# Patient Record
Sex: FEMALE | Race: WHITE | NOT HISPANIC OR LATINO | Employment: UNEMPLOYED | URBAN - METROPOLITAN AREA
[De-identification: names, ages, dates, MRNs, and addresses within clinical notes are randomized per-mention and may not be internally consistent; named-entity substitution may affect disease eponyms.]

---

## 2017-11-10 ENCOUNTER — APPOINTMENT (OUTPATIENT)
Dept: RADIOLOGY | Facility: CLINIC | Age: 62
End: 2017-11-10
Payer: COMMERCIAL

## 2017-11-10 ENCOUNTER — OFFICE VISIT (OUTPATIENT)
Dept: URGENT CARE | Facility: CLINIC | Age: 62
End: 2017-11-10
Payer: COMMERCIAL

## 2017-11-10 DIAGNOSIS — M25.562 PAIN IN LEFT KNEE: ICD-10-CM

## 2017-11-10 PROCEDURE — 99213 OFFICE O/P EST LOW 20 MIN: CPT

## 2017-11-10 PROCEDURE — S9088 SERVICES PROVIDED IN URGENT: HCPCS

## 2017-11-10 PROCEDURE — 73562 X-RAY EXAM OF KNEE 3: CPT

## 2017-11-13 NOTE — PROGRESS NOTES
Assessment    1  Left knee pain (339 46) (M25 562)    Plan  Left knee pain    · * XR KNEE 3 VW LEFT NON INJURY; Status:Active - Retrospective Authorization; Requested for:10Nov2017;     Discussion/Summary  Discussion Summary:   Follow up with PCP if no improvement  Take Tylenol or Motrin as needed for pain  Understands and agrees with treatment plan: The treatment plan was reviewed with the patient/guardian  The patient/guardian understands and agrees with the treatment plan   Counseling Documentation With Imm: The patient was counseled regarding instructions for management  Chief Complaint    1  Knee Pain  Chief Complaint Free Text Note Form: C/O pain in her posterior left knee with no known injury x 1 week  Pt states that the pain is worse with rising from a sitting position and walking  History of Present Illness  HPI: Having left posterior knee pain for past week  Worse with ambulation  No swelling of knee or lower extremity  Hospital Based Practices Required Assessment:  Pain Assessment  the patient states they have pain  The pain is located in the left posterior knee  The patient describes the pain as aching  (on a scale of 0 to 10, the patient rates the pain at 5 )  Abuse And Domestic Violence Screen   Yes, the patient is safe at home  -- The patient states no one is hurting them  Depression And Suicide Screen  No, the patient has not had thoughts of hurting themself  No, the patient has not felt depressed in the past 7 days  Knee Pain: Santana Wren presents with complaints of knee pain  Associated symptoms include no swelling,-- no warmth,-- no redness,-- no ecchymosis,-- no stiffness,-- no decreased range of motion,-- no locking,-- no instability,-- no difficulty bearing weight,-- no difficulty ambulating,-- no fever,-- no chills,-- no localized rash,-- no generalized rash-- and-- no pain in other joints        Review of Systems  Focused-Female:  Constitutional: no fever-- and-- no chills  ENT: no nasal discharge-- and-- no hoarseness  Cardiovascular: no chest pain  Respiratory: no shortness of breath,-- no cough,-- no wheezing-- and-- no shortness of breath during exertion  Gastrointestinal: no abdominal pain,-- no nausea-- and-- no vomiting  Musculoskeletal: no myalgias  Integumentary: no rashes  Neurological: no headache-- and-- no dizziness  ROS Reviewed:   ROS reviewed  Active Problems  1  Bipolar disorder (296 80) (F31 9)   2  Chest pain (786 50) (R07 9)   3  Constipation (564 00) (K59 00)   4  Dermatitis of foot (692 9) (L30 9)   5  Hypercholesterolemia (272 0) (E78 00)   6  Hypothyroidism (244 9) (E03 9)   7  Pain, dental (525 9) (K08 89)   8  Subconjunctival hemorrhage (372 72) (H11 30)    Past Medical History  1  History of Blood in underwear (459 0) (R58)   2  History of chest pain (V13 89) (Z87 898)   3  No pertinent past medical history   4  History of Perineal laceration of vulva (618 7) (S31 41XA)  Active Problems And Past Medical History Reviewed: The active problems and past medical history were reviewed and updated today  Family History  Family History    1  No pertinent family history    Social History   · Denied: History of Alcohol   · Never A Smoker  Social History Reviewed: The social history was reviewed and updated today  Surgical History    1  History of Ankle Incision   2  History of Dilation And Curettage    Current Meds   1  Doxepin HCl CAPS; Therapy: (Recorded:09Jan2014) to Recorded   2  Lithium Carbonate TABS; Therapy: (Recorded:09Jan2014) to Recorded   3  Pravachol TABS; Therapy: ([id number]) to Recorded   4  Synthroid TABS; Therapy: ([id number]) to Recorded   5  Vitamin C CAPS; Therapy: ([id number]) to Recorded  Medication List Reviewed: The medication list was reviewed and updated today  Allergies    1   No Known Drug Allergies    Vitals  Signs   Recorded: [id number] 02:22PM   Temperature: 95 9 F  Heart Rate: 96  Respiration: 20  Systolic: 070  Diastolic: 88  Height: 5 ft   Weight: 240 lb   BMI Calculated: 46 87  BSA Calculated: 2 01  O2 Saturation: 97  Pain Scale: 5    Physical Exam   Constitutional  General appearance: No acute distress, well appearing and well nourished  Eyes  Conjunctiva and lids: No swelling, erythema or discharge  Ears, Nose, Mouth, and Throat  External inspection of ears and nose: Normal    Pulmonary  Respiratory effort: No increased work of breathing or signs of respiratory distress  Musculoskeletal  Gait and station: Normal    Skin  Skin and subcutaneous tissue: Normal without rashes or lesions  Psychiatric  Mood and affect: Normal        Results/Data  Diagnostic Studies Reviewed: I personally reviewed the films/images/results in the office today  My interpretation follows  X-ray Review left knee - degenerative changes        Signatures   Electronically signed by : JONEL Ramírez; Nov 10 2017  2:59PM EST                       (Author)    Electronically signed by : REKHA Drake ; Nov 12 2017 11:37AM EST                       (Co-author)

## 2018-01-12 ENCOUNTER — OFFICE VISIT (OUTPATIENT)
Dept: URGENT CARE | Facility: CLINIC | Age: 63
End: 2018-01-12
Payer: COMMERCIAL

## 2018-01-12 PROCEDURE — S9088 SERVICES PROVIDED IN URGENT: HCPCS

## 2018-01-12 PROCEDURE — 99213 OFFICE O/P EST LOW 20 MIN: CPT

## 2018-01-16 NOTE — PROGRESS NOTES
Assessment   1  Ecchymosis (190 66) (R58)    Discussion/Summary   Discussion Summary:    Patient informed that bruise was not likely due to [address]  Continue medications as prescribed  Is more likely due to use of baby aspirin  Patient informed to continue using baby aspirin  She has no other bruising anywhere else on her body  No petechiae  If you develop more bruises, follow up with PCP  Medication Side Effects Reviewed: Possible side effects of new medications were reviewed with the patient/guardian today  Understands and agrees with treatment plan: The treatment plan was reviewed with the patient/guardian  The patient/guardian understands and agrees with the treatment plan      Chief Complaint   1  Skin Wound  Chief Complaint Free Text Note Form: C/O ecchymotic area on mid low abdomen without known injury x 2 days  Pt was started on Januvia for Borderline Diabetes and was concerned that it was causing bruising  Pt does take Aspirin 81 mg daily      History of Present Illness   HPI: 80-year-old female here with bruise on her abdomen  She does not recall any injury  She noticed a bruise about 2 days ago  Recently started on Januvia for borderline diabetes and was concerned if the medication was causing bruising  She does take a baby aspirin daily  Denies any other bruises  Hospital Based Practices Required Assessment:      Pain Assessment      the patient states they do not have pain  (on a scale of 0 to 10, the patient rates the pain at 0 )      Abuse And Domestic Violence Screen       Yes, the patient is safe at home  -- The patient states no one is hurting them  Depression And Suicide Screen  No, the patient has not had thoughts of hurting themself  No, the patient has not felt depressed in the past 7 days  Review of Systems   Focused-Female:      Constitutional: No fever, no chills, feels well, no tiredness, no recent weight gain or loss        Cardiovascular: no complaints of slow or fast heart rate, no chest pain, no palpitations, no leg claudication or lower extremity edema  Respiratory: no complaints of shortness of breath, no wheezing, no dyspnea on exertion, no orthopnea or PND  Integumentary: as noted in HPI  ROS Reviewed:    ROS reviewed  Active Problems   1  Bipolar disorder (296 80) (F31 9)   2  Chest pain (786 50) (R07 9)   3  Constipation (564 00) (K59 00)   4  Dermatitis of foot (692 9) (L30 9)   5  Hypercholesterolemia (272 0) (E78 00)   6  Hypothyroidism (244 9) (E03 9)   7  Left knee pain (719 46) (M25 562)   8  Pain, dental (525 9) (K08 89)   9  Subconjunctival hemorrhage (372 72) (H11 30)    Past Medical History   1  History of Blood in underwear (459 0) (R58)   2  History of chest pain (V13 89) (Z87 898)   3  No pertinent past medical history   4  History of Perineal laceration of vulva (618 7) (S31 41XA)  Active Problems And Past Medical History Reviewed: The active problems and past medical history were reviewed and updated today  Family History   Family History    1  No pertinent family history  Family History Reviewed: The family history was reviewed and updated today  Social History    · Denied: History of Alcohol   · Never A Smoker  Social History Reviewed: The social history was reviewed and updated today  The social history was reviewed and is unchanged  Surgical History   1  History of Ankle Incision   2  History of Dilation And Curettage  Surgical History Reviewed: The surgical history was reviewed and updated today  Current Meds    1  Doxepin HCl CAPS; Therapy: (Recorded:2014) to Recorded   2  Lithium Carbonate TABS; Therapy: (Recorded:2014) to Recorded   3  Pravachol TABS; Therapy: ([id number]) to Recorded   4  Synthroid TABS; Therapy: ([id number]) to Recorded   5  Vitamin C CAPS; Therapy: ((15) [id number]) to Recorded  Medication List Reviewed:     The medication list was reviewed and updated today  Allergies   1  No Known Drug Allergies    Vitals   Signs   Recorded: 12Jan2018 02:27PM   Temperature: 99 8 F  Heart Rate: 94  Respiration: 18  Systolic: 347  Diastolic: 90  O2 Saturation: 97    Physical Exam        Constitutional      General appearance: No acute distress, well appearing and well nourished  Pulmonary      Respiratory effort: No increased work of breathing or signs of respiratory distress  Auscultation of lungs: Clear to auscultation  Cardiovascular      Auscultation of heart: Normal rate and rhythm, normal S1 and S2, without murmurs  Abdomen      Abdomen: Abnormal  -- 2 inch diameter area of ecchymoses going through healing stages with some yellowing        Signatures    Electronically signed by : Brad Ugalde PAC; Jan 12 2018  2:56PM EST                       (Author)     Electronically signed by : REKHA Camacho ; Rio 15 2018 11:34AM EST                       (Co-author)

## 2018-01-23 VITALS
DIASTOLIC BLOOD PRESSURE: 90 MMHG | HEART RATE: 94 BPM | TEMPERATURE: 99.8 F | OXYGEN SATURATION: 97 % | RESPIRATION RATE: 18 BRPM | SYSTOLIC BLOOD PRESSURE: 152 MMHG

## 2018-04-20 ENCOUNTER — OFFICE VISIT (OUTPATIENT)
Dept: URGENT CARE | Facility: CLINIC | Age: 63
End: 2018-04-20
Payer: COMMERCIAL

## 2018-04-20 ENCOUNTER — APPOINTMENT (OUTPATIENT)
Dept: RADIOLOGY | Facility: CLINIC | Age: 63
End: 2018-04-20
Payer: COMMERCIAL

## 2018-04-20 VITALS
HEIGHT: 60 IN | WEIGHT: 235 LBS | HEART RATE: 101 BPM | BODY MASS INDEX: 46.13 KG/M2 | TEMPERATURE: 98 F | SYSTOLIC BLOOD PRESSURE: 128 MMHG | DIASTOLIC BLOOD PRESSURE: 82 MMHG

## 2018-04-20 DIAGNOSIS — M25.561 ACUTE PAIN OF RIGHT KNEE: Primary | ICD-10-CM

## 2018-04-20 DIAGNOSIS — M25.561 ACUTE PAIN OF RIGHT KNEE: ICD-10-CM

## 2018-04-20 PROCEDURE — S9088 SERVICES PROVIDED IN URGENT: HCPCS | Performed by: PHYSICIAN ASSISTANT

## 2018-04-20 PROCEDURE — 99213 OFFICE O/P EST LOW 20 MIN: CPT | Performed by: PHYSICIAN ASSISTANT

## 2018-04-20 PROCEDURE — 73562 X-RAY EXAM OF KNEE 3: CPT

## 2018-04-20 RX ORDER — AMOXICILLIN 400 MG/5ML
800 POWDER, FOR SUSPENSION ORAL 2 TIMES DAILY
Qty: 280 ML | Refills: 0 | Status: SHIPPED | OUTPATIENT
Start: 2018-04-20 | End: 2018-04-20 | Stop reason: CLARIF

## 2018-04-20 NOTE — PROGRESS NOTES
[facility]        NAME: Emma Hinds is a 65 y o  female  : 1955    MRN: [id number]  DATE: 2018  TIME: 3:36 PM    Assessment and Plan   Acute pain of right knee [M25 561]  1  Acute pain of right knee  XR knee 3 vw right non injury         Patient Instructions     Follow up with PCP in 3-5 days  Proceed to  ER if symptoms worsen  Chief Complaint     Chief Complaint   Patient presents with    Leg Pain     right leg pain started 1 week ago         History of Present Illness       Patient presents with right knee pain which started 1 week ago  She states the pain occasionally radiates to her right groin  She denies any swelling to the knee or lower extremity no redness or warmth to her thigh  She states that when she is walking the pain is worse  No fever chills paresthesias headaches palpitations  Review of Systems   Review of Systems   Constitutional: Negative for chills and fever  HENT: Negative for rhinorrhea and sore throat  Eyes: Negative for redness  Respiratory: Negative for cough, shortness of breath and wheezing  Cardiovascular: Negative for chest pain and palpitations  Gastrointestinal: Negative for abdominal pain, diarrhea, nausea and vomiting  Genitourinary: Negative for difficulty urinating  Musculoskeletal: Negative for joint swelling  Skin: Negative for rash  Neurological: Negative for dizziness and headaches  Hematological: Negative for adenopathy  Current Medications     No current outpatient prescriptions on file      Current Allergies     Allergies as of 2018    (No Known Allergies)            The following portions of the patient's history were reviewed and updated as appropriate: allergies, current medications, past family history, past medical history, past social history, past surgical history and problem list      Past Medical History:   Diagnosis Date    Allergic     Depression     Disease of thyroid gland  GERD (gastroesophageal reflux disease)        History reviewed  No pertinent surgical history  No family history on file  Medications have been verified  Objective   /82 (BP Location: Right arm, Patient Position: Sitting, Cuff Size: Standard)   Pulse 101   Temp 98 °F (36 7 °C) (Tympanic)   Ht 5' (1 524 m)   Wt 107 kg (235 lb)   BMI 45 90 kg/m²        Physical Exam     Physical Exam   Constitutional: She is oriented to person, place, and time  She appears well-developed and well-nourished  HENT:   Head: Normocephalic and atraumatic  Eyes: Conjunctivae are normal    Neck: Normal range of motion  Cardiovascular: Normal rate and regular rhythm  Pulmonary/Chest: Effort normal    Musculoskeletal:   Right knee without any erythema bruising swelling or tenderness to palpation  No edema of the lower extremity  No palpable cords negative Homans  Neurological: She is alert and oriented to person, place, and time  Skin: Skin is warm and dry  No rash noted  Psychiatric: She has a normal mood and affect   Her behavior is normal  Judgment and thought content normal      X-ray viewed by me and independently reviewed by me contemporaneously as patellar degenerative changes

## 2018-06-11 ENCOUNTER — TRANSCRIBE ORDERS (OUTPATIENT)
Dept: PHYSICAL THERAPY | Facility: CLINIC | Age: 63
End: 2018-06-11

## 2018-06-11 ENCOUNTER — EVALUATION (OUTPATIENT)
Dept: PHYSICAL THERAPY | Facility: CLINIC | Age: 63
End: 2018-06-11
Payer: COMMERCIAL

## 2018-06-11 DIAGNOSIS — M25.561 CHRONIC PAIN OF RIGHT KNEE: Primary | ICD-10-CM

## 2018-06-11 DIAGNOSIS — G89.29 CHRONIC PAIN OF RIGHT KNEE: Primary | ICD-10-CM

## 2018-06-11 PROCEDURE — G8990 OTHER PT/OT CURRENT STATUS: HCPCS | Performed by: PHYSICAL THERAPIST

## 2018-06-11 PROCEDURE — G8991 OTHER PT/OT GOAL STATUS: HCPCS | Performed by: PHYSICAL THERAPIST

## 2018-06-11 PROCEDURE — 97535 SELF CARE MNGMENT TRAINING: CPT | Performed by: PHYSICAL THERAPIST

## 2018-06-11 PROCEDURE — 97162 PT EVAL MOD COMPLEX 30 MIN: CPT | Performed by: PHYSICAL THERAPIST

## 2018-06-11 NOTE — LETTER
2018    Tamika Garcia DO  [address]  [address] [address]    Patient: Cristhian Sherwood   YOB: 1955   Date of Visit: 2018     Encounter Diagnosis     ICD-10-CM    1  Chronic pain of right knee M25 561     G89 29        Dear Dr Marlena Villa:    Please review the attached Plan of Care from [address] recent visit  Please verify that you agree therapy should continue by signing the attached document and sending it back to our office  If you have any questions or concerns, please don't hesitate to call  Sincerely,    Edwin Manuel, PT      Referring Provider:      I certify that I have read the below Plan of Care and certify the need for these services furnished under this plan of treatment while under my care  Tamika Garcia DO  [address]  [address] [facility] [address] [place] [address]: [phone]          PT Evaluation     Today's date: 2018  Patient name: Cristhian Sherwood  : 1955  MRN: [id number]  Referring provider: Patience Melchor DO  Dx:   Encounter Diagnosis     ICD-10-CM    1  Chronic pain of right knee M25 561     G89 29                   Assessment  Impairments: activity intolerance, impaired balance, impaired physical strength and pain with function    Assessment details: Cristhian Sherwood is a 65 y o  female who presents to outpatient PT with a  Chronic pain of right knee  (primary encounter diagnosis)  No further referral appears necessary at this time based upon examination results  Pt presents with decreased strength, ROM, balance, functional activity tolerance, and pain with movement in her right knee,  which is  limiting her ability to perform the aforementioned functional activities  Etiologic factors include repetitive poor body mechanics  Prognosis is good given HEP compliance and PT 3/wk  Please contact me if you have any questions or recommendations  Thank you for the opportunity to share in  Kathia jorgensen  Understanding of Dx/Px/POC: good   Prognosis: good    Goals  1  In 4-6 weeks, patient will demonstrate a decrease in pain to 0/10 during functional activities  2  In 4-6 weeks, patient will demonstrate an increase in range of motion by 5 degrees  3  In 4-6 weeks, patient will demonstrate an increase in strength by 1/2 grade on MMT    1  In 6-8 weeks, patient will be independent with their home exercise program  2  In 6-8 weeks, patient will have zero limitations with strength  3  In 6-8 weeks, patient will have zero limitations with ROM  4  In 6-8 weeks, patient will have zero limitations with ambulation  5  In 6-8 weeks, patient will have zero limitations with stair negotiation    Plan  Patient would benefit from: skilled physical therapy  Planned therapy interventions: manual therapy, therapeutic exercise and home exercise program  Frequency: 3x week  Duration in weeks: 6  Treatment plan discussed with: patient  Plan details: Patient was provided a home exercise program and demonstrated an understanding of exercises  Patient was advised to stop performing home exercise program if symptoms increase or new complaints developed  Verbal understanding demonstrated regarding home exercise program instructions  Subjective Evaluation    History of Present Illness  Date of onset: 2018  Mechanism of injury: Pt reports that her right knee began to bother her about a month ago  She reports that she went to the urgent care, and then to see Dr Luisito Arnold  She had an MRI done, and that's when they found out that she has a tear in her meniscus    Pain  Current pain ratin  At best pain ratin  At worst pain ratin  Relieving factors: change in position  Aggravating factors: sitting  Progression: no change    Social Support  Steps to enter house: yes  Stairs in house: yes   Lives in: multiple-level home  Lives with: Brother     Employment status: not working (Retired )  Treatments  Current treatment: physical therapy  Patient Goals  Patient goals for therapy: decreased pain          Objective     Active Range of Motion   Left Hip   Flexion: Left hip active flexion: Limited  Abduction: Active abduction with hip flexed: Limited    Adduction: Left hip active adduction: Limited      Right Hip   Flexion: Right hip active flexion: Limited    Abduction: Right hip active abduction: Limited    Adduction: Right hip active adduction: Limited    Left Knee   Flexion: 115 degrees   Extension: -4 degrees     Right Knee   Flexion: 110 degrees   Extension: -4 degrees     Additional Active Range of Motion Details  Pain and tenderness to Right medial joint line  Braulio Positive Medially  Varus/Valgus - Negative     Anterior Drawer  _ Negative     Posterior drawer - Negative     Heel Raise/ Toe Raise, Normal     SLS 0 seconds, right,   0 seconds left           Strength/Myotome Testing     Left Hip   Planes of Motion   Flexion: 4-  Abduction: 4-  Adduction: 4-    Right Hip   Planes of Motion   Flexion: 4-  Abduction: 4-  Adduction: 4-    Left Knee   Flexion: 4  Extension: 4    Right Knee   Flexion: 4  Extension: 4          Precautions:     Daily Treatment Diary     Manual                                                                                   Exercise Diary              R HS Self   R Gastroc Self   R Hip Flexor self       Nu Step             Quad Set              Hip[ Add with Ball             Clamshell             Bridges              SLR 4 way              Step Up              SLS             HR/TR              Lunges                                                                                                                                                    Modalities

## 2018-06-11 NOTE — PROGRESS NOTES
PT Evaluation     Today's date: 2018  Patient name: Malcolm Bone  : 1955  MRN: [id number]  Referring provider: Stevo Paul DO  Dx:   Encounter Diagnosis     ICD-10-CM    1  Chronic pain of right knee M25 561     G89 29                   Assessment  Impairments: activity intolerance, impaired balance, impaired physical strength and pain with function    Assessment details: Malcolm Bone is a 65 y o  female who presents to outpatient PT with a  Chronic pain of right knee  (primary encounter diagnosis)  No further referral appears necessary at this time based upon examination results  Pt presents with decreased strength, ROM, balance, functional activity tolerance, and pain with movement in her right knee,  which is  limiting her ability to perform the aforementioned functional activities  Etiologic factors include repetitive poor body mechanics  Prognosis is good given HEP compliance and PT 3/wk  Please contact me if you have any questions or recommendations  Thank you for the opportunity to share in  [facility]  Understanding of Dx/Px/POC: good   Prognosis: good    Goals  1  In 4-6 weeks, patient will demonstrate a decrease in pain to 0/10 during functional activities  2  In 4-6 weeks, patient will demonstrate an increase in range of motion by 5 degrees  3  In 4-6 weeks, patient will demonstrate an increase in strength by 1/2 grade on MMT    1  In 6-8 weeks, patient will be independent with their home exercise program  2  In 6-8 weeks, patient will have zero limitations with strength  3  In 6-8 weeks, patient will have zero limitations with ROM  4  In 6-8 weeks, patient will have zero limitations with ambulation  5    In 6-8 weeks, patient will have zero limitations with stair negotiation    Plan  Patient would benefit from: skilled physical therapy  Planned therapy interventions: manual therapy, therapeutic exercise and home exercise program  Frequency: 3x week  Duration in weeks: 6  Treatment plan discussed with: patient  Plan details: Patient was provided a home exercise program and demonstrated an understanding of exercises  Patient was advised to stop performing home exercise program if symptoms increase or new complaints developed  Verbal understanding demonstrated regarding home exercise program instructions  Subjective Evaluation    History of Present Illness  Date of onset: 2018  Mechanism of injury: Pt reports that her right knee began to bother her about a month ago  She reports that she went to the urgent care, and then to see Dr David Holley  She had an MRI done, and that's when they found out that she has a tear in her meniscus  Pain  Current pain ratin  At best pain ratin  At worst pain ratin  Relieving factors: change in position  Aggravating factors: sitting  Progression: no change    Social Support  Steps to enter house: yes  Stairs in house: yes   Lives in: multiple-level home  Lives with: Brother     Employment status: not working (Retired )  Treatments  Current treatment: physical therapy  Patient Goals  Patient goals for therapy: decreased pain          Objective     Active Range of Motion   Left Hip   Flexion: Left hip active flexion: Limited  Abduction: Active abduction with hip flexed: Limited    Adduction: Left hip active adduction: Limited      Right Hip   Flexion: Right hip active flexion: Limited    Abduction: Right hip active abduction: Limited    Adduction: Right hip active adduction: Limited    Left Knee   Flexion: 115 degrees   Extension: -4 degrees     Right Knee   Flexion: 110 degrees   Extension: -4 degrees     Additional Active Range of Motion Details  Pain and tenderness to Right medial joint line  Braulio Positive Medially  Varus/Valgus - Negative     Anterior Drawer  _ Negative     Posterior drawer - Negative     Heel Raise/ Toe Raise, Normal     SLS 0 seconds, right,   0 seconds left           Strength/Myotome Testing Left Hip   Planes of Motion   Flexion: 4-  Abduction: 4-  Adduction: 4-    Right Hip   Planes of Motion   Flexion: 4-  Abduction: 4-  Adduction: 4-    Left Knee   Flexion: 4  Extension: 4    Right Knee   Flexion: 4  Extension: 4          Precautions:     Daily Treatment Diary     Manual                                                                                   Exercise Diary              R HS Self   R Gastroc Self   R Hip Flexor self       Nu Step             Quad Set              Hip[ Add with Ball             Clamshell             Bridges              SLR 4 way              Step Up              SLS             HR/TR              Lunges                                                                                                                                                    Modalities

## 2018-06-12 ENCOUNTER — APPOINTMENT (OUTPATIENT)
Dept: LAB | Facility: CLINIC | Age: 63
End: 2018-06-12
Payer: COMMERCIAL

## 2018-06-12 ENCOUNTER — TRANSCRIBE ORDERS (OUTPATIENT)
Dept: LAB | Facility: CLINIC | Age: 63
End: 2018-06-12

## 2018-06-12 DIAGNOSIS — E55.9 VITAMIN D DEFICIENCY: ICD-10-CM

## 2018-06-12 DIAGNOSIS — R53.83 FATIGUE, UNSPECIFIED TYPE: Primary | ICD-10-CM

## 2018-06-12 DIAGNOSIS — E78.2 MIXED HYPERLIPIDEMIA: ICD-10-CM

## 2018-06-12 LAB
25(OH)D3 SERPL-MCNC: 50.6 NG/ML (ref 30–100)
ALBUMIN SERPL BCP-MCNC: 3.5 G/DL (ref 3.5–5)
ALP SERPL-CCNC: 123 U/L (ref 46–116)
ALT SERPL W P-5'-P-CCNC: 16 U/L (ref 12–78)
ANION GAP SERPL CALCULATED.3IONS-SCNC: 8 MMOL/L (ref 4–13)
AST SERPL W P-5'-P-CCNC: 12 U/L (ref 5–45)
BASOPHILS # BLD AUTO: 0.05 THOUSANDS/ΜL (ref 0–0.1)
BASOPHILS NFR BLD AUTO: 0 % (ref 0–1)
BILIRUB DIRECT SERPL-MCNC: 0.23 MG/DL (ref 0–0.2)
BILIRUB SERPL-MCNC: 0.99 MG/DL (ref 0.2–1)
BUN SERPL-MCNC: 7 MG/DL (ref 5–25)
CALCIUM SERPL-MCNC: 9.5 MG/DL (ref 8.3–10.1)
CHLORIDE SERPL-SCNC: 105 MMOL/L (ref 100–108)
CHOLEST SERPL-MCNC: 173 MG/DL (ref 50–200)
CO2 SERPL-SCNC: 25 MMOL/L (ref 21–32)
CREAT SERPL-MCNC: 0.85 MG/DL (ref 0.6–1.3)
EOSINOPHIL # BLD AUTO: 0 THOUSAND/ΜL (ref 0–0.61)
EOSINOPHIL NFR BLD AUTO: 0 % (ref 0–6)
ERYTHROCYTE [DISTWIDTH] IN BLOOD BY AUTOMATED COUNT: 13.2 % (ref 11.6–15.1)
FERRITIN SERPL-MCNC: 85 NG/ML (ref 8–388)
GFR SERPL CREATININE-BSD FRML MDRD: 74 ML/MIN/1.73SQ M
GLUCOSE P FAST SERPL-MCNC: 178 MG/DL (ref 65–99)
HCT VFR BLD AUTO: 42.4 % (ref 34.8–46.1)
HDLC SERPL-MCNC: 65 MG/DL (ref 40–60)
HGB BLD-MCNC: 13.6 G/DL (ref 11.5–15.4)
IMM GRANULOCYTES # BLD AUTO: 0.07 THOUSAND/UL (ref 0–0.2)
IMM GRANULOCYTES NFR BLD AUTO: 1 % (ref 0–2)
IRON SATN MFR SERPL: 25 %
IRON SERPL-MCNC: 86 UG/DL (ref 50–170)
LDLC SERPL CALC-MCNC: 82 MG/DL (ref 0–100)
LITHIUM SERPL-SCNC: 0.6 MMOL/L (ref 0.5–1)
LYMPHOCYTES # BLD AUTO: 2.04 THOUSANDS/ΜL (ref 0.6–4.47)
LYMPHOCYTES NFR BLD AUTO: 17 % (ref 14–44)
MCH RBC QN AUTO: 29.4 PG (ref 26.8–34.3)
MCHC RBC AUTO-ENTMCNC: 32.1 G/DL (ref 31.4–37.4)
MCV RBC AUTO: 92 FL (ref 82–98)
MONOCYTES # BLD AUTO: 0.61 THOUSAND/ΜL (ref 0.17–1.22)
MONOCYTES NFR BLD AUTO: 5 % (ref 4–12)
NEUTROPHILS # BLD AUTO: 9.41 THOUSANDS/ΜL (ref 1.85–7.62)
NEUTS SEG NFR BLD AUTO: 77 % (ref 43–75)
NONHDLC SERPL-MCNC: 108 MG/DL
NRBC BLD AUTO-RTO: 0 /100 WBCS
PLATELET # BLD AUTO: 334 THOUSANDS/UL (ref 149–390)
PMV BLD AUTO: 10.4 FL (ref 8.9–12.7)
POTASSIUM SERPL-SCNC: 4.1 MMOL/L (ref 3.5–5.3)
PROT SERPL-MCNC: 7.5 G/DL (ref 6.4–8.2)
RBC # BLD AUTO: 4.63 MILLION/UL (ref 3.81–5.12)
SODIUM SERPL-SCNC: 138 MMOL/L (ref 136–145)
T3FREE SERPL-MCNC: 2.37 PG/ML (ref 2.3–4.2)
TIBC SERPL-MCNC: 348 UG/DL (ref 250–450)
TRIGL SERPL-MCNC: 130 MG/DL
TSH SERPL DL<=0.05 MIU/L-ACNC: 0.43 UIU/ML (ref 0.36–3.74)
WBC # BLD AUTO: 12.18 THOUSAND/UL (ref 4.31–10.16)

## 2018-06-12 PROCEDURE — 80048 BASIC METABOLIC PNL TOTAL CA: CPT

## 2018-06-12 PROCEDURE — 80076 HEPATIC FUNCTION PANEL: CPT

## 2018-06-12 PROCEDURE — 84481 FREE ASSAY (FT-3): CPT

## 2018-06-12 PROCEDURE — 84443 ASSAY THYROID STIM HORMONE: CPT

## 2018-06-12 PROCEDURE — 82728 ASSAY OF FERRITIN: CPT

## 2018-06-12 PROCEDURE — 80178 ASSAY OF LITHIUM: CPT

## 2018-06-12 PROCEDURE — 82306 VITAMIN D 25 HYDROXY: CPT

## 2018-06-12 PROCEDURE — 83550 IRON BINDING TEST: CPT

## 2018-06-12 PROCEDURE — 36415 COLL VENOUS BLD VENIPUNCTURE: CPT

## 2018-06-12 PROCEDURE — 85025 COMPLETE CBC W/AUTO DIFF WBC: CPT

## 2018-06-12 PROCEDURE — 80061 LIPID PANEL: CPT

## 2018-06-12 PROCEDURE — 83540 ASSAY OF IRON: CPT

## 2018-06-13 ENCOUNTER — OFFICE VISIT (OUTPATIENT)
Dept: PHYSICAL THERAPY | Facility: CLINIC | Age: 63
End: 2018-06-13
Payer: COMMERCIAL

## 2018-06-13 DIAGNOSIS — M25.561 CHRONIC PAIN OF RIGHT KNEE: Primary | ICD-10-CM

## 2018-06-13 DIAGNOSIS — G89.29 CHRONIC PAIN OF RIGHT KNEE: Primary | ICD-10-CM

## 2018-06-13 PROCEDURE — 97110 THERAPEUTIC EXERCISES: CPT | Performed by: PHYSICAL THERAPIST

## 2018-06-13 NOTE — PROGRESS NOTES
Daily Note     Today's date: 2018  Patient name: Willow Dawn  : 1955  MRN: [id number]  Referring provider: Regina Kay DO  Dx:   Encounter Diagnosis     ICD-10-CM    1  Chronic pain of right knee M25 561     G89 29                   Subjective: "I feel fine today "       Objective: See treatment diary below    Manual                                                                                   Exercise Diary              R HS Self   R Gastroc Self   R Hip Flexor self       Nu Step 30''3x  30''3x  30''3x        10 min L3            Quad Set              Hip[ Add with Ball 10''10x            Clamshell 3x10 Blue             Bridges  5''20x             SLR 4 way  3x10 flex only             Step Up  B 3x10 right only             SLS             HR/TR  30x each             Lunges                                                                                                                                                    Modalities                                                           Assessment: Tolerated treatment fair  Patient exhibited good technique with therapeutic exercises  Pt began sweating profusely during exercises  Pt is diabetic, and checks her blood sugar regularly  Denies dizziness and lightheaded ness during PT  Gave two pieces of chocolate, and some water  Pt reports feeling Ok and was able to continue treatment  No sx throughout the remainder of treatment  Plan: Continue per plan of care

## 2018-06-14 ENCOUNTER — APPOINTMENT (OUTPATIENT)
Dept: PHYSICAL THERAPY | Facility: CLINIC | Age: 63
End: 2018-06-14
Payer: COMMERCIAL

## 2018-06-14 ENCOUNTER — OFFICE VISIT (OUTPATIENT)
Dept: PHYSICAL THERAPY | Facility: CLINIC | Age: 63
End: 2018-06-14
Payer: COMMERCIAL

## 2018-06-14 DIAGNOSIS — G89.29 CHRONIC PAIN OF RIGHT KNEE: Primary | ICD-10-CM

## 2018-06-14 DIAGNOSIS — M25.561 CHRONIC PAIN OF RIGHT KNEE: Primary | ICD-10-CM

## 2018-06-14 PROCEDURE — 97110 THERAPEUTIC EXERCISES: CPT

## 2018-06-14 NOTE — PROGRESS NOTES
Daily Note     Today's date: 2018  Patient name: Carlitos Oden  : 1955  MRN: [id number]  Referring provider: Komal Singh DO  Dx:   Encounter Diagnosis     ICD-10-CM    1  Chronic pain of right knee M25 561     G89 29                   Subjective: Pt reports that she is sore from her PT session yesterday  Objective: See treatment diary below      Manual                                                     Exercise Diary        R HS Self   R Gastroc Self   R Hip Flexor self       Nu Step 30''3x  30''3x  30''3x        10 min L3 30''3x  30''3x  30''3x        10 min L3      Quad Set   5" x20      Hip Add with Ball 10''10x 5''20x      Clamshell 3x10 Blue  3x10 Blue       Bridges  5''20x  5''20x       SLR 4 way  3x10 flex only  3x10 flex only       Step Up  B 3x10 right only  B 3x10 right only      SLS        HR/TR  30x each  30x each       Lunges                                                                                             Modalities                                   Assessment: Tolerated treatment fair  Pt requires some verbal cueing to perform exercises correctly  Pt fatigues very easily and requires frequent rest breaks for fatigue to reside  Patient would benefit from continued PT      Plan: Continue per plan of care  Progress treatment as tolerated

## 2018-06-18 ENCOUNTER — OFFICE VISIT (OUTPATIENT)
Dept: PHYSICAL THERAPY | Facility: CLINIC | Age: 63
End: 2018-06-18
Payer: COMMERCIAL

## 2018-06-18 DIAGNOSIS — M25.561 CHRONIC PAIN OF RIGHT KNEE: Primary | ICD-10-CM

## 2018-06-18 DIAGNOSIS — G89.29 CHRONIC PAIN OF RIGHT KNEE: Primary | ICD-10-CM

## 2018-06-18 PROCEDURE — 97110 THERAPEUTIC EXERCISES: CPT | Performed by: PHYSICAL THERAPIST

## 2018-06-18 NOTE — PROGRESS NOTES
Daily Note     Today's date: 2018  Patient name: Carlitos Oden  : 1955  MRN: [id number]  Referring provider: Komal Singh DO  Dx: No diagnosis found  Subjective: " I feel pretty good"       Objective: See treatment diary below  Manual                                                     Exercise Diary        R HS Self   R Gastroc Self   R Hip Flexor self       Nu Step 30''3x  30''3x  30''3x        10 min L3 30''3x  30''3x  30''3x        10 min L3 30''3x  30''3x  30''3x        10 min L3      Quad Set   5" x20 5''20x     Hip Add with Ball 10''10x 5''20x 5''20x     Clamshell 3x10 Blue  3x10 Blue  3x10 Blue      Bridges  5''20x  5''20x  5''20x      SLR 4 way  3x10 flex only  3x10 flex only  3x10 flex only      Step Up  B 3x10 right only  B 3x10 right only B 3x10 RLE      SLS        HR/TR  30x each  30x each  30x each       Lunges                                                                                             Modalities                                       Assessment: Tolerated treatment well  Patient exhibited good technique with therapeutic exercises  Consider gait training next session with appropriate use of SPC  Plan: Continue per plan of care

## 2018-06-20 ENCOUNTER — OFFICE VISIT (OUTPATIENT)
Dept: PHYSICAL THERAPY | Facility: CLINIC | Age: 63
End: 2018-06-20
Payer: COMMERCIAL

## 2018-06-20 DIAGNOSIS — M25.561 CHRONIC PAIN OF RIGHT KNEE: Primary | ICD-10-CM

## 2018-06-20 DIAGNOSIS — G89.29 CHRONIC PAIN OF RIGHT KNEE: Primary | ICD-10-CM

## 2018-06-20 PROCEDURE — 97110 THERAPEUTIC EXERCISES: CPT

## 2018-06-20 NOTE — PROGRESS NOTES
Daily Note     Today's date: 2018  Patient name: Tiffany Alexander  : 1955  MRN: [id number]  Referring provider: Dave Leon DO  Dx:   Encounter Diagnosis     ICD-10-CM    1  Chronic pain of right knee M25 561     G89 29                   Subjective: Pt offers no new comments and denies all pain in the R knee    Objective: See treatment diary below  Manual                                                     Exercise Diary      R HS Self   R Gastroc Self   R Hip Flexor self       Nu Step 30''3x  30''3x  30''3x        10 min L3 30''3x  30''3x  30''3x        10 min L3 30''3x  30''3x  30''3x        10 min L3  30''3x  30''3x  30''3x        10 min L3    Quad Set   5" x20 5''20x 5" x30    Hip Add with Ball 10''10x 5''20x 5''20x 5" x20    Clamshell 3x10 Blue  3x10 Blue  3x10 Blue  3x10 blue    Bridges  5''20x  5''20x  5''20x  5''20x     SLR 4 way  3x10 flex only  3x10 flex only  3x10 flex only  3x10 flex only    Step Up  B 3x10 right only  B 3x10 right only B 3x10 RLE  B 3x10 RLE    SLS    30" x3 RLE    HR/TR  30x each  30x each  30x each   30x ea    Lunges     3x10 3" RLE                                                                                        Modalities                                   Assessment: Tolerated treatment well  Initiated a SLS and lunges on the RLE this date  Pt has some LOB during SLS but is able to self correct  Patient demonstrated fatigue post treatment and would benefit from continued PT      Plan: Continue per plan of care  Progress treatment as tolerated

## 2018-06-21 ENCOUNTER — OFFICE VISIT (OUTPATIENT)
Dept: PHYSICAL THERAPY | Facility: CLINIC | Age: 63
End: 2018-06-21
Payer: COMMERCIAL

## 2018-06-21 DIAGNOSIS — M25.561 CHRONIC PAIN OF RIGHT KNEE: Primary | ICD-10-CM

## 2018-06-21 DIAGNOSIS — G89.29 CHRONIC PAIN OF RIGHT KNEE: Primary | ICD-10-CM

## 2018-06-21 PROCEDURE — 97110 THERAPEUTIC EXERCISES: CPT

## 2018-06-21 NOTE — PROGRESS NOTES
Daily Note     Today's date: 2018  Patient name: Edison Arora  : 1955  MRN: [id number]  Referring provider: Corbin Echevarria DO  Dx:   Encounter Diagnosis     ICD-10-CM    1  Chronic pain of right knee M25 561     G89 29                   Subjective: Pt reports that she is sore this date  Objective: See treatment diary below    Exercise Diary     R HS Self   R Gastroc Self   R Hip Flexor self       Nu Step 30''3x  30''3x  30''3x        10 min L3 30''3x  30''3x  30''3x        10 min L3 30''3x  30''3x  30''3x        10 min L3  30''3x  30''3x  30''3x        10 min L3 30''3x  30''3x  30''3x        10 min L3   Quad Set   5" x20 5''20x 5" x30 5" x30   Hip Add with Ball 10''10x 5''20x 5''20x 5" x20 5" x30   Clamshell 3x10 Blue  3x10 Blue  3x10 Blue  3x10 blue 3x10 blue TB   Bridges  5''20x  5''20x  5''20x  5''20x  5" x30   SLR 4 way  3x10 flex only  3x10 flex only  3x10 flex only  3x10 flex only 3x10  Flex only    Step Up  B 3x10 right only  B 3x10 right only B 3x10 RLE  B 3x10 RLE B 3x10 RLE   SLS    30" x3 RLE 30" x3 RLE   HR/TR  30x each  30x each  30x each   30x ea 30x ea   Lunges     3x10 3" RLE 3x10 3" ea                                                                                       Modalities                                     Assessment: Tolerated treatment well  Pt has a better understanding of self stretches this date  Patient demonstrated fatigue post treatment and would benefit from continued PT      Plan: Continue per plan of care  Progress treatment as tolerated

## 2018-06-25 ENCOUNTER — OFFICE VISIT (OUTPATIENT)
Dept: PHYSICAL THERAPY | Facility: CLINIC | Age: 63
End: 2018-06-25
Payer: COMMERCIAL

## 2018-06-25 DIAGNOSIS — G89.29 CHRONIC PAIN OF RIGHT KNEE: Primary | ICD-10-CM

## 2018-06-25 DIAGNOSIS — M25.561 CHRONIC PAIN OF RIGHT KNEE: Primary | ICD-10-CM

## 2018-06-25 PROCEDURE — 97110 THERAPEUTIC EXERCISES: CPT

## 2018-06-25 NOTE — PROGRESS NOTES
Daily Note     Today's date: 2018  Patient name: Devin Keane  : 1955  MRN: [id number]  Referring provider: Dianna Rey DO  Dx:   Encounter Diagnosis     ICD-10-CM    1  Chronic pain of right knee M25 561     G89 29                   Subjective: Pt offers no new comments and denies all pain  Objective: See treatment diary below    Exercise Diary     R HS Self   R Gastroc Self   R Hip Flexor self       Nu Step 30''3x  30''3x  30''3x        10 min L4 30''3x  30''3x  30''3x        10 min L3 30''3x  30''3x  30''3x        10 min L3  30''3x  30''3x  30''3x        10 min L3 30''3x  30''3x  30''3x        10 min L3   Quad Set  5" x20 5" x20 5''20x 5" x30 5" x30   Hip Add with Ball 5" x30 5''20x 5''20x 5" x20 5" x30   Clamshell 3x10 Blue  3x10 Blue  3x10 Blue  3x10 blue 3x10 blue TB   Bridges  5''20x  5''20x  5''20x  5''20x  5" x30   SLR 4 way  3x10 flex only  3x10 flex only  3x10 flex only  3x10 flex only 3x10  Flex only    Step Up  B 3x10 right only  B 3x10 right only B 3x10 RLE  B 3x10 RLE B 3x10 RLE   SLS 30" x3 RLE   30" x3 RLE 30" x3 RLE   HR/TR  30x each  30x each  30x each   30x ea 30x ea   Lunges  3x10 3" RLE   3x10 3" RLE 3x10 3" ea                                                                                       Modalities                                     Assessment: Tolerated treatment well  Pt has some LOB and dizziness when transfering from a supine to sit position  Pt requires multiple therapeutic rest breaks this date  Patient demonstrated fatigue post treatment and would benefit from continued PT      Plan: Continue per plan of care  Progress treatment as tolerated

## 2018-06-27 ENCOUNTER — OFFICE VISIT (OUTPATIENT)
Dept: PHYSICAL THERAPY | Facility: CLINIC | Age: 63
End: 2018-06-27
Payer: COMMERCIAL

## 2018-06-27 DIAGNOSIS — G89.29 CHRONIC PAIN OF RIGHT KNEE: Primary | ICD-10-CM

## 2018-06-27 DIAGNOSIS — M25.561 CHRONIC PAIN OF RIGHT KNEE: Primary | ICD-10-CM

## 2018-06-27 PROCEDURE — 97110 THERAPEUTIC EXERCISES: CPT | Performed by: PHYSICAL THERAPIST

## 2018-06-27 NOTE — PROGRESS NOTES
Daily Note     Today's date: 2018  Patient name: Loree Tafoya  : 1955  MRN: [id number]  Referring provider: Domenic Osler, DO  Dx:   Encounter Diagnosis     ICD-10-CM    1  Chronic pain of right knee M25 561     G89 29                   Subjective: "My knee feels alright"      Objective: See treatment diary below  Exercise Diary     R HS Self   R Gastroc Self   R Hip Flexor self       Nu Step 30''3x  30''3x  30''3x        10 min L4 30''3x  30''3x  30''3x        10 min L3 30''3x  30''3x  30''3x        10 min L3  30''3x  30''3x  30''3x        10 min L3 30''3x  30''3x  30''3x        10 min L3   Quad Set  5" x20 5" x20 5''20x 5" x30 5" x30   Hip Add with Ball 5" x30 5''20x 5''20x 5" x20 5" x30   Clamshell 3x10 Blue  3x10 Blue  3x10 Blue  3x10 blue 3x10 blue TB   Bridges  5''20x  5''20x  5''20x  5''20x  5" x30   SLR 4 way  3x10 flex only  3x10 flex only  3x10 flex only  3x10 flex only 3x10  Flex only    Step Up  B 3x10 right only  B 3x10 right only B 3x10 RLE  B 3x10 RLE B 3x10 RLE   SLS 30" x3 RLE   30" x3 RLE 30" x3 RLE   HR/TR  30x each  30x each  30x each   30x ea 30x ea   Lunges  3x10 3" RLE   3x10 3" RLE 3x10 3" ea   Mini Squat   3x10                                                                                   Modalities                                     Assessment: Tolerated treatment well  Patient exhibited good technique with therapeutic exercises  Added mini squats this session to help with lower extremity and right knee strengthening  Plan: Continue per plan of care

## 2018-06-28 ENCOUNTER — OFFICE VISIT (OUTPATIENT)
Dept: PHYSICAL THERAPY | Facility: CLINIC | Age: 63
End: 2018-06-28
Payer: COMMERCIAL

## 2018-06-28 DIAGNOSIS — G89.29 CHRONIC PAIN OF RIGHT KNEE: Primary | ICD-10-CM

## 2018-06-28 DIAGNOSIS — M25.561 CHRONIC PAIN OF RIGHT KNEE: Primary | ICD-10-CM

## 2018-06-28 PROCEDURE — 97110 THERAPEUTIC EXERCISES: CPT

## 2018-06-28 NOTE — PROGRESS NOTES
Daily Note     Today's date: 2018  Patient name: Casper Ross  : 1955  MRN: [id number]  Referring provider: Lani Scruggs DO  Dx:   Encounter Diagnosis     ICD-10-CM    1  Chronic pain of right knee M25 561     G89 29                   Subjective: Pt reports that she feels that she is ambulating much better  She denies all R knee pain  Objective: See treatment diary below  Exercise Diary     R HS Self   R Gastroc Self   R Hip Flexor self       Nu Step 30''3x  30''3x  30''3x        10 min L4 30''3x  30''3x  30''3x        10 min L3 30''3x  30''3x  30''3x        10 min L5 30''3x  30''3x  30''3x        10 min L3 30''3x  30''3x  30''3x        10 min L3   Quad Set  5" x20 5" x20 5''20x 5" x30 5" x30   Hip Add with Ball 5" x30 5''20x 5''20x 5" x20 5" x30   Clamshell 3x10 Blue  3x10 Blue  3x10 Blue  3x10 blue 3x10 blue TB   Bridges  5''20x  5''20x  5''20x  5''20x  5" x30   SLR 4 way  3x10 flex only  3x10 flex only  3x10 flex only  3x10 flex only 3x10  Flex only    Step Up  B 3x10 right only  B 3x10 right only B 3x10 RLE  B 3x10 RLE B 3x10 RLE   SLS 30" x3 RLE  30" x3 RLE 30" x3 RLE 30" x3 RLE   HR/TR  30x each  30x each  30x each   30x ea 30x ea   Lunges  3x10 3" RLE  3x10 3" RLE 3x10 3" RLE 3x10 3" ea   Mini Squat   3x10  3x10                                                                                 Modalities                                     Assessment: Tolerated treatment well  Pt very fatigued after all standing exercises  Pt required multiple therapeutic rest breaks to resolve fatigue  Pt has high levels of perspiration this date and is advised to check her sugars when she gets home  Patient demonstrated fatigue post treatment and would benefit from continued PT      Plan: Continue per plan of care  Progress treatment as tolerated

## 2018-07-02 ENCOUNTER — OFFICE VISIT (OUTPATIENT)
Dept: PHYSICAL THERAPY | Facility: CLINIC | Age: 63
End: 2018-07-02
Payer: COMMERCIAL

## 2018-07-02 DIAGNOSIS — M25.561 CHRONIC PAIN OF RIGHT KNEE: Primary | ICD-10-CM

## 2018-07-02 DIAGNOSIS — G89.29 CHRONIC PAIN OF RIGHT KNEE: Primary | ICD-10-CM

## 2018-07-02 PROCEDURE — 97110 THERAPEUTIC EXERCISES: CPT

## 2018-07-02 NOTE — PROGRESS NOTES
Daily Note     Today's date: 2018  Patient name: Edison Arora  : 1955  MRN: [id number]  Referring provider: Corbin Echevarria DO  Dx:   Encounter Diagnosis     ICD-10-CM    1  Chronic pain of right knee M25 561     G89 29                   Subjective: Pt  Reports her knee is feeling good  Objective: See treatment diary below    Objective: See treatment diary below  Exercise Diary     R HS Self   R Gastroc Self   R Hip Flexor self       Nu Step 30''3x  30''3x  30''3x        10 min L4 30''3x  30''3x  30''3x        10 min L3 30''3x  30''3x  30''3x        10 min L5 30''3x  30''3x  30''3x        10 min L3 30''3x  30''3x  30''3x        10 min L3   Quad Set  5" x20 5" x20 5''20x 5" x30 5" x30   Hip Add with Ball 5" x30 5''20x 5''20x 5" x30 5" x30   Clamshell 3x10 Blue  3x10 Blue  3x10 Blue  3x10 blue 3x10 blue TB   Bridges  5''20x  5''20x  5''20x  5''20x  5" x30   SLR 4 way  3x10 flex only  3x10 flex only  3x10 flex only  3x10 flex only 3x10  Flex only    Step Up  B 3x10 right only  B 3x10 right only B 3x10 RLE  B 3x10 RLE B 3x10 RLE   SLS 30" x3 RLE  30" x3 RLE 30" x3 RLE 30" x3 RLE   HR/TR  30x each  30x each  30x each   30x ea 30x ea   Lunges  3x10 3" RLE  3x10 3" RLE 3x10 3" RLE 3x10 3" ea   Mini Squat   3x10  3x10 x20                                                                                Modalities                                       Assessment: Tolerated treatment well  Patient demonstrated fatigue post treatment and would benefit from continued PT  Several rest periods required t/o therapy session  Plan: Continue per plan of care

## 2018-07-05 ENCOUNTER — OFFICE VISIT (OUTPATIENT)
Dept: PHYSICAL THERAPY | Facility: CLINIC | Age: 63
End: 2018-07-05
Payer: COMMERCIAL

## 2018-07-05 DIAGNOSIS — M25.561 CHRONIC PAIN OF RIGHT KNEE: Primary | ICD-10-CM

## 2018-07-05 DIAGNOSIS — G89.29 CHRONIC PAIN OF RIGHT KNEE: Primary | ICD-10-CM

## 2018-07-05 PROCEDURE — 97110 THERAPEUTIC EXERCISES: CPT

## 2018-07-09 ENCOUNTER — OFFICE VISIT (OUTPATIENT)
Dept: PHYSICAL THERAPY | Facility: CLINIC | Age: 63
End: 2018-07-09
Payer: COMMERCIAL

## 2018-07-09 DIAGNOSIS — M25.561 CHRONIC PAIN OF RIGHT KNEE: Primary | ICD-10-CM

## 2018-07-09 DIAGNOSIS — G89.29 CHRONIC PAIN OF RIGHT KNEE: Primary | ICD-10-CM

## 2018-07-09 PROCEDURE — 97110 THERAPEUTIC EXERCISES: CPT | Performed by: PHYSICAL THERAPIST

## 2018-07-09 NOTE — PROGRESS NOTES
Daily Note     Today's date: 2018  Patient name: Zachery Denise  : 1955  MRN: [id number]  Referring provider: Maida Ocampo DO  Dx:   Encounter Diagnosis     ICD-10-CM    1  Chronic pain of right knee M25 561     G89 29                   Subjective: Patient offers no complaints today  Objective: See treatment diary below  Exercise Diary     R HS Self   R Gastroc Self   R Hip Flexor self       Nu Step 30''3x  30''3x  30''3x        10 min L5 30''3x  30''3x  30''3x        10 min L3 30''3x  30''3x  30''3x        10 min L5 30''3x  30''3x  30''3x        10 min L3 30''3x  30''3x  30''3x        10 min L5   Quad Set  5" x20 5" x20 5''20x 5" x30 5" x30   Hip Add with Ball 5" x30 5''20x 5''20x 5" x30 5" x30   Clamshell 3x10 Blue  3x10 Blue  3x10 Blue  3x10 blue 3x10 blue TB   Bridges  5''20x  5''20x  5''20x  5''20x  5" x20   SLR 4 way  3x10 flex only  3x10 flex only  3x10 flex only  3x10 flex only 3x10  Flex only    Step Up  B 3x10 RLE B 3x10 right only B 3x10 RLE  B 3x10 RLE B 3x10 RLE   SLS 30" x3 RLE  30" x3 RLE 30" x3 RLE 30" x3 RLE   HR/TR  30x each  30x each  30x each   30x ea 30x ea   Lunges  3x10 3" RLE  3x10 3" RLE 3x10 3" RLE 3x10 3" ea   Mini Squat  3x10 3x10  3x10 x20 x20                                                                                 Assessment: Tolerated treatment well  Some cueing required for mini squats for proper LE alignment  Patient demonstrated fatigue post treatment, exhibited good technique with therapeutic exercises and would benefit from continued PT      Plan: Continue per plan of care  Progress treatment as tolerated

## 2018-07-11 ENCOUNTER — APPOINTMENT (OUTPATIENT)
Dept: PHYSICAL THERAPY | Facility: CLINIC | Age: 63
End: 2018-07-11
Payer: COMMERCIAL

## 2018-07-12 ENCOUNTER — OFFICE VISIT (OUTPATIENT)
Dept: PHYSICAL THERAPY | Facility: CLINIC | Age: 63
End: 2018-07-12
Payer: COMMERCIAL

## 2018-07-12 DIAGNOSIS — M25.561 CHRONIC PAIN OF RIGHT KNEE: Primary | ICD-10-CM

## 2018-07-12 DIAGNOSIS — G89.29 CHRONIC PAIN OF RIGHT KNEE: Primary | ICD-10-CM

## 2018-07-12 PROCEDURE — 97110 THERAPEUTIC EXERCISES: CPT

## 2018-07-12 NOTE — PROGRESS NOTES
Daily Note     Today's date: 2018  Patient name: Edison Arora  : 1955  MRN: [id number]  Referring provider: Corbin Echevarria DO  Dx:   Encounter Diagnosis     ICD-10-CM    1  Chronic pain of right knee M25 561     G89 29                   Subjective: Pt offers no new comments and denies pain      Objective: See treatment diary below  Exercise Diary     R HS Self   R Gastroc Self   R Hip Flexor self       Nu Step 30''3x  30''3x  30''3x        10 min L5 30''3x  30''3x  30''3x        10 min L5 30''3x  30''3x  30''3x        10 min L5 30''3x  30''3x  30''3x        10 min L3 30''3x  30''3x  30''3x        10 min L5   Quad Set  5" x20 5" x20 5''20x 5" x30 5" x30   Hip Add with Ball 5" x30 5''20x 5''20x 5" x30 5" x30   Clamshell 3x10 Blue  3x10 Blue  3x10 Blue  3x10 blue 3x10 blue TB   Bridges  5''20x  5''20x  5''20x  5''20x  5" x20   SLR 4 way  3x10 flex only  3x10 flex only  3x10 flex only  3x10 flex only 3x10  Flex only    Step Up  B 3x10 RLE B 3x10 RLE B 3x10 RLE  B 3x10 RLE B 3x10 RLE   SLS 30" x3 RLE 30" x3 RLE 30" x3 RLE 30" x3 RLE 30" x3 RLE   HR/TR  30x each  30x each  30x each   30x ea 30x ea   Lunges  3x10 3" RLE 3x10 3" RLE 3x10 3" RLE 3x10 3" RLE 3x10 3" ea   Mini Squat  3x10 3x10  3x10 x20 x20                                                                               Assessment: Tolerated treatment well  Pt demonstrates a good understanding of all self stretches  Patient demonstrated fatigue post treatment and would benefit from continued PT      Plan: Continue per plan of care  Progress treatment as tolerated

## 2018-07-16 ENCOUNTER — OFFICE VISIT (OUTPATIENT)
Dept: PHYSICAL THERAPY | Facility: CLINIC | Age: 63
End: 2018-07-16
Payer: COMMERCIAL

## 2018-07-16 DIAGNOSIS — G89.29 CHRONIC PAIN OF RIGHT KNEE: Primary | ICD-10-CM

## 2018-07-16 DIAGNOSIS — M25.561 CHRONIC PAIN OF RIGHT KNEE: Primary | ICD-10-CM

## 2018-07-16 PROCEDURE — 97110 THERAPEUTIC EXERCISES: CPT | Performed by: PHYSICAL THERAPIST

## 2018-07-16 NOTE — PROGRESS NOTES
Daily Note     Today's date: 2018  Patient name: Angie Coronado  : 1955  MRN: [id number]  Referring provider: Joe Floyd DO  Dx:   Encounter Diagnosis     ICD-10-CM    1  Chronic pain of right knee M25 561     G89 29                   Subjective: Patient offers no new complaints      Objective: See treatment diary below      Exercise Diary     R HS Self   R Gastroc Self   R Hip Flexor self       Nu Step 30''3x  30''3x  30''3x        10 min L5 30''3x  30''3x  30''3x        10 min L5 30''3x  30''3x  30''3x        10 min L5 30''3x  30''3x  30''3x        10 min L3 30''3x  30''3x  30''3x        10 min L5   Quad Set  5" x20 5" x20  5" x30 5" x30   Hip Add with Ball 5" x30 5''20x 5''20x 5" x30 5" x30   Clamshell 3x10 Blue  3x10 Blue  3x10 Blue  3x10 blue 3x10 blue TB   Bridges  5''20x  5''20x  5''20x  5''20x  5" x20   SLR 4 way  3x10 flex only  3x10 flex only  3x10 flex only, 1#  3x10 flex only 3x10  Flex only    Step Up  B 3x10 RLE B 3x10 RLE B 3x10 RLE  B 3x10 RLE B 3x10 RLE   SLS 30" x3 RLE 30" x3 RLE 30" x3 RLE  On foam 30" x3 RLE 30" x3 RLE   HR/TR  30x each  30x each  30x each   30x ea 30x ea   Lunges  3x10 3" RLE 3x10 3" RLE 3x10 3" RLE 3x10 3" RLE 3x10 3" ea   Mini Squat  3x10 3x10  3x10 x20 x20                                                                             Assessment: Tolerated treatment well  Added light weight to SLRs and added foam to SLS balance  Patient demonstrated fatigue post treatment, exhibited good technique with therapeutic exercises and would benefit from continued PT      Plan: Continue per plan of care  Progress treatment as tolerated

## 2018-07-18 ENCOUNTER — OFFICE VISIT (OUTPATIENT)
Dept: PHYSICAL THERAPY | Facility: CLINIC | Age: 63
End: 2018-07-18
Payer: COMMERCIAL

## 2018-07-18 DIAGNOSIS — G89.29 CHRONIC PAIN OF RIGHT KNEE: Primary | ICD-10-CM

## 2018-07-18 DIAGNOSIS — M25.561 CHRONIC PAIN OF RIGHT KNEE: Primary | ICD-10-CM

## 2018-07-18 PROCEDURE — 97110 THERAPEUTIC EXERCISES: CPT

## 2018-07-18 NOTE — PROGRESS NOTES
Daily Note     Today's date: 2018  Patient name: Casper Ross  : 1955  MRN: [id number]  Referring provider: Lani Scruggs DO  Dx:   Encounter Diagnosis     ICD-10-CM    1  Chronic pain of right knee M25 561     G89 29                   Subjective: Pt report that she would like to be d/c from PT next week  Pt denies all pain  Objective: See treatment diary below  Exercise Diary     R HS Self   R Gastroc Self   R Hip Flexor self       Nu Step 30''3x  30''3x  30''3x        10 min L5 30''3x  30''3x  30''3x        10 min L5 30''3x  30''3x  30''3x        10 min L5 30''3x  30''3x  30''3x        10 min L5 30''3x  30''3x  30''3x        10 min L5   Quad Set  5" x20 5" x20  5" x30 5" x30   Hip Add with Ball 5" x30 5''20x 5''20x 5" x30 5" x30   Clamshell 3x10 Blue  3x10 Blue  3x10 Blue  3x10 blue 3x10 blue TB   Bridges  5''20x  5''20x  5''20x  5''20x  5" x20   SLR 4 way  3x10 flex only  3x10 flex only  3x10 flex only, 1#  3x10 flex only 3x10  Flex only    Step Up  B 3x10 RLE B 3x10 RLE B 3x10 RLE  B 3x10 RLE B 3x10 RLE   SLS 30" x3 RLE 30" x3 RLE 30" x3 RLE  On foam 30" x3 RLE 30" x3 RLE   HR/TR  30x each  30x each  30x each   30x ea 30x ea   Lunges  3x10 3" RLE 3x10 3" RLE 3x10 3" RLE 3x10 3" RLE 3x10 3" ea   Mini Squat  3x10 3x10  3x10 x20 x20                                                                               Assessment: Tolerated treatment well  Pt continues to demonstrate a good understanding of all self stretches  Pt requires cueing to take rest breaks to resolve fatigue  Patient demonstrated fatigue post treatment and would benefit from continued PT      Plan: Continue per plan of care  Progress treatment as tolerated

## 2018-07-19 ENCOUNTER — OFFICE VISIT (OUTPATIENT)
Dept: PHYSICAL THERAPY | Facility: CLINIC | Age: 63
End: 2018-07-19
Payer: COMMERCIAL

## 2018-07-19 DIAGNOSIS — M25.561 CHRONIC PAIN OF RIGHT KNEE: Primary | ICD-10-CM

## 2018-07-19 DIAGNOSIS — G89.29 CHRONIC PAIN OF RIGHT KNEE: Primary | ICD-10-CM

## 2018-07-19 PROCEDURE — 97110 THERAPEUTIC EXERCISES: CPT | Performed by: PHYSICAL THERAPIST

## 2018-07-19 NOTE — PROGRESS NOTES
Daily Note     Today's date: 2018  Patient name: Mamie Calderon  : 1955  MRN: [id number]  Referring provider: Lauren Mcallister DO  Dx:   Encounter Diagnosis     ICD-10-CM    1  Chronic pain of right knee M25 561     G89 29        Start Time: 1445  Stop Time: 1545  Total time in clinic (min): 60 minutes    Subjective: Pt reports she is a little sore today after having PT yesterday, however, no pain reported at the start of treatment  Notes she typically has this soreness when exercising for consecutive days  Objective: See treatment diary below  Exercise Diary     R HS Self   R Gastroc Self   R Hip Flexor self           Nu Step 30''3x  30''3x  30''3x           10 min L5 30''3x  30''3x  30''3x           10 min L5 30''3x  30''3x  30''3x           10 min L5 30''3x  30''3x  30''3x           10 min L5 3x30"  3x30"  3x30"        10 min L5   Quad Set  5" x20 5" x20   5" x30 30x5" hold   Hip Add with Ball 5" x30 5''20x 5''20x 5" x30 30x5"    Clamshell 3x10 Blue  3x10 Blue  3x10 Blue  3x10 blue 3x10 Blue   Bridges  5''20x  5''20x  5''20x  5''20x  20x5" hold   SLR 4 way  3x10 flex only  3x10 flex only  3x10 flex only, 1#  3x10 flex only 3x10 Flex only   Step Up  B 3x10 RLE B 3x10 RLE B 3x10 RLE  B 3x10 RLE    SLS 30" x3 RLE 30" x3 RLE 30" x3 RLE  On foam 30" x3 RLE 3x30" RLE   HR/TR  30x each  30x each  30x each   30x ea 30x ea   Lunges  3x10 3" RLE 3x10 3" RLE 3x10 3" RLE 3x10 3" RLE 3x10 3" RLE   Mini Squat  3x10 3x10  3x10 x20 20x                                                                                                                                      Assessment: Tolerated treatment well  Patient exhibited good technique with therapeutic exercises and would benefit from continued PT  Pt with good tolerance to exercises this visit with no increased pain noted  Pt notes she continues to feel pretty good following PT treatment and would like to get back to walking   Progress note to be performed next visit with possible d/c pending assessment  Plan: Continue per plan of care  Progress note during next visit

## 2018-07-23 ENCOUNTER — EVALUATION (OUTPATIENT)
Dept: PHYSICAL THERAPY | Facility: CLINIC | Age: 63
End: 2018-07-23
Payer: COMMERCIAL

## 2018-07-23 DIAGNOSIS — M25.561 CHRONIC PAIN OF RIGHT KNEE: Primary | ICD-10-CM

## 2018-07-23 DIAGNOSIS — G89.29 CHRONIC PAIN OF RIGHT KNEE: Primary | ICD-10-CM

## 2018-07-23 PROCEDURE — G8991 OTHER PT/OT GOAL STATUS: HCPCS | Performed by: PHYSICAL THERAPIST

## 2018-07-23 PROCEDURE — 97164 PT RE-EVAL EST PLAN CARE: CPT | Performed by: PHYSICAL THERAPIST

## 2018-07-23 PROCEDURE — 97110 THERAPEUTIC EXERCISES: CPT | Performed by: PHYSICAL THERAPIST

## 2018-07-23 PROCEDURE — G8990 OTHER PT/OT CURRENT STATUS: HCPCS | Performed by: PHYSICAL THERAPIST

## 2018-07-23 NOTE — PROGRESS NOTES
PT Re-Evaluation          Assessment  Impairments: activity intolerance, impaired balance, impaired physical strength and pain with function    Assessment details: Lanny Patino has demonstrated decreased pain, increased strength, increased range of motion, and increased activity tolerance since starting physical therapy services  They report an overall improvement of 90% thus far  At this time, patient has achieved their maximum functional benefit from skilled physical therapy services and will be discharged to their [facility]  Patient is in agreement with the plan of care  As a result, patient is discharged from physical therapy  Understanding of Dx/Px/POC: good   Prognosis: good    Goals  1  In 4-6 weeks, patient will demonstrate a decrease in pain to 0/10 during functional activities  Met  2  In 4-6 weeks, patient will demonstrate an increase in range of motion by 5 degrees  Met    3  In 4-6 weeks, patient will demonstrate an increase in strength by 1/2 grade on MMT  Met    1  In 6-8 weeks, patient will be independent with their home exercise program  Met  2  In 6-8 weeks, patient will have zero limitations with strength  Partially Met  3  In 6-8 weeks, patient will have zero limitations with ROM  Partially Met   4  In 6-8 weeks, patient will have zero limitations with ambulation  Partially Met   5  In 6-8 weeks, patient will have zero limitations with stair negotiation  Partially Met     Plan  Patient would benefit from: skilled physical therapy  Planned therapy interventions: manual therapy, therapeutic exercise and home exercise program  Frequency: DC PT   Duration in weeks: 6  Treatment plan discussed with: patient  Plan details: Patient was provided a home exercise program and demonstrated an understanding of exercises  Patient was advised to stop performing home exercise program if symptoms increase or new complaints developed    Verbal understanding demonstrated regarding home exercise program instructions  Subjective Evaluation    History of Present Illness  Date of onset: 2018  Mechanism of injury: The patient reports that her right knee feels about 90 percent better since beginning therapy  She reports that she is able to walk, and negotiate stairs without any pain since beginning therapy  She does not report any other issues with her right knee at this time  Pain  Current pain ratin  At best pain ratin  At worst pain ratin  Relieving factors: change in position  Aggravating factors: sitting  Progression: no change    Social Support  Steps to enter house: yes  Stairs in house: yes   Lives in: multiple-level home  Lives with: Brother     Employment status: not working (Retired )  Treatments  Current treatment: physical therapy  Patient Goals  Patient goals for therapy: decreased pain          Objective     Active Range of Motion   Left Hip   Flexion: Left hip active flexion: Limited  Abduction: Active abduction with hip flexed: Limited    Adduction: Left hip active adduction: Limited      Right Hip   Flexion: Right hip active flexion: Limited    Abduction: Right hip active abduction: Limited    Adduction: Right hip active adduction: Limited    Left Knee                                   Flexion: 115 degrees            Extension: -4 degrees               Right Knee                             Flexion: 110 degrees             118 degrees  Extension: -4 degrees             0 degrees     Additional Active Range of Motion Details  Pain and tenderness to Right medial joint line  Braulio Positive Medially  Varus/Valgus - Negative     Anterior Drawer  _ Negative     Posterior drawer - Negative     Heel Raise/ Toe Raise, Normal     SLS 0 seconds, right,   0 seconds left           Strength/Myotome Testing     Left Hip                        Planes of Motion   Flexion: 4-                   4  Abduction: 4-               4 Adduction: 4-               4    Right Hip                 7/23   Planes of Motion   Flexion: 4-               4  Abduction: 4-           4   Adduction: 4-           4    Left Knee             7/23   Flexion: 4               4+  Extension: 4           4+    Right Knee         7/23  Flexion: 4              4+  Extension: 4         4+    Exercise Diary  7/23 7/12 7/16 7/18 7/19   R HS Self   R Gastroc Self   R Hip Flexor self           Nu Step 30''3x  30''3x  30''3x           10 min L5 30''3x  30''3x  30''3x           10 min L5 30''3x  30''3x  30''3x           10 min L5 30''3x  30''3x  30''3x           10 min L5 3x30"  3x30"  3x30"        10 min L5   Quad Set  5" x20 5" x20   5" x30 30x5" hold   Hip Add with Ball 5" x30 5''20x 5''20x 5" x30 30x5"    Clamshell 3x10 Blue  3x10 Blue  3x10 Blue  3x10 blue 3x10 Blue   Bridges  5''20x  5''20x  5''20x  5''20x  20x5" hold   SLR 4 way  3x10 flex only  3x10 flex only  3x10 flex only, 1#  3x10 flex only 3x10 Flex only   Step Up  B 3x10 RLE B 3x10 RLE B 3x10 RLE  B 3x10 RLE    SLS 30" x3 RLE 30" x3 RLE 30" x3 RLE  On foam 30" x3 RLE 3x30" RLE   HR/TR  30x each  30x each  30x each   30x ea 30x ea   Lunges  3x10 3" RLE 3x10 3" RLE 3x10 3" RLE 3x10 3" RLE 3x10 3" RLE   Mini Squat  3x10 3x10  3x10 x20 20x

## 2018-07-23 NOTE — LETTER
July 24, 2018    Shai Corrales DO  [address]  [address] [place] [address]    Patient: Adamaris Jackson   YOB: 1955   Date of Visit: 7/23/2018     Encounter Diagnosis     ICD-10-CM    1  Chronic pain of right knee M25 561 PT plan of care cert/re-cert    A40 05        Dear Dr Jorge Posada:    Please review the attached Plan of Care from [address] recent visit  Please verify that you agree therapy should continue by signing the attached document and sending it back to our office  If you have any questions or concerns, please don't hesitate to call  Sincerely,    Erica Robledo, PT      Referring Provider:      I certify that I have read the below Plan of Care and certify the need for these services furnished under this plan of treatment while under my care  Shai Corrales DO  [phone]: [phone]                                                   PT Re-Evaluation          Assessment  Impairments: activity intolerance, impaired balance, impaired physical strength and pain with function    Assessment details: Adamaris Jackson has demonstrated decreased pain, increased strength, increased range of motion, and increased activity tolerance since starting physical therapy services  They report an overall improvement of 90% thus far  At this time, patient has achieved their maximum functional benefit from skilled physical therapy services and will be discharged to their [facility]  Patient is in agreement with the plan of care  As a result, patient is discharged from physical therapy  Understanding of Dx/Px/POC: good   Prognosis: good    Goals  1  In 4-6 weeks, patient will demonstrate a decrease in pain to 0/10 during functional activities  Met  2  In 4-6 weeks, patient will demonstrate an increase in range of motion by 5 degrees  Met    3   In 4-6 weeks, patient will demonstrate an increase in strength by 1/2 grade on MMT  Met    1  In 6-8 weeks, patient will be independent with their home exercise program  Met  2  In 6-8 weeks, patient will have zero limitations with strength  Partially Met  3  In 6-8 weeks, patient will have zero limitations with ROM  Partially Met   4  In 6-8 weeks, patient will have zero limitations with ambulation  Partially Met   5  In 6-8 weeks, patient will have zero limitations with stair negotiation  Partially Met     Plan  Patient would benefit from: skilled physical therapy  Planned therapy interventions: manual therapy, therapeutic exercise and home exercise program  Frequency: DC PT   Duration in weeks: 6  Treatment plan discussed with: patient  Plan details: Patient was provided a home exercise program and demonstrated an understanding of exercises  Patient was advised to stop performing home exercise program if symptoms increase or new complaints developed  Verbal understanding demonstrated regarding home exercise program instructions  Subjective Evaluation    History of Present Illness  Date of onset: 2018  Mechanism of injury: The patient reports that her right knee feels about 90 percent better since beginning therapy  She reports that she is able to walk, and negotiate stairs without any pain since beginning therapy  She does not report any other issues with her right knee at this time  Pain  Current pain ratin  At best pain ratin  At worst pain ratin  Relieving factors: change in position  Aggravating factors: sitting  Progression: no change    Social Support  Steps to enter house: yes  Stairs in house: yes   Lives in: multiple-level home  Lives with: Brother     Employment status: not working (Retired )  Treatments  Current treatment: physical therapy  Patient Goals  Patient goals for therapy: decreased pain          Objective     Active Range of Motion   Left Hip   Flexion: Left hip active flexion: Limited     Abduction: Active abduction with hip flexed: Limited    Adduction: Left hip active adduction: Limited      Right Hip   Flexion: Right hip active flexion: Limited    Abduction: Right hip active abduction: Limited    Adduction: Right hip active adduction: Limited    Left Knee                                   Flexion: 115 degrees            Extension: -4 degrees               Right Knee                           7/23  Flexion: 110 degrees             118 degrees  Extension: -4 degrees             0 degrees     Additional Active Range of Motion Details  Pain and tenderness to Right medial joint line  Braulio Positive Medially  Varus/Valgus - Negative     Anterior Drawer  _ Negative     Posterior drawer - Negative     Heel Raise/ Toe Raise, Normal     SLS 0 seconds, right,   0 seconds left           Strength/Myotome Testing     Left Hip                     7/23   Planes of Motion   Flexion: 4-                   4  Abduction: 4-               4   Adduction: 4-               4    Right Hip                 7/23   Planes of Motion   Flexion: 4-               4  Abduction: 4-           4   Adduction: 4-           4    Left Knee             7/23   Flexion: 4               4+  Extension: 4           4+    Right Knee         7/23  Flexion: 4              4+  Extension: 4         4+    Exercise Diary  7/23 7/12 7/16 7/18 7/19   R HS Self   R Gastroc Self   R Hip Flexor self           Nu Step 30''3x  30''3x  30''3x           10 min L5 30''3x  30''3x  30''3x           10 min L5 30''3x  30''3x  30''3x           10 min L5 30''3x  30''3x  30''3x           10 min L5 3x30"  3x30"  3x30"        10 min L5   Quad Set  5" x20 5" x20   5" x30 30x5" hold   Hip Add with Ball 5" x30 5''20x 5''20x 5" x30 30x5"    Clamshell 3x10 Blue  3x10 Blue  3x10 Blue  3x10 blue 3x10 Blue   Bridges  5''20x  5''20x  5''20x  5''20x  20x5" hold   SLR 4 way  3x10 flex only  3x10 flex only  3x10 flex only, 1#  3x10 flex only 3x10 Flex only   Step Up  B 3x10 RLE B 3x10 RLE B 3x10 RLE  B 3x10 RLE SLS 30" x3 RLE 30" x3 RLE 30" x3 RLE  On foam 30" x3 RLE 3x30" RLE   HR/TR  30x each  30x each  30x each   30x ea 30x ea   Lunges  3x10 3" RLE 3x10 3" RLE 3x10 3" RLE 3x10 3" RLE 3x10 3" RLE   Mini Squat  3x10 3x10  3x10 x20 20x

## 2018-07-24 ENCOUNTER — TRANSCRIBE ORDERS (OUTPATIENT)
Dept: PHYSICAL THERAPY | Facility: CLINIC | Age: 63
End: 2018-07-24

## 2018-07-24 DIAGNOSIS — M25.561 ACUTE PAIN OF RIGHT KNEE: Primary | ICD-10-CM

## 2018-09-28 ENCOUNTER — TRANSCRIBE ORDERS (OUTPATIENT)
Dept: ADMINISTRATIVE | Facility: HOSPITAL | Age: 63
End: 2018-09-28

## 2018-09-28 DIAGNOSIS — E03.9 HYPOTHYROIDISM, UNSPECIFIED TYPE: ICD-10-CM

## 2018-09-28 DIAGNOSIS — Z12.39 SCREENING BREAST EXAMINATION: Primary | ICD-10-CM

## 2018-10-05 ENCOUNTER — HOSPITAL ENCOUNTER (OUTPATIENT)
Dept: MAMMOGRAPHY | Facility: HOSPITAL | Age: 63
Discharge: HOME | End: 2018-10-05
Payer: COMMERCIAL

## 2018-10-05 ENCOUNTER — HOSPITAL ENCOUNTER (OUTPATIENT)
Dept: ULTRASOUND IMAGING | Facility: HOSPITAL | Age: 63
Discharge: HOME | End: 2018-10-05
Payer: COMMERCIAL

## 2018-10-05 DIAGNOSIS — Z12.39 SCREENING BREAST EXAMINATION: ICD-10-CM

## 2018-10-05 DIAGNOSIS — E03.9 HYPOTHYROIDISM, UNSPECIFIED TYPE: ICD-10-CM

## 2018-10-05 PROCEDURE — 76536 US EXAM OF HEAD AND NECK: CPT

## 2018-10-05 PROCEDURE — 77063 BREAST TOMOSYNTHESIS BI: CPT

## 2018-10-05 PROCEDURE — 77067 SCR MAMMO BI INCL CAD: CPT

## 2018-10-17 ENCOUNTER — APPOINTMENT (OUTPATIENT)
Dept: LAB | Facility: CLINIC | Age: 63
End: 2018-10-17
Payer: COMMERCIAL

## 2018-10-17 ENCOUNTER — TRANSCRIBE ORDERS (OUTPATIENT)
Dept: LAB | Facility: CLINIC | Age: 63
End: 2018-10-17

## 2018-10-17 DIAGNOSIS — F32.0 MILD MAJOR DEPRESSION (HCC): Primary | ICD-10-CM

## 2018-10-17 LAB
ANION GAP SERPL CALCULATED.3IONS-SCNC: 3 MMOL/L (ref 4–13)
BUN SERPL-MCNC: 8 MG/DL (ref 5–25)
CALCIUM SERPL-MCNC: 9.7 MG/DL (ref 8.3–10.1)
CHLORIDE SERPL-SCNC: 105 MMOL/L (ref 100–108)
CHOLEST SERPL-MCNC: 152 MG/DL (ref 50–200)
CO2 SERPL-SCNC: 30 MMOL/L (ref 21–32)
CREAT SERPL-MCNC: 0.96 MG/DL (ref 0.6–1.3)
GFR SERPL CREATININE-BSD FRML MDRD: 64 ML/MIN/1.73SQ M
GLUCOSE P FAST SERPL-MCNC: 138 MG/DL (ref 65–99)
HDLC SERPL-MCNC: 54 MG/DL (ref 40–60)
LDLC SERPL CALC-MCNC: 71 MG/DL (ref 0–100)
LITHIUM SERPL-SCNC: 0.7 MMOL/L (ref 0.5–1)
NONHDLC SERPL-MCNC: 98 MG/DL
POTASSIUM SERPL-SCNC: 4.5 MMOL/L (ref 3.5–5.3)
SODIUM SERPL-SCNC: 138 MMOL/L (ref 136–145)
T4 FREE SERPL-MCNC: 1.38 NG/DL (ref 0.76–1.46)
TRIGL SERPL-MCNC: 135 MG/DL
TSH SERPL DL<=0.05 MIU/L-ACNC: 0.25 UIU/ML (ref 0.36–3.74)

## 2018-10-17 PROCEDURE — 36415 COLL VENOUS BLD VENIPUNCTURE: CPT

## 2018-10-17 PROCEDURE — 80048 BASIC METABOLIC PNL TOTAL CA: CPT

## 2018-10-17 PROCEDURE — 80061 LIPID PANEL: CPT

## 2018-10-17 PROCEDURE — 80178 ASSAY OF LITHIUM: CPT

## 2018-10-17 PROCEDURE — 84443 ASSAY THYROID STIM HORMONE: CPT

## 2018-10-17 PROCEDURE — 84439 ASSAY OF FREE THYROXINE: CPT

## 2019-01-04 ENCOUNTER — TRANSCRIBE ORDERS (OUTPATIENT)
Dept: URGENT CARE | Facility: CLINIC | Age: 64
End: 2019-01-04

## 2019-01-04 ENCOUNTER — APPOINTMENT (OUTPATIENT)
Dept: LAB | Facility: CLINIC | Age: 64
End: 2019-01-04
Payer: COMMERCIAL

## 2019-01-04 DIAGNOSIS — R53.83 FATIGUE, UNSPECIFIED TYPE: Primary | ICD-10-CM

## 2019-01-04 DIAGNOSIS — E78.2 MIXED HYPERLIPIDEMIA: ICD-10-CM

## 2019-01-04 DIAGNOSIS — E55.9 VITAMIN D DEFICIENCY: ICD-10-CM

## 2019-01-04 LAB
25(OH)D3 SERPL-MCNC: 49.6 NG/ML (ref 30–100)
ALBUMIN SERPL BCP-MCNC: 3.7 G/DL (ref 3.5–5)
ALP SERPL-CCNC: 115 U/L (ref 46–116)
ALT SERPL W P-5'-P-CCNC: 17 U/L (ref 12–78)
ANION GAP SERPL CALCULATED.3IONS-SCNC: 7 MMOL/L (ref 4–13)
AST SERPL W P-5'-P-CCNC: 12 U/L (ref 5–45)
BASOPHILS # BLD AUTO: 0.04 THOUSANDS/ΜL (ref 0–0.1)
BASOPHILS NFR BLD AUTO: 0 % (ref 0–1)
BILIRUB DIRECT SERPL-MCNC: 0.24 MG/DL (ref 0–0.2)
BILIRUB SERPL-MCNC: 1.02 MG/DL (ref 0.2–1)
BUN SERPL-MCNC: 7 MG/DL (ref 5–25)
CALCIUM SERPL-MCNC: 10 MG/DL (ref 8.3–10.1)
CHLORIDE SERPL-SCNC: 107 MMOL/L (ref 100–108)
CHOLEST SERPL-MCNC: 168 MG/DL (ref 50–200)
CO2 SERPL-SCNC: 28 MMOL/L (ref 21–32)
CREAT SERPL-MCNC: 0.91 MG/DL (ref 0.6–1.3)
EOSINOPHIL # BLD AUTO: 0.01 THOUSAND/ΜL (ref 0–0.61)
EOSINOPHIL NFR BLD AUTO: 0 % (ref 0–6)
ERYTHROCYTE [DISTWIDTH] IN BLOOD BY AUTOMATED COUNT: 13.7 % (ref 11.6–15.1)
FERRITIN SERPL-MCNC: 68 NG/ML (ref 8–388)
GFR SERPL CREATININE-BSD FRML MDRD: 68 ML/MIN/1.73SQ M
GLUCOSE P FAST SERPL-MCNC: 155 MG/DL (ref 65–99)
HCT VFR BLD AUTO: 42.4 % (ref 34.8–46.1)
HDLC SERPL-MCNC: 67 MG/DL (ref 40–60)
HGB BLD-MCNC: 13.6 G/DL (ref 11.5–15.4)
IMM GRANULOCYTES # BLD AUTO: 0.05 THOUSAND/UL (ref 0–0.2)
IMM GRANULOCYTES NFR BLD AUTO: 0 % (ref 0–2)
IRON SATN MFR SERPL: 24 %
IRON SERPL-MCNC: 86 UG/DL (ref 50–170)
LDLC SERPL CALC-MCNC: 72 MG/DL (ref 0–100)
LITHIUM SERPL-SCNC: 0.7 MMOL/L (ref 0.5–1)
LYMPHOCYTES # BLD AUTO: 1.69 THOUSANDS/ΜL (ref 0.6–4.47)
LYMPHOCYTES NFR BLD AUTO: 15 % (ref 14–44)
MCH RBC QN AUTO: 30.2 PG (ref 26.8–34.3)
MCHC RBC AUTO-ENTMCNC: 32.1 G/DL (ref 31.4–37.4)
MCV RBC AUTO: 94 FL (ref 82–98)
MONOCYTES # BLD AUTO: 0.52 THOUSAND/ΜL (ref 0.17–1.22)
MONOCYTES NFR BLD AUTO: 5 % (ref 4–12)
NEUTROPHILS # BLD AUTO: 9.01 THOUSANDS/ΜL (ref 1.85–7.62)
NEUTS SEG NFR BLD AUTO: 80 % (ref 43–75)
NONHDLC SERPL-MCNC: 101 MG/DL
NRBC BLD AUTO-RTO: 0 /100 WBCS
PLATELET # BLD AUTO: 337 THOUSANDS/UL (ref 149–390)
PMV BLD AUTO: 10 FL (ref 8.9–12.7)
POTASSIUM SERPL-SCNC: 4.6 MMOL/L (ref 3.5–5.3)
PROT SERPL-MCNC: 7.5 G/DL (ref 6.4–8.2)
RBC # BLD AUTO: 4.51 MILLION/UL (ref 3.81–5.12)
SODIUM SERPL-SCNC: 142 MMOL/L (ref 136–145)
T3FREE SERPL-MCNC: 2.54 PG/ML (ref 2.3–4.2)
TIBC SERPL-MCNC: 354 UG/DL (ref 250–450)
TRIGL SERPL-MCNC: 145 MG/DL
TSH SERPL DL<=0.05 MIU/L-ACNC: 0.96 UIU/ML (ref 0.36–3.74)
WBC # BLD AUTO: 11.32 THOUSAND/UL (ref 4.31–10.16)

## 2019-01-04 PROCEDURE — 84443 ASSAY THYROID STIM HORMONE: CPT

## 2019-01-04 PROCEDURE — 85025 COMPLETE CBC W/AUTO DIFF WBC: CPT

## 2019-01-04 PROCEDURE — 84481 FREE ASSAY (FT-3): CPT

## 2019-01-04 PROCEDURE — 83540 ASSAY OF IRON: CPT

## 2019-01-04 PROCEDURE — 80061 LIPID PANEL: CPT

## 2019-01-04 PROCEDURE — 36415 COLL VENOUS BLD VENIPUNCTURE: CPT

## 2019-01-04 PROCEDURE — 80076 HEPATIC FUNCTION PANEL: CPT

## 2019-01-04 PROCEDURE — 82306 VITAMIN D 25 HYDROXY: CPT

## 2019-01-04 PROCEDURE — 82728 ASSAY OF FERRITIN: CPT

## 2019-01-04 PROCEDURE — 83550 IRON BINDING TEST: CPT

## 2019-01-04 PROCEDURE — 80048 BASIC METABOLIC PNL TOTAL CA: CPT

## 2019-01-04 PROCEDURE — 80178 ASSAY OF LITHIUM: CPT

## 2019-06-05 ENCOUNTER — HOSPITAL ENCOUNTER (EMERGENCY)
Facility: HOSPITAL | Age: 64
Discharge: HOME | End: 2019-06-06
Attending: EMERGENCY MEDICINE | Admitting: EMERGENCY MEDICINE
Payer: COMMERCIAL

## 2019-06-05 DIAGNOSIS — K62.5 RECTAL BLEEDING: Primary | ICD-10-CM

## 2019-06-05 PROCEDURE — 99285 EMERGENCY DEPT VISIT HI MDM: CPT

## 2019-06-05 PROCEDURE — 83690 ASSAY OF LIPASE: CPT

## 2019-06-05 PROCEDURE — 80053 COMPREHEN METABOLIC PANEL: CPT

## 2019-06-05 PROCEDURE — 36415 COLL VENOUS BLD VENIPUNCTURE: CPT | Performed by: EMERGENCY MEDICINE

## 2019-06-05 PROCEDURE — 86850 RBC ANTIBODY SCREEN: CPT

## 2019-06-05 PROCEDURE — 99283 EMERGENCY DEPT VISIT LOW MDM: CPT | Performed by: EMERGENCY MEDICINE

## 2019-06-05 PROCEDURE — 86901 BLOOD TYPING SEROLOGIC RH(D): CPT

## 2019-06-05 PROCEDURE — 84484 ASSAY OF TROPONIN QUANT: CPT | Performed by: EMERGENCY MEDICINE

## 2019-06-05 PROCEDURE — 85025 COMPLETE CBC W/AUTO DIFF WBC: CPT

## 2019-06-05 PROCEDURE — 86900 BLOOD TYPING SEROLOGIC ABO: CPT

## 2019-06-06 VITALS
HEART RATE: 90 BPM | RESPIRATION RATE: 20 BRPM | OXYGEN SATURATION: 96 % | BODY MASS INDEX: 46.13 KG/M2 | SYSTOLIC BLOOD PRESSURE: 141 MMHG | DIASTOLIC BLOOD PRESSURE: 63 MMHG | WEIGHT: 235 LBS | HEIGHT: 60 IN

## 2019-06-06 LAB
ABO GROUP BLD: NORMAL
ALBUMIN SERPL BCP-MCNC: 3.3 G/DL (ref 3.5–5)
ALP SERPL-CCNC: 107 U/L (ref 46–116)
ALT SERPL W P-5'-P-CCNC: 19 U/L (ref 12–78)
ANION GAP SERPL CALCULATED.3IONS-SCNC: 8 MMOL/L (ref 4–13)
APTT PPP: 29 SECONDS (ref 26–38)
AST SERPL W P-5'-P-CCNC: 15 U/L (ref 5–45)
BASOPHILS # BLD AUTO: 0.04 THOUSANDS/ΜL (ref 0–0.1)
BASOPHILS NFR BLD AUTO: 0 % (ref 0–1)
BILIRUB SERPL-MCNC: 0.7 MG/DL (ref 0.2–1)
BLD GP AB SCN SERPL QL: NEGATIVE
BUN SERPL-MCNC: 6 MG/DL (ref 5–25)
CALCIUM SERPL-MCNC: 9.2 MG/DL (ref 8.3–10.1)
CHLORIDE SERPL-SCNC: 104 MMOL/L (ref 100–108)
CO2 SERPL-SCNC: 29 MMOL/L (ref 21–32)
CREAT SERPL-MCNC: 1.05 MG/DL (ref 0.6–1.3)
EOSINOPHIL # BLD AUTO: 0.01 THOUSAND/ΜL (ref 0–0.61)
EOSINOPHIL NFR BLD AUTO: 0 % (ref 0–6)
ERYTHROCYTE [DISTWIDTH] IN BLOOD BY AUTOMATED COUNT: 13.2 % (ref 11.6–15.1)
GFR SERPL CREATININE-BSD FRML MDRD: 57 ML/MIN/1.73SQ M
GLUCOSE SERPL-MCNC: 208 MG/DL (ref 65–140)
HCT VFR BLD AUTO: 37.8 % (ref 34.8–46.1)
HGB BLD-MCNC: 12.2 G/DL (ref 11.5–15.4)
HOLD SPECIMEN: NORMAL
IMM GRANULOCYTES # BLD AUTO: 0.05 THOUSAND/UL (ref 0–0.2)
IMM GRANULOCYTES NFR BLD AUTO: 0 % (ref 0–2)
INR PPP: 1.02 (ref 0.86–1.17)
LIPASE SERPL-CCNC: 85 U/L (ref 73–393)
LYMPHOCYTES # BLD AUTO: 1.87 THOUSANDS/ΜL (ref 0.6–4.47)
LYMPHOCYTES NFR BLD AUTO: 17 % (ref 14–44)
MCH RBC QN AUTO: 31 PG (ref 26.8–34.3)
MCHC RBC AUTO-ENTMCNC: 32.3 G/DL (ref 31.4–37.4)
MCV RBC AUTO: 96 FL (ref 82–98)
MONOCYTES # BLD AUTO: 0.64 THOUSAND/ΜL (ref 0.17–1.22)
MONOCYTES NFR BLD AUTO: 6 % (ref 4–12)
NEUTROPHILS # BLD AUTO: 8.67 THOUSANDS/ΜL (ref 1.85–7.62)
NEUTS SEG NFR BLD AUTO: 77 % (ref 43–75)
NRBC BLD AUTO-RTO: 0 /100 WBCS
PLATELET # BLD AUTO: 313 THOUSANDS/UL (ref 149–390)
PMV BLD AUTO: 10 FL (ref 8.9–12.7)
POTASSIUM SERPL-SCNC: 3.9 MMOL/L (ref 3.5–5.3)
PROT SERPL-MCNC: 6.7 G/DL (ref 6.4–8.2)
PROTHROMBIN TIME: 12.9 SECONDS (ref 11.8–14.2)
RBC # BLD AUTO: 3.94 MILLION/UL (ref 3.81–5.12)
RH BLD: POSITIVE
SODIUM SERPL-SCNC: 141 MMOL/L (ref 136–145)
SPECIMEN EXPIRATION DATE: NORMAL
TROPONIN I SERPL-MCNC: <0.02 NG/ML
WBC # BLD AUTO: 11.28 THOUSAND/UL (ref 4.31–10.16)

## 2019-06-06 PROCEDURE — 85610 PROTHROMBIN TIME: CPT | Performed by: EMERGENCY MEDICINE

## 2019-06-06 PROCEDURE — 96360 HYDRATION IV INFUSION INIT: CPT

## 2019-06-06 PROCEDURE — 36415 COLL VENOUS BLD VENIPUNCTURE: CPT | Performed by: EMERGENCY MEDICINE

## 2019-06-06 PROCEDURE — 93005 ELECTROCARDIOGRAM TRACING: CPT

## 2019-06-06 PROCEDURE — 85730 THROMBOPLASTIN TIME PARTIAL: CPT | Performed by: EMERGENCY MEDICINE

## 2019-06-06 RX ORDER — ASPIRIN 81 MG/1
81 TABLET ORAL DAILY
COMMUNITY

## 2019-06-06 RX ORDER — LITHIUM CARBONATE 300 MG
300 TABLET ORAL 2 TIMES DAILY
COMMUNITY
End: 2022-03-03 | Stop reason: DRUGHIGH

## 2019-06-06 RX ORDER — PRAVASTATIN SODIUM 40 MG
40 TABLET ORAL DAILY
COMMUNITY
End: 2020-04-10 | Stop reason: SDUPTHER

## 2019-06-06 RX ORDER — DOXEPIN HYDROCHLORIDE 75 MG/1
100 CAPSULE ORAL
COMMUNITY

## 2019-06-06 RX ORDER — LEVOTHYROXINE SODIUM 0.12 MG/1
125 TABLET ORAL DAILY
COMMUNITY
End: 2019-10-22 | Stop reason: DRUGHIGH

## 2019-06-06 RX ADMIN — SODIUM CHLORIDE 250 ML: 0.9 INJECTION, SOLUTION INTRAVENOUS at 01:33

## 2019-06-07 LAB
ATRIAL RATE: 94 BPM
P AXIS: 49 DEGREES
PR INTERVAL: 164 MS
QRS AXIS: 24 DEGREES
QRSD INTERVAL: 82 MS
QT INTERVAL: 320 MS
QTC INTERVAL: 400 MS
T WAVE AXIS: -51 DEGREES
VENTRICULAR RATE: 94 BPM

## 2019-06-07 PROCEDURE — 93010 ELECTROCARDIOGRAM REPORT: CPT | Performed by: INTERNAL MEDICINE

## 2019-06-13 ENCOUNTER — PATIENT OUTREACH (OUTPATIENT)
Dept: CASE MANAGEMENT | Facility: OTHER | Age: 64
End: 2019-06-13

## 2019-08-14 ENCOUNTER — APPOINTMENT (OUTPATIENT)
Dept: LAB | Facility: CLINIC | Age: 64
End: 2019-08-14
Payer: COMMERCIAL

## 2019-08-14 ENCOUNTER — TRANSCRIBE ORDERS (OUTPATIENT)
Dept: URGENT CARE | Facility: CLINIC | Age: 64
End: 2019-08-14

## 2019-08-14 DIAGNOSIS — E10.9 TYPE 1 DIABETES MELLITUS WITHOUT COMPLICATION (HCC): Primary | ICD-10-CM

## 2019-08-14 DIAGNOSIS — E03.9 HYPOTHYROIDISM, ADULT: ICD-10-CM

## 2019-08-14 DIAGNOSIS — E55.9 VITAMIN D DEFICIENCY: ICD-10-CM

## 2019-08-14 DIAGNOSIS — E78.2 MIXED HYPERLIPIDEMIA: ICD-10-CM

## 2019-08-14 LAB
25(OH)D3 SERPL-MCNC: 64.8 NG/ML (ref 30–100)
ALBUMIN SERPL BCP-MCNC: 3.9 G/DL (ref 3.5–5)
ALP SERPL-CCNC: 104 U/L (ref 46–116)
ALT SERPL W P-5'-P-CCNC: 16 U/L (ref 12–78)
ANION GAP SERPL CALCULATED.3IONS-SCNC: 6 MMOL/L (ref 4–13)
AST SERPL W P-5'-P-CCNC: 13 U/L (ref 5–45)
BASOPHILS # BLD AUTO: 0.03 THOUSANDS/ΜL (ref 0–0.1)
BASOPHILS NFR BLD AUTO: 0 % (ref 0–1)
BILIRUB SERPL-MCNC: 1.1 MG/DL (ref 0.2–1)
BUN SERPL-MCNC: 6 MG/DL (ref 5–25)
CALCIUM SERPL-MCNC: 9.9 MG/DL (ref 8.3–10.1)
CHLORIDE SERPL-SCNC: 108 MMOL/L (ref 100–108)
CHOLEST SERPL-MCNC: 146 MG/DL (ref 50–200)
CO2 SERPL-SCNC: 28 MMOL/L (ref 21–32)
CREAT SERPL-MCNC: 0.95 MG/DL (ref 0.6–1.3)
EOSINOPHIL # BLD AUTO: 0.01 THOUSAND/ΜL (ref 0–0.61)
EOSINOPHIL NFR BLD AUTO: 0 % (ref 0–6)
ERYTHROCYTE [DISTWIDTH] IN BLOOD BY AUTOMATED COUNT: 13.1 % (ref 11.6–15.1)
GFR SERPL CREATININE-BSD FRML MDRD: 64 ML/MIN/1.73SQ M
GLUCOSE P FAST SERPL-MCNC: 145 MG/DL (ref 65–99)
HCT VFR BLD AUTO: 42.4 % (ref 34.8–46.1)
HDLC SERPL-MCNC: 57 MG/DL (ref 40–60)
HGB BLD-MCNC: 13.6 G/DL (ref 11.5–15.4)
IMM GRANULOCYTES # BLD AUTO: 0.04 THOUSAND/UL (ref 0–0.2)
IMM GRANULOCYTES NFR BLD AUTO: 0 % (ref 0–2)
LDLC SERPL CALC-MCNC: 63 MG/DL (ref 0–100)
LITHIUM SERPL-SCNC: 0.7 MMOL/L (ref 0.5–1)
LYMPHOCYTES # BLD AUTO: 1.87 THOUSANDS/ΜL (ref 0.6–4.47)
LYMPHOCYTES NFR BLD AUTO: 20 % (ref 14–44)
MCH RBC QN AUTO: 30 PG (ref 26.8–34.3)
MCHC RBC AUTO-ENTMCNC: 32.1 G/DL (ref 31.4–37.4)
MCV RBC AUTO: 93 FL (ref 82–98)
MONOCYTES # BLD AUTO: 0.56 THOUSAND/ΜL (ref 0.17–1.22)
MONOCYTES NFR BLD AUTO: 6 % (ref 4–12)
NEUTROPHILS # BLD AUTO: 7.05 THOUSANDS/ΜL (ref 1.85–7.62)
NEUTS SEG NFR BLD AUTO: 74 % (ref 43–75)
NONHDLC SERPL-MCNC: 89 MG/DL
NRBC BLD AUTO-RTO: 0 /100 WBCS
PLATELET # BLD AUTO: 319 THOUSANDS/UL (ref 149–390)
PMV BLD AUTO: 10.7 FL (ref 8.9–12.7)
POTASSIUM SERPL-SCNC: 4 MMOL/L (ref 3.5–5.3)
PROT SERPL-MCNC: 7.4 G/DL (ref 6.4–8.2)
RBC # BLD AUTO: 4.54 MILLION/UL (ref 3.81–5.12)
SODIUM SERPL-SCNC: 142 MMOL/L (ref 136–145)
TRIGL SERPL-MCNC: 130 MG/DL
TSH SERPL DL<=0.05 MIU/L-ACNC: 0.23 UIU/ML (ref 0.36–3.74)
WBC # BLD AUTO: 9.56 THOUSAND/UL (ref 4.31–10.16)

## 2019-08-14 PROCEDURE — 85025 COMPLETE CBC W/AUTO DIFF WBC: CPT

## 2019-08-14 PROCEDURE — 84443 ASSAY THYROID STIM HORMONE: CPT

## 2019-08-14 PROCEDURE — 80053 COMPREHEN METABOLIC PANEL: CPT

## 2019-08-14 PROCEDURE — 82306 VITAMIN D 25 HYDROXY: CPT

## 2019-08-14 PROCEDURE — 36415 COLL VENOUS BLD VENIPUNCTURE: CPT

## 2019-08-14 PROCEDURE — 80061 LIPID PANEL: CPT

## 2019-08-14 PROCEDURE — 80178 ASSAY OF LITHIUM: CPT

## 2019-08-27 LAB
LEFT EYE DIABETIC RETINOPATHY: NORMAL
RIGHT EYE DIABETIC RETINOPATHY: NORMAL

## 2019-09-26 DIAGNOSIS — E13.9 DIABETES 1.5, MANAGED AS TYPE 2 (HCC): Primary | ICD-10-CM

## 2019-10-18 ENCOUNTER — OFFICE VISIT (OUTPATIENT)
Dept: INTERNAL MEDICINE CLINIC | Facility: CLINIC | Age: 64
End: 2019-10-18
Payer: COMMERCIAL

## 2019-10-18 VITALS
HEART RATE: 86 BPM | DIASTOLIC BLOOD PRESSURE: 70 MMHG | TEMPERATURE: 97.8 F | OXYGEN SATURATION: 97 % | HEIGHT: 60 IN | BODY MASS INDEX: 44.52 KG/M2 | RESPIRATION RATE: 18 BRPM | SYSTOLIC BLOOD PRESSURE: 126 MMHG | WEIGHT: 226.8 LBS

## 2019-10-18 DIAGNOSIS — Z78.0 POSTMENOPAUSAL: ICD-10-CM

## 2019-10-18 DIAGNOSIS — E66.01 MORBID OBESITY WITH BMI OF 40.0-44.9, ADULT (HCC): ICD-10-CM

## 2019-10-18 DIAGNOSIS — E78.00 HYPERCHOLESTEROLEMIA: ICD-10-CM

## 2019-10-18 DIAGNOSIS — F31.60 BIPOLAR AFFECTIVE DISORDER, CURRENT EPISODE MIXED, CURRENT EPISODE SEVERITY UNSPECIFIED (HCC): ICD-10-CM

## 2019-10-18 DIAGNOSIS — Z12.39 SCREENING FOR BREAST CANCER: ICD-10-CM

## 2019-10-18 DIAGNOSIS — E03.9 HYPOTHYROIDISM, UNSPECIFIED TYPE: ICD-10-CM

## 2019-10-18 DIAGNOSIS — Z23 NEED FOR INFLUENZA VACCINATION: ICD-10-CM

## 2019-10-18 DIAGNOSIS — E11.9 TYPE 2 DIABETES MELLITUS WITHOUT COMPLICATION, WITHOUT LONG-TERM CURRENT USE OF INSULIN (HCC): Primary | ICD-10-CM

## 2019-10-18 DIAGNOSIS — Z11.59 NEED FOR HEPATITIS C SCREENING TEST: ICD-10-CM

## 2019-10-18 DIAGNOSIS — Z12.11 SCREENING FOR COLON CANCER: ICD-10-CM

## 2019-10-18 DIAGNOSIS — E13.9 DIABETES 1.5, MANAGED AS TYPE 2 (HCC): ICD-10-CM

## 2019-10-18 LAB
CREAT UR-MCNC: 117 MG/DL
MICROALBUMIN UR-MCNC: 7 MG/L (ref 0–20)
MICROALBUMIN/CREAT 24H UR: 6 MG/G CREATININE (ref 0–30)
SL AMB POCT HEMOGLOBIN AIC: 6.1 (ref ?–6.5)

## 2019-10-18 PROCEDURE — 83036 HEMOGLOBIN GLYCOSYLATED A1C: CPT | Performed by: NURSE PRACTITIONER

## 2019-10-18 PROCEDURE — 90471 IMMUNIZATION ADMIN: CPT | Performed by: NURSE PRACTITIONER

## 2019-10-18 PROCEDURE — 82043 UR ALBUMIN QUANTITATIVE: CPT | Performed by: NURSE PRACTITIONER

## 2019-10-18 PROCEDURE — 90682 RIV4 VACC RECOMBINANT DNA IM: CPT | Performed by: NURSE PRACTITIONER

## 2019-10-18 PROCEDURE — 99204 OFFICE O/P NEW MOD 45 MIN: CPT | Performed by: NURSE PRACTITIONER

## 2019-10-18 PROCEDURE — 82570 ASSAY OF URINE CREATININE: CPT | Performed by: NURSE PRACTITIONER

## 2019-10-18 PROCEDURE — 3008F BODY MASS INDEX DOCD: CPT | Performed by: NURSE PRACTITIONER

## 2019-10-18 NOTE — PROGRESS NOTES
Assessment/Plan: A1C well controlled at 6 1  She is checking her sugars at home  Foot exam in office was normal  Will obtain eye exam  Did obtain microablumin  Will give script for mammogram and bone density  Will obtain records from [facility]  Will recheck TSH  Depression screening normal  Bp stable 126/70  Will bring back in 3 months for Medicare Wellness  Patient will be due for pap smear with Dr Eladia Noble  Will follow up in 3 months or sooner if need be  No problem-specific Assessment & Plan notes found for this encounter  Problem List Items Addressed This Visit        Endocrine    Hypothyroidism    Relevant Orders    TSH, 3rd generation with Free T4 reflex    Type 2 diabetes mellitus without complication, without long-term current use of insulin (HCC) - Primary    Relevant Medications    linaGLIPtin (TRADJENTA) 5 MG TABS    Other Relevant Orders    Microalbumin / creatinine urine ratio    POCT hemoglobin A1c (Completed)       Other    Bipolar disorder ([facility] )    Hypercholesterolemia    Morbid obesity with BMI of 40 0-44 9, adult ([facility] )    Need for hepatitis C screening test    Relevant Orders    Hepatitis C antibody    Screening for breast cancer    Relevant Orders    Mammo screening bilateral w 3d & cad    Postmenopausal    Relevant Orders    DXA bone density spine hip and pelvis    Screening for colon cancer    Relevant Orders    Cologuard      Other Visit Diagnoses     Diabetes 1 5, managed as type 2 ([facility] )        Relevant Medications    linaGLIPtin (TRADJENTA) 5 MG TABS    Need for influenza vaccination        Relevant Orders    influenza vaccine, [id number], quadrivalent, recombinant, PF, 0 5 mL, for patients 18 yr+ (FLUBLOK) (Completed)            Subjective:      Patient ID: Wale Parrish is a 62 y o  female  Nithin Barnett is here today to establish care as a new patient  She was a previous patient of [address] [facility]  She is a type 2 diabetic controlled and is taking Tradjenta   She mathews have high cholesterol well controlled and is Bipolar seeing [facility] in [facility]  She did hae lab work done in August but was never informed of the results  She does take Synthroid daily  She denies any chest pain, SOB, or palpitations  She states her depression and anxiety is well controlled  She denies any issues with constipation or diarrhea  She denies any alcohol, drug, or tobacco use  She does see Leif Ríos for eye exams and does see a Podiatrist in [employer]  She did have a FIT test done around one year ago  She will do a Cologuard  She would like the Flu vaccine  The following portions of the patient's history were reviewed and updated as appropriate:   She  has a past medical history of Allergic, Depression, Disease of thyroid gland, GERD (gastroesophageal reflux disease), and Hyperchloremia  She   Patient Active Problem List    Diagnosis Date Noted    Type 2 diabetes mellitus without complication, without long-term current use of insulin ([place] [address] ) 10/18/2019    Morbid obesity with BMI of 40 0-44 9, adult ([place] [address] ) 10/18/2019    Need for hepatitis C screening test 10/18/2019    Screening for breast cancer 10/18/2019    Postmenopausal 10/18/2019    Screening for colon cancer 10/18/2019    Morbid obesity ([facility] ) 08/21/2017    Bipolar disorder ([place] [address] ) 01/09/2014    Constipation 01/09/2014    Hypercholesterolemia 01/09/2014    Hypothyroidism 01/09/2014     She  has a past surgical history that includes Ankle surgery and Dilation and curettage of uterus  Her family history includes No Known Problems in her family  She  reports that she has never smoked  She has never used smokeless tobacco  She reports that she does not drink alcohol or use drugs    Current Outpatient Medications   Medication Sig Dispense Refill    aspirin (ECOTRIN LOW STRENGTH) 81 mg EC tablet Take 81 mg by mouth daily      doxepin (SINEquan) 100 mg capsule Take 200 mg by mouth daily at bedtime      glucose blood test strip 1 each by Other route as needed Use as instructed      levothyroxine 125 mcg tablet Take 125 mcg by mouth daily      linaGLIPtin (TRADJENTA) 5 MG TABS Take 5 mg by mouth daily 30 tablet 3    lithium 300 MG tablet Take 300 mg by mouth 2 (two) times a day      pravastatin (PRAVACHOL) 40 mg tablet Take 40 mg by mouth daily       No current facility-administered medications for this visit  Current Outpatient Medications on File Prior to Visit   Medication Sig    aspirin (ECOTRIN LOW STRENGTH) 81 mg EC tablet Take 81 mg by mouth daily    doxepin (SINEquan) 100 mg capsule Take 200 mg by mouth daily at bedtime    glucose blood test strip 1 each by Other route as needed Use as instructed    levothyroxine 125 mcg tablet Take 125 mcg by mouth daily    lithium 300 MG tablet Take 300 mg by mouth 2 (two) times a day    pravastatin (PRAVACHOL) 40 mg tablet Take 40 mg by mouth daily    [DISCONTINUED] linaGLIPtin (TRADJENTA) 5 MG TABS Take 5 mg by mouth daily    [DISCONTINUED] UNKNOWN TO PATIENT      No current facility-administered medications on file prior to visit  She has No Known Allergies       Review of Systems   All other systems reviewed and are negative  Patient's shoes and socks removed  Right Foot/Ankle   Right Foot Inspection  Skin Exam: skin normal and skin intact no dry skin, no warmth, no callus, no erythema, no maceration, no abnormal color, no pre-ulcer, no ulcer and no callus                          Toe Exam: ROM and strength within normal limits  Sensory   Vibration: intact  Proprioception: intact   Monofilament testing: intact  Vascular  Capillary refills: < 3 seconds  The right DP pulse is 2+  The right PT pulse is 2+       Left Foot/Ankle  Left Foot Inspection  Skin Exam: skin normal and skin intactno dry skin, no warmth, no erythema, no maceration, normal color, no pre-ulcer, no ulcer and no callus                         Toe Exam: ROM and strength within normal limits                   Sensory Vibration: intact  Proprioception: intact  Monofilament: intact  Vascular  Capillary refills: < 3 seconds  The left DP pulse is 2+  The left PT pulse is 2+  Assign Risk Category:  No deformity present; No loss of protective sensation; No weak pulses       Risk: 0       Below is the patient's most recent value for Albumin, ALT, AST, BUN, Calcium, Chloride, Cholesterol, CO2, Creatinine, GFR, Glucose, HDL, Hematocrit, Hemoglobin, Hemoglobin A1C, LDL, Magnesium, Phosphorus, Platelets, Potassium, PSA, Sodium, Triglycerides, and WBC  Lab Results   Component Value Date    ALT 16 08/14/2019    AST 13 08/14/2019    BUN 6 08/14/2019    CALCIUM 9 9 08/14/2019     08/14/2019    CO2 28 08/14/2019    CREATININE 0 95 08/14/2019    HDL 57 08/14/2019    HCT 42 4 08/14/2019    HGB 13 6 08/14/2019    HGBA1C 6 1 10/18/2019     08/14/2019    K 4 0 08/14/2019     02/06/2015    TRIG 130 08/14/2019    WBC 9 56 08/14/2019     Note: for a comprehensive list of the patient's lab results, access the Results Review activity  Objective:      /70 (BP Location: Right arm, Patient Position: Sitting, Cuff Size: Large)   Pulse 86   Temp 97 8 °F (36 6 °C) (Temporal)   Resp 18   Ht 5' (1 524 m)   Wt 103 kg (226 lb 12 8 oz)   SpO2 97%   BMI 44 29 kg/m²          Physical Exam   Constitutional: She is oriented to person, place, and time  She appears well-developed and well-nourished  HENT:   Head: Normocephalic and atraumatic  Right Ear: External ear normal    Left Ear: External ear normal    Nose: Nose normal    Mouth/Throat: Oropharynx is clear and moist    Eyes: Pupils are equal, round, and reactive to light  Conjunctivae and EOM are normal    Neck: Normal range of motion  Neck supple  Cardiovascular: Normal rate, regular rhythm, normal heart sounds and intact distal pulses  Pulses are no weak pulses  Pulses:       Dorsalis pedis pulses are 2+ on the right side, and 2+ on the left side          Posterior tibial pulses are 2+ on the right side, and 2+ on the left side  Pulmonary/Chest: Effort normal and breath sounds normal    Abdominal: Soft  Bowel sounds are normal    Musculoskeletal: Normal range of motion  Feet:    Feet:   Right Foot:   Skin Integrity: Negative for ulcer, skin breakdown, erythema, warmth, callus or dry skin  Left Foot:   Skin Integrity: Negative for ulcer, skin breakdown, erythema, warmth, callus or dry skin  Neurological: She is alert and oriented to person, place, and time  Skin: Skin is warm and dry  Capillary refill takes less than 2 seconds  Psychiatric: She has a normal mood and affect  Her behavior is normal  Judgment and thought content normal    Vitals reviewed  BMI Counseling: Body mass index is 44 29 kg/m²  The BMI is above normal  Nutrition recommendations include reducing portion sizes, decreasing overall calorie intake, 3-5 servings of fruits/vegetables daily, reducing fast food intake, consuming healthier snacks, decreasing soda and/or juice intake, moderation in carbohydrate intake, increasing intake of lean protein, reducing intake of saturated fat and trans fat and reducing intake of cholesterol

## 2019-10-18 NOTE — PATIENT INSTRUCTIONS

## 2019-10-21 ENCOUNTER — APPOINTMENT (OUTPATIENT)
Dept: LAB | Facility: CLINIC | Age: 64
End: 2019-10-21
Payer: COMMERCIAL

## 2019-10-21 DIAGNOSIS — E03.9 HYPOTHYROIDISM, UNSPECIFIED TYPE: ICD-10-CM

## 2019-10-21 DIAGNOSIS — Z11.59 NEED FOR HEPATITIS C SCREENING TEST: ICD-10-CM

## 2019-10-21 LAB
HCV AB SER QL: NORMAL
T4 FREE SERPL-MCNC: 1.24 NG/DL (ref 0.76–1.46)
TSH SERPL DL<=0.05 MIU/L-ACNC: 0.24 UIU/ML (ref 0.36–3.74)

## 2019-10-21 PROCEDURE — 84443 ASSAY THYROID STIM HORMONE: CPT

## 2019-10-21 PROCEDURE — 36415 COLL VENOUS BLD VENIPUNCTURE: CPT

## 2019-10-21 PROCEDURE — 84439 ASSAY OF FREE THYROXINE: CPT

## 2019-10-21 PROCEDURE — 86803 HEPATITIS C AB TEST: CPT

## 2019-10-22 DIAGNOSIS — E78.00 HYPERCHOLESTEROLEMIA: Primary | ICD-10-CM

## 2019-10-22 DIAGNOSIS — E03.9 HYPOTHYROIDISM, UNSPECIFIED TYPE: ICD-10-CM

## 2019-10-22 RX ORDER — LEVOTHYROXINE SODIUM 112 UG/1
112 TABLET ORAL
Qty: 30 TABLET | Refills: 5 | Status: SHIPPED | OUTPATIENT
Start: 2019-10-22 | End: 2020-04-10

## 2019-11-26 ENCOUNTER — HOSPITAL ENCOUNTER (OUTPATIENT)
Dept: BONE DENSITY | Facility: HOSPITAL | Age: 64
Discharge: HOME | End: 2019-11-26
Payer: COMMERCIAL

## 2019-11-26 ENCOUNTER — HOSPITAL ENCOUNTER (OUTPATIENT)
Dept: MAMMOGRAPHY | Facility: HOSPITAL | Age: 64
Discharge: HOME | End: 2019-11-26
Payer: COMMERCIAL

## 2019-11-26 VITALS — WEIGHT: 235 LBS | BODY MASS INDEX: 46.13 KG/M2 | HEIGHT: 60 IN

## 2019-11-26 DIAGNOSIS — Z12.39 SCREENING FOR BREAST CANCER: ICD-10-CM

## 2019-11-26 DIAGNOSIS — Z78.0 POSTMENOPAUSAL: ICD-10-CM

## 2019-11-26 PROCEDURE — 77067 SCR MAMMO BI INCL CAD: CPT

## 2019-11-26 PROCEDURE — 77063 BREAST TOMOSYNTHESIS BI: CPT

## 2019-11-26 PROCEDURE — 77080 DXA BONE DENSITY AXIAL: CPT

## 2020-01-14 DIAGNOSIS — E13.9 DIABETES 1.5, MANAGED AS TYPE 2 (HCC): ICD-10-CM

## 2020-01-20 ENCOUNTER — OFFICE VISIT (OUTPATIENT)
Dept: INTERNAL MEDICINE CLINIC | Facility: CLINIC | Age: 65
End: 2020-01-20
Payer: COMMERCIAL

## 2020-01-20 VITALS
OXYGEN SATURATION: 99 % | WEIGHT: 229.1 LBS | TEMPERATURE: 98.2 F | RESPIRATION RATE: 16 BRPM | BODY MASS INDEX: 44.98 KG/M2 | HEART RATE: 92 BPM | HEIGHT: 60 IN | SYSTOLIC BLOOD PRESSURE: 122 MMHG | DIASTOLIC BLOOD PRESSURE: 76 MMHG

## 2020-01-20 DIAGNOSIS — F31.60 BIPOLAR AFFECTIVE DISORDER, CURRENT EPISODE MIXED, CURRENT EPISODE SEVERITY UNSPECIFIED (HCC): ICD-10-CM

## 2020-01-20 DIAGNOSIS — Z00.00 MEDICARE ANNUAL WELLNESS VISIT, SUBSEQUENT: Primary | ICD-10-CM

## 2020-01-20 DIAGNOSIS — E03.9 HYPOTHYROIDISM, UNSPECIFIED TYPE: ICD-10-CM

## 2020-01-20 DIAGNOSIS — E66.01 MORBID OBESITY WITH BMI OF 40.0-44.9, ADULT (HCC): ICD-10-CM

## 2020-01-20 DIAGNOSIS — E78.00 HYPERCHOLESTEROLEMIA: ICD-10-CM

## 2020-01-20 DIAGNOSIS — E11.9 TYPE 2 DIABETES MELLITUS WITHOUT COMPLICATION, WITHOUT LONG-TERM CURRENT USE OF INSULIN (HCC): ICD-10-CM

## 2020-01-20 PROBLEM — Z12.39 SCREENING FOR BREAST CANCER: Status: RESOLVED | Noted: 2019-10-18 | Resolved: 2020-01-20

## 2020-01-20 PROBLEM — Z11.59 NEED FOR HEPATITIS C SCREENING TEST: Status: RESOLVED | Noted: 2019-10-18 | Resolved: 2020-01-20

## 2020-01-20 PROBLEM — Z12.11 SCREENING FOR COLON CANCER: Status: RESOLVED | Noted: 2019-10-18 | Resolved: 2020-01-20

## 2020-01-20 LAB — SL AMB POCT HEMOGLOBIN AIC: 6 (ref ?–6.5)

## 2020-01-20 PROCEDURE — 83036 HEMOGLOBIN GLYCOSYLATED A1C: CPT | Performed by: NURSE PRACTITIONER

## 2020-01-20 PROCEDURE — 3008F BODY MASS INDEX DOCD: CPT | Performed by: NURSE PRACTITIONER

## 2020-01-20 PROCEDURE — 3044F HG A1C LEVEL LT 7.0%: CPT | Performed by: NURSE PRACTITIONER

## 2020-01-20 PROCEDURE — 3725F SCREEN DEPRESSION PERFORMED: CPT | Performed by: NURSE PRACTITIONER

## 2020-01-20 PROCEDURE — 1036F TOBACCO NON-USER: CPT | Performed by: NURSE PRACTITIONER

## 2020-01-20 PROCEDURE — G0439 PPPS, SUBSEQ VISIT: HCPCS | Performed by: NURSE PRACTITIONER

## 2020-01-20 PROCEDURE — 99213 OFFICE O/P EST LOW 20 MIN: CPT | Performed by: NURSE PRACTITIONER

## 2020-01-20 NOTE — PATIENT INSTRUCTIONS
Medicare Preventive Visit Patient Instructions  Thank you for completing your Welcome to Medicare Visit or Medicare Annual Wellness Visit today  Your next wellness visit will be due in one year (1/20/2021)  The screening/preventive services that you may require over the next 5-10 years are detailed below  Some tests may not apply to you based off risk factors and/or age  Screening tests ordered at today's visit but not completed yet may show as past due  Also, please note that scanned in results may not display below  Preventive Screenings:  Service Recommendations Previous Testing/Comments   Colorectal Cancer Screening  * Colonoscopy    * Fecal Occult Blood Test (FOBT)/Fecal Immunochemical Test (FIT)  * Fecal DNA/Cologuard Test  * Flexible Sigmoidoscopy Age: 54-65 years old   Colonoscopy: every 10 years (may be performed more frequently if at higher risk)  OR  FOBT/FIT: every 1 year  OR  Cologuard: every 3 years  OR  Sigmoidoscopy: every 5 years  Screening may be recommended earlier than age 48 if at higher risk for colorectal cancer  Also, an individualized decision between you and your healthcare provider will decide whether screening between the ages of 74-80 would be appropriate  Colonoscopy: Not on file  FOBT/FIT: Not on file  Cologuard: Not on file  Sigmoidoscopy: Not on file         Breast Cancer Screening Age: 36 years old  Frequency: every 1-2 years  Not required if history of left and right mastectomy Mammogram: 11/26/2019    Screening Current   Cervical Cancer Screening Between the ages of 21-29, pap smear recommended once every 3 years  Between the ages of 33-67, can perform pap smear with HPV co-testing every 5 years     Recommendations may differ for women with a history of total hysterectomy, cervical cancer, or abnormal pap smears in past  Pap Smear: Not on file       Hepatitis C Screening Once for adults born between 1945 and 1965  More frequently in patients at high risk for Hepatitis C Hep C Antibody: 10/21/2019    Screening Current   Diabetes Screening 1-2 times per year if you're at risk for diabetes or have pre-diabetes Fasting glucose: 145 mg/dL   A1C: 6 1    Screening Not Indicated  History Diabetes   Cholesterol Screening Once every 5 years if you don't have a lipid disorder  May order more often based on risk factors  Lipid panel: 08/14/2019    Screening Not Indicated  History Lipid Disorder     Other Preventive Screenings Covered by Medicare:  1  Abdominal Aortic Aneurysm (AAA) Screening: covered once if your at risk  You're considered to be at risk if you have a family history of AAA  2  Lung Cancer Screening: covers low dose CT scan once per year if you meet all of the following conditions: (1) Age 50-69; (2) No signs or symptoms of lung cancer; (3) Current smoker or have quit smoking within the last 15 years; (4) You have a tobacco smoking history of at least 30 pack years (packs per day multiplied by number of years you smoked); (5) You get a written order from a healthcare provider  3  Glaucoma Screening: covered annually if you're considered high risk: (1) You have diabetes OR (2) Family history of glaucoma OR (3)  aged 48 and older OR (3)  American aged 72 and older  3  Osteoporosis Screening: covered every 2 years if you meet one of the following conditions: (1) You're estrogen deficient and at risk for osteoporosis based off medical history and other findings; (2) Have a vertebral abnormality; (3) On glucocorticoid therapy for more than 3 months; (4) Have primary hyperparathyroidism; (5) On osteoporosis medications and need to assess response to drug therapy  · Last bone density test (DXA Scan): 11/26/2019   5  HIV Screening: covered annually if you're between the age of 15-65  Also covered annually if you are younger than 13 and older than 72 with risk factors for HIV infection   For pregnant patients, it is covered up to 3 times per pregnancy  Immunizations:  Immunization Recommendations   Influenza Vaccine Annual influenza vaccination during flu season is recommended for all persons aged >= 6 months who do not have contraindications   Pneumococcal Vaccine (Prevnar and Pneumovax)  * Prevnar = PCV13  * Pneumovax = PPSV23   Adults 25-60 years old: 1-3 doses may be recommended based on certain risk factors  Adults 72 years old: Prevnar (PCV13) vaccine recommended followed by Pneumovax (PPSV23) vaccine  If already received PPSV23 since turning 65, then PCV13 recommended at least one year after PPSV23 dose  Hepatitis B Vaccine 3 dose series if at intermediate or high risk (ex: diabetes, end stage renal disease, liver disease)   Tetanus (Td) Vaccine - COST NOT COVERED BY MEDICARE PART B Following completion of primary series, a booster dose should be given every 10 years to maintain immunity against tetanus  Td may also be given as tetanus wound prophylaxis  Tdap Vaccine - COST NOT COVERED BY MEDICARE PART B Recommended at least once for all adults  For pregnant patients, recommended with each pregnancy  Shingles Vaccine (Shingrix) - COST NOT COVERED BY MEDICARE PART B  2 shot series recommended in those aged 48 and above     Health Maintenance Due:      Topic Date Due    CRC Screening: Colonoscopy  1955    Cervical Cancer Screening  07/14/1977    MAMMOGRAM  11/26/2021    DXA SCAN  11/26/2021    Hepatitis C Screening  Completed     Immunizations Due:      Topic Date Due    DTaP,Tdap,and Td Vaccines (1 - Tdap) 07/14/1967     Advance Directives   What are advance directives? Advance directives are legal documents that state your wishes and plans for medical care  These plans are made ahead of time in case you lose your ability to make decisions for yourself  Advance directives can apply to any medical decision, such as the treatments you want, and if you want to donate organs  What are the types of advance directives?   There are many types of advance directives, and each state has rules about how to use them  You may choose a combination of any of the following:  · Living will: This is a written record of the treatment you want  You can also choose which treatments you do not want, which to limit, and which to stop at a certain time  This includes surgery, medicine, IV fluid, and tube feedings  · Durable power of  for healthcare [facility]): This is a written record that states who you want to make healthcare choices for you when you are unable to make them for yourself  This person, called a proxy, is usually a family member or a friend  You may choose more than 1 proxy  · Do not resuscitate (DNR) order:  A DNR order is used in case your heart stops beating or you stop breathing  It is a request not to have certain forms of treatment, such as CPR  A DNR order may be included in other types of advance directives  · Medical directive: This covers the care that you want if you are in a coma, near death, or unable to make decisions for yourself  You can list the treatments you want for each condition  Treatment may include pain medicine, surgery, blood transfusions, dialysis, IV or tube feedings, and a ventilator (breathing machine)  · Values history: This document has questions about your views, beliefs, and how you feel and think about life  This information can help others choose the care that you would choose  Why are advance directives important? An advance directive helps you control your care  Although spoken wishes may be used, it is better to have your wishes written down  Spoken wishes can be misunderstood, or not followed  Treatments may be given even if you do not want them  An advance directive may make it easier for your family to make difficult choices about your care     Weight Management   Why it is important to manage your weight:  Being overweight increases your risk of health conditions such as heart disease, high blood pressure, type 2 diabetes, and certain types of cancer  It can also increase your risk for osteoarthritis, sleep apnea, and other respiratory problems  Aim for a slow, steady weight loss  Even a small amount of weight loss can lower your risk of health problems  How to lose weight safely:  A safe and healthy way to lose weight is to eat fewer calories and get regular exercise  You can lose up about 1 pound a week by decreasing the number of calories you eat by 500 calories each day  Healthy meal plan for weight management:  A healthy meal plan includes a variety of foods, contains fewer calories, and helps you stay healthy  A healthy meal plan includes the following:  · Eat whole-grain foods more often  A healthy meal plan should contain fiber  Fiber is the part of grains, fruits, and vegetables that is not broken down by your body  Whole-grain foods are healthy and provide extra fiber in your diet  Some examples of whole-grain foods are whole-wheat breads and pastas, oatmeal, brown rice, and bulgur  · Eat a variety of vegetables every day  Include dark, leafy greens such as spinach, kale, juaquin greens, and mustard greens  Eat yellow and orange vegetables such as carrots, sweet potatoes, and winter squash  · Eat a variety of fruits every day  Choose fresh or canned fruit (canned in its own juice or light syrup) instead of juice  Fruit juice has very little or no fiber  · Eat low-fat dairy foods  Drink fat-free (skim) milk or 1% milk  Eat fat-free yogurt and low-fat cottage cheese  Try low-fat cheeses such as mozzarella and other reduced-fat cheeses  · Choose meat and other protein foods that are low in fat  Choose beans or other legumes such as split peas or lentils  Choose fish, skinless poultry (chicken or turkey), or lean cuts of red meat (beef or pork)  Before you cook meat or poultry, cut off any visible fat  · Use less fat and oil  Try baking foods instead of frying them  Add less fat, such as margarine, sour cream, regular salad dressing and mayonnaise to foods  Eat fewer high-fat foods  Some examples of high-fat foods include french fries, doughnuts, ice cream, and cakes  · Eat fewer sweets  Limit foods and drinks that are high in sugar  This includes candy, cookies, regular soda, and sweetened drinks  Exercise:  Exercise at least 30 minutes per day on most days of the week  Some examples of exercise include walking, biking, dancing, and swimming  You can also fit in more physical activity by taking the stairs instead of the elevator or parking farther away from stores  Ask your healthcare provider about the best exercise plan for you  © Copyright [employer] 2018 Information is for End User's use only and may not be sold, redistributed or otherwise used for commercial purposes  All illustrations and images included in CareNotes® are the copyrighted property of [employer]  or [facility] Preventive Visit Patient Instructions  Thank you for completing your Welcome to Medicare Visit or Medicare Annual Wellness Visit today  Your next wellness visit will be due in one year (1/20/2021)  The screening/preventive services that you may require over the next 5-10 years are detailed below  Some tests may not apply to you based off risk factors and/or age  Screening tests ordered at today's visit but not completed yet may show as past due  Also, please note that scanned in results may not display below    Preventive Screenings:  Service Recommendations Previous Testing/Comments   Colorectal Cancer Screening  * Colonoscopy    * Fecal Occult Blood Test (FOBT)/Fecal Immunochemical Test (FIT)  * Fecal DNA/Cologuard Test  * Flexible Sigmoidoscopy Age: 54-65 years old   Colonoscopy: every 10 years (may be performed more frequently if at higher risk)  OR  FOBT/FIT: every 1 year  OR  Cologuard: every 3 years  OR  Sigmoidoscopy: every 5 years  Screening may be recommended earlier than age 48 if at higher risk for colorectal cancer  Also, an individualized decision between you and your healthcare provider will decide whether screening between the ages of 74-80 would be appropriate  Colonoscopy: Not on file  FOBT/FIT: Not on file  Cologuard: Not on file  Sigmoidoscopy: Not on file         Breast Cancer Screening Age: 36 years old  Frequency: every 1-2 years  Not required if history of left and right mastectomy Mammogram: 11/26/2019    Screening Current   Cervical Cancer Screening Between the ages of 21-29, pap smear recommended once every 3 years  Between the ages of 33-67, can perform pap smear with HPV co-testing every 5 years  Recommendations may differ for women with a history of total hysterectomy, cervical cancer, or abnormal pap smears in past  Pap Smear: Not on file       Hepatitis C Screening Once for adults born between 1945 and 1965  More frequently in patients at high risk for Hepatitis C Hep C Antibody: 10/21/2019    Screening Current   Diabetes Screening 1-2 times per year if you're at risk for diabetes or have pre-diabetes Fasting glucose: 145 mg/dL   A1C: 6 1    Screening Not Indicated  History Diabetes   Cholesterol Screening Once every 5 years if you don't have a lipid disorder  May order more often based on risk factors  Lipid panel: 08/14/2019    Screening Not Indicated  History Lipid Disorder     Other Preventive Screenings Covered by Medicare:  6  Abdominal Aortic Aneurysm (AAA) Screening: covered once if your at risk  You're considered to be at risk if you have a family history of AAA    7  Lung Cancer Screening: covers low dose CT scan once per year if you meet all of the following conditions: (1) Age 50-69; (2) No signs or symptoms of lung cancer; (3) Current smoker or have quit smoking within the last 15 years; (4) You have a tobacco smoking history of at least 30 pack years (packs per day multiplied by number of years you smoked); (5) You get a written order from a healthcare provider  8  Glaucoma Screening: covered annually if you're considered high risk: (1) You have diabetes OR (2) Family history of glaucoma OR (3)  aged 48 and older OR (3)  American aged 72 and older  5  Osteoporosis Screening: covered every 2 years if you meet one of the following conditions: (1) You're estrogen deficient and at risk for osteoporosis based off medical history and other findings; (2) Have a vertebral abnormality; (3) On glucocorticoid therapy for more than 3 months; (4) Have primary hyperparathyroidism; (5) On osteoporosis medications and need to assess response to drug therapy  · Last bone density test (DXA Scan): 11/26/2019   10  HIV Screening: covered annually if you're between the age of 15-65  Also covered annually if you are younger than 13 and older than 72 with risk factors for HIV infection  For pregnant patients, it is covered up to 3 times per pregnancy  Immunizations:  Immunization Recommendations   Influenza Vaccine Annual influenza vaccination during flu season is recommended for all persons aged >= 6 months who do not have contraindications   Pneumococcal Vaccine (Prevnar and Pneumovax)  * Prevnar = PCV13  * Pneumovax = PPSV23   Adults 25-60 years old: 1-3 doses may be recommended based on certain risk factors  Adults 72 years old: Prevnar (PCV13) vaccine recommended followed by Pneumovax (PPSV23) vaccine  If already received PPSV23 since turning 65, then PCV13 recommended at least one year after PPSV23 dose  Hepatitis B Vaccine 3 dose series if at intermediate or high risk (ex: diabetes, end stage renal disease, liver disease)   Tetanus (Td) Vaccine - COST NOT COVERED BY MEDICARE PART B Following completion of primary series, a booster dose should be given every 10 years to maintain immunity against tetanus  Td may also be given as tetanus wound prophylaxis     Tdap Vaccine - COST NOT COVERED BY MEDICARE PART B Recommended at least once for all adults  For pregnant patients, recommended with each pregnancy  Shingles Vaccine (Shingrix) - COST NOT COVERED BY MEDICARE PART B  2 shot series recommended in those aged 48 and above     Health Maintenance Due:      Topic Date Due    CRC Screening: Colonoscopy  1955    Cervical Cancer Screening  07/14/1977    MAMMOGRAM  11/26/2021    DXA SCAN  11/26/2021    Hepatitis C Screening  Completed     Immunizations Due:      Topic Date Due    DTaP,Tdap,and Td Vaccines (1 - Tdap) 07/14/1967     Advance Directives   What are advance directives? Advance directives are legal documents that state your wishes and plans for medical care  These plans are made ahead of time in case you lose your ability to make decisions for yourself  Advance directives can apply to any medical decision, such as the treatments you want, and if you want to donate organs  What are the types of advance directives? There are many types of advance directives, and each state has rules about how to use them  You may choose a combination of any of the following:  · Living will: This is a written record of the treatment you want  You can also choose which treatments you do not want, which to limit, and which to stop at a certain time  This includes surgery, medicine, IV fluid, and tube feedings  · Durable power of  for healthcare [place]): This is a written record that states who you want to make healthcare choices for you when you are unable to make them for yourself  This person, called a proxy, is usually a family member or a friend  You may choose more than 1 proxy  · Do not resuscitate (DNR) order:  A DNR order is used in case your heart stops beating or you stop breathing  It is a request not to have certain forms of treatment, such as CPR  A DNR order may be included in other types of advance directives  · Medical directive:   This covers the care that you want if you are in a coma, near death, or unable to make decisions for yourself  You can list the treatments you want for each condition  Treatment may include pain medicine, surgery, blood transfusions, dialysis, IV or tube feedings, and a ventilator (breathing machine)  · Values history: This document has questions about your views, beliefs, and how you feel and think about life  This information can help others choose the care that you would choose  Why are advance directives important? An advance directive helps you control your care  Although spoken wishes may be used, it is better to have your wishes written down  Spoken wishes can be misunderstood, or not followed  Treatments may be given even if you do not want them  An advance directive may make it easier for your family to make difficult choices about your care  Weight Management   Why it is important to manage your weight:  Being overweight increases your risk of health conditions such as heart disease, high blood pressure, type 2 diabetes, and certain types of cancer  It can also increase your risk for osteoarthritis, sleep apnea, and other respiratory problems  Aim for a slow, steady weight loss  Even a small amount of weight loss can lower your risk of health problems  How to lose weight safely:  A safe and healthy way to lose weight is to eat fewer calories and get regular exercise  You can lose up about 1 pound a week by decreasing the number of calories you eat by 500 calories each day  Healthy meal plan for weight management:  A healthy meal plan includes a variety of foods, contains fewer calories, and helps you stay healthy  A healthy meal plan includes the following:  · Eat whole-grain foods more often  A healthy meal plan should contain fiber  Fiber is the part of grains, fruits, and vegetables that is not broken down by your body  Whole-grain foods are healthy and provide extra fiber in your diet   Some examples of whole-grain foods are whole-wheat breads and pastas, oatmeal, brown rice, and bulgur  · Eat a variety of vegetables every day  Include dark, leafy greens such as spinach, kale, juaquin greens, and mustard greens  Eat yellow and orange vegetables such as carrots, sweet potatoes, and winter squash  · Eat a variety of fruits every day  Choose fresh or canned fruit (canned in its own juice or light syrup) instead of juice  Fruit juice has very little or no fiber  · Eat low-fat dairy foods  Drink fat-free (skim) milk or 1% milk  Eat fat-free yogurt and low-fat cottage cheese  Try low-fat cheeses such as mozzarella and other reduced-fat cheeses  · Choose meat and other protein foods that are low in fat  Choose beans or other legumes such as split peas or lentils  Choose fish, skinless poultry (chicken or turkey), or lean cuts of red meat (beef or pork)  Before you cook meat or poultry, cut off any visible fat  · Use less fat and oil  Try baking foods instead of frying them  Add less fat, such as margarine, sour cream, regular salad dressing and mayonnaise to foods  Eat fewer high-fat foods  Some examples of high-fat foods include french fries, doughnuts, ice cream, and cakes  · Eat fewer sweets  Limit foods and drinks that are high in sugar  This includes candy, cookies, regular soda, and sweetened drinks  Exercise:  Exercise at least 30 minutes per day on most days of the week  Some examples of exercise include walking, biking, dancing, and swimming  You can also fit in more physical activity by taking the stairs instead of the elevator or parking farther away from stores  Ask your healthcare provider about the best exercise plan for you  © Copyright [employer] 2018 Information is for End User's use only and may not be sold, redistributed or otherwise used for commercial purposes   All illustrations and images included in CareNotes® are the copyrighted property of [employer]  or [address] [place] [address]

## 2020-01-20 NOTE — PROGRESS NOTES
Assessment and Plan:     Problem List Items Addressed This Visit        Endocrine    Hypothyroidism    Relevant Orders    Comprehensive metabolic panel    CBC and differential    TSH, 3rd generation with Free T4 reflex    Type 2 diabetes mellitus without complication, without long-term current use of insulin (HCC)    Relevant Orders    Comprehensive metabolic panel    CBC and differential    POCT hemoglobin A1c       Other    Bipolar disorder (HCC)    Relevant Orders    Comprehensive metabolic panel    CBC and differential    Hypercholesterolemia    Relevant Orders    Comprehensive metabolic panel    CBC and differential    Lipid panel    Morbid obesity with BMI of 40 0-44 9, adult ([place] [id number] )    Medicare annual wellness visit, subsequent - Primary    Relevant Orders    Comprehensive metabolic panel    CBC and differential           Preventive health issues were discussed with patient, and age appropriate screening tests were ordered as noted in patient's After Visit Summary  Personalized health advice and appropriate referrals for health education or preventive services given if needed, as noted in patient's After Visit Summary       History of Present Illness:     Patient presents for Medicare Annual Wellness visit    Patient Care Team:  Tiffanie Gruber as PCP - General (Family Medicine)  Al Weiner MD as PCP - [address] [place] [address] (RTE)     Problem List:     Patient Active Problem List   Diagnosis    Bipolar disorder ([facility] )    Constipation    Hypercholesterolemia    Hypothyroidism    Morbid obesity ([facility] )    Type 2 diabetes mellitus without complication, without long-term current use of insulin ([facility] )    Morbid obesity with BMI of 40 0-44 9, adult ([facility] )    Postmenopausal    Medicare annual wellness visit, subsequent      Past Medical and Surgical History:     Past Medical History:   Diagnosis Date    Allergic     Depression     Disease of thyroid gland     GERD (gastroesophageal reflux disease)     Hyperchloremia      Past Surgical History:   Procedure Laterality Date    ANKLE SURGERY      Incision    DILATION AND CURETTAGE OF UTERUS        Family History:     Family History   Problem Relation Age of Onset    No Known Problems Family     Diabetes Mother     Heart disease Mother    Elysia Singh Stroke Mother     Diabetes Father     Stroke Father     No Known Problems Maternal Grandmother     No Known Problems Maternal Grandfather     No Known Problems Paternal Grandmother     No Known Problems Paternal Grandfather     Diabetes Brother     No Known Problems Paternal Aunt       Social History:     Social History     Socioeconomic History    Marital status: Single     Spouse name: None    Number of children: None    Years of education: None    Highest education level: None   Occupational History    None   Social Needs    Financial resource strain: None    Food insecurity:     Worry: None     Inability: None    Transportation needs:     Medical: None     Non-medical: None   Tobacco Use    Smoking status: Never Smoker    Smokeless tobacco: Never Used   Substance and Sexual Activity    Alcohol use: No    Drug use: No    Sexual activity: None   Lifestyle    Physical activity:     Days per week: None     Minutes per session: None    Stress: None   Relationships    Social connections:     Talks on phone: None     Gets together: None     Attends Tenriism service: None     Active member of club or organization: None     Attends meetings of clubs or organizations: None     Relationship status: None    Intimate partner violence:     Fear of current or ex partner: None     Emotionally abused: None     Physically abused: None     Forced sexual activity: None   Other Topics Concern    None   Social History Narrative    None       Medications and Allergies:     Current Outpatient Medications   Medication Sig Dispense Refill    aspirin (ECOTRIN LOW STRENGTH) 81 mg EC tablet Take 81 mg by mouth daily      doxepin (SINEquan) 100 mg capsule Take 200 mg by mouth daily at bedtime      glucose blood test strip 1 each by Other route as needed Use as instructed      levothyroxine 112 mcg tablet Take 1 tablet (112 mcg total) by mouth daily in the early morning 30 tablet 5    linaGLIPtin (TRADJENTA) 5 MG TABS Take 5 mg by mouth daily 90 tablet 3    lithium 300 MG tablet Take 300 mg by mouth 2 (two) times a day      pravastatin (PRAVACHOL) 40 mg tablet Take 40 mg by mouth daily       No current facility-administered medications for this visit  No Known Allergies   Immunizations:     Immunization History   Administered Date(s) Administered    INFLUENZA 09/29/2015, 10/25/2016, 09/22/2017, 10/04/2018    Influenza, recombinant, quadrivalent,injectable, preservative free 10/18/2019      Health Maintenance:         Topic Date Due    CRC Screening: Colonoscopy  1955    Cervical Cancer Screening  07/14/1977    MAMMOGRAM  11/26/2021    DXA SCAN  11/26/2021    Hepatitis C Screening  Completed         Topic Date Due    DTaP,Tdap,and Td Vaccines (1 - Tdap) 07/14/1967      Medicare Health Risk Assessment:     /76 (BP Location: Right arm, Patient Position: Sitting, Cuff Size: Large)   Pulse 92   Temp 98 2 °F (36 8 °C) (Temporal)   Resp 16   Ht 5' (1 524 m)   Wt 104 kg (229 lb 1 6 oz)   SpO2 99%   BMI 44 74 kg/m²      [employer] is here for her Subsequent Wellness visit  Health Risk Assessment:   Patient rates overall health as good  Patient feels that their physical health rating is same  Eyesight was rated as same  Hearing was rated as same  Patient feels that their emotional and mental health rating is same  Pain experienced in the last 7 days has been none  Patient states that she has experienced no weight loss or gain in last 6 months  Depression Screening:   PHQ-2 Score: 0      Fall Risk Screening:    In the past year, patient has experienced: no history of falling in past year Urinary Incontinence Screening:   Patient has not leaked urine accidently in the last six months  Home Safety:  Patient does not have trouble with stairs inside or outside of their home  Patient has working smoke alarms and has working carbon monoxide detector  Home safety hazards include: none  Nutrition:   Current diet is Frequent junk food and Regular  Medications:   Patient is currently taking over-the-counter supplements  OTC medications include: see medication list  Patient is able to manage medications  Activities of Daily Living (ADLs)/Instrumental Activities of Daily Living (IADLs):   Walk and transfer into and out of bed and chair?: Yes  Dress and groom yourself?: Yes    Bathe or shower yourself?: Yes    Feed yourself? Yes  Do your laundry/housekeeping?: Yes  Manage your money, pay your bills and track your expenses?: Yes  Make your own meals?: Yes    Do your own shopping?: Yes    Previous Hospitalizations:   Any hospitalizations or ED visits within the last 12 months?: Yes    How many hospitalizations have you had in the last year?: 1-2    Advance Care Planning:   Living will: No    Durable POA for healthcare:  Yes    Advanced directive: No    Advanced directive counseling given: Yes    Five wishes given: Yes    Patient declined ACP directive: No    End of Life Decisions reviewed with patient: Yes    Provider agrees with end of life decisions: Yes      Cognitive Screening:   Provider or family/friend/caregiver concerned regarding cognition?: No    PREVENTIVE SCREENINGS      Cardiovascular Screening:    General: History Lipid Disorder and Risks and Benefits Discussed    Due for: Lipid Panel, Cholesterol and Triglycerides      Diabetes Screening:     General: History Diabetes and Risks and Benefits Discussed    Due for: Blood Glucose      Colorectal Cancer Screening:     General: Risks and Benefits Discussed and Screening Current      Breast Cancer Screening:     General: Screening Current and Risks and Benefits Discussed      Cervical Cancer Screening:    General: Risks and Benefits Discussed and Screening Current      Osteoporosis Screening:    General: Screening Current and Risks and Benefits Discussed      Abdominal Aortic Aneurysm (AAA) Screening:        General: Screening Not Indicated      Lung Cancer Screening:     General: Screening Not Indicated      Hepatitis C Screening:    General: Screening Current      Karley [id number], CRNP

## 2020-01-20 NOTE — PROGRESS NOTES
Assessment/Plan: Will repeat fasting labs  Will obtain Cologuard results  She is following up with [facility] no changes to any medications  She is up to date on her other screenings  A1C today in the office was 6 0  She was advised to continue to check her sugars at home  If any low readings she is to call the office  Will follow up with her in 6 months or sooner if need be  No problem-specific Assessment & Plan notes found for this encounter  Problem List Items Addressed This Visit        Endocrine    Hypothyroidism    Relevant Orders    Comprehensive metabolic panel    CBC and differential    TSH, 3rd generation with Free T4 reflex    Type 2 diabetes mellitus without complication, without long-term current use of insulin (HCC)    Relevant Orders    Comprehensive metabolic panel    CBC and differential    POCT hemoglobin A1c (Completed)       Other    Bipolar disorder (HCC)    Relevant Orders    Comprehensive metabolic panel    CBC and differential    Hypercholesterolemia    Relevant Orders    Comprehensive metabolic panel    CBC and differential    Lipid panel    Morbid obesity with BMI of 40 0-44 9, adult ([facility] )    Medicare annual wellness visit, subsequent - Primary    Relevant Orders    Comprehensive metabolic panel    CBC and differential            Subjective:      Patient ID: Mohit Pinon is a 62 y o  female  [facility] is here today for a follow up visit and Medicare wellness  She is doing well today and offers no issues  She does see [facility] every 3 months and no changes have been made to her medications  She is checking her sugar and states some days they are running low  She is up to date on her screenings and did complete the Cologuard with her last PCP  She denies any chest pain, SOB, or palpitations  She is not needing any refills  She did have her last eye exam done with [facility]  She offers no other issues        The following portions of the patient's history were reviewed and updated as appropriate:   She  has a past medical history of Allergic, Depression, Disease of thyroid gland, GERD (gastroesophageal reflux disease), and Hyperchloremia  She   Patient Active Problem List    Diagnosis Date Noted    Medicare annual wellness visit, subsequent 01/20/2020    Type 2 diabetes mellitus without complication, without long-term current use of insulin ([place] [id number] ) 10/18/2019    Morbid obesity with BMI of 40 0-44 9, adult ([place] [address] ) 10/18/2019    Postmenopausal 10/18/2019    Morbid obesity ([place] [address] ) 08/21/2017    Bipolar disorder ([place] [address] ) 01/09/2014    Constipation 01/09/2014    Hypercholesterolemia 01/09/2014    Hypothyroidism 01/09/2014     She  has a past surgical history that includes Ankle surgery and Dilation and curettage of uterus  Her family history includes Diabetes in her brother, father, and mother; Heart disease in her mother; No Known Problems in her family, maternal grandfather, maternal grandmother, paternal aunt, paternal grandfather, and paternal grandmother; Stroke in her father and mother  She  reports that she has never smoked  She has never used smokeless tobacco  She reports that she does not drink alcohol or use drugs  Current Outpatient Medications   Medication Sig Dispense Refill    aspirin (ECOTRIN LOW STRENGTH) 81 mg EC tablet Take 81 mg by mouth daily      doxepin (SINEquan) 100 mg capsule Take 200 mg by mouth daily at bedtime      glucose blood test strip 1 each by Other route as needed Use as instructed      levothyroxine 112 mcg tablet Take 1 tablet (112 mcg total) by mouth daily in the early morning 30 tablet 5    linaGLIPtin (TRADJENTA) 5 MG TABS Take 5 mg by mouth daily 90 tablet 3    lithium 300 MG tablet Take 300 mg by mouth 2 (two) times a day      pravastatin (PRAVACHOL) 40 mg tablet Take 40 mg by mouth daily       No current facility-administered medications for this visit        Current Outpatient Medications on File Prior to Visit   Medication Sig    aspirin (ECOTRIN LOW STRENGTH) 81 mg EC tablet Take 81 mg by mouth daily    doxepin (SINEquan) 100 mg capsule Take 200 mg by mouth daily at bedtime    glucose blood test strip 1 each by Other route as needed Use as instructed    levothyroxine 112 mcg tablet Take 1 tablet (112 mcg total) by mouth daily in the early morning    linaGLIPtin (TRADJENTA) 5 MG TABS Take 5 mg by mouth daily    lithium 300 MG tablet Take 300 mg by mouth 2 (two) times a day    pravastatin (PRAVACHOL) 40 mg tablet Take 40 mg by mouth daily     No current facility-administered medications on file prior to visit  She has No Known Allergies       Review of Systems   All other systems reviewed and are negative  Below is the patient's most recent value for Albumin, ALT, AST, BUN, Calcium, Chloride, Cholesterol, CO2, Creatinine, GFR, Glucose, HDL, Hematocrit, Hemoglobin, Hemoglobin A1C, LDL, Magnesium, Phosphorus, Platelets, Potassium, PSA, Sodium, Triglycerides, and WBC  Lab Results   Component Value Date    ALT 16 08/14/2019    AST 13 08/14/2019    BUN 6 08/14/2019    CALCIUM 9 9 08/14/2019     08/14/2019    CO2 28 08/14/2019    CREATININE 0 95 08/14/2019    HDL 57 08/14/2019    HCT 42 4 08/14/2019    HGB 13 6 08/14/2019    HGBA1C 6 01/20/2020     08/14/2019    K 4 0 08/14/2019     02/06/2015    TRIG 130 08/14/2019    WBC 9 56 08/14/2019     Note: for a comprehensive list of the patient's lab results, access the Results Review activity  Objective:      /76 (BP Location: Right arm, Patient Position: Sitting, Cuff Size: Large)   Pulse 92   Temp 98 2 °F (36 8 °C) (Temporal)   Resp 16   Ht 5' (1 524 m)   Wt 104 kg (229 lb 1 6 oz)   SpO2 99%   BMI 44 74 kg/m²          Physical Exam   Constitutional: She is oriented to person, place, and time  She appears well-developed and well-nourished  HENT:   Head: Normocephalic and atraumatic     Right Ear: External ear normal    Left Ear: External ear normal    Nose: Nose normal    Mouth/Throat: Oropharynx is clear and moist    Eyes: Pupils are equal, round, and reactive to light  Conjunctivae and EOM are normal    Neck: Normal range of motion  Neck supple  Cardiovascular: Normal rate, regular rhythm, normal heart sounds and intact distal pulses  Pulmonary/Chest: Effort normal and breath sounds normal    Abdominal: Soft  Bowel sounds are normal    Musculoskeletal: Normal range of motion  Neurological: She is alert and oriented to person, place, and time  Skin: Skin is warm and dry  Capillary refill takes less than 2 seconds  Psychiatric: She has a normal mood and affect  Her behavior is normal  Judgment and thought content normal    Vitals reviewed

## 2020-01-20 NOTE — PROGRESS NOTES
Chart updated per office request  Discrete reportable data documented    Care Gap closed - Cervical Cancer Screen

## 2020-03-03 DIAGNOSIS — E13.9 DIABETES 1.5, MANAGED AS TYPE 2 (HCC): ICD-10-CM

## 2020-03-03 RX ORDER — LINAGLIPTIN 5 MG/1
TABLET, FILM COATED ORAL
Qty: 30 TABLET | Refills: 0 | Status: SHIPPED | OUTPATIENT
Start: 2020-03-03 | End: 2020-04-01 | Stop reason: SDUPTHER

## 2020-04-01 DIAGNOSIS — E13.9 DIABETES 1.5, MANAGED AS TYPE 2 (HCC): ICD-10-CM

## 2020-04-10 DIAGNOSIS — E03.9 HYPOTHYROIDISM, UNSPECIFIED TYPE: ICD-10-CM

## 2020-04-10 DIAGNOSIS — E78.00 HYPERCHOLESTEROLEMIA: Primary | ICD-10-CM

## 2020-04-10 RX ORDER — PRAVASTATIN SODIUM 40 MG
40 TABLET ORAL DAILY
Qty: 90 TABLET | Refills: 3 | Status: SHIPPED | OUTPATIENT
Start: 2020-04-10 | End: 2021-03-22 | Stop reason: SDUPTHER

## 2020-04-10 RX ORDER — LEVOTHYROXINE SODIUM 112 UG/1
TABLET ORAL
Qty: 30 TABLET | Refills: 0 | Status: SHIPPED | OUTPATIENT
Start: 2020-04-10 | End: 2020-05-15

## 2020-05-12 ENCOUNTER — APPOINTMENT (OUTPATIENT)
Dept: LAB | Facility: CLINIC | Age: 65
End: 2020-05-12
Payer: COMMERCIAL

## 2020-05-12 DIAGNOSIS — E03.9 HYPOTHYROIDISM, UNSPECIFIED TYPE: ICD-10-CM

## 2020-05-12 DIAGNOSIS — Z00.00 MEDICARE ANNUAL WELLNESS VISIT, SUBSEQUENT: ICD-10-CM

## 2020-05-12 DIAGNOSIS — F31.60 BIPOLAR AFFECTIVE DISORDER, CURRENT EPISODE MIXED, CURRENT EPISODE SEVERITY UNSPECIFIED (HCC): ICD-10-CM

## 2020-05-12 DIAGNOSIS — E11.9 TYPE 2 DIABETES MELLITUS WITHOUT COMPLICATION, WITHOUT LONG-TERM CURRENT USE OF INSULIN (HCC): ICD-10-CM

## 2020-05-12 DIAGNOSIS — E78.00 HYPERCHOLESTEROLEMIA: ICD-10-CM

## 2020-05-12 LAB
ALBUMIN SERPL BCP-MCNC: 3.8 G/DL (ref 3.5–5)
ALP SERPL-CCNC: 105 U/L (ref 46–116)
ALT SERPL W P-5'-P-CCNC: 15 U/L (ref 12–78)
ANION GAP SERPL CALCULATED.3IONS-SCNC: 4 MMOL/L (ref 4–13)
AST SERPL W P-5'-P-CCNC: 13 U/L (ref 5–45)
BASOPHILS # BLD AUTO: 0.04 THOUSANDS/ΜL (ref 0–0.1)
BASOPHILS NFR BLD AUTO: 0 % (ref 0–1)
BILIRUB SERPL-MCNC: 1.25 MG/DL (ref 0.2–1)
BUN SERPL-MCNC: 9 MG/DL (ref 5–25)
CALCIUM SERPL-MCNC: 9.6 MG/DL (ref 8.3–10.1)
CHLORIDE SERPL-SCNC: 108 MMOL/L (ref 100–108)
CHOLEST SERPL-MCNC: 157 MG/DL (ref 50–200)
CO2 SERPL-SCNC: 28 MMOL/L (ref 21–32)
CREAT SERPL-MCNC: 0.94 MG/DL (ref 0.6–1.3)
EOSINOPHIL # BLD AUTO: 0 THOUSAND/ΜL (ref 0–0.61)
EOSINOPHIL NFR BLD AUTO: 0 % (ref 0–6)
ERYTHROCYTE [DISTWIDTH] IN BLOOD BY AUTOMATED COUNT: 13.4 % (ref 11.6–15.1)
GFR SERPL CREATININE-BSD FRML MDRD: 65 ML/MIN/1.73SQ M
GLUCOSE P FAST SERPL-MCNC: 162 MG/DL (ref 65–99)
HCT VFR BLD AUTO: 42.5 % (ref 34.8–46.1)
HDLC SERPL-MCNC: 62 MG/DL
HGB BLD-MCNC: 13.5 G/DL (ref 11.5–15.4)
IMM GRANULOCYTES # BLD AUTO: 0.04 THOUSAND/UL (ref 0–0.2)
IMM GRANULOCYTES NFR BLD AUTO: 0 % (ref 0–2)
LDLC SERPL CALC-MCNC: 66 MG/DL (ref 0–100)
LYMPHOCYTES # BLD AUTO: 1.86 THOUSANDS/ΜL (ref 0.6–4.47)
LYMPHOCYTES NFR BLD AUTO: 18 % (ref 14–44)
MCH RBC QN AUTO: 30.9 PG (ref 26.8–34.3)
MCHC RBC AUTO-ENTMCNC: 31.8 G/DL (ref 31.4–37.4)
MCV RBC AUTO: 97 FL (ref 82–98)
MONOCYTES # BLD AUTO: 0.57 THOUSAND/ΜL (ref 0.17–1.22)
MONOCYTES NFR BLD AUTO: 6 % (ref 4–12)
NEUTROPHILS # BLD AUTO: 7.64 THOUSANDS/ΜL (ref 1.85–7.62)
NEUTS SEG NFR BLD AUTO: 76 % (ref 43–75)
NONHDLC SERPL-MCNC: 95 MG/DL
NRBC BLD AUTO-RTO: 0 /100 WBCS
PLATELET # BLD AUTO: 322 THOUSANDS/UL (ref 149–390)
PMV BLD AUTO: 9.7 FL (ref 8.9–12.7)
POTASSIUM SERPL-SCNC: 4.3 MMOL/L (ref 3.5–5.3)
PROT SERPL-MCNC: 7.5 G/DL (ref 6.4–8.2)
RBC # BLD AUTO: 4.37 MILLION/UL (ref 3.81–5.12)
SODIUM SERPL-SCNC: 140 MMOL/L (ref 136–145)
TRIGL SERPL-MCNC: 143 MG/DL
TSH SERPL DL<=0.05 MIU/L-ACNC: 0.85 UIU/ML (ref 0.36–3.74)
WBC # BLD AUTO: 10.15 THOUSAND/UL (ref 4.31–10.16)

## 2020-05-12 PROCEDURE — 85025 COMPLETE CBC W/AUTO DIFF WBC: CPT

## 2020-05-12 PROCEDURE — 80053 COMPREHEN METABOLIC PANEL: CPT

## 2020-05-12 PROCEDURE — 80061 LIPID PANEL: CPT

## 2020-05-12 PROCEDURE — 36415 COLL VENOUS BLD VENIPUNCTURE: CPT

## 2020-05-12 PROCEDURE — 84443 ASSAY THYROID STIM HORMONE: CPT

## 2020-05-15 DIAGNOSIS — E03.9 HYPOTHYROIDISM, UNSPECIFIED TYPE: ICD-10-CM

## 2020-05-15 RX ORDER — LEVOTHYROXINE SODIUM 112 UG/1
TABLET ORAL
Qty: 30 TABLET | Refills: 0 | Status: SHIPPED | OUTPATIENT
Start: 2020-05-15 | End: 2020-06-15

## 2020-06-15 DIAGNOSIS — E03.9 HYPOTHYROIDISM, UNSPECIFIED TYPE: ICD-10-CM

## 2020-06-15 RX ORDER — LEVOTHYROXINE SODIUM 112 UG/1
TABLET ORAL
Qty: 30 TABLET | Refills: 0 | Status: SHIPPED | OUTPATIENT
Start: 2020-06-15 | End: 2020-07-14 | Stop reason: SDUPTHER

## 2020-07-14 DIAGNOSIS — E03.9 HYPOTHYROIDISM, UNSPECIFIED TYPE: ICD-10-CM

## 2020-07-14 RX ORDER — LEVOTHYROXINE SODIUM 112 UG/1
112 TABLET ORAL DAILY
Qty: 30 TABLET | Refills: 3 | Status: SHIPPED | OUTPATIENT
Start: 2020-07-14 | End: 2020-11-04 | Stop reason: SDUPTHER

## 2020-07-24 ENCOUNTER — TELEMEDICINE (OUTPATIENT)
Dept: INTERNAL MEDICINE CLINIC | Facility: CLINIC | Age: 65
End: 2020-07-24
Payer: COMMERCIAL

## 2020-07-24 VITALS — WEIGHT: 229 LBS | BODY MASS INDEX: 44.96 KG/M2 | HEIGHT: 60 IN

## 2020-07-24 DIAGNOSIS — E11.9 TYPE 2 DIABETES MELLITUS WITHOUT COMPLICATION, WITHOUT LONG-TERM CURRENT USE OF INSULIN (HCC): Primary | ICD-10-CM

## 2020-07-24 DIAGNOSIS — F31.60 BIPOLAR AFFECTIVE DISORDER, CURRENT EPISODE MIXED, CURRENT EPISODE SEVERITY UNSPECIFIED (HCC): ICD-10-CM

## 2020-07-24 DIAGNOSIS — E03.9 HYPOTHYROIDISM, UNSPECIFIED TYPE: ICD-10-CM

## 2020-07-24 DIAGNOSIS — E78.00 HYPERCHOLESTEROLEMIA: ICD-10-CM

## 2020-07-24 PROBLEM — Z00.00 MEDICARE ANNUAL WELLNESS VISIT, SUBSEQUENT: Status: RESOLVED | Noted: 2020-01-20 | Resolved: 2020-07-24

## 2020-07-24 PROCEDURE — 3008F BODY MASS INDEX DOCD: CPT | Performed by: NURSE PRACTITIONER

## 2020-07-24 PROCEDURE — 99442 PR PHYS/QHP TELEPHONE EVALUATION 11-20 MIN: CPT | Performed by: NURSE PRACTITIONER

## 2020-07-24 NOTE — PATIENT INSTRUCTIONS
Medicare Preventive Visit Patient Instructions  Thank you for completing your Welcome to Medicare Visit or Medicare Annual Wellness Visit today  Your next wellness visit will be due in one year (7/24/2021)  The screening/preventive services that you may require over the next 5-10 years are detailed below  Some tests may not apply to you based off risk factors and/or age  Screening tests ordered at today's visit but not completed yet may show as past due  Also, please note that scanned in results may not display below  Preventive Screenings:  Service Recommendations Previous Testing/Comments   Colorectal Cancer Screening  * Colonoscopy    * Fecal Occult Blood Test (FOBT)/Fecal Immunochemical Test (FIT)  * Fecal DNA/Cologuard Test  * Flexible Sigmoidoscopy Age: 54-65 years old   Colonoscopy: every 10 years (may be performed more frequently if at higher risk)  OR  FOBT/FIT: every 1 year  OR  Cologuard: every 3 years  OR  Sigmoidoscopy: every 5 years  Screening may be recommended earlier than age 48 if at higher risk for colorectal cancer  Also, an individualized decision between you and your healthcare provider will decide whether screening between the ages of 74-80 would be appropriate  Colonoscopy: Not on file  FOBT/FIT: Not on file  Cologuard: Not on file  Sigmoidoscopy: Not on file         Breast Cancer Screening Age: 36 years old  Frequency: every 1-2 years  Not required if history of left and right mastectomy Mammogram: 11/26/2019    Screening Current   Cervical Cancer Screening Between the ages of 21-29, pap smear recommended once every 3 years  Between the ages of 33-67, can perform pap smear with HPV co-testing every 5 years     Recommendations may differ for women with a history of total hysterectomy, cervical cancer, or abnormal pap smears in past  Pap Smear: 12/11/2019    Screening Current   Hepatitis C Screening Once for adults born between 1945 and 1965  More frequently in patients at high risk for Hepatitis C Hep C Antibody: 10/21/2019    Screening Current   Diabetes Screening 1-2 times per year if you're at risk for diabetes or have pre-diabetes Fasting glucose: 162 mg/dL   A1C: 6    Screening Not Indicated  History Diabetes   Cholesterol Screening Once every 5 years if you don't have a lipid disorder  May order more often based on risk factors  Lipid panel: 05/12/2020    Screening Not Indicated  History Lipid Disorder     Other Preventive Screenings Covered by Medicare:  1  Abdominal Aortic Aneurysm (AAA) Screening: covered once if your at risk  You're considered to be at risk if you have a family history of AAA  2  Lung Cancer Screening: covers low dose CT scan once per year if you meet all of the following conditions: (1) Age 50-69; (2) No signs or symptoms of lung cancer; (3) Current smoker or have quit smoking within the last 15 years; (4) You have a tobacco smoking history of at least 30 pack years (packs per day multiplied by number of years you smoked); (5) You get a written order from a healthcare provider  3  Glaucoma Screening: covered annually if you're considered high risk: (1) You have diabetes OR (2) Family history of glaucoma OR (3)  aged 48 and older OR (3)  American aged 72 and older  3  Osteoporosis Screening: covered every 2 years if you meet one of the following conditions: (1) You're estrogen deficient and at risk for osteoporosis based off medical history and other findings; (2) Have a vertebral abnormality; (3) On glucocorticoid therapy for more than 3 months; (4) Have primary hyperparathyroidism; (5) On osteoporosis medications and need to assess response to drug therapy  · Last bone density test (DXA Scan): 11/26/2019   5  HIV Screening: covered annually if you're between the age of 15-65  Also covered annually if you are younger than 13 and older than 72 with risk factors for HIV infection   For pregnant patients, it is covered up to 3 times per pregnancy  Immunizations:  Immunization Recommendations   Influenza Vaccine Annual influenza vaccination during flu season is recommended for all persons aged >= 6 months who do not have contraindications   Pneumococcal Vaccine (Prevnar and Pneumovax)  * Prevnar = PCV13  * Pneumovax = PPSV23   Adults 25-60 years old: 1-3 doses may be recommended based on certain risk factors  Adults 72 years old: Prevnar (PCV13) vaccine recommended followed by Pneumovax (PPSV23) vaccine  If already received PPSV23 since turning 65, then PCV13 recommended at least one year after PPSV23 dose  Hepatitis B Vaccine 3 dose series if at intermediate or high risk (ex: diabetes, end stage renal disease, liver disease)   Tetanus (Td) Vaccine - COST NOT COVERED BY MEDICARE PART B Following completion of primary series, a booster dose should be given every 10 years to maintain immunity against tetanus  Td may also be given as tetanus wound prophylaxis  Tdap Vaccine - COST NOT COVERED BY MEDICARE PART B Recommended at least once for all adults  For pregnant patients, recommended with each pregnancy  Shingles Vaccine (Shingrix) - COST NOT COVERED BY MEDICARE PART B  2 shot series recommended in those aged 48 and above     Health Maintenance Due:      Topic Date Due    CRC Screening: Cologuard  07/14/2006    MAMMOGRAM  11/26/2021    DXA SCAN  11/26/2021    Cervical Cancer Screening  12/11/2024    Hepatitis C Screening  Completed     Immunizations Due:      Topic Date Due    DTaP,Tdap,and Td Vaccines (1 - Tdap) 07/14/1967    Influenza Vaccine  07/01/2020     Advance Directives   What are advance directives? Advance directives are legal documents that state your wishes and plans for medical care  These plans are made ahead of time in case you lose your ability to make decisions for yourself  Advance directives can apply to any medical decision, such as the treatments you want, and if you want to donate organs     What are the types of advance directives? There are many types of advance directives, and each state has rules about how to use them  You may choose a combination of any of the following:  · Living will: This is a written record of the treatment you want  You can also choose which treatments you do not want, which to limit, and which to stop at a certain time  This includes surgery, medicine, IV fluid, and tube feedings  · Durable power of  for healthcare [facility]): This is a written record that states who you want to make healthcare choices for you when you are unable to make them for yourself  This person, called a proxy, is usually a family member or a friend  You may choose more than 1 proxy  · Do not resuscitate (DNR) order:  A DNR order is used in case your heart stops beating or you stop breathing  It is a request not to have certain forms of treatment, such as CPR  A DNR order may be included in other types of advance directives  · Medical directive: This covers the care that you want if you are in a coma, near death, or unable to make decisions for yourself  You can list the treatments you want for each condition  Treatment may include pain medicine, surgery, blood transfusions, dialysis, IV or tube feedings, and a ventilator (breathing machine)  · Values history: This document has questions about your views, beliefs, and how you feel and think about life  This information can help others choose the care that you would choose  Why are advance directives important? An advance directive helps you control your care  Although spoken wishes may be used, it is better to have your wishes written down  Spoken wishes can be misunderstood, or not followed  Treatments may be given even if you do not want them  An advance directive may make it easier for your family to make difficult choices about your care     Weight Management   Why it is important to manage your weight:  Being overweight increases your risk of health conditions such as heart disease, high blood pressure, type 2 diabetes, and certain types of cancer  It can also increase your risk for osteoarthritis, sleep apnea, and other respiratory problems  Aim for a slow, steady weight loss  Even a small amount of weight loss can lower your risk of health problems  How to lose weight safely:  A safe and healthy way to lose weight is to eat fewer calories and get regular exercise  You can lose up about 1 pound a week by decreasing the number of calories you eat by 500 calories each day  Healthy meal plan for weight management:  A healthy meal plan includes a variety of foods, contains fewer calories, and helps you stay healthy  A healthy meal plan includes the following:  · Eat whole-grain foods more often  A healthy meal plan should contain fiber  Fiber is the part of grains, fruits, and vegetables that is not broken down by your body  Whole-grain foods are healthy and provide extra fiber in your diet  Some examples of whole-grain foods are whole-wheat breads and pastas, oatmeal, brown rice, and bulgur  · Eat a variety of vegetables every day  Include dark, leafy greens such as spinach, kale, juaquin greens, and mustard greens  Eat yellow and orange vegetables such as carrots, sweet potatoes, and winter squash  · Eat a variety of fruits every day  Choose fresh or canned fruit (canned in its own juice or light syrup) instead of juice  Fruit juice has very little or no fiber  · Eat low-fat dairy foods  Drink fat-free (skim) milk or 1% milk  Eat fat-free yogurt and low-fat cottage cheese  Try low-fat cheeses such as mozzarella and other reduced-fat cheeses  · Choose meat and other protein foods that are low in fat  Choose beans or other legumes such as split peas or lentils  Choose fish, skinless poultry (chicken or turkey), or lean cuts of red meat (beef or pork)  Before you cook meat or poultry, cut off any visible fat  · Use less fat and oil    Try baking foods instead of frying them  Add less fat, such as margarine, sour cream, regular salad dressing and mayonnaise to foods  Eat fewer high-fat foods  Some examples of high-fat foods include french fries, doughnuts, ice cream, and cakes  · Eat fewer sweets  Limit foods and drinks that are high in sugar  This includes candy, cookies, regular soda, and sweetened drinks  Exercise:  Exercise at least 30 minutes per day on most days of the week  Some examples of exercise include walking, biking, dancing, and swimming  You can also fit in more physical activity by taking the stairs instead of the elevator or parking farther away from stores  Ask your healthcare provider about the best exercise plan for you  © Copyright [employer] 2018 Information is for End User's use only and may not be sold, redistributed or otherwise used for commercial purposes  All illustrations and images included in CareNotes® are the copyrighted property of [employer]  or [facility] Preventive Visit Patient Instructions  Thank you for completing your Welcome to Medicare Visit or Medicare Annual Wellness Visit today  Your next wellness visit will be due in one year (7/24/2021)  The screening/preventive services that you may require over the next 5-10 years are detailed below  Some tests may not apply to you based off risk factors and/or age  Screening tests ordered at today's visit but not completed yet may show as past due  Also, please note that scanned in results may not display below    Preventive Screenings:  Service Recommendations Previous Testing/Comments   Colorectal Cancer Screening  * Colonoscopy    * Fecal Occult Blood Test (FOBT)/Fecal Immunochemical Test (FIT)  * Fecal DNA/Cologuard Test  * Flexible Sigmoidoscopy Age: 54-65 years old   Colonoscopy: every 10 years (may be performed more frequently if at higher risk)  OR  FOBT/FIT: every 1 year  OR  Cologuard: every 3 years  OR  Sigmoidoscopy: every 5 years  Screening may be recommended earlier than age 48 if at higher risk for colorectal cancer  Also, an individualized decision between you and your healthcare provider will decide whether screening between the ages of 74-80 would be appropriate  Colonoscopy: Not on file  FOBT/FIT: Not on file  Cologuard: Not on file  Sigmoidoscopy: Not on file         Breast Cancer Screening Age: 36 years old  Frequency: every 1-2 years  Not required if history of left and right mastectomy Mammogram: 11/26/2019    Screening Current   Cervical Cancer Screening Between the ages of 21-29, pap smear recommended once every 3 years  Between the ages of 33-67, can perform pap smear with HPV co-testing every 5 years  Recommendations may differ for women with a history of total hysterectomy, cervical cancer, or abnormal pap smears in past  Pap Smear: 12/11/2019    Screening Current   Hepatitis C Screening Once for adults born between 1945 and 1965  More frequently in patients at high risk for Hepatitis C Hep C Antibody: 10/21/2019    Screening Current   Diabetes Screening 1-2 times per year if you're at risk for diabetes or have pre-diabetes Fasting glucose: 162 mg/dL   A1C: 6    Screening Not Indicated  History Diabetes   Cholesterol Screening Once every 5 years if you don't have a lipid disorder  May order more often based on risk factors  Lipid panel: 05/12/2020    Screening Not Indicated  History Lipid Disorder     Other Preventive Screenings Covered by Medicare:  6  Abdominal Aortic Aneurysm (AAA) Screening: covered once if your at risk  You're considered to be at risk if you have a family history of AAA    7  Lung Cancer Screening: covers low dose CT scan once per year if you meet all of the following conditions: (1) Age 50-69; (2) No signs or symptoms of lung cancer; (3) Current smoker or have quit smoking within the last 15 years; (4) You have a tobacco smoking history of at least 30 pack years (packs per day multiplied by number of years you smoked); (5) You get a written order from a healthcare provider  8  Glaucoma Screening: covered annually if you're considered high risk: (1) You have diabetes OR (2) Family history of glaucoma OR (3)  aged 48 and older OR (3)  American aged 72 and older  5  Osteoporosis Screening: covered every 2 years if you meet one of the following conditions: (1) You're estrogen deficient and at risk for osteoporosis based off medical history and other findings; (2) Have a vertebral abnormality; (3) On glucocorticoid therapy for more than 3 months; (4) Have primary hyperparathyroidism; (5) On osteoporosis medications and need to assess response to drug therapy  · Last bone density test (DXA Scan): 11/26/2019   10  HIV Screening: covered annually if you're between the age of 15-65  Also covered annually if you are younger than 13 and older than 72 with risk factors for HIV infection  For pregnant patients, it is covered up to 3 times per pregnancy  Immunizations:  Immunization Recommendations   Influenza Vaccine Annual influenza vaccination during flu season is recommended for all persons aged >= 6 months who do not have contraindications   Pneumococcal Vaccine (Prevnar and Pneumovax)  * Prevnar = PCV13  * Pneumovax = PPSV23   Adults 25-60 years old: 1-3 doses may be recommended based on certain risk factors  Adults 72 years old: Prevnar (PCV13) vaccine recommended followed by Pneumovax (PPSV23) vaccine  If already received PPSV23 since turning 65, then PCV13 recommended at least one year after PPSV23 dose  Hepatitis B Vaccine 3 dose series if at intermediate or high risk (ex: diabetes, end stage renal disease, liver disease)   Tetanus (Td) Vaccine - COST NOT COVERED BY MEDICARE PART B Following completion of primary series, a booster dose should be given every 10 years to maintain immunity against tetanus  Td may also be given as tetanus wound prophylaxis     Tdap Vaccine - COST NOT COVERED BY MEDICARE PART B Recommended at least once for all adults  For pregnant patients, recommended with each pregnancy  Shingles Vaccine (Shingrix) - COST NOT COVERED BY MEDICARE PART B  2 shot series recommended in those aged 48 and above     Health Maintenance Due:      Topic Date Due    CRC Screening: Cologuard  07/14/2006    MAMMOGRAM  11/26/2021    DXA SCAN  11/26/2021    Cervical Cancer Screening  12/11/2024    Hepatitis C Screening  Completed     Immunizations Due:      Topic Date Due    DTaP,Tdap,and Td Vaccines (1 - Tdap) 07/14/1967    Influenza Vaccine  07/01/2020     Advance Directives   What are advance directives? Advance directives are legal documents that state your wishes and plans for medical care  These plans are made ahead of time in case you lose your ability to make decisions for yourself  Advance directives can apply to any medical decision, such as the treatments you want, and if you want to donate organs  What are the types of advance directives? There are many types of advance directives, and each state has rules about how to use them  You may choose a combination of any of the following:  · Living will: This is a written record of the treatment you want  You can also choose which treatments you do not want, which to limit, and which to stop at a certain time  This includes surgery, medicine, IV fluid, and tube feedings  · Durable power of  for healthcare [facility]): This is a written record that states who you want to make healthcare choices for you when you are unable to make them for yourself  This person, called a proxy, is usually a family member or a friend  You may choose more than 1 proxy  · Do not resuscitate (DNR) order:  A DNR order is used in case your heart stops beating or you stop breathing  It is a request not to have certain forms of treatment, such as CPR  A DNR order may be included in other types of advance directives    · Medical directive: This covers the care that you want if you are in a coma, near death, or unable to make decisions for yourself  You can list the treatments you want for each condition  Treatment may include pain medicine, surgery, blood transfusions, dialysis, IV or tube feedings, and a ventilator (breathing machine)  · Values history: This document has questions about your views, beliefs, and how you feel and think about life  This information can help others choose the care that you would choose  Why are advance directives important? An advance directive helps you control your care  Although spoken wishes may be used, it is better to have your wishes written down  Spoken wishes can be misunderstood, or not followed  Treatments may be given even if you do not want them  An advance directive may make it easier for your family to make difficult choices about your care  Weight Management   Why it is important to manage your weight:  Being overweight increases your risk of health conditions such as heart disease, high blood pressure, type 2 diabetes, and certain types of cancer  It can also increase your risk for osteoarthritis, sleep apnea, and other respiratory problems  Aim for a slow, steady weight loss  Even a small amount of weight loss can lower your risk of health problems  How to lose weight safely:  A safe and healthy way to lose weight is to eat fewer calories and get regular exercise  You can lose up about 1 pound a week by decreasing the number of calories you eat by 500 calories each day  Healthy meal plan for weight management:  A healthy meal plan includes a variety of foods, contains fewer calories, and helps you stay healthy  A healthy meal plan includes the following:  · Eat whole-grain foods more often  A healthy meal plan should contain fiber  Fiber is the part of grains, fruits, and vegetables that is not broken down by your body   Whole-grain foods are healthy and provide extra fiber in your diet  Some examples of whole-grain foods are whole-wheat breads and pastas, oatmeal, brown rice, and bulgur  · Eat a variety of vegetables every day  Include dark, leafy greens such as spinach, kale, juaquin greens, and mustard greens  Eat yellow and orange vegetables such as carrots, sweet potatoes, and winter squash  · Eat a variety of fruits every day  Choose fresh or canned fruit (canned in its own juice or light syrup) instead of juice  Fruit juice has very little or no fiber  · Eat low-fat dairy foods  Drink fat-free (skim) milk or 1% milk  Eat fat-free yogurt and low-fat cottage cheese  Try low-fat cheeses such as mozzarella and other reduced-fat cheeses  · Choose meat and other protein foods that are low in fat  Choose beans or other legumes such as split peas or lentils  Choose fish, skinless poultry (chicken or turkey), or lean cuts of red meat (beef or pork)  Before you cook meat or poultry, cut off any visible fat  · Use less fat and oil  Try baking foods instead of frying them  Add less fat, such as margarine, sour cream, regular salad dressing and mayonnaise to foods  Eat fewer high-fat foods  Some examples of high-fat foods include french fries, doughnuts, ice cream, and cakes  · Eat fewer sweets  Limit foods and drinks that are high in sugar  This includes candy, cookies, regular soda, and sweetened drinks  Exercise:  Exercise at least 30 minutes per day on most days of the week  Some examples of exercise include walking, biking, dancing, and swimming  You can also fit in more physical activity by taking the stairs instead of the elevator or parking farther away from stores  Ask your healthcare provider about the best exercise plan for you  © Copyright [employer] 2018 Information is for End User's use only and may not be sold, redistributed or otherwise used for commercial purposes   All illustrations and images included in CareNotes® are the copyrighted property of [employer]  or [address] [facility] Samples Given: Cordran Ointment apply thin layer Twice daily for weeks Detail Level: Simple

## 2020-07-24 NOTE — PROGRESS NOTES
Virtual Brief Visit    Assessment/Plan: Will recheck A1C last was 6  Patient is stating she has low sugars not sure if these readings are accurate  She is up to date on her screenings will obtain Cologuard report  Continue to follow up with [facility]  Will follow up with her in 6 months or sooner if need be  Problem List Items Addressed This Visit        Endocrine    Hypothyroidism    Type 2 diabetes mellitus without complication, without long-term current use of insulin ([facility] ) - Primary    Relevant Orders    HEMOGLOBIN A1C W/ EAG ESTIMATION       Other    Bipolar disorder ([facility] )    Hypercholesterolemia                Reason for visit is   Chief Complaint   Patient presents with    Medicare Wellness Visit    Virtual Brief Visit        Encounter provider Karley [id number]     Provider located at [address]  [address] [facility]  [id number]    Recent Visits  No visits were found meeting these conditions  Showing recent visits within past 7 days and meeting all other requirements     Today's Visits  Date Type Provider Dept   07/24/20 Telemedicine [facility]   Showing today's visits and meeting all other requirements     Future Appointments  No visits were found meeting these conditions  Showing future appointments within next 150 days and meeting all other requirements        After connecting through telephone, the patient was identified by name and date of birth  Sawyer Abel was informed that this is a telemedicine visit and that the visit is being conducted through telephone  My office door was closed  No one else was in the room  She acknowledged consent and understanding of privacy and security of the platform  The patient has agreed to participate and understands she can discontinue the visit at any time  Patient is aware this is a billable service       Subjective    Sawyer Abel is a 60 y o  female patient  Neeta Watts is for a follow up visit  She states she is doing well but is having low sugars of 19 and 14  She states some days she could not even get readings on her meter  She is taking her medications with not issues  She is up to date on her screenings and does see [facility]  She is doing virtual visits with them and not changes have been made to her medications  She denies any chest pain, SOB, or palpitations  She did complete to [facility] but has not heard of any results  She offers no other issues  Past Medical History:   Diagnosis Date    Allergic     Depression     Disease of thyroid gland     GERD (gastroesophageal reflux disease)     Hyperchloremia        Past Surgical History:   Procedure Laterality Date    ANKLE SURGERY      Incision    DILATION AND CURETTAGE OF UTERUS         Current Outpatient Medications   Medication Sig Dispense Refill    aspirin (ECOTRIN LOW STRENGTH) 81 mg EC tablet Take 81 mg by mouth daily      doxepin (SINEquan) 100 mg capsule Take 200 mg by mouth daily at bedtime      glucose blood (Contour Next Test) test strip 1 each by Other route 2 (two) times a day Use as instructed 100 each 5    glucose blood test strip 1 each by Other route as needed Use as instructed      levothyroxine 112 mcg tablet Take 1 tablet (112 mcg total) by mouth daily 30 tablet 3    linaGLIPtin (Tradjenta) 5 MG TABS Take 5 mg by mouth daily 30 tablet 5    lithium 300 MG tablet Take 300 mg by mouth 2 (two) times a day      pravastatin (PRAVACHOL) 40 mg tablet Take 1 tablet (40 mg total) by mouth daily 90 tablet 3     No current facility-administered medications for this visit  No Known Allergies    Review of Systems   All other systems reviewed and are negative        Vitals:    07/24/20 1333   Weight: 104 kg (229 lb)   Height: 5' (1 524 m)       I spent 15 minutes with patient during visit  It was my intent to perform this visit via video technology but the patient was not able to do a video connection so the visit was completed via audio telephone only  VIRTUAL VISIT DISCLAIMER    Arronia Matilde acknowledges that she has consented to an online visit or consultation  She understands that the online visit is based solely on information provided by her, and that, in the absence of a face-to-face physical evaluation by the physician, the diagnosis she receives is both limited and provisional in terms of accuracy and completeness  This is not intended to replace a full medical face-to-face evaluation by the physician  Anny Clayton understands and accepts these terms

## 2020-07-24 NOTE — PROGRESS NOTES
Assessment and Plan:     Problem List Items Addressed This Visit        Endocrine    Type 2 diabetes mellitus without complication, without long-term current use of insulin ([facility] )       Other    Bipolar disorder ([facility] )    Hypercholesterolemia    Medicare annual wellness visit, subsequent - Primary           Preventive health issues were discussed with patient, and age appropriate screening tests were ordered as noted in patient's After Visit Summary  Personalized health advice and appropriate referrals for health education or preventive services given if needed, as noted in patient's After Visit Summary       History of Present Illness:     Patient presents for Medicare Annual Wellness visit    Patient Care Team:  Antonio Cheatham as PCP - General (Family Medicine)  Erin Jenkins MD as PCP - [address] [place], [place] [address] [facility] ([facility])     Problem List:     Patient Active Problem List   Diagnosis    Bipolar disorder ([facility] )    Constipation    Hypercholesterolemia    Hypothyroidism    Morbid obesity ([facility] )    Type 2 diabetes mellitus without complication, without long-term current use of insulin ([facility] )    Morbid obesity with BMI of 40 0-44 9, adult ([facility] )    Postmenopausal    Medicare annual wellness visit, subsequent      Past Medical and Surgical History:     Past Medical History:   Diagnosis Date    Allergic     Depression     Disease of thyroid gland     GERD (gastroesophageal reflux disease)     Hyperchloremia      Past Surgical History:   Procedure Laterality Date    ANKLE SURGERY      Incision    DILATION AND CURETTAGE OF UTERUS        Family History:     Family History   Problem Relation Age of Onset    No Known Problems Family     Diabetes Mother     Heart disease Mother     Stroke Mother     Diabetes Father     Stroke Father     No Known Problems Maternal Grandmother     No Known Problems Maternal Grandfather     No Known Problems Paternal Grandmother     No Known Problems Paternal Grandfather     Diabetes Brother     No Known Problems Paternal Aunt       Social History:        Social History     Socioeconomic History    Marital status: Single     Spouse name: None    Number of children: None    Years of education: None    Highest education level: None   Occupational History    None   Social Needs    Financial resource strain: None    Food insecurity:     Worry: None     Inability: None    Transportation needs:     Medical: None     Non-medical: None   Tobacco Use    Smoking status: Never Smoker    Smokeless tobacco: Never Used   Substance and Sexual Activity    Alcohol use: No    Drug use: No    Sexual activity: None   Lifestyle    Physical activity:     Days per week: None     Minutes per session: None    Stress: None   Relationships    Social connections:     Talks on phone: None     Gets together: None     Attends Buddhist service: None     Active member of club or organization: None     Attends meetings of clubs or organizations: None     Relationship status: None    Intimate partner violence:     Fear of current or ex partner: None     Emotionally abused: None     Physically abused: None     Forced sexual activity: None   Other Topics Concern    None   Social History Narrative    None      Medications and Allergies:     Current Outpatient Medications   Medication Sig Dispense Refill    aspirin (ECOTRIN LOW STRENGTH) 81 mg EC tablet Take 81 mg by mouth daily      doxepin (SINEquan) 100 mg capsule Take 200 mg by mouth daily at bedtime      glucose blood (Contour Next Test) test strip 1 each by Other route 2 (two) times a day Use as instructed 100 each 5    glucose blood test strip 1 each by Other route as needed Use as instructed      levothyroxine 112 mcg tablet Take 1 tablet (112 mcg total) by mouth daily 30 tablet 3    linaGLIPtin (Tradjenta) 5 MG TABS Take 5 mg by mouth daily 30 tablet 5    lithium 300 MG tablet Take 300 mg by mouth 2 (two) times a day  pravastatin (PRAVACHOL) 40 mg tablet Take 1 tablet (40 mg total) by mouth daily 90 tablet 3     No current facility-administered medications for this visit  No Known Allergies   Immunizations:     Immunization History   Administered Date(s) Administered    INFLUENZA 09/29/2015, 10/25/2016, 09/22/2017, 10/04/2018    Influenza, recombinant, quadrivalent,injectable, preservative free 10/18/2019      Health Maintenance:         Topic Date Due    CRC Screening: Cologuard  07/14/2006    MAMMOGRAM  11/26/2021    DXA SCAN  11/26/2021    Cervical Cancer Screening  12/11/2024    Hepatitis C Screening  Completed         Topic Date Due    DTaP,Tdap,and Td Vaccines (1 - Tdap) 07[birth year]14/1967    Influenza Vaccine  07/01/2020      Medicare Health Risk Assessment:     Ht 5' (1 524 m)   Wt 104 kg (229 lb)   BMI 44 72 kg/m²      Rebecca Godwin is here for her Subsequent Wellness visit  Last Medicare Wellness visit information reviewed, patient interviewed and updates made to the record today  Health Risk Assessment:   Patient rates overall health as good  Patient feels that their physical health rating is same  Eyesight was rated as same  Hearing was rated as same  Patient feels that their emotional and mental health rating is same  Pain experienced in the last 7 days has been none  Patient states that she has experienced no weight loss or gain in last 6 months  Depression Screening:   PHQ-2 Score: 0      Fall Risk Screening: In the past year, patient has experienced: no history of falling in past year      Urinary Incontinence Screening:   Patient has not leaked urine accidently in the last six months  Home Safety:  Patient does not have trouble with stairs inside or outside of their home  Patient has working smoke alarms and has working carbon monoxide detector  Home safety hazards include: none  Nutrition:   Current diet is Regular       Medications:   Patient is currently taking over-the-counter supplements  OTC medications include: see medication list  Patient is able to manage medications  Activities of Daily Living (ADLs)/Instrumental Activities of Daily Living (IADLs):   Walk and transfer into and out of bed and chair?: Yes  Dress and groom yourself?: Yes    Bathe or shower yourself?: Yes    Feed yourself?  Yes  Do your laundry/housekeeping?: Yes  Manage your money, pay your bills and track your expenses?: Yes  Make your own meals?: Yes    Do your own shopping?: Yes    Previous Hospitalizations:   Any hospitalizations or ED visits within the last 12 months?: Yes    How many hospitalizations have you had in the last year?: 1-2    Advance Care Planning:   Living will: No    Durable POA for healthcare: No    Advanced directive: No    Advanced directive counseling given: Yes    Five wishes given: Yes    Patient declined ACP directive: No    End of Life Decisions reviewed with patient: Yes    Provider agrees with end of life decisions: Yes      Cognitive Screening:   Provider or family/friend/caregiver concerned regarding cognition?: No    PREVENTIVE SCREENINGS      Cardiovascular Screening:    General: History Lipid Disorder, Risks and Benefits Discussed and Screening Current      Diabetes Screening:     General: History Diabetes and Risks and Benefits Discussed    Due for: Blood Glucose      Colorectal Cancer Screening:     General: Risks and Benefits Discussed    Due for: Cologuard      Breast Cancer Screening:     General: Screening Current      Cervical Cancer Screening:    General: Screening Current      Osteoporosis Screening:    General: Screening Current      Abdominal Aortic Aneurysm (AAA) Screening:        General: Screening Not Indicated      Lung Cancer Screening:     General: Screening Not Indicated      Hepatitis C Screening:    General: Screening Current      [address] [facility] [place]

## 2020-07-24 NOTE — PROGRESS NOTES
Assessment/Plan:    No problem-specific Assessment & Plan notes found for this encounter  Problem List Items Addressed This Visit        Endocrine    Type 2 diabetes mellitus without complication, without long-term current use of insulin ([facility] ) - Primary       Other    Bipolar disorder ([facility] )    Hypercholesterolemia            Subjective:      Patient ID: Osman Albert is a 60 y o  female  HPI    The following portions of the patient's history were reviewed and updated as appropriate:   She  has a past medical history of Allergic, Depression, Disease of thyroid gland, GERD (gastroesophageal reflux disease), and Hyperchloremia  She   Patient Active Problem List    Diagnosis Date Noted    Medicare annual wellness visit, subsequent 01/20/2020    Type 2 diabetes mellitus without complication, without long-term current use of insulin ([facility] ) 10/18/2019    Morbid obesity with BMI of 40 0-44 9, adult ([facility] ) 10/18/2019    Postmenopausal 10/18/2019    Morbid obesity ([facility] ) 08/21/2017    Bipolar disorder ([facility] ) 01/09/2014    Constipation 01/09/2014    Hypercholesterolemia 01/09/2014    Hypothyroidism 01/09/2014     She  has a past surgical history that includes Ankle surgery and Dilation and curettage of uterus  Her family history includes Diabetes in her brother, father, and mother; Heart disease in her mother; No Known Problems in her family, maternal grandfather, maternal grandmother, paternal aunt, paternal grandfather, and paternal grandmother; Stroke in her father and mother  She  reports that she has never smoked  She has never used smokeless tobacco  She reports that she does not drink alcohol or use drugs    Current Outpatient Medications   Medication Sig Dispense Refill    aspirin (ECOTRIN LOW STRENGTH) 81 mg EC tablet Take 81 mg by mouth daily      doxepin (SINEquan) 100 mg capsule Take 200 mg by mouth daily at bedtime      glucose blood (Contour Next Test) test strip 1 each by Other route 2 (two) times a day Use as instructed 100 each 5    glucose blood test strip 1 each by Other route as needed Use as instructed      levothyroxine 112 mcg tablet Take 1 tablet (112 mcg total) by mouth daily 30 tablet 3    linaGLIPtin (Tradjenta) 5 MG TABS Take 5 mg by mouth daily 30 tablet 5    lithium 300 MG tablet Take 300 mg by mouth 2 (two) times a day      pravastatin (PRAVACHOL) 40 mg tablet Take 1 tablet (40 mg total) by mouth daily 90 tablet 3     No current facility-administered medications for this visit  Current Outpatient Medications on File Prior to Visit   Medication Sig    aspirin (ECOTRIN LOW STRENGTH) 81 mg EC tablet Take 81 mg by mouth daily    doxepin (SINEquan) 100 mg capsule Take 200 mg by mouth daily at bedtime    glucose blood (Contour Next Test) test strip 1 each by Other route 2 (two) times a day Use as instructed    glucose blood test strip 1 each by Other route as needed Use as instructed    levothyroxine 112 mcg tablet Take 1 tablet (112 mcg total) by mouth daily    linaGLIPtin (Tradjenta) 5 MG TABS Take 5 mg by mouth daily    lithium 300 MG tablet Take 300 mg by mouth 2 (two) times a day    pravastatin (PRAVACHOL) 40 mg tablet Take 1 tablet (40 mg total) by mouth daily     No current facility-administered medications on file prior to visit  She has No Known Allergies       Review of Systems      Objective:      Ht 5' (1 524 m)   Wt 104 kg (229 lb)   BMI 44 72 kg/m²          Physical Exam

## 2020-07-28 ENCOUNTER — TELEPHONE (OUTPATIENT)
Dept: INTERNAL MEDICINE CLINIC | Facility: CLINIC | Age: 65
End: 2020-07-28

## 2020-07-28 NOTE — TELEPHONE ENCOUNTER
Received a call from Rochelle Marsh stating that she took her sugar and it is at 25  Did ask If she drank any orange juice, and she had  Did ask when she had the 22, and she stated about an hour earlier  I did ask for her to recheck and she stated she would call back, I asked her to just put the phone down and take it and let us know  She took it again, and it was up to 417  Let her know, per Alma Rosa Valiente, that more than likely is something wrong with the meter   That we will call in a new meter

## 2020-08-14 ENCOUNTER — TRANSCRIBE ORDERS (OUTPATIENT)
Dept: URGENT CARE | Facility: CLINIC | Age: 65
End: 2020-08-14

## 2020-08-14 ENCOUNTER — CLINICAL SUPPORT (OUTPATIENT)
Dept: URGENT CARE | Facility: CLINIC | Age: 65
End: 2020-08-14
Payer: COMMERCIAL

## 2020-08-14 ENCOUNTER — LAB (OUTPATIENT)
Dept: LAB | Facility: CLINIC | Age: 65
End: 2020-08-14
Payer: COMMERCIAL

## 2020-08-14 DIAGNOSIS — F31.32 MODERATE DEPRESSED BIPOLAR I DISORDER (HCC): Primary | ICD-10-CM

## 2020-08-14 DIAGNOSIS — Z01.818 PRE-OP TESTING: Primary | ICD-10-CM

## 2020-08-14 LAB
ATRIAL RATE: 86 BPM
LITHIUM SERPL-SCNC: 0.8 MMOL/L (ref 0.5–1)
P AXIS: 49 DEGREES
PR INTERVAL: 152 MS
QRS AXIS: 30 DEGREES
QRSD INTERVAL: 82 MS
QT INTERVAL: 308 MS
QTC INTERVAL: 368 MS
T WAVE AXIS: 9 DEGREES
VENTRICULAR RATE: 86 BPM

## 2020-08-14 PROCEDURE — 36415 COLL VENOUS BLD VENIPUNCTURE: CPT

## 2020-08-14 PROCEDURE — 93005 ELECTROCARDIOGRAM TRACING: CPT

## 2020-08-14 PROCEDURE — 93010 ELECTROCARDIOGRAM REPORT: CPT | Performed by: INTERNAL MEDICINE

## 2020-08-14 PROCEDURE — 80178 ASSAY OF LITHIUM: CPT

## 2020-10-02 DIAGNOSIS — E13.9 DIABETES 1.5, MANAGED AS TYPE 2 (HCC): ICD-10-CM

## 2020-10-05 RX ORDER — LINAGLIPTIN 5 MG/1
TABLET, FILM COATED ORAL
Qty: 30 TABLET | Refills: 0 | Status: SHIPPED | OUTPATIENT
Start: 2020-10-05 | End: 2020-10-30

## 2020-10-30 DIAGNOSIS — E13.9 DIABETES 1.5, MANAGED AS TYPE 2 (HCC): ICD-10-CM

## 2020-10-30 RX ORDER — LINAGLIPTIN 5 MG/1
TABLET, FILM COATED ORAL
Qty: 30 TABLET | Refills: 3 | Status: SHIPPED | OUTPATIENT
Start: 2020-10-30 | End: 2020-12-03 | Stop reason: SDUPTHER

## 2020-11-04 DIAGNOSIS — E03.9 HYPOTHYROIDISM, UNSPECIFIED TYPE: ICD-10-CM

## 2020-11-04 RX ORDER — LEVOTHYROXINE SODIUM 112 UG/1
112 TABLET ORAL DAILY
Qty: 30 TABLET | Refills: 3 | Status: SHIPPED | OUTPATIENT
Start: 2020-11-04 | End: 2021-03-01 | Stop reason: SDUPTHER

## 2020-12-03 DIAGNOSIS — E13.9 DIABETES 1.5, MANAGED AS TYPE 2 (HCC): ICD-10-CM

## 2020-12-04 ENCOUNTER — TELEPHONE (OUTPATIENT)
Dept: INTERNAL MEDICINE CLINIC | Facility: CLINIC | Age: 65
End: 2020-12-04

## 2020-12-04 RX ORDER — LINAGLIPTIN 5 MG/1
5 TABLET, FILM COATED ORAL DAILY
Qty: 30 TABLET | Refills: 3 | Status: SHIPPED | OUTPATIENT
Start: 2020-12-04 | End: 2021-03-01 | Stop reason: SDUPTHER

## 2020-12-15 ENCOUNTER — OFFICE VISIT (OUTPATIENT)
Dept: FAMILY MEDICINE CLINIC | Facility: CLINIC | Age: 65
End: 2020-12-15
Payer: COMMERCIAL

## 2020-12-15 VITALS
BODY MASS INDEX: 44.8 KG/M2 | TEMPERATURE: 97.7 F | RESPIRATION RATE: 18 BRPM | HEART RATE: 99 BPM | OXYGEN SATURATION: 98 % | DIASTOLIC BLOOD PRESSURE: 82 MMHG | WEIGHT: 228.2 LBS | SYSTOLIC BLOOD PRESSURE: 126 MMHG | HEIGHT: 60 IN

## 2020-12-15 DIAGNOSIS — L21.9 SEBORRHEIC DERMATITIS OF SCALP: ICD-10-CM

## 2020-12-15 DIAGNOSIS — E11.9 TYPE 2 DIABETES MELLITUS WITHOUT COMPLICATION, WITHOUT LONG-TERM CURRENT USE OF INSULIN (HCC): ICD-10-CM

## 2020-12-15 DIAGNOSIS — E03.9 ACQUIRED HYPOTHYROIDISM: ICD-10-CM

## 2020-12-15 DIAGNOSIS — Z00.00 ENCOUNTER FOR MEDICAL EXAMINATION TO ESTABLISH CARE: Primary | ICD-10-CM

## 2020-12-15 DIAGNOSIS — Z12.11 SCREENING FOR COLORECTAL CANCER: ICD-10-CM

## 2020-12-15 DIAGNOSIS — Z12.12 SCREENING FOR COLORECTAL CANCER: ICD-10-CM

## 2020-12-15 PROBLEM — E66.01 MORBID OBESITY (HCC): Status: RESOLVED | Noted: 2017-08-21 | Resolved: 2020-12-15

## 2020-12-15 PROCEDURE — 99214 OFFICE O/P EST MOD 30 MIN: CPT | Performed by: FAMILY MEDICINE

## 2020-12-15 RX ORDER — KETOCONAZOLE 20 MG/ML
1 SHAMPOO TOPICAL 2 TIMES WEEKLY
Qty: 120 ML | Refills: 2 | Status: SHIPPED | OUTPATIENT
Start: 2020-12-17 | End: 2021-10-28 | Stop reason: SDUPTHER

## 2020-12-15 RX ORDER — CLOBETASOL PROPIONATE 0.46 MG/ML
SOLUTION TOPICAL
Qty: 50 ML | Refills: 2 | Status: SHIPPED | OUTPATIENT
Start: 2020-12-15 | End: 2021-10-28 | Stop reason: SDUPTHER

## 2021-01-25 ENCOUNTER — TRANSCRIBE ORDERS (OUTPATIENT)
Dept: URGENT CARE | Facility: CLINIC | Age: 66
End: 2021-01-25

## 2021-01-25 ENCOUNTER — LAB (OUTPATIENT)
Dept: LAB | Facility: CLINIC | Age: 66
End: 2021-01-25
Payer: COMMERCIAL

## 2021-01-25 DIAGNOSIS — Z79.899 ENCOUNTER FOR LONG-TERM (CURRENT) USE OF OTHER MEDICATIONS: Primary | ICD-10-CM

## 2021-01-25 DIAGNOSIS — E11.9 TYPE 2 DIABETES MELLITUS WITHOUT COMPLICATION, WITHOUT LONG-TERM CURRENT USE OF INSULIN (HCC): ICD-10-CM

## 2021-01-25 DIAGNOSIS — E03.9 ACQUIRED HYPOTHYROIDISM: ICD-10-CM

## 2021-01-25 LAB
ALBUMIN SERPL BCP-MCNC: 3.8 G/DL (ref 3.5–5)
ALP SERPL-CCNC: 119 U/L (ref 46–116)
ALT SERPL W P-5'-P-CCNC: 15 U/L (ref 12–78)
ANION GAP SERPL CALCULATED.3IONS-SCNC: 2 MMOL/L (ref 4–13)
AST SERPL W P-5'-P-CCNC: 14 U/L (ref 5–45)
BASOPHILS # BLD AUTO: 0.05 THOUSANDS/ΜL (ref 0–0.1)
BASOPHILS NFR BLD AUTO: 0 % (ref 0–1)
BILIRUB SERPL-MCNC: 1.8 MG/DL (ref 0.2–1)
BUN SERPL-MCNC: 10 MG/DL (ref 5–25)
CALCIUM SERPL-MCNC: 9.7 MG/DL (ref 8.3–10.1)
CHLORIDE SERPL-SCNC: 105 MMOL/L (ref 100–108)
CO2 SERPL-SCNC: 31 MMOL/L (ref 21–32)
CREAT SERPL-MCNC: 1.02 MG/DL (ref 0.6–1.3)
CREAT UR-MCNC: 50.2 MG/DL
EOSINOPHIL # BLD AUTO: 0 THOUSAND/ΜL (ref 0–0.61)
EOSINOPHIL NFR BLD AUTO: 0 % (ref 0–6)
ERYTHROCYTE [DISTWIDTH] IN BLOOD BY AUTOMATED COUNT: 13.4 % (ref 11.6–15.1)
EST. AVERAGE GLUCOSE BLD GHB EST-MCNC: 140 MG/DL
GFR SERPL CREATININE-BSD FRML MDRD: 58 ML/MIN/1.73SQ M
GLUCOSE P FAST SERPL-MCNC: 152 MG/DL (ref 65–99)
HBA1C MFR BLD: 6.5 %
HCT VFR BLD AUTO: 43 % (ref 34.8–46.1)
HGB BLD-MCNC: 13.6 G/DL (ref 11.5–15.4)
IMM GRANULOCYTES # BLD AUTO: 0.05 THOUSAND/UL (ref 0–0.2)
IMM GRANULOCYTES NFR BLD AUTO: 0 % (ref 0–2)
LITHIUM SERPL-SCNC: 0.7 MMOL/L (ref 0.5–1)
LYMPHOCYTES # BLD AUTO: 1.55 THOUSANDS/ΜL (ref 0.6–4.47)
LYMPHOCYTES NFR BLD AUTO: 11 % (ref 14–44)
MCH RBC QN AUTO: 30.3 PG (ref 26.8–34.3)
MCHC RBC AUTO-ENTMCNC: 31.6 G/DL (ref 31.4–37.4)
MCV RBC AUTO: 96 FL (ref 82–98)
MICROALBUMIN UR-MCNC: <5 MG/L (ref 0–20)
MICROALBUMIN/CREAT 24H UR: <10 MG/G CREATININE (ref 0–30)
MONOCYTES # BLD AUTO: 0.77 THOUSAND/ΜL (ref 0.17–1.22)
MONOCYTES NFR BLD AUTO: 6 % (ref 4–12)
NEUTROPHILS # BLD AUTO: 11.54 THOUSANDS/ΜL (ref 1.85–7.62)
NEUTS SEG NFR BLD AUTO: 83 % (ref 43–75)
NRBC BLD AUTO-RTO: 0 /100 WBCS
PLATELET # BLD AUTO: 333 THOUSANDS/UL (ref 149–390)
PMV BLD AUTO: 9.5 FL (ref 8.9–12.7)
POTASSIUM SERPL-SCNC: 4 MMOL/L (ref 3.5–5.3)
PROT SERPL-MCNC: 7.2 G/DL (ref 6.4–8.2)
RBC # BLD AUTO: 4.49 MILLION/UL (ref 3.81–5.12)
SODIUM SERPL-SCNC: 138 MMOL/L (ref 136–145)
TSH SERPL DL<=0.05 MIU/L-ACNC: 1.02 UIU/ML (ref 0.36–3.74)
WBC # BLD AUTO: 13.96 THOUSAND/UL (ref 4.31–10.16)

## 2021-01-25 PROCEDURE — 83036 HEMOGLOBIN GLYCOSYLATED A1C: CPT

## 2021-01-25 PROCEDURE — 36415 COLL VENOUS BLD VENIPUNCTURE: CPT

## 2021-01-25 PROCEDURE — 84443 ASSAY THYROID STIM HORMONE: CPT

## 2021-01-25 PROCEDURE — 82570 ASSAY OF URINE CREATININE: CPT | Performed by: PHYSICIAN ASSISTANT

## 2021-01-25 PROCEDURE — 82043 UR ALBUMIN QUANTITATIVE: CPT | Performed by: PHYSICIAN ASSISTANT

## 2021-01-25 PROCEDURE — 85025 COMPLETE CBC W/AUTO DIFF WBC: CPT

## 2021-01-25 PROCEDURE — 80053 COMPREHEN METABOLIC PANEL: CPT

## 2021-01-25 PROCEDURE — 80178 ASSAY OF LITHIUM: CPT

## 2021-03-01 DIAGNOSIS — E13.9 DIABETES 1.5, MANAGED AS TYPE 2 (HCC): ICD-10-CM

## 2021-03-01 DIAGNOSIS — E03.9 HYPOTHYROIDISM, UNSPECIFIED TYPE: ICD-10-CM

## 2021-03-01 RX ORDER — LEVOTHYROXINE SODIUM 112 UG/1
112 TABLET ORAL DAILY
Qty: 90 TABLET | Refills: 1 | Status: SHIPPED | OUTPATIENT
Start: 2021-03-01 | End: 2021-08-17 | Stop reason: SDUPTHER

## 2021-03-01 RX ORDER — LINAGLIPTIN 5 MG/1
5 TABLET, FILM COATED ORAL DAILY
Qty: 90 TABLET | Refills: 1 | Status: SHIPPED | OUTPATIENT
Start: 2021-03-01 | End: 2021-08-17 | Stop reason: SDUPTHER

## 2021-03-22 ENCOUNTER — OFFICE VISIT (OUTPATIENT)
Dept: FAMILY MEDICINE CLINIC | Facility: CLINIC | Age: 66
End: 2021-03-22
Payer: COMMERCIAL

## 2021-03-22 DIAGNOSIS — E03.9 ACQUIRED HYPOTHYROIDISM: ICD-10-CM

## 2021-03-22 DIAGNOSIS — R87.610 ATYPICAL SQUAMOUS CELLS OF UNDETERMINED SIGNIFICANCE ON CYTOLOGIC SMEAR OF CERVIX (ASC-US): ICD-10-CM

## 2021-03-22 DIAGNOSIS — E78.00 HYPERCHOLESTEROLEMIA: ICD-10-CM

## 2021-03-22 DIAGNOSIS — Z12.31 ENCOUNTER FOR SCREENING MAMMOGRAM FOR MALIGNANT NEOPLASM OF BREAST: ICD-10-CM

## 2021-03-22 DIAGNOSIS — E11.9 TYPE 2 DIABETES MELLITUS WITHOUT COMPLICATION, WITHOUT LONG-TERM CURRENT USE OF INSULIN (HCC): ICD-10-CM

## 2021-03-22 DIAGNOSIS — Z00.00 MEDICARE ANNUAL WELLNESS VISIT, SUBSEQUENT: Primary | ICD-10-CM

## 2021-03-22 PROCEDURE — G0439 PPPS, SUBSEQ VISIT: HCPCS | Performed by: FAMILY MEDICINE

## 2021-03-22 RX ORDER — PRAVASTATIN SODIUM 40 MG
40 TABLET ORAL DAILY
Qty: 90 TABLET | Refills: 3 | Status: SHIPPED | OUTPATIENT
Start: 2021-03-22 | End: 2022-03-23 | Stop reason: SDUPTHER

## 2021-03-22 NOTE — PROGRESS NOTES
Assessment and Plan:     Problem List Items Addressed This Visit        Endocrine    Hypothyroidism    Type 2 diabetes mellitus without complication, without long-term current use of insulin ([facility] )       Genitourinary    Atypical squamous cells of undetermined significance on cytologic smear of cervix ([facility])    Relevant Orders    Ambulatory referral to Obstetrics / Gynecology       Other    Hypercholesterolemia    Relevant Medications    pravastatin (PRAVACHOL) 40 mg tablet      Other Visit Diagnoses     Medicare annual wellness visit, subsequent    -  Primary    Encounter for screening mammogram for malignant neoplasm of breast        Relevant Orders    Mammo screening bilateral w 3d & cad        - Continue current medications  - Screening mammo ordered  - Referral provided to GYN for follow up of abnormal PAP in 12/2019   - Cologuard negative 11/2019  - A1c at next visit    BMI Counseling: There is no height or weight on file to calculate BMI  The BMI is above normal  Nutrition recommendations include decreasing portion sizes, encouraging healthy choices of fruits and vegetables, decreasing fast food intake, consuming healthier snacks, limiting drinks that contain sugar, moderation in carbohydrate intake, increasing intake of lean protein, reducing intake of saturated and trans fat and reducing intake of cholesterol  Exercise recommendations include moderate physical activity 150 minutes/week, vigorous physical activity 75 minutes/week, exercising 3-5 times per week, obtaining a gym membership and strength training exercises  No pharmacotherapy was ordered  Preventive health issues were discussed with patient, and age appropriate screening tests were ordered as noted in patient's After Visit Summary  Personalized health advice and appropriate referrals for health education or preventive services given if needed, as noted in patient's After Visit Summary       History of Present Illness:     Patient presents for Medicare Annual Wellness visit    Patient Care Team:  Linh Walsh PA-C as PCP - General (Family Medicine)  Conor Blanchard MD as PCP - [address] [place] [address] (RTE)     Problem List:     Patient Active Problem List   Diagnosis    Bipolar disorder ([facility] )    Constipation    Hypercholesterolemia    Hypothyroidism    Type 2 diabetes mellitus without complication, without long-term current use of insulin ([facility] )    Morbid obesity with BMI of 40 0-44 9, adult ([facility] )    Postmenopausal    Atypical squamous cells of undetermined significance on cytologic smear of cervix ([facility])      Past Medical and Surgical History:     Past Medical History:   Diagnosis Date    Allergic     Depression     Disease of thyroid gland     GERD (gastroesophageal reflux disease)     Hyperchloremia      Past Surgical History:   Procedure Laterality Date    ANKLE SURGERY      Incision    DILATION AND CURETTAGE OF UTERUS        Family History:     Family History   Problem Relation Age of Onset    No Known Problems Family     Diabetes Mother     Heart disease Mother     Stroke Mother     Diabetes Father     Stroke Father     No Known Problems Maternal Grandmother     No Known Problems Maternal Grandfather     No Known Problems Paternal Grandmother     No Known Problems Paternal Grandfather     Diabetes Brother     No Known Problems Paternal Aunt       Social History:     E-Cigarette/Vaping    E-Cigarette Use Never User      E-Cigarette/Vaping Substances    Nicotine No     THC No     CBD No     Flavoring No     Other No     Unknown No      Social History     Socioeconomic History    Marital status: Single     Spouse name: None    Number of children: None    Years of education: None    Highest education level: None   Occupational History    None   Social Needs    Financial resource strain: None    Food insecurity     Worry: None     Inability: None    Transportation needs     Medical: None Non-medical: None   Tobacco Use    Smoking status: Never Smoker    Smokeless tobacco: Never Used   Substance and Sexual Activity    Alcohol use: No    Drug use: No    Sexual activity: Not Currently   Lifestyle    Physical activity     Days per week: None     Minutes per session: None    Stress: None   Relationships    Social connections     Talks on phone: None     Gets together: None     Attends [employer] service: None     Active member of club or organization: None     Attends meetings of clubs or organizations: None     Relationship status: None    Intimate partner violence     Fear of current or ex partner: None     Emotionally abused: None     Physically abused: None     Forced sexual activity: None   Other Topics Concern    None   Social History Narrative    None      Medications and Allergies:     Current Outpatient Medications   Medication Sig Dispense Refill    aspirin (ECOTRIN LOW STRENGTH) 81 mg EC tablet Take 81 mg by mouth daily      clobetasol (TEMOVATE) 0 05 % external solution Apply to dry scalp daily for up to 4 weeks  Leave shampoo on the scalp x 15 minutes, then rinse  50 mL 2    doxepin (SINEquan) 75 MG capsule Take 125 mg by mouth daily at bedtime 125mg Daily : Take 75mg capsule along with 50mg capsule daily        glucose blood (Contour Next Test) test strip 1 each by Other route 2 (two) times a day Use as instructed 100 each 5    glucose blood test strip 1 each by Other route as needed Use as instructed      ketoconazole (NIZORAL) 2 % shampoo Apply 1 application topically 2 (two) times a week 120 mL 2    levothyroxine 112 mcg tablet Take 1 tablet (112 mcg total) by mouth daily 90 tablet 1    linaGLIPtin (Tradjenta) 5 MG TABS Take 5 mg by mouth daily 90 tablet 1    lithium 300 MG tablet Take 300 mg by mouth 2 (two) times a day      pravastatin (PRAVACHOL) 40 mg tablet Take 1 tablet (40 mg total) by mouth daily 90 tablet 3     No current facility-administered medications for this visit  No Known Allergies   Immunizations:     Immunization History   Administered Date(s) Administered    INFLUENZA 09/29/2015, 10/25/2016, 09/22/2017, 10/04/2018    Influenza Injectable, MDCK, Preservative Free, Quadrivalent 10/20/2020    Influenza, recombinant, quadrivalent,injectable, preservative free 10/18/2019    SARS-CoV-2 / COVID-19 mRNA IM (Moderna) 03/09/2021      Health Maintenance:         Topic Date Due    HIV Screening  Never done    MAMMOGRAM  11/26/2021    DXA SCAN  11/26/2021    Colorectal Cancer Screening  11/13/2022    Cervical Cancer Screening  12/11/2024    Hepatitis C Screening  Completed         Topic Date Due    Pneumococcal Vaccine: Pediatrics (0 to 5 Years) and At-Risk Patients (6 to 59 Years) (1 of 1 - PPSV23) Never done    DTaP,Tdap,and Td Vaccines (1 - Tdap) Never done      Medicare Health Risk Assessment:     There were no vitals taken for this visit  Percilla Beverage is here for her Subsequent Wellness visit  Health Risk Assessment:   Patient rates overall health as good  Patient feels that their physical health rating is same  Patient is satisfied with their life  Eyesight was rated as same  Hearing was rated as same  Patient feels that their emotional and mental health rating is same  Patients states they are never, rarely angry  Patient states they are never, rarely unusually tired/fatigued  Pain experienced in the last 7 days has been none  Patient states that she has experienced no weight loss or gain in last 6 months  Fall Risk Screening: In the past year, patient has experienced: no history of falling in past year      Urinary Incontinence Screening:   Patient has not leaked urine accidently in the last six months  Home Safety:  Patient does not have trouble with stairs inside or outside of their home  Patient has working smoke alarms and has working carbon monoxide detector  Home safety hazards include: none       Nutrition:   Current diet is Regular  Medications:   Patient is not currently taking any over-the-counter supplements  Patient is able to manage medications  Activities of Daily Living (ADLs)/Instrumental Activities of Daily Living (IADLs):   Walk and transfer into and out of bed and chair?: Yes  Dress and groom yourself?: Yes    Bathe or shower yourself?: Yes    Feed yourself? Yes  Do your laundry/housekeeping?: Yes  Manage your money, pay your bills and track your expenses?: Yes  Make your own meals?: Yes    Do your own shopping?: Yes    Previous Hospitalizations:   Any hospitalizations or ED visits within the last 12 months?: No      Advance Care Planning:   Living will: No    Durable POA for healthcare: No    Advanced directive: No      PREVENTIVE SCREENINGS      Cardiovascular Screening:    General: History Lipid Disorder and Screening Current      Diabetes Screening:     General: History Diabetes and Screening Current      Colorectal Cancer Screening:     General: Screening Current      Breast Cancer Screening:     General: Risks and Benefits Discussed    Due for: Mammogram        Cervical Cancer Screening:    General: Screening Current      Osteoporosis Screening:    General: Screening Current      Abdominal Aortic Aneurysm (AAA) Screening:        General: Screening Not Indicated      Lung Cancer Screening:     General: Screening Not Indicated      Hepatitis C Screening:    General: Screening Current    Screening, Brief Intervention, and Referral to Treatment (SBIRT)    Screening  Typical number of drinks in a day: 0  Typical number of drinks in a week: 0  Interpretation: Low risk drinking behavior      Single Item Drug Screening:  How often have you used an illegal drug (including marijuana) or a prescription medication for non-medical reasons in the past year? never    Single Item Drug Screen Score: 0  Interpretation: Negative screen for possible drug use disorder      Khadijah Hernandez PA-C

## 2021-03-22 NOTE — PATIENT INSTRUCTIONS
Medicare Preventive Visit Patient Instructions  Thank you for completing your Welcome to Medicare Visit or Medicare Annual Wellness Visit today  Your next wellness visit will be due in one year (3/23/2022)  The screening/preventive services that you may require over the next 5-10 years are detailed below  Some tests may not apply to you based off risk factors and/or age  Screening tests ordered at today's visit but not completed yet may show as past due  Also, please note that scanned in results may not display below  Preventive Screenings:  Service Recommendations Previous Testing/Comments   Colorectal Cancer Screening  * Colonoscopy    * Fecal Occult Blood Test (FOBT)/Fecal Immunochemical Test (FIT)  * Fecal DNA/Cologuard Test  * Flexible Sigmoidoscopy Age: 54-65 years old   Colonoscopy: every 10 years (may be performed more frequently if at higher risk)  OR  FOBT/FIT: every 1 year  OR  Cologuard: every 3 years  OR  Sigmoidoscopy: every 5 years  Screening may be recommended earlier than age 48 if at higher risk for colorectal cancer  Also, an individualized decision between you and your healthcare provider will decide whether screening between the ages of 74-80 would be appropriate  Colonoscopy: Not on file  FOBT/FIT: Not on file  Cologuard: 11/13/2019  Sigmoidoscopy: Not on file    Screening Current     Breast Cancer Screening Age: 36 years old  Frequency: every 1-2 years  Not required if history of left and right mastectomy Mammogram: 11/26/2019    Screening Current   Cervical Cancer Screening Between the ages of 21-29, pap smear recommended once every 3 years  Between the ages of 33-67, can perform pap smear with HPV co-testing every 5 years     Recommendations may differ for women with a history of total hysterectomy, cervical cancer, or abnormal pap smears in past  Pap Smear: 12/11/2019    Screening Current   Hepatitis C Screening Once for adults born between 1945 and 1965  More frequently in patients at high risk for Hepatitis C Hep C Antibody: 10/21/2019    Screening Current   Diabetes Screening 1-2 times per year if you're at risk for diabetes or have pre-diabetes Fasting glucose: 152 mg/dL   A1C: 6 5 %    Screening Not Indicated  History Diabetes   Cholesterol Screening Once every 5 years if you don't have a lipid disorder  May order more often based on risk factors  Lipid panel: 05/12/2020    Screening Not Indicated  History Lipid Disorder     Other Preventive Screenings Covered by Medicare:  1  Abdominal Aortic Aneurysm (AAA) Screening: covered once if your at risk  You're considered to be at risk if you have a family history of AAA  2  Lung Cancer Screening: covers low dose CT scan once per year if you meet all of the following conditions: (1) Age 50-69; (2) No signs or symptoms of lung cancer; (3) Current smoker or have quit smoking within the last 15 years; (4) You have a tobacco smoking history of at least 30 pack years (packs per day multiplied by number of years you smoked); (5) You get a written order from a healthcare provider  3  Glaucoma Screening: covered annually if you're considered high risk: (1) You have diabetes OR (2) Family history of glaucoma OR (3)  aged 48 and older OR (3)  American aged 72 and older  3  Osteoporosis Screening: covered every 2 years if you meet one of the following conditions: (1) You're estrogen deficient and at risk for osteoporosis based off medical history and other findings; (2) Have a vertebral abnormality; (3) On glucocorticoid therapy for more than 3 months; (4) Have primary hyperparathyroidism; (5) On osteoporosis medications and need to assess response to drug therapy  · Last bone density test (DXA Scan): 11/26/2019   5  HIV Screening: covered annually if you're between the age of 15-65  Also covered annually if you are younger than 13 and older than 72 with risk factors for HIV infection   For pregnant patients, it is covered up to 3 times per pregnancy  Immunizations:  Immunization Recommendations   Influenza Vaccine Annual influenza vaccination during flu season is recommended for all persons aged >= 6 months who do not have contraindications   Pneumococcal Vaccine (Prevnar and Pneumovax)  * Prevnar = PCV13  * Pneumovax = PPSV23   Adults 25-60 years old: 1-3 doses may be recommended based on certain risk factors  Adults 72 years old: Prevnar (PCV13) vaccine recommended followed by Pneumovax (PPSV23) vaccine  If already received PPSV23 since turning 65, then PCV13 recommended at least one year after PPSV23 dose  Hepatitis B Vaccine 3 dose series if at intermediate or high risk (ex: diabetes, end stage renal disease, liver disease)   Tetanus (Td) Vaccine - COST NOT COVERED BY MEDICARE PART B Following completion of primary series, a booster dose should be given every 10 years to maintain immunity against tetanus  Td may also be given as tetanus wound prophylaxis  Tdap Vaccine - COST NOT COVERED BY MEDICARE PART B Recommended at least once for all adults  For pregnant patients, recommended with each pregnancy  Shingles Vaccine (Shingrix) - COST NOT COVERED BY MEDICARE PART B  2 shot series recommended in those aged 48 and above     Health Maintenance Due:      Topic Date Due    HIV Screening  Never done    MAMMOGRAM  11/26/2021    DXA SCAN  11/26/2021    Colorectal Cancer Screening  11/13/2022    Cervical Cancer Screening  12/11/2024    Hepatitis C Screening  Completed     Immunizations Due:      Topic Date Due    Pneumococcal Vaccine: Pediatrics (0 to 5 Years) and At-Risk Patients (6 to 59 Years) (1 of 1 - PPSV23) Never done    DTaP,Tdap,and Td Vaccines (1 - Tdap) Never done     Advance Directives   What are advance directives? Advance directives are legal documents that state your wishes and plans for medical care  These plans are made ahead of time in case you lose your ability to make decisions for yourself   Advance directives can apply to any medical decision, such as the treatments you want, and if you want to donate organs  What are the types of advance directives? There are many types of advance directives, and each state has rules about how to use them  You may choose a combination of any of the following:  · Living will: This is a written record of the treatment you want  You can also choose which treatments you do not want, which to limit, and which to stop at a certain time  This includes surgery, medicine, IV fluid, and tube feedings  · Durable power of  for healthcare [facility]): This is a written record that states who you want to make healthcare choices for you when you are unable to make them for yourself  This person, called a proxy, is usually a family member or a friend  You may choose more than 1 proxy  · Do not resuscitate (DNR) order:  A DNR order is used in case your heart stops beating or you stop breathing  It is a request not to have certain forms of treatment, such as CPR  A DNR order may be included in other types of advance directives  · Medical directive: This covers the care that you want if you are in a coma, near death, or unable to make decisions for yourself  You can list the treatments you want for each condition  Treatment may include pain medicine, surgery, blood transfusions, dialysis, IV or tube feedings, and a ventilator (breathing machine)  · Values history: This document has questions about your views, beliefs, and how you feel and think about life  This information can help others choose the care that you would choose  Why are advance directives important? An advance directive helps you control your care  Although spoken wishes may be used, it is better to have your wishes written down  Spoken wishes can be misunderstood, or not followed  Treatments may be given even if you do not want them   An advance directive may make it easier for your family to make difficult choices about your care  Weight Management   Why it is important to manage your weight:  Being overweight increases your risk of health conditions such as heart disease, high blood pressure, type 2 diabetes, and certain types of cancer  It can also increase your risk for osteoarthritis, sleep apnea, and other respiratory problems  Aim for a slow, steady weight loss  Even a small amount of weight loss can lower your risk of health problems  How to lose weight safely:  A safe and healthy way to lose weight is to eat fewer calories and get regular exercise  You can lose up about 1 pound a week by decreasing the number of calories you eat by 500 calories each day  Healthy meal plan for weight management:  A healthy meal plan includes a variety of foods, contains fewer calories, and helps you stay healthy  A healthy meal plan includes the following:  · Eat whole-grain foods more often  A healthy meal plan should contain fiber  Fiber is the part of grains, fruits, and vegetables that is not broken down by your body  Whole-grain foods are healthy and provide extra fiber in your diet  Some examples of whole-grain foods are whole-wheat breads and pastas, oatmeal, brown rice, and bulgur  · Eat a variety of vegetables every day  Include dark, leafy greens such as spinach, kale, juaquin greens, and mustard greens  Eat yellow and orange vegetables such as carrots, sweet potatoes, and winter squash  · Eat a variety of fruits every day  Choose fresh or canned fruit (canned in its own juice or light syrup) instead of juice  Fruit juice has very little or no fiber  · Eat low-fat dairy foods  Drink fat-free (skim) milk or 1% milk  Eat fat-free yogurt and low-fat cottage cheese  Try low-fat cheeses such as mozzarella and other reduced-fat cheeses  · Choose meat and other protein foods that are low in fat  Choose beans or other legumes such as split peas or lentils   Choose fish, skinless poultry (chicken or turkey), or lean cuts of red meat (beef or pork)  Before you cook meat or poultry, cut off any visible fat  · Use less fat and oil  Try baking foods instead of frying them  Add less fat, such as margarine, sour cream, regular salad dressing and mayonnaise to foods  Eat fewer high-fat foods  Some examples of high-fat foods include french fries, doughnuts, ice cream, and cakes  · Eat fewer sweets  Limit foods and drinks that are high in sugar  This includes candy, cookies, regular soda, and sweetened drinks  Exercise:  Exercise at least 30 minutes per day on most days of the week  Some examples of exercise include walking, biking, dancing, and swimming  You can also fit in more physical activity by taking the stairs instead of the elevator or parking farther away from stores  Ask your healthcare provider about the best exercise plan for you  © Copyright [employer] 2018 Information is for End User's use only and may not be sold, redistributed or otherwise used for commercial purposes   All illustrations and images included in CareNotes® are the copyrighted property of [employer]  or [address] [place] [address]

## 2021-03-23 ENCOUNTER — TELEPHONE (OUTPATIENT)
Dept: ADMINISTRATIVE | Facility: OTHER | Age: 66
End: 2021-03-23

## 2021-03-23 NOTE — TELEPHONE ENCOUNTER
----- Message from Jr Gentile sent at 3/22/2021  1:17 PM EDT -----  Regarding: HM/VBI : Diabetic Foot Exam  This patient has diabetic foot exams done with [facility] in [place] or [place], she is seen at both offices as needed  Patient states she's had an exam done in the last year      Thank You! :)

## 2021-03-23 NOTE — LETTER
Diabetic Foot Exam Form    Date Requested: 21  Patient: Rakesh Moya  Patient : 1955   Referring Provider: Turner Bauer PA-C    Diabetic Foot Exam Performed with shoes and socks removed        Yes         No     Date of Diabetic Foot Exam ______________________________  Risk Score ____________________________________________    Left Foot       Visual Inspection         Monofilament Testing Sensory Exam        Pedal Pulses         Additional Comments         Right Foot      Visual Inspection         Monofilament Testing Sensory Exam       Pedal Pulses         Additional Comments         Comments __________________________________________________________    Practice Providing Exam ______________________________________________    Exam Performed By (print name) _______________________________________      Provider Signature ___________________________________________________      These reports are needed for  compliance    Please fax this completed form and a copy of the Diabetic Foot Exam report to our office located at [place] [address] as soon as possible to [phone] payam Pham: Phone [phone]    We thank you for your assistance in treating our mutual patient

## 2021-03-23 NOTE — TELEPHONE ENCOUNTER
Upon review of the In Basket request and the patient's chart, initial outreach has been made via fax, please see Contacts section for details       Thank you  Elvan Cabot

## 2021-03-23 NOTE — LETTER
Diabetic Foot Exam Form    Date Requested: 21  Patient: Mohit Pinon  Patient : 1955   Referring Provider: Aissatou Berry PA-C    Diabetic Foot Exam Performed with shoes and socks removed        Yes         No     Date of Diabetic Foot Exam ______________________________  Risk Score ____________________________________________    Left Foot       Visual Inspection         Monofilament Testing Sensory Exam        Pedal Pulses         Additional Comments         Right Foot      Visual Inspection         Monofilament Testing Sensory Exam       Pedal Pulses         Additional Comments         Comments __________________________________________________________    Practice Providing Exam ______________________________________________    Exam Performed By (print name) _______________________________________      Provider Signature ___________________________________________________      These reports are needed for  compliance    Please fax this completed form and a copy of the Diabetic Foot Exam report to our office located at [place] [address] as soon as possible to [phone] payam Yuen: Phone [phone]    We thank you for your assistance in treating our mutual patient

## 2021-03-26 NOTE — TELEPHONE ENCOUNTER
As a follow-up, a second attempt has been made for outreach via fax, please see Contacts section for details       [facility] [address] [phone]    Thank you  [facility]

## 2021-03-31 NOTE — TELEPHONE ENCOUNTER
As a final attempt, a third outreach has been made via telephone call  Please see Contacts section for details  This encounter will be closed and completed by end of day  Should we receive the requested information because of previous outreach attempts, the requested patient's chart will be updated appropriately       Thank you  Kelsy Adamson

## 2021-04-27 LAB
LEFT EYE DIABETIC RETINOPATHY: NORMAL
RIGHT EYE DIABETIC RETINOPATHY: NORMAL

## 2021-05-10 ENCOUNTER — HOSPITAL ENCOUNTER (OUTPATIENT)
Dept: MAMMOGRAPHY | Facility: HOSPITAL | Age: 66
Discharge: HOME | End: 2021-05-10
Payer: COMMERCIAL

## 2021-05-10 VITALS — WEIGHT: 228 LBS | HEIGHT: 60 IN | BODY MASS INDEX: 44.76 KG/M2

## 2021-05-10 DIAGNOSIS — Z12.31 ENCOUNTER FOR SCREENING MAMMOGRAM FOR MALIGNANT NEOPLASM OF BREAST: ICD-10-CM

## 2021-05-10 PROCEDURE — 77063 BREAST TOMOSYNTHESIS BI: CPT

## 2021-05-10 PROCEDURE — 77067 SCR MAMMO BI INCL CAD: CPT

## 2021-05-24 ENCOUNTER — OFFICE VISIT (OUTPATIENT)
Dept: FAMILY MEDICINE CLINIC | Facility: CLINIC | Age: 66
End: 2021-05-24
Payer: COMMERCIAL

## 2021-05-24 VITALS
HEART RATE: 94 BPM | WEIGHT: 229.4 LBS | DIASTOLIC BLOOD PRESSURE: 76 MMHG | SYSTOLIC BLOOD PRESSURE: 146 MMHG | BODY MASS INDEX: 44.8 KG/M2 | OXYGEN SATURATION: 98 % | RESPIRATION RATE: 16 BRPM | TEMPERATURE: 97.8 F

## 2021-05-24 DIAGNOSIS — Z11.4 SCREENING FOR HIV WITHOUT PRESENCE OF RISK FACTORS: ICD-10-CM

## 2021-05-24 DIAGNOSIS — E03.9 ACQUIRED HYPOTHYROIDISM: ICD-10-CM

## 2021-05-24 DIAGNOSIS — E11.9 TYPE 2 DIABETES MELLITUS WITHOUT COMPLICATION, WITHOUT LONG-TERM CURRENT USE OF INSULIN (HCC): Primary | ICD-10-CM

## 2021-05-24 DIAGNOSIS — E78.00 HYPERCHOLESTEROLEMIA: ICD-10-CM

## 2021-05-24 LAB — SL AMB POCT HEMOGLOBIN AIC: 6.7 (ref ?–6.5)

## 2021-05-24 PROCEDURE — 83036 HEMOGLOBIN GLYCOSYLATED A1C: CPT | Performed by: FAMILY MEDICINE

## 2021-05-24 PROCEDURE — 99213 OFFICE O/P EST LOW 20 MIN: CPT | Performed by: FAMILY MEDICINE

## 2021-05-24 NOTE — PROGRESS NOTES
Assessment/Plan:     Diagnoses and all orders for this visit:    Type 2 diabetes mellitus without complication, without long-term current use of insulin ([facility])  -     POCT hemoglobin A1c    Screening for HIV without presence of risk factors  -     HIV 1/2 ANTIGEN/ANTIBODY (4TH GENERATION) W REFLEX [facility]; Future    Acquired hypothyroidism    Hypercholesterolemia        - Continue current medications  - Follow up GYN and psych as scheduled    Return in about 3 months (around 8/24/2021) for Next scheduled follow up  Subjective:        Patient ID: Rohit Chatman is a 60 y o  female  Chief Complaint   Patient presents with   Valiant Lee is a 60year old female with history of HLD, hypothyroidism, and type 2 DM, presenting for follow up      HLD is managed with pravastatin 40 mg daily  Last lipid panel from 5/2020 with total cholesterol 157, trig 143, LDL 62  Hypothyroidism managed with levothyroxine 112 mcg daily  Last TSH from 1/2021 WNL  Type 2 DM managed with Tradjenta 5 mg daily  Last A1c from 1/2021 at 6 5, up to 6 7 today  Patient reports home BS fluctuates and she does not check it regularly  She was at the podiatrist this week and reports seeing Glen/Lonnie last month for her routine exam      Patient follows with [facility] every 3 months for bipolar management  Patient is scheduled for follow up with GYN on 5/28    Mammo done 5/10/21 was normal       The following portions of the patient's history were reviewed and updated as appropriate: allergies, current medications, past family history, past medical history, past social history, past surgical history and problem list     Patient Active Problem List   Diagnosis    Bipolar disorder ([facility] )    Constipation    Hypercholesterolemia    Hypothyroidism    Type 2 diabetes mellitus without complication, without long-term current use of insulin ([facility] )    Morbid obesity with BMI of 40 0-44 9, adult ([facility] )    Postmenopausal    Atypical squamous cells of undetermined significance on cytologic smear of cervix (ASC-US)       Current Outpatient Medications   Medication Sig Dispense Refill    aspirin (ECOTRIN LOW STRENGTH) 81 mg EC tablet Take 81 mg by mouth daily      clobetasol (TEMOVATE) 0 05 % external solution Apply to dry scalp daily for up to 4 weeks  Leave shampoo on the scalp x 15 minutes, then rinse  50 mL 2    doxepin (SINEquan) 75 MG capsule Take 100 mg by mouth daily at bedtime 100mg daily      glucose blood (Contour Next Test) test strip 1 each by Other route 2 (two) times a day Use as instructed 100 each 5    glucose blood test strip 1 each by Other route as needed Use as instructed      ketoconazole (NIZORAL) 2 % shampoo Apply 1 application topically 2 (two) times a week 120 mL 2    levothyroxine 112 mcg tablet Take 1 tablet (112 mcg total) by mouth daily 90 tablet 1    linaGLIPtin (Tradjenta) 5 MG TABS Take 5 mg by mouth daily 90 tablet 1    lithium 300 MG tablet Take 300 mg by mouth 2 (two) times a day      pravastatin (PRAVACHOL) 40 mg tablet Take 1 tablet (40 mg total) by mouth daily 90 tablet 3     No current facility-administered medications for this visit           Past Medical History:   Diagnosis Date    Allergic     Depression     Disease of thyroid gland     GERD (gastroesophageal reflux disease)     Hyperchloremia         Past Surgical History:   Procedure Laterality Date    ANKLE SURGERY      Incision    DILATION AND CURETTAGE OF UTERUS          Social History     Socioeconomic History    Marital status: Single     Spouse name: Not on file    Number of children: Not on file    Years of education: Not on file    Highest education level: Not on file   Occupational History    Not on file   Social Needs    Financial resource strain: Not on file    Food insecurity     Worry: Not on file     Inability: Not on file    Transportation needs     Medical: Not on file Non-medical: Not on file   Tobacco Use    Smoking status: Never Smoker    Smokeless tobacco: Never Used   Substance and Sexual Activity    Alcohol use: No    Drug use: No    Sexual activity: Not Currently   Lifestyle    Physical activity     Days per week: Not on file     Minutes per session: Not on file    Stress: Not on file   Relationships    Social connections     Talks on phone: Not on file     Gets together: Not on file     Attends [employer] service: Not on file     Active member of club or organization: Not on file     Attends meetings of clubs or organizations: Not on file     Relationship status: Not on file    Intimate partner violence     Fear of current or ex partner: Not on file     Emotionally abused: Not on file     Physically abused: Not on file     Forced sexual activity: Not on file   Other Topics Concern    Not on file   Social History Narrative    Not on file        Review of Systems   Constitutional: Negative for chills, diaphoresis and fever  HENT: Negative for congestion, ear pain, postnasal drip, rhinorrhea, sinus pressure and sore throat  Respiratory: Negative for cough, chest tightness, shortness of breath and wheezing  Cardiovascular: Negative for chest pain, palpitations and leg swelling  Gastrointestinal: Negative for abdominal pain, blood in stool, constipation, diarrhea, nausea and vomiting  Skin: Negative for rash and wound  Neurological: Negative for dizziness, syncope, weakness, light-headedness and headaches  Psychiatric/Behavioral: Negative for decreased concentration, dysphoric mood, self-injury, sleep disturbance and suicidal ideas  The patient is not nervous/anxious  Objective:      /76   Pulse 94   Temp 97 8 °F (36 6 °C)   Resp 16   Wt 104 kg (229 lb 6 4 oz)   SpO2 98%   BMI 44 80 kg/m²          Physical Exam  Vitals signs and nursing note reviewed  Constitutional:       General: She is not in acute distress       Appearance: Normal appearance  She is obese  HENT:      Head: Normocephalic and atraumatic  Eyes:      Extraocular Movements: Extraocular movements intact  Conjunctiva/sclera: Conjunctivae normal       Pupils: Pupils are equal, round, and reactive to light  Neck:      Musculoskeletal: Normal range of motion and neck supple  Cardiovascular:      Rate and Rhythm: Normal rate and regular rhythm  Heart sounds: Normal heart sounds  No murmur  Pulmonary:      Effort: Pulmonary effort is normal  No respiratory distress  Breath sounds: Normal breath sounds  No wheezing  Musculoskeletal:      Right lower leg: No edema  Left lower leg: Edema (trace pitting edema) present  Lymphadenopathy:      Cervical: No cervical adenopathy  Skin:     General: Skin is warm and dry  Neurological:      General: No focal deficit present  Mental Status: She is alert and oriented to person, place, and time  Cranial Nerves: No cranial nerve deficit  Motor: No weakness     Psychiatric:         Mood and Affect: Mood normal          Behavior: Behavior normal

## 2021-05-25 ENCOUNTER — TELEPHONE (OUTPATIENT)
Dept: ADMINISTRATIVE | Facility: OTHER | Age: 66
End: 2021-05-25

## 2021-05-25 NOTE — TELEPHONE ENCOUNTER
----- Message from Julia Cortez PA-C sent at 5/24/2021  1:39 PM EDT -----  Regarding: Care Gap Request  05/24/21 1:39 PM    Hello, our patient Milderd Turner has had Diabetic Eye Exam completed/performed  Please assist in updating the patient chart by making an External outreach to [facility] facility located in [place] [address]  The date of service is 4/2021      Thank you,  Julia Cortez PA-C  [address]

## 2021-05-25 NOTE — TELEPHONE ENCOUNTER
Upon review of the In Basket request and the patient's chart, initial outreach has been made via fax, please see Contacts section for details       Thank you  Lila Nevarez

## 2021-05-25 NOTE — LETTER
Diabetic Eye Exam Form    Date Requested: 21  Patient: Kunal Ng  Patient : [id number]   Referring Provider: Fuad Engel PA-C    Dilated Retinal Exam, Optomap-Iris Exam, or Fundus Photography Done         Yes ([place] one above)         No     Date of Diabetic Eye Exam ______________________________  Left Eye      Exam did show retinopathy    Exam did not show retinopathy         Mild       Moderate       None       Proliferative       Severe     Right Eye     Exam did show retinopathy    Exam did not show retinopathy         Mild       Moderate       None       Proliferative       Severe     Comments __________________________________________________________    Practice Providing Exam ______________________________________________    Exam Performed By (print name) _______________________________________      Provider Signature ___________________________________________________      These reports are needed for  compliance    Please fax this completed form and a copy of the Diabetic Eye Exam report to our office located at [place] [address] as soon as possible to [phone] payam Montes Ali: Phone [phone]    We thank you for your assistance in treating our mutual patient

## 2021-05-28 NOTE — TELEPHONE ENCOUNTER
Upon review of the In Basket request we were able to locate, review, and update the patient chart as requested for Diabetic Eye Exam     Any additional questions or concerns should be emailed to the Practice Liaisons via [email or web address] email, please do not reply via In Basket      Thank you  Jonathan Gallardo

## 2021-07-21 DIAGNOSIS — E13.9 DIABETES 1.5, MANAGED AS TYPE 2 (HCC): ICD-10-CM

## 2021-07-21 RX ORDER — BLOOD SUGAR DIAGNOSTIC
STRIP MISCELLANEOUS
Qty: 100 STRIP | Refills: 0 | Status: SHIPPED | OUTPATIENT
Start: 2021-07-21 | End: 2021-10-28 | Stop reason: SDUPTHER

## 2021-08-17 DIAGNOSIS — E03.9 HYPOTHYROIDISM, UNSPECIFIED TYPE: ICD-10-CM

## 2021-08-17 DIAGNOSIS — E13.9 DIABETES 1.5, MANAGED AS TYPE 2 (HCC): ICD-10-CM

## 2021-08-17 RX ORDER — LEVOTHYROXINE SODIUM 112 UG/1
112 TABLET ORAL DAILY
Qty: 90 TABLET | Refills: 1 | Status: SHIPPED | OUTPATIENT
Start: 2021-08-17 | End: 2022-02-17 | Stop reason: SDUPTHER

## 2021-08-17 RX ORDER — LINAGLIPTIN 5 MG/1
5 TABLET, FILM COATED ORAL DAILY
Qty: 90 TABLET | Refills: 1 | Status: SHIPPED | OUTPATIENT
Start: 2021-08-17 | End: 2022-02-15 | Stop reason: SDUPTHER

## 2021-08-24 ENCOUNTER — OFFICE VISIT (OUTPATIENT)
Dept: FAMILY MEDICINE CLINIC | Facility: CLINIC | Age: 66
End: 2021-08-24
Payer: COMMERCIAL

## 2021-08-24 VITALS
OXYGEN SATURATION: 97 % | SYSTOLIC BLOOD PRESSURE: 142 MMHG | HEIGHT: 60 IN | RESPIRATION RATE: 18 BRPM | BODY MASS INDEX: 44.52 KG/M2 | HEART RATE: 86 BPM | DIASTOLIC BLOOD PRESSURE: 84 MMHG | WEIGHT: 226.8 LBS | TEMPERATURE: 97.8 F

## 2021-08-24 DIAGNOSIS — E11.9 TYPE 2 DIABETES MELLITUS WITHOUT COMPLICATION, WITHOUT LONG-TERM CURRENT USE OF INSULIN (HCC): Primary | ICD-10-CM

## 2021-08-24 DIAGNOSIS — E78.00 HYPERCHOLESTEROLEMIA: ICD-10-CM

## 2021-08-24 DIAGNOSIS — Z79.899 LITHIUM USE: ICD-10-CM

## 2021-08-24 DIAGNOSIS — E03.9 ACQUIRED HYPOTHYROIDISM: ICD-10-CM

## 2021-08-24 DIAGNOSIS — I10 ESSENTIAL HYPERTENSION: ICD-10-CM

## 2021-08-24 LAB — SL AMB POCT HEMOGLOBIN AIC: 6.7 (ref ?–6.5)

## 2021-08-24 PROCEDURE — 99214 OFFICE O/P EST MOD 30 MIN: CPT | Performed by: FAMILY MEDICINE

## 2021-08-24 RX ORDER — LISINOPRIL 5 MG/1
5 TABLET ORAL DAILY
Qty: 30 TABLET | Refills: 5 | Status: SHIPPED | OUTPATIENT
Start: 2021-08-24 | End: 2022-01-18 | Stop reason: SDUPTHER

## 2021-08-24 NOTE — PROGRESS NOTES
Assessment/Plan:     Diagnoses and all orders for this visit:    Type 2 diabetes mellitus without complication, without long-term current use of insulin (HCC)  -     POCT hemoglobin A1c  -     CBC and differential; Future  -     Comprehensive metabolic panel; Future  -     lisinopril (ZESTRIL) 5 mg tablet; Take 1 tablet (5 mg total) by mouth daily    Acquired hypothyroidism  -     TSH, 3rd generation with Free T4 reflex; Future    Hypercholesterolemia  -     Lipid panel; Future    Essential hypertension  -     lisinopril (ZESTRIL) 5 mg tablet; Take 1 tablet (5 mg total) by mouth daily    Lithium use  -     Lithium level; Future  -     ECG 12 lead; Future        - Will start lisinopril 5 mg daily for HTN and kidney protection  - Continue other medications as prescribed  - Routine labs as ordered  - Lithium level and EKG ordered for medication monitoring    Return in about 3 months (around 11/24/2021) for Next scheduled follow up  Subjective:        Patient ID: Ivy Galicia is a 73 y o  female  Chief Complaint   Patient presents with   Ermelinda Arms is a 73year old female with history of HLD, hypothyroidism, and type 2 DM, presenting for follow up      HLD is managed with pravastatin 40 mg daily  Last lipid panel from 5/2020 with total cholesterol 157, trig 143, LDL 62  Hypothyroidism managed with levothyroxine 112 mcg daily  Last TSH from 1/2021 WNL  Type 2 DM managed with Tradjenta 5 mg daily  Last A1c 6 7, remains 6 7 today  Patient reports home BS fluctuates and she does not check it regularly      Patient follows with [facility] every 3 months for bipolar management, currently taking lithium and doxepin  Lithium level from 1/2021 WNL  Last EKG from 8/2020 NSR with Nonspecific T wave abnormality  BP is elevated at 142/84 today  Last visit BP was 146/76    Patient is not currently taking any HTN medications and does not recall being on anything in the past   She denies chest pain, palpitations, LE edema, HA, dizziness, or vision changes  The following portions of the patient's history were reviewed and updated as appropriate: allergies, current medications, past family history, past medical history, past social history, past surgical history and problem list     Patient Active Problem List   Diagnosis    Bipolar disorder ([place] [address] )    Constipation    Hypercholesterolemia    Hypothyroidism    Type 2 diabetes mellitus without complication, without long-term current use of insulin ([place] [address] )    Morbid obesity with BMI of 40 0-44 9, adult ([place] [address] )    Postmenopausal    Atypical squamous cells of undetermined significance on cytologic smear of cervix ([facility])    Essential hypertension       Current Outpatient Medications   Medication Sig Dispense Refill    aspirin (ECOTRIN LOW STRENGTH) 81 mg EC tablet Take 81 mg by mouth daily      clobetasol (TEMOVATE) 0 05 % external solution Apply to dry scalp daily for up to 4 weeks  Leave shampoo on the scalp x 15 minutes, then rinse  50 mL 2    Contour Next Test test strip USE TWICE DAILY AS DIRECTED    100 strip 0    doxepin (SINEquan) 75 MG capsule Take 100 mg by mouth daily at bedtime 100mg daily      glucose blood test strip 1 each by Other route as needed Use as instructed      ketoconazole (NIZORAL) 2 % shampoo Apply 1 application topically 2 (two) times a week 120 mL 2    levothyroxine 112 mcg tablet Take 1 tablet (112 mcg total) by mouth daily 90 tablet 1    linaGLIPtin (Tradjenta) 5 MG TABS Take 5 mg by mouth daily 90 tablet 1    lithium 300 MG tablet Take 300 mg by mouth 2 (two) times a day      pravastatin (PRAVACHOL) 40 mg tablet Take 1 tablet (40 mg total) by mouth daily 90 tablet 3    lisinopril (ZESTRIL) 5 mg tablet Take 1 tablet (5 mg total) by mouth daily 30 tablet 5     No current facility-administered medications for this visit          Past Medical History:   Diagnosis Date    Allergic     Depression     Disease of thyroid gland     GERD (gastroesophageal reflux disease)     Hyperchloremia         Past Surgical History:   Procedure Laterality Date    ANKLE SURGERY      Incision    DILATION AND CURETTAGE OF UTERUS          Social History     Socioeconomic History    Marital status: Single     Spouse name: Not on file    Number of children: Not on file    Years of education: Not on file    Highest education level: Not on file   Occupational History    Not on file   Tobacco Use    Smoking status: Never Smoker    Smokeless tobacco: Never Used   Vaping Use    Vaping Use: Never used   Substance and Sexual Activity    Alcohol use: No    Drug use: No    Sexual activity: Not Currently   Other Topics Concern    Not on file   Social History Narrative    Not on file     Social Determinants of Health     Financial Resource Strain:     Difficulty of Paying Living Expenses:    Food Insecurity:     Worried About Running Out of Food in the Last Year:     Ran Out of Food in the Last Year:    Transportation Needs:     Lack of Transportation (Medical):  Lack of Transportation (Non-Medical):    Physical Activity:     Days of Exercise per Week:     Minutes of Exercise per Session:    Stress:     Feeling of Stress :    Social Connections:     Frequency of Communication with Friends and Family:     Frequency of Social Gatherings with Friends and Family:     Attends Rastafari Services:     Active Member of Clubs or Organizations:     Attends Club or Organization Meetings:     Marital Status:    Intimate Partner Violence:     Fear of Current or Ex-Partner:     Emotionally Abused:     Physically Abused:     Sexually Abused:    Review of Systems   Constitutional: Negative for chills, diaphoresis and fever  HENT: Negative for congestion, ear pain, postnasal drip, rhinorrhea, sinus pressure and sore throat  Respiratory: Negative for cough, chest tightness, shortness of breath and wheezing      Cardiovascular: Negative for chest pain, palpitations and leg swelling  Gastrointestinal: Negative for abdominal pain, blood in stool, constipation, diarrhea, nausea and vomiting  Skin: Negative for rash and wound  Neurological: Negative for dizziness, syncope, weakness, light-headedness and headaches  Psychiatric/Behavioral: Negative for decreased concentration, dysphoric mood, self-injury, sleep disturbance and suicidal ideas  The patient is not nervous/anxious  Objective:      /84 (BP Location: Left arm, Patient Position: Sitting, Cuff Size: Large)   Pulse 86   Temp 97 8 °F (36 6 °C) (Tympanic)   Resp 18   Ht 5' (1 524 m)   Wt 103 kg (226 lb 12 8 oz)   SpO2 97%   BMI 44 29 kg/m²          Physical Exam  Vitals and nursing note reviewed  Constitutional:       General: She is not in acute distress  Appearance: Normal appearance  She is obese  Comments: Ambulating with a cane   HENT:      Head: Normocephalic and atraumatic  Eyes:      Extraocular Movements: Extraocular movements intact  Conjunctiva/sclera: Conjunctivae normal       Pupils: Pupils are equal, round, and reactive to light  Cardiovascular:      Rate and Rhythm: Normal rate and regular rhythm  Heart sounds: Normal heart sounds  No murmur heard  Pulmonary:      Effort: Pulmonary effort is normal  No respiratory distress  Breath sounds: Normal breath sounds  No wheezing  Musculoskeletal:      Cervical back: Normal range of motion and neck supple  Right lower leg: No edema  Left lower leg: No edema  Lymphadenopathy:      Cervical: No cervical adenopathy  Skin:     General: Skin is warm and dry  Neurological:      General: No focal deficit present  Mental Status: She is alert and oriented to person, place, and time  Cranial Nerves: No cranial nerve deficit  Motor: No weakness     Psychiatric:         Mood and Affect: Mood normal          Behavior: Behavior normal

## 2021-09-10 ENCOUNTER — CLINICAL SUPPORT (OUTPATIENT)
Dept: URGENT CARE | Facility: CLINIC | Age: 66
End: 2021-09-10
Payer: COMMERCIAL

## 2021-09-10 DIAGNOSIS — Z79.899 LITHIUM USE: ICD-10-CM

## 2021-09-10 LAB
ATRIAL RATE: 88 BPM
P AXIS: 50 DEGREES
PR INTERVAL: 148 MS
QRS AXIS: 11 DEGREES
QRSD INTERVAL: 72 MS
QT INTERVAL: 354 MS
QTC INTERVAL: 428 MS
T WAVE AXIS: 262 DEGREES
VENTRICULAR RATE: 88 BPM

## 2021-09-10 PROCEDURE — 93010 ELECTROCARDIOGRAM REPORT: CPT | Performed by: INTERNAL MEDICINE

## 2021-09-10 PROCEDURE — 93005 ELECTROCARDIOGRAM TRACING: CPT

## 2021-09-24 ENCOUNTER — APPOINTMENT (OUTPATIENT)
Dept: LAB | Facility: CLINIC | Age: 66
End: 2021-09-24
Payer: COMMERCIAL

## 2021-09-24 DIAGNOSIS — E78.00 HYPERCHOLESTEROLEMIA: ICD-10-CM

## 2021-09-24 DIAGNOSIS — Z79.899 LITHIUM USE: ICD-10-CM

## 2021-09-24 DIAGNOSIS — E11.9 TYPE 2 DIABETES MELLITUS WITHOUT COMPLICATION, WITHOUT LONG-TERM CURRENT USE OF INSULIN (HCC): ICD-10-CM

## 2021-09-24 DIAGNOSIS — E03.9 ACQUIRED HYPOTHYROIDISM: ICD-10-CM

## 2021-09-24 LAB
ALBUMIN SERPL BCP-MCNC: 3.7 G/DL (ref 3.5–5)
ALP SERPL-CCNC: 101 U/L (ref 46–116)
ALT SERPL W P-5'-P-CCNC: 14 U/L (ref 12–78)
ANION GAP SERPL CALCULATED.3IONS-SCNC: 4 MMOL/L (ref 4–13)
AST SERPL W P-5'-P-CCNC: 17 U/L (ref 5–45)
BASOPHILS # BLD AUTO: 0.05 THOUSANDS/ΜL (ref 0–0.1)
BASOPHILS NFR BLD AUTO: 1 % (ref 0–1)
BILIRUB SERPL-MCNC: 1.29 MG/DL (ref 0.2–1)
BUN SERPL-MCNC: 9 MG/DL (ref 5–25)
CALCIUM SERPL-MCNC: 9.9 MG/DL (ref 8.3–10.1)
CHLORIDE SERPL-SCNC: 106 MMOL/L (ref 100–108)
CHOLEST SERPL-MCNC: 153 MG/DL (ref 50–200)
CO2 SERPL-SCNC: 28 MMOL/L (ref 21–32)
CREAT SERPL-MCNC: 0.97 MG/DL (ref 0.6–1.3)
EOSINOPHIL # BLD AUTO: 0.01 THOUSAND/ΜL (ref 0–0.61)
EOSINOPHIL NFR BLD AUTO: 0 % (ref 0–6)
ERYTHROCYTE [DISTWIDTH] IN BLOOD BY AUTOMATED COUNT: 13.5 % (ref 11.6–15.1)
GFR SERPL CREATININE-BSD FRML MDRD: 61 ML/MIN/1.73SQ M
GLUCOSE P FAST SERPL-MCNC: 162 MG/DL (ref 65–99)
HCT VFR BLD AUTO: 43.2 % (ref 34.8–46.1)
HDLC SERPL-MCNC: 61 MG/DL
HGB BLD-MCNC: 13.4 G/DL (ref 11.5–15.4)
IMM GRANULOCYTES # BLD AUTO: 0.05 THOUSAND/UL (ref 0–0.2)
IMM GRANULOCYTES NFR BLD AUTO: 1 % (ref 0–2)
LDLC SERPL CALC-MCNC: 66 MG/DL (ref 0–100)
LYMPHOCYTES # BLD AUTO: 2.48 THOUSANDS/ΜL (ref 0.6–4.47)
LYMPHOCYTES NFR BLD AUTO: 22 % (ref 14–44)
MCH RBC QN AUTO: 30 PG (ref 26.8–34.3)
MCHC RBC AUTO-ENTMCNC: 31 G/DL (ref 31.4–37.4)
MCV RBC AUTO: 97 FL (ref 82–98)
MONOCYTES # BLD AUTO: 0.66 THOUSAND/ΜL (ref 0.17–1.22)
MONOCYTES NFR BLD AUTO: 6 % (ref 4–12)
NEUTROPHILS # BLD AUTO: 7.8 THOUSANDS/ΜL (ref 1.85–7.62)
NEUTS SEG NFR BLD AUTO: 70 % (ref 43–75)
NONHDLC SERPL-MCNC: 92 MG/DL
NRBC BLD AUTO-RTO: 0 /100 WBCS
PLATELET # BLD AUTO: 348 THOUSANDS/UL (ref 149–390)
PMV BLD AUTO: 10.4 FL (ref 8.9–12.7)
POTASSIUM SERPL-SCNC: 4.7 MMOL/L (ref 3.5–5.3)
PROT SERPL-MCNC: 7.4 G/DL (ref 6.4–8.2)
RBC # BLD AUTO: 4.46 MILLION/UL (ref 3.81–5.12)
SODIUM SERPL-SCNC: 138 MMOL/L (ref 136–145)
TRIGL SERPL-MCNC: 130 MG/DL
TSH SERPL DL<=0.05 MIU/L-ACNC: 1.55 UIU/ML (ref 0.36–3.74)
WBC # BLD AUTO: 11.05 THOUSAND/UL (ref 4.31–10.16)

## 2021-09-24 PROCEDURE — 85025 COMPLETE CBC W/AUTO DIFF WBC: CPT

## 2021-09-24 PROCEDURE — 84443 ASSAY THYROID STIM HORMONE: CPT

## 2021-09-24 PROCEDURE — 80053 COMPREHEN METABOLIC PANEL: CPT

## 2021-09-24 PROCEDURE — 80061 LIPID PANEL: CPT

## 2021-10-28 DIAGNOSIS — E13.9 DIABETES 1.5, MANAGED AS TYPE 2 (HCC): ICD-10-CM

## 2021-10-28 DIAGNOSIS — L21.9 SEBORRHEIC DERMATITIS OF SCALP: ICD-10-CM

## 2021-10-28 RX ORDER — BLOOD SUGAR DIAGNOSTIC
1 STRIP MISCELLANEOUS 2 TIMES DAILY
Qty: 100 STRIP | Refills: 5 | Status: SHIPPED | OUTPATIENT
Start: 2021-10-28

## 2021-10-28 RX ORDER — KETOCONAZOLE 20 MG/ML
1 SHAMPOO TOPICAL 2 TIMES WEEKLY
Qty: 120 ML | Refills: 2 | Status: SHIPPED | OUTPATIENT
Start: 2021-10-28

## 2021-10-28 RX ORDER — CLOBETASOL PROPIONATE 0.46 MG/ML
SOLUTION TOPICAL
Qty: 50 ML | Refills: 2 | Status: SHIPPED | OUTPATIENT
Start: 2021-10-28

## 2021-11-24 ENCOUNTER — APPOINTMENT (OUTPATIENT)
Dept: LAB | Facility: CLINIC | Age: 66
End: 2021-11-24
Payer: COMMERCIAL

## 2021-12-02 ENCOUNTER — OFFICE VISIT (OUTPATIENT)
Dept: FAMILY MEDICINE CLINIC | Facility: CLINIC | Age: 66
End: 2021-12-02
Payer: COMMERCIAL

## 2021-12-02 VITALS
BODY MASS INDEX: 43.23 KG/M2 | WEIGHT: 220.2 LBS | SYSTOLIC BLOOD PRESSURE: 132 MMHG | DIASTOLIC BLOOD PRESSURE: 80 MMHG | TEMPERATURE: 98.1 F | RESPIRATION RATE: 18 BRPM | HEIGHT: 60 IN | OXYGEN SATURATION: 99 % | HEART RATE: 92 BPM

## 2021-12-02 DIAGNOSIS — E66.01 MORBID OBESITY WITH BMI OF 40.0-44.9, ADULT (HCC): ICD-10-CM

## 2021-12-02 DIAGNOSIS — I10 ESSENTIAL HYPERTENSION: ICD-10-CM

## 2021-12-02 DIAGNOSIS — E11.9 TYPE 2 DIABETES MELLITUS WITHOUT COMPLICATION, WITHOUT LONG-TERM CURRENT USE OF INSULIN (HCC): Primary | ICD-10-CM

## 2021-12-02 DIAGNOSIS — F31.60 BIPOLAR AFFECTIVE DISORDER, CURRENT EPISODE MIXED, CURRENT EPISODE SEVERITY UNSPECIFIED (HCC): ICD-10-CM

## 2021-12-02 DIAGNOSIS — Z23 ENCOUNTER FOR IMMUNIZATION: ICD-10-CM

## 2021-12-02 DIAGNOSIS — E03.9 ACQUIRED HYPOTHYROIDISM: ICD-10-CM

## 2021-12-02 LAB — SL AMB POCT HEMOGLOBIN AIC: 6.8 (ref ?–6.5)

## 2021-12-02 PROCEDURE — G0009 ADMIN PNEUMOCOCCAL VACCINE: HCPCS | Performed by: FAMILY MEDICINE

## 2021-12-02 PROCEDURE — 90670 PCV13 VACCINE IM: CPT

## 2021-12-02 PROCEDURE — 99214 OFFICE O/P EST MOD 30 MIN: CPT | Performed by: FAMILY MEDICINE

## 2022-01-18 DIAGNOSIS — E11.9 TYPE 2 DIABETES MELLITUS WITHOUT COMPLICATION, WITHOUT LONG-TERM CURRENT USE OF INSULIN (HCC): ICD-10-CM

## 2022-01-18 DIAGNOSIS — I10 ESSENTIAL HYPERTENSION: ICD-10-CM

## 2022-01-18 RX ORDER — LISINOPRIL 5 MG/1
5 TABLET ORAL DAILY
Qty: 30 TABLET | Refills: 5 | Status: SHIPPED | OUTPATIENT
Start: 2022-01-18

## 2022-02-15 DIAGNOSIS — E13.9 DIABETES 1.5, MANAGED AS TYPE 2 (HCC): ICD-10-CM

## 2022-02-15 RX ORDER — LINAGLIPTIN 5 MG/1
5 TABLET, FILM COATED ORAL DAILY
Qty: 90 TABLET | Refills: 1 | Status: SHIPPED | OUTPATIENT
Start: 2022-02-15

## 2022-02-17 DIAGNOSIS — E03.9 HYPOTHYROIDISM, UNSPECIFIED TYPE: ICD-10-CM

## 2022-02-17 RX ORDER — LEVOTHYROXINE SODIUM 112 UG/1
112 TABLET ORAL DAILY
Qty: 90 TABLET | Refills: 1 | Status: SHIPPED | OUTPATIENT
Start: 2022-02-17

## 2022-02-18 ENCOUNTER — RA CDI HCC (OUTPATIENT)
Dept: OTHER | Facility: HOSPITAL | Age: 67
End: 2022-02-18

## 2022-02-18 NOTE — PROGRESS NOTES
[facility]  coding opportunities       Chart reviewed, no opportunity found: CHART REVIEWED, NO OPPORTUNITY FOUND                        Patients insurance company: [facility]

## 2022-03-03 ENCOUNTER — OFFICE VISIT (OUTPATIENT)
Dept: FAMILY MEDICINE CLINIC | Facility: CLINIC | Age: 67
End: 2022-03-03
Payer: COMMERCIAL

## 2022-03-03 VITALS
HEART RATE: 94 BPM | BODY MASS INDEX: 42.62 KG/M2 | OXYGEN SATURATION: 99 % | TEMPERATURE: 98.2 F | WEIGHT: 217.1 LBS | HEIGHT: 60 IN | SYSTOLIC BLOOD PRESSURE: 132 MMHG | RESPIRATION RATE: 18 BRPM | DIASTOLIC BLOOD PRESSURE: 82 MMHG

## 2022-03-03 DIAGNOSIS — G47.00 INSOMNIA, UNSPECIFIED TYPE: Primary | ICD-10-CM

## 2022-03-03 DIAGNOSIS — E11.9 TYPE 2 DIABETES MELLITUS WITHOUT COMPLICATION, WITHOUT LONG-TERM CURRENT USE OF INSULIN (HCC): ICD-10-CM

## 2022-03-03 LAB — SL AMB POCT HEMOGLOBIN AIC: 6.4 (ref ?–6.5)

## 2022-03-03 PROCEDURE — 99213 OFFICE O/P EST LOW 20 MIN: CPT | Performed by: FAMILY MEDICINE

## 2022-03-03 RX ORDER — LITHIUM CARBONATE 150 MG/1
150 CAPSULE ORAL 2 TIMES DAILY WITH MEALS
COMMUNITY
Start: 2022-02-03

## 2022-03-03 RX ORDER — HYDROXYZINE PAMOATE 50 MG/1
50 CAPSULE ORAL
Qty: 30 CAPSULE | Refills: 0 | OUTPATIENT
Start: 2022-03-03 | End: 2022-04-26

## 2022-03-03 NOTE — PROGRESS NOTES
Assessment/Plan:     Diagnoses and all orders for this visit:    Insomnia, unspecified type  -     hydrOXYzine pamoate (VISTARIL) 50 mg capsule; Take 1 capsule (50 mg total) by mouth daily at bedtime as needed (sleep)    Type 2 diabetes mellitus without complication, without long-term current use of insulin (HCC)  -     POCT hemoglobin A1c    Other orders  -     lithium carbonate 150 mg capsule; Take 150 mg by mouth 2 (two) times a day with meals Take 2 capsules in the morning and 1 capsule at bedtime          - Will trial vistaril qhs for insomnia  Patient was encouraged to schedule follow up with her psychiatrist     Return in about 1 month (around 4/3/2022) for Medicare Annual Wellness Visit  Subjective:        Patient ID: Zara Collier is a 73 y o  female  Chief Complaint   Patient presents with    Insomnia     trouble falling asleep for last week or so       Clemente Bear is a 73year old female with history of HLD, hypothyroidism, type 2 DM, and bipolar disorder, presenting with concern for insomnia  Patient reports trouble falling asleep for the past week  She has tried OTC cold and flu medication and camomile tea without improvement  Denies any associated symptoms      Patient follows with [facility] every 3 months for bipolar management, currently taking lithium and doxepin  Lithium dose was recently changed  She is not sure when her next follow up is        The following portions of the patient's history were reviewed and updated as appropriate: allergies, current medications, past family history, past medical history, past social history, past surgical history and problem list     Patient Active Problem List   Diagnosis    Bipolar disorder ([facility] )    Constipation    Hypercholesterolemia    Hypothyroidism    Type 2 diabetes mellitus without complication, without long-term current use of insulin ([facility] )    Morbid obesity with BMI of 40 0-44 9, adult ([facility] )    Postmenopausal    Atypical squamous cells of undetermined significance on cytologic smear of cervix ([facility])    Essential hypertension       Current Outpatient Medications   Medication Sig Dispense Refill    aspirin (ECOTRIN LOW STRENGTH) 81 mg EC tablet Take 81 mg by mouth daily      doxepin (SINEquan) 75 MG capsule Take 100 mg by mouth daily at bedtime 100mg daily      glucose blood (Contour Next Test) test strip Use 1 each 2 (two) times a day Use as instructed 100 strip 5    glucose blood test strip 1 each by Other route as needed Use as instructed      levothyroxine 112 mcg tablet Take 1 tablet (112 mcg total) by mouth daily 90 tablet 1    linaGLIPtin (Tradjenta) 5 MG TABS Take 5 mg by mouth daily 90 tablet 1    lisinopril (ZESTRIL) 5 mg tablet Take 1 tablet (5 mg total) by mouth daily 30 tablet 5    lithium carbonate 150 mg capsule Take 150 mg by mouth 2 (two) times a day with meals Take 2 capsules in the morning and 1 capsule at bedtime   pravastatin (PRAVACHOL) 40 mg tablet Take 1 tablet (40 mg total) by mouth daily 90 tablet 3    clobetasol (TEMOVATE) 0 05 % external solution Apply to dry scalp daily for up to 4 weeks  Leave shampoo on the scalp x 15 minutes, then rinse  (Patient not taking: Reported on 3/3/2022 ) 50 mL 2    hydrOXYzine pamoate (VISTARIL) 50 mg capsule Take 1 capsule (50 mg total) by mouth daily at bedtime as needed (sleep) 30 capsule 0    ketoconazole (NIZORAL) 2 % shampoo Apply 1 application topically 2 (two) times a week (Patient not taking: Reported on 3/3/2022 ) 120 mL 2     No current facility-administered medications for this visit          Past Medical History:   Diagnosis Date    Allergic     Depression     Disease of thyroid gland     GERD (gastroesophageal reflux disease)     Hyperchloremia         Past Surgical History:   Procedure Laterality Date    ANKLE SURGERY      Incision    DILATION AND CURETTAGE OF UTERUS          Social History     Socioeconomic History    Marital status: Single     Spouse name: Not on file    Number of children: Not on file    Years of education: Not on file    Highest education level: Not on file   Occupational History    Not on file   Tobacco Use    Smoking status: Never Smoker    Smokeless tobacco: Never Used   Vaping Use    Vaping Use: Never used   Substance and Sexual Activity    Alcohol use: No    Drug use: No    Sexual activity: Not Currently   Other Topics Concern    Not on file   Social History Narrative    Not on file     Social Determinants of Health     Financial Resource Strain: Not on file   Food Insecurity: Not on file   Transportation Needs: Not on file   Physical Activity: Not on file   Stress: Not on file   Social Connections: Not on file   Intimate Partner Violence: Not on file   Housing Stability: Not on file        Review of Systems   Constitutional: Negative for chills, diaphoresis and fever  Respiratory: Negative for cough, chest tightness, shortness of breath and wheezing  Cardiovascular: Negative for chest pain, palpitations and leg swelling  Gastrointestinal: Negative for abdominal pain, constipation, diarrhea, nausea and vomiting  Skin: Negative for rash and wound  Neurological: Negative for dizziness, syncope, weakness, light-headedness and headaches  Psychiatric/Behavioral: Positive for sleep disturbance  Negative for dysphoric mood, self-injury and suicidal ideas  The patient is not nervous/anxious  Objective:      /82 (BP Location: Left arm, Patient Position: Sitting, Cuff Size: Large)   Pulse 94   Temp 98 2 °F (36 8 °C) (Temporal)   Resp 18   Ht 5' (1 524 m)   Wt 98 5 kg (217 lb 1 6 oz)   SpO2 99%   BMI 42 40 kg/m²          Physical Exam  Vitals and nursing note reviewed  Constitutional:       General: She is not in acute distress  Appearance: Normal appearance  She is obese  Eyes:      Extraocular Movements: Extraocular movements intact        Conjunctiva/sclera: Conjunctivae normal       Pupils: Pupils are equal, round, and reactive to light  Cardiovascular:      Rate and Rhythm: Normal rate and regular rhythm  Heart sounds: Normal heart sounds  No murmur heard  Pulmonary:      Effort: Pulmonary effort is normal  No respiratory distress  Breath sounds: Normal breath sounds  No wheezing  Musculoskeletal:      Cervical back: Normal range of motion  Right lower leg: No edema  Left lower leg: No edema  Skin:     General: Skin is warm and dry  Neurological:      General: No focal deficit present  Mental Status: She is alert and oriented to person, place, and time  Cranial Nerves: No cranial nerve deficit  Motor: No weakness     Psychiatric:         Mood and Affect: Mood normal          Behavior: Behavior normal

## 2022-03-03 NOTE — PATIENT INSTRUCTIONS
Tips for Healthy Sleep   AMBULATORY CARE:   What you need to know about healthy sleep:  Healthy sleep, or sleep hygiene, is important to your physical, mental, and emotional health  Sleep affects almost every part of your body, including your brain, heart, metabolism, immune system, and mood  Good sleep habits can help you fall asleep and stay asleep during the night  Poor quality sleep or a long-term lack of sleep increases your risk for certain disorders  These include high blood pressure, cardiovascular disease, obesity, depression, and diabetes  What you need to know about sleep stages: The 2 main kinds of sleep are rapid eye movement (REM) and non-REM  You cycle through REM and non-REM many times during the night  · Stage 1 non-REM sleep  is when you first fall asleep  This stage is short  Your brain waves slow and your body relaxes  · Stage 2 non-REM sleep  is a period of light sleep before you move into deeper sleep  You spend the most time in this stage during the night  Your body temperature drops and your body relaxes even more  · Stage 3 non-REM sleep  is a period of deep sleep that starts after stage 2  This stage is needed to feel refreshed in the morning  · REM sleep  starts about 90 minutes after you fall asleep  Your eyes move from side to side  Your heart rate, blood pressure, and breathing become faster  Most of your dreams occur during REM sleep  As you age, you spend less time in REM sleep  How much sleep you need:  Each person needs a different amount of sleep  Your sleep patterns and needs change as you age  In general, school-aged children and teenagers need about 9 to 10 hours of sleep a night  Most adults need about 7 to 9 hours of sleep a night  After age 61 years, sleep may be lighter and for less time, with more periods of wakefulness  Older adults may fall asleep and wake up earlier than they did at a younger age   Medicines or conditions, such as chronic pain, may keep older adults awake  How to know you are getting healthy sleep:   · Keep a 2-week log of your sleep  Write down what time you got up, what you did that day, and anything else that could affect your sleep  Keep a record of your sleep patterns, and any sleeping problems you have  Bring the record to your follow-up visits  · Ask someone who lives with you if they notice anything about your sleep  For example, maybe you snore loudly or stop breathing for short periods  Tell your healthcare provider if your legs twitch or you feel like you can't keep them still  Your healthcare provider may refer to you a sleep specialist or cognitive behavioral therapy  Call your doctor if:   · Someone has told you that you stop breathing when you sleep  · Your legs twitch and it keeps you from sleeping  · You begin to use drugs or alcohol to fall asleep  · You have questions or concerns about your sleep habits  How to improve your sleep:  Your daily routines influence your sleep  Nutrition, medicines, your schedule, and what you do in the evenings can affect your sleep  Do the following to help improve your sleep:  · Create a sleep schedule  Go to sleep and wake up at the same time every day  Be consistent, even on weekends or when you travel  Do not go to bed unless you are sleepy  · Set up a calming routine before bed  Soak in a warm bath, listen to relaxing music, or read a book  · Turn off all electronics at least 30 minutes before bedtime  Try not to watch television or use any electronics in the bedroom  · Limit naps to 20 or 30 minutes, earlier in the day  Naps could make it hard for you to fall asleep at bedtime  · Keep your bedroom cool, quiet, and dark  Turn on white noise, such as a fan, to help you relax  · Get up if you do not fall asleep within 20 minutes  Move to another room and do something relaxing until you become sleepy      · Limit caffeine, alcohol, and food to earlier in the day  Only drink caffeine in the morning  Do not drink alcohol within 6 hours of bedtime  Do not eat a heavy meal right before you go to bed  Limit how much liquid you drink in the evenings and right before bed  If you are prescribed diuretics, or water pills, take them early in the day  · Exercise regularly  Daily exercise may help you sleep better  Do not exercise within 3 hours of bedtime  Follow up with your doctor as directed:  Bring your sleep log with you  Write down your questions so you remember to ask them during your visits  © Copyright [employer] 2022 Information is for End User's use only and may not be sold, redistributed or otherwise used for commercial purposes  All illustrations and images included in CareNotes® are the copyrighted property of [employer]  or [facility]   The above information is an  only  It is not intended as medical advice for individual conditions or treatments  Talk to your doctor, nurse or pharmacist before following any medical regimen to see if it is safe and effective for you

## 2022-03-10 ENCOUNTER — APPOINTMENT (OUTPATIENT)
Dept: LAB | Facility: CLINIC | Age: 67
End: 2022-03-10
Payer: COMMERCIAL

## 2022-03-23 ENCOUNTER — OFFICE VISIT (OUTPATIENT)
Dept: FAMILY MEDICINE CLINIC | Facility: CLINIC | Age: 67
End: 2022-03-23
Payer: COMMERCIAL

## 2022-03-23 VITALS
DIASTOLIC BLOOD PRESSURE: 84 MMHG | TEMPERATURE: 97.9 F | OXYGEN SATURATION: 99 % | WEIGHT: 219.4 LBS | HEART RATE: 88 BPM | RESPIRATION RATE: 18 BRPM | SYSTOLIC BLOOD PRESSURE: 144 MMHG | HEIGHT: 60 IN | BODY MASS INDEX: 43.07 KG/M2

## 2022-03-23 DIAGNOSIS — Z00.00 MEDICARE ANNUAL WELLNESS VISIT, SUBSEQUENT: Primary | ICD-10-CM

## 2022-03-23 DIAGNOSIS — Z13.820 ENCOUNTER FOR OSTEOPOROSIS SCREENING IN ASYMPTOMATIC POSTMENOPAUSAL PATIENT: ICD-10-CM

## 2022-03-23 DIAGNOSIS — Z78.0 ENCOUNTER FOR OSTEOPOROSIS SCREENING IN ASYMPTOMATIC POSTMENOPAUSAL PATIENT: ICD-10-CM

## 2022-03-23 DIAGNOSIS — E78.00 HYPERCHOLESTEROLEMIA: ICD-10-CM

## 2022-03-23 DIAGNOSIS — E66.01 MORBID OBESITY WITH BMI OF 40.0-44.9, ADULT (HCC): ICD-10-CM

## 2022-03-23 DIAGNOSIS — E55.9 VITAMIN D DEFICIENCY: ICD-10-CM

## 2022-03-23 PROCEDURE — G0439 PPPS, SUBSEQ VISIT: HCPCS | Performed by: FAMILY MEDICINE

## 2022-03-23 RX ORDER — ERGOCALCIFEROL 1.25 MG/1
50000 CAPSULE ORAL WEEKLY
Qty: 12 CAPSULE | Refills: 0 | Status: SHIPPED | OUTPATIENT
Start: 2022-03-23

## 2022-03-23 RX ORDER — PRAVASTATIN SODIUM 40 MG
40 TABLET ORAL DAILY
Qty: 90 TABLET | Refills: 3 | Status: SHIPPED | OUTPATIENT
Start: 2022-03-23

## 2022-03-23 NOTE — PATIENT INSTRUCTIONS
Medicare Preventive Visit Patient Instructions  Thank you for completing your Welcome to Medicare Visit or Medicare Annual Wellness Visit today  Your next wellness visit will be due in one year (3/24/2023)  The screening/preventive services that you may require over the next 5-10 years are detailed below  Some tests may not apply to you based off risk factors and/or age  Screening tests ordered at today's visit but not completed yet may show as past due  Also, please note that scanned in results may not display below  Preventive Screenings:  Service Recommendations Previous Testing/Comments   Colorectal Cancer Screening  * Colonoscopy    * Fecal Occult Blood Test (FOBT)/Fecal Immunochemical Test (FIT)  * Fecal DNA/Cologuard Test  * Flexible Sigmoidoscopy Age: 54-65 years old   Colonoscopy: every 10 years (may be performed more frequently if at higher risk)  OR  FOBT/FIT: every 1 year  OR  Cologuard: every 3 years  OR  Sigmoidoscopy: every 5 years  Screening may be recommended earlier than age 48 if at higher risk for colorectal cancer  Also, an individualized decision between you and your healthcare provider will decide whether screening between the ages of 74-80 would be appropriate  Colonoscopy: Not on file  FOBT/FIT: Not on file  Cologuard: 11/13/2019  Sigmoidoscopy: Not on file    Screening Current     Breast Cancer Screening Age: 36 years old  Frequency: every 1-2 years  Not required if history of left and right mastectomy Mammogram: 05/10/2021    Screening Current   Cervical Cancer Screening Between the ages of 21-29, pap smear recommended once every 3 years  Between the ages of 33-67, can perform pap smear with HPV co-testing every 5 years     Recommendations may differ for women with a history of total hysterectomy, cervical cancer, or abnormal pap smears in past  Pap Smear: 12/11/2019    Screening Not Indicated   Hepatitis C Screening Once for adults born between 1945 and 1965  More frequently in patients at high risk for Hepatitis C Hep C Antibody: 10/21/2019    Screening Current   Diabetes Screening 1-2 times per year if you're at risk for diabetes or have pre-diabetes Fasting glucose: 160 mg/dL   A1C: 6 5 %    Screening Not Indicated  History Diabetes   Cholesterol Screening Once every 5 years if you don't have a lipid disorder  May order more often based on risk factors  Lipid panel: 03/10/2022    Screening Not Indicated  History Lipid Disorder     Other Preventive Screenings Covered by Medicare:  1  Abdominal Aortic Aneurysm (AAA) Screening: covered once if your at risk  You're considered to be at risk if you have a family history of AAA  2  Lung Cancer Screening: covers low dose CT scan once per year if you meet all of the following conditions: (1) Age 50-69; (2) No signs or symptoms of lung cancer; (3) Current smoker or have quit smoking within the last 15 years; (4) You have a tobacco smoking history of at least 30 pack years (packs per day multiplied by number of years you smoked); (5) You get a written order from a healthcare provider  3  Glaucoma Screening: covered annually if you're considered high risk: (1) You have diabetes OR (2) Family history of glaucoma OR (3)  aged 48 and older OR (3)  American aged 72 and older  3  Osteoporosis Screening: covered every 2 years if you meet one of the following conditions: (1) You're estrogen deficient and at risk for osteoporosis based off medical history and other findings; (2) Have a vertebral abnormality; (3) On glucocorticoid therapy for more than 3 months; (4) Have primary hyperparathyroidism; (5) On osteoporosis medications and need to assess response to drug therapy  · Last bone density test (DXA Scan): 11/26/2019   5  HIV Screening: covered annually if you're between the age of 15-65  Also covered annually if you are younger than 13 and older than 72 with risk factors for HIV infection   For pregnant patients, it is covered up to 3 times per pregnancy  Immunizations:  Immunization Recommendations   Influenza Vaccine Annual influenza vaccination during flu season is recommended for all persons aged >= 6 months who do not have contraindications   Pneumococcal Vaccine (Prevnar and Pneumovax)  * Prevnar = PCV13  * Pneumovax = PPSV23   Adults 25-60 years old: 1-3 doses may be recommended based on certain risk factors  Adults 72 years old: Prevnar (PCV13) vaccine recommended followed by Pneumovax (PPSV23) vaccine  If already received PPSV23 since turning 65, then PCV13 recommended at least one year after PPSV23 dose  Hepatitis B Vaccine 3 dose series if at intermediate or high risk (ex: diabetes, end stage renal disease, liver disease)   Tetanus (Td) Vaccine - COST NOT COVERED BY MEDICARE PART B Following completion of primary series, a booster dose should be given every 10 years to maintain immunity against tetanus  Td may also be given as tetanus wound prophylaxis  Tdap Vaccine - COST NOT COVERED BY MEDICARE PART B Recommended at least once for all adults  For pregnant patients, recommended with each pregnancy  Shingles Vaccine (Shingrix) - COST NOT COVERED BY MEDICARE PART B  2 shot series recommended in those aged 48 and above     Health Maintenance Due:      Topic Date Due    HIV Screening  Never done    DXA SCAN  11/26/2021    Colorectal Cancer Screening  11/13/2022    Breast Cancer Screening: Mammogram  05/10/2023    Hepatitis C Screening  Completed     Immunizations Due:  There are no preventive care reminders to display for this patient  Advance Directives   What are advance directives? Advance directives are legal documents that state your wishes and plans for medical care  These plans are made ahead of time in case you lose your ability to make decisions for yourself  Advance directives can apply to any medical decision, such as the treatments you want, and if you want to donate organs     What are the types of advance directives? There are many types of advance directives, and each state has rules about how to use them  You may choose a combination of any of the following:  · Living will: This is a written record of the treatment you want  You can also choose which treatments you do not want, which to limit, and which to stop at a certain time  This includes surgery, medicine, IV fluid, and tube feedings  · Durable power of  for healthcare [facility]): This is a written record that states who you want to make healthcare choices for you when you are unable to make them for yourself  This person, called a proxy, is usually a family member or a friend  You may choose more than 1 proxy  · Do not resuscitate (DNR) order:  A DNR order is used in case your heart stops beating or you stop breathing  It is a request not to have certain forms of treatment, such as CPR  A DNR order may be included in other types of advance directives  · Medical directive: This covers the care that you want if you are in a coma, near death, or unable to make decisions for yourself  You can list the treatments you want for each condition  Treatment may include pain medicine, surgery, blood transfusions, dialysis, IV or tube feedings, and a ventilator (breathing machine)  · Values history: This document has questions about your views, beliefs, and how you feel and think about life  This information can help others choose the care that you would choose  Why are advance directives important? An advance directive helps you control your care  Although spoken wishes may be used, it is better to have your wishes written down  Spoken wishes can be misunderstood, or not followed  Treatments may be given even if you do not want them  An advance directive may make it easier for your family to make difficult choices about your care     Weight Management   Why it is important to manage your weight:  Being overweight increases your risk of health conditions such as heart disease, high blood pressure, type 2 diabetes, and certain types of cancer  It can also increase your risk for osteoarthritis, sleep apnea, and other respiratory problems  Aim for a slow, steady weight loss  Even a small amount of weight loss can lower your risk of health problems  How to lose weight safely:  A safe and healthy way to lose weight is to eat fewer calories and get regular exercise  You can lose up about 1 pound a week by decreasing the number of calories you eat by 500 calories each day  Healthy meal plan for weight management:  A healthy meal plan includes a variety of foods, contains fewer calories, and helps you stay healthy  A healthy meal plan includes the following:  · Eat whole-grain foods more often  A healthy meal plan should contain fiber  Fiber is the part of grains, fruits, and vegetables that is not broken down by your body  Whole-grain foods are healthy and provide extra fiber in your diet  Some examples of whole-grain foods are whole-wheat breads and pastas, oatmeal, brown rice, and bulgur  · Eat a variety of vegetables every day  Include dark, leafy greens such as spinach, kale, juaquin greens, and mustard greens  Eat yellow and orange vegetables such as carrots, sweet potatoes, and winter squash  · Eat a variety of fruits every day  Choose fresh or canned fruit (canned in its own juice or light syrup) instead of juice  Fruit juice has very little or no fiber  · Eat low-fat dairy foods  Drink fat-free (skim) milk or 1% milk  Eat fat-free yogurt and low-fat cottage cheese  Try low-fat cheeses such as mozzarella and other reduced-fat cheeses  · Choose meat and other protein foods that are low in fat  Choose beans or other legumes such as split peas or lentils  Choose fish, skinless poultry (chicken or turkey), or lean cuts of red meat (beef or pork)  Before you cook meat or poultry, cut off any visible fat  · Use less fat and oil    Try baking foods instead of frying them  Add less fat, such as margarine, sour cream, regular salad dressing and mayonnaise to foods  Eat fewer high-fat foods  Some examples of high-fat foods include french fries, doughnuts, ice cream, and cakes  · Eat fewer sweets  Limit foods and drinks that are high in sugar  This includes candy, cookies, regular soda, and sweetened drinks  Exercise:  Exercise at least 30 minutes per day on most days of the week  Some examples of exercise include walking, biking, dancing, and swimming  You can also fit in more physical activity by taking the stairs instead of the elevator or parking farther away from stores  Ask your healthcare provider about the best exercise plan for you  © Copyright "[employer] 2018 Information is for End User's use only and may not be sold, redistributed or otherwise used for commercial purposes   All illustrations and images included in CareNotes® are the copyrighted property of [employer]  or [address] [place] [address]

## 2022-03-23 NOTE — PROGRESS NOTES
Assessment and Plan:     Problem List Items Addressed This Visit        Other    Hypercholesterolemia    Relevant Medications    pravastatin (PRAVACHOL) 40 mg tablet    Morbid obesity with BMI of 40 0-44 9, adult (HCC)    Vitamin D deficiency    Relevant Medications    ergocalciferol (VITAMIN D2) 50,000 units      Other Visit Diagnoses     Medicare annual wellness visit, subsequent    -  Primary    Encounter for osteoporosis screening in asymptomatic postmenopausal patient        Relevant Orders    DXA bone density spine hip and pelvis        BMI Counseling: Body mass index is 42 85 kg/m²  The BMI is above normal  Nutrition recommendations include decreasing portion sizes, encouraging healthy choices of fruits and vegetables, decreasing fast food intake, consuming healthier snacks, limiting drinks that contain sugar, moderation in carbohydrate intake, increasing intake of lean protein, reducing intake of saturated and trans fat and reducing intake of cholesterol  Exercise recommendations include moderate physical activity 150 minutes/week, vigorous physical activity 75 minutes/week, exercising 3-5 times per week, obtaining a gym membership and strength training exercises  No pharmacotherapy was ordered  Rationale for BMI follow-up plan is due to patient being overweight or obese  Preventive health issues were discussed with patient, and age appropriate screening tests were ordered as noted in patient's After Visit Summary  Personalized health advice and appropriate referrals for health education or preventive services given if needed, as noted in patient's After Visit Summary       History of Present Illness:     Patient presents for Medicare Annual Wellness visit    Patient Care Team:  Frank Crump PA-C as PCP - General (Family Medicine)  Nory Hutchison MD as PCP - [address] [place] [address] (RTE)  [facility]     Problem List:     Patient Active Problem List   Diagnosis  Bipolar disorder ([facility] [address] )    Constipation    Hypercholesterolemia    Hypothyroidism    Type 2 diabetes mellitus without complication, without long-term current use of insulin ([facility])    Morbid obesity with BMI of 40 0-44 9, adult ([facility])    Postmenopausal    Atypical squamous cells of undetermined significance on cytologic smear of cervix ([facility])    Essential hypertension    Vitamin D deficiency      Past Medical and Surgical History:     Past Medical History:   Diagnosis Date    Allergic     Depression     Disease of thyroid gland     GERD (gastroesophageal reflux disease)     Hyperchloremia      Past Surgical History:   Procedure Laterality Date    ANKLE SURGERY      Incision    DILATION AND CURETTAGE OF UTERUS        Family History:     Family History   Problem Relation Age of Onset    No Known Problems Family     Diabetes Mother     Heart disease Mother     Stroke Mother     Diabetes Father     Stroke Father     No Known Problems Maternal Grandmother     No Known Problems Maternal Grandfather     No Known Problems Paternal Grandmother     No Known Problems Paternal Grandfather     Diabetes Brother     No Known Problems Paternal Aunt       Social History:     Social History     Socioeconomic History    Marital status: Single     Spouse name: None    Number of children: None    Years of education: None    Highest education level: None   Occupational History    None   Tobacco Use    Smoking status: Never Smoker    Smokeless tobacco: Never Used   Vaping Use    Vaping Use: Never used   Substance and Sexual Activity    Alcohol use: No    Drug use: No    Sexual activity: Not Currently   Other Topics Concern    None   Social History Narrative    None     Social Determinants of Health     Financial Resource Strain: Not on file   Food Insecurity: Not on file   Transportation Needs: Not on file   Physical Activity: Not on file   Stress: Not on file   Social Connections: Not on file Intimate Partner Violence: Not on file   Housing Stability: Not on file      Medications and Allergies:     Current Outpatient Medications   Medication Sig Dispense Refill    aspirin (ECOTRIN LOW STRENGTH) 81 mg EC tablet Take 81 mg by mouth daily      doxepin (SINEquan) 75 MG capsule Take 100 mg by mouth daily at bedtime 100mg daily      glucose blood (Contour Next Test) test strip Use 1 each 2 (two) times a day Use as instructed 100 strip 5    glucose blood test strip 1 each by Other route as needed Use as instructed      levothyroxine 112 mcg tablet Take 1 tablet (112 mcg total) by mouth daily 90 tablet 1    linaGLIPtin (Tradjenta) 5 MG TABS Take 5 mg by mouth daily 90 tablet 1    lisinopril (ZESTRIL) 5 mg tablet Take 1 tablet (5 mg total) by mouth daily 30 tablet 5    lithium carbonate 150 mg capsule Take 150 mg by mouth 2 (two) times a day with meals Take 2 capsules in the morning and 1 capsule at bedtime   pravastatin (PRAVACHOL) 40 mg tablet Take 1 tablet (40 mg total) by mouth daily 90 tablet 3    clobetasol (TEMOVATE) 0 05 % external solution Apply to dry scalp daily for up to 4 weeks  Leave shampoo on the scalp x 15 minutes, then rinse  (Patient not taking: Reported on 3/3/2022 ) 50 mL 2    ergocalciferol (VITAMIN D2) 50,000 units Take 1 capsule (50,000 Units total) by mouth once a week 12 capsule 0    hydrOXYzine pamoate (VISTARIL) 50 mg capsule Take 1 capsule (50 mg total) by mouth daily at bedtime as needed (sleep) (Patient not taking: Reported on 3/23/2022 ) 30 capsule 0    ketoconazole (NIZORAL) 2 % shampoo Apply 1 application topically 2 (two) times a week (Patient not taking: Reported on 3/3/2022 ) 120 mL 2     No current facility-administered medications for this visit       No Known Allergies   Immunizations:     Immunization History   Administered Date(s) Administered    COVID-19 MODERNA VACC 0 5 ML IM 03/09/2021, 04/06/2021, 10/28/2021    INFLUENZA 09/29/2015, 10/25/2016, 09/22/2017, 10/04/2018, 10/19/2021    Influenza Injectable, MDCK, Preservative Free, Quadrivalent 10/20/2020    Influenza, recombinant, quadrivalent,injectable, preservative free 10/18/2019    Pneumococcal Polysaccharide PPV23 12/02/2021      Health Maintenance:         Topic Date Due    HIV Screening  Never done    DXA SCAN  11/26/2021    Colorectal Cancer Screening  11/13/2022    Breast Cancer Screening: Mammogram  05/10/2023    Hepatitis C Screening  Completed     There are no preventive care reminders to display for this patient  Medicare Health Risk Assessment:     /84 (BP Location: Left arm, Patient Position: Sitting, Cuff Size: Adult)   Pulse 88   Temp 97 9 °F (36 6 °C) (Temporal)   Resp 18   Ht 5' (1 524 m)   Wt 99 5 kg (219 lb 6 4 oz)   SpO2 99%   BMI 42 85 kg/m²      Shilo Wisdom is here for her Subsequent Wellness visit  Health Risk Assessment:   Patient rates overall health as good  Patient feels that their physical health rating is same  Patient is satisfied with their life  Eyesight was rated as same  Hearing was rated as same  Patient feels that their emotional and mental health rating is same  Patients states they are never, rarely angry  Patient states they are often unusually tired/fatigued  Pain experienced in the last 7 days has been none  Patient states that she has experienced no weight loss or gain in last 6 months  Depression Screening:   PHQ-2 Score: 0      Fall Risk Screening: In the past year, patient has experienced: no history of falling in past year      Urinary Incontinence Screening:   Patient has not leaked urine accidently in the last six months  Home Safety:  Patient does not have trouble with stairs inside or outside of their home  Patient has no working smoke alarms and has no working carbon monoxide detector  Home safety hazards include: none  Nutrition:   Current diet is Regular       Medications:   Patient is not currently taking any over-the-counter supplements  Patient is able to manage medications  Activities of Daily Living (ADLs)/Instrumental Activities of Daily Living (IADLs):   Walk and transfer into and out of bed and chair?: Yes  Dress and groom yourself?: Yes    Bathe or shower yourself?: Yes    Feed yourself? Yes  Do your laundry/housekeeping?: Yes  Manage your money, pay your bills and track your expenses?: Yes  Make your own meals?: Yes    Do your own shopping?: Yes    Previous Hospitalizations:   Any hospitalizations or ED visits within the last 12 months?: No      Advance Care Planning:   Living will: No    Durable POA for healthcare: No    Advanced directive: No    Advanced directive counseling given: Yes    Five wishes given: Yes      PREVENTIVE SCREENINGS      Cardiovascular Screening:    General: History Lipid Disorder and Screening Current      Diabetes Screening:     General: History Diabetes and Screening Current      Colorectal Cancer Screening:     General: Screening Current      Breast Cancer Screening:     General: Screening Current      Cervical Cancer Screening:    General: Screening Not Indicated      Osteoporosis Screening:    General: Risks and Benefits Discussed    Due for: DXA Axial      Abdominal Aortic Aneurysm (AAA) Screening:        General: Screening Not Indicated      Lung Cancer Screening:     General: Screening Not Indicated      Hepatitis C Screening:    General: Screening Current    Screening, Brief Intervention, and Referral to Treatment (SBIRT)    Screening  Typical number of drinks in a day: 0  Typical number of drinks in a week: 0  Interpretation: Low risk drinking behavior      Single Item Drug Screening:  How often have you used an illegal drug (including marijuana) or a prescription medication for non-medical reasons in the past year? never    Single Item Drug Screen Score: 0  Interpretation: Negative screen for possible drug use disorder      Gwyn Rooney PA-C

## 2022-04-02 ENCOUNTER — HOSPITAL ENCOUNTER (EMERGENCY)
Facility: HOSPITAL | Age: 67
Discharge: HOME | End: 2022-04-02
Attending: EMERGENCY MEDICINE
Payer: COMMERCIAL

## 2022-04-02 VITALS
SYSTOLIC BLOOD PRESSURE: 184 MMHG | BODY MASS INDEX: 43 KG/M2 | OXYGEN SATURATION: 98 % | RESPIRATION RATE: 20 BRPM | HEIGHT: 60 IN | DIASTOLIC BLOOD PRESSURE: 81 MMHG | WEIGHT: 219 LBS | TEMPERATURE: 98.7 F | HEART RATE: 89 BPM

## 2022-04-02 DIAGNOSIS — G47.00 INSOMNIA: Primary | ICD-10-CM

## 2022-04-02 LAB
ALBUMIN SERPL BCP-MCNC: 4 G/DL (ref 3.5–5)
ALP SERPL-CCNC: 92 U/L (ref 34–104)
ALT SERPL W P-5'-P-CCNC: 8 U/L (ref 7–52)
AMPHETAMINES SERPL QL SCN: NEGATIVE
ANION GAP SERPL CALCULATED.3IONS-SCNC: 5 MMOL/L (ref 4–13)
AST SERPL W P-5'-P-CCNC: 12 U/L (ref 13–39)
BARBITURATES UR QL: NEGATIVE
BASOPHILS # BLD AUTO: 0.03 THOUSANDS/ΜL (ref 0–0.1)
BASOPHILS NFR BLD AUTO: 0 % (ref 0–1)
BENZODIAZ UR QL: NEGATIVE
BILIRUB SERPL-MCNC: 1.08 MG/DL (ref 0.2–1)
BILIRUB UR QL STRIP: NEGATIVE
BUN SERPL-MCNC: 12 MG/DL (ref 5–25)
CALCIUM SERPL-MCNC: 10 MG/DL (ref 8.4–10.2)
CHLORIDE SERPL-SCNC: 102 MMOL/L (ref 96–108)
CLARITY UR: CLEAR
CO2 SERPL-SCNC: 29 MMOL/L (ref 21–32)
COCAINE UR QL: NEGATIVE
COLOR UR: NORMAL
CREAT SERPL-MCNC: 1 MG/DL (ref 0.6–1.3)
EOSINOPHIL # BLD AUTO: 0.01 THOUSAND/ΜL (ref 0–0.61)
EOSINOPHIL NFR BLD AUTO: 0 % (ref 0–6)
ERYTHROCYTE [DISTWIDTH] IN BLOOD BY AUTOMATED COUNT: 13.2 % (ref 11.6–15.1)
ETHANOL EXG-MCNC: 0 MG/DL
FLUAV RNA RESP QL NAA+PROBE: NEGATIVE
FLUBV RNA RESP QL NAA+PROBE: NEGATIVE
GFR SERPL CREATININE-BSD FRML MDRD: 59 ML/MIN/1.73SQ M
GLUCOSE SERPL-MCNC: 132 MG/DL (ref 65–140)
GLUCOSE UR STRIP-MCNC: NEGATIVE MG/DL
HCT VFR BLD AUTO: 39.4 % (ref 34.8–46.1)
HGB BLD-MCNC: 12.5 G/DL (ref 11.5–15.4)
HGB UR QL STRIP.AUTO: NEGATIVE
IMM GRANULOCYTES # BLD AUTO: 0.04 THOUSAND/UL (ref 0–0.2)
IMM GRANULOCYTES NFR BLD AUTO: 0 % (ref 0–2)
KETONES UR STRIP-MCNC: NEGATIVE MG/DL
LEUKOCYTE ESTERASE UR QL STRIP: NEGATIVE
LITHIUM SERPL-SCNC: 1.3 MMOL/L (ref 0.5–1)
LYMPHOCYTES # BLD AUTO: 1.53 THOUSANDS/ΜL (ref 0.6–4.47)
LYMPHOCYTES NFR BLD AUTO: 15 % (ref 14–44)
MCH RBC QN AUTO: 31.3 PG (ref 26.8–34.3)
MCHC RBC AUTO-ENTMCNC: 31.7 G/DL (ref 31.4–37.4)
MCV RBC AUTO: 99 FL (ref 82–98)
METHADONE UR QL: NEGATIVE
MONOCYTES # BLD AUTO: 0.47 THOUSAND/ΜL (ref 0.17–1.22)
MONOCYTES NFR BLD AUTO: 5 % (ref 4–12)
NEUTROPHILS # BLD AUTO: 8.41 THOUSANDS/ΜL (ref 1.85–7.62)
NEUTS SEG NFR BLD AUTO: 80 % (ref 43–75)
NITRITE UR QL STRIP: NEGATIVE
NRBC BLD AUTO-RTO: 0 /100 WBCS
OPIATES UR QL SCN: NEGATIVE
OXYCODONE+OXYMORPHONE UR QL SCN: NEGATIVE
PCP UR QL: NEGATIVE
PH UR STRIP.AUTO: 7 [PH]
PLATELET # BLD AUTO: 298 THOUSANDS/UL (ref 149–390)
PMV BLD AUTO: 9.6 FL (ref 8.9–12.7)
POTASSIUM SERPL-SCNC: 4.3 MMOL/L (ref 3.5–5.3)
PROT SERPL-MCNC: 7 G/DL (ref 6.4–8.4)
PROT UR STRIP-MCNC: NEGATIVE MG/DL
RBC # BLD AUTO: 3.99 MILLION/UL (ref 3.81–5.12)
RSV RNA RESP QL NAA+PROBE: NEGATIVE
SARS-COV-2 RNA RESP QL NAA+PROBE: NEGATIVE
SODIUM SERPL-SCNC: 136 MMOL/L (ref 135–147)
SP GR UR STRIP.AUTO: 1.01 (ref 1–1.03)
THC UR QL: NEGATIVE
TSH SERPL DL<=0.05 MIU/L-ACNC: 1.1 UIU/ML (ref 0.45–5.33)
UROBILINOGEN UR QL STRIP.AUTO: 0.2 E.U./DL
WBC # BLD AUTO: 10.49 THOUSAND/UL (ref 4.31–10.16)

## 2022-04-02 PROCEDURE — 84443 ASSAY THYROID STIM HORMONE: CPT | Performed by: EMERGENCY MEDICINE

## 2022-04-02 PROCEDURE — 82075 ASSAY OF BREATH ETHANOL: CPT | Performed by: EMERGENCY MEDICINE

## 2022-04-02 PROCEDURE — 93005 ELECTROCARDIOGRAM TRACING: CPT

## 2022-04-02 PROCEDURE — 85025 COMPLETE CBC W/AUTO DIFF WBC: CPT | Performed by: EMERGENCY MEDICINE

## 2022-04-02 PROCEDURE — 99284 EMERGENCY DEPT VISIT MOD MDM: CPT | Performed by: EMERGENCY MEDICINE

## 2022-04-02 PROCEDURE — 80307 DRUG TEST PRSMV CHEM ANLYZR: CPT | Performed by: EMERGENCY MEDICINE

## 2022-04-02 PROCEDURE — 99204 OFFICE O/P NEW MOD 45 MIN: CPT | Performed by: GENERAL PRACTICE

## 2022-04-02 PROCEDURE — 36415 COLL VENOUS BLD VENIPUNCTURE: CPT | Performed by: EMERGENCY MEDICINE

## 2022-04-02 PROCEDURE — 99284 EMERGENCY DEPT VISIT MOD MDM: CPT

## 2022-04-02 PROCEDURE — 81003 URINALYSIS AUTO W/O SCOPE: CPT | Performed by: EMERGENCY MEDICINE

## 2022-04-02 PROCEDURE — 80178 ASSAY OF LITHIUM: CPT | Performed by: EMERGENCY MEDICINE

## 2022-04-02 PROCEDURE — 80053 COMPREHEN METABOLIC PANEL: CPT | Performed by: EMERGENCY MEDICINE

## 2022-04-02 PROCEDURE — 0241U HB NFCT DS VIR RESP RNA 4 TRGT: CPT | Performed by: EMERGENCY MEDICINE

## 2022-04-02 RX ORDER — QUETIAPINE FUMARATE 25 MG/1
25 TABLET, FILM COATED ORAL ONCE
Status: COMPLETED | OUTPATIENT
Start: 2022-04-02 | End: 2022-04-02

## 2022-04-02 RX ORDER — QUETIAPINE FUMARATE 25 MG/1
25 TABLET, FILM COATED ORAL
Qty: 60 TABLET | Refills: 0 | Status: SHIPPED | OUTPATIENT
Start: 2022-04-02 | End: 2022-06-01

## 2022-04-02 RX ADMIN — QUETIAPINE FUMARATE 25 MG: 25 TABLET ORAL at 21:17

## 2022-04-02 NOTE — Clinical Note
Roxanna Sender should be transferred out to [address] [place] [address] and has been medically cleared

## 2022-04-02 NOTE — CONSULTS
TeleConsultation - Ana [age] y o  female MRN: [id number]  Unit/Bed#: ED 06 Encounter: [id number]        REQUIRED DOCUMENTATION:     1  This service was provided via Telemedicine  2  Provider located at [facility]   3  TeleMed provider: Mathew Crigler, MD   4  Identify all parties in room with patient during tele consult: Patient Only   5  Patient was then informed that this was a Telemedicine visit and that the exam was being conducted confidentially over secure lines  My office door was closed  No one else was in the room  Patient acknowledged consent and understanding of privacy and security of the Telemedicine visit, and gave us permission to have the assistant stay in the room in order to assist with the history and to conduct the exam   I informed the patient that I have reviewed their record in Epic and presented the opportunity for them to ask any questions regarding the visit today  The patient agreed to participate  Assessment/Plan     Assessment:  Erin Esparza is a 74 y/o female with PMH significant for Bipolar Disorder that presents with CC of Insomnia  Patient endorsing severe insomnia with 7 days of no sleep but denying any other symptoms of carlie  Patient with severe symptoms since decreased dosing of Lithium and initiation on Lisinopril despite supratherapuetic levels  Patient has failed Hydroxxyzine, Trazodone, and Doxapin as such recommend starting Seroquel 25 mg and increasing to 50 mg qhs as needed  Patient with no acute or imminent safety concerns or other factors modifiable by IP psychiatric admission       Plan:   Risks, benefits and possible side effects of Medications:   Risks, benefits, and possible side effects of medications explained to patient and patient verbalizes understanding        -Start Seroquel 25 mg qhs and increase to 50 mg qhs as needed    Chief Complaint: " I can't sleep"    History of Present Illness     Reason for Consult / Modena Ahumada Problem: Insomnia    Patient with worsening insomnia since decreased dosing of Lithium due to being started on Lisinopril  Patient reports she has not slept in 7 days depite having tried 3 different medications for insomnia  Patient denies any symptoms of carlie/hypomania or acute psychosis nor any acute safety concerns  Inpatient consult to Psychiatry  Consult performed by: Sharri Melo MD  Consult ordered by: Erica Samuels MD          Psychiatric Review Of Systems:  sleep: yes, per HPI  appetite changes: no  weight changes: no  energy/anergy: no  interest/pleasure/anhedonia: no  somatic symptoms: no  anxiety/panic: no  carlie: no  guilty/hopeless: no  self injurious behavior/risky behavior: no    Historical Information   Past Psychiatric History: In Patient 7x total, enodrsed 2+years since last IP admission  Currently in treatment with None  Past Suicide attempts: None  Past Violent behavior: None  Past Psychiatric medication trial: Denied minus HPI    Substance Abuse History: None  Use of Alcohol: denied    Longest clean time: years  History of IP/OP rehabilitation program: None  Smoking history: None  Use of Caffeine: denies use    Family Psychiatric History:   Psychiatric Illness Mother  Illness: Bipoalr     Social History  Education: high school diploma/GED  Learning Disabilities: Denied  Marital history: single  Living arrangement, social support: The patient lives in home with Brother and dog  Occupational History: retired  Functioning Relationships: good support system    Other Pertinent History: None    Traumatic History:   Abuse: None  Other Traumatic Events: None    Past Medical History:   Diagnosis Date    Allergic     Depression     Disease of thyroid gland     GERD (gastroesophageal reflux disease)     Hyperchloremia        Medical Review Of Systems:  Review of Systems    Meds/Allergies   all current active meds have been reviewed  No Known Allergies    Objective   Vital signs in last 24 hours:  Temp:  [98 7 °F (37 1 °C)] 98 7 °F (37 1 °C)  HR:  [89] 89  Resp:  [20] 20  BP: (184)/(81) 184/81    No intake or output data in the 24 hours ending 04/02/22 1849    Mental Status Evaluation:  Appearance:  age appropriate   Behavior:  normal   Speech:  normal pitch and normal volume   Mood:  normal   Affect:  constricted   Language: NFT   Thought Process:  normal   Thought Content:  normal   Perceptual Disturbances: None   Risk Potential: None   Sensorium:  person, place and time/date   Cognition:  recent and remote memory grossly intact   Consciousness:  alert    Attention: attention span and concentration were age appropriate   Intellect: within normal limits   Fund of Knowledge: awareness of current events: President   Insight:  age appropriate   Judgment: fair   Muscle Strength and Tone: NFT   Gait/Station: normal gait/station   Motor Activity: no abnormal movements     Lab Results: Reviewed, elavated [place] Level  Imaging Studies: Reviewed  EKG, Pathology, and Other Studies: Reviewed    Code Status: No Order  Advance Directive and Living Will:      Power of :    POLST:      Counseling / Coordination of Care  Total floor / unit time spent today 30  minutes  Greater than 50% of total time was spent with the patient and / or family counseling and / or coordination of care  A description of the counseling / coordination of care:  Face to Face patient care

## 2022-04-03 LAB
ATRIAL RATE: 74 BPM
P AXIS: 60 DEGREES
PR INTERVAL: 172 MS
QRS AXIS: 51 DEGREES
QRSD INTERVAL: 88 MS
QT INTERVAL: 334 MS
QTC INTERVAL: 370 MS
T WAVE AXIS: 36 DEGREES
VENTRICULAR RATE: 74 BPM

## 2022-04-03 PROCEDURE — 93010 ELECTROCARDIOGRAM REPORT: CPT | Performed by: INTERNAL MEDICINE

## 2022-04-03 NOTE — DISCHARGE INSTRUCTIONS
-start taking Seroquel 25 mg at night when trying to go to sleep, you may take a 2nd pill (50 mg) as needed for sleep  -please follow-up with psychiatrist for insomnia

## 2022-04-05 NOTE — ED PROVIDER NOTES
History  Chief Complaint   Patient presents with    Insomnia     Pt reports 3 weeks of insomnia  Pt reports decrease in lithium dose a few weeks prior  Patient is a 78-year-old female with extensive psychiatric history including bipolar disorder that presents for evaluation of insomnia  Patient says over the past 3 weeks she has been having worsening insomnia, saying that she sleeps only an hour or 2 every night  She was recently prescribed trazodone 50 mg nightly which has not been helping her insomnia she tells me  She otherwise has been compliant with all her medications and she has been taking them as prescribed  Patient denies suicidal homicidal ideations at this time  She denies headache, nausea vomiting chest pain dyspnea abdominal pain  Denies worsening anxiety or depression  Prior to Admission Medications   Prescriptions Last Dose Informant Patient Reported? Taking?   aspirin (ECOTRIN LOW STRENGTH) 81 mg EC tablet  Self Yes No   Sig: Take 81 mg by mouth daily   clobetasol (TEMOVATE) 0 05 % external solution   No No   Sig: Apply to dry scalp daily for up to 4 weeks  Leave shampoo on the scalp x 15 minutes, then rinse     Patient not taking: Reported on 3/3/2022    doxepin (SINEquan) 75 MG capsule  Self Yes No   Sig: Take 100 mg by mouth daily at bedtime 100mg daily   ergocalciferol (VITAMIN D2) 50,000 units   No No   Sig: Take 1 capsule (50,000 Units total) by mouth once a week   glucose blood (Contour Next Test) test strip   No No   Sig: Use 1 each 2 (two) times a day Use as instructed   glucose blood test strip  Self Yes No   Si each by Other route as needed Use as instructed   hydrOXYzine pamoate (VISTARIL) 50 mg capsule   No No   Sig: Take 1 capsule (50 mg total) by mouth daily at bedtime as needed (sleep)   Patient not taking: Reported on 3/23/2022    ketoconazole (NIZORAL) 2 % shampoo   No No   Sig: Apply 1 application topically 2 (two) times a week   Patient not taking: Reported on 3/3/2022    levothyroxine 112 mcg tablet   No No   Sig: Take 1 tablet (112 mcg total) by mouth daily   linaGLIPtin (Tradjenta) 5 MG TABS   No No   Sig: Take 5 mg by mouth daily   lisinopril (ZESTRIL) 5 mg tablet   No No   Sig: Take 1 tablet (5 mg total) by mouth daily   lithium carbonate 150 mg capsule   Yes No   Sig: Take 150 mg by mouth 2 (two) times a day with meals Take 2 capsules in the morning and 1 capsule at bedtime  pravastatin (PRAVACHOL) 40 mg tablet   No No   Sig: Take 1 tablet (40 mg total) by mouth daily      Facility-Administered Medications: None       Past Medical History:   Diagnosis Date    Allergic     Depression     Disease of thyroid gland     GERD (gastroesophageal reflux disease)     Hyperchloremia        Past Surgical History:   Procedure Laterality Date    ANKLE SURGERY      Incision    DILATION AND CURETTAGE OF UTERUS         Family History   Problem Relation Age of Onset    No Known Problems Family     Diabetes Mother     Heart disease Mother     Stroke Mother     Diabetes Father     Stroke Father     No Known Problems Maternal Grandmother     No Known Problems Maternal Grandfather     No Known Problems Paternal Grandmother     No Known Problems Paternal Grandfather     Diabetes Brother     No Known Problems Paternal Aunt      I have reviewed and agree with the history as documented  E-Cigarette/Vaping    E-Cigarette Use Never User      E-Cigarette/Vaping Substances    Nicotine No     THC No     CBD No     Flavoring No     Other No     Unknown No      Social History     Tobacco Use    Smoking status: Never Smoker    Smokeless tobacco: Never Used   Vaping Use    Vaping Use: Never used   Substance Use Topics    Alcohol use: No    Drug use: No       Review of Systems   Constitutional: Negative for fever  HENT: Negative for sore throat  Respiratory: Negative for shortness of breath  Cardiovascular: Negative for chest pain  Gastrointestinal: Negative for abdominal pain  Genitourinary: Negative for dysuria  Musculoskeletal: Negative for back pain  Skin: Negative for rash  Neurological: Negative for light-headedness  Psychiatric/Behavioral: Positive for sleep disturbance  Negative for agitation  All other systems reviewed and are negative  Physical Exam  Physical Exam  Vitals reviewed  Constitutional:       General: She is not in acute distress  Appearance: She is well-developed  HENT:      Head: Normocephalic  Eyes:      Pupils: Pupils are equal, round, and reactive to light  Cardiovascular:      Rate and Rhythm: Normal rate and regular rhythm  Heart sounds: Normal heart sounds  Pulmonary:      Effort: Pulmonary effort is normal       Breath sounds: Normal breath sounds  Abdominal:      General: Bowel sounds are normal  There is no distension  Palpations: Abdomen is soft  Tenderness: There is no abdominal tenderness  There is no guarding  Musculoskeletal:         General: No tenderness or deformity  Normal range of motion  Cervical back: Normal range of motion and neck supple  Skin:     General: Skin is warm and dry  Capillary Refill: Capillary refill takes less than 2 seconds  Neurological:      Mental Status: She is alert and oriented to person, place, and time  Cranial Nerves: No cranial nerve deficit  Sensory: No sensory deficit  Psychiatric:         Behavior: Behavior normal          Thought Content:  Thought content normal          Judgment: Judgment normal          Vital Signs  ED Triage Vitals [04/02/22 1507]   Temperature Pulse Respirations Blood Pressure SpO2   98 7 °F (37 1 °C) 89 20 (!) 184/81 98 %      Temp Source Heart Rate Source Patient Position - Orthostatic VS BP Location FiO2 (%)   Oral Monitor Lying Left arm --      Pain Score       --           Vitals:    04/02/22 1507   BP: (!) 184/81   Pulse: 89   Patient Position - Orthostatic VS: Lying Visual Acuity      ED Medications  Medications   QUEtiapine (SEROquel) tablet 25 mg (25 mg Oral Given 4/2/22 2117)       Diagnostic Studies  Results Reviewed     Procedure Component Value Units Date/Time    Rapid drug screen, urine [[id number]]  (Normal) Collected: 04/02/22 1632    Lab Status: Final result Specimen: Urine, Clean Catch Updated: 04/02/22 1653     Amph/Meth UR Negative     Barbiturate Ur Negative     Benzodiazepine Urine Negative     Cocaine Urine Negative     Methadone Urine Negative     Opiate Urine Negative     PCP Ur Negative     THC Urine Negative     Oxycodone Urine Negative    Narrative:      FOR MEDICAL PURPOSES ONLY  IF CONFIRMATION NEEDED PLEASE CONTACT THE LAB WITHIN 5 DAYS  Drug Screen Cutoff Levels:  AMPHETAMINE/METHAMPHETAMINES  1000 ng/mL  BARBITURATES     200 ng/mL  BENZODIAZEPINES     200 ng/mL  COCAINE      300 ng/mL  METHADONE      300 ng/mL  OPIATES      300 ng/mL  PHENCYCLIDINE     25 ng/mL  THC       50 ng/mL  OXYCODONE      100 ng/mL    UA w Reflex to Microscopic w Reflex to Culture [[id number]]  (Normal) Collected: 04/02/22 1632    Lab Status: Final result Specimen: Urine, Clean Catch Updated: 04/02/22 1638     Color, UA Straw     Clarity, UA Clear     Specific Gravity, UA 1 010     pH, UA 7 0     Leukocytes, UA Negative     Nitrite, UA Negative     Protein, UA Negative mg/dl      Glucose, UA Negative mg/dl      Ketones, UA Negative mg/dl      Urobilinogen, UA 0 2 E U /dl      Bilirubin, UA Negative     Blood, UA Negative    COVID/FLU/RSV [[id number]]  (Normal) Collected: 04/02/22 1523    Lab Status: Final result Specimen: Nares from Nose Updated: 04/02/22 1615     SARS-CoV-2 Negative     INFLUENZA A PCR Negative     INFLUENZA B PCR Negative     RSV PCR Negative    Narrative:      FOR PEDIATRIC PATIENTS - copy/paste COVID Guidelines URL to browser: [email or web address]    SARS-CoV-2 assay is a Nucleic Acid Amplification assay intended for the  qualitative detection of nucleic acid from SARS-CoV-2 in nasopharyngeal  swabs  Results are for the presumptive identification of SARS-CoV-2 RNA  Positive results are indicative of infection with SARS-CoV-2, the virus  causing COVID-19, but do not rule out bacterial infection or co-infection  with other viruses  Laboratories within the [place] and its  territories are required to report all positive results to the appropriate  public health authorities  Negative results do not preclude SARS-CoV-2  infection and should not be used as the sole basis for treatment or other  patient management decisions  Negative results must be combined with  clinical observations, patient history, and epidemiological information  This test has not been FDA cleared or approved  This test has been authorized by FDA under an Emergency Use Authorization  (EUA)  This test is only authorized for the duration of time the  declaration that circumstances exist justifying the authorization of the  emergency use of an in vitro diagnostic tests for detection of SARS-CoV-2  virus and/or diagnosis of COVID-19 infection under section 564(b)(1) of  the Act, 21 U  S C  370KHY-2(Y)(1), unless the authorization is terminated  or revoked sooner  The test has been validated but independent review by FDA  and CLIA is pending  Test performed using [employer]pert: This RT-PCR assay targets N2,  a region unique to SARS-CoV-2  A conserved region in the E-gene was chosen  for pan-Sarbecovirus detection which includes SARS-CoV-2     TSH, 3rd generation with Free T4 reflex [[id number]]  (Normal) Collected: 04/02/22 1523    Lab Status: Final result Specimen: Blood from Arm, Left Updated: 04/02/22 1602     TSH 3RD GENERATON 1 101 uIU/mL     Narrative:      Patients undergoing fluorescein dye angiography may retain small amounts of fluorescein in the body for 48-72 hours post procedure   Samples containing fluorescein can produce falsely depressed TSH values  If the patient had this procedure,a specimen should be resubmitted post fluorescein clearance        Comprehensive metabolic panel [[id number]]  (Abnormal) Collected: 04/02/22 1523    Lab Status: Final result Specimen: Blood from Arm, Left Updated: 04/02/22 1556     Sodium 136 mmol/L      Potassium 4 3 mmol/L      Chloride 102 mmol/L      CO2 29 mmol/L      ANION GAP 5 mmol/L      BUN 12 mg/dL      Creatinine 1 00 mg/dL      Glucose 132 mg/dL      Calcium 10 0 mg/dL      AST 12 U/L      ALT 8 U/L      Alkaline Phosphatase 92 U/L      Total Protein 7 0 g/dL      Albumin 4 0 g/dL      Total Bilirubin 1 08 mg/dL      eGFR 59 ml/min/1 73sq m     Narrative:      [place] guidelines for Chronic Kidney Disease (CKD):     Stage 1 with normal or high GFR (GFR > 90 mL/min/1 73 square meters)    Stage 2 Mild CKD (GFR = 60-89 mL/min/1 73 square meters)    Stage 3A Moderate CKD (GFR = 45-59 mL/min/1 73 square meters)    Stage 3B Moderate CKD (GFR = 30-44 mL/min/1 73 square meters)    Stage 4 Severe CKD (GFR = 15-29 mL/min/1 73 square meters)    Stage 5 End Stage CKD (GFR <15 mL/min/1 73 square meters)  Note: GFR calculation is accurate only with a steady state creatinine    Lithium level [[id number]]  (Abnormal) Collected: 04/02/22 1523    Lab Status: Final result Specimen: Blood from Arm, Left Updated: 04/02/22 1550     Lithium Lvl 1 3 mmol/L     POCT alcohol breath test [[id number]]  (Normal) Resulted: 04/02/22 1538    Lab Status: Final result Updated: 04/02/22 1538     EXTBreath Alcohol 0 000    CBC and differential [[id number]]  (Abnormal) Collected: 04/02/22 1523    Lab Status: Final result Specimen: Blood from Arm, Left Updated: 04/02/22 1529     WBC 10 49 Thousand/uL      RBC 3 99 Million/uL      Hemoglobin 12 5 g/dL      Hematocrit 39 4 %      MCV 99 fL      MCH 31 3 pg      MCHC 31 7 g/dL      RDW 13 2 %      MPV 9 6 fL      Platelets 668 Thousands/uL      nRBC 0 /100 WBCs      Neutrophils Relative 80 %      Immat GRANS % 0 %      Lymphocytes Relative 15 %      Monocytes Relative 5 %      Eosinophils Relative 0 %      Basophils Relative 0 %      Neutrophils Absolute 8 41 Thousands/µL      Immature Grans Absolute 0 04 Thousand/uL      Lymphocytes Absolute 1 53 Thousands/µL      Monocytes Absolute 0 47 Thousand/µL      Eosinophils Absolute 0 01 Thousand/µL      Basophils Absolute 0 03 Thousands/µL                  No orders to display              Procedures  Procedures         ED Course  ED Course as of 04/05/22 1538   Sat Apr 02, 2022   1638 LITHIUM LEVEL(!): 1 3  Discussed case with on-call , Dr Ziggy Anaya who says that this is not a concern at this time  Patient medically clear  SBIRT 20yo+      Most Recent Value   SBIRT (24 yo +)    In order to provide better care to our patients, we are screening all of our patients for alcohol and drug use  Would it be okay to ask you these screening questions? No Filed at: 04/02/2022 1515                    MDM  Number of Diagnoses or Management Options  Insomnia  Diagnosis management comments: Patient is a 71-year-old female who presents for evaluation of insomnia  Medically cleared, slightly elevated lithium level noted and discussed with Toxicology, this is of no concern at this time for toxicology  Patient spoke with psychiatry advised starting Seroquel q h s  the ability to increase dose from 50 mg to 100 mg nightly for insomnia  She is also instructed follow-up with PCP and psychiatrist with strict return precautions        Disposition  Final diagnoses:   Insomnia     Time reflects when diagnosis was documented in both MDM as applicable and the Disposition within this note     Time User Action Codes Description Comment    4/2/2022  4:28 PM Ulises Pearl Add [G47 00] Insomnia       ED Disposition     ED Disposition Condition Date/Time Comment    Discharge Stable Sat Apr 2, 2022  7:29 PM Discharged in stable condition        Follow-up Information     Follow up With Specialties Details Why [address] Emergency Department Emergency Medicine  If symptoms worsen [address] [place] [id number] [phone] [facility], [address] [place] [address] Kate Zhou, [address] [facility]          Discharge Medication List as of 4/2/2022  8:25 PM      START taking these medications    Details   QUEtiapine (SEROquel) 25 mg tablet Take 1 tablet (25 mg total) by mouth daily at bedtime, Starting Sat 4/2/2022, Until Wed 6/1/2022, Normal         CONTINUE these medications which have NOT CHANGED    Details   aspirin (ECOTRIN LOW STRENGTH) 81 mg EC tablet Take 81 mg by mouth daily, Historical Med      clobetasol (TEMOVATE) 0 05 % external solution Apply to dry scalp daily for up to 4 weeks    Leave shampoo on the scalp x 15 minutes, then rinse , Normal      doxepin (SINEquan) 75 MG capsule Take 100 mg by mouth daily at bedtime 100mg daily, Historical Med      ergocalciferol (VITAMIN D2) 50,000 units Take 1 capsule (50,000 Units total) by mouth once a week, Starting Wed 3/23/2022, Normal      !! glucose blood (Contour Next Test) test strip Use 1 each 2 (two) times a day Use as instructed, Starting Thu 10/28/2021, Normal      !! glucose blood test strip 1 each by Other route as needed Use as instructed, Historical Med      hydrOXYzine pamoate (VISTARIL) 50 mg capsule Take 1 capsule (50 mg total) by mouth daily at bedtime as needed (sleep), Starting Thu 3/3/2022, Normal      ketoconazole (NIZORAL) 2 % shampoo Apply 1 application topically 2 (two) times a week, Starting Thu 10/28/2021, Normal      levothyroxine 112 mcg tablet Take 1 tablet (112 mcg total) by mouth daily, Starting Thu 2/17/2022, Normal      linaGLIPtin (Tradjenta) 5 MG TABS Take 5 mg by mouth daily, Starting Tue 2/15/2022, Normal lisinopril (ZESTRIL) 5 mg tablet Take 1 tablet (5 mg total) by mouth daily, Starting Tue 1/18/2022, Normal      lithium carbonate 150 mg capsule Take 150 mg by mouth 2 (two) times a day with meals Take 2 capsules in the morning and 1 capsule at bedtime  , Starting Thu 2/3/2022, Historical Med      pravastatin (PRAVACHOL) 40 mg tablet Take 1 tablet (40 mg total) by mouth daily, Starting Wed 3/23/2022, Normal       !! - Potential duplicate medications found  Please discuss with provider  No discharge procedures on file      PDMP Review     None          ED Provider  Electronically Signed by           Harsh Gallagher MD  04/05/22 4372

## 2022-04-26 ENCOUNTER — HOSPITAL ENCOUNTER (EMERGENCY)
Facility: HOSPITAL | Age: 67
Discharge: HOME | End: 2022-04-26
Attending: EMERGENCY MEDICINE | Admitting: EMERGENCY MEDICINE
Payer: COMMERCIAL

## 2022-04-26 VITALS
OXYGEN SATURATION: 99 % | DIASTOLIC BLOOD PRESSURE: 55 MMHG | TEMPERATURE: 98 F | SYSTOLIC BLOOD PRESSURE: 104 MMHG | HEIGHT: 60 IN | WEIGHT: 220 LBS | HEART RATE: 78 BPM | BODY MASS INDEX: 43.19 KG/M2 | RESPIRATION RATE: 20 BRPM

## 2022-04-26 DIAGNOSIS — G47.00 INSOMNIA: Primary | ICD-10-CM

## 2022-04-26 LAB
ANION GAP SERPL CALCULATED.3IONS-SCNC: 6 MMOL/L (ref 4–13)
BASOPHILS # BLD AUTO: 0.06 THOUSANDS/ΜL (ref 0–0.1)
BASOPHILS NFR BLD AUTO: 1 % (ref 0–1)
BUN SERPL-MCNC: 12 MG/DL (ref 5–25)
CALCIUM SERPL-MCNC: 10 MG/DL (ref 8.4–10.2)
CHLORIDE SERPL-SCNC: 103 MMOL/L (ref 96–108)
CO2 SERPL-SCNC: 28 MMOL/L (ref 21–32)
CREAT SERPL-MCNC: 1.05 MG/DL (ref 0.6–1.3)
EOSINOPHIL # BLD AUTO: 0.03 THOUSAND/ΜL (ref 0–0.61)
EOSINOPHIL NFR BLD AUTO: 0 % (ref 0–6)
ERYTHROCYTE [DISTWIDTH] IN BLOOD BY AUTOMATED COUNT: 13.6 % (ref 11.6–15.1)
GFR SERPL CREATININE-BSD FRML MDRD: 55 ML/MIN/1.73SQ M
GLUCOSE SERPL-MCNC: 185 MG/DL (ref 65–140)
HCT VFR BLD AUTO: 39.8 % (ref 34.8–46.1)
HGB BLD-MCNC: 12.6 G/DL (ref 11.5–15.4)
IMM GRANULOCYTES # BLD AUTO: 0.05 THOUSAND/UL (ref 0–0.2)
IMM GRANULOCYTES NFR BLD AUTO: 0 % (ref 0–2)
LITHIUM SERPL-SCNC: 1.3 MMOL/L (ref 0.5–1)
LYMPHOCYTES # BLD AUTO: 1.63 THOUSANDS/ΜL (ref 0.6–4.47)
LYMPHOCYTES NFR BLD AUTO: 14 % (ref 14–44)
MCH RBC QN AUTO: 31.3 PG (ref 26.8–34.3)
MCHC RBC AUTO-ENTMCNC: 31.7 G/DL (ref 31.4–37.4)
MCV RBC AUTO: 99 FL (ref 82–98)
MONOCYTES # BLD AUTO: 0.84 THOUSAND/ΜL (ref 0.17–1.22)
MONOCYTES NFR BLD AUTO: 7 % (ref 4–12)
NEUTROPHILS # BLD AUTO: 9.15 THOUSANDS/ΜL (ref 1.85–7.62)
NEUTS SEG NFR BLD AUTO: 78 % (ref 43–75)
NRBC BLD AUTO-RTO: 0 /100 WBCS
PLATELET # BLD AUTO: 340 THOUSANDS/UL (ref 149–390)
PMV BLD AUTO: 9.5 FL (ref 8.9–12.7)
POTASSIUM SERPL-SCNC: 4.3 MMOL/L (ref 3.5–5.3)
RBC # BLD AUTO: 4.02 MILLION/UL (ref 3.81–5.12)
SODIUM SERPL-SCNC: 137 MMOL/L (ref 135–147)
WBC # BLD AUTO: 11.76 THOUSAND/UL (ref 4.31–10.16)

## 2022-04-26 PROCEDURE — 36415 COLL VENOUS BLD VENIPUNCTURE: CPT | Performed by: EMERGENCY MEDICINE

## 2022-04-26 PROCEDURE — 80178 ASSAY OF LITHIUM: CPT | Performed by: EMERGENCY MEDICINE

## 2022-04-26 PROCEDURE — 85025 COMPLETE CBC W/AUTO DIFF WBC: CPT | Performed by: EMERGENCY MEDICINE

## 2022-04-26 PROCEDURE — 99283 EMERGENCY DEPT VISIT LOW MDM: CPT

## 2022-04-26 PROCEDURE — 99284 EMERGENCY DEPT VISIT MOD MDM: CPT | Performed by: EMERGENCY MEDICINE

## 2022-04-26 PROCEDURE — 80048 BASIC METABOLIC PNL TOTAL CA: CPT | Performed by: EMERGENCY MEDICINE

## 2022-04-26 RX ORDER — HYDROXYZINE HYDROCHLORIDE 25 MG/1
50 TABLET, FILM COATED ORAL
Qty: 14 TABLET | Refills: 0 | Status: SHIPPED | OUTPATIENT
Start: 2022-04-26 | End: 2022-05-03

## 2022-04-26 NOTE — ED PROVIDER NOTES
History  Chief Complaint   Patient presents with    Insomnia     Pt presents via EMS from home where pt offers complaint of insomnia x 1 month  Pt had recent medication changes to Seroquel and Trazodone  81-year-old female presents with sleeplessness over the last month  She denies suicidal homicidal ideation  Hydroxyzine was stopped around the time of the onset of her symptoms  She is not sure why  Prior to Admission Medications   Prescriptions Last Dose Informant Patient Reported? Taking? QUEtiapine (SEROquel) 25 mg tablet   No No   Sig: Take 1 tablet (25 mg total) by mouth daily at bedtime   aspirin (ECOTRIN LOW STRENGTH) 81 mg EC tablet  Self Yes No   Sig: Take 81 mg by mouth daily   clobetasol (TEMOVATE) 0 05 % external solution   No No   Sig: Apply to dry scalp daily for up to 4 weeks  Leave shampoo on the scalp x 15 minutes, then rinse     Patient not taking: Reported on 3/3/2022    doxepin (SINEquan) 75 MG capsule  Self Yes No   Sig: Take 100 mg by mouth daily at bedtime 100mg daily   ergocalciferol (VITAMIN D2) 50,000 units   No No   Sig: Take 1 capsule (50,000 Units total) by mouth once a week   glucose blood (Contour Next Test) test strip   No No   Sig: Use 1 each 2 (two) times a day Use as instructed   glucose blood test strip  Self Yes No   Si each by Other route as needed Use as instructed   hydrOXYzine pamoate (VISTARIL) 50 mg capsule   No No   Sig: Take 1 capsule (50 mg total) by mouth daily at bedtime as needed (sleep)   Patient not taking: Reported on 3/23/2022    ketoconazole (NIZORAL) 2 % shampoo   No No   Sig: Apply 1 application topically 2 (two) times a week   Patient not taking: Reported on 3/3/2022    levothyroxine 112 mcg tablet   No No   Sig: Take 1 tablet (112 mcg total) by mouth daily   linaGLIPtin (Tradjenta) 5 MG TABS   No No   Sig: Take 5 mg by mouth daily   lisinopril (ZESTRIL) 5 mg tablet   No No   Sig: Take 1 tablet (5 mg total) by mouth daily   lithium carbonate 150 mg capsule   Yes No   Sig: Take 150 mg by mouth 2 (two) times a day with meals Take 2 capsules in the morning and 1 capsule at bedtime  pravastatin (PRAVACHOL) 40 mg tablet   No No   Sig: Take 1 tablet (40 mg total) by mouth daily      Facility-Administered Medications: None       Past Medical History:   Diagnosis Date    Allergic     Depression     Diabetes mellitus ([address] )     Disease of thyroid gland     GERD (gastroesophageal reflux disease)     Hyperchloremia     Hypertension     Psychiatric disorder        Past Surgical History:   Procedure Laterality Date    ANKLE SURGERY      Incision    DILATION AND CURETTAGE OF UTERUS         Family History   Problem Relation Age of Onset    No Known Problems Family     Diabetes Mother     Heart disease Mother     Stroke Mother     Diabetes Father     Stroke Father     No Known Problems Maternal Grandmother     No Known Problems Maternal Grandfather     No Known Problems Paternal Grandmother     No Known Problems Paternal Grandfather     Diabetes Brother     No Known Problems Paternal Aunt      I have reviewed and agree with the history as documented  E-Cigarette/Vaping    E-Cigarette Use Never User      E-Cigarette/Vaping Substances    Nicotine No     THC No     CBD No     Flavoring No     Other No     Unknown No      Social History     Tobacco Use    Smoking status: Never Smoker    Smokeless tobacco: Never Used   Vaping Use    Vaping Use: Never used   Substance Use Topics    Alcohol use: No    Drug use: No       Review of Systems   Psychiatric/Behavioral: Positive for sleep disturbance  Negative for self-injury  All other systems reviewed and are negative  Physical Exam  Physical Exam  Vitals and nursing note reviewed  Constitutional:       Appearance: She is well-developed  HENT:      Head: Normocephalic and atraumatic     Eyes:      Conjunctiva/sclera: Conjunctivae normal       Pupils: Pupils are equal, round, and reactive to light  Neck:      Trachea: No tracheal deviation  Cardiovascular:      Rate and Rhythm: Normal rate and regular rhythm  Heart sounds: Normal heart sounds  No murmur heard  Pulmonary:      Effort: Pulmonary effort is normal  No respiratory distress  Breath sounds: Normal breath sounds  No wheezing or rales  Abdominal:      General: Bowel sounds are normal  There is no distension  Palpations: Abdomen is soft  Tenderness: There is no abdominal tenderness  Musculoskeletal:         General: No deformity  Cervical back: Normal range of motion and neck supple  Skin:     General: Skin is warm and dry  Capillary Refill: Capillary refill takes less than 2 seconds  Neurological:      Mental Status: She is alert and oriented to person, place, and time  Sensory: No sensory deficit     Psychiatric:         Judgment: Judgment normal          Vital Signs  ED Triage Vitals [04/26/22 1648]   Temperature Pulse Respirations Blood Pressure SpO2   98 °F (36 7 °C) 78 20 104/55 99 %      Temp Source Heart Rate Source Patient Position - Orthostatic VS BP Location FiO2 (%)   Oral Monitor Lying Left arm --      Pain Score       --           Vitals:    04/26/22 1648   BP: 104/55   Pulse: 78   Patient Position - Orthostatic VS: Lying         Visual Acuity      ED Medications  Medications - No data to display    Diagnostic Studies  Results Reviewed     Procedure Component Value Units Date/Time    Lithium level [[id number]]  (Abnormal) Collected: 04/26/22 1722    Lab Status: Final result Specimen: Blood from Arm, Left Updated: 04/26/22 1748     Lithium Lvl 1 3 mmol/L     Basic metabolic panel [[id number]]  (Abnormal) Collected: 04/26/22 1722    Lab Status: Final result Specimen: Blood from Arm, Left Updated: 04/26/22 1748     Sodium 137 mmol/L      Potassium 4 3 mmol/L      Chloride 103 mmol/L      CO2 28 mmol/L      ANION GAP 6 mmol/L      BUN 12 mg/dL      Creatinine 1 05 mg/dL      Glucose 185 mg/dL      Calcium 10 0 mg/dL      eGFR 55 ml/min/1 73sq m     Narrative:      National Kidney Disease Foundation guidelines for Chronic Kidney Disease (CKD):     Stage 1 with normal or high GFR (GFR > 90 mL/min/1 73 square meters)    Stage 2 Mild CKD (GFR = 60-89 mL/min/1 73 square meters)    Stage 3A Moderate CKD (GFR = 45-59 mL/min/1 73 square meters)    Stage 3B Moderate CKD (GFR = 30-44 mL/min/1 73 square meters)    Stage 4 Severe CKD (GFR = 15-29 mL/min/1 73 square meters)    Stage 5 End Stage CKD (GFR <15 mL/min/1 73 square meters)  Note: GFR calculation is accurate only with a steady state creatinine    CBC and differential [[id number]]  (Abnormal) Collected: 04/26/22 1722    Lab Status: Final result Specimen: Blood from Arm, Left Updated: 04/26/22 1727     WBC 11 76 Thousand/uL      RBC 4 02 Million/uL      Hemoglobin 12 6 g/dL      Hematocrit 39 8 %      MCV 99 fL      MCH 31 3 pg      MCHC 31 7 g/dL      RDW 13 6 %      MPV 9 5 fL      Platelets 878 Thousands/uL      nRBC 0 /100 WBCs      Neutrophils Relative 78 %      Immat GRANS % 0 %      Lymphocytes Relative 14 %      Monocytes Relative 7 %      Eosinophils Relative 0 %      Basophils Relative 1 %      Neutrophils Absolute 9 15 Thousands/µL      Immature Grans Absolute 0 05 Thousand/uL      Lymphocytes Absolute 1 63 Thousands/µL      Monocytes Absolute 0 84 Thousand/µL      Eosinophils Absolute 0 03 Thousand/µL      Basophils Absolute 0 06 Thousands/µL                  No orders to display              Procedures  Procedures         ED Course                               SBIRT 20yo+      Most Recent Value   SBIRT (22 yo +)    In order to provide better care to our patients, we are screening all of our patients for alcohol and drug use  Would it be okay to ask you these screening questions?  No Filed at: 04/26/2022 1842                    MDM  Number of Diagnoses or Management Options  Insomnia: new and requires workup  Diagnosis management comments: Insomnia  No SI/HI  Will restart hydroxyzine  Patient comfortable with discharge, has primary follow up for later this week       Amount and/or Complexity of Data Reviewed  Review and summarize past medical records: yes  Independent visualization of images, tracings, or specimens: yes    Risk of Complications, Morbidity, and/or Mortality  Presenting problems: moderate  Diagnostic procedures: minimal  Management options: moderate        Disposition  Final diagnoses:   Insomnia     Time reflects when diagnosis was documented in both MDM as applicable and the Disposition within this note     Time User Action Codes Description Comment    4/26/2022  5:54 PM Janna Toney Add [G47 00] Insomnia       ED Disposition     ED Disposition Condition Date/Time Comment    Discharge Stable Tue Apr 26, 2022  5:56 PM [address] discharge to home/self care  Follow-up Information     Follow up With Specialties Details Why Franchesca Parham PA-C Family Medicine Schedule an appointment as soon as possible for a visit   [address] [place] [address] [address] [facility]  [phone]            Discharge Medication List as of 4/26/2022  6:12 PM      START taking these medications    Details   hydrOXYzine HCL (ATARAX) 25 mg tablet Take 2 tablets (50 mg total) by mouth daily at bedtime for 7 days, Starting Tue 4/26/2022, Until Tue 5/3/2022, Normal         CONTINUE these medications which have NOT CHANGED    Details   aspirin (ECOTRIN LOW STRENGTH) 81 mg EC tablet Take 81 mg by mouth daily, Historical Med      clobetasol (TEMOVATE) 0 05 % external solution Apply to dry scalp daily for up to 4 weeks    Leave shampoo on the scalp x 15 minutes, then rinse , Normal      doxepin (SINEquan) 75 MG capsule Take 100 mg by mouth daily at bedtime 100mg daily, Historical Med      ergocalciferol (VITAMIN D2) 50,000 units Take 1 capsule (50,000 Units total) by mouth once a week, Starting Wed 3/23/2022, Normal      !! glucose blood (Contour Next Test) test strip Use 1 each 2 (two) times a day Use as instructed, Starting Thu 10/28/2021, Normal      !! glucose blood test strip 1 each by Other route as needed Use as instructed, Historical Med      ketoconazole (NIZORAL) 2 % shampoo Apply 1 application topically 2 (two) times a week, Starting Thu 10/28/2021, Normal      levothyroxine 112 mcg tablet Take 1 tablet (112 mcg total) by mouth daily, Starting Thu 2/17/2022, Normal      linaGLIPtin (Tradjenta) 5 MG TABS Take 5 mg by mouth daily, Starting Tue 2/15/2022, Normal      lisinopril (ZESTRIL) 5 mg tablet Take 1 tablet (5 mg total) by mouth daily, Starting Tue 1/18/2022, Normal      lithium carbonate 150 mg capsule Take 150 mg by mouth 2 (two) times a day with meals Take 2 capsules in the morning and 1 capsule at bedtime  , Starting Thu 2/3/2022, Historical Med      pravastatin (PRAVACHOL) 40 mg tablet Take 1 tablet (40 mg total) by mouth daily, Starting Wed 3/23/2022, Normal      QUEtiapine (SEROquel) 25 mg tablet Take 1 tablet (25 mg total) by mouth daily at bedtime, Starting Sat 4/2/2022, Until Wed 6/1/2022, Normal       !! - Potential duplicate medications found  Please discuss with provider  STOP taking these medications       hydrOXYzine pamoate (VISTARIL) 50 mg capsule Comments:   Reason for Stopping:               No discharge procedures on file      PDMP Review     None          ED Provider  Electronically Signed by           Kolby Claros DO  05/02/22 0721

## 2022-04-26 NOTE — DISCHARGE INSTRUCTIONS
Try the atarax for sleep  You should follow up with your primary physician for further management  Return to the emergency department at anytime you think you are experiencing a medical emergency, if you have thoughts of hurting yourself or others

## 2022-05-12 ENCOUNTER — APPOINTMENT (OUTPATIENT)
Dept: LAB | Facility: CLINIC | Age: 67
End: 2022-05-12
Payer: COMMERCIAL

## 2022-06-13 ENCOUNTER — APPOINTMENT (OUTPATIENT)
Dept: LAB | Facility: CLINIC | Age: 67
End: 2022-06-13
Payer: COMMERCIAL

## 2022-06-13 DIAGNOSIS — F31.9 BIPOLAR 1 DISORDER, DEPRESSED (HCC): ICD-10-CM

## 2022-06-13 LAB — LITHIUM SERPL-SCNC: 0.8 MMOL/L (ref 0.5–1)

## 2022-06-13 PROCEDURE — 80178 ASSAY OF LITHIUM: CPT

## 2022-06-13 PROCEDURE — 36415 COLL VENOUS BLD VENIPUNCTURE: CPT

## 2022-06-21 LAB — HBA1C MFR BLD HPLC: 5.8 %

## 2022-06-26 DIAGNOSIS — Z23 NEED FOR SHINGLES VACCINE: Primary | ICD-10-CM

## 2022-06-26 RX ORDER — ZOSTER VACCINE RECOMBINANT, ADJUVANTED 50 MCG/0.5
0.5 KIT INTRAMUSCULAR ONCE
Qty: 1 EACH | Refills: 1 | Status: SHIPPED | OUTPATIENT
Start: 2022-06-26 | End: 2022-06-26

## 2022-08-11 ENCOUNTER — OFFICE VISIT (OUTPATIENT)
Dept: FAMILY MEDICINE CLINIC | Facility: HOME HEALTHCARE | Age: 67
End: 2022-08-11
Payer: COMMERCIAL

## 2022-08-11 VITALS
TEMPERATURE: 97.3 F | HEIGHT: 60 IN | RESPIRATION RATE: 18 BRPM | WEIGHT: 211 LBS | DIASTOLIC BLOOD PRESSURE: 66 MMHG | HEART RATE: 78 BPM | OXYGEN SATURATION: 97 % | BODY MASS INDEX: 41.43 KG/M2 | SYSTOLIC BLOOD PRESSURE: 112 MMHG

## 2022-08-11 DIAGNOSIS — N18.31 TYPE 2 DIABETES MELLITUS WITH STAGE 3A CHRONIC KIDNEY DISEASE, WITHOUT LONG-TERM CURRENT USE OF INSULIN (HCC): Primary | ICD-10-CM

## 2022-08-11 DIAGNOSIS — N18.31 STAGE 3A CHRONIC KIDNEY DISEASE (HCC): ICD-10-CM

## 2022-08-11 DIAGNOSIS — E11.22 TYPE 2 DIABETES MELLITUS WITH STAGE 3A CHRONIC KIDNEY DISEASE, WITHOUT LONG-TERM CURRENT USE OF INSULIN (HCC): Primary | ICD-10-CM

## 2022-08-11 DIAGNOSIS — I10 ESSENTIAL HYPERTENSION: ICD-10-CM

## 2022-08-11 PROBLEM — N18.30 STAGE 3 CHRONIC KIDNEY DISEASE, UNSPECIFIED WHETHER STAGE 3A OR 3B CKD (HCC): Status: ACTIVE | Noted: 2022-08-11

## 2022-08-11 PROBLEM — E53.8 VITAMIN B12 DEFICIENCY: Status: ACTIVE | Noted: 2022-06-21

## 2022-08-11 PROCEDURE — 99214 OFFICE O/P EST MOD 30 MIN: CPT | Performed by: FAMILY MEDICINE

## 2022-08-11 RX ORDER — TRAZODONE HYDROCHLORIDE 100 MG/1
TABLET ORAL
COMMUNITY
Start: 2022-07-28

## 2022-08-11 RX ORDER — FAMOTIDINE 20 MG
TABLET ORAL
COMMUNITY

## 2022-08-11 RX ORDER — LISINOPRIL 5 MG/1
5 TABLET ORAL DAILY
Qty: 30 TABLET | Refills: 5 | Status: SHIPPED | OUTPATIENT
Start: 2022-08-11

## 2022-08-11 RX ORDER — LITHIUM CARBONATE 300 MG/1
TABLET, FILM COATED, EXTENDED RELEASE ORAL
COMMUNITY
Start: 2022-07-28

## 2022-08-11 RX ORDER — LINAGLIPTIN 5 MG/1
5 TABLET, FILM COATED ORAL DAILY
Qty: 90 TABLET | Refills: 1 | Status: SHIPPED | OUTPATIENT
Start: 2022-08-11

## 2022-08-11 NOTE — PROGRESS NOTES
Assessment/Plan:     Diagnoses and all orders for this visit:    Type 2 diabetes mellitus with stage 3a chronic kidney disease, without long-term current use of insulin ([facility])  -     Cancel: POCT hemoglobin A1c  -     lisinopril (ZESTRIL) 5 mg tablet; Take 1 tablet (5 mg total) by mouth daily  -     linaGLIPtin (Tradjenta) 5 MG TABS; Take 5 mg by mouth daily    Essential hypertension  -     lisinopril (ZESTRIL) 5 mg tablet; Take 1 tablet (5 mg total) by mouth daily    Stage 3a chronic kidney disease ([address] )    Other orders  -     lithium carbonate (LITHOBID) 300 mg CR tablet  -     traZODone (DESYREL) 100 mg tablet  -     Vitamin D, Cholecalciferol, 25 MCG (1000 UT) CAPS; Take by mouth  -     Cyanocobalamin (VITAMIN B 12 PO); Take by mouth      - Continue current medications as prescribed, refills sent    Return in about 3 months (around 11/11/2022) for Next scheduled follow up  Subjective:        Patient ID: Kori Wiseman is a 78 y o  female  Chief Complaint   Patient presents with   Miladys Casas is a 78year old female with history of HLD, hypothyroidism, type 2 DM, and bipolar disorder, presenting for follow up      HTN is currently managed with lisinopril 5 mg daily which was started at last visit  /66 today  Patient denies medication side effects  Denies chest pain, palpitations, or LE edema  HLD is managed with pravastatin 40 mg daily  Last lipid panel from 3/2022 with total cholesterol 182, LDL 89  Hypothyroidism managed with levothyroxine 112 mcg daily  Last TSH from 4/2022 WNL  Type 2 DM managed with Tradjenta 5 mg daily  Last A1c 6/2022 at 5 8  She is following with [facility] for bipolar disorder, managed with trazodone and lithium  Screening mammogram and DXA scheduled next week        The following portions of the patient's history were reviewed and updated as appropriate: allergies, current medications, past family history, past medical history, past social history, past surgical history and problem list     Patient Active Problem List   Diagnosis    Bipolar disorder ([facility] )    Constipation    Hypercholesterolemia    Hypothyroidism    Type 2 diabetes mellitus without complication, without long-term current use of insulin ([facility] )    Morbid obesity with BMI of 40 0-44 9, adult ([facility] )    Postmenopausal    Atypical squamous cells of undetermined significance on cytologic smear of cervix ([facility])    Essential hypertension    Vitamin D deficiency    Vitamin B12 deficiency    Stage 3 chronic kidney disease, unspecified whether stage 3a or 3b CKD (HCC)       Current Outpatient Medications   Medication Sig Dispense Refill    aspirin (ECOTRIN LOW STRENGTH) 81 mg EC tablet Take 81 mg by mouth daily      Cyanocobalamin (VITAMIN B 12 PO) Take by mouth      glucose blood (Contour Next Test) test strip Use 1 each 2 (two) times a day Use as instructed 100 strip 5    glucose blood test strip 1 each by Other route as needed Use as instructed      levothyroxine 112 mcg tablet Take 1 tablet (112 mcg total) by mouth daily 90 tablet 1    linaGLIPtin (Tradjenta) 5 MG TABS Take 5 mg by mouth daily 90 tablet 1    lisinopril (ZESTRIL) 5 mg tablet Take 1 tablet (5 mg total) by mouth daily 30 tablet 5    lithium carbonate (LITHOBID) 300 mg CR tablet       pravastatin (PRAVACHOL) 40 mg tablet Take 1 tablet (40 mg total) by mouth daily 90 tablet 3    traZODone (DESYREL) 100 mg tablet       Vitamin D, Cholecalciferol, 25 MCG (1000 UT) CAPS Take by mouth      hydrOXYzine HCL (ATARAX) 25 mg tablet Take 2 tablets (50 mg total) by mouth daily at bedtime for 7 days 14 tablet 0     No current facility-administered medications for this visit          Past Medical History:   Diagnosis Date    Allergic     Depression     Diabetes mellitus (HCC)     Disease of thyroid gland     GERD (gastroesophageal reflux disease)     Hyperchloremia     Hypertension     Psychiatric disorder Past Surgical History:   Procedure Laterality Date    ANKLE SURGERY      Incision    DILATION AND CURETTAGE OF UTERUS          Social History     Socioeconomic History    Marital status: Single     Spouse name: Not on file    Number of children: Not on file    Years of education: Not on file    Highest education level: Not on file   Occupational History    Not on file   Tobacco Use    Smoking status: Never Smoker    Smokeless tobacco: Never Used   Vaping Use    Vaping Use: Never used   Substance and Sexual Activity    Alcohol use: No    Drug use: No    Sexual activity: Not Currently   Other Topics Concern    Not on file   Social History Narrative    Not on file     Social Determinants of Health     Financial Resource Strain: Not on file   Food Insecurity: Not on file   Transportation Needs: Not on file   Physical Activity: Not on file   Stress: Not on file   Social Connections: Not on file   Intimate Partner Violence: Not on file   Housing Stability: Not on file        Review of Systems   Constitutional: Negative for chills, diaphoresis and fever  Respiratory: Negative for cough, chest tightness, shortness of breath and wheezing  Cardiovascular: Negative for chest pain, palpitations and leg swelling  Gastrointestinal: Negative for abdominal pain, blood in stool, constipation, diarrhea, nausea and vomiting  Skin: Negative for rash and wound  Neurological: Negative for dizziness, syncope, weakness, light-headedness and headaches  Objective:      /66 (BP Location: Left arm, Patient Position: Sitting, Cuff Size: Large)   Pulse 78   Temp (!) 97 3 °F (36 3 °C) (Temporal)   Resp 18   Ht 5' (1 524 m)   Wt 95 7 kg (211 lb)   SpO2 97%   BMI 41 21 kg/m²          Physical Exam  Vitals and nursing note reviewed  Constitutional:       General: She is not in acute distress  Appearance: Normal appearance  She is obese     Eyes:      Extraocular Movements: Extraocular movements intact  Conjunctiva/sclera: Conjunctivae normal       Pupils: Pupils are equal, round, and reactive to light  Cardiovascular:      Rate and Rhythm: Normal rate and regular rhythm  Heart sounds: Normal heart sounds  No murmur heard  Pulmonary:      Effort: Pulmonary effort is normal  No respiratory distress  Breath sounds: Normal breath sounds  No wheezing  Musculoskeletal:      Cervical back: Normal range of motion  Right lower leg: No edema  Left lower leg: No edema  Skin:     General: Skin is warm and dry  Neurological:      General: No focal deficit present  Mental Status: She is alert and oriented to person, place, and time  Cranial Nerves: No cranial nerve deficit  Motor: No weakness     Psychiatric:         Mood and Affect: Mood normal          Behavior: Behavior normal

## 2022-08-12 ENCOUNTER — TELEPHONE (OUTPATIENT)
Dept: ADMINISTRATIVE | Facility: OTHER | Age: 67
End: 2022-08-12

## 2022-08-12 NOTE — TELEPHONE ENCOUNTER
----- Message from Alondra Gregory PA-C sent at 8/11/2022  1:21 PM EDT -----  Regarding: Care Gap Request  08/11/22 1:22 PM    Hello, our patient Navarro Laura has had Hemoglobin A1c completed/performed  Please assist in updating the patient chart by pulling the Care Everywhere (CE) document  The date of service is 6/21/22       Thank you,  Alondra Gregory PA-C  [place] [facility]

## 2022-08-12 NOTE — TELEPHONE ENCOUNTER
Upon review of the In Basket request we were able to locate, review, and update the patient chart as requested for Hemoglobin A1c  Any additional questions or concerns should be emailed to the Practice Liaisons via [email or web address] email, please do not reply via In Basket      Thank you  Sandra Pierre

## 2022-08-16 DIAGNOSIS — E03.9 HYPOTHYROIDISM, UNSPECIFIED TYPE: ICD-10-CM

## 2022-08-16 RX ORDER — LEVOTHYROXINE SODIUM 112 UG/1
112 TABLET ORAL DAILY
Qty: 90 TABLET | Refills: 1 | Status: SHIPPED | OUTPATIENT
Start: 2022-08-16

## 2022-08-18 ENCOUNTER — HOSPITAL ENCOUNTER (OUTPATIENT)
Dept: MAMMOGRAPHY | Facility: HOSPITAL | Age: 67
Discharge: HOME | End: 2022-08-18
Payer: COMMERCIAL

## 2022-08-18 ENCOUNTER — HOSPITAL ENCOUNTER (OUTPATIENT)
Dept: BONE DENSITY | Facility: HOSPITAL | Age: 67
Discharge: HOME | End: 2022-08-18
Payer: COMMERCIAL

## 2022-08-18 VITALS — WEIGHT: 211 LBS | BODY MASS INDEX: 41.43 KG/M2 | HEIGHT: 60 IN

## 2022-08-18 DIAGNOSIS — Z12.31 ENCOUNTER FOR SCREENING MAMMOGRAM FOR BREAST CANCER: ICD-10-CM

## 2022-08-18 DIAGNOSIS — Z78.0 ENCOUNTER FOR OSTEOPOROSIS SCREENING IN ASYMPTOMATIC POSTMENOPAUSAL PATIENT: ICD-10-CM

## 2022-08-18 DIAGNOSIS — Z13.820 ENCOUNTER FOR OSTEOPOROSIS SCREENING IN ASYMPTOMATIC POSTMENOPAUSAL PATIENT: ICD-10-CM

## 2022-08-18 PROCEDURE — 77063 BREAST TOMOSYNTHESIS BI: CPT

## 2022-08-18 PROCEDURE — 77067 SCR MAMMO BI INCL CAD: CPT

## 2022-08-18 PROCEDURE — 77080 DXA BONE DENSITY AXIAL: CPT

## 2022-11-07 ENCOUNTER — APPOINTMENT (OUTPATIENT)
Dept: LAB | Facility: CLINIC | Age: 67
End: 2022-11-07

## 2022-11-07 DIAGNOSIS — F31.32 MODERATE DEPRESSED BIPOLAR I DISORDER (HCC): ICD-10-CM

## 2022-11-07 LAB
ANION GAP SERPL CALCULATED.3IONS-SCNC: 6 MMOL/L (ref 4–13)
BUN SERPL-MCNC: 14 MG/DL (ref 5–25)
CALCIUM SERPL-MCNC: 10 MG/DL (ref 8.3–10.1)
CHLORIDE SERPL-SCNC: 108 MMOL/L (ref 96–108)
CO2 SERPL-SCNC: 25 MMOL/L (ref 21–32)
CREAT SERPL-MCNC: 1.15 MG/DL (ref 0.6–1.3)
GFR SERPL CREATININE-BSD FRML MDRD: 49 ML/MIN/1.73SQ M
GLUCOSE P FAST SERPL-MCNC: 122 MG/DL (ref 65–99)
LITHIUM SERPL-SCNC: 0.8 MMOL/L (ref 0.5–1)
POTASSIUM SERPL-SCNC: 4.4 MMOL/L (ref 3.5–5.3)
SODIUM SERPL-SCNC: 139 MMOL/L (ref 135–147)
T4 FREE SERPL-MCNC: 1.55 NG/DL (ref 0.76–1.46)
TSH SERPL DL<=0.05 MIU/L-ACNC: 0.8 UIU/ML (ref 0.45–4.5)

## 2022-11-21 DIAGNOSIS — E11.22 TYPE 2 DIABETES MELLITUS WITH STAGE 3A CHRONIC KIDNEY DISEASE, WITHOUT LONG-TERM CURRENT USE OF INSULIN (HCC): ICD-10-CM

## 2022-11-21 DIAGNOSIS — I10 ESSENTIAL HYPERTENSION: ICD-10-CM

## 2022-11-21 DIAGNOSIS — N18.31 TYPE 2 DIABETES MELLITUS WITH STAGE 3A CHRONIC KIDNEY DISEASE, WITHOUT LONG-TERM CURRENT USE OF INSULIN (HCC): ICD-10-CM

## 2022-11-21 DIAGNOSIS — E03.9 HYPOTHYROIDISM, UNSPECIFIED TYPE: ICD-10-CM

## 2022-11-21 RX ORDER — LEVOTHYROXINE SODIUM 112 UG/1
112 TABLET ORAL DAILY
Qty: 90 TABLET | Refills: 0 | Status: SHIPPED | OUTPATIENT
Start: 2022-11-21

## 2022-11-21 RX ORDER — LISINOPRIL 5 MG/1
5 TABLET ORAL DAILY
Qty: 90 TABLET | Refills: 0 | Status: SHIPPED | OUTPATIENT
Start: 2022-11-21

## 2022-11-21 RX ORDER — LINAGLIPTIN 5 MG/1
5 TABLET, FILM COATED ORAL DAILY
Qty: 90 TABLET | Refills: 0 | Status: SHIPPED | OUTPATIENT
Start: 2022-11-21

## 2023-01-13 ENCOUNTER — RA CDI HCC (OUTPATIENT)
Dept: OTHER | Facility: HOSPITAL | Age: 68
End: 2023-01-13

## 2023-01-13 NOTE — PROGRESS NOTES
[address]  coding opportunities     E11 36     Chart Reviewed number of suggestions sent to Provider: 1     Patients Insurance     Medicare Insurance: [address] [place] [address]

## 2023-01-18 ENCOUNTER — OFFICE VISIT (OUTPATIENT)
Dept: FAMILY MEDICINE CLINIC | Facility: HOME HEALTHCARE | Age: 68
End: 2023-01-18

## 2023-01-18 VITALS
TEMPERATURE: 97.8 F | WEIGHT: 212 LBS | RESPIRATION RATE: 16 BRPM | HEART RATE: 85 BPM | OXYGEN SATURATION: 99 % | SYSTOLIC BLOOD PRESSURE: 114 MMHG | HEIGHT: 60 IN | BODY MASS INDEX: 41.62 KG/M2 | DIASTOLIC BLOOD PRESSURE: 58 MMHG

## 2023-01-18 DIAGNOSIS — E78.00 HYPERCHOLESTEROLEMIA: ICD-10-CM

## 2023-01-18 DIAGNOSIS — I10 ESSENTIAL HYPERTENSION: ICD-10-CM

## 2023-01-18 DIAGNOSIS — E66.01 MORBID OBESITY WITH BMI OF 40.0-44.9, ADULT (HCC): ICD-10-CM

## 2023-01-18 DIAGNOSIS — F31.60 BIPOLAR AFFECTIVE DISORDER, CURRENT EPISODE MIXED, CURRENT EPISODE SEVERITY UNSPECIFIED (HCC): ICD-10-CM

## 2023-01-18 DIAGNOSIS — Z12.11 SCREENING FOR COLON CANCER: ICD-10-CM

## 2023-01-18 DIAGNOSIS — N18.31 TYPE 2 DIABETES MELLITUS WITH STAGE 3A CHRONIC KIDNEY DISEASE, WITHOUT LONG-TERM CURRENT USE OF INSULIN (HCC): Primary | ICD-10-CM

## 2023-01-18 DIAGNOSIS — E11.22 TYPE 2 DIABETES MELLITUS WITH STAGE 3A CHRONIC KIDNEY DISEASE, WITHOUT LONG-TERM CURRENT USE OF INSULIN (HCC): Primary | ICD-10-CM

## 2023-01-18 DIAGNOSIS — Z23 ENCOUNTER FOR IMMUNIZATION: ICD-10-CM

## 2023-01-18 DIAGNOSIS — E03.9 ACQUIRED HYPOTHYROIDISM: ICD-10-CM

## 2023-01-18 DIAGNOSIS — E55.9 VITAMIN D DEFICIENCY: ICD-10-CM

## 2023-01-18 LAB — SL AMB POCT HEMOGLOBIN AIC: 6.3 (ref ?–6.5)

## 2023-01-18 RX ORDER — LINAGLIPTIN 5 MG/1
5 TABLET, FILM COATED ORAL DAILY
Qty: 90 TABLET | Refills: 3 | Status: SHIPPED | OUTPATIENT
Start: 2023-01-18

## 2023-01-18 RX ORDER — LEVOTHYROXINE SODIUM 112 UG/1
112 TABLET ORAL DAILY
Qty: 90 TABLET | Refills: 3 | Status: SHIPPED | OUTPATIENT
Start: 2023-01-18

## 2023-01-18 RX ORDER — LISINOPRIL 5 MG/1
5 TABLET ORAL DAILY
Qty: 90 TABLET | Refills: 3 | Status: SHIPPED | OUTPATIENT
Start: 2023-01-18

## 2023-01-18 RX ORDER — PRAVASTATIN SODIUM 40 MG
40 TABLET ORAL DAILY
Qty: 90 TABLET | Refills: 3 | Status: SHIPPED | OUTPATIENT
Start: 2023-01-18

## 2023-01-26 ENCOUNTER — TELEPHONE (OUTPATIENT)
Dept: FAMILY MEDICINE CLINIC | Facility: HOME HEALTHCARE | Age: 68
End: 2023-01-26

## 2023-01-26 NOTE — TELEPHONE ENCOUNTER
We can give her the vaccine at her appointment in April or she can check with her pharmacy to see if she can receive it there as well

## 2023-01-26 NOTE — TELEPHONE ENCOUNTER
CALLED PT TO SCHEDULE AN APPOINTMENT FOR HER PNEUMOCOCCAL 20 VACCINE  SHE STATED, SHE DOESN'T KNOW WHAT SHE'S GOING DO BECAUSE SHE DOESN'T WANT TO TAKE THE BUS AGAIN   I INFORMED HER IF SHE CHANGES HER MIND TO CALL THE OFFICE

## 2023-02-03 LAB — COLOGUARD RESULT REPORTABLE: NEGATIVE

## 2023-02-24 ENCOUNTER — APPOINTMENT (OUTPATIENT)
Dept: LAB | Facility: CLINIC | Age: 68
End: 2023-02-24

## 2023-02-24 DIAGNOSIS — E55.9 VITAMIN D DEFICIENCY: ICD-10-CM

## 2023-02-24 DIAGNOSIS — E78.00 HYPERCHOLESTEROLEMIA: ICD-10-CM

## 2023-02-24 LAB
25(OH)D3 SERPL-MCNC: 24.1 NG/ML (ref 30–100)
CHOLEST SERPL-MCNC: 149 MG/DL
CREAT UR-MCNC: 79.2 MG/DL
HDLC SERPL-MCNC: 71 MG/DL
LDLC SERPL CALC-MCNC: 58 MG/DL (ref 0–100)
MICROALBUMIN UR-MCNC: 10.9 MG/L (ref 0–20)
MICROALBUMIN/CREAT 24H UR: 14 MG/G CREATININE (ref 0–30)
NONHDLC SERPL-MCNC: 78 MG/DL
TRIGL SERPL-MCNC: 101 MG/DL

## 2023-04-24 ENCOUNTER — OFFICE VISIT (OUTPATIENT)
Dept: FAMILY MEDICINE CLINIC | Facility: HOME HEALTHCARE | Age: 68
End: 2023-04-24

## 2023-04-24 VITALS
BODY MASS INDEX: 41.7 KG/M2 | TEMPERATURE: 97.6 F | RESPIRATION RATE: 18 BRPM | DIASTOLIC BLOOD PRESSURE: 62 MMHG | HEIGHT: 60 IN | HEART RATE: 74 BPM | SYSTOLIC BLOOD PRESSURE: 122 MMHG | OXYGEN SATURATION: 98 % | WEIGHT: 212.4 LBS

## 2023-04-24 DIAGNOSIS — E78.00 HYPERCHOLESTEROLEMIA: ICD-10-CM

## 2023-04-24 DIAGNOSIS — Z23 ENCOUNTER FOR IMMUNIZATION: ICD-10-CM

## 2023-04-24 DIAGNOSIS — E55.9 VITAMIN D DEFICIENCY: ICD-10-CM

## 2023-04-24 DIAGNOSIS — N18.31 TYPE 2 DIABETES MELLITUS WITH STAGE 3A CHRONIC KIDNEY DISEASE, WITHOUT LONG-TERM CURRENT USE OF INSULIN (HCC): ICD-10-CM

## 2023-04-24 DIAGNOSIS — I10 ESSENTIAL HYPERTENSION: ICD-10-CM

## 2023-04-24 DIAGNOSIS — Z00.00 MEDICARE ANNUAL WELLNESS VISIT, SUBSEQUENT: Primary | ICD-10-CM

## 2023-04-24 DIAGNOSIS — E11.22 TYPE 2 DIABETES MELLITUS WITH STAGE 3A CHRONIC KIDNEY DISEASE, WITHOUT LONG-TERM CURRENT USE OF INSULIN (HCC): ICD-10-CM

## 2023-04-24 LAB — SL AMB POCT HEMOGLOBIN AIC: 6 (ref ?–6.5)

## 2023-04-24 RX ORDER — LISINOPRIL 5 MG/1
5 TABLET ORAL DAILY
Qty: 90 TABLET | Refills: 3 | Status: CANCELLED | OUTPATIENT
Start: 2023-04-24

## 2023-04-24 RX ORDER — PRAVASTATIN SODIUM 40 MG
40 TABLET ORAL DAILY
Qty: 90 TABLET | Refills: 3 | Status: CANCELLED | OUTPATIENT
Start: 2023-04-24

## 2023-04-24 RX ORDER — CHOLECALCIFEROL (VITAMIN D3) 50 MCG
2000 TABLET ORAL DAILY
Qty: 90 TABLET | Refills: 3 | Status: SHIPPED | OUTPATIENT
Start: 2023-04-24

## 2023-04-24 NOTE — PATIENT INSTRUCTIONS
Medicare Preventive Visit Patient Instructions  Thank you for completing your Welcome to Medicare Visit or Medicare Annual Wellness Visit today  Your next wellness visit will be due in one year (4/24/2024)  The screening/preventive services that you may require over the next 5-10 years are detailed below  Some tests may not apply to you based off risk factors and/or age  Screening tests ordered at today's visit but not completed yet may show as past due  Also, please note that scanned in results may not display below  Preventive Screenings:  Service Recommendations Previous Testing/Comments   Colorectal Cancer Screening  * Colonoscopy    * Fecal Occult Blood Test (FOBT)/Fecal Immunochemical Test (FIT)  * Fecal DNA/Cologuard Test  * Flexible Sigmoidoscopy Age: 39-70 years old   Colonoscopy: every 10 years (may be performed more frequently if at higher risk)  OR  FOBT/FIT: every 1 year  OR  Cologuard: every 3 years  OR  Sigmoidoscopy: every 5 years  Screening may be recommended earlier than age 39 if at higher risk for colorectal cancer  Also, an individualized decision between you and your healthcare provider will decide whether screening between the ages of 74-80 would be appropriate  Colonoscopy: 02/03/2023  FOBT/FIT: Not on file  Cologuard: 01/26/2023  Sigmoidoscopy: Not on file    Screening Current     Breast Cancer Screening Age: 36 years old  Frequency: every 1-2 years  Not required if history of left and right mastectomy Mammogram: 08/18/2022    Screening Current   Cervical Cancer Screening Between the ages of 21-29, pap smear recommended once every 3 years  Between the ages of 33-67, can perform pap smear with HPV co-testing every 5 years     Recommendations may differ for women with a history of total hysterectomy, cervical cancer, or abnormal pap smears in past  Pap Smear: 12/11/2019    Screening Not Indicated   Hepatitis C Screening Once for adults born between 1945 and 1965  More frequently in patients at high risk for Hepatitis C Hep C Antibody: 10/21/2019    Screening Current   Diabetes Screening 1-2 times per year if you're at risk for diabetes or have pre-diabetes Fasting glucose: 122 mg/dL (11/7/2022)  A1C: 6 3 (1/18/2023)  Screening Not Indicated  History Diabetes   Cholesterol Screening Once every 5 years if you don't have a lipid disorder  May order more often based on risk factors  Lipid panel: 02/24/2023    Screening Not Indicated  History Lipid Disorder     Other Preventive Screenings Covered by Medicare:  1  Abdominal Aortic Aneurysm (AAA) Screening: covered once if your at risk  You're considered to be at risk if you have a family history of AAA  2  Lung Cancer Screening: covers low dose CT scan once per year if you meet all of the following conditions: (1) Age 50-69; (2) No signs or symptoms of lung cancer; (3) Current smoker or have quit smoking within the last 15 years; (4) You have a tobacco smoking history of at least 20 pack years (packs per day multiplied by number of years you smoked); (5) You get a written order from a healthcare provider  3  Glaucoma Screening: covered annually if you're considered high risk: (1) You have diabetes OR (2) Family history of glaucoma OR (3)  aged 48 and older OR (3)  American aged 72 and older  3  Osteoporosis Screening: covered every 2 years if you meet one of the following conditions: (1) You're estrogen deficient and at risk for osteoporosis based off medical history and other findings; (2) Have a vertebral abnormality; (3) On glucocorticoid therapy for more than 3 months; (4) Have primary hyperparathyroidism; (5) On osteoporosis medications and need to assess response to drug therapy  · Last bone density test (DXA Scan): 08/18/2022   5  HIV Screening: covered annually if you're between the age of 15-65  Also covered annually if you are younger than 13 and older than 72 with risk factors for HIV infection   For pregnant patients, it is covered up to 3 times per pregnancy  Immunizations:  Immunization Recommendations   Influenza Vaccine Annual influenza vaccination during flu season is recommended for all persons aged >= 6 months who do not have contraindications   Pneumococcal Vaccine   * Pneumococcal conjugate vaccine = PCV13 (Prevnar 13), PCV15 (Vaxneuvance), PCV20 (Prevnar 20)  * Pneumococcal polysaccharide vaccine = PPSV23 (Pneumovax) Adults 25-60 years old: 1-3 doses may be recommended based on certain risk factors  Adults 72 years old: 1-2 doses may be recommended based off what pneumonia vaccine you previously received   Hepatitis B Vaccine 3 dose series if at intermediate or high risk (ex: diabetes, end stage renal disease, liver disease)   Tetanus (Td) Vaccine - COST NOT COVERED BY MEDICARE PART B Following completion of primary series, a booster dose should be given every 10 years to maintain immunity against tetanus  Td may also be given as tetanus wound prophylaxis  Tdap Vaccine - COST NOT COVERED BY MEDICARE PART B Recommended at least once for all adults  For pregnant patients, recommended with each pregnancy  Shingles Vaccine (Shingrix) - COST NOT COVERED BY MEDICARE PART B  2 shot series recommended in those aged 48 and above     Health Maintenance Due:      Topic Date Due   • Breast Cancer Screening: Mammogram  08/18/2024   • DXA SCAN  08/18/2024   • Colorectal Cancer Screening  01/26/2026   • Hepatitis C Screening  Completed     Immunizations Due:      Topic Date Due   • COVID-19 Vaccine (5 - Booster for Moderna series) 06/02/2022   • Pneumococcal Vaccine: 65+ Years (2 - PCV) 12/02/2022     Advance Directives   What are advance directives? Advance directives are legal documents that state your wishes and plans for medical care  These plans are made ahead of time in case you lose your ability to make decisions for yourself   Advance directives can apply to any medical decision, such as the treatments you want, and if you want to donate organs  What are the types of advance directives? There are many types of advance directives, and each state has rules about how to use them  You may choose a combination of any of the following:  · Living will: This is a written record of the treatment you want  You can also choose which treatments you do not want, which to limit, and which to stop at a certain time  This includes surgery, medicine, IV fluid, and tube feedings  · Durable power of  for healthcare [facility]): This is a written record that states who you want to make healthcare choices for you when you are unable to make them for yourself  This person, called a proxy, is usually a family member or a friend  You may choose more than 1 proxy  · Do not resuscitate (DNR) order:  A DNR order is used in case your heart stops beating or you stop breathing  It is a request not to have certain forms of treatment, such as CPR  A DNR order may be included in other types of advance directives  · Medical directive: This covers the care that you want if you are in a coma, near death, or unable to make decisions for yourself  You can list the treatments you want for each condition  Treatment may include pain medicine, surgery, blood transfusions, dialysis, IV or tube feedings, and a ventilator (breathing machine)  · Values history: This document has questions about your views, beliefs, and how you feel and think about life  This information can help others choose the care that you would choose  Why are advance directives important? An advance directive helps you control your care  Although spoken wishes may be used, it is better to have your wishes written down  Spoken wishes can be misunderstood, or not followed  Treatments may be given even if you do not want them  An advance directive may make it easier for your family to make difficult choices about your care     Weight Management   Why it is important to manage your weight:  Being overweight increases your risk of health conditions such as heart disease, high blood pressure, type 2 diabetes, and certain types of cancer  It can also increase your risk for osteoarthritis, sleep apnea, and other respiratory problems  Aim for a slow, steady weight loss  Even a small amount of weight loss can lower your risk of health problems  How to lose weight safely:  A safe and healthy way to lose weight is to eat fewer calories and get regular exercise  You can lose up about 1 pound a week by decreasing the number of calories you eat by 500 calories each day  Healthy meal plan for weight management:  A healthy meal plan includes a variety of foods, contains fewer calories, and helps you stay healthy  A healthy meal plan includes the following:  · Eat whole-grain foods more often  A healthy meal plan should contain fiber  Fiber is the part of grains, fruits, and vegetables that is not broken down by your body  Whole-grain foods are healthy and provide extra fiber in your diet  Some examples of whole-grain foods are whole-wheat breads and pastas, oatmeal, brown rice, and bulgur  · Eat a variety of vegetables every day  Include dark, leafy greens such as spinach, kale, juaquin greens, and mustard greens  Eat yellow and orange vegetables such as carrots, sweet potatoes, and winter squash  · Eat a variety of fruits every day  Choose fresh or canned fruit (canned in its own juice or light syrup) instead of juice  Fruit juice has very little or no fiber  · Eat low-fat dairy foods  Drink fat-free (skim) milk or 1% milk  Eat fat-free yogurt and low-fat cottage cheese  Try low-fat cheeses such as mozzarella and other reduced-fat cheeses  · Choose meat and other protein foods that are low in fat  Choose beans or other legumes such as split peas or lentils  Choose fish, skinless poultry (chicken or turkey), or lean cuts of red meat (beef or pork)   Before you cook meat or poultry, cut off any visible fat  · Use less fat and oil  Try baking foods instead of frying them  Add less fat, such as margarine, sour cream, regular salad dressing and mayonnaise to foods  Eat fewer high-fat foods  Some examples of high-fat foods include french fries, doughnuts, ice cream, and cakes  · Eat fewer sweets  Limit foods and drinks that are high in sugar  This includes candy, cookies, regular soda, and sweetened drinks  Exercise:  Exercise at least 30 minutes per day on most days of the week  Some examples of exercise include walking, biking, dancing, and swimming  You can also fit in more physical activity by taking the stairs instead of the elevator or parking farther away from stores  Ask your healthcare provider about the best exercise plan for you  © Copyright [employer] 2018 Information is for End User's use only and may not be sold, redistributed or otherwise used for commercial purposes   All illustrations and images included in CareNotes® are the copyrighted property of [employer]  or [address] [place] [address]

## 2023-04-24 NOTE — PROGRESS NOTES
Assessment and Plan:     Problem List Items Addressed This Visit        Endocrine    Type 2 diabetes mellitus with stage 3a chronic kidney disease, without long-term current use of insulin ([facility] [address] )    Relevant Orders    POCT hemoglobin A1c (Completed)       Cardiovascular and Mediastinum    Essential hypertension       Other    Hypercholesterolemia    Vitamin D deficiency    Relevant Medications    Cholecalciferol (Vitamin D) 50 MCG (2000 UT) tablet   Other Visit Diagnoses     Medicare annual wellness visit, subsequent    -  Primary    Encounter for immunization        Relevant Orders    Pneumococcal Conjugate Vaccine 20-valent (Pcv20) (Completed)        - Continue current medications as prescribed  Follow up in 3 months, sooner if needed  BMI Counseling: Body mass index is 41 48 kg/m²  The BMI is above normal  Nutrition recommendations include decreasing portion sizes, encouraging healthy choices of fruits and vegetables, decreasing fast food intake, consuming healthier snacks, limiting drinks that contain sugar, moderation in carbohydrate intake, increasing intake of lean protein, reducing intake of saturated and trans fat and reducing intake of cholesterol  Exercise recommendations include moderate physical activity 150 minutes/week, vigorous physical activity 75 minutes/week, exercising 3-5 times per week, obtaining a gym membership and strength training exercises  No pharmacotherapy was ordered  Rationale for BMI follow-up plan is due to patient being overweight or obese  Preventive health issues were discussed with patient, and age appropriate screening tests were ordered as noted in patient's After Visit Summary  Personalized health advice and appropriate referrals for health education or preventive services given if needed, as noted in patient's After Visit Summary       History of Present Illness:     Patient presents for a Medicare Wellness Visit    Eugenie Glez is a 78year old female with history of HLD, hypothyroidism, type 2 DM, and bipolar disorder, presenting for follow up      HTN is currently managed with lisinopril 5 mg daily  /62 today  Denies chest pain, palpitations, or LE edema  HLD is managed with pravastatin 40 mg daily  Last lipid panel from 2/2023 with total cholesterol 149, LDL 58  Hypothyroidism managed with levothyroxine 112 mcg daily  Last TSH from 11/2022 WNL  Type 2 DM managed with Tradjenta 5 mg daily  Last A1c 1/2023 was 6 3, down to 6 0 today  Eye and foot exams UTD  She is following with [facility] for bipolar disorder, managed with trazodone and lithium  Cologuard 1/2023 was negative  Recommended to repeat q3 years  Patient Care Team:  Gen Roldan PA-C as PCP - General (Family Medicine)  Shari Bearden MD as PCP - [address] [place] [address] (RTE)  [facility]     Review of Systems:     Review of Systems   Constitutional: Negative for chills, diaphoresis and fever  Eyes: Negative for visual disturbance  Respiratory: Negative for cough, chest tightness, shortness of breath and wheezing  Cardiovascular: Negative for chest pain, palpitations and leg swelling  Gastrointestinal: Negative for abdominal pain, blood in stool, constipation, diarrhea, nausea and vomiting  Genitourinary: Negative for dysuria, frequency, hematuria and urgency  Skin: Negative for rash and wound  Neurological: Negative for dizziness, syncope, weakness, light-headedness and headaches  Psychiatric/Behavioral: Negative for dysphoric mood, self-injury, sleep disturbance and suicidal ideas  The patient is not nervous/anxious  All other systems reviewed and are negative         Problem List:     Patient Active Problem List   Diagnosis   • Bipolar disorder ([facility] [address] )   • Constipation   • Hypercholesterolemia   • Hypothyroidism   • Type 2 diabetes mellitus with stage 3a chronic kidney disease, without long-term current use of insulin (HCC)   • Morbid obesity with BMI of 40 0-44 9, adult ([facility])   • Postmenopausal   • Atypical squamous cells of undetermined significance on cytologic smear of cervix (ASC-US)   • Essential hypertension   • Vitamin D deficiency   • Vitamin B12 deficiency   • Stage 3 chronic kidney disease, unspecified whether stage 3a or 3b CKD ([facility])      Past Medical and Surgical History:     Past Medical History:   Diagnosis Date   • Allergic    • Depression    • Diabetes mellitus ([address] )    • Disease of thyroid gland    • GERD (gastroesophageal reflux disease)    • Hyperchloremia    • Hypertension    • Psychiatric disorder      Past Surgical History:   Procedure Laterality Date   • ANKLE SURGERY      Incision   • DILATION AND CURETTAGE OF UTERUS        Family History:     Family History   Problem Relation Age of Onset   • No Known Problems Family    • Diabetes Mother    • Heart disease Mother    • Stroke Mother    • Diabetes Father    • Stroke Father    • No Known Problems Maternal Grandmother    • No Known Problems Maternal Grandfather    • No Known Problems Paternal Grandmother    • No Known Problems Paternal Grandfather    • Diabetes Brother    • No Known Problems Paternal Aunt       Social History:     Social History     Socioeconomic History   • Marital status: Single     Spouse name: None   • Number of children: None   • Years of education: None   • Highest education level: None   Occupational History   • None   Tobacco Use   • Smoking status: Never   • Smokeless tobacco: Never   Vaping Use   • Vaping Use: Never used   Substance and Sexual Activity   • Alcohol use: No   • Drug use: No   • Sexual activity: Not Currently   Other Topics Concern   • None   Social History Narrative   • None     Social Determinants of Health     Financial Resource Strain: Not on file   Food Insecurity: Not on file   Transportation Needs: Not on file   Physical Activity: Not on file   Stress: Not on file   Social Connections: Not on file   Intimate Partner Violence: Not on file   Housing Stability: Not on file      Medications and Allergies:     Current Outpatient Medications   Medication Sig Dispense Refill   • aspirin (ECOTRIN LOW STRENGTH) 81 mg EC tablet Take 81 mg by mouth daily     • Cholecalciferol (Vitamin D) 50 MCG (2000 UT) tablet Take 1 tablet (2,000 Units total) by mouth daily 90 tablet 3   • glucose blood (Contour Next Test) test strip Use 1 each 2 (two) times a day Use as instructed 100 strip 5   • glucose blood test strip 1 each by Other route as needed Use as instructed     • levothyroxine 112 mcg tablet Take 1 tablet (112 mcg total) by mouth daily 90 tablet 3   • linaGLIPtin (Tradjenta) 5 MG TABS Take 5 mg by mouth daily 90 tablet 3   • lisinopril (ZESTRIL) 5 mg tablet Take 1 tablet (5 mg total) by mouth daily 90 tablet 3   • lithium carbonate (LITHOBID) 300 mg CR tablet      • pravastatin (PRAVACHOL) 40 mg tablet Take 1 tablet (40 mg total) by mouth daily 90 tablet 3   • traZODone (DESYREL) 100 mg tablet      • hydrOXYzine HCL (ATARAX) 25 mg tablet Take 2 tablets (50 mg total) by mouth daily at bedtime for 7 days 14 tablet 0     No current facility-administered medications for this visit       No Known Allergies   Immunizations:     Immunization History   Administered Date(s) Administered   • COVID-19 MODERNA VACC 0 5 ML IM 03/09/2021, 04/06/2021, 10/28/2021, 04/07/2022   • INFLUENZA 09/29/2015, 10/25/2016, 09/22/2017, 10/04/2018, 10/19/2021, 10/03/2022   • Influenza Injectable, MDCK, Preservative Free, Quadrivalent 10/20/2020   • Influenza, recombinant, quadrivalent,injectable, preservative free 10/18/2019   • Pneumococcal Conjugate Vaccine 20-valent (Pcv20), Polysace 04/24/2023   • Pneumococcal Polysaccharide PPV23 12/02/2021   • Tdap 04/20/2022   • Zoster Vaccine Recombinant 04/20/2022      Health Maintenance:         Topic Date Due   • Breast Cancer Screening: Mammogram  08/18/2024   • DXA SCAN  08/18/2024   • Colorectal Cancer Screening 01/26/2026   • Hepatitis C Screening  Completed         Topic Date Due   • COVID-19 Vaccine (5 - Booster for Moderna series) 06/02/2022      Medicare Screening Tests and Risk Assessments:     Ehsan Herrera is here for her Subsequent Wellness visit  Health Risk Assessment:   Patient rates overall health as good  Patient feels that their physical health rating is same  Patient is satisfied with their life  Eyesight was rated as same  Hearing was rated as same  Patient feels that their emotional and mental health rating is same  Patients states they are never, rarely angry  Patient states they are never, rarely unusually tired/fatigued  Pain experienced in the last 7 days has been none  Patient states that she has experienced no weight loss or gain in last 6 months  Fall Risk Screening: In the past year, patient has experienced: no history of falling in past year      Urinary Incontinence Screening:   Patient has not leaked urine accidently in the last six months  Home Safety:  Patient does not have trouble with stairs inside or outside of their home  Patient has working smoke alarms and has working carbon monoxide detector  Home safety hazards include: none  Nutrition:   Current diet is Regular  Medications:   Patient is currently taking over-the-counter supplements  OTC medications include: see medication list  Patient is able to manage medications  Activities of Daily Living (ADLs)/Instrumental Activities of Daily Living (IADLs):   Walk and transfer into and out of bed and chair?: Yes  Dress and groom yourself?: Yes    Bathe or shower yourself?: Yes    Feed yourself?  Yes  Do your laundry/housekeeping?: Yes  Manage your money, pay your bills and track your expenses?: Yes  Make your own meals?: Yes    Do your own shopping?: Yes    Previous Hospitalizations:   Any hospitalizations or ED visits within the last 12 months?: Yes    How many hospitalizations have you had in the last year?: 1-2    Advance Care Planning:   Living will: No    Durable POA for healthcare: No    Advanced directive: No    Advanced directive counseling given: Yes    Five wishes given: Yes      PREVENTIVE SCREENINGS      Cardiovascular Screening:    General: History Lipid Disorder and Screening Current      Diabetes Screening:     General: History Diabetes and Screening Current      Colorectal Cancer Screening:     General: Screening Current      Breast Cancer Screening:     General: Screening Current      Cervical Cancer Screening:    General: Screening Not Indicated      Osteoporosis Screening:    General: Screening Current      Abdominal Aortic Aneurysm (AAA) Screening:        General: Screening Not Indicated      Lung Cancer Screening:     General: Screening Not Indicated      Hepatitis C Screening:    General: Screening Current    Screening, Brief Intervention, and Referral to Treatment (SBIRT)    Screening  Typical number of drinks in a day: 0  Typical number of drinks in a week: 0  Interpretation: Low risk drinking behavior  Single Item Drug Screening:  How often have you used an illegal drug (including marijuana) or a prescription medication for non-medical reasons in the past year? never    Single Item Drug Screen Score: 0  Interpretation: Negative screen for possible drug use disorder    No results found  Physical Exam:     /62 (BP Location: Left arm, Patient Position: Sitting, Cuff Size: Large)   Pulse 74   Temp 97 6 °F (36 4 °C) (Tympanic)   Resp 18   Ht 5' (1 524 m)   Wt 96 3 kg (212 lb 6 4 oz)   SpO2 98%   BMI 41 48 kg/m²     Physical Exam  Vitals and nursing note reviewed  Constitutional:       General: She is not in acute distress  Appearance: Normal appearance  She is obese  HENT:      Head: Normocephalic and atraumatic  Eyes:      Extraocular Movements: Extraocular movements intact  Conjunctiva/sclera: Conjunctivae normal       Pupils: Pupils are equal, round, and reactive to light  Cardiovascular:      Rate and Rhythm: Normal rate and regular rhythm  Heart sounds: Normal heart sounds  No murmur heard  Pulmonary:      Effort: Pulmonary effort is normal  No respiratory distress  Breath sounds: Normal breath sounds  No wheezing  Musculoskeletal:         General: Normal range of motion  Cervical back: Normal range of motion and neck supple  Right lower leg: No edema  Left lower leg: No edema  Lymphadenopathy:      Cervical: No cervical adenopathy  Skin:     General: Skin is warm and dry  Neurological:      General: No focal deficit present  Mental Status: She is alert and oriented to person, place, and time  Cranial Nerves: No cranial nerve deficit  Motor: No weakness     Psychiatric:         Mood and Affect: Mood normal          Behavior: Behavior normal           Abdoulaye Rankin PA-C

## 2023-04-25 ENCOUNTER — TELEPHONE (OUTPATIENT)
Dept: ADMINISTRATIVE | Facility: OTHER | Age: 68
End: 2023-04-25

## 2023-04-25 NOTE — LETTER
Diabetic Eye Exam Form    Date Requested: 23  Patient: Tatiana Moy  Patient : 1955   Referring Provider: Juan Castaneda PA-C      DIABETIC Eye Exam Date _______________________________      Type of Exam MUST be documented for Diabetic Eye Exams  Please CHECK ONE  Retinal Exam       Dilated Retinal Exam       OCT       Optomap-Iris Exam      Fundus Photography       Left Eye - Please check Retinopathy or No Retinopathy        Exam did show retinopathy    Exam did not show retinopathy       Right Eye - Please check Retinopathy or No Retinopathy       Exam did show retinopathy    Exam did not show retinopathy       Comments __________________________________________________________    Practice Providing Exam ______________________________________________    Exam Performed By (print name) _______________________________________      Provider Signature ___________________________________________________      These reports are needed for  compliance  Please fax this completed form and a copy of the Diabetic Eye Exam report to our office located at [place] [address] as soon as possible via Fax [phone] attention [place] [place]: Phone [phone]  We thank you for your assistance in treating our mutual patient

## 2023-04-25 NOTE — TELEPHONE ENCOUNTER
Upon review of the In Basket request we were able to locate, review, and update the patient chart as requested for Diabetic Eye Exam     Any additional questions or concerns should be emailed to the Practice Liaisons via the appropriate education email address, please do not reply via In Basket      Thank you  Eusebio Josue MA

## 2023-04-25 NOTE — TELEPHONE ENCOUNTER
Upon review of the In Basket request and the patient's chart, initial outreach has been made via fax to facility  Please see Contacts section for details       Thank you  Cesilia Singh MA

## 2023-04-25 NOTE — TELEPHONE ENCOUNTER
----- Message from Taco Nicolas PA-C sent at 4/24/2023  2:16 PM EDT -----  Regarding: Care Gap Request  04/24/23 2:16 PM    Hello, our patient Jana Mota has had Diabetic Eye Exam completed/performed  Please assist in updating the patient chart by making an External outreach to [facility] located in [place]  The date of service is 2023      Thank you,  Taco Nicolas PA-C  [address]

## 2023-05-05 ENCOUNTER — OFFICE VISIT (OUTPATIENT)
Dept: URGENT CARE | Facility: CLINIC | Age: 68
End: 2023-05-05

## 2023-05-05 VITALS
OXYGEN SATURATION: 100 % | DIASTOLIC BLOOD PRESSURE: 80 MMHG | HEART RATE: 107 BPM | TEMPERATURE: 98.2 F | SYSTOLIC BLOOD PRESSURE: 140 MMHG | RESPIRATION RATE: 20 BRPM

## 2023-05-05 DIAGNOSIS — H65.111 ACUTE MUCOID OTITIS MEDIA OF RIGHT EAR: Primary | ICD-10-CM

## 2023-05-05 DIAGNOSIS — J02.9 SORE THROAT: ICD-10-CM

## 2023-05-05 LAB — S PYO AG THROAT QL: NEGATIVE

## 2023-05-05 RX ORDER — AMOXICILLIN 875 MG/1
875 TABLET, COATED ORAL 2 TIMES DAILY
Qty: 20 TABLET | Refills: 0 | Status: SHIPPED | OUTPATIENT
Start: 2023-05-05 | End: 2023-05-15

## 2023-07-11 ENCOUNTER — APPOINTMENT (OUTPATIENT)
Age: 68
End: 2023-07-11
Payer: COMMERCIAL

## 2023-07-11 DIAGNOSIS — Z79.899 ENCOUNTER FOR LONG-TERM (CURRENT) USE OF OTHER MEDICATIONS: ICD-10-CM

## 2023-07-11 LAB
ALBUMIN SERPL BCP-MCNC: 3.8 G/DL (ref 3.5–5)
ALP SERPL-CCNC: 71 U/L (ref 46–116)
ALT SERPL W P-5'-P-CCNC: 14 U/L (ref 12–78)
ANION GAP SERPL CALCULATED.3IONS-SCNC: 3 MMOL/L
AST SERPL W P-5'-P-CCNC: 10 U/L (ref 5–45)
BASOPHILS # BLD AUTO: 0.06 THOUSANDS/ÂΜL (ref 0–0.1)
BASOPHILS NFR BLD AUTO: 1 % (ref 0–1)
BILIRUB SERPL-MCNC: 1.22 MG/DL (ref 0.2–1)
BUN SERPL-MCNC: 12 MG/DL (ref 5–25)
CALCIUM SERPL-MCNC: 9.7 MG/DL (ref 8.3–10.1)
CHLORIDE SERPL-SCNC: 110 MMOL/L (ref 96–108)
CHOLEST SERPL-MCNC: 146 MG/DL
CO2 SERPL-SCNC: 27 MMOL/L (ref 21–32)
CREAT SERPL-MCNC: 1.14 MG/DL (ref 0.6–1.3)
EOSINOPHIL # BLD AUTO: 0.21 THOUSAND/ÂΜL (ref 0–0.61)
EOSINOPHIL NFR BLD AUTO: 2 % (ref 0–6)
ERYTHROCYTE [DISTWIDTH] IN BLOOD BY AUTOMATED COUNT: 13.5 % (ref 11.6–15.1)
GFR SERPL CREATININE-BSD FRML MDRD: 50 ML/MIN/1.73SQ M
GLUCOSE P FAST SERPL-MCNC: 109 MG/DL (ref 65–99)
HCT VFR BLD AUTO: 38.5 % (ref 34.8–46.1)
HGB BLD-MCNC: 12.2 G/DL (ref 11.5–15.4)
IMM GRANULOCYTES # BLD AUTO: 0.05 THOUSAND/UL (ref 0–0.2)
IMM GRANULOCYTES NFR BLD AUTO: 1 % (ref 0–2)
LITHIUM SERPL-SCNC: 0.9 MMOL/L (ref 0.6–1.2)
LYMPHOCYTES # BLD AUTO: 1.85 THOUSANDS/ÂΜL (ref 0.6–4.47)
LYMPHOCYTES NFR BLD AUTO: 18 % (ref 14–44)
MCH RBC QN AUTO: 28.9 PG (ref 26.8–34.3)
MCHC RBC AUTO-ENTMCNC: 31.7 G/DL (ref 31.4–37.4)
MCV RBC AUTO: 91 FL (ref 82–98)
MONOCYTES # BLD AUTO: 0.68 THOUSAND/ÂΜL (ref 0.17–1.22)
MONOCYTES NFR BLD AUTO: 7 % (ref 4–12)
NEUTROPHILS # BLD AUTO: 7.67 THOUSANDS/ÂΜL (ref 1.85–7.62)
NEUTS SEG NFR BLD AUTO: 71 % (ref 43–75)
NRBC BLD AUTO-RTO: 0 /100 WBCS
PLATELET # BLD AUTO: 336 THOUSANDS/UL (ref 149–390)
PMV BLD AUTO: 10.2 FL (ref 8.9–12.7)
POTASSIUM SERPL-SCNC: 4.6 MMOL/L (ref 3.5–5.3)
PROT SERPL-MCNC: 7.2 G/DL (ref 6.4–8.4)
RBC # BLD AUTO: 4.22 MILLION/UL (ref 3.81–5.12)
SODIUM SERPL-SCNC: 140 MMOL/L (ref 135–147)
T3FREE SERPL-MCNC: 3.24 PG/ML (ref 2.5–3.9)
T4 FREE SERPL-MCNC: 1.55 NG/DL (ref 0.61–1.12)
TSH SERPL DL<=0.05 MIU/L-ACNC: 0.29 UIU/ML (ref 0.45–4.5)
WBC # BLD AUTO: 10.52 THOUSAND/UL (ref 4.31–10.16)

## 2023-07-11 PROCEDURE — 84481 FREE ASSAY (FT-3): CPT

## 2023-07-11 PROCEDURE — 82465 ASSAY BLD/SERUM CHOLESTEROL: CPT

## 2023-07-11 PROCEDURE — 85025 COMPLETE CBC W/AUTO DIFF WBC: CPT

## 2023-07-11 PROCEDURE — 36415 COLL VENOUS BLD VENIPUNCTURE: CPT

## 2023-07-11 PROCEDURE — 80178 ASSAY OF LITHIUM: CPT

## 2023-07-11 PROCEDURE — 84439 ASSAY OF FREE THYROXINE: CPT

## 2023-07-11 PROCEDURE — 80053 COMPREHEN METABOLIC PANEL: CPT

## 2023-07-11 PROCEDURE — 84443 ASSAY THYROID STIM HORMONE: CPT

## 2023-07-11 PROCEDURE — 83036 HEMOGLOBIN GLYCOSYLATED A1C: CPT

## 2023-07-12 LAB
EST. AVERAGE GLUCOSE BLD GHB EST-MCNC: 108 MG/DL
HBA1C MFR BLD: 5.4 %

## 2023-07-26 DIAGNOSIS — E13.9 DIABETES 1.5, MANAGED AS TYPE 2 (HCC): ICD-10-CM

## 2023-07-26 RX ORDER — PERPHENAZINE 16 MG/1
1 TABLET, FILM COATED ORAL 2 TIMES DAILY
Qty: 100 STRIP | Refills: 5 | Status: SHIPPED | OUTPATIENT
Start: 2023-07-26

## 2023-08-16 ENCOUNTER — RA CDI HCC (OUTPATIENT)
Dept: OTHER | Facility: HOSPITAL | Age: 68
End: 2023-08-16

## 2023-08-16 NOTE — PROGRESS NOTES
[address] coding opportunities       Chart reviewed, no opportunity found: [address] Review     Patients Insurance     Medicare Insurance: [employer]

## 2023-08-22 ENCOUNTER — RA CDI HCC (OUTPATIENT)
Dept: OTHER | Facility: HOSPITAL | Age: 68
End: 2023-08-22

## 2023-08-22 NOTE — PROGRESS NOTES
[address] [facility] coding opportunities     E11.36     Chart Reviewed number of suggestions sent to Provider: 1     GR    Patients Insurance     Medicare Insurance: [facility]

## 2023-08-24 ENCOUNTER — OFFICE VISIT (OUTPATIENT)
Dept: FAMILY MEDICINE CLINIC | Facility: HOME HEALTHCARE | Age: 68
End: 2023-08-24
Payer: COMMERCIAL

## 2023-08-24 VITALS
OXYGEN SATURATION: 97 % | HEART RATE: 84 BPM | SYSTOLIC BLOOD PRESSURE: 126 MMHG | DIASTOLIC BLOOD PRESSURE: 82 MMHG | TEMPERATURE: 97.3 F | BODY MASS INDEX: 38.79 KG/M2 | RESPIRATION RATE: 20 BRPM | WEIGHT: 197.6 LBS | HEIGHT: 60 IN

## 2023-08-24 DIAGNOSIS — N18.31 TYPE 2 DIABETES MELLITUS WITH STAGE 3A CHRONIC KIDNEY DISEASE, WITHOUT LONG-TERM CURRENT USE OF INSULIN (HCC): Primary | ICD-10-CM

## 2023-08-24 DIAGNOSIS — E03.9 ACQUIRED HYPOTHYROIDISM: ICD-10-CM

## 2023-08-24 DIAGNOSIS — L21.9 SEBORRHEIC DERMATITIS: ICD-10-CM

## 2023-08-24 DIAGNOSIS — E11.22 TYPE 2 DIABETES MELLITUS WITH STAGE 3A CHRONIC KIDNEY DISEASE, WITHOUT LONG-TERM CURRENT USE OF INSULIN (HCC): Primary | ICD-10-CM

## 2023-08-24 PROCEDURE — 99213 OFFICE O/P EST LOW 20 MIN: CPT | Performed by: PHYSICIAN ASSISTANT

## 2023-08-24 RX ORDER — CLOBETASOL PROPIONATE 0.46 MG/ML
SOLUTION TOPICAL
Qty: 50 ML | Refills: 2 | Status: SHIPPED | OUTPATIENT
Start: 2023-08-24

## 2023-08-24 NOTE — PROGRESS NOTES
Patient's shoes and socks removed. Right Foot/Ankle   Right Foot Inspection  Skin Exam: skin normal and skin intact. No dry skin, no warmth, no callus, no erythema, no maceration, no abnormal color, no pre-ulcer, no ulcer and no callus. Toe Exam: ROM and strength within normal limits. Sensory   Monofilament testing: intact    Vascular  Capillary refills: < 3 seconds  The right DP pulse is 2+. The right PT pulse is 2+. Left Foot/Ankle  Left Foot Inspection  Skin Exam: skin normal and skin intact. No dry skin, no warmth, no erythema, no maceration, normal color, no pre-ulcer, no ulcer and no callus. Toe Exam: ROM and strength within normal limits. Sensory   Monofilament testing: intact    Vascular  Capillary refills: < 3 seconds  The left DP pulse is 2+. The left PT pulse is 2+.      Assign Risk Category  No deformity present  No loss of protective sensation  No weak pulses  Risk: 0

## 2023-08-24 NOTE — PROGRESS NOTES
Assessment/Plan:     Diagnoses and all orders for this visit:    Type 2 diabetes mellitus with stage 3a chronic kidney disease, without long-term current use of insulin ([address])  -     Ambulatory Referral to Ophthalmology; Future    Acquired hypothyroidism  -     TSH, 3rd generation with Free T4 reflex; Future    Seborrheic dermatitis  -     clobetasol (TEMOVATE) 0.05 % external solution; Apply to dry scalp daily for up to 4 weeks. Leave shampoo on the scalp x 15 minutes, then rinse. - Repeat TSH. If this remains low, will decrease levothyroxine dose. - Continue current medications as prescribed  - Diabetic eye exam as scheduled    Return in about 3 months (around 11/24/2023) for Next scheduled follow up. Subjective:        Patient ID: Preet Miller is a 80 y.o. female. Chief Complaint   Patient presents with   • Diabetes   • Follow-up       Arsen Mccoy is a 80year old female with history of HLD, hypothyroidism, type 2 DM, and bipolar disorder, presenting for follow up.     HTN is currently managed with lisinopril 5 mg daily. /82 today. Denies chest pain, palpitations, or LE edema.     HLD is managed with pravastatin 40 mg daily. Last lipid panel from 2/2023 with total cholesterol 149, LDL 58.     Hypothyroidism managed with levothyroxine 112 mcg daily. Last TSH from 7/2023 was low at 0.28 with T4 1.55.     Type 2 DM managed with Tradjenta 5 mg daily. Last A1c 7/2023 was 5.4. Due for diabetic eye exam.     She is following with [facility] for bipolar disorder, managed with trazodone and lithium.       The following portions of the patient's history were reviewed and updated as appropriate: allergies, current medications, past family history, past medical history, past social history, past surgical history and problem list.    Patient Active Problem List   Diagnosis   • Bipolar disorder ([address])   • Constipation   • Hypercholesterolemia   • Hypothyroidism   • Type 2 diabetes mellitus with stage 3a chronic kidney disease, without long-term current use of insulin ([address])   • Morbid obesity with BMI of 40.0-44.9, adult ([facility])   • Postmenopausal   • Atypical squamous cells of undetermined significance on cytologic smear of cervix ([facility])   • Essential hypertension   • Vitamin D deficiency   • Vitamin B12 deficiency   • Stage 3 chronic kidney disease, unspecified whether stage 3a or 3b CKD ([facility])       Current Outpatient Medications   Medication Sig Dispense Refill   • aspirin (ECOTRIN LOW STRENGTH) 81 mg EC tablet Take 81 mg by mouth daily     • clobetasol (TEMOVATE) 0.05 % external solution Apply to dry scalp daily for up to 4 weeks. Leave shampoo on the scalp x 15 minutes, then rinse. 50 mL 2   • glucose blood (Contour Next Test) test strip Use 1 each 2 (two) times a day Use as instructed 100 strip 5   • glucose blood test strip 1 each by Other route as needed Use as instructed     • levothyroxine 112 mcg tablet Take 1 tablet (112 mcg total) by mouth daily 90 tablet 3   • linaGLIPtin (Tradjenta) 5 MG TABS Take 5 mg by mouth daily 90 tablet 3   • lisinopril (ZESTRIL) 5 mg tablet Take 1 tablet (5 mg total) by mouth daily 90 tablet 3   • lithium carbonate (LITHOBID) 300 mg CR tablet      • pravastatin (PRAVACHOL) 40 mg tablet Take 1 tablet (40 mg total) by mouth daily 90 tablet 3   • traZODone (DESYREL) 100 mg tablet      • Cholecalciferol (Vitamin D) 50 MCG (2000 UT) tablet Take 1 tablet (2,000 Units total) by mouth daily (Patient not taking: Reported on 8/24/2023) 90 tablet 3   • hydrOXYzine HCL (ATARAX) 25 mg tablet Take 2 tablets (50 mg total) by mouth daily at bedtime for 7 days 14 tablet 0     No current facility-administered medications for this visit.         Past Medical History:   Diagnosis Date   • Allergic    • Depression    • Diabetes mellitus ([address])    • Disease of thyroid gland    • GERD (gastroesophageal reflux disease)    • Hyperchloremia    • Hypertension    • Psychiatric disorder Past Surgical History:   Procedure Laterality Date   • ANKLE SURGERY      Incision   • DILATION AND CURETTAGE OF UTERUS          Social History     Socioeconomic History   • Marital status: Single     Spouse name: Not on file   • Number of children: Not on file   • Years of education: Not on file   • Highest education level: Not on file   Occupational History   • Not on file   Tobacco Use   • Smoking status: Never   • Smokeless tobacco: Never   Vaping Use   • Vaping Use: Never used   Substance and Sexual Activity   • Alcohol use: No   • Drug use: No   • Sexual activity: Not Currently   Other Topics Concern   • Not on file   Social History Narrative   • Not on file     Social Determinants of Health     Financial Resource Strain: Not on file   Food Insecurity: Not on file   Transportation Needs: Not on file   Physical Activity: Not on file   Stress: Not on file   Social Connections: Not on file   Intimate Partner Violence: Not on file   Housing Stability: Not on file        Review of Systems   Constitutional: Negative for chills, diaphoresis and fever. Eyes: Negative for visual disturbance. Respiratory: Negative for cough, chest tightness, shortness of breath and wheezing. Cardiovascular: Negative for chest pain, palpitations and leg swelling. Gastrointestinal: Negative for abdominal pain, blood in stool, constipation, diarrhea, nausea and vomiting. Genitourinary: Negative for dysuria, frequency, hematuria and urgency. Skin: Negative for rash and wound. Neurological: Negative for dizziness, syncope, weakness, light-headedness and headaches. Psychiatric/Behavioral: Negative for dysphoric mood, self-injury, sleep disturbance and suicidal ideas. The patient is not nervous/anxious. All other systems reviewed and are negative.         Objective:      /82 (BP Location: Left arm, Patient Position: Sitting, Cuff Size: Large)   Pulse 84   Temp (!) 97.3 °F (36.3 °C) (Tympanic)   Resp 20   Ht 5' (1.524 m)   Wt 89.6 kg (197 lb 9.6 oz)   SpO2 97%   BMI 38.59 kg/m²          Physical Exam  Vitals and nursing note reviewed. Constitutional:       General: She is not in acute distress. Appearance: Normal appearance. She is obese. HENT:      Head: Normocephalic and atraumatic. Eyes:      Extraocular Movements: Extraocular movements intact. Conjunctiva/sclera: Conjunctivae normal.      Pupils: Pupils are equal, round, and reactive to light. Cardiovascular:      Rate and Rhythm: Normal rate and regular rhythm. Heart sounds: Normal heart sounds. No murmur heard. Pulmonary:      Effort: Pulmonary effort is normal. No respiratory distress. Breath sounds: Normal breath sounds. No wheezing. Musculoskeletal:      Cervical back: Normal range of motion and neck supple. Skin:     General: Skin is warm and dry. Neurological:      General: No focal deficit present. Mental Status: She is alert and oriented to person, place, and time. Cranial Nerves: No cranial nerve deficit. Motor: No weakness.    Psychiatric:         Mood and Affect: Mood normal.         Behavior: Behavior normal.

## 2023-09-20 ENCOUNTER — APPOINTMENT (OUTPATIENT)
Age: 68
End: 2023-09-20
Payer: COMMERCIAL

## 2023-09-20 DIAGNOSIS — E03.9 ACQUIRED HYPOTHYROIDISM: ICD-10-CM

## 2023-09-20 LAB
T4 FREE SERPL-MCNC: 1.6 NG/DL (ref 0.61–1.12)
TSH SERPL DL<=0.05 MIU/L-ACNC: 0.17 UIU/ML (ref 0.45–4.5)

## 2023-09-20 PROCEDURE — 84443 ASSAY THYROID STIM HORMONE: CPT

## 2023-09-20 PROCEDURE — 84439 ASSAY OF FREE THYROXINE: CPT

## 2023-09-20 PROCEDURE — 36415 COLL VENOUS BLD VENIPUNCTURE: CPT

## 2023-09-22 DIAGNOSIS — E03.9 ACQUIRED HYPOTHYROIDISM: ICD-10-CM

## 2023-09-22 DIAGNOSIS — E03.9 ACQUIRED HYPOTHYROIDISM: Primary | ICD-10-CM

## 2023-09-22 RX ORDER — LEVOTHYROXINE SODIUM 0.1 MG/1
100 TABLET ORAL DAILY
Qty: 90 TABLET | Refills: 0 | Status: SHIPPED | OUTPATIENT
Start: 2023-09-22

## 2023-10-04 LAB
LEFT EYE DIABETIC RETINOPATHY: NORMAL
RIGHT EYE DIABETIC RETINOPATHY: NORMAL

## 2023-10-06 ENCOUNTER — APPOINTMENT (OUTPATIENT)
Dept: RADIOLOGY | Facility: CLINIC | Age: 68
End: 2023-10-06
Payer: COMMERCIAL

## 2023-10-06 ENCOUNTER — OFFICE VISIT (OUTPATIENT)
Dept: URGENT CARE | Facility: CLINIC | Age: 68
End: 2023-10-06
Payer: COMMERCIAL

## 2023-10-06 VITALS
DIASTOLIC BLOOD PRESSURE: 52 MMHG | RESPIRATION RATE: 22 BRPM | BODY MASS INDEX: 37.69 KG/M2 | SYSTOLIC BLOOD PRESSURE: 127 MMHG | TEMPERATURE: 98 F | HEART RATE: 100 BPM | OXYGEN SATURATION: 97 % | WEIGHT: 193 LBS

## 2023-10-06 DIAGNOSIS — J06.9 UPPER RESPIRATORY INFECTION WITH COUGH AND CONGESTION: Primary | ICD-10-CM

## 2023-10-06 DIAGNOSIS — R05.1 ACUTE COUGH: ICD-10-CM

## 2023-10-06 PROCEDURE — 87635 SARS-COV-2 COVID-19 AMP PRB: CPT | Performed by: NURSE PRACTITIONER

## 2023-10-06 PROCEDURE — 71046 X-RAY EXAM CHEST 2 VIEWS: CPT

## 2023-10-06 PROCEDURE — 99213 OFFICE O/P EST LOW 20 MIN: CPT | Performed by: NURSE PRACTITIONER

## 2023-10-06 PROCEDURE — S9088 SERVICES PROVIDED IN URGENT: HCPCS | Performed by: NURSE PRACTITIONER

## 2023-10-06 NOTE — PATIENT INSTRUCTIONS
Your xray was preliminarily read by your provider. A radiologist will read the xray and you will be notified if it is abnormal.    You appear to have cold symptoms. You are to take plain robitussin for the cough. You may try corcidin HBP for cold symptoms.   You are to follow up with your PCP in 3-5 days   Go to the ED if symptoms worsen

## 2023-10-06 NOTE — PROGRESS NOTES
[facility]        NAME: Anahy Staples is a 80 y.o. female  : 1955    MRN: [id number]  DATE: 2023  TIME: 1:48 PM    Assessment and Plan   Upper respiratory infection with cough and congestion [J06.9]  1. Upper respiratory infection with cough and congestion        2. Acute cough  COVID Only -Office Collect    XR chest pa & lateral            Patient Instructions       Follow up with PCP in 3-5 days. Proceed to  ER if symptoms worsen. Your xray was preliminarily read by your provider. A radiologist will read the xray and you will be notified if it is abnormal.    You appear to have cold symptoms. You are to take plain robitussin for the cough. You may try corcidin HBP for cold symptoms. You are to follow up with your PCP in 3-5 days   Go to the ED if symptoms worsen        Chief Complaint     Chief Complaint   Patient presents with   • Cough     X 2 days   • Nasal Congestion     X 2 days         History of Present Illness       This is a 80year old female who states was sitting a the eye doctors office 2 days ago and the person next to her was coughing and did not have a mask on. She states that she is now coughing and sneezing. She has not taken anything for her symptoms. She denies fevers, chills, n/v/d. She states she is covid vaccinated last in . She has not tested herself for covid. Denies chest pain, SOB. Review of Systems   Review of Systems   Constitutional: Negative. HENT: Positive for sneezing. Eyes: Negative. Respiratory: Positive for cough. Cardiovascular: Negative. Gastrointestinal: Negative. Endocrine: Negative. Genitourinary: Negative. Musculoskeletal: Negative. Skin: Negative. Allergic/Immunologic: Negative. Neurological: Negative. Hematological: Negative. Psychiatric/Behavioral: Negative.           Current Medications       Current Outpatient Medications:   •  aspirin (ECOTRIN LOW STRENGTH) 81 mg EC tablet, Take 81 mg by mouth daily, Disp: , Rfl:   •  Cholecalciferol (Vitamin D) 50 MCG (2000 UT) tablet, Take 1 tablet (2,000 Units total) by mouth daily (Patient not taking: Reported on 8/24/2023), Disp: 90 tablet, Rfl: 3  •  clobetasol (TEMOVATE) 0.05 % external solution, Apply to dry scalp daily for up to 4 weeks.   Leave shampoo on the scalp x 15 minutes, then rinse., Disp: 50 mL, Rfl: 2  •  glucose blood (Contour Next Test) test strip, Use 1 each 2 (two) times a day Use as instructed, Disp: 100 strip, Rfl: 5  •  glucose blood test strip, 1 each by Other route as needed Use as instructed, Disp: , Rfl:   •  hydrOXYzine HCL (ATARAX) 25 mg tablet, Take 2 tablets (50 mg total) by mouth daily at bedtime for 7 days, Disp: 14 tablet, Rfl: 0  •  levothyroxine 100 mcg tablet, Take 1 tablet (100 mcg total) by mouth daily, Disp: 90 tablet, Rfl: 0  •  linaGLIPtin (Tradjenta) 5 MG TABS, Take 5 mg by mouth daily, Disp: 90 tablet, Rfl: 3  •  lisinopril (ZESTRIL) 5 mg tablet, Take 1 tablet (5 mg total) by mouth daily, Disp: 90 tablet, Rfl: 3  •  lithium carbonate (LITHOBID) 300 mg CR tablet, , Disp: , Rfl:   •  pravastatin (PRAVACHOL) 40 mg tablet, Take 1 tablet (40 mg total) by mouth daily, Disp: 90 tablet, Rfl: 3  •  traZODone (DESYREL) 100 mg tablet, , Disp: , Rfl:     Current Allergies     Allergies as of 10/06/2023   • (No Known Allergies)            The following portions of the patient's history were reviewed and updated as appropriate: allergies, current medications, past family history, past medical history, past social history, past surgical history and problem list.     Past Medical History:   Diagnosis Date   • Allergic    • Depression    • Diabetes mellitus ([address])    • Disease of thyroid gland    • GERD (gastroesophageal reflux disease)    • Hyperchloremia    • Hypertension    • Psychiatric disorder        Past Surgical History:   Procedure Laterality Date   • ANKLE SURGERY      Incision   • DILATION AND CURETTAGE OF UTERUS Family History   Problem Relation Age of Onset   • No Known Problems Family    • Diabetes Mother    • Heart disease Mother    • Stroke Mother    • Diabetes Father    • Stroke Father    • No Known Problems Maternal Grandmother    • No Known Problems Maternal Grandfather    • No Known Problems Paternal Grandmother    • No Known Problems Paternal Grandfather    • Diabetes Brother    • No Known Problems Paternal Aunt          Medications have been verified. Objective   /52   Pulse 100   Temp 98 °F (36.7 °C)   Resp 22   Wt 87.5 kg (193 lb)   BMI 37.69 kg/m²   No LMP recorded. Patient is postmenopausal.       Physical Exam     Physical Exam  Vitals and nursing note reviewed. Constitutional:       General: She is not in acute distress. Appearance: Normal appearance. She is obese. She is not ill-appearing, toxic-appearing or diaphoretic. HENT:      Head: Normocephalic and atraumatic. Right Ear: Tympanic membrane normal. There is impacted cerumen. Left Ear: Tympanic membrane normal. There is impacted cerumen. Nose: Congestion present. No rhinorrhea. Mouth/Throat:      Mouth: Mucous membranes are moist.      Pharynx: No oropharyngeal exudate or posterior oropharyngeal erythema. Eyes:      Extraocular Movements: Extraocular movements intact. Cardiovascular:      Rate and Rhythm: Normal rate and regular rhythm. Pulses: Normal pulses. Heart sounds: Normal heart sounds. Pulmonary:      Effort: Pulmonary effort is normal. No respiratory distress. Breath sounds: Normal breath sounds. No stridor. No wheezing, rhonchi or rales. Comments: No coughing or sneezing noted while in exam room   Chest:      Chest wall: No tenderness. Musculoskeletal:         General: Normal range of motion. Cervical back: Normal range of motion and neck supple. Skin:     General: Skin is warm and dry. Capillary Refill: Capillary refill takes less than 2 seconds. Neurological:      General: No focal deficit present. Mental Status: She is alert and oriented to person, place, and time. Psychiatric:         Mood and Affect: Mood normal.         Behavior: Behavior normal.         Thought Content: Thought content normal.         Judgment: Judgment normal.             Preliminary reading CXR  ?  Bronchial enlargement  Waiting on rad read

## 2023-10-07 LAB — SARS-COV-2 RNA RESP QL NAA+PROBE: POSITIVE

## 2023-10-07 NOTE — RESULT ENCOUNTER NOTE
SARS-CoV-2 Negative Positive    Attempted to call patient and inform her of + covid results. No answer. Unable to leave message. Please have your office call patient for follow up and to discuss Paxlovid treatment if she is interested.

## 2023-10-08 ENCOUNTER — NURSE TRIAGE (OUTPATIENT)
Dept: OTHER | Facility: OTHER | Age: 68
End: 2023-10-08

## 2023-10-08 DIAGNOSIS — U07.1 COVID-19: Primary | ICD-10-CM

## 2023-10-08 RX ORDER — NIRMATRELVIR AND RITONAVIR 150-100 MG
2 KIT ORAL 2 TIMES DAILY
Qty: 20 TABLET | Refills: 0 | Status: SHIPPED | OUTPATIENT
Start: 2023-10-08 | End: 2023-10-13

## 2023-10-08 NOTE — TELEPHONE ENCOUNTER
Regarding: COVID Positive/ medication  ----- Message from Jamel Louise sent at 10/8/2023  4:10 PM EDT -----  "I tested positive for COVID, I want to see if there is a medication that they can prescribe for me."

## 2023-10-08 NOTE — RESULT ENCOUNTER NOTE
Spoke with patient regarding + covid results. She states she is not feeling well. Informed her that is due to the covid. Pt is informed to take robitussin for cough. She has been informed to call PCP office that there is always someone on call and she can discuss paxlovid treatment with them if she and the dr. Mayer Pop it. Gave pt strict ED instructions. She verbalizes understanding.      Please have your office call patient for follow up

## 2023-10-08 NOTE — TELEPHONE ENCOUNTER
Reason for Disposition  • HIGH RISK for severe COVID complications (e.g., weak immune system, age > 59 years, obesity with BMI > 22, pregnant, chronic lung disease or other chronic medical condition)  (Exception: Already seen by PCP and no new or worsening symptoms.)    Answer Assessment - Initial Assessment Questions  1. COVID-19 DIAGNOSIS: "Who made your COVID-19 diagnosis?" "Was it confirmed by a positive lab test or self-test?" If not diagnosed by a doctor (or NP/PA), ask "Are there lots of cases (community spread) where you live?" Note: See public health department website, if unsure. Positive lab test    2. COVID-19 EXPOSURE: "Was there any known exposure to COVID before the symptoms began?" CDC Definition of close contact: within 6 feet (2 meters) for a total of 15 minutes or more over a 24-hour period. Unsure    3. ONSET: "When did the COVID-19 symptoms start?"      10/5    4. WORST SYMPTOM: "What is your worst symptom?" (e.g., cough, fever, shortness of breath, muscle aches)    Cough    5. COUGH: "Do you have a cough?" If Yes, ask: "How bad is the cough?"      Yes    6. FEVER: "Do you have a fever?" If Yes, ask: "What is your temperature, how was it measured, and when did it start?"      No    7. RESPIRATORY STATUS: "Describe your breathing?" (e.g., shortness of breath, wheezing, unable to speak)     Denies respiratory distress    8. BETTER-SAME-WORSE: "Are you getting better, staying the same or getting worse compared to yesterday?"  If getting worse, ask, "In what way?"     Same    9. HIGH RISK DISEASE: "Do you have any chronic medical problems?" (e.g., asthma, heart or lung disease, weak immune system, obesity, etc.)   Type 2 Dm and CKD    12. PREGNANCY: "Is there any chance you are pregnant?" "When was your last menstrual period?"   n/a    13.  OTHER SYMPTOMS: "Do you have any other symptoms?"  (e.g., chills, fatigue, headache, loss of smell or taste, muscle pain, sore throat)   cough, runny nose, diarrhea, and congestion    14. O2 SATURATION MONITOR:  "Do you use an oxygen saturation monitor (pulse oximeter) at home?" If Yes, ask "What is your reading (oxygen level) today?" "What is your usual oxygen saturation reading?" (e.g., 95%)      No    Protocols used: CORONAVIRUS (COVID-19) DIAGNOSED OR SUSPECTED-ADULT-      Paged on call provider. Provider stated he sent Paxlovid to pharmacy and called patient directly.

## 2023-10-23 ENCOUNTER — OFFICE VISIT (OUTPATIENT)
Dept: URGENT CARE | Facility: CLINIC | Age: 68
End: 2023-10-23
Payer: COMMERCIAL

## 2023-10-23 VITALS
BODY MASS INDEX: 37.89 KG/M2 | RESPIRATION RATE: 18 BRPM | WEIGHT: 194 LBS | TEMPERATURE: 98 F | OXYGEN SATURATION: 98 % | HEART RATE: 78 BPM | DIASTOLIC BLOOD PRESSURE: 64 MMHG | SYSTOLIC BLOOD PRESSURE: 145 MMHG

## 2023-10-23 DIAGNOSIS — Z86.16 HISTORY OF COVID-19: ICD-10-CM

## 2023-10-23 DIAGNOSIS — R05.2 SUBACUTE COUGH: Primary | ICD-10-CM

## 2023-10-23 PROCEDURE — 99213 OFFICE O/P EST LOW 20 MIN: CPT | Performed by: NURSE PRACTITIONER

## 2023-10-23 PROCEDURE — S9088 SERVICES PROVIDED IN URGENT: HCPCS | Performed by: NURSE PRACTITIONER

## 2023-10-23 NOTE — PROGRESS NOTES
[facility]        NAME: Evette Robert is a 80 y.o. female  : 1955    MRN: [id number]  DATE: 2023  TIME: 2:37 PM    Assessment and Plan   Subacute cough [R05.2]  1. Subacute cough        2. History of COVID-19              Patient Instructions       Follow up with PCP in 3-5 days. Proceed to  ER if symptoms worsen. You are to continue take plain robitussin for cough due to recent covid. You may call your PCP if you are requesting something stronger. [facility] does not prescribe prescription cough medications  You are to follow up with your PCP in 2-3 days  Go to the ED if symptoms worsen          Chief Complaint     Chief Complaint   Patient presents with    Cough     Cough x 2+ weeks         History of Present Illness       This is a 80year old female who recently had covid. She states she has been improving and did not need to go to the ED for care. She states she seems to have a cough that keeps her up at night. She denies fevers, chills, n/v/d. She states she has been taking robitussin. Review of Systems   Review of Systems   Constitutional: Negative. HENT: Negative. Eyes: Negative. Respiratory:  Positive for cough. Cardiovascular: Negative. Gastrointestinal: Negative. Endocrine: Negative. Genitourinary: Negative. Musculoskeletal: Negative. Skin: Negative. Allergic/Immunologic: Negative. Neurological: Negative. Hematological: Negative. Psychiatric/Behavioral: Negative. Current Medications       Current Outpatient Medications:     aspirin (ECOTRIN LOW STRENGTH) 81 mg EC tablet, Take 81 mg by mouth daily, Disp: , Rfl:     clobetasol (TEMOVATE) 0.05 % external solution, Apply to dry scalp daily for up to 4 weeks.   Leave shampoo on the scalp x 15 minutes, then rinse., Disp: 50 mL, Rfl: 2    glucose blood (Contour Next Test) test strip, Use 1 each 2 (two) times a day Use as instructed, Disp: 100 strip, Rfl: 5 glucose blood test strip, 1 each by Other route as needed Use as instructed, Disp: , Rfl:     levothyroxine 100 mcg tablet, Take 1 tablet (100 mcg total) by mouth daily, Disp: 90 tablet, Rfl: 0    linaGLIPtin (Tradjenta) 5 MG TABS, Take 5 mg by mouth daily, Disp: 90 tablet, Rfl: 3    lisinopril (ZESTRIL) 5 mg tablet, Take 1 tablet (5 mg total) by mouth daily, Disp: 90 tablet, Rfl: 3    lithium carbonate (LITHOBID) 300 mg CR tablet, , Disp: , Rfl:     pravastatin (PRAVACHOL) 40 mg tablet, Take 1 tablet (40 mg total) by mouth daily, Disp: 90 tablet, Rfl: 3    traZODone (DESYREL) 100 mg tablet, , Disp: , Rfl:     Cholecalciferol (Vitamin D) 50 MCG (2000 UT) tablet, Take 1 tablet (2,000 Units total) by mouth daily (Patient not taking: Reported on 8/24/2023), Disp: 90 tablet, Rfl: 3    hydrOXYzine HCL (ATARAX) 25 mg tablet, Take 2 tablets (50 mg total) by mouth daily at bedtime for 7 days, Disp: 14 tablet, Rfl: 0    Current Allergies     Allergies as of 10/23/2023    (No Known Allergies)            The following portions of the patient's history were reviewed and updated as appropriate: allergies, current medications, past family history, past medical history, past social history, past surgical history and problem list.     Past Medical History:   Diagnosis Date    Allergic     Depression     Diabetes mellitus ([address])     Disease of thyroid gland     GERD (gastroesophageal reflux disease)     Hyperchloremia     Hypertension     Psychiatric disorder        Past Surgical History:   Procedure Laterality Date    ANKLE SURGERY      Incision    DILATION AND CURETTAGE OF UTERUS         Family History   Problem Relation Age of Onset    No Known Problems Family     Diabetes Mother     Heart disease Mother     Stroke Mother     Diabetes Father     Stroke Father     No Known Problems Maternal Grandmother     No Known Problems Maternal Grandfather     No Known Problems Paternal Grandmother     No Known Problems Paternal Grandfather Diabetes Brother     No Known Problems Paternal Aunt          Medications have been verified. Objective   /64 (BP Location: Right arm, Patient Position: Sitting, Cuff Size: Standard)   Pulse 78   Temp 98 °F (36.7 °C) (Temporal)   Resp 18   Wt 88 kg (194 lb)   SpO2 98%   BMI 37.89 kg/m²   No LMP recorded. Patient is postmenopausal.       Physical Exam     Physical Exam  Vitals and nursing note reviewed. Constitutional:       General: She is not in acute distress. Appearance: Normal appearance. She is obese. She is not ill-appearing, toxic-appearing or diaphoretic. HENT:      Head: Normocephalic and atraumatic. Nose: Nose normal.      Mouth/Throat:      Mouth: Mucous membranes are moist.   Eyes:      Extraocular Movements: Extraocular movements intact. Cardiovascular:      Rate and Rhythm: Normal rate and regular rhythm. Pulses: Normal pulses. Heart sounds: Normal heart sounds. Pulmonary:      Effort: Pulmonary effort is normal. No respiratory distress. Breath sounds: Normal breath sounds. No stridor. No wheezing, rhonchi or rales. Comments: No cough in exam room   Chest:      Chest wall: No tenderness. Musculoskeletal:         General: Normal range of motion. Cervical back: Normal range of motion. Skin:     General: Skin is warm and dry. Capillary Refill: Capillary refill takes less than 2 seconds. Neurological:      General: No focal deficit present. Mental Status: She is alert and oriented to person, place, and time. Psychiatric:         Mood and Affect: Mood normal.         Behavior: Behavior normal.         Thought Content:  Thought content normal.         Judgment: Judgment normal.

## 2023-10-23 NOTE — PATIENT INSTRUCTIONS
You are to continue take plain robitussin for cough due to recent covid. You may call your PCP if you are requesting something stronger.    [facility] does not prescribe prescription cough medications  You are to follow up with your PCP in 2-3 days  Go to the ED if symptoms worsen

## 2023-11-16 ENCOUNTER — RA CDI HCC (OUTPATIENT)
Dept: OTHER | Facility: HOSPITAL | Age: 68
End: 2023-11-16

## 2023-11-16 NOTE — PROGRESS NOTES
[address] coding opportunities       Chart reviewed, no opportunity found: [address] Review     Patients Insurance     Medicare Insurance: [employer]

## 2023-11-20 ENCOUNTER — OFFICE VISIT (OUTPATIENT)
Dept: URGENT CARE | Facility: CLINIC | Age: 68
End: 2023-11-20
Payer: COMMERCIAL

## 2023-11-20 VITALS
WEIGHT: 189.6 LBS | TEMPERATURE: 97.9 F | HEIGHT: 60 IN | DIASTOLIC BLOOD PRESSURE: 65 MMHG | HEART RATE: 94 BPM | SYSTOLIC BLOOD PRESSURE: 136 MMHG | OXYGEN SATURATION: 99 % | BODY MASS INDEX: 37.22 KG/M2 | RESPIRATION RATE: 18 BRPM

## 2023-11-20 DIAGNOSIS — G47.09 OTHER INSOMNIA: Primary | ICD-10-CM

## 2023-11-20 PROCEDURE — 99213 OFFICE O/P EST LOW 20 MIN: CPT | Performed by: NURSE PRACTITIONER

## 2023-11-20 PROCEDURE — S9088 SERVICES PROVIDED IN URGENT: HCPCS | Performed by: NURSE PRACTITIONER

## 2023-11-20 NOTE — PATIENT INSTRUCTIONS
You are to contact your psychiatrist since your increased dose in trazadone hasn't worked  Follow up with your PCP in 3-5 days   Go to the ED if symptoms worsen  You may try melatonin OTC

## 2023-11-20 NOTE — PROGRESS NOTES
[facility]        NAME: Gunnar Escobar is a 80 y.o. female  : 1955    MRN: [id number]  DATE: 2023  TIME: 2:11 PM    Assessment and Plan   Other insomnia [G47.09]  1. Other insomnia              Patient Instructions       Follow up with PCP in 3-5 days. Proceed to  ER if symptoms worsen. You are to contact your psychiatrist since your increased dose in trazadone hasn't worked  Follow up with your PCP in 3-5 days   Go to the ED if symptoms worsen  You may try melatonin OTC     Chief Complaint     Chief Complaint   Patient presents with    Insomnia     Patient states, "Cannot sleep, haven't slept in a month." Left message for psychiatry one week ago that recent increase in Trazodone is not effective. Has not heard back. History of Present Illness       This is a 80year old female who states that she has been unable to sleep since she has had covid. She contacted her psychiatrist and they increased her Topamax from 100 to 150 and "it still isn't working". She states she has tried melatonin before w/o relief. She has not called her PCP. Review of Systems   Review of Systems   Constitutional: Negative. HENT: Negative. Eyes: Negative. Respiratory: Negative. Cardiovascular: Negative. Gastrointestinal: Negative. Endocrine: Negative. Genitourinary: Negative. Musculoskeletal: Negative. Skin: Negative. Allergic/Immunologic: Negative. Neurological: Negative. Hematological: Negative. Psychiatric/Behavioral:  Positive for sleep disturbance. Current Medications       Current Outpatient Medications:     clobetasol (TEMOVATE) 0.05 % external solution, Apply to dry scalp daily for up to 4 weeks.   Leave shampoo on the scalp x 15 minutes, then rinse., Disp: 50 mL, Rfl: 2    glucose blood (Contour Next Test) test strip, Use 1 each 2 (two) times a day Use as instructed, Disp: 100 strip, Rfl: 5    glucose blood test strip, 1 each by Other route as needed Use as instructed, Disp: , Rfl:     levothyroxine 100 mcg tablet, Take 1 tablet (100 mcg total) by mouth daily, Disp: 90 tablet, Rfl: 0    linaGLIPtin (Tradjenta) 5 MG TABS, Take 5 mg by mouth daily, Disp: 90 tablet, Rfl: 3    lisinopril (ZESTRIL) 5 mg tablet, Take 1 tablet (5 mg total) by mouth daily, Disp: 90 tablet, Rfl: 3    lithium carbonate (LITHOBID) 300 mg CR tablet, , Disp: , Rfl:     pravastatin (PRAVACHOL) 40 mg tablet, Take 1 tablet (40 mg total) by mouth daily, Disp: 90 tablet, Rfl: 3    traZODone (DESYREL) 100 mg tablet, , Disp: , Rfl:     aspirin (ECOTRIN LOW STRENGTH) 81 mg EC tablet, Take 81 mg by mouth daily (Patient not taking: Reported on 11/20/2023), Disp: , Rfl:     Cholecalciferol (Vitamin D) 50 MCG (2000 UT) tablet, Take 1 tablet (2,000 Units total) by mouth daily (Patient not taking: Reported on 8/24/2023), Disp: 90 tablet, Rfl: 3    hydrOXYzine HCL (ATARAX) 25 mg tablet, Take 2 tablets (50 mg total) by mouth daily at bedtime for 7 days, Disp: 14 tablet, Rfl: 0    Current Allergies     Allergies as of 11/20/2023    (No Known Allergies)            The following portions of the patient's history were reviewed and updated as appropriate: allergies, current medications, past family history, past medical history, past social history, past surgical history and problem list.     Past Medical History:   Diagnosis Date    Allergic     Depression     Diabetes mellitus ([address])     Disease of thyroid gland     GERD (gastroesophageal reflux disease)     Hyperchloremia     Hypertension     Psychiatric disorder        Past Surgical History:   Procedure Laterality Date    ANKLE SURGERY      Incision    DILATION AND CURETTAGE OF UTERUS         Family History   Problem Relation Age of Onset    No Known Problems Family     Diabetes Mother     Heart disease Mother     Stroke Mother     Diabetes Father     Stroke Father     No Known Problems Maternal Grandmother     No Known Problems Maternal Grandfather     No Known Problems Paternal Grandmother     No Known Problems Paternal Grandfather     Diabetes Brother     No Known Problems Paternal Aunt          Medications have been verified. Objective   /65 (BP Location: Left arm, Patient Position: Sitting, Cuff Size: Standard)   Pulse 94   Temp 97.9 °F (36.6 °C) (Temporal)   Resp 18   Ht 5' (1.524 m)   Wt 86 kg (189 lb 9.6 oz)   SpO2 99%   BMI 37.03 kg/m²   No LMP recorded. Patient is postmenopausal.       Physical Exam     Physical Exam  Vitals and nursing note reviewed. Constitutional:       General: She is not in acute distress. Appearance: Normal appearance. She is obese. She is not ill-appearing or toxic-appearing. HENT:      Head: Normocephalic and atraumatic. Nose: Nose normal.      Mouth/Throat:      Mouth: Mucous membranes are moist.   Cardiovascular:      Rate and Rhythm: Normal rate and regular rhythm. Pulses: Normal pulses. Heart sounds: Normal heart sounds. Pulmonary:      Effort: Pulmonary effort is normal.      Breath sounds: Normal breath sounds. Musculoskeletal:         General: Normal range of motion. Cervical back: Normal range of motion. Skin:     General: Skin is warm and dry. Capillary Refill: Capillary refill takes less than 2 seconds. Neurological:      General: No focal deficit present. Mental Status: She is alert and oriented to person, place, and time. Psychiatric:         Mood and Affect: Mood normal.         Behavior: Behavior normal.         Thought Content:  Thought content normal.         Judgment: Judgment normal.

## 2023-11-27 ENCOUNTER — RA CDI HCC (OUTPATIENT)
Dept: OTHER | Facility: HOSPITAL | Age: 68
End: 2023-11-27

## 2023-11-27 ENCOUNTER — OFFICE VISIT (OUTPATIENT)
Dept: FAMILY MEDICINE CLINIC | Facility: HOME HEALTHCARE | Age: 68
End: 2023-11-27
Payer: COMMERCIAL

## 2023-11-27 VITALS
RESPIRATION RATE: 18 BRPM | DIASTOLIC BLOOD PRESSURE: 68 MMHG | HEIGHT: 60 IN | SYSTOLIC BLOOD PRESSURE: 104 MMHG | WEIGHT: 189.2 LBS | TEMPERATURE: 98.1 F | HEART RATE: 90 BPM | OXYGEN SATURATION: 99 % | BODY MASS INDEX: 37.15 KG/M2

## 2023-11-27 DIAGNOSIS — N18.31 TYPE 2 DIABETES MELLITUS WITH STAGE 3A CHRONIC KIDNEY DISEASE, WITHOUT LONG-TERM CURRENT USE OF INSULIN (HCC): Primary | ICD-10-CM

## 2023-11-27 DIAGNOSIS — E11.22 TYPE 2 DIABETES MELLITUS WITH STAGE 3A CHRONIC KIDNEY DISEASE, WITHOUT LONG-TERM CURRENT USE OF INSULIN (HCC): Primary | ICD-10-CM

## 2023-11-27 DIAGNOSIS — E03.9 ACQUIRED HYPOTHYROIDISM: ICD-10-CM

## 2023-11-27 LAB
SL AMB POCT HEMOGLOBIN AIC: 5.5 (ref ?–6.5)
T4 FREE SERPL-MCNC: 1.21 NG/DL (ref 0.61–1.12)
TSH SERPL DL<=0.05 MIU/L-ACNC: 0.73 UIU/ML (ref 0.45–4.5)

## 2023-11-27 PROCEDURE — 83036 HEMOGLOBIN GLYCOSYLATED A1C: CPT | Performed by: PHYSICIAN ASSISTANT

## 2023-11-27 PROCEDURE — 84439 ASSAY OF FREE THYROXINE: CPT | Performed by: PHYSICIAN ASSISTANT

## 2023-11-27 PROCEDURE — 99213 OFFICE O/P EST LOW 20 MIN: CPT | Performed by: PHYSICIAN ASSISTANT

## 2023-11-27 PROCEDURE — 84443 ASSAY THYROID STIM HORMONE: CPT | Performed by: PHYSICIAN ASSISTANT

## 2023-11-27 RX ORDER — TRAZODONE HYDROCHLORIDE 150 MG/1
150 TABLET ORAL
COMMUNITY
Start: 2023-11-27

## 2023-11-27 NOTE — PROGRESS NOTES
Assessment/Plan:     Diagnoses and all orders for this visit:    Type 2 diabetes mellitus with stage 3a chronic kidney disease, without long-term current use of insulin ([facility])  -     POCT hemoglobin A1c    Acquired hypothyroidism    Other orders  -     traZODone (DESYREL) 150 mg tablet; Take 150 mg by mouth daily at bedtime        - Continue current medications as prescribed  - TSH drawn in the office today. Will call with results and adjust levothyroxine as necessary. Return in about 3 months (around 2/27/2024) for Next scheduled follow up. Subjective:        Patient ID: Maria C Lugo is a 80 y.o. female. Chief Complaint   Patient presents with   • Diabetes       Dede Carpio is a 80year old female with history of HLD, hypothyroidism, type 2 DM, and bipolar disorder, presenting for follow up. HTN is currently managed with lisinopril 5 mg daily. /68 today. Denies chest pain, palpitations, or LE edema. HLD is managed with pravastatin 40 mg daily. Last lipid panel from 2/2023 with total cholesterol 149, LDL 58. Hypothyroidism managed with levothyroxine 100 mcg daily. Last TSH from 9/2023 was low at 0.167 with T4 1.6, at which time TSH was lowered from 112 mcg to 100 mcg daily. Type 2 DM managed with Tradjenta 5 mg daily. Last A1c 7/2023 was 5.4, remains 5.5 today. [facility] exam done at [facility] 10/2023. She is following with [facility] for bipolar disorder, managed with trazodone and lithium.         The following portions of the patient's history were reviewed and updated as appropriate: allergies, current medications, past family history, past medical history, past social history, past surgical history and problem list.    Patient Active Problem List   Diagnosis   • Bipolar disorder ([address])   • Constipation   • Hypercholesterolemia   • Hypothyroidism   • Type 2 diabetes mellitus with stage 3a chronic kidney disease, without long-term current use of insulin ([address])   • Morbid obesity with BMI of 40.0-44.9, adult ([facility])   • Postmenopausal   • Atypical squamous cells of undetermined significance on cytologic smear of cervix ([facility])   • Essential hypertension   • Vitamin D deficiency   • Vitamin B12 deficiency   • Stage 3 chronic kidney disease, unspecified whether stage 3a or 3b CKD ([facility])       Current Outpatient Medications   Medication Sig Dispense Refill   • Cholecalciferol (Vitamin D) 50 MCG (2000 UT) tablet Take 1 tablet (2,000 Units total) by mouth daily 90 tablet 3   • clobetasol (TEMOVATE) 0.05 % external solution Apply to dry scalp daily for up to 4 weeks. Leave shampoo on the scalp x 15 minutes, then rinse. 50 mL 2   • glucose blood (Contour Next Test) test strip Use 1 each 2 (two) times a day Use as instructed 100 strip 5   • glucose blood test strip 1 each by Other route as needed Use as instructed     • levothyroxine 100 mcg tablet Take 1 tablet (100 mcg total) by mouth daily 90 tablet 0   • linaGLIPtin (Tradjenta) 5 MG TABS Take 5 mg by mouth daily 90 tablet 3   • lisinopril (ZESTRIL) 5 mg tablet Take 1 tablet (5 mg total) by mouth daily 90 tablet 3   • lithium carbonate (LITHOBID) 300 mg CR tablet      • pravastatin (PRAVACHOL) 40 mg tablet Take 1 tablet (40 mg total) by mouth daily 90 tablet 3   • traZODone (DESYREL) 150 mg tablet Take 150 mg by mouth daily at bedtime     • aspirin (ECOTRIN LOW STRENGTH) 81 mg EC tablet Take 81 mg by mouth daily (Patient not taking: Reported on 11/20/2023)     • hydrOXYzine HCL (ATARAX) 25 mg tablet Take 2 tablets (50 mg total) by mouth daily at bedtime for 7 days 14 tablet 0     No current facility-administered medications for this visit.         Past Medical History:   Diagnosis Date   • Allergic    • Depression    • Diabetes mellitus ([address])    • Disease of thyroid gland    • GERD (gastroesophageal reflux disease)    • Hyperchloremia    • Hypertension    • Psychiatric disorder         Past Surgical History:   Procedure Laterality Date   • ANKLE SURGERY      Incision   • DILATION AND CURETTAGE OF UTERUS          Social History     Socioeconomic History   • Marital status: Single     Spouse name: Not on file   • Number of children: Not on file   • Years of education: Not on file   • Highest education level: Not on file   Occupational History   • Not on file   Tobacco Use   • Smoking status: Never   • Smokeless tobacco: Never   Vaping Use   • Vaping Use: Never used   Substance and Sexual Activity   • Alcohol use: No   • Drug use: No   • Sexual activity: Not Currently   Other Topics Concern   • Not on file   Social History Narrative   • Not on file     Social Determinants of Health     Financial Resource Strain: Not on file   Food Insecurity: Not on file   Transportation Needs: Not on file   Physical Activity: Not on file   Stress: Not on file   Social Connections: Not on file   Intimate Partner Violence: Not on file   Housing Stability: Not on file        Review of Systems   Constitutional:  Negative for chills, diaphoresis and fever. Eyes:  Negative for visual disturbance. Respiratory:  Negative for cough, chest tightness, shortness of breath and wheezing. Cardiovascular:  Negative for chest pain, palpitations and leg swelling. Gastrointestinal:  Negative for abdominal pain, blood in stool, constipation, diarrhea, nausea and vomiting. Genitourinary:  Negative for dysuria, frequency, hematuria and urgency. Skin:  Negative for rash and wound. Neurological:  Negative for dizziness, syncope, weakness, light-headedness and headaches. Psychiatric/Behavioral:  Positive for sleep disturbance. Negative for dysphoric mood, self-injury and suicidal ideas. The patient is not nervous/anxious. All other systems reviewed and are negative.         Objective:      /68 (BP Location: Left arm, Patient Position: Sitting, Cuff Size: Standard)   Pulse 90   Temp 98.1 °F (36.7 °C)   Resp 18   Ht 5' (1.524 m)   Wt 85.8 kg (189 lb 3.2 oz)   SpO2 99%   BMI 36.95 kg/m²          Physical Exam  Vitals and nursing note reviewed. Constitutional:       General: She is not in acute distress. Appearance: Normal appearance. She is obese. HENT:      Head: Normocephalic and atraumatic. Eyes:      Extraocular Movements: Extraocular movements intact. Conjunctiva/sclera: Conjunctivae normal.      Pupils: Pupils are equal, round, and reactive to light. Cardiovascular:      Rate and Rhythm: Normal rate and regular rhythm. Heart sounds: Normal heart sounds. No murmur heard. Pulmonary:      Effort: Pulmonary effort is normal. No respiratory distress. Breath sounds: Normal breath sounds. No wheezing. Musculoskeletal:      Cervical back: Normal range of motion and neck supple. Skin:     General: Skin is warm and dry. Neurological:      General: No focal deficit present. Mental Status: She is alert and oriented to person, place, and time. Cranial Nerves: No cranial nerve deficit. Motor: No weakness.    Psychiatric:         Mood and Affect: Mood normal.         Behavior: Behavior normal.

## 2023-11-27 NOTE — PROGRESS NOTES
[facility] coding opportunities     E11.36     Chart Reviewed number of suggestions sent to Provider: 1   GR    Patients Insurance     Medicare Insurance: [facility]

## 2023-11-28 ENCOUNTER — TELEPHONE (OUTPATIENT)
Dept: ADMINISTRATIVE | Facility: OTHER | Age: 68
End: 2023-11-28

## 2023-11-28 NOTE — TELEPHONE ENCOUNTER
Upon review of the In Basket request and the patient's chart, initial outreach has been made via fax to facility. Please see Contacts section for details.      Thank you  Figueroa Galan MA

## 2023-11-28 NOTE — LETTER
Diabetic Eye Exam Form    Date Requested: 23  Patient: Jose Eduardo Marie  Patient : 1955   Referring Provider: Hillary Jaramillo PA-C      DIABETIC Eye Exam Date _______________________________      Type of Exam MUST be documented for Diabetic Eye Exams. Please CHECK ONE. Retinal Exam       Dilated Retinal Exam       OCT       Optomap-Iris Exam      Fundus Photography       Left Eye - Please check Retinopathy or No Retinopathy        Exam did show retinopathy    Exam did not show retinopathy       Right Eye - Please check Retinopathy or No Retinopathy       Exam did show retinopathy    Exam did not show retinopathy       Comments __________________________________________________________    Practice Providing Exam ______________________________________________    Exam Performed By (print name) _______________________________________      Provider Signature ___________________________________________________      These reports are needed for  compliance. Please fax this completed form and a copy of the Diabetic Eye Exam report to our office located at [address] [place] [address] as soon as possible via Fax [phone] payam Hussein: Phone [phone]  We thank you for your assistance in treating our mutual patient.

## 2023-11-28 NOTE — TELEPHONE ENCOUNTER
----- Message from Maurisio Cheek PA-C sent at 11/27/2023  1:55 PM EST -----  Regarding: Care Gap Request  11/27/23 1:55 PM    Hello, our patient Go Dasilva has had Diabetic Eye Exam completed/performed. Please assist in updating the patient chart by making an External outreach to [facility] located in [address] [place] [address]. The date of service is 10/2023.     Thank you,  Maurisio Cheek PA-C  [address]

## 2023-11-29 NOTE — TELEPHONE ENCOUNTER
Upon review of the In Basket request we were able to locate, review, and update the patient chart as requested for Diabetic Eye Exam.    Any additional questions or concerns should be emailed to the Practice Liaisons via the appropriate education email address, please do not reply via In Basket.     Thank you  Jannet Jean MA

## 2023-12-12 DIAGNOSIS — E03.9 ACQUIRED HYPOTHYROIDISM: ICD-10-CM

## 2023-12-12 RX ORDER — LEVOTHYROXINE SODIUM 88 UG/1
88 TABLET ORAL DAILY
Qty: 90 TABLET | Refills: 1 | Status: SHIPPED | OUTPATIENT
Start: 2023-12-12

## 2024-01-22 DIAGNOSIS — E11.22 TYPE 2 DIABETES MELLITUS WITH STAGE 3A CHRONIC KIDNEY DISEASE, WITHOUT LONG-TERM CURRENT USE OF INSULIN (HCC): ICD-10-CM

## 2024-01-22 DIAGNOSIS — N18.31 TYPE 2 DIABETES MELLITUS WITH STAGE 3A CHRONIC KIDNEY DISEASE, WITHOUT LONG-TERM CURRENT USE OF INSULIN (HCC): ICD-10-CM

## 2024-01-23 DIAGNOSIS — E11.22 TYPE 2 DIABETES MELLITUS WITH STAGE 3A CHRONIC KIDNEY DISEASE, WITHOUT LONG-TERM CURRENT USE OF INSULIN (HCC): ICD-10-CM

## 2024-01-23 DIAGNOSIS — N18.31 TYPE 2 DIABETES MELLITUS WITH STAGE 3A CHRONIC KIDNEY DISEASE, WITHOUT LONG-TERM CURRENT USE OF INSULIN (HCC): ICD-10-CM

## 2024-01-23 RX ORDER — LINAGLIPTIN 5 MG/1
5 TABLET, FILM COATED ORAL DAILY
Qty: 30 TABLET | Refills: 0 | OUTPATIENT
Start: 2024-01-23

## 2024-01-23 RX ORDER — LINAGLIPTIN 5 MG/1
5 TABLET, FILM COATED ORAL DAILY
Qty: 90 TABLET | Refills: 3 | Status: SHIPPED | OUTPATIENT
Start: 2024-01-23

## 2024-02-07 DIAGNOSIS — E78.00 HYPERCHOLESTEROLEMIA: ICD-10-CM

## 2024-02-08 RX ORDER — PRAVASTATIN SODIUM 40 MG
40 TABLET ORAL DAILY
Qty: 90 TABLET | Refills: 3 | Status: SHIPPED | OUTPATIENT
Start: 2024-02-08

## 2024-02-15 ENCOUNTER — RA CDI HCC (OUTPATIENT)
Dept: OTHER | Facility: HOSPITAL | Age: 69
End: 2024-02-15

## 2024-02-15 NOTE — PROGRESS NOTES
[facility] coding opportunities       Chart reviewed, no opportunity found: CHART REVIEWED, NO OPPORTUNITY FOUND   [facility] Reviewed      Patients Insurance     Medicare Insurance: [facility] [employer]

## 2024-02-23 ENCOUNTER — RA CDI HCC (OUTPATIENT)
Dept: OTHER | Facility: HOSPITAL | Age: 69
End: 2024-02-23

## 2024-02-27 ENCOUNTER — OFFICE VISIT (OUTPATIENT)
Dept: FAMILY MEDICINE CLINIC | Facility: HOME HEALTHCARE | Age: 69
End: 2024-02-27
Payer: COMMERCIAL

## 2024-02-27 VITALS
RESPIRATION RATE: 18 BRPM | BODY MASS INDEX: 38.59 KG/M2 | SYSTOLIC BLOOD PRESSURE: 110 MMHG | TEMPERATURE: 97.6 F | OXYGEN SATURATION: 97 % | HEART RATE: 77 BPM | DIASTOLIC BLOOD PRESSURE: 72 MMHG | WEIGHT: 197.6 LBS

## 2024-02-27 DIAGNOSIS — E78.00 HYPERCHOLESTEROLEMIA: ICD-10-CM

## 2024-02-27 DIAGNOSIS — E11.22 TYPE 2 DIABETES MELLITUS WITH STAGE 3A CHRONIC KIDNEY DISEASE, WITHOUT LONG-TERM CURRENT USE OF INSULIN (HCC): Primary | ICD-10-CM

## 2024-02-27 DIAGNOSIS — E55.9 VITAMIN D DEFICIENCY: ICD-10-CM

## 2024-02-27 DIAGNOSIS — N18.31 TYPE 2 DIABETES MELLITUS WITH STAGE 3A CHRONIC KIDNEY DISEASE, WITHOUT LONG-TERM CURRENT USE OF INSULIN (HCC): Primary | ICD-10-CM

## 2024-02-27 DIAGNOSIS — E03.9 ACQUIRED HYPOTHYROIDISM: ICD-10-CM

## 2024-02-27 DIAGNOSIS — M25.562 CHRONIC PAIN OF LEFT KNEE: ICD-10-CM

## 2024-02-27 DIAGNOSIS — G89.29 CHRONIC PAIN OF LEFT KNEE: ICD-10-CM

## 2024-02-27 PROBLEM — E66.812 CLASS 2 SEVERE OBESITY DUE TO EXCESS CALORIES WITH SERIOUS COMORBIDITY AND BODY MASS INDEX (BMI) OF 38.0 TO 38.9 IN ADULT (HCC): Status: ACTIVE | Noted: 2019-10-18

## 2024-02-27 LAB — SL AMB POCT HEMOGLOBIN AIC: 5.5 (ref ?–6.5)

## 2024-02-27 PROCEDURE — 99213 OFFICE O/P EST LOW 20 MIN: CPT | Performed by: PHYSICIAN ASSISTANT

## 2024-02-27 PROCEDURE — 83036 HEMOGLOBIN GLYCOSYLATED A1C: CPT | Performed by: FAMILY MEDICINE

## 2024-02-27 RX ORDER — TRAZODONE HYDROCHLORIDE 100 MG/1
200 TABLET ORAL
COMMUNITY
Start: 2024-02-19

## 2024-02-27 NOTE — PROGRESS NOTES
Assessment/Plan:     Diagnoses and all orders for this visit:    Type 2 diabetes mellitus with stage 3a chronic kidney disease, without long-term current use of insulin (HCC)  -     POCT hemoglobin A1c  -     Albumin / creatinine urine ratio; Future  -     CBC and differential; Future  -     Comprehensive metabolic panel; Future    Hypercholesterolemia  -     Lipid panel; Future    Acquired hypothyroidism  -     TSH, 3rd generation with Free T4 reflex; Future    Vitamin D deficiency  -     Vitamin D 25 hydroxy; Future    Chronic pain of left knee  -     Ambulatory Referral to Orthopedic Surgery; Future  -     XR knee 3 vw left non injury; Future    Other orders  -     traZODone (DESYREL) 100 mg tablet; Take 200 mg by mouth daily at bedtime        - Continue current medications as prescribed  - Labs as ordered  - Will check left knee xray and refer to ortho for follow up    Return in about 3 months (around 5/27/2024) for Medicare Annual Wellness Visit.              Subjective:        Patient ID: Lisa Rich is a 68 y.o. female.    Chief Complaint   Patient presents with   • Follow-up       Lisa is a 68 year old female with history of HLD, hypothyroidism, type 2 DM, and bipolar disorder, presenting for follow up.     HTN is currently managed with lisinopril 5 mg daily.  /72 today.  Denies chest pain, palpitations, or LE edema.     HLD is managed with pravastatin 40 mg daily.  Last lipid panel from 2/2023 with total cholesterol 149, LDL 58.     Hypothyroidism managed with levothyroxine 88 mcg daily.  Last TSH from 11/2023 WNL.     Type 2 DM managed with Tradjenta 5 mg daily.  Last A1c 11/2023 was 5.5.  Eye exam UTD, done at [facility].    She is following with [facility] for bipolar disorder, managed with trazodone and lithium.    Patient endorses worsening left knee pain today.  Reports seeing Dr. Wilson in the past and would like to follow up with him again.  No recent xrays.         The following portions  of the patient's history were reviewed and updated as appropriate: allergies, current medications, past family history, past medical history, past social history, past surgical history and problem list.    Patient Active Problem List   Diagnosis   • Bipolar disorder (HCC)   • Constipation   • Hypercholesterolemia   • Hypothyroidism   • Type 2 diabetes mellitus with stage 3a chronic kidney disease, without long-term current use of insulin ([facility])   • Class 2 severe obesity due to excess calories with serious comorbidity and body mass index (BMI) of 38.0 to 38.9 in adult    • Postmenopausal   • Atypical squamous cells of undetermined significance on cytologic smear of cervix ([facility])   • Essential hypertension   • Vitamin D deficiency   • Vitamin B12 deficiency   • Stage 3 chronic kidney disease, unspecified whether stage 3a or 3b CKD ([facility])       Current Outpatient Medications   Medication Sig Dispense Refill   • aspirin (ECOTRIN LOW STRENGTH) 81 mg EC tablet Take 81 mg by mouth daily     • clobetasol (TEMOVATE) 0.05 % external solution Apply to dry scalp daily for up to 4 weeks.  Leave shampoo on the scalp x 15 minutes, then rinse. 50 mL 2   • glucose blood (Contour Next Test) test strip Use 1 each 2 (two) times a day Use as instructed 100 strip 5   • glucose blood test strip 1 each by Other route as needed Use as instructed     • linaGLIPtin (Tradjenta) 5 MG TABS Take 5 mg by mouth daily 90 tablet 3   • lisinopril (ZESTRIL) 5 mg tablet Take 1 tablet (5 mg total) by mouth daily 90 tablet 3   • lithium carbonate (LITHOBID) 300 mg CR tablet      • pravastatin (PRAVACHOL) 40 mg tablet TAKE 1 TABLET DAILY. 90 tablet 3   • traZODone (DESYREL) 100 mg tablet Take 200 mg by mouth daily at bedtime     • Cholecalciferol (Vitamin D) 50 MCG (2000 UT) tablet Take 1 tablet (2,000 Units total) by mouth daily (Patient not taking: Reported on 2/27/2024) 90 tablet 3   • hydrOXYzine HCL (ATARAX) 25 mg tablet Take 2 tablets (50 mg total)  by mouth daily at bedtime for 7 days 14 tablet 0   • levothyroxine 88 mcg tablet Take 1 tablet (88 mcg total) by mouth daily 90 tablet 1     No current facility-administered medications for this visit.        Past Medical History:   Diagnosis Date   • Allergic    • Depression    • Diabetes mellitus (HCC)    • Disease of thyroid gland    • GERD (gastroesophageal reflux disease)    • Hyperchloremia    • Hypertension    • Psychiatric disorder         Past Surgical History:   Procedure Laterality Date   • ANKLE SURGERY      Incision   • DILATION AND CURETTAGE OF UTERUS          Social History     Socioeconomic History   • Marital status: Single     Spouse name: Not on file   • Number of children: Not on file   • Years of education: Not on file   • Highest education level: Not on file   Occupational History   • Not on file   Tobacco Use   • Smoking status: Never   • Smokeless tobacco: Never   Vaping Use   • Vaping status: Never Used   Substance and Sexual Activity   • Alcohol use: No   • Drug use: No   • Sexual activity: Not Currently   Other Topics Concern   • Not on file   Social History Narrative   • Not on file     Social Determinants of Health     Financial Resource Strain: Not on file   Food Insecurity: No Food Insecurity (11/1/2023)    Received from [facility] Vital Sign    • Worried About Running Out of Food in the Last Year: Never true    • Ran Out of Food in the Last Year: Never true   Transportation Needs: Not on file   Physical Activity: Not on file   Stress: Not on file   Social Connections: Not on file   Intimate Partner Violence: Not on file   Housing Stability: Not on file        Review of Systems   Constitutional:  Negative for chills, diaphoresis and fever.   Eyes:  Negative for visual disturbance.   Respiratory:  Negative for cough, chest tightness, shortness of breath and wheezing.    Cardiovascular:  Negative for chest pain, palpitations and leg swelling.   Gastrointestinal:  Negative for  abdominal pain, blood in stool, constipation, diarrhea, nausea and vomiting.   Genitourinary:  Negative for dysuria, frequency, hematuria and urgency.   Musculoskeletal:  Positive for arthralgias.   Skin:  Negative for rash and wound.   Neurological:  Negative for dizziness, syncope, weakness, light-headedness and headaches.   Psychiatric/Behavioral:  Negative for dysphoric mood, self-injury and suicidal ideas. The patient is not nervous/anxious.    All other systems reviewed and are negative.        Objective:      /72   Pulse 77   Temp 97.6 °F (36.4 °C)   Resp 18   Wt 89.6 kg (197 lb 9.6 oz)   SpO2 97%   BMI 38.59 kg/m²          Physical Exam  Vitals and nursing note reviewed.   Constitutional:       General: She is not in acute distress.     Appearance: Normal appearance. She is obese.   HENT:      Head: Normocephalic and atraumatic.   Eyes:      Extraocular Movements: Extraocular movements intact.      Conjunctiva/sclera: Conjunctivae normal.      Pupils: Pupils are equal, round, and reactive to light.   Cardiovascular:      Rate and Rhythm: Normal rate and regular rhythm.      Heart sounds: Normal heart sounds. No murmur heard.  Pulmonary:      Effort: Pulmonary effort is normal. No respiratory distress.      Breath sounds: Normal breath sounds. No wheezing.   Musculoskeletal:      Cervical back: Normal range of motion and neck supple.   Skin:     General: Skin is warm and dry.   Neurological:      General: No focal deficit present.      Mental Status: She is alert and oriented to person, place, and time.      Cranial Nerves: No cranial nerve deficit.      Motor: No weakness.   Psychiatric:         Mood and Affect: Mood normal.         Behavior: Behavior normal.

## 2024-03-05 DIAGNOSIS — E03.9 ACQUIRED HYPOTHYROIDISM: ICD-10-CM

## 2024-03-05 RX ORDER — LEVOTHYROXINE SODIUM 88 UG/1
88 TABLET ORAL DAILY
Qty: 90 TABLET | Refills: 0 | Status: SHIPPED | OUTPATIENT
Start: 2024-03-05

## 2024-03-07 ENCOUNTER — APPOINTMENT (OUTPATIENT)
Age: 69
End: 2024-03-07
Payer: COMMERCIAL

## 2024-03-07 ENCOUNTER — APPOINTMENT (OUTPATIENT)
Dept: RADIOLOGY | Facility: CLINIC | Age: 69
End: 2024-03-07
Payer: COMMERCIAL

## 2024-03-07 DIAGNOSIS — N18.31 TYPE 2 DIABETES MELLITUS WITH STAGE 3A CHRONIC KIDNEY DISEASE, WITHOUT LONG-TERM CURRENT USE OF INSULIN (HCC): ICD-10-CM

## 2024-03-07 DIAGNOSIS — M25.562 CHRONIC PAIN OF LEFT KNEE: ICD-10-CM

## 2024-03-07 DIAGNOSIS — E55.9 VITAMIN D DEFICIENCY: ICD-10-CM

## 2024-03-07 DIAGNOSIS — G89.29 CHRONIC PAIN OF LEFT KNEE: ICD-10-CM

## 2024-03-07 DIAGNOSIS — E11.22 TYPE 2 DIABETES MELLITUS WITH STAGE 3A CHRONIC KIDNEY DISEASE, WITHOUT LONG-TERM CURRENT USE OF INSULIN (HCC): ICD-10-CM

## 2024-03-07 DIAGNOSIS — E78.00 HYPERCHOLESTEROLEMIA: ICD-10-CM

## 2024-03-07 DIAGNOSIS — E03.9 ACQUIRED HYPOTHYROIDISM: ICD-10-CM

## 2024-03-07 LAB
25(OH)D3 SERPL-MCNC: 20.4 NG/ML (ref 30–100)
ALBUMIN SERPL BCP-MCNC: 4.1 G/DL (ref 3.5–5)
ALP SERPL-CCNC: 82 U/L (ref 34–104)
ALT SERPL W P-5'-P-CCNC: 11 U/L (ref 7–52)
ANION GAP SERPL CALCULATED.3IONS-SCNC: 9 MMOL/L
AST SERPL W P-5'-P-CCNC: 18 U/L (ref 13–39)
BASOPHILS # BLD AUTO: 0.05 THOUSANDS/ÂΜL (ref 0–0.1)
BASOPHILS NFR BLD AUTO: 1 % (ref 0–1)
BILIRUB SERPL-MCNC: 0.99 MG/DL (ref 0.2–1)
BUN SERPL-MCNC: 12 MG/DL (ref 5–25)
CALCIUM SERPL-MCNC: 9.8 MG/DL (ref 8.4–10.2)
CHLORIDE SERPL-SCNC: 103 MMOL/L (ref 96–108)
CHOLEST SERPL-MCNC: 168 MG/DL
CO2 SERPL-SCNC: 28 MMOL/L (ref 21–32)
CREAT SERPL-MCNC: 0.99 MG/DL (ref 0.6–1.3)
CREAT UR-MCNC: 62.4 MG/DL
EOSINOPHIL # BLD AUTO: 0.34 THOUSAND/ÂΜL (ref 0–0.61)
EOSINOPHIL NFR BLD AUTO: 4 % (ref 0–6)
ERYTHROCYTE [DISTWIDTH] IN BLOOD BY AUTOMATED COUNT: 13.4 % (ref 11.6–15.1)
GFR SERPL CREATININE-BSD FRML MDRD: 59 ML/MIN/1.73SQ M
GLUCOSE P FAST SERPL-MCNC: 89 MG/DL (ref 65–99)
HCT VFR BLD AUTO: 37.6 % (ref 34.8–46.1)
HDLC SERPL-MCNC: 75 MG/DL
HGB BLD-MCNC: 11.8 G/DL (ref 11.5–15.4)
IMM GRANULOCYTES # BLD AUTO: 0.04 THOUSAND/UL (ref 0–0.2)
IMM GRANULOCYTES NFR BLD AUTO: 0 % (ref 0–2)
LDLC SERPL CALC-MCNC: 78 MG/DL (ref 0–100)
LYMPHOCYTES # BLD AUTO: 1.88 THOUSANDS/ÂΜL (ref 0.6–4.47)
LYMPHOCYTES NFR BLD AUTO: 20 % (ref 14–44)
MCH RBC QN AUTO: 29.6 PG (ref 26.8–34.3)
MCHC RBC AUTO-ENTMCNC: 31.4 G/DL (ref 31.4–37.4)
MCV RBC AUTO: 94 FL (ref 82–98)
MICROALBUMIN UR-MCNC: 28.2 MG/L
MICROALBUMIN/CREAT 24H UR: 45 MG/G CREATININE (ref 0–30)
MONOCYTES # BLD AUTO: 0.56 THOUSAND/ÂΜL (ref 0.17–1.22)
MONOCYTES NFR BLD AUTO: 6 % (ref 4–12)
NEUTROPHILS # BLD AUTO: 6.58 THOUSANDS/ÂΜL (ref 1.85–7.62)
NEUTS SEG NFR BLD AUTO: 69 % (ref 43–75)
NONHDLC SERPL-MCNC: 93 MG/DL
NRBC BLD AUTO-RTO: 0 /100 WBCS
PLATELET # BLD AUTO: 332 THOUSANDS/UL (ref 149–390)
PMV BLD AUTO: 10 FL (ref 8.9–12.7)
POTASSIUM SERPL-SCNC: 4.6 MMOL/L (ref 3.5–5.3)
PROT SERPL-MCNC: 6.8 G/DL (ref 6.4–8.4)
RBC # BLD AUTO: 3.99 MILLION/UL (ref 3.81–5.12)
SODIUM SERPL-SCNC: 140 MMOL/L (ref 135–147)
T4 FREE SERPL-MCNC: 1.08 NG/DL (ref 0.61–1.12)
TRIGL SERPL-MCNC: 73 MG/DL
TSH SERPL DL<=0.05 MIU/L-ACNC: 5.38 UIU/ML (ref 0.45–4.5)
WBC # BLD AUTO: 9.45 THOUSAND/UL (ref 4.31–10.16)

## 2024-03-07 PROCEDURE — 84439 ASSAY OF FREE THYROXINE: CPT

## 2024-03-07 PROCEDURE — 82570 ASSAY OF URINE CREATININE: CPT

## 2024-03-07 PROCEDURE — 85025 COMPLETE CBC W/AUTO DIFF WBC: CPT

## 2024-03-07 PROCEDURE — 80061 LIPID PANEL: CPT

## 2024-03-07 PROCEDURE — 36415 COLL VENOUS BLD VENIPUNCTURE: CPT

## 2024-03-07 PROCEDURE — 84443 ASSAY THYROID STIM HORMONE: CPT

## 2024-03-07 PROCEDURE — 82043 UR ALBUMIN QUANTITATIVE: CPT

## 2024-03-07 PROCEDURE — 73562 X-RAY EXAM OF KNEE 3: CPT

## 2024-03-07 PROCEDURE — 82306 VITAMIN D 25 HYDROXY: CPT

## 2024-03-07 PROCEDURE — 80053 COMPREHEN METABOLIC PANEL: CPT

## 2024-03-08 DIAGNOSIS — E55.9 VITAMIN D DEFICIENCY: ICD-10-CM

## 2024-03-08 RX ORDER — CHOLECALCIFEROL (VITAMIN D3) 50 MCG
2000 TABLET ORAL DAILY
Qty: 90 TABLET | Refills: 3 | Status: SHIPPED | OUTPATIENT
Start: 2024-03-08

## 2024-03-26 DIAGNOSIS — I10 ESSENTIAL HYPERTENSION: ICD-10-CM

## 2024-03-26 DIAGNOSIS — N18.31 TYPE 2 DIABETES MELLITUS WITH STAGE 3A CHRONIC KIDNEY DISEASE, WITHOUT LONG-TERM CURRENT USE OF INSULIN (HCC): ICD-10-CM

## 2024-03-26 DIAGNOSIS — E11.22 TYPE 2 DIABETES MELLITUS WITH STAGE 3A CHRONIC KIDNEY DISEASE, WITHOUT LONG-TERM CURRENT USE OF INSULIN (HCC): ICD-10-CM

## 2024-03-26 RX ORDER — LISINOPRIL 5 MG/1
TABLET ORAL
Qty: 90 TABLET | Refills: 0 | Status: SHIPPED | OUTPATIENT
Start: 2024-03-26

## 2024-04-02 ENCOUNTER — OFFICE VISIT (OUTPATIENT)
Dept: OBGYN CLINIC | Facility: CLINIC | Age: 69
End: 2024-04-02
Payer: COMMERCIAL

## 2024-04-02 VITALS
DIASTOLIC BLOOD PRESSURE: 73 MMHG | BODY MASS INDEX: 38.68 KG/M2 | HEART RATE: 78 BPM | SYSTOLIC BLOOD PRESSURE: 122 MMHG | WEIGHT: 197 LBS | HEIGHT: 60 IN

## 2024-04-02 DIAGNOSIS — M17.12 PRIMARY OSTEOARTHRITIS OF LEFT KNEE: Primary | ICD-10-CM

## 2024-04-02 PROCEDURE — 99203 OFFICE O/P NEW LOW 30 MIN: CPT | Performed by: ORTHOPAEDIC SURGERY

## 2024-04-02 PROCEDURE — 20610 DRAIN/INJ JOINT/BURSA W/O US: CPT | Performed by: ORTHOPAEDIC SURGERY

## 2024-04-02 RX ORDER — BUPIVACAINE HYDROCHLORIDE 2.5 MG/ML
4 INJECTION, SOLUTION INFILTRATION; PERINEURAL
Status: COMPLETED | OUTPATIENT
Start: 2024-04-02 | End: 2024-04-02

## 2024-04-02 RX ORDER — TRIAMCINOLONE ACETONIDE 40 MG/ML
80 INJECTION, SUSPENSION INTRA-ARTICULAR; INTRAMUSCULAR
Status: COMPLETED | OUTPATIENT
Start: 2024-04-02 | End: 2024-04-02

## 2024-04-02 RX ADMIN — TRIAMCINOLONE ACETONIDE 80 MG: 40 INJECTION, SUSPENSION INTRA-ARTICULAR; INTRAMUSCULAR at 14:30

## 2024-04-02 RX ADMIN — BUPIVACAINE HYDROCHLORIDE 4 ML: 2.5 INJECTION, SOLUTION INFILTRATION; PERINEURAL at 14:30

## 2024-04-02 NOTE — PROGRESS NOTES
ASSESSMENT/PLAN:    Diagnoses and all orders for this visit:    Primary osteoarthritis of left knee    Other orders  -     Large joint arthrocentesis        X-rays of the patient's left knee are negative for any fractures or dislocations.  There are moderate arthritic changes present.  Possible treatment options were discussed with the patient.  She would like to proceed with conservative treatment.  Her left knee was injected with Kenalog and Marcaine.  She tolerated the injection quite well.  She will follow-up with our office in 3 months.  The patient is acceptable to this plan.    Return in about 3 months (around 7/2/2024).    The patient has arthritis of her left knee.  She does not want any surgical invention.  The left knee was injected with Kenalog and Marcaine.  She tolerated seizure quite well.  Return back 3 months evaluation.  If her condition changes, she would not hesitate to let us know  _____________________________________________________  CHIEF COMPLAINT:  Chief Complaint   Patient presents with    Left Knee - Pain         SUBJECTIVE:  Lisa Rich is a 68 y.o. female who presents to our office complaining of left knee pain.  The patient states that her pain is worsened with prolonged standing and ambulation.  She denies any new falls or trauma.  She denies any numbness or tingling.    The following portions of the patient's history were reviewed and updated as appropriate: allergies, current medications, past family history, past medical history, past social history, past surgical history and problem list.    PAST MEDICAL HISTORY:  Past Medical History:   Diagnosis Date    Allergic     Depression     Diabetes mellitus (HCC)     Disease of thyroid gland     GERD (gastroesophageal reflux disease)     Hyperchloremia     Hypertension     Psychiatric disorder        PAST SURGICAL HISTORY:  Past Surgical History:   Procedure Laterality Date    ANKLE SURGERY      Incision    DILATION AND CURETTAGE OF  UTERUS         FAMILY HISTORY:  Family History   Problem Relation Age of Onset    No Known Problems Family     Diabetes Mother     Heart disease Mother     Stroke Mother     Diabetes Father     Stroke Father     No Known Problems Maternal Grandmother     No Known Problems Maternal Grandfather     No Known Problems Paternal Grandmother     No Known Problems Paternal Grandfather     Diabetes Brother     No Known Problems Paternal Aunt        SOCIAL HISTORY:  Social History     Tobacco Use    Smoking status: Never    Smokeless tobacco: Never   Vaping Use    Vaping status: Never Used   Substance Use Topics    Alcohol use: No    Drug use: No       MEDICATIONS:    Current Outpatient Medications:     aspirin (ECOTRIN LOW STRENGTH) 81 mg EC tablet, Take 81 mg by mouth daily, Disp: , Rfl:     Cholecalciferol (Vitamin D) 50 MCG (2000 UT) tablet, Take 1 tablet (2,000 Units total) by mouth daily, Disp: 90 tablet, Rfl: 3    clobetasol (TEMOVATE) 0.05 % external solution, Apply to dry scalp daily for up to 4 weeks.  Leave shampoo on the scalp x 15 minutes, then rinse., Disp: 50 mL, Rfl: 2    glucose blood (Contour Next Test) test strip, Use 1 each 2 (two) times a day Use as instructed, Disp: 100 strip, Rfl: 5    glucose blood test strip, 1 each by Other route as needed Use as instructed, Disp: , Rfl:     levothyroxine 88 mcg tablet, Take 1 tablet (88 mcg total) by mouth daily, Disp: 90 tablet, Rfl: 0    linaGLIPtin (Tradjenta) 5 MG TABS, Take 5 mg by mouth daily, Disp: 90 tablet, Rfl: 3    lisinopril (ZESTRIL) 5 mg tablet, TAKE 1 TABLETDAILY., Disp: 90 tablet, Rfl: 0    lithium carbonate (LITHOBID) 300 mg CR tablet, , Disp: , Rfl:     pravastatin (PRAVACHOL) 40 mg tablet, TAKE 1 TABLET DAILY., Disp: 90 tablet, Rfl: 3    traZODone (DESYREL) 100 mg tablet, Take 200 mg by mouth daily at bedtime, Disp: , Rfl:     hydrOXYzine HCL (ATARAX) 25 mg tablet, Take 2 tablets (50 mg total) by mouth daily at bedtime for 7 days, Disp: 14 tablet,  Rfl: 0    ALLERGIES:  No Known Allergies    ROS:  Review of Systems     Constitutional: Negative for fatigue, fever or loss of appetite.   HENT: Negative.    Respiratory: Negative for shortness of breath, dyspnea.    Cardiovascular: Negative for chest pain/tightness.   Gastrointestinal: Negative for abdominal pain, N/V.   Endocrine: Negative for cold/heat intolerance, unexplained weight loss/gain.   Genitourinary: Negative for flank pain, dysuria, hematuria.   Musculoskeletal: Positive for arthralgia   Skin: Negative for rash.    Neurological: Negative for numbness or tingling  Psychiatric/Behavioral: Negative for agitation.  _____________________________________________________  PHYSICAL EXAMINATION:    Blood pressure 122/73, pulse 78, height 5' (1.524 m), weight 89.4 kg (197 lb).    Constitutional: Oriented to person, place, and time. Appears well-developed and well-nourished. No distress.   HENT:   Head: Normocephalic.   Eyes: Conjunctivae are normal. Right eye exhibits no discharge. Left eye exhibits no discharge. No scleral icterus.   Cardiovascular: Normal rate.    Pulmonary/Chest: Effort normal.   Neurological: Alert and oriented to person, place, and time.   Skin: Skin is warm and dry. No rash noted. Not diaphoretic. No erythema. No pallor.   Psychiatric: Normal mood and affect. Behavior is normal. Judgment and thought content normal.      MUSCULOSKELETAL EXAMINATION:   Physical Exam  Ortho Exam    Left lower extremity is neurovascularly intact  Toes are pink and mobile   Compartments are soft  No warmth, erythema or ecchymosis  ROM of knee is from 5-115 degrees  Negative Lachman, drawer or pivot shift  No medial instability  Medial joint line tenderness, slight lateral joint line tenderness  Patellofemoral crepitation   Objective:  BP Readings from Last 1 Encounters:   04/02/24 122/73      Wt Readings from Last 1 Encounters:   04/02/24 89.4 kg (197 lb)        BMI:   Estimated body mass index is 38.47 kg/m²  as calculated from the following:    Height as of this encounter: 5' (1.524 m).    Weight as of this encounter: 89.4 kg (197 lb).      PROCEDURES PERFORMED:  Large joint arthrocentesis: L knee  Universal Protocol:  Risks and benefits: risks, benefits and alternatives were discussed  Consent given by: patient  Patient understanding: patient states understanding of the procedure being performed  Site marked: the operative site was marked  Patient identity confirmed: verbally with patient  Supporting Documentation  Indications: pain   Procedure Details  Location: knee - L knee  Preparation: Patient was prepped and draped in the usual sterile fashion  Needle size: 22 G  Ultrasound guidance: no  Approach: lateral  Medications administered: 4 mL bupivacaine 0.25 %; 80 mg triamcinolone acetonide 40 mg/mL    Patient tolerance: patient tolerated the procedure well with no immediate complications  Dressing:  Sterile dressing applied            Scribe Attestation      I,:  Arley Reynolds PA-C am acting as a scribe while in the presence of the attending physician.:       I,:  Barrett Wilson DO personally performed the services described in this documentation    as scribed in my presence.:

## 2024-04-08 ENCOUNTER — OFFICE VISIT (OUTPATIENT)
Dept: PODIATRY | Facility: CLINIC | Age: 69
End: 2024-04-08
Payer: COMMERCIAL

## 2024-04-08 VITALS
WEIGHT: 197 LBS | DIASTOLIC BLOOD PRESSURE: 73 MMHG | HEART RATE: 93 BPM | BODY MASS INDEX: 38.47 KG/M2 | SYSTOLIC BLOOD PRESSURE: 131 MMHG

## 2024-04-08 DIAGNOSIS — B35.1 ONYCHOMYCOSIS: ICD-10-CM

## 2024-04-08 DIAGNOSIS — I87.2 VENOUS INSUFFICIENCY OF BOTH LOWER EXTREMITIES: ICD-10-CM

## 2024-04-08 DIAGNOSIS — E11.49 OTHER DIABETIC NEUROLOGICAL COMPLICATION ASSOCIATED WITH TYPE 2 DIABETES MELLITUS (HCC): Primary | ICD-10-CM

## 2024-04-08 PROCEDURE — 11721 DEBRIDE NAIL 6 OR MORE: CPT | Performed by: STUDENT IN AN ORGANIZED HEALTH CARE EDUCATION/TRAINING PROGRAM

## 2024-04-08 PROCEDURE — 99203 OFFICE O/P NEW LOW 30 MIN: CPT | Performed by: STUDENT IN AN ORGANIZED HEALTH CARE EDUCATION/TRAINING PROGRAM

## 2024-04-09 NOTE — PROGRESS NOTES
Assessment/Plan:    No problem-specific Assessment & Plan notes found for this encounter.       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 A thorough neurovascular exam was performed. Patient presents for at-risk foot care.  Patient has no acute concerns today.  Patient has significant lower extremity risk due to neuropathy, parasthesia, edema, and trophic skin changes to the lower extremity. Patient has Q9  findings and is recommended for at risk foot care every 3 months.    2. All 10 toenails were debridement as follow: using nail nipper and curette, nails were sharply debrided, reduced in thickness and length. Devitalized nail tissue and fungal debris excised and removed. Patient tolerated well.      3. Patient was educated on importance of glycemic control, daily foot assessment and proper shoe gear.     4. Call if any questions or occurrence of any foot and ankle related complications     5. Return in 10-12 weeks.     6.  Recent blood work reviewed, recent PCP notes reviewed    Lab Results   Component Value Date    HGBA1C 5.5 02/27/2024           Subjective:      Patient ID: Lisa Rich is a 68 y.o. female.    HPI:  Lisa Rich is a 68 y.o. female who presents with painful, elongated toenails. They have difficulty applying their socks and shoes due to the elongation of the nails. The pressure within their shoe gear is painful and they have been unable to cut their nails adequately. Patient states pain is 1/10 in shoe gear. Pain with pressure. Requires at risk foot care.           The following portions of the patient's history were reviewed and updated as appropriate: She  has a past medical history of Allergic, Depression, Diabetes mellitus (HCC), Disease of thyroid gland, GERD (gastroesophageal reflux disease), Hyperchloremia,  Hypertension, and Psychiatric disorder.  She   Patient Active Problem List    Diagnosis Date Noted    Stage 3 chronic kidney disease, unspecified whether stage 3a or 3b CKD ([facility]) 08/11/2022    Vitamin B12 deficiency 06/21/2022    Vitamin D deficiency 03/23/2022    Essential hypertension 08/24/2021    Atypical squamous cells of undetermined significance on cytologic smear of cervix ([facility]) 03/22/2021    Type 2 diabetes mellitus with stage 3a chronic kidney disease, without long-term current use of insulin ([facility]) 10/18/2019    Class 2 severe obesity due to excess calories with serious comorbidity and body mass index (BMI) of 38.0 to 38.9 in adult ([facility]) 10/18/2019    Postmenopausal 10/18/2019    Bipolar disorder ([facility]) 01/09/2014    Constipation 01/09/2014    Hypercholesterolemia 01/09/2014    Hypothyroidism 01/09/2014     She  has a past surgical history that includes Ankle surgery and Dilation and curettage of uterus.  Current Outpatient Medications   Medication Sig Dispense Refill    aspirin (ECOTRIN LOW STRENGTH) 81 mg EC tablet Take 81 mg by mouth daily      Cholecalciferol (Vitamin D) 50 MCG (2000 UT) tablet Take 1 tablet (2,000 Units total) by mouth daily 90 tablet 3    clobetasol (TEMOVATE) 0.05 % external solution Apply to dry scalp daily for up to 4 weeks.  Leave shampoo on the scalp x 15 minutes, then rinse. 50 mL 2    glucose blood (Contour Next Test) test strip Use 1 each 2 (two) times a day Use as instructed 100 strip 5    glucose blood test strip 1 each by Other route as needed Use as instructed      levothyroxine 88 mcg tablet Take 1 tablet (88 mcg total) by mouth daily 90 tablet 0    linaGLIPtin (Tradjenta) 5 MG TABS Take 5 mg by mouth daily 90 tablet 3    lisinopril (ZESTRIL) 5 mg tablet TAKE 1 TABLETDAILY. 90 tablet 0    lithium carbonate (LITHOBID) 300 mg CR tablet       pravastatin (PRAVACHOL) 40 mg tablet TAKE 1 TABLET DAILY. 90 tablet 3    traZODone (DESYREL) 100 mg tablet Take 200 mg by mouth  daily at bedtime      hydrOXYzine HCL (ATARAX) 25 mg tablet Take 2 tablets (50 mg total) by mouth daily at bedtime for 7 days (Patient not taking: Reported on 4/8/2024) 14 tablet 0     No current facility-administered medications for this visit.   .    Review of Systems   All other systems reviewed and are negative.        Objective:      /73 (BP Location: Right arm, Patient Position: Sitting, Cuff Size: Standard)   Pulse 93   Wt 89.4 kg (197 lb)   BMI 38.47 kg/m²          Physical Exam  Vitals reviewed.   Cardiovascular:      Pulses: Pulses are weak.           Dorsalis pedis pulses are 1+ on the right side and 1+ on the left side.        Posterior tibial pulses are 1+ on the right side and 1+ on the left side.   Musculoskeletal:      Right foot: Deformity present.      Left foot: Deformity present.   Feet:      Right foot:      Protective Sensation: 10 sites tested.  7 sites sensed.      Skin integrity: Dry skin present.      Toenail Condition: Right toenails are abnormally thick and long. Fungal disease present.     Left foot:      Protective Sensation: 10 sites tested.  6 sites sensed.      Skin integrity: Dry skin present.      Toenail Condition: Left toenails are abnormally thick and long. Fungal disease present.     Comments: On exam patient has thickened, hypertrophic, discolored, brittle toenails with subungual debris and tenderness x10.  Patient has lower extremity edema  Patients skin is atrophic, thickened nails, and decreased pedal hair. Patient has decreased pinprick to feet.  Flexible hammertoe deformity noted bilateral toes multiple.        Diabetic Foot Exam    Patient's shoes and socks removed.    Right Foot/Ankle   Right Foot Inspection  Skin Exam: dry skin.     Toe Exam: right toe deformity.     Sensory   Monofilament testing: diminished    Vascular  The right DP pulse is 1+. The right PT pulse is 1+.     Left Foot/Ankle  Left Foot Inspection  Skin Exam: dry skin.     Toe Exam: left toe  deformity.     Sensory   Monofilament testing: diminished    Vascular  The left DP pulse is 1+. The left PT pulse is 1+.     Assign Risk Category  Deformity present  Loss of protective sensation  Weak pulses  Risk: 2

## 2024-04-25 ENCOUNTER — VBI (OUTPATIENT)
Dept: ADMINISTRATIVE | Facility: OTHER | Age: 69
End: 2024-04-25

## 2024-04-25 ENCOUNTER — APPOINTMENT (OUTPATIENT)
Age: 69
End: 2024-04-25
Payer: COMMERCIAL

## 2024-04-25 DIAGNOSIS — F31.32 MODERATE DEPRESSED BIPOLAR I DISORDER (HCC): ICD-10-CM

## 2024-04-25 DIAGNOSIS — Z79.899 ENCOUNTER FOR LONG-TERM (CURRENT) USE OF OTHER MEDICATIONS: ICD-10-CM

## 2024-04-25 LAB
25(OH)D3 SERPL-MCNC: 23.4 NG/ML (ref 30–100)
ALBUMIN SERPL BCP-MCNC: 4.2 G/DL (ref 3.5–5)
ALP SERPL-CCNC: 72 U/L (ref 34–104)
ALT SERPL W P-5'-P-CCNC: 13 U/L (ref 7–52)
ANION GAP SERPL CALCULATED.3IONS-SCNC: 6 MMOL/L (ref 4–13)
AST SERPL W P-5'-P-CCNC: 14 U/L (ref 13–39)
BASOPHILS # BLD AUTO: 0.07 THOUSANDS/ÂΜL (ref 0–0.1)
BASOPHILS NFR BLD AUTO: 1 % (ref 0–1)
BILIRUB SERPL-MCNC: 1.09 MG/DL (ref 0.2–1)
BUN SERPL-MCNC: 11 MG/DL (ref 5–25)
CALCIUM SERPL-MCNC: 10 MG/DL (ref 8.4–10.2)
CHLORIDE SERPL-SCNC: 103 MMOL/L (ref 96–108)
CHOLEST SERPL-MCNC: 173 MG/DL
CO2 SERPL-SCNC: 30 MMOL/L (ref 21–32)
CREAT SERPL-MCNC: 1.14 MG/DL (ref 0.6–1.3)
EOSINOPHIL # BLD AUTO: 0.2 THOUSAND/ÂΜL (ref 0–0.61)
EOSINOPHIL NFR BLD AUTO: 2 % (ref 0–6)
ERYTHROCYTE [DISTWIDTH] IN BLOOD BY AUTOMATED COUNT: 13.4 % (ref 11.6–15.1)
EST. AVERAGE GLUCOSE BLD GHB EST-MCNC: 131 MG/DL
FOLATE SERPL-MCNC: 8.6 NG/ML
GFR SERPL CREATININE-BSD FRML MDRD: 49 ML/MIN/1.73SQ M
GLUCOSE P FAST SERPL-MCNC: 111 MG/DL (ref 65–99)
HBA1C MFR BLD: 6.2 %
HCT VFR BLD AUTO: 40.7 % (ref 34.8–46.1)
HDLC SERPL-MCNC: 79 MG/DL
HGB BLD-MCNC: 13 G/DL (ref 11.5–15.4)
IMM GRANULOCYTES # BLD AUTO: 0.07 THOUSAND/UL (ref 0–0.2)
IMM GRANULOCYTES NFR BLD AUTO: 1 % (ref 0–2)
LDLC SERPL CALC-MCNC: 71 MG/DL (ref 0–100)
LYMPHOCYTES # BLD AUTO: 1.88 THOUSANDS/ÂΜL (ref 0.6–4.47)
LYMPHOCYTES NFR BLD AUTO: 15 % (ref 14–44)
MCH RBC QN AUTO: 30.2 PG (ref 26.8–34.3)
MCHC RBC AUTO-ENTMCNC: 31.9 G/DL (ref 31.4–37.4)
MCV RBC AUTO: 94 FL (ref 82–98)
MONOCYTES # BLD AUTO: 0.65 THOUSAND/ÂΜL (ref 0.17–1.22)
MONOCYTES NFR BLD AUTO: 5 % (ref 4–12)
NEUTROPHILS # BLD AUTO: 9.76 THOUSANDS/ÂΜL (ref 1.85–7.62)
NEUTS SEG NFR BLD AUTO: 76 % (ref 43–75)
NONHDLC SERPL-MCNC: 94 MG/DL
NRBC BLD AUTO-RTO: 0 /100 WBCS
PLATELET # BLD AUTO: 369 THOUSANDS/UL (ref 149–390)
PMV BLD AUTO: 9.9 FL (ref 8.9–12.7)
POTASSIUM SERPL-SCNC: 4.7 MMOL/L (ref 3.5–5.3)
PROT SERPL-MCNC: 6.7 G/DL (ref 6.4–8.4)
RBC # BLD AUTO: 4.31 MILLION/UL (ref 3.81–5.12)
SODIUM SERPL-SCNC: 139 MMOL/L (ref 135–147)
T4 FREE SERPL-MCNC: 1.12 NG/DL (ref 0.61–1.12)
TRIGL SERPL-MCNC: 114 MG/DL
TSH SERPL DL<=0.05 MIU/L-ACNC: 8.92 UIU/ML (ref 0.45–4.5)
VIT B12 SERPL-MCNC: 150 PG/ML (ref 180–914)
WBC # BLD AUTO: 12.63 THOUSAND/UL (ref 4.31–10.16)

## 2024-04-25 PROCEDURE — 80061 LIPID PANEL: CPT

## 2024-04-25 PROCEDURE — 80359 METHYLENEDIOXYAMPHETAMINES: CPT

## 2024-04-25 PROCEDURE — 84443 ASSAY THYROID STIM HORMONE: CPT

## 2024-04-25 PROCEDURE — 36415 COLL VENOUS BLD VENIPUNCTURE: CPT

## 2024-04-25 PROCEDURE — 82607 VITAMIN B-12: CPT

## 2024-04-25 PROCEDURE — 80053 COMPREHEN METABOLIC PANEL: CPT

## 2024-04-25 PROCEDURE — 85025 COMPLETE CBC W/AUTO DIFF WBC: CPT

## 2024-04-25 PROCEDURE — 80324 DRUG SCREEN AMPHETAMINES 1/2: CPT

## 2024-04-25 PROCEDURE — 3044F HG A1C LEVEL LT 7.0%: CPT | Performed by: STUDENT IN AN ORGANIZED HEALTH CARE EDUCATION/TRAINING PROGRAM

## 2024-04-25 PROCEDURE — 82306 VITAMIN D 25 HYDROXY: CPT

## 2024-04-25 PROCEDURE — 80307 DRUG TEST PRSMV CHEM ANLYZR: CPT

## 2024-04-25 PROCEDURE — 84439 ASSAY OF FREE THYROXINE: CPT

## 2024-04-25 PROCEDURE — 83036 HEMOGLOBIN GLYCOSYLATED A1C: CPT

## 2024-04-25 PROCEDURE — 82746 ASSAY OF FOLIC ACID SERUM: CPT

## 2024-04-25 NOTE — TELEPHONE ENCOUNTER
04/25/24 11:09 AM     [employer] SmartForm used to document caregap status.    Leilani Clement MA

## 2024-04-28 LAB
6MAM UR QL SCN: NEGATIVE NG/ML
ACCEPTABLE CREAT UR QL: 235 MG/DL
AMPHET UR QL CFM: NOT DETECTED NG/MG CREAT
AMPHET UR QL SCN: NEGATIVE NG/ML
BARBITURATES UR QL SCN: NEGATIVE NG/ML
BENZODIAZ UR QL SCN: NEGATIVE NG/ML
BUPRENORPHINE UR QL CFM: NEGATIVE NG/ML
CANNABINOIDS UR QL SCN: NEGATIVE NG/ML
CARISOPRODOL UR QL: NEGATIVE NG/ML
COCAINE+BZE UR QL SCN: NEGATIVE NG/ML
ETHYL GLUCURONIDE UR QL SCN: NEGATIVE NG/ML
FENTANYL UR QL SCN: NEGATIVE NG/ML
GABAPENTIN SERPLBLD QL SCN: NEGATIVE UG/ML
MDMA UR QL CFM: NOT DETECTED NG/MG CREAT
MDMA UR QL CFM: NOT DETECTED NG/MG CREAT
METHADONE UR QL SCN: NEGATIVE NG/ML
METHAMPHET UR QL CFM: NOT DETECTED NG/MG CREAT
NITRITE UR QL STRIP: NEGATIVE UG/ML
OPIATES UR QL SCN: NEGATIVE NG/ML
OXYCODONE+OXYMORPHONE UR QL SCN: NEGATIVE NG/ML
PCP UR QL SCN: NEGATIVE NG/ML
PROPOXYPH UR QL SCN: NEGATIVE NG/ML
SPECIMEN PH ACCEPTABLE UR: 6.1 (ref 4.5–8.9)
TAPENTADOL UR QL SCN: NEGATIVE NG/ML
TRAMADOL UR QL SCN: NEGATIVE NG/ML

## 2024-05-09 ENCOUNTER — APPOINTMENT (OUTPATIENT)
Age: 69
End: 2024-05-09
Payer: COMMERCIAL

## 2024-05-09 DIAGNOSIS — N17.9 ACUTE RENAL FAILURE, UNSPECIFIED ACUTE RENAL FAILURE TYPE (HCC): ICD-10-CM

## 2024-05-09 LAB
ANION GAP SERPL CALCULATED.3IONS-SCNC: 9 MMOL/L (ref 4–13)
BUN SERPL-MCNC: 14 MG/DL (ref 5–25)
CALCIUM SERPL-MCNC: 10.1 MG/DL (ref 8.4–10.2)
CHLORIDE SERPL-SCNC: 102 MMOL/L (ref 96–108)
CO2 SERPL-SCNC: 29 MMOL/L (ref 21–32)
CREAT SERPL-MCNC: 1.01 MG/DL (ref 0.6–1.3)
GFR SERPL CREATININE-BSD FRML MDRD: 57 ML/MIN/1.73SQ M
GLUCOSE SERPL-MCNC: 102 MG/DL (ref 65–140)
POTASSIUM SERPL-SCNC: 4.9 MMOL/L (ref 3.5–5.3)
SODIUM SERPL-SCNC: 140 MMOL/L (ref 135–147)

## 2024-05-09 PROCEDURE — 36415 COLL VENOUS BLD VENIPUNCTURE: CPT

## 2024-05-09 PROCEDURE — 80048 BASIC METABOLIC PNL TOTAL CA: CPT

## 2024-05-24 ENCOUNTER — OFFICE VISIT (OUTPATIENT)
Dept: URGENT CARE | Facility: CLINIC | Age: 69
End: 2024-05-24
Payer: COMMERCIAL

## 2024-05-24 VITALS
RESPIRATION RATE: 18 BRPM | DIASTOLIC BLOOD PRESSURE: 52 MMHG | SYSTOLIC BLOOD PRESSURE: 108 MMHG | OXYGEN SATURATION: 97 % | HEART RATE: 82 BPM | TEMPERATURE: 97.9 F

## 2024-05-24 DIAGNOSIS — S80.12XA CONTUSION OF LEFT LOWER LEG, INITIAL ENCOUNTER: Primary | ICD-10-CM

## 2024-05-24 PROCEDURE — 99213 OFFICE O/P EST LOW 20 MIN: CPT | Performed by: STUDENT IN AN ORGANIZED HEALTH CARE EDUCATION/TRAINING PROGRAM

## 2024-05-24 PROCEDURE — S9088 SERVICES PROVIDED IN URGENT: HCPCS | Performed by: STUDENT IN AN ORGANIZED HEALTH CARE EDUCATION/TRAINING PROGRAM

## 2024-05-24 NOTE — PROGRESS NOTES
"  [facility]        NAME: Lisa Bocanegraorry is a 68 y.o. female  : 1955    MRN: [id number]  DATE: May 24, 2024  TIME: 2:11 PM    Assessment and Orders   Contusion of left lower leg, initial encounter [S80.12XA]  1. Contusion of left lower leg, initial encounter              Plan and Discussion      Symptoms and exam consistent with contusion of the left lower leg. Slightly bruised and tender to palpation. No evidence of DVT. Lower leg is NVI. No pain with ambulation. Ok to monitor. May need US for further evaluation if does not resolve.      Risks and benefits discussed. Patient understands and agrees with the plan.     PATIENT INSTRUCTIONS      If tests have been performed at [facility], our office will contact you with results if changes need to be made to the care plan discussed with you at the visit.  You can review your full results on [facility] MyChart.    Follow up with PCP.     If any of the following occur, please report to your nearest ED for evaluation or call 911.   Difficultly breathing or shortness of breath  Chest pain  Acutely worsening symptoms.         Chief Complaint     Chief Complaint   Patient presents with    Mass     C/o \"lump\" to left lower extremity onset \"a couple of days ago\". Pt observed with bruising, skin observed intact. Pt denies fall/trauma association. Denies numbness/tingling. ASA 81mg regimen. Steady gait observed.         History of Present Illness       Patient is a 69 yo F who presents with a bump on her lower left leg. She does not remember injuring her leg. She first noticed the bump a few days ago. Slightly tender to palpation. Some bruising. No numbness or tingling in the lower leg. No pain in the calf or knee. Otherwise feels well. No pain with ambulation.         Review of Systems   Review of Systems  As stated above     Current Medications       Current Outpatient Medications:     aspirin (ECOTRIN LOW STRENGTH) 81 mg EC tablet, Take 81 mg by mouth daily, " Disp: , Rfl:     Cholecalciferol (Vitamin D) 50 MCG (2000 UT) tablet, Take 1 tablet (2,000 Units total) by mouth daily, Disp: 90 tablet, Rfl: 3    clobetasol (TEMOVATE) 0.05 % external solution, Apply to dry scalp daily for up to 4 weeks.  Leave shampoo on the scalp x 15 minutes, then rinse., Disp: 50 mL, Rfl: 2    glucose blood (Contour Next Test) test strip, Use 1 each 2 (two) times a day Use as instructed, Disp: 100 strip, Rfl: 5    glucose blood test strip, 1 each by Other route as needed Use as instructed, Disp: , Rfl:     hydrOXYzine HCL (ATARAX) 25 mg tablet, Take 2 tablets (50 mg total) by mouth daily at bedtime for 7 days (Patient not taking: Reported on 4/8/2024), Disp: 14 tablet, Rfl: 0    levothyroxine 88 mcg tablet, Take 1 tablet (88 mcg total) by mouth daily, Disp: 90 tablet, Rfl: 0    linaGLIPtin (Tradjenta) 5 MG TABS, Take 5 mg by mouth daily, Disp: 90 tablet, Rfl: 3    lisinopril (ZESTRIL) 5 mg tablet, TAKE 1 TABLETDAILY., Disp: 90 tablet, Rfl: 0    lithium carbonate (LITHOBID) 300 mg CR tablet, , Disp: , Rfl:     pravastatin (PRAVACHOL) 40 mg tablet, TAKE 1 TABLET DAILY., Disp: 90 tablet, Rfl: 3    traZODone (DESYREL) 100 mg tablet, Take 200 mg by mouth daily at bedtime, Disp: , Rfl:     Current Allergies     Allergies as of 05/24/2024    (No Known Allergies)            The following portions of the patient's history were reviewed and updated as appropriate: allergies, current medications, past family history, past medical history, past social history, past surgical history and problem list.     Past Medical History:   Diagnosis Date    Allergic     Depression     Diabetes mellitus (HCC)     Disease of thyroid gland     GERD (gastroesophageal reflux disease)     Hyperchloremia     Hypertension     Psychiatric disorder        Past Surgical History:   Procedure Laterality Date    ANKLE SURGERY      Incision    DILATION AND CURETTAGE OF UTERUS         Family History   Problem Relation Age of Onset     No Known Problems Family     Diabetes Mother     Heart disease Mother     Stroke Mother     Diabetes Father     Stroke Father     No Known Problems Maternal Grandmother     No Known Problems Maternal Grandfather     No Known Problems Paternal Grandmother     No Known Problems Paternal Grandfather     Diabetes Brother     No Known Problems Paternal Aunt          Medications have been verified.        Objective   /52 (BP Location: Left arm, Patient Position: Sitting, Cuff Size: Large)   Pulse 82   Temp 97.9 °F (36.6 °C) (Temporal)   Resp 18   SpO2 97%   No LMP recorded. Patient is postmenopausal.       Physical Exam     Physical Exam  Constitutional:       General: She is not in acute distress.  Pulmonary:      Effort: No respiratory distress.   Musculoskeletal:      Left lower leg: No swelling. No edema.        Legs:    Skin:     Findings: Bruising and lesion present.   Neurological:      Mental Status: She is alert and oriented to person, place, and time.      Gait: Gait normal.   Psychiatric:         Mood and Affect: Mood normal.               Claire Pinon DO

## 2024-05-31 ENCOUNTER — OFFICE VISIT (OUTPATIENT)
Dept: FAMILY MEDICINE CLINIC | Facility: HOME HEALTHCARE | Age: 69
End: 2024-05-31
Payer: COMMERCIAL

## 2024-05-31 VITALS
BODY MASS INDEX: 38.56 KG/M2 | TEMPERATURE: 98.1 F | WEIGHT: 196.4 LBS | HEIGHT: 60 IN | DIASTOLIC BLOOD PRESSURE: 76 MMHG | SYSTOLIC BLOOD PRESSURE: 145 MMHG | OXYGEN SATURATION: 97 % | HEART RATE: 73 BPM | RESPIRATION RATE: 18 BRPM

## 2024-05-31 DIAGNOSIS — Z12.31 ENCOUNTER FOR SCREENING MAMMOGRAM FOR BREAST CANCER: ICD-10-CM

## 2024-05-31 DIAGNOSIS — N18.31 STAGE 3A CHRONIC KIDNEY DISEASE (HCC): ICD-10-CM

## 2024-05-31 DIAGNOSIS — E03.9 ACQUIRED HYPOTHYROIDISM: ICD-10-CM

## 2024-05-31 DIAGNOSIS — Z00.00 MEDICARE ANNUAL WELLNESS VISIT, SUBSEQUENT: Primary | ICD-10-CM

## 2024-05-31 DIAGNOSIS — Z78.0 ASYMPTOMATIC POSTMENOPAUSAL STATE: ICD-10-CM

## 2024-05-31 DIAGNOSIS — F31.60 BIPOLAR AFFECTIVE DISORDER, CURRENT EPISODE MIXED, CURRENT EPISODE SEVERITY UNSPECIFIED (HCC): ICD-10-CM

## 2024-05-31 DIAGNOSIS — N18.31 TYPE 2 DIABETES MELLITUS WITH STAGE 3A CHRONIC KIDNEY DISEASE, WITHOUT LONG-TERM CURRENT USE OF INSULIN (HCC): ICD-10-CM

## 2024-05-31 DIAGNOSIS — E78.00 HYPERCHOLESTEROLEMIA: ICD-10-CM

## 2024-05-31 DIAGNOSIS — R80.9 ALBUMINURIA: ICD-10-CM

## 2024-05-31 DIAGNOSIS — E11.22 TYPE 2 DIABETES MELLITUS WITH STAGE 3A CHRONIC KIDNEY DISEASE, WITHOUT LONG-TERM CURRENT USE OF INSULIN (HCC): ICD-10-CM

## 2024-05-31 DIAGNOSIS — I10 ESSENTIAL HYPERTENSION: ICD-10-CM

## 2024-05-31 DIAGNOSIS — E55.9 VITAMIN D INSUFFICIENCY: ICD-10-CM

## 2024-05-31 DIAGNOSIS — R87.610 ATYPICAL SQUAMOUS CELL OF UNDETERMINED SIGNIFICANCE OF CERVIX: ICD-10-CM

## 2024-05-31 PROCEDURE — G0439 PPPS, SUBSEQ VISIT: HCPCS

## 2024-05-31 RX ORDER — LEVOTHYROXINE SODIUM 88 UG/1
88 TABLET ORAL DAILY
Qty: 90 TABLET | Refills: 0 | Status: SHIPPED | OUTPATIENT
Start: 2024-05-31

## 2024-05-31 RX ORDER — LISINOPRIL 5 MG/1
5 TABLET ORAL DAILY
Qty: 90 TABLET | Refills: 0 | Status: SHIPPED | OUTPATIENT
Start: 2024-05-31

## 2024-05-31 RX ORDER — PRAVASTATIN SODIUM 40 MG
40 TABLET ORAL DAILY
Qty: 90 TABLET | Refills: 3 | Status: SHIPPED | OUTPATIENT
Start: 2024-05-31

## 2024-05-31 NOTE — LETTER
May 31, 2024     Patient: Lisa Rich  YOB: 1955  Date of Visit: 5/31/2024      To Whom it May Concern:    Lisa Rich is under my professional care. Lisa was seen in my office on 5/31/2024. Lisa has mental health issues which includes anxiety and bipolar, which gets worse during long distance drive.  Unfortunately long distance drive can affect her and her health.    If you have any questions or concerns, please don't hesitate to call.       Sincerely,          Leticia Terrell MD        CC: No Recipients

## 2024-05-31 NOTE — PATIENT INSTRUCTIONS
Medicare Preventive Visit Patient Instructions  Thank you for completing your Welcome to Medicare Visit or Medicare Annual Wellness Visit today. Your next wellness visit will be due in one year (6/1/2025).  The screening/preventive services that you may require over the next 5-10 years are detailed below. Some tests may not apply to you based off risk factors and/or age. Screening tests ordered at today's visit but not completed yet may show as past due. Also, please note that scanned in results may not display below.  Preventive Screenings:  Service Recommendations Previous Testing/Comments   Colorectal Cancer Screening  * Colonoscopy    * Fecal Occult Blood Test (FOBT)/Fecal Immunochemical Test (FIT)  * Fecal DNA/Cologuard Test  * Flexible Sigmoidoscopy Age: 45-75 years old   Colonoscopy: every 10 years (may be performed more frequently if at higher risk)  OR  FOBT/FIT: every 1 year  OR  Cologuard: every 3 years  OR  Sigmoidoscopy: every 5 years  Screening may be recommended earlier than age 45 if at higher risk for colorectal cancer. Also, an individualized decision between you and your healthcare provider will decide whether screening between the ages of 76-85 would be appropriate. Colonoscopy: Not on file  FOBT/FIT: Not on file  Cologuard: 01/26/2023  Sigmoidoscopy: Not on file    Screening Current     Breast Cancer Screening Age: 40+ years old  Frequency: every 1-2 years  Not required if history of left and right mastectomy Mammogram: 08/18/2022    Screening Current   Cervical Cancer Screening Between the ages of 21-29, pap smear recommended once every 3 years.   Between the ages of 30-65, can perform pap smear with HPV co-testing every 5 years.   Recommendations may differ for women with a history of total hysterectomy, cervical cancer, or abnormal pap smears in past. Pap Smear: 12/11/2019    Screening Not Indicated   Hepatitis C Screening Once for adults born between 1945 and 1965  More frequently in  patients at high risk for Hepatitis C Hep C Antibody: 10/21/2019    Screening Current   Diabetes Screening 1-2 times per year if you're at risk for diabetes or have pre-diabetes Fasting glucose: 111 mg/dL (4/25/2024)  A1C: 6.2 % (4/25/2024)  Screening Not Indicated  History Diabetes   Cholesterol Screening Once every 5 years if you don't have a lipid disorder. May order more often based on risk factors. Lipid panel: 04/25/2024    Screening Not Indicated  History Lipid Disorder     Other Preventive Screenings Covered by Medicare:  Abdominal Aortic Aneurysm (AAA) Screening: covered once if your at risk. You're considered to be at risk if you have a family history of AAA.  Lung Cancer Screening: covers low dose CT scan once per year if you meet all of the following conditions: (1) Age 55-77; (2) No signs or symptoms of lung cancer; (3) Current smoker or have quit smoking within the last 15 years; (4) You have a tobacco smoking history of at least 20 pack years (packs per day multiplied by number of years you smoked); (5) You get a written order from a healthcare provider.  Glaucoma Screening: covered annually if you're considered high risk: (1) You have diabetes OR (2) Family history of glaucoma OR (3)  aged 50 and older OR (4)  American aged 65 and older  Osteoporosis Screening: covered every 2 years if you meet one of the following conditions: (1) You're estrogen deficient and at risk for osteoporosis based off medical history and other findings; (2) Have a vertebral abnormality; (3) On glucocorticoid therapy for more than 3 months; (4) Have primary hyperparathyroidism; (5) On osteoporosis medications and need to assess response to drug therapy.   Last bone density test (DXA Scan): 08/18/2022.  HIV Screening: covered annually if you're between the age of 15-65. Also covered annually if you are younger than 15 and older than 65 with risk factors for HIV infection. For pregnant patients, it is  covered up to 3 times per pregnancy.    Immunizations:  Immunization Recommendations   Influenza Vaccine Annual influenza vaccination during flu season is recommended for all persons aged >= 6 months who do not have contraindications   Pneumococcal Vaccine   * Pneumococcal conjugate vaccine = PCV13 (Prevnar 13), PCV15 (Vaxneuvance), PCV20 (Prevnar 20)  * Pneumococcal polysaccharide vaccine = PPSV23 (Pneumovax) Adults 19-65 yo with certain risk factors or if 65+ yo  If never received any pneumonia vaccine: recommend Prevnar 20 (PCV20)  Give PCV20 if previously received 1 dose of PCV13 or PPSV23   Hepatitis B Vaccine 3 dose series if at intermediate or high risk (ex: diabetes, end stage renal disease, liver disease)   Respiratory syncytial virus (RSV) Vaccine - COVERED BY MEDICARE PART D  * RSVPreF3 (Arexvy) CDC recommends that adults 60 years of age and older may receive a single dose of RSV vaccine using shared clinical decision-making (SCDM)   Tetanus (Td) Vaccine - COST NOT COVERED BY MEDICARE PART B Following completion of primary series, a booster dose should be given every 10 years to maintain immunity against tetanus. Td may also be given as tetanus wound prophylaxis.   Tdap Vaccine - COST NOT COVERED BY MEDICARE PART B Recommended at least once for all adults. For pregnant patients, recommended with each pregnancy.   Shingles Vaccine (Shingrix) - COST NOT COVERED BY MEDICARE PART B  2 shot series recommended in those 19 years and older who have or will have weakened immune systems or those 50 years and older     Health Maintenance Due:      Topic Date Due   • Breast Cancer Screening: Mammogram  08/18/2024   • DXA SCAN  08/18/2024   • Colorectal Cancer Screening  01/26/2026   • Hepatitis C Screening  Completed     Immunizations Due:  There are no preventive care reminders to display for this patient.  Advance Directives   What are advance directives?  Advance directives are legal documents that state your  wishes and plans for medical care. These plans are made ahead of time in case you lose your ability to make decisions for yourself. Advance directives can apply to any medical decision, such as the treatments you want, and if you want to donate organs.   What are the types of advance directives?  There are many types of advance directives, and each state has rules about how to use them. You may choose a combination of any of the following:  Living will:  This is a written record of the treatment you want. You can also choose which treatments you do not want, which to limit, and which to stop at a certain time. This includes surgery, medicine, IV fluid, and tube feedings.   Durable power of  for healthcare (DPAHC):  This is a written record that states who you want to make healthcare choices for you when you are unable to make them for yourself. This person, called a proxy, is usually a family member or a friend. You may choose more than 1 proxy.  Do not resuscitate (DNR) order:  A DNR order is used in case your heart stops beating or you stop breathing. It is a request not to have certain forms of treatment, such as CPR. A DNR order may be included in other types of advance directives.  Medical directive:  This covers the care that you want if you are in a coma, near death, or unable to make decisions for yourself. You can list the treatments you want for each condition. Treatment may include pain medicine, surgery, blood transfusions, dialysis, IV or tube feedings, and a ventilator (breathing machine).  Values history:  This document has questions about your views, beliefs, and how you feel and think about life. This information can help others choose the care that you would choose.  Why are advance directives important?  An advance directive helps you control your care. Although spoken wishes may be used, it is better to have your wishes written down. Spoken wishes can be misunderstood, or not followed.  Treatments may be given even if you do not want them. An advance directive may make it easier for your family to make difficult choices about your care.   Weight Management   Why it is important to manage your weight:  Being overweight increases your risk of health conditions such as heart disease, high blood pressure, type 2 diabetes, and certain types of cancer. It can also increase your risk for osteoarthritis, sleep apnea, and other respiratory problems. Aim for a slow, steady weight loss. Even a small amount of weight loss can lower your risk of health problems.  How to lose weight safely:  A safe and healthy way to lose weight is to eat fewer calories and get regular exercise. You can lose up about 1 pound a week by decreasing the number of calories you eat by 500 calories each day.   Healthy meal plan for weight management:  A healthy meal plan includes a variety of foods, contains fewer calories, and helps you stay healthy. A healthy meal plan includes the following:  Eat whole-grain foods more often.  A healthy meal plan should contain fiber. Fiber is the part of grains, fruits, and vegetables that is not broken down by your body. Whole-grain foods are healthy and provide extra fiber in your diet. Some examples of whole-grain foods are whole-wheat breads and pastas, oatmeal, brown rice, and bulgur.  Eat a variety of vegetables every day.  Include dark, leafy greens such as spinach, kale, juaquin greens, and mustard greens. Eat yellow and orange vegetables such as carrots, sweet potatoes, and winter squash.   Eat a variety of fruits every day.  Choose fresh or canned fruit (canned in its own juice or light syrup) instead of juice. Fruit juice has very little or no fiber.  Eat low-fat dairy foods.  Drink fat-free (skim) milk or 1% milk. Eat fat-free yogurt and low-fat cottage cheese. Try low-fat cheeses such as mozzarella and other reduced-fat cheeses.  Choose meat and other protein foods that are low in fat.   Choose beans or other legumes such as split peas or lentils. Choose fish, skinless poultry (chicken or turkey), or lean cuts of red meat (beef or pork). Before you cook meat or poultry, cut off any visible fat.   Use less fat and oil.  Try baking foods instead of frying them. Add less fat, such as margarine, sour cream, regular salad dressing and mayonnaise to foods. Eat fewer high-fat foods. Some examples of high-fat foods include french fries, doughnuts, ice cream, and cakes.  Eat fewer sweets.  Limit foods and drinks that are high in sugar. This includes candy, cookies, regular soda, and sweetened drinks.  Exercise:  Exercise at least 30 minutes per day on most days of the week. Some examples of exercise include walking, biking, dancing, and swimming. You can also fit in more physical activity by taking the stairs instead of the elevator or parking farther away from stores. Ask your healthcare provider about the best exercise plan for you.      © Copyright [employer] 2018 Information is for End User's use only and may not be sold, redistributed or otherwise used for commercial purposes. All illustrations and images included in CareNotes® are the copyrighted property of [employer] or [employer]      Medicare Preventive Visit Patient Instructions  Thank you for completing your Welcome to Medicare Visit or Medicare Annual Wellness Visit today. Your next wellness visit will be due in one year (6/1/2025).  The screening/preventive services that you may require over the next 5-10 years are detailed below. Some tests may not apply to you based off risk factors and/or age. Screening tests ordered at today's visit but not completed yet may show as past due. Also, please note that scanned in results may not display below.  Preventive Screenings:  Service Recommendations Previous Testing/Comments   Colorectal Cancer Screening  * Colonoscopy    * Fecal Occult Blood Test (FOBT)/Fecal Immunochemical Test  (FIT)  * Fecal DNA/Cologuard Test  * Flexible Sigmoidoscopy Age: 45-75 years old   Colonoscopy: every 10 years (may be performed more frequently if at higher risk)  OR  FOBT/FIT: every 1 year  OR  Cologuard: every 3 years  OR  Sigmoidoscopy: every 5 years  Screening may be recommended earlier than age 45 if at higher risk for colorectal cancer. Also, an individualized decision between you and your healthcare provider will decide whether screening between the ages of 76-85 would be appropriate. Colonoscopy: Not on file  FOBT/FIT: Not on file  Cologuard: 01/26/2023  Sigmoidoscopy: Not on file    Screening Current     Breast Cancer Screening Age: 40+ years old  Frequency: every 1-2 years  Not required if history of left and right mastectomy Mammogram: 08/18/2022    Screening Current   Cervical Cancer Screening Between the ages of 21-29, pap smear recommended once every 3 years.   Between the ages of 30-65, can perform pap smear with HPV co-testing every 5 years.   Recommendations may differ for women with a history of total hysterectomy, cervical cancer, or abnormal pap smears in past. Pap Smear: 12/11/2019    Screening Not Indicated   Hepatitis C Screening Once for adults born between 1945 and 1965  More frequently in patients at high risk for Hepatitis C Hep C Antibody: 10/21/2019    Screening Current   Diabetes Screening 1-2 times per year if you're at risk for diabetes or have pre-diabetes Fasting glucose: 111 mg/dL (4/25/2024)  A1C: 6.2 % (4/25/2024)  Screening Not Indicated  History Diabetes   Cholesterol Screening Once every 5 years if you don't have a lipid disorder. May order more often based on risk factors. Lipid panel: 04/25/2024    Screening Not Indicated  History Lipid Disorder     Other Preventive Screenings Covered by Medicare:  Abdominal Aortic Aneurysm (AAA) Screening: covered once if your at risk. You're considered to be at risk if you have a family history of AAA.  Lung Cancer Screening: covers low  dose CT scan once per year if you meet all of the following conditions: (1) Age 55-77; (2) No signs or symptoms of lung cancer; (3) Current smoker or have quit smoking within the last 15 years; (4) You have a tobacco smoking history of at least 20 pack years (packs per day multiplied by number of years you smoked); (5) You get a written order from a healthcare provider.  Glaucoma Screening: covered annually if you're considered high risk: (1) You have diabetes OR (2) Family history of glaucoma OR (3)  aged 50 and older OR (4)  American aged 65 and older  Osteoporosis Screening: covered every 2 years if you meet one of the following conditions: (1) You're estrogen deficient and at risk for osteoporosis based off medical history and other findings; (2) Have a vertebral abnormality; (3) On glucocorticoid therapy for more than 3 months; (4) Have primary hyperparathyroidism; (5) On osteoporosis medications and need to assess response to drug therapy.   Last bone density test (DXA Scan): 08/18/2022.  HIV Screening: covered annually if you're between the age of 15-65. Also covered annually if you are younger than 15 and older than 65 with risk factors for HIV infection. For pregnant patients, it is covered up to 3 times per pregnancy.    Immunizations:  Immunization Recommendations   Influenza Vaccine Annual influenza vaccination during flu season is recommended for all persons aged >= 6 months who do not have contraindications   Pneumococcal Vaccine   * Pneumococcal conjugate vaccine = PCV13 (Prevnar 13), PCV15 (Vaxneuvance), PCV20 (Prevnar 20)  * Pneumococcal polysaccharide vaccine = PPSV23 (Pneumovax) Adults 19-63 yo with certain risk factors or if 65+ yo  If never received any pneumonia vaccine: recommend Prevnar 20 (PCV20)  Give PCV20 if previously received 1 dose of PCV13 or PPSV23   Hepatitis B Vaccine 3 dose series if at intermediate or high risk (ex: diabetes, end stage renal disease, liver  disease)   Respiratory syncytial virus (RSV) Vaccine - COVERED BY MEDICARE PART D  * RSVPreF3 (Arexvy) CDC recommends that adults 60 years of age and older may receive a single dose of RSV vaccine using shared clinical decision-making (SCDM)   Tetanus (Td) Vaccine - COST NOT COVERED BY MEDICARE PART B Following completion of primary series, a booster dose should be given every 10 years to maintain immunity against tetanus. Td may also be given as tetanus wound prophylaxis.   Tdap Vaccine - COST NOT COVERED BY MEDICARE PART B Recommended at least once for all adults. For pregnant patients, recommended with each pregnancy.   Shingles Vaccine (Shingrix) - COST NOT COVERED BY MEDICARE PART B  2 shot series recommended in those 19 years and older who have or will have weakened immune systems or those 50 years and older     Health Maintenance Due:      Topic Date Due   • Breast Cancer Screening: Mammogram  08/18/2024   • DXA SCAN  08/18/2024   • Colorectal Cancer Screening  01/26/2026   • Hepatitis C Screening  Completed     Immunizations Due:  There are no preventive care reminders to display for this patient.  Advance Directives   What are advance directives?  Advance directives are legal documents that state your wishes and plans for medical care. These plans are made ahead of time in case you lose your ability to make decisions for yourself. Advance directives can apply to any medical decision, such as the treatments you want, and if you want to donate organs.   What are the types of advance directives?  There are many types of advance directives, and each state has rules about how to use them. You may choose a combination of any of the following:  Living will:  This is a written record of the treatment you want. You can also choose which treatments you do not want, which to limit, and which to stop at a certain time. This includes surgery, medicine, IV fluid, and tube feedings.   Durable power of  for  healthcare ([facility]):  This is a written record that states who you want to make healthcare choices for you when you are unable to make them for yourself. This person, called a proxy, is usually a family member or a friend. You may choose more than 1 proxy.  Do not resuscitate (DNR) order:  A DNR order is used in case your heart stops beating or you stop breathing. It is a request not to have certain forms of treatment, such as CPR. A DNR order may be included in other types of advance directives.  Medical directive:  This covers the care that you want if you are in a coma, near death, or unable to make decisions for yourself. You can list the treatments you want for each condition. Treatment may include pain medicine, surgery, blood transfusions, dialysis, IV or tube feedings, and a ventilator (breathing machine).  Values history:  This document has questions about your views, beliefs, and how you feel and think about life. This information can help others choose the care that you would choose.  Why are advance directives important?  An advance directive helps you control your care. Although spoken wishes may be used, it is better to have your wishes written down. Spoken wishes can be misunderstood, or not followed. Treatments may be given even if you do not want them. An advance directive may make it easier for your family to make difficult choices about your care.   Weight Management   Why it is important to manage your weight:  Being overweight increases your risk of health conditions such as heart disease, high blood pressure, type 2 diabetes, and certain types of cancer. It can also increase your risk for osteoarthritis, sleep apnea, and other respiratory problems. Aim for a slow, steady weight loss. Even a small amount of weight loss can lower your risk of health problems.  How to lose weight safely:  A safe and healthy way to lose weight is to eat fewer calories and get regular exercise. You can lose up about  1 pound a week by decreasing the number of calories you eat by 500 calories each day.   Healthy meal plan for weight management:  A healthy meal plan includes a variety of foods, contains fewer calories, and helps you stay healthy. A healthy meal plan includes the following:  Eat whole-grain foods more often.  A healthy meal plan should contain fiber. Fiber is the part of grains, fruits, and vegetables that is not broken down by your body. Whole-grain foods are healthy and provide extra fiber in your diet. Some examples of whole-grain foods are whole-wheat breads and pastas, oatmeal, brown rice, and bulgur.  Eat a variety of vegetables every day.  Include dark, leafy greens such as spinach, kale, juaquin greens, and mustard greens. Eat yellow and orange vegetables such as carrots, sweet potatoes, and winter squash.   Eat a variety of fruits every day.  Choose fresh or canned fruit (canned in its own juice or light syrup) instead of juice. Fruit juice has very little or no fiber.  Eat low-fat dairy foods.  Drink fat-free (skim) milk or 1% milk. Eat fat-free yogurt and low-fat cottage cheese. Try low-fat cheeses such as mozzarella and other reduced-fat cheeses.  Choose meat and other protein foods that are low in fat.  Choose beans or other legumes such as split peas or lentils. Choose fish, skinless poultry (chicken or turkey), or lean cuts of red meat (beef or pork). Before you cook meat or poultry, cut off any visible fat.   Use less fat and oil.  Try baking foods instead of frying them. Add less fat, such as margarine, sour cream, regular salad dressing and mayonnaise to foods. Eat fewer high-fat foods. Some examples of high-fat foods include french fries, doughnuts, ice cream, and cakes.  Eat fewer sweets.  Limit foods and drinks that are high in sugar. This includes candy, cookies, regular soda, and sweetened drinks.  Exercise:  Exercise at least 30 minutes per day on most days of the week. Some examples of  exercise include walking, biking, dancing, and swimming. You can also fit in more physical activity by taking the stairs instead of the elevator or parking farther away from stores. Ask your healthcare provider about the best exercise plan for you.      © Copyright [employer] 2018 Information is for End User's use only and may not be sold, redistributed or otherwise used for commercial purposes. All illustrations and images included in CareNotes® are the copyrighted property of [employer] or [employer]      Medicare Preventive Visit Patient Instructions  Thank you for completing your Welcome to Medicare Visit or Medicare Annual Wellness Visit today. Your next wellness visit will be due in one year (6/1/2025).  The screening/preventive services that you may require over the next 5-10 years are detailed below. Some tests may not apply to you based off risk factors and/or age. Screening tests ordered at today's visit but not completed yet may show as past due. Also, please note that scanned in results may not display below.  Preventive Screenings:  Service Recommendations Previous Testing/Comments   Colorectal Cancer Screening  * Colonoscopy    * Fecal Occult Blood Test (FOBT)/Fecal Immunochemical Test (FIT)  * Fecal DNA/Cologuard Test  * Flexible Sigmoidoscopy Age: 45-75 years old   Colonoscopy: every 10 years (may be performed more frequently if at higher risk)  OR  FOBT/FIT: every 1 year  OR  Cologuard: every 3 years  OR  Sigmoidoscopy: every 5 years  Screening may be recommended earlier than age 45 if at higher risk for colorectal cancer. Also, an individualized decision between you and your healthcare provider will decide whether screening between the ages of 76-85 would be appropriate. Colonoscopy: Not on file  FOBT/FIT: Not on file  Cologuard: 01/26/2023  Sigmoidoscopy: Not on file    Screening Current     Breast Cancer Screening Age: 40+ years old  Frequency: every 1-2 years  Not required if  history of left and right mastectomy Mammogram: 08/18/2022    Screening Current   Cervical Cancer Screening Between the ages of 21-29, pap smear recommended once every 3 years.   Between the ages of 30-65, can perform pap smear with HPV co-testing every 5 years.   Recommendations may differ for women with a history of total hysterectomy, cervical cancer, or abnormal pap smears in past. Pap Smear: 12/11/2019    Screening Not Indicated   Hepatitis C Screening Once for adults born between 1945 and 1965  More frequently in patients at high risk for Hepatitis C Hep C Antibody: 10/21/2019    Screening Current   Diabetes Screening 1-2 times per year if you're at risk for diabetes or have pre-diabetes Fasting glucose: 111 mg/dL (4/25/2024)  A1C: 6.2 % (4/25/2024)  Screening Not Indicated  History Diabetes   Cholesterol Screening Once every 5 years if you don't have a lipid disorder. May order more often based on risk factors. Lipid panel: 04/25/2024    Screening Not Indicated  History Lipid Disorder     Other Preventive Screenings Covered by Medicare:  Abdominal Aortic Aneurysm (AAA) Screening: covered once if your at risk. You're considered to be at risk if you have a family history of AAA.  Lung Cancer Screening: covers low dose CT scan once per year if you meet all of the following conditions: (1) Age 55-77; (2) No signs or symptoms of lung cancer; (3) Current smoker or have quit smoking within the last 15 years; (4) You have a tobacco smoking history of at least 20 pack years (packs per day multiplied by number of years you smoked); (5) You get a written order from a healthcare provider.  Glaucoma Screening: covered annually if you're considered high risk: (1) You have diabetes OR (2) Family history of glaucoma OR (3)  aged 50 and older OR (4)  American aged 65 and older  Osteoporosis Screening: covered every 2 years if you meet one of the following conditions: (1) You're estrogen deficient and  at risk for osteoporosis based off medical history and other findings; (2) Have a vertebral abnormality; (3) On glucocorticoid therapy for more than 3 months; (4) Have primary hyperparathyroidism; (5) On osteoporosis medications and need to assess response to drug therapy.   Last bone density test (DXA Scan): 08/18/2022.  HIV Screening: covered annually if you're between the age of 15-65. Also covered annually if you are younger than 15 and older than 65 with risk factors for HIV infection. For pregnant patients, it is covered up to 3 times per pregnancy.    Immunizations:  Immunization Recommendations   Influenza Vaccine Annual influenza vaccination during flu season is recommended for all persons aged >= 6 months who do not have contraindications   Pneumococcal Vaccine   * Pneumococcal conjugate vaccine = PCV13 (Prevnar 13), PCV15 (Vaxneuvance), PCV20 (Prevnar 20)  * Pneumococcal polysaccharide vaccine = PPSV23 (Pneumovax) Adults 19-65 yo with certain risk factors or if 65+ yo  If never received any pneumonia vaccine: recommend Prevnar 20 (PCV20)  Give PCV20 if previously received 1 dose of PCV13 or PPSV23   Hepatitis B Vaccine 3 dose series if at intermediate or high risk (ex: diabetes, end stage renal disease, liver disease)   Respiratory syncytial virus (RSV) Vaccine - COVERED BY MEDICARE PART D  * RSVPreF3 (Arexvy) CDC recommends that adults 60 years of age and older may receive a single dose of RSV vaccine using shared clinical decision-making (SCDM)   Tetanus (Td) Vaccine - COST NOT COVERED BY MEDICARE PART B Following completion of primary series, a booster dose should be given every 10 years to maintain immunity against tetanus. Td may also be given as tetanus wound prophylaxis.   Tdap Vaccine - COST NOT COVERED BY MEDICARE PART B Recommended at least once for all adults. For pregnant patients, recommended with each pregnancy.   Shingles Vaccine (Shingrix) - COST NOT COVERED BY MEDICARE PART B  2 shot  series recommended in those 19 years and older who have or will have weakened immune systems or those 50 years and older     Health Maintenance Due:      Topic Date Due   • Breast Cancer Screening: Mammogram  08/18/2024   • DXA SCAN  08/18/2024   • Colorectal Cancer Screening  01/26/2026   • Hepatitis C Screening  Completed     Immunizations Due:  There are no preventive care reminders to display for this patient.  Advance Directives   What are advance directives?  Advance directives are legal documents that state your wishes and plans for medical care. These plans are made ahead of time in case you lose your ability to make decisions for yourself. Advance directives can apply to any medical decision, such as the treatments you want, and if you want to donate organs.   What are the types of advance directives?  There are many types of advance directives, and each state has rules about how to use them. You may choose a combination of any of the following:  Living will:  This is a written record of the treatment you want. You can also choose which treatments you do not want, which to limit, and which to stop at a certain time. This includes surgery, medicine, IV fluid, and tube feedings.   Durable power of  for healthcare (DPAHC):  This is a written record that states who you want to make healthcare choices for you when you are unable to make them for yourself. This person, called a proxy, is usually a family member or a friend. You may choose more than 1 proxy.  Do not resuscitate (DNR) order:  A DNR order is used in case your heart stops beating or you stop breathing. It is a request not to have certain forms of treatment, such as CPR. A DNR order may be included in other types of advance directives.  Medical directive:  This covers the care that you want if you are in a coma, near death, or unable to make decisions for yourself. You can list the treatments you want for each condition. Treatment may include  pain medicine, surgery, blood transfusions, dialysis, IV or tube feedings, and a ventilator (breathing machine).  Values history:  This document has questions about your views, beliefs, and how you feel and think about life. This information can help others choose the care that you would choose.  Why are advance directives important?  An advance directive helps you control your care. Although spoken wishes may be used, it is better to have your wishes written down. Spoken wishes can be misunderstood, or not followed. Treatments may be given even if you do not want them. An advance directive may make it easier for your family to make difficult choices about your care.   Weight Management   Why it is important to manage your weight:  Being overweight increases your risk of health conditions such as heart disease, high blood pressure, type 2 diabetes, and certain types of cancer. It can also increase your risk for osteoarthritis, sleep apnea, and other respiratory problems. Aim for a slow, steady weight loss. Even a small amount of weight loss can lower your risk of health problems.  How to lose weight safely:  A safe and healthy way to lose weight is to eat fewer calories and get regular exercise. You can lose up about 1 pound a week by decreasing the number of calories you eat by 500 calories each day.   Healthy meal plan for weight management:  A healthy meal plan includes a variety of foods, contains fewer calories, and helps you stay healthy. A healthy meal plan includes the following:  Eat whole-grain foods more often.  A healthy meal plan should contain fiber. Fiber is the part of grains, fruits, and vegetables that is not broken down by your body. Whole-grain foods are healthy and provide extra fiber in your diet. Some examples of whole-grain foods are whole-wheat breads and pastas, oatmeal, brown rice, and bulgur.  Eat a variety of vegetables every day.  Include dark, leafy greens such as spinach, kale,  juaquin greens, and mustard greens. Eat yellow and orange vegetables such as carrots, sweet potatoes, and winter squash.   Eat a variety of fruits every day.  Choose fresh or canned fruit (canned in its own juice or light syrup) instead of juice. Fruit juice has very little or no fiber.  Eat low-fat dairy foods.  Drink fat-free (skim) milk or 1% milk. Eat fat-free yogurt and low-fat cottage cheese. Try low-fat cheeses such as mozzarella and other reduced-fat cheeses.  Choose meat and other protein foods that are low in fat.  Choose beans or other legumes such as split peas or lentils. Choose fish, skinless poultry (chicken or turkey), or lean cuts of red meat (beef or pork). Before you cook meat or poultry, cut off any visible fat.   Use less fat and oil.  Try baking foods instead of frying them. Add less fat, such as margarine, sour cream, regular salad dressing and mayonnaise to foods. Eat fewer high-fat foods. Some examples of high-fat foods include french fries, doughnuts, ice cream, and cakes.  Eat fewer sweets.  Limit foods and drinks that are high in sugar. This includes candy, cookies, regular soda, and sweetened drinks.  Exercise:  Exercise at least 30 minutes per day on most days of the week. Some examples of exercise include walking, biking, dancing, and swimming. You can also fit in more physical activity by taking the stairs instead of the elevator or parking farther away from stores. Ask your healthcare provider about the best exercise plan for you.      © Copyright "[employer] 2018 Information is for End User's use only and may not be sold, redistributed or otherwise used for commercial purposes. All illustrations and images included in CareNotes® are the copyrighted property of [employer] or [employer]

## 2024-05-31 NOTE — PROGRESS NOTES
Ambulatory Visit  Name: Lisa Rich      : 1955      MRN: [id number]  Encounter Provider: Leticia Terrell MD  Encounter Date: 2024   Encounter department: [facility]    Assessment & Plan   1. Medicare annual wellness visit, subsequent  Comments:  Labs reviewed screening reviewed Meds reviewed.  Patient is to come back for repeat Pap smear due to abnormal Pap in 2019  2. Type 2 diabetes mellitus with stage 3a chronic kidney disease, without long-term current use of insulin ([facility])  Comments:  Continue Tradjenta for diabetes.  A1c on  is 6.2  Orders:  -     lisinopril (ZESTRIL) 5 mg tablet; Take 1 tablet (5 mg total) by mouth daily  3. Essential hypertension  Comments:  Controlled continue current  Orders:  -     lisinopril (ZESTRIL) 5 mg tablet; Take 1 tablet (5 mg total) by mouth daily  4. Hypercholesterolemia  Comments:  Lipid panel  within normal limits continue pravastatin 40 mg  Orders:  -     pravastatin (PRAVACHOL) 40 mg tablet; Take 1 tablet (40 mg total) by mouth daily  5. Acquired hypothyroidism  Comments:  TSH  is 8  T4 is 1.25  Continue levothyroxine 88 mcg daily  Orders:  -     levothyroxine 88 mcg tablet; Take 1 tablet (88 mcg total) by mouth daily  6. Encounter for screening mammogram for breast cancer  Comments:  Mammogram done 2022.,  No mammogram evidence of malignancy  Needed to do screening mammogram in 1 year for both breasts  Orders:  -     Mammo screening bilateral w 3d & cad; Future  7. Asymptomatic postmenopausal state  -     DXA bone density spine hip and pelvis; Future; Expected date: 2024  8. Bipolar affective disorder, current episode mixed, current episode severity unspecified (HCC)  Comments:  pt FUP with [employer] she receives lithium and trazodone   No new concerns or complaints.  Continue current  9. Vitamin D insufficiency  Comments:  Vitamin D since  vitamin D is still  insufficient.  Asked to take 2 tablets of  2000 unit twice a day.  Orders:  -     Vitamin D 25 hydroxy; Future  10. Stage 3a chronic kidney disease (HCC)  Comments:  Avoid NSAIDs drink fluids.  Repeat BMP  11. Albuminuria  Comments:  On ACE inhibitor's.  Diabetes well-controlled.  Repeat albumin creatinine ratio  Orders:  -     Albumin / creatinine urine ratio; Future  12. Atypical squamous cell of undetermined significance of cervix  Comments:   Pap was normal 2019 significant for ASC-US no new sexual partners.   LMP 17 years ago  Do repeat Pap.  Cervical cancer screening indicated       Patient is a new patient to me.  She is here for Medicare annual visit.  Upon reviewing her chart it was significant but that her Pap smear in 2019 was abnormal.  She did not have any follow-up with gynecologist or with us.  At this time she will come back for repeat Pap smear.  Cervical cancer screening is indicated.     labs reviewed by me.  She does have elevated albumin.  I will recheck her albumin creatinine ratio.  She is on ACE inhibitor's diabetes is well-controlled.  Patient does not bring any new concerns or complaints.  Her vitamin D level is still low.  Recommended to take 2 tablets of 2000 unit vitamin D twice a day.  Will recheck vitamin D afterwards.  Patient is following up with [facility] for bipolar she is currently on lithium and trazodone by  psychiatry in [facility].         Pap smear 2019    [facility]  Outside Information      Contains abnormal data THINPREP PAP W/REFLEX HIGH RISK HPV DNA 16,18(ASCUS ONLY)  Specimen: Genital structure (body structure) - Cervical cytology test (procedure)  Component 4 yr ago   ThinPrep w/rflx Hi Risk HPV 16,18(ASCUS)  [facility]  [address] [place] [id number] [phone]    PATHOLOGY REPORT      MR#:             [id number]  Patient:         KARTIK CASTANEDA  /Sex:         1955  F  Provider:        ANTONIO ANGELES  [facility]  Location:        [facility]  Collect Date:    12/11/2019    [id number]  CYTOLOGIC DIAGNOSIS:  EPITHELIAL CELL ABNORMALITIES:  Atypical squamous cells: undetermined significance (ASC-US).    Adequacy:  Adequate for evaluation.        Electronically Signed Out By Nataliia Zuleta M.D.      RA                                          Finalized:  12/19/2019    Test Code      Result       Result Date/Time  HPV Other Negative      12/18/2019 19:05            HPV 16 Negative      12/18/2019 19:05            HPV 18 Negative      12/18/2019 19:05            HPV Interpretation SEE TEXT                     Text Result/Comments:         HPV types 16, 18, 31, 33, 35, 39, 45, 51, 52, 56, 58, 59, 66, and  68 DNA were not detected.            Reference Range: Not Detected  Method: Real Time Polymerase Chain Reaction  (RT-PCR)    Not Detected Results: Patients 21 years of age or older with an ASCUS  interpretation on Pap Test and who test negative for HPV16, HPV18 and  Other High Risk HPV have a very low likelihood that underlying CIN2  will be detected at colposcopy.  A negative result does not preclude  the presence of HPV DNA concentrations below the level of detection.  Results are dependent on adequate specimen collection and absence of  inhibitors.  16 / 18 Detected Results: Patients 21 years of age or older with an  ASCUS interpretation on Pap Test and who test positive for HPV 16  and/or 18 have a high risk of CIN2 or a more severe lesion and should  be considered for colposcopy if clinically appropriate.  Patients 30  years of age or older with a Negative for Intraepithelial Lesion  interpretation on Pap Test and who test positive for HPV 16 and/or 18  have an increased risk of CIN2 or a more severe lesion and should be  considered for colposcopy if clinically appropriate.  High Risk Detected Results: Patients 21 years of age or older with an  ASCUS interpretation on Pap Test and who test positive for other  High  Risk HPV, have a increased risk for CIN2 or a more sever lesion and  should be considered for colposcopy if clinically appropriate.           PAP Cytological Diagnosis CYTOLOGIC DIAGNOSIS: EPITHELIAL CELL ABNORMALITIES:  Aty       No alcohol use no recreational drug use no smoking.  A1c April 25, 2014  Hemoglobin A1C  Status: Final result      Contains abnormal data Hemoglobin A1C  Order: [id number]   Status: Final result       Visible to patient: No (inaccessible in [facility])       Next appt: 06/10/2024 at 01:00 PM in Podiatry (Stephanie Meeks DPM)       Dx: Encounter for long-term (current) use...    0 Result Notes       1 HM Topic            Component  Ref Range & Units 4/25/24 1151 2/27/24 1413 11/27/23 1407 7/11/23 1219 4/24/23 1432 1/18/23 1442 6/21/22 0000   Hemoglobin A1C  Normal 4.0-5.6%; PreDiabetic 5.7-6.4%; Diabetic >=6.5%; Glycemic control for adults with diabetes <7.0% % 6.2 High  5.5 R 5.5 R 5.4 R 6.0 R 6.3 R 5.8 R   EAG  mg/dl 131   108                                  DEXA scan 11/26/2019  iMPRESSION:  1. Based on the WHO classification, this study is NORMAL.  The patient is considered at low risk for fracture.       2. A daily intake of calcium of at least 1200 mg and vitamin D, 800-1000 IU, as well as weight bearing and muscle strengthening exercise, fall prevention and avoidance of tobacco and excessive alcohol intake as basic preventive measures are recommended.  3. Repeat DXA scan on the same equipment in 18-24 months as clinically indicated.           Cologuard was done 1/26/2023  COLOGUARD  Status: Edited Result - FINAL        COLOGUARD  Order: [id number]   Status: Edited Result - FINAL       Next appt: 06/10/2024 at 01:00 PM in Podiatry (Stephanie Meeks DPM)     important suggestion  Newer results are available. Click to view them now.          Component  Ref Range & Units 1/26/23 1345 11/13/19 0035   Cologuard Result  Negative Negative Negative R, CM            Mammogram was done  August 18, 2022.  IMPRESSION:  No mammographic evidence of malignancy.           ASSESSMENT/BI-RADS CATEGORY:  Left: 2 - Benign  Right: 2 - Benign  Overall: 2 - Benign     RECOMMENDATION:       - Routine screening mammogram in 1 year for both breasts.       Preventive health issues were discussed with patient, and age appropriate screening tests were ordered as noted in patient's After Visit Summary. Personalized health advice and appropriate referrals for health education or preventive services given if needed, as noted in patient's After Visit Summary.    History of Present Illness     HPI   Patient Care Team:  Gadiel Young PA-C as PCP - General (Family Medicine)  Shan Lynn MD as PCP - PCP-[facility] ([facility])  Caroline Mercer MD as PCP - PCP-[facility] ([facility])    Review of Systems   Constitutional:  Negative for chills and fever.   HENT:  Negative for ear pain and sore throat.    Eyes:  Negative for pain and visual disturbance.   Respiratory:  Negative for cough and shortness of breath.    Cardiovascular:  Negative for chest pain and palpitations.   Gastrointestinal:  Negative for abdominal pain and vomiting.   Genitourinary:  Negative for dysuria and hematuria.   Musculoskeletal:  Negative for arthralgias and back pain.   Skin:  Negative for color change and rash.   Neurological:  Negative for seizures and syncope.   All other systems reviewed and are negative.    Medical History Reviewed by provider this encounter:       Annual Wellness Visit Questionnaire   Lisa is here for her Subsequent Wellness visit. Last Medicare Wellness visit information reviewed, patient interviewed and updates made to the record today.      Health Risk Assessment:   Patient rates overall health as very good. Patient feels that their physical health rating is same. Patient is satisfied with their life. Eyesight was rated as same. Hearing was rated as same. Patient feels that their emotional and mental health rating is same.  Patients states they are never, rarely angry. Patient states they are never, rarely unusually tired/fatigued. Pain experienced in the last 7 days has been none. Patient states that she has experienced no weight loss or gain in last 6 months.     Depression Screening:   PHQ-2 Score: 0      Fall Risk Screening:   In the past year, patient has experienced: no history of falling in past year      Urinary Incontinence Screening:   Patient has not leaked urine accidently in the last six months.     Home Safety:  Patient does not have trouble with stairs inside or outside of their home. Patient has working smoke alarms and has working carbon monoxide detector. Home safety hazards include: none.     Nutrition:   Current diet is Regular and Limited junk food.     Medications:   Patient is not currently taking any over-the-counter supplements. Patient is able to manage medications.     Activities of Daily Living (ADLs)/Instrumental Activities of Daily Living (IADLs):   Walk and transfer into and out of bed and chair?: Yes  Dress and groom yourself?: Yes    Bathe or shower yourself?: Yes    Feed yourself? Yes  Do your laundry/housekeeping?: Yes  Manage your money, pay your bills and track your expenses?: Yes  Make your own meals?: Yes    Do your own shopping?: Yes    Previous Hospitalizations:   Any hospitalizations or ED visits within the last 12 months?: Yes    How many hospitalizations have you had in the last year?: 1-2    Advance Care Planning:   Living will: No    Durable POA for healthcare: No    Advanced directive: No      PREVENTIVE SCREENINGS      Cardiovascular Screening:    General: Screening Not Indicated and History Lipid Disorder      Diabetes Screening:     General: Screening Not Indicated and History Diabetes      Colorectal Cancer Screening:     General: Screening Current      Breast Cancer Screening:     General: Screening Current      Cervical Cancer Screening:    General: Screening Not Indicated       Osteoporosis Screening:    General: Screening Current      Lung Cancer Screening:     General: Screening Not Indicated      Hepatitis C Screening:    General: Screening Current    Screening, Brief Intervention, and Referral to Treatment (SBIRT)    Screening  Typical number of drinks in a day: 0  Typical number of drinks in a week: 0  Interpretation: Low risk drinking behavior.    Single Item Drug Screening:  How often have you used an illegal drug (including marijuana) or a prescription medication for non-medical reasons in the past year? never    Single Item Drug Screen Score: 0  Interpretation: Negative screen for possible drug use disorder    Social Determinants of Health     Financial Resource Strain: Low Risk  (4/29/2024)    [facility]    Overall Financial Resource Strain (CARDIA)     Difficulty of Paying Living Expenses: Not very hard   Food Insecurity: No Food Insecurity (5/31/2024)    Hunger Vital Sign     Worried About Running Out of Food in the Last Year: Never true     Ran Out of Food in the Last Year: Never true   Transportation Needs: No Transportation Needs (5/31/2024)    PRAPARE - Transportation     Lack of Transportation (Medical): No     Lack of Transportation (Non-Medical): No   Housing Stability: Unknown (5/31/2024)    Housing Stability Vital Sign     Unable to Pay for Housing in the Last Year: No     Homeless in the Last Year: No   Utilities: Not At Risk (5/31/2024)    [facility] Utilities     Threatened with loss of utilities: No     No results found.    Objective     /76 (BP Location: Left arm, Patient Position: Sitting, Cuff Size: Standard)   Pulse 73   Temp 98.1 °F (36.7 °C)   Resp 18   Ht 5' (1.524 m)   Wt 89.1 kg (196 lb 6.4 oz)   SpO2 97%   BMI 38.36 kg/m²     Physical Exam  Vitals and nursing note reviewed.   Constitutional:       General: She is not in acute distress.     Appearance: She is well-developed. She is obese.  She is not toxic-appearing.   HENT:      Head: Normocephalic and atraumatic.      Mouth/Throat:      Mouth: Mucous membranes are moist.   Eyes:      Extraocular Movements: Extraocular movements intact.      Conjunctiva/sclera: Conjunctivae normal.   Cardiovascular:      Rate and Rhythm: Normal rate and regular rhythm.      Heart sounds: No murmur heard.  Pulmonary:      Effort: Pulmonary effort is normal. No respiratory distress.      Breath sounds: Normal breath sounds.   Abdominal:      General: Abdomen is flat. Bowel sounds are normal.      Palpations: Abdomen is soft.   Musculoskeletal:         General: No swelling.      Cervical back: Neck supple.   Skin:     General: Skin is warm and dry.      Capillary Refill: Capillary refill takes less than 2 seconds.   Neurological:      General: No focal deficit present.      Mental Status: She is alert and oriented to person, place, and time.   Psychiatric:         Mood and Affect: Mood normal.         Behavior: Behavior normal.         Thought Content: Thought content normal.         Judgment: Judgment normal.       Administrative Statements   I have spent a total time of 35 minutes on 06/04/24 In caring for this patient including Diagnostic results, Prognosis, Risks and benefits of tx options, Patient and family education, Risk factor reductions, Impressions, Counseling / Coordination of care, Documenting in the medical record, Reviewing / ordering tests, medicine, procedures  , and Obtaining or reviewing history  .

## 2024-06-17 ENCOUNTER — OFFICE VISIT (OUTPATIENT)
Dept: FAMILY MEDICINE CLINIC | Facility: HOME HEALTHCARE | Age: 69
End: 2024-06-17
Payer: COMMERCIAL

## 2024-06-17 VITALS
HEART RATE: 82 BPM | DIASTOLIC BLOOD PRESSURE: 73 MMHG | WEIGHT: 193.8 LBS | RESPIRATION RATE: 18 BRPM | SYSTOLIC BLOOD PRESSURE: 134 MMHG | BODY MASS INDEX: 38.05 KG/M2 | OXYGEN SATURATION: 97 % | HEIGHT: 60 IN | TEMPERATURE: 98.2 F

## 2024-06-17 DIAGNOSIS — R87.610 ATYPICAL SQUAMOUS CELLS OF UNDETERMINED SIGNIFICANCE ON CYTOLOGIC SMEAR OF CERVIX (ASC-US): Primary | ICD-10-CM

## 2024-06-17 DIAGNOSIS — E11.22 TYPE 2 DIABETES MELLITUS WITH STAGE 3A CHRONIC KIDNEY DISEASE, WITHOUT LONG-TERM CURRENT USE OF INSULIN (HCC): ICD-10-CM

## 2024-06-17 DIAGNOSIS — E55.9 VITAMIN D INSUFFICIENCY: ICD-10-CM

## 2024-06-17 DIAGNOSIS — N18.31 TYPE 2 DIABETES MELLITUS WITH STAGE 3A CHRONIC KIDNEY DISEASE, WITHOUT LONG-TERM CURRENT USE OF INSULIN (HCC): ICD-10-CM

## 2024-06-17 DIAGNOSIS — R80.9 ALBUMINURIA: ICD-10-CM

## 2024-06-17 LAB
25(OH)D3 SERPL-MCNC: 25.7 NG/ML (ref 30–100)
CREAT UR-MCNC: 62.5 MG/DL
MICROALBUMIN UR-MCNC: <7 MG/L

## 2024-06-17 PROCEDURE — 82043 UR ALBUMIN QUANTITATIVE: CPT

## 2024-06-17 PROCEDURE — 87624 HPV HI-RISK TYP POOLED RSLT: CPT

## 2024-06-17 PROCEDURE — 82570 ASSAY OF URINE CREATININE: CPT

## 2024-06-17 PROCEDURE — 88175 CYTOPATH C/V AUTO FLUID REDO: CPT

## 2024-06-17 PROCEDURE — 82306 VITAMIN D 25 HYDROXY: CPT

## 2024-06-17 PROCEDURE — 99213 OFFICE O/P EST LOW 20 MIN: CPT

## 2024-06-17 NOTE — ASSESSMENT & PLAN NOTE
Pap smear in 2015 was normal.  In 2019 significant for ASC-US, unfortunately patient did not have any follow-up with gynecologist.  She was seen by me in May  first time for Medicare annual where I noticed abnormal Pap smear.    She is here today to review the Pap smear since cervical cancer screening indicated  LMP-17 years ago  No new sexual partners no discharge no abdominal pain no concerns from female health standpoint

## 2024-06-17 NOTE — PROGRESS NOTES
Ambulatory Visit  Name: Lisa Rich      : 1955      MRN: [id number]  Encounter Provider: Leticia Terrell MD  Encounter Date: 2024   Encounter department: [facility]    Assessment & Plan   1. Atypical squamous cells of undetermined significance on cytologic smear of cervix (ASC-US)  Assessment & Plan:  Pap smear in 2015 was normal.  In 2019 significant for ASC-US, unfortunately patient did not have any follow-up with gynecologist.  She was seen by me in May  first time for Medicare annual where I noticed abnormal Pap smear.    She is here today to review the Pap smear since cervical cancer screening indicated  LMP-17 years ago  No new sexual partners no discharge no abdominal pain no concerns from female health standpoint  Orders:  -     Liquid-based pap, diagnostic; Future  -     Liquid-based pap, diagnostic  2. Type 2 diabetes mellitus with stage 3a chronic kidney disease, without long-term current use of insulin ([facility])  -     glucose blood (Contour Next Test) test strip; Use 1 each 2 (two) times a day Use as instructed  3. Vitamin D insufficiency  Comments:  Vitamin D since  vitamin D is still insufficient.  Asked to take 2 tablets of  2000 unit twice a day.  Orders:  -     Vitamin D 25 hydroxy       History of Present Illness     HPI  Patient is here to do Pap smear.  She did Pap smear in 2015 which was normal in 2019 she did a Pap smear which was significant for ASC-US no new sexual partners. LMP 17 years ago.  Patient does not bring any new concerns or complaints from sexual health standpoint or from female health standpoint.  No sexual partner no discharge no abdominal pain no fever no chills no bleeding.            Pap smear 2019  Contains abnormal data THINPREP PAP W/REFLEX HIGH RISK HPV DNA 16,18(ASCUS ONLY)  Specimen: Genital structure (body structure) - Cervical cytology test (procedure)  Component 4 yr ago   ThinPrep w/rflx Hi  Risk HPV 16,18(ASCUS)  [facility]  [address] [place] [id number] [phone]    PATHOLOGY REPORT      MR#:             [id number]  Patient:         KARTIK CASTANEDA  /Sex:         1955  F  Provider:        ANTONIO CASTILLO  Location:        [facility] Office  Collect Date:    2019    [id number]  CYTOLOGIC DIAGNOSIS:  EPITHELIAL CELL ABNORMALITIES:  Atypical squamous cells: undetermined significance (ASC-US).    Adequacy:  Adequate for evaluation.        Electronically Signed Out By Nataliia Zuleta M.D.      RA                                          Finalized:  2019    Test Code      Result       Result Date/Time  HPV Other Negative      2019 19:05            HPV 16 Negative      2019 19:05            HPV 18 Negative      2019 19:05            HPV Interpretation SEE TEXT                     Text Result/Comments:         HPV types 16, 18, 31, 33, 35, 39, 45, 51, 52, 56, 58, 59, 66, and  68 DNA were not detected.            Reference Range: Not Detected  Method: Real Time Polymerase Chain Reaction  (RT-PCR)    Not Detected Results: Patients 21 years of age or older with an ASCUS  interpretation on Pap Test and who test negative for HPV16, HPV18 and  Other High Risk HPV have a very low likelihood that underlying CIN2  will be detected at colposcopy.  A negative result does not preclude  the presence of HPV DNA concentrations below the level of detection.  Results are dependent on adequate specimen collection and absence of  inhibitors.  16 / 18 Detected Results: Patients 21 years of age or older with an  ASCUS interpretation on Pap Test and who test positive for HPV 16  and/or 18 have a high risk of CIN2 or a more severe lesion and should  be considered for colposcopy if clinically appropriate.  Patients 30  years of age or older with a Negative for Intraepithelial Lesion  interpretation on Pap Test and who test positive for HPV 16 and/or 18  have an  increased risk of CIN2 or a more severe lesion and should be  considered for colposcopy if clinically appropriate.  High Risk Detected Results: Patients 21 years of age or older with an  ASCUS interpretation on Pap Test and who test positive for other High  Risk HPV, have a increased risk for CIN2 or a more sever lesion and  should be considered for colposcopy if clinically appropriate.            PAP Cytological Diagnosis CYTOLOGIC DIAGNOSIS: EPITHELIAL CELL ABNORMALITIES:  Aty      Review of Systems   Constitutional:  Negative for chills and fever.   HENT:  Negative for ear pain and sore throat.    Eyes:  Negative for pain and visual disturbance.   Respiratory:  Negative for cough and shortness of breath.    Cardiovascular:  Negative for chest pain and palpitations.   Gastrointestinal:  Negative for abdominal pain and vomiting.   Genitourinary:  Negative for dysuria and hematuria.   Musculoskeletal:  Negative for arthralgias and back pain.   Skin:  Negative for color change and rash.   Neurological:  Negative for seizures and syncope.   All other systems reviewed and are negative.    Medical History Reviewed by provider this encounter:       Current Outpatient Medications on File Prior to Visit   Medication Sig Dispense Refill    aspirin (ECOTRIN LOW STRENGTH) 81 mg EC tablet Take 81 mg by mouth daily      Cholecalciferol (Vitamin D) 50 MCG (2000 UT) tablet Take 1 tablet (2,000 Units total) by mouth daily 90 tablet 3    clobetasol (TEMOVATE) 0.05 % external solution Apply to dry scalp daily for up to 4 weeks.  Leave shampoo on the scalp x 15 minutes, then rinse. 50 mL 2    glucose blood test strip 1 each by Other route as needed Use as instructed      levothyroxine 88 mcg tablet Take 1 tablet (88 mcg total) by mouth daily 90 tablet 0    linaGLIPtin (Tradjenta) 5 MG TABS Take 5 mg by mouth daily 90 tablet 3    lisinopril (ZESTRIL) 5 mg tablet Take 1 tablet (5 mg total) by mouth daily 90 tablet 0    lithium  carbonate (LITHOBID) 300 mg CR tablet       pravastatin (PRAVACHOL) 40 mg tablet Take 1 tablet (40 mg total) by mouth daily 90 tablet 3    traZODone (DESYREL) 100 mg tablet Take 200 mg by mouth daily at bedtime      [DISCONTINUED] glucose blood (Contour Next Test) test strip Use 1 each 2 (two) times a day Use as instructed 100 strip 5    hydrOXYzine HCL (ATARAX) 25 mg tablet Take 2 tablets (50 mg total) by mouth daily at bedtime for 7 days (Patient not taking: Reported on 4/8/2024) 14 tablet 0    [DISCONTINUED] hydrOXYzine pamoate (VISTARIL) 50 mg capsule Take 1 capsule (50 mg total) by mouth daily at bedtime as needed (sleep) (Patient not taking: Reported on 3/23/2022 ) 30 capsule 0     No current facility-administered medications on file prior to visit.      Social History     Tobacco Use    Smoking status: Never     Passive exposure: Never    Smokeless tobacco: Never   Vaping Use    Vaping status: Never Used   Substance and Sexual Activity    Alcohol use: No    Drug use: No    Sexual activity: Not Currently     Objective     /73 (BP Location: Left arm, Patient Position: Sitting, Cuff Size: Standard)   Pulse 82   Temp 98.2 °F (36.8 °C)   Resp 18   Ht 5' (1.524 m)   Wt 87.9 kg (193 lb 12.8 oz)   SpO2 97%   BMI 37.85 kg/m²     Physical Exam  Vitals and nursing note reviewed. Exam conducted with a chaperone present.   Constitutional:       General: She is not in acute distress.     Appearance: She is well-developed. She is obese.   HENT:      Head: Normocephalic and atraumatic.   Eyes:      Conjunctiva/sclera: Conjunctivae normal.   Cardiovascular:      Rate and Rhythm: Normal rate and regular rhythm.      Pulses: Normal pulses.      Heart sounds: Normal heart sounds. No murmur heard.  Pulmonary:      Effort: Pulmonary effort is normal.      Breath sounds: Normal breath sounds.   Abdominal:      Hernia: There is no hernia in the right inguinal area.   Genitourinary:     Exam position: Knee-chest  position.      Pubic Area: No rash.       Labia:         Right: No rash, tenderness or injury.         Left: No rash, tenderness, lesion or injury.       Vagina: No signs of injury and foreign body. No vaginal discharge, erythema, tenderness, bleeding, lesions or prolapsed vaginal walls.      Cervix: Normal.      Uterus: Normal.    Musculoskeletal:         General: No swelling.      Cervical back: Neck supple.   Skin:     General: Skin is warm and dry.      Capillary Refill: Capillary refill takes less than 2 seconds.   Neurological:      General: No focal deficit present.      Mental Status: She is alert and oriented to person, place, and time.   Psychiatric:         Mood and Affect: Mood normal.         Thought Content: Thought content normal.       Administrative Statements   I have spent a total time of 25 minutes on 06/17/24 In caring for this patient including Diagnostic results, Instructions for management, Patient and family education, Importance of tx compliance, Risk factor reductions, Impressions, Counseling / Coordination of care, Documenting in the medical record, and Obtaining or reviewing history  .

## 2024-06-18 LAB
HPV HR 12 DNA CVX QL NAA+PROBE: NEGATIVE
HPV16 DNA CVX QL NAA+PROBE: NEGATIVE
HPV18 DNA CVX QL NAA+PROBE: NEGATIVE

## 2024-06-21 LAB
LAB AP GYN PRIMARY INTERPRETATION: NORMAL
Lab: NORMAL

## 2024-06-24 ENCOUNTER — OFFICE VISIT (OUTPATIENT)
Dept: OBGYN CLINIC | Facility: CLINIC | Age: 69
End: 2024-06-24
Payer: COMMERCIAL

## 2024-06-24 VITALS
DIASTOLIC BLOOD PRESSURE: 66 MMHG | WEIGHT: 193 LBS | HEIGHT: 60 IN | SYSTOLIC BLOOD PRESSURE: 107 MMHG | HEART RATE: 94 BPM | BODY MASS INDEX: 37.89 KG/M2

## 2024-06-24 DIAGNOSIS — M17.12 PRIMARY OSTEOARTHRITIS OF LEFT KNEE: Primary | ICD-10-CM

## 2024-06-24 PROCEDURE — 99213 OFFICE O/P EST LOW 20 MIN: CPT | Performed by: ORTHOPAEDIC SURGERY

## 2024-06-24 PROCEDURE — 20610 DRAIN/INJ JOINT/BURSA W/O US: CPT | Performed by: ORTHOPAEDIC SURGERY

## 2024-06-24 RX ORDER — TRIAMCINOLONE ACETONIDE 40 MG/ML
1.5 INJECTION, SUSPENSION INTRA-ARTICULAR; INTRAMUSCULAR
Status: COMPLETED | OUTPATIENT
Start: 2024-06-24 | End: 2024-06-24

## 2024-06-24 RX ORDER — BUPIVACAINE HYDROCHLORIDE 2.5 MG/ML
3.5 INJECTION, SOLUTION EPIDURAL; INFILTRATION; INTRACAUDAL
Status: COMPLETED | OUTPATIENT
Start: 2024-06-24 | End: 2024-06-24

## 2024-06-24 RX ADMIN — BUPIVACAINE HYDROCHLORIDE 3.5 ML: 2.5 INJECTION, SOLUTION EPIDURAL; INFILTRATION; INTRACAUDAL at 14:00

## 2024-06-24 RX ADMIN — TRIAMCINOLONE ACETONIDE 1.5 ML: 40 INJECTION, SUSPENSION INTRA-ARTICULAR; INTRAMUSCULAR at 14:00

## 2024-06-24 NOTE — PROGRESS NOTES
ASSESSMENT/PLAN:  Chief Complaint   Patient presents with    Left Knee - Follow-up       Diagnoses and all orders for this visit:    Primary osteoarthritis of left knee      The patient has degenerative joint disease of her left knee.  Under aseptic technique, the left knee was injected with Kenalog and Marcaine.  She tolerated procedure quite well.  Return back in 3 months for reevaluation    Return in about 3 months (around 9/24/2024).    The patient has arthritis of her left knee.  She does not want any surgical invention.  The left knee was injected with Kenalog and Marcaine.  She tolerated CSI quite well.  Return back 3 months evaluation.  If her condition changes, she would not hesitate to let us know  _____________________________________________________  CHIEF COMPLAINT:  Chief Complaint   Patient presents with    Left Knee - Follow-up         SUBJECTIVE:  Lisa Rich is a 68 y.o. female who presents to our office complaining of left knee pain.  The patient states that her pain is worsened with prolonged standing and ambulation.  She denies any new falls or trauma.  She denies any numbness or tingling. Unchanged from prior. She has been taking care of her dog.     The following portions of the patient's history were reviewed and updated as appropriate: allergies, current medications, past family history, past medical history, past social history, past surgical history and problem list.    PAST MEDICAL HISTORY:  Past Medical History:   Diagnosis Date    Allergic     Depression     Diabetes mellitus (HCC)     Disease of thyroid gland     GERD (gastroesophageal reflux disease)     Hyperchloremia     Hypertension     Psychiatric disorder        PAST SURGICAL HISTORY:  Past Surgical History:   Procedure Laterality Date    ANKLE SURGERY      Incision    DILATION AND CURETTAGE OF UTERUS         FAMILY HISTORY:  Family History   Problem Relation Age of Onset    No Known Problems Family     Diabetes Mother      Heart disease Mother     Stroke Mother     Diabetes Father     Stroke Father     No Known Problems Maternal Grandmother     No Known Problems Maternal Grandfather     No Known Problems Paternal Grandmother     No Known Problems Paternal Grandfather     Diabetes Brother     No Known Problems Paternal Aunt        SOCIAL HISTORY:  Social History     Tobacco Use    Smoking status: Never     Passive exposure: Never    Smokeless tobacco: Never   Vaping Use    Vaping status: Never Used   Substance Use Topics    Alcohol use: No    Drug use: No       MEDICATIONS:    Current Outpatient Medications:     aspirin (ECOTRIN LOW STRENGTH) 81 mg EC tablet, Take 81 mg by mouth daily, Disp: , Rfl:     Cholecalciferol (Vitamin D) 50 MCG (2000 UT) tablet, Take 1 tablet (2,000 Units total) by mouth daily, Disp: 90 tablet, Rfl: 3    clobetasol (TEMOVATE) 0.05 % external solution, Apply to dry scalp daily for up to 4 weeks.  Leave shampoo on the scalp x 15 minutes, then rinse., Disp: 50 mL, Rfl: 2    glucose blood (Contour Next Test) test strip, Use 1 each 2 (two) times a day Use as instructed, Disp: 100 strip, Rfl: 5    glucose blood test strip, 1 each by Other route as needed Use as instructed, Disp: , Rfl:     levothyroxine 88 mcg tablet, Take 1 tablet (88 mcg total) by mouth daily, Disp: 90 tablet, Rfl: 0    linaGLIPtin (Tradjenta) 5 MG TABS, Take 5 mg by mouth daily, Disp: 90 tablet, Rfl: 3    lisinopril (ZESTRIL) 5 mg tablet, Take 1 tablet (5 mg total) by mouth daily, Disp: 90 tablet, Rfl: 0    lithium carbonate (LITHOBID) 300 mg CR tablet, , Disp: , Rfl:     pravastatin (PRAVACHOL) 40 mg tablet, Take 1 tablet (40 mg total) by mouth daily, Disp: 90 tablet, Rfl: 3    traZODone (DESYREL) 100 mg tablet, Take 200 mg by mouth daily at bedtime, Disp: , Rfl:     hydrOXYzine HCL (ATARAX) 25 mg tablet, Take 2 tablets (50 mg total) by mouth daily at bedtime for 7 days (Patient not taking: Reported on 4/8/2024), Disp: 14 tablet, Rfl:  0    ALLERGIES:  No Known Allergies    ROS:  Review of Systems     Constitutional: Negative for fatigue, fever or loss of appetite.   HENT: Negative.    Respiratory: Negative for shortness of breath, dyspnea.    Cardiovascular: Negative for chest pain/tightness.   Gastrointestinal: Negative for abdominal pain, N/V.   Endocrine: Negative for cold/heat intolerance, unexplained weight loss/gain.   Genitourinary: Negative for flank pain, dysuria, hematuria.   Musculoskeletal: Positive for arthralgia   Skin: Negative for rash.    Neurological: Negative for numbness or tingling  Psychiatric/Behavioral: Negative for agitation.  _____________________________________________________  PHYSICAL EXAMINATION:    Blood pressure 107/66, pulse 94, height 5' (1.524 m), weight 87.5 kg (193 lb).    Constitutional: Oriented to person, place, and time. Appears well-developed and well-nourished. No distress.   HENT:   Head: Normocephalic.   Eyes: Conjunctivae are normal. Right eye exhibits no discharge. Left eye exhibits no discharge. No scleral icterus.   Cardiovascular: Normal rate.    Pulmonary/Chest: Effort normal.   Neurological: Alert and oriented to person, place, and time.   Skin: Skin is warm and dry. No rash noted. Not diaphoretic. No erythema. No pallor.   Psychiatric: Normal mood and affect. Behavior is normal. Judgment and thought content normal.      MUSCULOSKELETAL EXAMINATION:   Physical Exam  Ortho Exam    Left lower extremity is neurovascularly intact  Toes are pink and mobile   Compartments are soft  No warmth, erythema or ecchymosis  ROM of knee is from 5-115 degrees  Difficulty with IR at hip  Negative Lachman, drawer or pivot shift  No medial instability  Medial joint line tenderness, slight lateral joint line tenderness  Patellofemoral crepitation       Objective:  BP Readings from Last 1 Encounters:   06/24/24 107/66      Wt Readings from Last 1 Encounters:   06/24/24 87.5 kg (193 lb)        BMI:   Estimated body  "mass index is 37.69 kg/m² as calculated from the following:    Height as of this encounter: 5' (1.524 m).    Weight as of this encounter: 87.5 kg (193 lb).      PROCEDURES PERFORMED:  Large joint arthrocentesis: L knee  Universal Protocol:  Consent: Verbal consent obtained.  Risks and benefits: risks, benefits and alternatives were discussed  Consent given by: patient  Time out: Immediately prior to procedure a \"time out\" was called to verify the correct patient, procedure, equipment, support staff and site/side marked as required.  Patient understanding: patient states understanding of the procedure being performed  Site marked: the operative site was marked  Patient identity confirmed: verbally with patient  Supporting Documentation  Indications: pain   Procedure Details  Location: knee - L knee  Preparation: Patient was prepped and draped in the usual sterile fashion  Needle size: 18 G  Ultrasound guidance: no  Medications administered: 1.5 mL triamcinolone acetonide 40 mg/mL; 3.5 mL bupivacaine (PF) 0.25 %    Patient tolerance: patient tolerated the procedure well with no immediate complications  Dressing:  Sterile dressing applied            Fernie NOVAK PA-C, scribed this note  for Dr. Barrett Wilson, who performed the services as described in this documentation    "

## 2024-07-30 ENCOUNTER — APPOINTMENT (OUTPATIENT)
Age: 69
End: 2024-07-30
Payer: COMMERCIAL

## 2024-07-30 DIAGNOSIS — F31.32 MODERATE DEPRESSED BIPOLAR I DISORDER (HCC): ICD-10-CM

## 2024-07-30 DIAGNOSIS — Z79.899 ENCOUNTER FOR LONG-TERM (CURRENT) USE OF OTHER MEDICATIONS: ICD-10-CM

## 2024-07-30 LAB
ALBUMIN SERPL BCG-MCNC: 3.8 G/DL (ref 3.5–5)
ALP SERPL-CCNC: 62 U/L (ref 34–104)
ALT SERPL W P-5'-P-CCNC: 10 U/L (ref 7–52)
ANION GAP SERPL CALCULATED.3IONS-SCNC: 8 MMOL/L (ref 4–13)
AST SERPL W P-5'-P-CCNC: 12 U/L (ref 13–39)
BASOPHILS # BLD AUTO: 0.05 THOUSANDS/ÂΜL (ref 0–0.1)
BASOPHILS NFR BLD AUTO: 0 % (ref 0–1)
BILIRUB SERPL-MCNC: 1.01 MG/DL (ref 0.2–1)
BUN SERPL-MCNC: 11 MG/DL (ref 5–25)
CALCIUM SERPL-MCNC: 10 MG/DL (ref 8.4–10.2)
CHLORIDE SERPL-SCNC: 105 MMOL/L (ref 96–108)
CO2 SERPL-SCNC: 25 MMOL/L (ref 21–32)
CREAT SERPL-MCNC: 1.13 MG/DL (ref 0.6–1.3)
EOSINOPHIL # BLD AUTO: 0.28 THOUSAND/ÂΜL (ref 0–0.61)
EOSINOPHIL NFR BLD AUTO: 2 % (ref 0–6)
ERYTHROCYTE [DISTWIDTH] IN BLOOD BY AUTOMATED COUNT: 13.4 % (ref 11.6–15.1)
EST. AVERAGE GLUCOSE BLD GHB EST-MCNC: 120 MG/DL
GFR SERPL CREATININE-BSD FRML MDRD: 50 ML/MIN/1.73SQ M
GLUCOSE P FAST SERPL-MCNC: 96 MG/DL (ref 65–99)
HBA1C MFR BLD: 5.8 %
HCT VFR BLD AUTO: 37.8 % (ref 34.8–46.1)
HGB BLD-MCNC: 12.3 G/DL (ref 11.5–15.4)
IMM GRANULOCYTES # BLD AUTO: 0.05 THOUSAND/UL (ref 0–0.2)
IMM GRANULOCYTES NFR BLD AUTO: 0 % (ref 0–2)
LITHIUM SERPL-SCNC: 1.21 MMOL/L (ref 0.6–1.2)
LYMPHOCYTES # BLD AUTO: 1.6 THOUSANDS/ÂΜL (ref 0.6–4.47)
LYMPHOCYTES NFR BLD AUTO: 14 % (ref 14–44)
MCH RBC QN AUTO: 31.4 PG (ref 26.8–34.3)
MCHC RBC AUTO-ENTMCNC: 32.5 G/DL (ref 31.4–37.4)
MCV RBC AUTO: 96 FL (ref 82–98)
MONOCYTES # BLD AUTO: 0.67 THOUSAND/ÂΜL (ref 0.17–1.22)
MONOCYTES NFR BLD AUTO: 6 % (ref 4–12)
NEUTROPHILS # BLD AUTO: 8.8 THOUSANDS/ÂΜL (ref 1.85–7.62)
NEUTS SEG NFR BLD AUTO: 78 % (ref 43–75)
NRBC BLD AUTO-RTO: 0 /100 WBCS
PLATELET # BLD AUTO: 334 THOUSANDS/UL (ref 149–390)
PMV BLD AUTO: 10 FL (ref 8.9–12.7)
POTASSIUM SERPL-SCNC: 4.6 MMOL/L (ref 3.5–5.3)
PROT SERPL-MCNC: 6.3 G/DL (ref 6.4–8.4)
RBC # BLD AUTO: 3.92 MILLION/UL (ref 3.81–5.12)
SODIUM SERPL-SCNC: 138 MMOL/L (ref 135–147)
TSH SERPL DL<=0.05 MIU/L-ACNC: 3.89 UIU/ML (ref 0.45–4.5)
VIT B12 SERPL-MCNC: 266 PG/ML (ref 180–914)
WBC # BLD AUTO: 11.45 THOUSAND/UL (ref 4.31–10.16)

## 2024-07-30 PROCEDURE — 83036 HEMOGLOBIN GLYCOSYLATED A1C: CPT

## 2024-07-30 PROCEDURE — 82607 VITAMIN B-12: CPT

## 2024-07-30 PROCEDURE — 36415 COLL VENOUS BLD VENIPUNCTURE: CPT

## 2024-07-30 PROCEDURE — 85025 COMPLETE CBC W/AUTO DIFF WBC: CPT

## 2024-07-30 PROCEDURE — 80178 ASSAY OF LITHIUM: CPT

## 2024-07-30 PROCEDURE — 80053 COMPREHEN METABOLIC PANEL: CPT

## 2024-07-30 PROCEDURE — 3044F HG A1C LEVEL LT 7.0%: CPT | Performed by: ORTHOPAEDIC SURGERY

## 2024-07-30 PROCEDURE — 84443 ASSAY THYROID STIM HORMONE: CPT

## 2024-08-08 ENCOUNTER — TELEPHONE (OUTPATIENT)
Dept: FAMILY MEDICINE CLINIC | Facility: HOME HEALTHCARE | Age: 69
End: 2024-08-08

## 2024-08-12 ENCOUNTER — OFFICE VISIT (OUTPATIENT)
Dept: OBGYN CLINIC | Facility: CLINIC | Age: 69
End: 2024-08-12
Payer: COMMERCIAL

## 2024-08-12 VITALS — HEIGHT: 60 IN | BODY MASS INDEX: 37.89 KG/M2 | WEIGHT: 193 LBS

## 2024-08-12 DIAGNOSIS — M17.12 PRIMARY OSTEOARTHRITIS OF LEFT KNEE: Primary | ICD-10-CM

## 2024-08-12 PROCEDURE — 20610 DRAIN/INJ JOINT/BURSA W/O US: CPT | Performed by: ORTHOPAEDIC SURGERY

## 2024-08-12 PROCEDURE — 99213 OFFICE O/P EST LOW 20 MIN: CPT | Performed by: ORTHOPAEDIC SURGERY

## 2024-08-12 RX ORDER — BUPIVACAINE HYDROCHLORIDE 2.5 MG/ML
4 INJECTION, SOLUTION INFILTRATION; PERINEURAL
Status: COMPLETED | OUTPATIENT
Start: 2024-08-12 | End: 2024-08-12

## 2024-08-12 RX ORDER — TRIAMCINOLONE ACETONIDE 40 MG/ML
80 INJECTION, SUSPENSION INTRA-ARTICULAR; INTRAMUSCULAR
Status: COMPLETED | OUTPATIENT
Start: 2024-08-12 | End: 2024-08-12

## 2024-08-12 RX ADMIN — TRIAMCINOLONE ACETONIDE 80 MG: 40 INJECTION, SUSPENSION INTRA-ARTICULAR; INTRAMUSCULAR at 14:00

## 2024-08-12 RX ADMIN — BUPIVACAINE HYDROCHLORIDE 4 ML: 2.5 INJECTION, SOLUTION INFILTRATION; PERINEURAL at 14:00

## 2024-08-12 NOTE — PROGRESS NOTES
ASSESSMENT/PLAN:    Diagnoses and all orders for this visit:    Primary osteoarthritis of left knee    Other orders  -     Large joint arthrocentesis        The patient's left knee was injected with Kenalog and Marcaine.  She tolerated the injection quite well.  She will follow-up with our office in 3 months.  She is acceptable to this plan.    Return in about 3 months (around 11/12/2024).    The patient has degenerative joint disease of her left knee.  Under aseptic technique, the left knee was injected with Kenalog and Marcaine.  She tolerated procedures quite well.  Return back in 3 months for reevaluation      _____________________________________________________  CHIEF COMPLAINT:  Chief Complaint   Patient presents with    Left Knee - Follow-up         SUBJECTIVE:  Lisa Rich is a 69 y.o. female who presents to our office for a follow-up visit.  The patient has a history of primary osteoarthritis of her left knee.  She would like a corticosteroid injection today, as they have provided her with relief of her symptoms in the past.  She denies any numbness or tingling.  She denies any fever or chills.    The following portions of the patient's history were reviewed and updated as appropriate: allergies, current medications, past family history, past medical history, past social history, past surgical history and problem list.    PAST MEDICAL HISTORY:  Past Medical History:   Diagnosis Date    Allergic     Depression     Diabetes mellitus (HCC)     Disease of thyroid gland     GERD (gastroesophageal reflux disease)     Hyperchloremia     Hypertension     Psychiatric disorder        PAST SURGICAL HISTORY:  Past Surgical History:   Procedure Laterality Date    ANKLE SURGERY      Incision    DILATION AND CURETTAGE OF UTERUS         FAMILY HISTORY:  Family History   Problem Relation Age of Onset    No Known Problems Family     Diabetes Mother     Heart disease Mother     Stroke Mother     Diabetes Father      Stroke Father     No Known Problems Maternal Grandmother     No Known Problems Maternal Grandfather     No Known Problems Paternal Grandmother     No Known Problems Paternal Grandfather     Diabetes Brother     No Known Problems Paternal Aunt        SOCIAL HISTORY:  Social History     Tobacco Use    Smoking status: Never     Passive exposure: Never    Smokeless tobacco: Never   Vaping Use    Vaping status: Never Used   Substance Use Topics    Alcohol use: No    Drug use: No       MEDICATIONS:    Current Outpatient Medications:     aspirin (ECOTRIN LOW STRENGTH) 81 mg EC tablet, Take 81 mg by mouth daily, Disp: , Rfl:     Cholecalciferol (Vitamin D) 50 MCG (2000 UT) tablet, Take 1 tablet (2,000 Units total) by mouth daily, Disp: 90 tablet, Rfl: 3    clobetasol (TEMOVATE) 0.05 % external solution, Apply to dry scalp daily for up to 4 weeks.  Leave shampoo on the scalp x 15 minutes, then rinse., Disp: 50 mL, Rfl: 2    glucose blood (Contour Next Test) test strip, Use 1 each 2 (two) times a day Use as instructed, Disp: 100 strip, Rfl: 5    glucose blood test strip, 1 each by Other route as needed Use as instructed, Disp: , Rfl:     levothyroxine 88 mcg tablet, Take 1 tablet (88 mcg total) by mouth daily, Disp: 90 tablet, Rfl: 0    linaGLIPtin (Tradjenta) 5 MG TABS, Take 5 mg by mouth daily, Disp: 90 tablet, Rfl: 3    lisinopril (ZESTRIL) 5 mg tablet, Take 1 tablet (5 mg total) by mouth daily, Disp: 90 tablet, Rfl: 0    lithium carbonate (LITHOBID) 300 mg CR tablet, , Disp: , Rfl:     pravastatin (PRAVACHOL) 40 mg tablet, Take 1 tablet (40 mg total) by mouth daily, Disp: 90 tablet, Rfl: 3    traZODone (DESYREL) 100 mg tablet, Take 200 mg by mouth daily at bedtime, Disp: , Rfl:     hydrOXYzine HCL (ATARAX) 25 mg tablet, Take 2 tablets (50 mg total) by mouth daily at bedtime for 7 days (Patient not taking: Reported on 4/8/2024), Disp: 14 tablet, Rfl: 0    ALLERGIES:  No Known Allergies    ROS:  Review of Systems      Constitutional: Negative for fatigue, fever or loss of appetite.   HENT: Negative.    Respiratory: Negative for shortness of breath, dyspnea.    Cardiovascular: Negative for chest pain/tightness.   Gastrointestinal: Negative for abdominal pain, N/V.   Endocrine: Negative for cold/heat intolerance, unexplained weight loss/gain.   Genitourinary: Negative for flank pain, dysuria, hematuria.   Musculoskeletal: Positive for arthralgia   Skin: Negative for rash.    Neurological: Negative for numbness or tingling  Psychiatric/Behavioral: Negative for agitation.  _____________________________________________________  PHYSICAL EXAMINATION:    Height 5' (1.524 m), weight 87.5 kg (193 lb).    Constitutional: Oriented to person, place, and time. Appears well-developed and well-nourished. No distress.   HENT:   Head: Normocephalic.   Eyes: Conjunctivae are normal. Right eye exhibits no discharge. Left eye exhibits no discharge. No scleral icterus.   Cardiovascular: Normal rate.    Pulmonary/Chest: Effort normal.   Neurological: Alert and oriented to person, place, and time.   Skin: Skin is warm and dry. No rash noted. Not diaphoretic. No erythema. No pallor.   Psychiatric: Normal mood and affect. Behavior is normal. Judgment and thought content normal.      MUSCULOSKELETAL EXAMINATION:   Physical Exam  Ortho Exam    Left lower extremity is neurovascularly intact  Toes are pink and mobile   Compartments are soft  No warmth, erythema or ecchymosis  ROM of knee is from 5-115 degrees  Negative Lachman, drawer or pivot shift  No medial instability  Medial joint line tenderness, slight lateral joint line tenderness  Patellofemoral crepitation   Objective:  BP Readings from Last 1 Encounters:   06/24/24 107/66      Wt Readings from Last 1 Encounters:   08/12/24 87.5 kg (193 lb)        BMI:   Estimated body mass index is 37.69 kg/m² as calculated from the following:    Height as of this encounter: 5' (1.524 m).    Weight as of this  encounter: 87.5 kg (193 lb).      PROCEDURES PERFORMED:  Large joint arthrocentesis: L knee  Universal Protocol:  Risks and benefits: risks, benefits and alternatives were discussed  Consent given by: patient  Patient understanding: patient states understanding of the procedure being performed  Site marked: the operative site was marked  Patient identity confirmed: verbally with patient  Supporting Documentation  Indications: pain   Procedure Details  Location: knee - L knee  Preparation: Patient was prepped and draped in the usual sterile fashion  Needle size: 22 G  Ultrasound guidance: no  Approach: lateral  Medications administered: 4 mL bupivacaine 0.25 %; 80 mg triamcinolone acetonide 40 mg/mL    Patient tolerance: patient tolerated the procedure well with no immediate complications  Dressing:  Sterile dressing applied            Scribe Attestation      I,:  Arley Reynolds PA-C am acting as a scribe while in the presence of the attending physician.:       I,:  Barrett Wilson DO personally performed the services described in this documentation    as scribed in my presence.:

## 2024-08-15 ENCOUNTER — OFFICE VISIT (OUTPATIENT)
Dept: URGENT CARE | Facility: CLINIC | Age: 69
End: 2024-08-15
Payer: COMMERCIAL

## 2024-08-15 ENCOUNTER — APPOINTMENT (OUTPATIENT)
Dept: RADIOLOGY | Facility: CLINIC | Age: 69
End: 2024-08-15
Payer: COMMERCIAL

## 2024-08-15 VITALS
RESPIRATION RATE: 18 BRPM | SYSTOLIC BLOOD PRESSURE: 122 MMHG | WEIGHT: 193 LBS | DIASTOLIC BLOOD PRESSURE: 68 MMHG | BODY MASS INDEX: 37.89 KG/M2 | TEMPERATURE: 98 F | OXYGEN SATURATION: 97 % | HEART RATE: 78 BPM | HEIGHT: 60 IN
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
## 2024-08-15 DIAGNOSIS — S80.12XA CONTUSION OF LEFT LOWER LEG, INITIAL ENCOUNTER: ICD-10-CM

## 2024-08-15 DIAGNOSIS — S70.02XA CONTUSION OF LEFT HIP, INITIAL ENCOUNTER: ICD-10-CM
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
## 2024-08-15 PROCEDURE — S9088 SERVICES PROVIDED IN URGENT: HCPCS

## 2024-08-15 PROCEDURE — 73503 X-RAY EXAM HIP UNI 4/> VIEWS: CPT

## 2024-08-15 PROCEDURE — 99213 OFFICE O/P EST LOW 20 MIN: CPT

## 2024-08-15 PROCEDURE — 73590 X-RAY EXAM OF LOWER LEG: CPT

## 2024-08-15 NOTE — PATIENT INSTRUCTIONS
Tylenol for pain.  May take Tylenol arthritis over-the-counter.  Avoid NSAIDs (ibuprofen/Aleve/Advil) due to history of chronic kidney disease.  Ice frequently 3-4 times a day for 10 to 15 minutes.  Elevate left leg when resting.  May try over-the-counter topical IcyHot, Voltaren gel, Bengay for pain.  Follow-up with your family doctor/orthopedist if no improvement in symptoms.  Follow up with PCP in 3-5 days.  Proceed to  ER if symptoms worsen.    If tests are performed, our office will contact you with results only if changes need to made to the care plan discussed with you at the visit. You can review your full results on [facility].  Patient Education     Taking care of bruises   The Basics   Written by the doctors and editors at [facility]   What are bruises? -- Bruises happen when blood vessels under the skin break, but the skin isn't cut. Blood leaks into the tissues under the skin. Bruises start off red in color, and then turn blue or purple. As they heal, bruises can turn green and yellow (figure 1). Most bruises heal in 1 to 2 weeks, but some take longer.  Bruises can happen when people get hurt, fall, or bump themselves. People usually have pain and swelling in the area of the bruise. Sometimes, the swelling happens right away. Other times, the swelling starts 1 or 2 days later.  Some people bruise more easily and get worse bruises. These include people who have conditions that keep the blood from clotting normally and people who take medicines to prevent blood clots.  How are bruises treated? -- A bruise will get better on its own. But to feel better and help your bruise heal, you can:   Put a cold gel pack, bag of ice, or bag of frozen vegetables on the injured area every 1 to 2 hours, for 15 minutes each time. Put a thin towel between the ice (or other cold object) and your skin. Use the ice (or other cold object) for at least 6 hours after your injury. Some people find it helpful to ice longer,  "even up to 2 days after their injury.   Raise the area, if possible - Raising the area above the level of your heart helps to reduce swelling.   Take medicine to reduce the pain and swelling - To treat pain, you can take acetaminophen (sample brand name: Tylenol). To treat pain and swelling, you can take ibuprofen (sample brand names: Advil, Motrin). But people who have certain conditions or take certain medicines should not take ibuprofen. If you are unsure, ask your doctor or nurse if you can take ibuprofen.   Use an elastic bandage - Using an elastic \"compression\" bandage to keep pressure on the area can reduce swelling. Be careful not to wrap the bandage too tightly. For most injuries, you can use the bandage during the first few days of healing, but take it off when you sleep.  If you have another injury in the same area, like a sprained ankle, you can continue to use the elastic bandage as the injury heals.  Do not use heat packs or a heating pad during the first 48 hours after injury. Heat can increase swelling and pain soon after an injury.  Do not stick a needle or other object in your bruise to drain it.  When should I call the doctor or nurse? -- Call your doctor or nurse if:   You get a fever   Your bruise causes your joints to swell   You can't move or walk because of your bruise   You get bruises for no reason or have unusual bleeding, such as from your gums or in your urine  All topics are updated as new evidence becomes available and our peer review process is complete.  This topic retrieved from [employer] on: Feb 26, 2024.  Topic 57753 Version 11.0  Release: 32.2.4 - C32.56  © 2024 UpToDate, Inc. and/or its affiliates. All rights reserved.  figure 1: How bruises heal     This drawing shows how a bruise changes color as it heals. A bruise starts off red in color (as shown in A) and then turns blue or purple (as shown in B). As a bruise heals, it can turn green and yellow (as shown in C).  Graphic " 93496 Version 4.0  Consumer Information Use and Disclaimer   Disclaimer: This generalized information is a limited summary of diagnosis, treatment, and/or medication information. It is not meant to be comprehensive and should be used as a tool to help the user understand and/or assess potential diagnostic and treatment options. It does NOT include all information about conditions, treatments, medications, side effects, or risks that may apply to a specific patient. It is not intended to be medical advice or a substitute for the medical advice, diagnosis, or treatment of a health care provider based on the health care provider's examination and assessment of a patient's specific and unique circumstances. Patients must speak with a health care provider for complete information about their health, medical questions, and treatment options, including any risks or benefits regarding use of medications. This information does not endorse any treatments or medications as safe, effective, or approved for treating a specific patient. UpToDate, Inc. and its affiliates disclaim any warranty or liability relating to this information or the use thereof.The use of this information is governed by the Terms of Use, available at https://www.wolterskluwer.com/en/know/clinical-effectiveness-terms. 2024© UpToDate, Inc. and its affiliates and/or licensors. All rights reserved.  Copyright   © 2024 UpToDate, Inc. and/or its affiliates. All rights reserved.

## 2024-08-15 NOTE — PROGRESS NOTES
"  [facility]        NAME: Lisa Rich is a 69 y.o. female  : 1955    MRN: [id number]  DATE: August 15, 2024  TIME: 2:09 PM    Assessment and Plan   Fall, initial encounter [W19.XXXA]  1. Fall, initial encounter  XR tibia fibula 2 vw left    XR hip/pelv 4+ vw left if performed      2. Contusion of left lower leg, initial encounter        3. Contusion of left hip, initial encounter          No obvious acute fracture or dislocation.  Official read pending at time of discharge.  Diagnosis of contusion at this time.  Did not hit her head or lose consciousness.  Mild limping gait due to pain.  Recommending Tylenol, ice, elevation.  Patient does have history of CKD, advised to avoid NSAIDs.  Recommended to follow-up with family doctor/orthopedist if no improvement.  Advised to go to the ER if any symptoms worsen.    Patient Instructions     Tylenol for pain.  May take Tylenol arthritis over-the-counter.  Avoid NSAIDs (ibuprofen/Aleve/Advil) due to history of chronic kidney disease.  Ice frequently 3-4 times a day for 10 to 15 minutes.  Elevate left leg when resting.  May try over-the-counter topical IcyHot, Voltaren gel, Bengay for pain.  Follow-up with your family doctor/orthopedist if no improvement in symptoms.  Follow up with PCP in 3-5 days.  Proceed to  ER if symptoms worsen.    If tests are performed, our office will contact you with results only if changes need to made to the care plan discussed with you at the visit. [facility]    Chief Complaint     Chief Complaint   Patient presents with    Fall     Fell injuring left lower leg 1 week ago         History of Present Illness       69-year-old female presents to the clinic with anterior left lower leg pain over the tibial region and left posterior hip/hamstring pain.  Patient states that a \"few days ago\", she was going to lift her dog onto the couch when she fell into the couch and hit the front of " her lower leg on the couch and then landed on her left hip.  Has pain with walking.  No prior injuries or surgeries to the area.  Did not take anything over-the-counter.  Patient saw Ortho 3 days ago on 8/12 and had steroid injection into her left knee.  Denies any other symptoms at this time.  Denies head injury or loss of consciousness.    Fall        Review of Systems   Review of Systems   Constitutional: Negative.    Respiratory: Negative.     Cardiovascular: Negative.    Musculoskeletal:  Positive for arthralgias and myalgias.   Skin: Negative.    Neurological: Negative.          Current Medications       Current Outpatient Medications:     aspirin (ECOTRIN LOW STRENGTH) 81 mg EC tablet, Take 81 mg by mouth daily, Disp: , Rfl:     Cholecalciferol (Vitamin D) 50 MCG (2000 UT) tablet, Take 1 tablet (2,000 Units total) by mouth daily, Disp: 90 tablet, Rfl: 3    clobetasol (TEMOVATE) 0.05 % external solution, Apply to dry scalp daily for up to 4 weeks.  Leave shampoo on the scalp x 15 minutes, then rinse., Disp: 50 mL, Rfl: 2    glucose blood (Contour Next Test) test strip, Use 1 each 2 (two) times a day Use as instructed, Disp: 100 strip, Rfl: 5    levothyroxine 88 mcg tablet, Take 1 tablet (88 mcg total) by mouth daily, Disp: 90 tablet, Rfl: 0    linaGLIPtin (Tradjenta) 5 MG TABS, Take 5 mg by mouth daily, Disp: 90 tablet, Rfl: 3    lisinopril (ZESTRIL) 5 mg tablet, Take 1 tablet (5 mg total) by mouth daily, Disp: 90 tablet, Rfl: 0    lithium carbonate (LITHOBID) 300 mg CR tablet, , Disp: , Rfl:     pravastatin (PRAVACHOL) 40 mg tablet, Take 1 tablet (40 mg total) by mouth daily, Disp: 90 tablet, Rfl: 3    traZODone (DESYREL) 100 mg tablet, Take 200 mg by mouth daily at bedtime, Disp: , Rfl:     glucose blood test strip, 1 each by Other route as needed Use as instructed (Patient not taking: Reported on 8/15/2024), Disp: , Rfl:     hydrOXYzine HCL (ATARAX) 25 mg tablet, Take 2 tablets (50 mg total) by mouth daily  at bedtime for 7 days (Patient not taking: Reported on 4/8/2024), Disp: 14 tablet, Rfl: 0    Current Allergies     Allergies as of 08/15/2024    (No Known Allergies)            The following portions of the patient's history were reviewed and updated as appropriate: allergies, current medications, past family history, past medical history, past social history, past surgical history and problem list.     Past Medical History:   Diagnosis Date    Allergic     Depression     Diabetes mellitus (HCC)     Disease of thyroid gland     GERD (gastroesophageal reflux disease)     Hyperchloremia     Hypertension     Psychiatric disorder        Past Surgical History:   Procedure Laterality Date    ANKLE SURGERY      Incision    DILATION AND CURETTAGE OF UTERUS         Family History   Problem Relation Age of Onset    No Known Problems Family     Diabetes Mother     Heart disease Mother     Stroke Mother     Diabetes Father     Stroke Father     No Known Problems Maternal Grandmother     No Known Problems Maternal Grandfather     No Known Problems Paternal Grandmother     No Known Problems Paternal Grandfather     Diabetes Brother     No Known Problems Paternal Aunt          Medications have been verified.        Objective   /68   Pulse 78   Temp 98 °F (36.7 °C) (Temporal)   Resp 18   Ht 5' (1.524 m)   Wt 87.5 kg (193 lb)   SpO2 97%   BMI 37.69 kg/m²        Physical Exam     Physical Exam  Constitutional:       General: She is not in acute distress.     Appearance: Normal appearance. She is not ill-appearing or diaphoretic.   HENT:      Head: Normocephalic and atraumatic.      Mouth/Throat:      Mouth: Mucous membranes are moist.      Pharynx: Oropharynx is clear.   Cardiovascular:      Rate and Rhythm: Normal rate and regular rhythm.      Pulses: Normal pulses.      Heart sounds: Normal heart sounds.   Pulmonary:      Effort: Pulmonary effort is normal. No respiratory distress.      Breath sounds: Normal breath  sounds.   Musculoskeletal:      Cervical back: Normal range of motion and neck supple. No tenderness.      Right hip: Normal.      Left hip: Tenderness (Mild tenderness to palpation over the lateral/posterior aspect of the left hip and into the superior left hamstring.  Slight limping gait.  Able to extend and flex normal with mild pain.  Normal abduction/adduction.) present. No deformity or lacerations. Normal strength.      Right lower leg: Normal.      Left lower leg: Tenderness (Mild tenderness to palpation over the more proximal anterior tibial aspect of the left lower leg.  There is no swelling, erythema, ecchymosis.  Tenderness is not significant.  No obvious deformity.) present. No swelling, deformity or lacerations. No edema.        Legs:       Comments: Left leg is not internally/externally rotated or shortened.   Lymphadenopathy:      Cervical: No cervical adenopathy.   Skin:     General: Skin is warm and dry.      Findings: No bruising, erythema or rash.   Neurological:      Mental Status: She is alert.

## 2024-08-22 ENCOUNTER — TELEPHONE (OUTPATIENT)
Age: 69
End: 2024-08-22

## 2024-08-22 NOTE — TELEPHONE ENCOUNTER
Caller: Self    Doctor/Office: Steve    CB#: [phone]      What needs to be faxed: AVS from 4/24, 6/24, 8/12  Made patient aware any imaging will have to be requested through medical records dept and transferred    ATTN to: JOVITA    Fax#: [phone]       Documents were successfully e-faxed

## 2024-09-03 DIAGNOSIS — E03.9 ACQUIRED HYPOTHYROIDISM: ICD-10-CM

## 2024-09-03 RX ORDER — LEVOTHYROXINE SODIUM 88 UG/1
88 TABLET ORAL DAILY
Qty: 90 TABLET | Refills: 1 | Status: SHIPPED | OUTPATIENT
Start: 2024-09-03

## 2024-09-10 DIAGNOSIS — G47.00 INSOMNIA, UNSPECIFIED TYPE: Primary | ICD-10-CM

## 2024-09-23 DIAGNOSIS — E11.22 TYPE 2 DIABETES MELLITUS WITH STAGE 3A CHRONIC KIDNEY DISEASE, WITHOUT LONG-TERM CURRENT USE OF INSULIN (HCC): ICD-10-CM

## 2024-09-23 DIAGNOSIS — N18.31 TYPE 2 DIABETES MELLITUS WITH STAGE 3A CHRONIC KIDNEY DISEASE, WITHOUT LONG-TERM CURRENT USE OF INSULIN (HCC): ICD-10-CM

## 2024-09-23 DIAGNOSIS — I10 ESSENTIAL HYPERTENSION: ICD-10-CM

## 2024-09-23 RX ORDER — LISINOPRIL 5 MG/1
5 TABLET ORAL DAILY
Qty: 90 TABLET | Refills: 0 | Status: SHIPPED | OUTPATIENT
Start: 2024-09-23

## 2024-09-24 ENCOUNTER — TELEPHONE (OUTPATIENT)
Dept: FAMILY MEDICINE CLINIC | Facility: HOME HEALTHCARE | Age: 69
End: 2024-09-24

## 2024-09-24 NOTE — TELEPHONE ENCOUNTER
Patient called in wanting to know if we got her lab results advised we did in deed get them and she should come in and see the provider to go over them. Patient refused to make a apt. Stated her dog  and she just can't

## 2024-09-30 ENCOUNTER — OFFICE VISIT (OUTPATIENT)
Dept: URGENT CARE | Facility: CLINIC | Age: 69
End: 2024-09-30
Payer: COMMERCIAL

## 2024-09-30 VITALS
OXYGEN SATURATION: 98 % | HEART RATE: 87 BPM | TEMPERATURE: 98 F | RESPIRATION RATE: 20 BRPM | DIASTOLIC BLOOD PRESSURE: 60 MMHG | SYSTOLIC BLOOD PRESSURE: 119 MMHG

## 2024-09-30 DIAGNOSIS — J06.9 VIRAL URI WITH COUGH: ICD-10-CM

## 2024-09-30 DIAGNOSIS — U07.1 COVID-19: Primary | ICD-10-CM

## 2024-09-30 LAB
SARS-COV-2 AG UPPER RESP QL IA: POSITIVE
VALID CONTROL: ABNORMAL

## 2024-09-30 PROCEDURE — 87811 SARS-COV-2 COVID19 W/OPTIC: CPT | Performed by: STUDENT IN AN ORGANIZED HEALTH CARE EDUCATION/TRAINING PROGRAM

## 2024-09-30 PROCEDURE — S9088 SERVICES PROVIDED IN URGENT: HCPCS | Performed by: STUDENT IN AN ORGANIZED HEALTH CARE EDUCATION/TRAINING PROGRAM

## 2024-09-30 PROCEDURE — 99213 OFFICE O/P EST LOW 20 MIN: CPT | Performed by: STUDENT IN AN ORGANIZED HEALTH CARE EDUCATION/TRAINING PROGRAM

## 2024-09-30 RX ORDER — NIRMATRELVIR AND RITONAVIR 150-100 MG
2 KIT ORAL 2 TIMES DAILY
Qty: 20 TABLET | Refills: 0 | Status: SHIPPED | OUTPATIENT
Start: 2024-09-30 | End: 2024-10-06

## 2024-09-30 NOTE — PROGRESS NOTES
[facility]        NAME: Lisa Rich is a 69 y.o. female  : 1955    MRN: [id number]  DATE: 2024  TIME: 2:25 PM    Assessment and Orders   COVID-19 [U07.1]  1. COVID-19  nirmatrelvir & ritonavir (Paxlovid, 150/100,) tablet therapy pack      2. Viral URI with cough  Poct Covid 19 Rapid Antigen Test            Plan and Discussion      Symptoms and exam consistent with COVID 19.  Patient is at high risk for worsening progression of symptoms requiring hospitalization. Will treat with Paxlovid.        Risks and benefits discussed. Patient understands and agrees with the plan.     PATIENT INSTRUCTIONS    If tests have been performed at [facility], our office will contact you with results if changes need to be made to the care plan discussed with you at the visit.  [facility]    Follow up with PCP.     If any of the following occur, please report to your nearest ED for evaluation or call 911.   Difficultly breathing or shortness of breath  Chest pain  Acutely worsening symptoms.         Chief Complaint     Chief Complaint   Patient presents with    Cold Like Symptoms    Cough     Since Saturday    Sore Throat         History of Present Illness       Cough  This is a new problem. The current episode started in the past 7 days. Associated symptoms include nasal congestion, postnasal drip and a sore throat. Pertinent negatives include no ear pain, fever, headaches, shortness of breath or wheezing. There is no history of asthma or COPD.   Sore Throat   Associated symptoms include coughing. Pertinent negatives include no ear pain, headaches or shortness of breath.       Review of Systems   Review of Systems   Constitutional:  Negative for fever.   HENT:  Positive for postnasal drip and sore throat. Negative for ear pain.    Respiratory:  Positive for cough. Negative for shortness of breath and wheezing.    Neurological:  Negative for headaches.          Current Medications       Current Outpatient Medications:     aspirin (ECOTRIN LOW STRENGTH) 81 mg EC tablet, Take 81 mg by mouth daily, Disp: , Rfl:     Cholecalciferol (Vitamin D) 50 MCG (2000 UT) tablet, Take 1 tablet (2,000 Units total) by mouth daily, Disp: 90 tablet, Rfl: 3    clobetasol (TEMOVATE) 0.05 % external solution, Apply to dry scalp daily for up to 4 weeks.  Leave shampoo on the scalp x 15 minutes, then rinse., Disp: 50 mL, Rfl: 2    glucose blood (Contour Next Test) test strip, Use 1 each 2 (two) times a day Use as instructed, Disp: 100 strip, Rfl: 5    glucose blood test strip, Use 1 each as needed Use as instructed, Disp: , Rfl:     levothyroxine 88 mcg tablet, Take 1 tablet (88 mcg total) by mouth daily, Disp: 90 tablet, Rfl: 1    linaGLIPtin (Tradjenta) 5 MG TABS, Take 5 mg by mouth daily, Disp: 90 tablet, Rfl: 3    lisinopril (ZESTRIL) 5 mg tablet, Take 1 tablet (5 mg total) by mouth daily, Disp: 90 tablet, Rfl: 0    lithium carbonate (LITHOBID) 300 mg CR tablet, , Disp: , Rfl:     nirmatrelvir & ritonavir (Paxlovid, 150/100,) tablet therapy pack, Take 2 tablets by mouth 2 (two) times a day for 5 days Take 1 nirmatrelvir tablet + 1 ritonavir tablet together per dose, Disp: 20 tablet, Rfl: 0    pravastatin (PRAVACHOL) 40 mg tablet, Take 1 tablet (40 mg total) by mouth daily, Disp: 90 tablet, Rfl: 3    traZODone (DESYREL) 100 mg tablet, Take 200 mg by mouth daily at bedtime, Disp: , Rfl:     hydrOXYzine HCL (ATARAX) 25 mg tablet, Take 2 tablets (50 mg total) by mouth daily at bedtime for 7 days (Patient not taking: Reported on 4/8/2024), Disp: 14 tablet, Rfl: 0    Current Allergies     Allergies as of 09/30/2024    (No Known Allergies)            The following portions of the patient's history were reviewed and updated as appropriate: allergies, current medications, past family history, past medical history, past social history, past surgical history and problem list.     Past Medical  History:   Diagnosis Date    Allergic     Depression     Diabetes mellitus (HCC)     Disease of thyroid gland     GERD (gastroesophageal reflux disease)     Hyperchloremia     Hypertension     Psychiatric disorder        Past Surgical History:   Procedure Laterality Date    ANKLE SURGERY      Incision    DILATION AND CURETTAGE OF UTERUS         Family History   Problem Relation Age of Onset    No Known Problems Family     Diabetes Mother     Heart disease Mother     Stroke Mother     Diabetes Father     Stroke Father     No Known Problems Maternal Grandmother     No Known Problems Maternal Grandfather     No Known Problems Paternal Grandmother     No Known Problems Paternal Grandfather     Diabetes Brother     No Known Problems Paternal Aunt          Medications have been verified.        Objective   /60   Pulse 87   Temp 98 °F (36.7 °C)   Resp 20   SpO2 98%   No LMP recorded. Patient is postmenopausal.       Physical Exam     Physical Exam  Constitutional:       General: She is not in acute distress.     Appearance: She is not ill-appearing.   HENT:      Right Ear: Tympanic membrane normal.      Left Ear: Tympanic membrane normal.      Nose: Congestion present.      Mouth/Throat:      Pharynx: No posterior oropharyngeal erythema or uvula swelling.      Tonsils: No tonsillar exudate.   Cardiovascular:      Rate and Rhythm: Normal rate and regular rhythm.   Pulmonary:      Effort: Pulmonary effort is normal. No respiratory distress.      Breath sounds: No wheezing.   Neurological:      General: No focal deficit present.      Mental Status: She is alert and oriented to person, place, and time.   Psychiatric:         Mood and Affect: Mood normal.         Behavior: Behavior normal.               Claire Pinon DO

## 2024-10-01 ENCOUNTER — TELEMEDICINE (OUTPATIENT)
Dept: FAMILY MEDICINE CLINIC | Facility: HOME HEALTHCARE | Age: 69
End: 2024-10-01
Payer: COMMERCIAL

## 2024-10-01 DIAGNOSIS — U07.1 COVID-19: Primary | ICD-10-CM

## 2024-10-01 PROCEDURE — 99213 OFFICE O/P EST LOW 20 MIN: CPT | Performed by: PHYSICIAN ASSISTANT

## 2024-10-01 PROCEDURE — 99213 OFFICE O/P EST LOW 20 MIN: CPT | Performed by: FAMILY MEDICINE

## 2024-10-01 NOTE — PROGRESS NOTES
COVID-19 Outpatient Progress Note  Name: Lisa Rich      : 1955      MRN: [id number]  Encounter Provider: Gadiel Young PA-C  Encounter Date: 10/1/2024   Encounter department: [facility]    Assessment & Plan  COVID-19         Disposition:     Patient was advised that they can go back to your normal activities when, for at least 24 hours, both are true: their symptoms are getting better overall and they have not had a fever (and are not using fever-reducing medication).    When going back to your normal activities, take added precaution over the next 5 days, such as taking additional steps for  air, hygiene, masks, physical distancing, and/or testing when you will be around other people indoors. You may still be able to spread the virus that made you sick, even if you are feeling better.    If patient develops a fever or starts to feel worse after they have gone back to normal activities, stay home and away from others again until, for at least 24 hours, both are true: symptoms are improving overall and they have not had a fever (and are not using fever-reducing medication). Then take added precaution for the next 5 days.      I have spent a total time of 10 minutes on the day of the encounter for this patient including discussing diagnostic results, discussing prognosis and instructions for management.          Encounter provider: Gadiel Young PA-C     Provider located at: [place] [address]  [phone]     Recent Visits  Date Type Provider Dept   24 Telephone Kane Moyer MA [facility]   Showing recent visits within past 7 days and meeting all other requirements  Today's Visits  Date Type Provider Dept   10/01/24 Telemedicine Gadiel Young PA-C [facility]   Showing today's visits and meeting all other requirements  Future Appointments  No visits were found  meeting these conditions.  Showing future appointments within next 150 days and meeting all other requirements    History of Present Illness      This virtual check-in was done via telephone and she agrees to proceed.    Patient agrees to participate in a virtual check in via telephone or video visit instead of presenting to the office to address urgent/immediate medical needs. Patient is aware this is a billable service. She acknowledged consent and understanding of privacy and security of the video platform. The patient has agreed to participate and understands they can discontinue the visit at any time.    After connecting through Telephone, the patient was identified by name and date of birth. Lisa Rich was informed that this was a telemedicine visit and that the exam was being conducted confidentially over secure lines. My office door was closed. No one else was in the room. Lisa Rich acknowledged consent and understanding of privacy and security of the telemedicine visit. I informed the patient that I have reviewed her record in Epic and presented the opportunity for her to ask any questions regarding the visit today. The patient agreed to participate.    It was my intent to perform this visit via video technology but the patient was not able to do a video connection so the visit was completed via audio telephone only.        Verification of patient location:  Patient is located in the following state in which I hold an active license: [place]    Subjective:   Lisa Rich is a 69 y.o. female who has been screened for COVID-19. Symptom change since last report: unchanged. Patient's symptoms include fatigue, nasal congestion, rhinorrhea and cough. Patient denies fever, chills, sore throat, anosmia, loss of taste, shortness of breath, chest tightness, abdominal pain, nausea, vomiting, diarrhea, myalgias and headaches.     - Date of symptom onset: 9/28/2024  - Date of positive COVID-19 test:  9/30/2024. Type of test: Rapid antigen.     COVID-19 vaccination status: Fully vaccinated with booster    Lisa has been staying home and has isolated themselves in her home. She is taking care to not share personal items and is cleaning all surfaces that are touched often, like counters, tabletops, and doorknobs using household cleaning sprays or wipes. She is wearing a mask when she leaves her room.     Lab Results   Component Value Date    SARSCOV2 Positive (A) 10/06/2023    SARSCOVAG Positive (A) 09/30/2024       Review of Systems   Constitutional:  Positive for fatigue. Negative for chills and fever.   HENT:  Positive for congestion and rhinorrhea. Negative for sore throat.    Respiratory:  Positive for cough. Negative for chest tightness and shortness of breath.    Gastrointestinal:  Negative for abdominal pain, diarrhea, nausea and vomiting.   Musculoskeletal:  Negative for myalgias.   Neurological:  Negative for headaches.     Objective     There were no vitals taken for this visit.    Physical Exam  Pulmonary:      Effort: No respiratory distress.   Neurological:      Mental Status: She is alert and oriented to person, place, and time.   Psychiatric:         Mood and Affect: Mood normal.         Behavior: Behavior normal.

## 2024-10-04 ENCOUNTER — HOSPITAL ENCOUNTER (INPATIENT)
Facility: HOSPITAL | Age: 69
LOS: 1 days | Discharge: HOME | DRG: 391 | End: 2024-10-06
Attending: EMERGENCY MEDICINE | Admitting: HOSPITALIST
Payer: COMMERCIAL

## 2024-10-04 DIAGNOSIS — K21.9 GASTROESOPHAGEAL REFLUX DISEASE, UNSPECIFIED WHETHER ESOPHAGITIS PRESENT: ICD-10-CM

## 2024-10-04 DIAGNOSIS — K80.20 CHOLELITHIASIS: ICD-10-CM

## 2024-10-04 DIAGNOSIS — R10.9 ABDOMINAL PAIN: Primary | ICD-10-CM

## 2024-10-04 DIAGNOSIS — R74.01 TRANSAMINITIS: ICD-10-CM

## 2024-10-04 LAB
BILIRUB UR QL STRIP: NEGATIVE
CLARITY UR: CLEAR
COLOR UR: YELLOW
GLUCOSE UR STRIP-MCNC: ABNORMAL MG/DL
HGB UR QL STRIP.AUTO: NEGATIVE
KETONES UR STRIP-MCNC: ABNORMAL MG/DL
LEUKOCYTE ESTERASE UR QL STRIP: NEGATIVE
NITRITE UR QL STRIP: NEGATIVE
PH UR STRIP.AUTO: 6 [PH]
PROT UR STRIP-MCNC: ABNORMAL MG/DL
SP GR UR STRIP.AUTO: 1.02
UROBILINOGEN UR QL STRIP.AUTO: 1 E.U./DL

## 2024-10-04 PROCEDURE — 81001 URINALYSIS AUTO W/SCOPE: CPT | Performed by: EMERGENCY MEDICINE

## 2024-10-04 PROCEDURE — 99284 EMERGENCY DEPT VISIT MOD MDM: CPT

## 2024-10-04 PROCEDURE — 99285 EMERGENCY DEPT VISIT HI MDM: CPT | Performed by: EMERGENCY MEDICINE

## 2024-10-05 ENCOUNTER — APPOINTMENT (EMERGENCY)
Dept: CT IMAGING | Facility: HOSPITAL | Age: 69
DRG: 391 | End: 2024-10-05
Payer: COMMERCIAL

## 2024-10-05 ENCOUNTER — APPOINTMENT (OUTPATIENT)
Dept: ULTRASOUND IMAGING | Facility: HOSPITAL | Age: 69
DRG: 391 | End: 2024-10-05
Payer: COMMERCIAL

## 2024-10-05 PROBLEM — U07.1 COVID-19: Status: ACTIVE | Noted: 2024-10-05

## 2024-10-05 PROBLEM — E66.09 CLASS 2 OBESITY DUE TO EXCESS CALORIES IN ADULT: Status: ACTIVE | Noted: 2019-10-18

## 2024-10-05 PROBLEM — R10.84 GENERALIZED ABDOMINAL PAIN: Status: ACTIVE | Noted: 2024-10-05

## 2024-10-05 LAB
ALBUMIN SERPL BCG-MCNC: 3.6 G/DL (ref 3.5–5)
ALBUMIN SERPL BCG-MCNC: 3.8 G/DL (ref 3.5–5)
ALP SERPL-CCNC: 80 U/L (ref 34–104)
ALP SERPL-CCNC: 82 U/L (ref 34–104)
ALT SERPL W P-5'-P-CCNC: 198 U/L (ref 7–52)
ALT SERPL W P-5'-P-CCNC: 233 U/L (ref 7–52)
ANION GAP SERPL CALCULATED.3IONS-SCNC: 6 MMOL/L (ref 4–13)
ANION GAP SERPL CALCULATED.3IONS-SCNC: 7 MMOL/L (ref 4–13)
AST SERPL W P-5'-P-CCNC: 317 U/L (ref 13–39)
AST SERPL W P-5'-P-CCNC: 572 U/L (ref 13–39)
BACTERIA UR QL AUTO: ABNORMAL /HPF
BASOPHILS # BLD AUTO: 0.02 THOUSANDS/ΜL (ref 0–0.1)
BASOPHILS # BLD AUTO: 0.04 THOUSANDS/ΜL (ref 0–0.1)
BASOPHILS NFR BLD AUTO: 0 % (ref 0–1)
BASOPHILS NFR BLD AUTO: 0 % (ref 0–1)
BILIRUB SERPL-MCNC: 0.45 MG/DL (ref 0.2–1)
BILIRUB SERPL-MCNC: 0.52 MG/DL (ref 0.2–1)
BUN SERPL-MCNC: 16 MG/DL (ref 5–25)
BUN SERPL-MCNC: 20 MG/DL (ref 5–25)
CALCIUM SERPL-MCNC: 9.4 MG/DL (ref 8.4–10.2)
CALCIUM SERPL-MCNC: 9.6 MG/DL (ref 8.4–10.2)
CHLORIDE SERPL-SCNC: 102 MMOL/L (ref 96–108)
CHLORIDE SERPL-SCNC: 106 MMOL/L (ref 96–108)
CO2 SERPL-SCNC: 23 MMOL/L (ref 21–32)
CO2 SERPL-SCNC: 26 MMOL/L (ref 21–32)
CREAT SERPL-MCNC: 1.03 MG/DL (ref 0.6–1.3)
CREAT SERPL-MCNC: 1.13 MG/DL (ref 0.6–1.3)
EOSINOPHIL # BLD AUTO: 0.02 THOUSAND/ΜL (ref 0–0.61)
EOSINOPHIL # BLD AUTO: 0.03 THOUSAND/ΜL (ref 0–0.61)
EOSINOPHIL NFR BLD AUTO: 0 % (ref 0–6)
EOSINOPHIL NFR BLD AUTO: 0 % (ref 0–6)
ERYTHROCYTE [DISTWIDTH] IN BLOOD BY AUTOMATED COUNT: 13.2 % (ref 11.6–15.1)
ERYTHROCYTE [DISTWIDTH] IN BLOOD BY AUTOMATED COUNT: 13.3 % (ref 11.6–15.1)
GFR SERPL CREATININE-BSD FRML MDRD: 50 ML/MIN/1.73SQ M
GFR SERPL CREATININE-BSD FRML MDRD: 55 ML/MIN/1.73SQ M
GLUCOSE P FAST SERPL-MCNC: 196 MG/DL (ref 65–99)
GLUCOSE SERPL-MCNC: 139 MG/DL (ref 65–140)
GLUCOSE SERPL-MCNC: 194 MG/DL (ref 65–140)
GLUCOSE SERPL-MCNC: 196 MG/DL (ref 65–140)
GLUCOSE SERPL-MCNC: 209 MG/DL (ref 65–140)
GLUCOSE SERPL-MCNC: 221 MG/DL (ref 65–140)
GLUCOSE SERPL-MCNC: 229 MG/DL (ref 65–140)
HCT VFR BLD AUTO: 34.2 % (ref 34.8–46.1)
HCT VFR BLD AUTO: 34.3 % (ref 34.8–46.1)
HGB BLD-MCNC: 10.9 G/DL (ref 11.5–15.4)
HGB BLD-MCNC: 11.1 G/DL (ref 11.5–15.4)
HYALINE CASTS #/AREA URNS LPF: ABNORMAL /LPF
IMM GRANULOCYTES # BLD AUTO: 0.1 THOUSAND/UL (ref 0–0.2)
IMM GRANULOCYTES # BLD AUTO: 0.12 THOUSAND/UL (ref 0–0.2)
IMM GRANULOCYTES NFR BLD AUTO: 1 % (ref 0–2)
IMM GRANULOCYTES NFR BLD AUTO: 1 % (ref 0–2)
LACTATE SERPL-SCNC: 1.1 MMOL/L (ref 0.5–2)
LIPASE SERPL-CCNC: 32 U/L (ref 11–82)
LYMPHOCYTES # BLD AUTO: 0.89 THOUSANDS/ΜL (ref 0.6–4.47)
LYMPHOCYTES # BLD AUTO: 1.15 THOUSANDS/ΜL (ref 0.6–4.47)
LYMPHOCYTES NFR BLD AUTO: 7 % (ref 14–44)
LYMPHOCYTES NFR BLD AUTO: 9 % (ref 14–44)
MAGNESIUM SERPL-MCNC: 1.9 MG/DL (ref 1.9–2.7)
MCH RBC QN AUTO: 31.1 PG (ref 26.8–34.3)
MCH RBC QN AUTO: 31.5 PG (ref 26.8–34.3)
MCHC RBC AUTO-ENTMCNC: 31.8 G/DL (ref 31.4–37.4)
MCHC RBC AUTO-ENTMCNC: 32.5 G/DL (ref 31.4–37.4)
MCV RBC AUTO: 97 FL (ref 82–98)
MCV RBC AUTO: 98 FL (ref 82–98)
MONOCYTES # BLD AUTO: 0.76 THOUSAND/ΜL (ref 0.17–1.22)
MONOCYTES # BLD AUTO: 0.88 THOUSAND/ΜL (ref 0.17–1.22)
MONOCYTES NFR BLD AUTO: 6 % (ref 4–12)
MONOCYTES NFR BLD AUTO: 7 % (ref 4–12)
NEUTROPHILS # BLD AUTO: 10.7 THOUSANDS/ΜL (ref 1.85–7.62)
NEUTROPHILS # BLD AUTO: 10.72 THOUSANDS/ΜL (ref 1.85–7.62)
NEUTS SEG NFR BLD AUTO: 84 % (ref 43–75)
NEUTS SEG NFR BLD AUTO: 85 % (ref 43–75)
NON-SQ EPI CELLS URNS QL MICRO: ABNORMAL /HPF
NRBC BLD AUTO-RTO: 0 /100 WBCS
NRBC BLD AUTO-RTO: 0 /100 WBCS
PHOSPHATE SERPL-MCNC: 2.5 MG/DL (ref 2.3–4.1)
PLATELET # BLD AUTO: 263 THOUSANDS/UL (ref 149–390)
PLATELET # BLD AUTO: 284 THOUSANDS/UL (ref 149–390)
PMV BLD AUTO: 9.7 FL (ref 8.9–12.7)
PMV BLD AUTO: 9.7 FL (ref 8.9–12.7)
POTASSIUM SERPL-SCNC: 4.1 MMOL/L (ref 3.5–5.3)
POTASSIUM SERPL-SCNC: 4.4 MMOL/L (ref 3.5–5.3)
PROT SERPL-MCNC: 6.1 G/DL (ref 6.4–8.4)
PROT SERPL-MCNC: 6.3 G/DL (ref 6.4–8.4)
RBC # BLD AUTO: 3.5 MILLION/UL (ref 3.81–5.12)
RBC # BLD AUTO: 3.52 MILLION/UL (ref 3.81–5.12)
RBC #/AREA URNS AUTO: ABNORMAL /HPF
SODIUM SERPL-SCNC: 135 MMOL/L (ref 135–147)
SODIUM SERPL-SCNC: 135 MMOL/L (ref 135–147)
WBC # BLD AUTO: 12.65 THOUSAND/UL (ref 4.31–10.16)
WBC # BLD AUTO: 12.78 THOUSAND/UL (ref 4.31–10.16)
WBC #/AREA URNS AUTO: ABNORMAL /HPF

## 2024-10-05 PROCEDURE — 99222 1ST HOSP IP/OBS MODERATE 55: CPT | Performed by: HOSPITALIST

## 2024-10-05 PROCEDURE — 80074 ACUTE HEPATITIS PANEL: CPT | Performed by: INTERNAL MEDICINE

## 2024-10-05 PROCEDURE — 82948 REAGENT STRIP/BLOOD GLUCOSE: CPT

## 2024-10-05 PROCEDURE — 74177 CT ABD & PELVIS W/CONTRAST: CPT

## 2024-10-05 PROCEDURE — 85025 COMPLETE CBC W/AUTO DIFF WBC: CPT

## 2024-10-05 PROCEDURE — 80053 COMPREHEN METABOLIC PANEL: CPT

## 2024-10-05 PROCEDURE — 85025 COMPLETE CBC W/AUTO DIFF WBC: CPT | Performed by: EMERGENCY MEDICINE

## 2024-10-05 PROCEDURE — 83690 ASSAY OF LIPASE: CPT | Performed by: EMERGENCY MEDICINE

## 2024-10-05 PROCEDURE — 76705 ECHO EXAM OF ABDOMEN: CPT

## 2024-10-05 PROCEDURE — 83735 ASSAY OF MAGNESIUM: CPT

## 2024-10-05 PROCEDURE — 84100 ASSAY OF PHOSPHORUS: CPT

## 2024-10-05 PROCEDURE — 83036 HEMOGLOBIN GLYCOSYLATED A1C: CPT

## 2024-10-05 PROCEDURE — 96361 HYDRATE IV INFUSION ADD-ON: CPT

## 2024-10-05 PROCEDURE — 83605 ASSAY OF LACTIC ACID: CPT | Performed by: INTERNAL MEDICINE

## 2024-10-05 PROCEDURE — 80053 COMPREHEN METABOLIC PANEL: CPT | Performed by: EMERGENCY MEDICINE

## 2024-10-05 PROCEDURE — 96360 HYDRATION IV INFUSION INIT: CPT

## 2024-10-05 PROCEDURE — 36415 COLL VENOUS BLD VENIPUNCTURE: CPT | Performed by: EMERGENCY MEDICINE

## 2024-10-05 PROCEDURE — 99222 1ST HOSP IP/OBS MODERATE 55: CPT | Performed by: INTERNAL MEDICINE

## 2024-10-05 RX ORDER — PANTOPRAZOLE SODIUM 40 MG/1
40 TABLET, DELAYED RELEASE ORAL
Status: DISCONTINUED | OUTPATIENT
Start: 2024-10-05 | End: 2024-10-06 | Stop reason: HOSPADM

## 2024-10-05 RX ORDER — LISINOPRIL 5 MG/1
5 TABLET ORAL DAILY
Status: DISCONTINUED | OUTPATIENT
Start: 2024-10-05 | End: 2024-10-06 | Stop reason: HOSPADM

## 2024-10-05 RX ORDER — LITHIUM CARBONATE 300 MG/1
300 TABLET, FILM COATED, EXTENDED RELEASE ORAL
Status: DISCONTINUED | OUTPATIENT
Start: 2024-10-05 | End: 2024-10-06 | Stop reason: HOSPADM

## 2024-10-05 RX ORDER — PANTOPRAZOLE SODIUM 40 MG/10ML
40 INJECTION, POWDER, LYOPHILIZED, FOR SOLUTION INTRAVENOUS ONCE
Status: COMPLETED | OUTPATIENT
Start: 2024-10-05 | End: 2024-10-05

## 2024-10-05 RX ORDER — HEPARIN SODIUM 5000 [USP'U]/ML
5000 INJECTION, SOLUTION INTRAVENOUS; SUBCUTANEOUS EVERY 8 HOURS SCHEDULED
Status: DISCONTINUED | OUTPATIENT
Start: 2024-10-05 | End: 2024-10-06 | Stop reason: HOSPADM

## 2024-10-05 RX ORDER — PRAVASTATIN SODIUM 40 MG
40 TABLET ORAL EVERY EVENING
Status: DISCONTINUED | OUTPATIENT
Start: 2024-10-05 | End: 2024-10-05

## 2024-10-05 RX ORDER — INSULIN LISPRO 100 [IU]/ML
1-6 INJECTION, SOLUTION INTRAVENOUS; SUBCUTANEOUS
Status: DISCONTINUED | OUTPATIENT
Start: 2024-10-05 | End: 2024-10-06 | Stop reason: HOSPADM

## 2024-10-05 RX ORDER — NYSTATIN 100000 [USP'U]/G
POWDER TOPICAL 2 TIMES DAILY
Status: DISCONTINUED | OUTPATIENT
Start: 2024-10-05 | End: 2024-10-06 | Stop reason: HOSPADM

## 2024-10-05 RX ORDER — TRAZODONE HYDROCHLORIDE 100 MG/1
200 TABLET ORAL
Status: DISCONTINUED | OUTPATIENT
Start: 2024-10-05 | End: 2024-10-06 | Stop reason: HOSPADM

## 2024-10-05 RX ORDER — LEVOTHYROXINE SODIUM 88 UG/1
88 TABLET ORAL
Status: DISCONTINUED | OUTPATIENT
Start: 2024-10-05 | End: 2024-10-06 | Stop reason: HOSPADM

## 2024-10-05 RX ORDER — SUCRALFATE 1 G/1
1 TABLET ORAL ONCE
Status: COMPLETED | OUTPATIENT
Start: 2024-10-05 | End: 2024-10-05

## 2024-10-05 RX ORDER — ONDANSETRON 2 MG/ML
4 INJECTION INTRAMUSCULAR; INTRAVENOUS EVERY 6 HOURS PRN
Status: DISCONTINUED | OUTPATIENT
Start: 2024-10-05 | End: 2024-10-06 | Stop reason: HOSPADM

## 2024-10-05 RX ADMIN — HEPARIN SODIUM 5000 UNITS: 5000 INJECTION, SOLUTION INTRAVENOUS; SUBCUTANEOUS at 21:47

## 2024-10-05 RX ADMIN — NYSTATIN: 100000 POWDER TOPICAL at 17:09

## 2024-10-05 RX ADMIN — IOHEXOL 100 ML: 350 INJECTION, SOLUTION INTRAVENOUS at 01:32

## 2024-10-05 RX ADMIN — TRAZODONE HYDROCHLORIDE 200 MG: 100 TABLET ORAL at 21:47

## 2024-10-05 RX ADMIN — NYSTATIN: 100000 POWDER TOPICAL at 08:57

## 2024-10-05 RX ADMIN — INSULIN LISPRO 2 UNITS: 100 INJECTION, SOLUTION INTRAVENOUS; SUBCUTANEOUS at 21:47

## 2024-10-05 RX ADMIN — LISINOPRIL 5 MG: 5 TABLET ORAL at 08:56

## 2024-10-05 RX ADMIN — INSULIN LISPRO 2 UNITS: 100 INJECTION, SOLUTION INTRAVENOUS; SUBCUTANEOUS at 16:18

## 2024-10-05 RX ADMIN — SUCRALFATE 1 G: 1 TABLET ORAL at 04:47

## 2024-10-05 RX ADMIN — SODIUM CHLORIDE 1000 ML: 0.9 INJECTION, SOLUTION INTRAVENOUS at 00:04

## 2024-10-05 RX ADMIN — HEPARIN SODIUM 5000 UNITS: 5000 INJECTION, SOLUTION INTRAVENOUS; SUBCUTANEOUS at 05:57

## 2024-10-05 RX ADMIN — LITHIUM CARBONATE 300 MG: 300 TABLET, EXTENDED RELEASE ORAL at 21:47

## 2024-10-05 RX ADMIN — LEVOTHYROXINE SODIUM 88 MCG: 88 TABLET ORAL at 06:12

## 2024-10-05 RX ADMIN — PANTOPRAZOLE SODIUM 40 MG: 40 INJECTION, POWDER, FOR SOLUTION INTRAVENOUS at 04:46

## 2024-10-05 RX ADMIN — HEPARIN SODIUM 5000 UNITS: 5000 INJECTION, SOLUTION INTRAVENOUS; SUBCUTANEOUS at 13:27

## 2024-10-05 RX ADMIN — ASPIRIN 162 MG: 81 TABLET, COATED ORAL at 21:47

## 2024-10-05 NOTE — ASSESSMENT & PLAN NOTE
Patient's BMI is 36.3  She would benefit greatly from a very low carbohydrate diet (less than 50 g daily) and intermittent fasting

## 2024-10-05 NOTE — UTILIZATION REVIEW
Initial Clinical Review    WAS OBSERVATION 10/02/2024 @ 0424 CONVERTED TO INPATIENT ADMISSION 10/05/2024 @ 1454 DUE TO CONTINUED STAY REQUIRED TO CARE FOR PATIENT WITH Generalized abdominal pain.      Admission: Date/Time/Statement:   Admission Orders (From admission, onward)       Ordered        10/05/24 1454  INPATIENT ADMISSION  Once            10/05/24 0424  Place in Observation  Once                          Orders Placed This Encounter   Procedures    INPATIENT ADMISSION     Standing Status:   Standing     Number of Occurrences:   1     Order Specific Question:   Level of Care     Answer:   Med Surg [16]     Order Specific Question:   Estimated length of stay     Answer:   More than 2 Midnights     Order Specific Question:   Certification     Answer:   I certify that inpatient services are medically necessary for this patient for a duration of greater than two midnights. See H&P and MD Progress Notes for additional information about the patient's course of treatment.     ED Arrival Information       Expected   -    Arrival   10/4/2024 22:32    Acuity   Urgent              Means of arrival   Walk-In    Escorted by   [facility]    Service   Hospitalist    Admission type   Emergency              Arrival complaint   COVID        10/04/24 0873 First Provider Evaluation of Patient         Chief Complaint   Patient presents with    Abdominal Pain     Epigastric pain that began a few hours ago; no N/V/D       Initial Presentation: 69 y.o. female to ED via walk in from home.    Admitted to observation with Dx: Generalized abdominal pain .  Presented to ED with Abdominal Pain after eating Tuna Hoagie.     PMHx:diabetes, hypertension, bipolar disorder, hypothyroidism, and hyperlipidemia .   Date: 10/04/2024   On exam: Obese.  Bowel sounds normal, abd soft & tender.  Imaging shows:  CT abd/Pel: Partially distended bladder. Mild circumferential bladder wall thickening noted, possibly exaggerated by under  distention. Superimposed cystitis considered in the appropriate clinical setting. Correlation with the patient's symptoms, laboratory values, and urinalysis recommended.  Cholelithiasis, and other findings as above.   US RUQ with liver dopplers:  Cholelithiasis without acute cholecystitis.  ED treatment NPO.  Consult GI.  Labs.  Trend LFTs.    Plan includes Regular diet.  Up & OOB as tolerated.  I&O q8h  Ray SCDs.    Anticipated Length of Stay/Certification Statement: inpatient services are medically necessary for this patient for a duration of greater than two midnights.     Day 2: 10/05/2024  Advance diet as tolerated.  Consult GI.  Trend LFTs.  Recently diagnosed  COVID 19 and started on Paxlovid     10/05/2024  Consult GI:    Acute abdominal pain.  No history of abdominal pain in the past.  No other GI symptoms.  Differentials for abdominal pain are likely food poisoning, gastritis.  Her abdominal pain is already improving with initiation of PPI and Carafate.  She recently was diagnosed with COVID infection which may be contributing towards abnormal liver tests which are acutely elevated.  Other possibility is viral hepatitis.  She does have cholelithiasis but no evidence of cholecystitis or CBD obstruction.  I will check lactate to make sure there is no underlying ischemic etiology for the abdominal pain.  Since her symptoms have improved, patient's diet can be advanced as well.   1.  Abdominal pain  2.  Cholelithiasis  3.  Abnormal LFTs  4.  COVID infection   Will continue to monitor abdominal pain as we advance patient's diet.  We will continue pantoprazole and Carafate.  If patient symptoms worsen with meal intake, endoscopic evaluation would be warranted.  Unlikely cholelithiasis causing patient's symptoms since no chronicity and currently no evidence of cholecystitis or cholangitis.  We will check acute hepatitis panel.  Continue to monitor liver tests.     ED Treatment-Medication Administration from  10/04/2024 2232 to 10/05/2024 0501         Date/Time Order Dose Route Action     10/05/2024 0004 sodium chloride 0.9 % bolus 1,000 mL 1,000 mL Intravenous New Bag     10/05/2024 0132 iohexol (OMNIPAQUE) 350 MG/ML injection (SINGLE-DOSE) 100 mL 100 mL Intravenous Given     10/05/2024 0446 pantoprazole (PROTONIX) injection 40 mg 40 mg Intravenous Given     10/05/2024 0447 sucralfate (CARAFATE) tablet 1 g 1 g Oral Given            Scheduled Medications:  aspirin, 162 mg, Oral, HS  heparin (porcine), 5,000 Units, Subcutaneous, Q8H DANYEL  insulin lispro, 1-6 Units, Subcutaneous, 4x Daily (AC & HS)  levothyroxine, 88 mcg, Oral, Early Morning  lisinopril, 5 mg, Oral, Daily  lithium carbonate, 300 mg, Oral, HS  nystatin, , Topical, BID  pantoprazole, 40 mg, Oral, Early Morning  traZODone, 200 mg, Oral, HS      Continuous IV Infusions:     PRN Meds:  ondansetron, 4 mg, Intravenous, Q6H PRN      ED Triage Vitals   Temperature Pulse Respirations Blood Pressure SpO2 Pain Score   10/05/24 0005 10/04/24 2247 10/04/24 2247 10/04/24 2247 10/04/24 2247 10/05/24 0559   97.7 °F (36.5 °C) 84 18 164/72 98 % 1     Weight (last 2 days)       Date/Time Weight    10/05/24 0527 84.5 (186.29)    10/05/24 0500 84.5 (186.29)            Vital Signs (last 3 days)       Date/Time Temp Pulse Resp BP MAP (mmHg) SpO2 O2 Device Patient Position - Orthostatic VS Adam Coma Scale Score Pain    10/05/24 0740 -- -- -- -- -- -- -- -- 15 No Pain    10/05/24 07:37:05 98.5 °F (36.9 °C) 83 20 130/79 96 97 % -- -- -- --    10/05/24 0559 -- -- -- -- -- -- -- -- 15 1    10/05/24 05:05:59 98.8 °F (37.1 °C) 91 18 113/95 101 98 % -- Sitting -- --    10/05/24 0400 -- 70 18 140/62 89 97 % -- -- -- --    10/05/24 0300 -- 90 18 159/73 105 98 % -- -- -- --    10/05/24 0121 -- -- -- -- -- -- -- -- 15 --    10/05/24 0045 -- 76 18 138/67 95 97 % -- -- -- --    10/05/24 0035 -- 85 20 138/67 -- 98 % None (Room air) Sitting -- --    10/05/24 0005 97.7 °F (36.5 °C) -- -- -- --  -- -- -- -- --    10/04/24 2247 -- 84 18 164/72 103 98 % None (Room air) Lying -- --              Pertinent Labs/Diagnostic Test Results:   Radiology:  US right upper quadrant with liver dopplers   Final Interpretation by Nissa Hale MD (10/05 0926)   Cholelithiasis without acute cholecystitis.      CT Abdomen pelvis with contrast   Final Interpretation by Braden Hopson DO (10/05 0356)   Partially distended bladder. Mild circumferential bladder wall thickening noted, possibly exaggerated by under distention. Superimposed cystitis considered in the appropriate clinical setting. Correlation with the patient's symptoms, laboratory values,    and urinalysis recommended.      Cholelithiasis, and other findings as above.        Cardiology:  No orders to display     GI:  No orders to display       Results from last 7 days   Lab Units 10/05/24  0618 10/05/24  0003   WBC Thousand/uL 12.78* 12.65*   HEMOGLOBIN g/dL 11.1* 10.9*   HEMATOCRIT % 34.2* 34.3*   PLATELETS Thousands/uL 263 284   TOTAL NEUT ABS Thousands/µL 10.72* 10.70*     Results from last 7 days   Lab Units 10/05/24  0555 10/05/24  0003   SODIUM mmol/L 135 135   POTASSIUM mmol/L 4.4 4.1   CHLORIDE mmol/L 106 102   CO2 mmol/L 23 26   ANION GAP mmol/L 6 7   BUN mg/dL 16 20   CREATININE mg/dL 1.03 1.13   EGFR ml/min/1.73sq m 55 50   CALCIUM mg/dL 9.4 9.6   MAGNESIUM mg/dL 1.9  --    PHOSPHORUS mg/dL 2.5  --      Results from last 7 days   Lab Units 10/05/24  0555 10/05/24  0003   AST U/L 317* 572*   ALT U/L 233* 198*   ALK PHOS U/L 80 82   TOTAL PROTEIN g/dL 6.3* 6.1*   ALBUMIN g/dL 3.8 3.6   TOTAL BILIRUBIN mg/dL 0.52 0.45     Results from last 7 days   Lab Units 10/05/24  1148 10/05/24  0735   POC GLUCOSE mg/dl 139 194*     Results from last 7 days   Lab Units 10/05/24  0555 10/05/24  0003   GLUCOSE RANDOM mg/dL 196* 229*     Results from last 7 days   Lab Units 10/05/24  1127   LACTIC ACID mmol/L 1.1     Results from last 7 days   Lab Units  10/05/24  0003   LIPASE u/L 32     Results from last 7 days   Lab Units 10/04/24  2342   CLARITY UA  Clear   COLOR UA  Yellow   SPEC GRAV UA  1.025   PH UA  6.0   GLUCOSE UA mg/dl 1+*   KETONES UA mg/dl Trace*   BLOOD UA  Negative   PROTEIN UA mg/dl Trace*   NITRITE UA  Negative   BILIRUBIN UA  Negative   UROBILINOGEN UA E.U./dl 1.0   LEUKOCYTES UA  Negative   WBC UA /hpf 4-10*   RBC UA /hpf 0-1   BACTERIA UA /hpf Occasional   EPITHELIAL CELLS WET PREP /hpf Moderate*     Past Medical History:   Diagnosis Date    Allergic     Depression     Diabetes mellitus (HCC)     Disease of thyroid gland     GERD (gastroesophageal reflux disease)     Hyperchloremia     Hypertension     Psychiatric disorder      Present on Admission:   Bipolar disorder (HCC)   Hypercholesterolemia   Type 2 diabetes mellitus with stage 3a chronic kidney disease, without long-term current use of insulin (HCC)   Hypothyroidism   Essential hypertension   Class 2 obesity due to excess calories in adult      Admitting Diagnosis: Cholelithiasis [K80.20]  Abdominal pain [R10.9]  Transaminitis [R74.01]  Age/Sex: 69 y.o. female    Network Utilization Review Department  ATTENTION: Please call with any questions or concerns to [phone] and carefully listen to the prompts so that you are directed to the right person. All voicemails are confidential.   For Discharge needs, contact Care Management DC Support Team at [phone] opt. 2  Send all requests for admission clinical reviews, approved or denied determinations and any other requests to dedicated fax number below belonging to the campus where the patient is receiving treatment. List of dedicated fax numbers for the Facilities:  FACILITY NAME UR FAX NUMBER   ADMISSION DENIALS (Administrative/Medical Necessity) [phone]   DISCHARGE SUPPORT TEAM (NETWORK) [phone]   PARENT CHILD HEALTH (Maternity/NICU/Pediatrics) [phone]   [facility] [phone]   [facility]  General acute hospital [phone]   [facility] [phone]   [facility] [phone]   [facility] [phone]   [facility] [phone]   [facility] [phone]   [facility] [phone]   [facility] [phone]   Critical access hospital [phone]   [facility] [phone]   [facility] [phone]

## 2024-10-05 NOTE — ASSESSMENT & PLAN NOTE
"Lab Results   Component Value Date    HGBA1C 5.8 (H) 07/30/2024       No results for input(s): \"POCGLU\" in the last 72 hours.    Blood Sugar Average: Last 72 hrs:    Patient takes Tradjenta at home, hold while inpatient  Place patient on sliding scale insulin  ACHS glucose checks   Diabetic diet  "

## 2024-10-05 NOTE — CASE MANAGEMENT
Case Management Assessment & Discharge Planning Note    Patient name Lisa A [facility]  Location /-01 MRN [id number]  : 1955 Date 10/5/2024       Current Admission Date: 10/4/2024  Current Admission Diagnosis:Generalized abdominal pain   Patient Active Problem List    Diagnosis Date Noted Date Diagnosed    Generalized abdominal pain 10/05/2024     COVID-19 10/05/2024     Stage 3 chronic kidney disease, unspecified whether stage 3a or 3b CKD (HCC) 2022     Vitamin B12 deficiency 2022     Vitamin D deficiency 2022     Essential hypertension 2021     Atypical squamous cells of undetermined significance on cytologic smear of cervix ([facility]) 2021     Type 2 diabetes mellitus with stage 3a chronic kidney disease, without long-term current use of insulin ([facility]) 10/18/2019     Class 2 obesity due to excess calories in adult 10/18/2019     Postmenopausal 10/18/2019     Bipolar disorder ([facility]) 2014     Constipation 2014     Hypercholesterolemia 2014     Hypothyroidism 2014       LOS (days): 0  Geometric Mean LOS (GMLOS) (days):   Days to GMLOS:     OBJECTIVE:       Current admission status: Observation    Preferred Pharmacy:   [facility] - [place] [address]  [place] [address]  Phone: [phone] Fax: [phone]    Primary Care Provider: Gadiel Young PA-C    Primary Insurance: [facility] REP  Secondary Insurance:     ASSESSMENT:  Active Health Care Proxies    There are no active Health Care Proxies on file.       Advance Directives  Does patient have a Health Care POA?: No  Was patient offered paperwork?: Yes (Declined)  Does patient currently have a Health Care decision maker?: Yes, please see Health Care Proxy section  Does patient have Advance Directives?: No  Was patient offered paperwork?: Yes (Declined)  Primary Contact: Jeramie powell    Readmission Root Cause  30 Day Readmission:  No    Patient Information  Admitted from:: Home  Mental Status: Alert  During Assessment patient was accompanied by: Not accompanied during assessment  Assessment information provided by:: Patient  Primary Caregiver: Self  Support Systems: Family members  County of Residence: [place]  What city do you live in?: [place]  Home entry access options. Select all that apply.: Stairs  Number of steps to enter home.: 1  Do the steps have railings?: No  Type of Current Residence: 2 story home  Upon entering residence, is there a bedroom on the main floor (no further steps)?: No  A bedroom is located on the following floor levels of residence (select all that apply):: 2nd Floor  Upon entering residence, is there a bathroom on the main floor (no further steps)?: Yes  Number of steps to 2nd floor from main floor: One Flight  Living Arrangements: Lives w/ Family members  Is patient a ?: No    Activities of Daily Living Prior to Admission  Functional Status: Independent  Completes ADLs independently?: Yes  Ambulates independently?: Yes  Does patient use assisted devices?: Yes  Assisted Devices (DME) used: Straight Cane  Does patient currently own DME?: Yes  What DME does the patient currently own?: Straight Cane  Does patient have a history of Outpatient Therapy (PT/OT)?: No  Does the patient have a history of Short-Term Rehab?: No  Does patient have a history of HHC?: No  Does patient currently have HHC?: No    Patient Information Continued  Income Source: SSI/SSD  Does patient have prescription coverage?: Yes  Does patient receive dialysis treatments?: No  Does patient have a history of substance abuse?: No  Does patient have a history of Mental Health Diagnosis?: Yes  Is patient receiving treatment for mental health?: Yes ([facility] every 2-3 months for medications)    Means of Transportation  Means of Transport to App:: Family transport      Social Determinants of Health (SDOH)      Flowsheet Row Most Recent Value    Housing Stability    In the last 12 months, was there a time when you were not able to pay the mortgage or rent on time? N   In the past 12 months, how many times have you moved where you were living? 0   At any time in the past 12 months, were you homeless or living in a shelter (including now)? N   Transportation Needs    In the past 12 months, has lack of transportation kept you from medical appointments or from getting medications? no   In the past 12 months, has lack of transportation kept you from meetings, work, or from getting things needed for daily living? No   Food Insecurity    Within the past 12 months, you worried that your food would run out before you got the money to buy more. Never true   Within the past 12 months, the food you bought just didn't last and you didn't have money to get more. Never true   Utilities    In the past 12 months has the electric, gas, oil, or water company threatened to shut off services in your home? No            DISCHARGE DETAILS:    Discharge planning discussed with:: Pt    Contacts  Patient Contacts: Jeramie Adams  Relationship to Patient:: Family  Contact Method: Phone  Phone Number: [phone]  Reason/Outcome: Emergency Contact    Requested Home Health Care         Is the patient interested in HHC at discharge?: No    DME Referral Provided  Referral made for DME?: No  Would you like to participate in our [facility] service program?  : No - Declined    Treatment Team Recommendation: Home  Pt lives with her brother.  Is IPA except for driving.  Pt goes to [facility] for [facility] medications.   Do not anticipate any care needs upon DC.  Brother will transport home.

## 2024-10-05 NOTE — ASSESSMENT & PLAN NOTE
Patient is 69-year-old female with past medical history of diabetes, hypertension, bipolar disorder, hypothyroidism, and hyperlipidemia who presents to the hospital after being awoken with abdominal pain.  She reports reports that she ate a tuna hoagie and was watching wrestling before bed and was awoken with severe abdominal pain.  In the emergency room CAT scan showed no obvious pathology however she had elevated transaminases.  Her case was discussed with gastroenterology on-call so she will be admitted for further evaluation.  Keep her n.p.o.  Check right upper quadrant ultrasound with Doppler  Gastroenterology consulted  Trend LFTs

## 2024-10-05 NOTE — H&P
"H&P - Hospitalist   Name: Lisa Rich 69 y.o. female I MRN: [id number]  Unit/Bed#: -01 I Date of Admission: 10/4/2024   Date of Service: 10/5/2024 I Hospital Day: 0     Assessment & Plan  Generalized abdominal pain  Patient is 69-year-old female with past medical history of diabetes, hypertension, bipolar disorder, hypothyroidism, and hyperlipidemia who presents to the hospital after being awoken with abdominal pain.  She reports reports that she ate a tuna hoagie and was watching wrestling before bed and was awoken with severe abdominal pain.  In the emergency room CAT scan showed no obvious pathology however she had elevated transaminases.  Her case was discussed with gastroenterology on-call so she will be admitted for further evaluation.  Keep her n.p.o.  Check right upper quadrant ultrasound with Doppler  Gastroenterology consulted  Trend LFTs  Bipolar disorder (HCC)  Continue lithium and trazodone at home doses  Hypercholesterolemia  Hold pravastatin for now due to elevated transaminases  Hypothyroidism  Continue levothyroxine at home dose  Type 2 diabetes mellitus with stage 3a chronic kidney disease, without long-term current use of insulin (HCC)  Lab Results   Component Value Date    HGBA1C 5.8 (H) 07/30/2024       No results for input(s): \"POCGLU\" in the last 72 hours.    Blood Sugar Average: Last 72 hrs:    Patient takes Tradjenta at home, hold while inpatient  Place patient on sliding scale insulin  ACHS glucose checks   Diabetic diet  Class 2 obesity due to excess calories in adult  Patient's BMI is 36.3  She would benefit greatly from a very low carbohydrate diet (less than 50 g daily) and intermittent fasting  Essential hypertension  Blood pressure currently stable  Continue lisinopril 5 mg daily  Routine vital signs  COVID-19  Recently diagnosed and started on Paxlovid  Complete outpatient course  Symptomatic management      VTE Pharmacologic Prophylaxis: VTE Score: 3 Moderate Risk (Score " 3-4) - Pharmacological DVT Prophylaxis Ordered: heparin.  Code Status: Level 1 - Full Code   Discussion with patient    Anticipated Length of Stay: Patient will be admitted on an observation basis with an anticipated length of stay of less than 2 midnights secondary to workup for abdominal pain.    History of Present Illness   Chief Complaint: Abdominal pain after eating tuna hoagie    Lisa Rich is a 69 y.o. female with a PMH of obesity, hypertension, hyperlipidemia, bipolar disorder, and diabetes who presents with generalized abdominal pain.  She reports that she ate a store-bought tuna hoagie for dinner and a few hours later began having severe pain.  In the emergency room no obvious source was found on CAT scan, however patient does have elevated transaminases.  Her case was discussed with gastroenterology who recommended observation admit and right upper quadrant Doppler.    Review of Systems   Constitutional:  Negative for chills and fever.   HENT:  Negative for ear pain and sore throat.    Eyes:  Negative for pain and visual disturbance.   Respiratory:  Negative for cough and shortness of breath.    Cardiovascular:  Negative for chest pain and palpitations.   Gastrointestinal:  Positive for abdominal pain. Negative for blood in stool, constipation, diarrhea, nausea and vomiting.   Genitourinary:  Negative for dysuria and hematuria.   Musculoskeletal:  Negative for arthralgias and back pain.   Skin:  Negative for color change and rash.   Neurological:  Negative for seizures and syncope.   All other systems reviewed and are negative.      Historical Information   Past Medical History:   Diagnosis Date    Allergic     Depression     Diabetes mellitus (HCC)     Disease of thyroid gland     GERD (gastroesophageal reflux disease)     Hyperchloremia     Hypertension     Psychiatric disorder      Past Surgical History:   Procedure Laterality Date    ANKLE SURGERY      Incision    DILATION AND CURETTAGE OF  UTERUS       Social History     Tobacco Use    Smoking status: Never     Passive exposure: Never    Smokeless tobacco: Never   Vaping Use    Vaping status: Never Used   Substance and Sexual Activity    Alcohol use: No    Drug use: No    Sexual activity: Not Currently     E-Cigarette/Vaping    E-Cigarette Use Never User      E-Cigarette/Vaping Substances    Nicotine No     THC No     CBD No     Flavoring No     Other No     Unknown No      Family History   Problem Relation Age of Onset    No Known Problems Family     Diabetes Mother     Heart disease Mother     Stroke Mother     Diabetes Father     Stroke Father     No Known Problems Maternal Grandmother     No Known Problems Maternal Grandfather     No Known Problems Paternal Grandmother     No Known Problems Paternal Grandfather     Diabetes Brother     No Known Problems Paternal Aunt      Social History:  Marital Status: Single   Occupation: Disability  Patient Pre-hospital Living Situation: Home  Patient Pre-hospital Level of Mobility: walks  Patient Pre-hospital Diet Restrictions: None    Objective :  Temp:  [97.7 °F (36.5 °C)-98.8 °F (37.1 °C)] 98.8 °F (37.1 °C)  HR:  [70-91] 91  BP: (113-164)/(62-95) 113/95  Resp:  [18-20] 18  SpO2:  [97 %-98 %] 98 %  O2 Device: None (Room air)    Physical Exam  Vitals and nursing note reviewed.   Constitutional:       General: She is not in acute distress.     Appearance: She is well-developed. She is obese.   HENT:      Head: Normocephalic and atraumatic.   Eyes:      Conjunctiva/sclera: Conjunctivae normal.   Cardiovascular:      Rate and Rhythm: Normal rate and regular rhythm.      Heart sounds: No murmur heard.  Pulmonary:      Effort: Pulmonary effort is normal. No respiratory distress.      Breath sounds: Normal breath sounds.   Abdominal:      General: Bowel sounds are normal.      Palpations: Abdomen is soft.      Tenderness: There is abdominal tenderness.   Musculoskeletal:         General: No swelling.      Cervical  back: Neck supple.   Skin:     General: Skin is warm and dry.      Capillary Refill: Capillary refill takes less than 2 seconds.   Neurological:      Mental Status: She is alert.   Psychiatric:         Mood and Affect: Mood normal.         Behavior: Behavior normal.         Lines/Drains:            Lab Results: I have reviewed the following results:  Results from last 7 days   Lab Units 10/05/24  0003   WBC Thousand/uL 12.65*   HEMOGLOBIN g/dL 10.9*   HEMATOCRIT % 34.3*   PLATELETS Thousands/uL 284   SEGS PCT % 85*   LYMPHO PCT % 7*   MONO PCT % 7   EOS PCT % 0     Results from last 7 days   Lab Units 10/05/24  0003   SODIUM mmol/L 135   POTASSIUM mmol/L 4.1   CHLORIDE mmol/L 102   CO2 mmol/L 26   BUN mg/dL 20   CREATININE mg/dL 1.13   ANION GAP mmol/L 7   CALCIUM mg/dL 9.6   ALBUMIN g/dL 3.6   TOTAL BILIRUBIN mg/dL 0.45   ALK PHOS U/L 82   ALT U/L 198*   AST U/L 572*   GLUCOSE RANDOM mg/dL 229*             Lab Results   Component Value Date    HGBA1C 5.8 (H) 07/30/2024    HGBA1C 6.2 (H) 04/25/2024    HGBA1C 5.5 02/27/2024           Imaging Results Review: I reviewed radiology reports from this admission including: CT abdomen/pelvis.  Other Study Results Review: No additional pertinent studies reviewed.    Administrative Statements   I have spent a total time of 75 minutes in caring for this patient on the day of the visit/encounter including Diagnostic results, Prognosis, Risks and benefits of tx options, Instructions for management, Patient and family education, Impressions, Documenting in the medical record, and Reviewing / ordering tests, medicine, procedures  .    ** Please Note: This note has been constructed using a voice recognition system. **

## 2024-10-05 NOTE — CONSULTS
Consultation -  [facility]  Lisa A McGorry 69 y.o. female MRN: [id number]  Unit/Bed#: -01 Encounter: [id number]        Inpatient consult to gastroenterology  Consult performed by: Jesus Brar MD  Consult ordered by: Aditya Castro DO          Reason for Consult / Principal Problem:     Acute abdominal pain      ASSESSMENT AND PLAN:      69-year-old with elevated BMI, hyperlipidemia, metabolic syndrome, diabetes, bipolar disorder presented with acute abdominal pain.  No history of abdominal pain in the past.  No other GI symptoms.  Differentials for abdominal pain are likely food poisoning, gastritis.  Her abdominal pain is already improving with initiation of PPI and Carafate.  She recently was diagnosed with COVID infection which may be contributing towards abnormal liver tests which are acutely elevated.  Other possibility is viral hepatitis.  She does have cholelithiasis but no evidence of cholecystitis or CBD obstruction.  I will check lactate to make sure there is no underlying ischemic etiology for the abdominal pain.  Since her symptoms have improved, patient's diet can be advanced as well.    1.  Abdominal pain  2.  Cholelithiasis  3.  Abnormal LFTs  4.  COVID infection    Will continue to monitor abdominal pain as we advance patient's diet.  We will continue pantoprazole and Carafate.  If patient symptoms worsen with meal intake, endoscopic evaluation would be warranted.  Unlikely cholelithiasis causing patient's symptoms since no chronicity and currently no evidence of cholecystitis or cholangitis.  We will check acute hepatitis panel.  Continue to monitor liver tests.  We will follow.    Jesus Brar MD  Gastroenterology  [facility]  Date: October 5, 2024        ______________________________________________________________________    HPI: 69-year-old with elevated BMI, hyperlipidemia, metabolic syndrome, diabetes, bipolar disorder presented  with acute abdominal pain.    Patient reports eating tuna hold the last night after which she developed abdominal pain in the epigastrium.  She also reports that her abdominal pain has improved since last night.  No history of abdominal pain in the past.  No other GI symptoms.  Patient does not report weight loss over the last 1 year.  Patient reports she was diagnosed with COVID earlier this week.  Currently no shortness of air or chest pain.  No history of heartburn.  Bowel movements are normal.  No blood in stools.      Labs and imaging were reviewed.  CT abdomen shows gallstones with partially distended gallbladder and urinary bladder wall thickening.  Right upper quadrant ultrasound showed cholelithiasis.  WBC 12 slightly elevated but stable, hemoglobin 11 stable normal platelets and lipase.  ,  which are acutely elevated and fluctuating levels.      REVIEW OF SYSTEMS:    CONSTITUTIONAL: Denies any fever, chills, rigors, and weight loss.  HEENT: No earache or tinnitus. Denies hearing loss or visual disturbances.  CARDIOVASCULAR: No chest pain or palpitations.   RESPIRATORY: Denies any cough, hemoptysis, shortness of breath or dyspnea on exertion.  GASTROINTESTINAL: As noted in the History of Present Illness.   GENITOURINARY: No problems with urination. Denies any hematuria or dysuria.  NEUROLOGIC: No dizziness or vertigo, denies headaches.   MUSCULOSKELETAL: Denies any muscle or joint pain.   SKIN: Denies skin rashes or itching.   ENDOCRINE: Denies excessive thirst. Denies intolerance to heat or cold.  PSYCHOSOCIAL: Denies depression or anxiety. Denies any recent memory loss.       Historical Information   Past Medical History:   Diagnosis Date    Allergic     Depression     Diabetes mellitus (HCC)     Disease of thyroid gland     GERD (gastroesophageal reflux disease)     Hyperchloremia     Hypertension     Psychiatric disorder      Past Surgical History:   Procedure Laterality Date    ANKLE  SURGERY      Incision    DILATION AND CURETTAGE OF UTERUS       Social History   Social History     Substance and Sexual Activity   Alcohol Use Not Currently     Social History     Substance and Sexual Activity   Drug Use No     Social History     Tobacco Use   Smoking Status Never    Passive exposure: Never   Smokeless Tobacco Never     Family History   Problem Relation Age of Onset    No Known Problems Family     Diabetes Mother     Heart disease Mother     Stroke Mother     Diabetes Father     Stroke Father     No Known Problems Maternal Grandmother     No Known Problems Maternal Grandfather     No Known Problems Paternal Grandmother     No Known Problems Paternal Grandfather     Diabetes Brother     No Known Problems Paternal Aunt        Meds/Allergies       Medications Prior to Admission:     aspirin (ECOTRIN LOW STRENGTH) 81 mg EC tablet    Cholecalciferol (Vitamin D) 50 MCG (2000 UT) tablet    glucose blood (Contour Next Test) test strip    levothyroxine 88 mcg tablet    linaGLIPtin (Tradjenta) 5 MG TABS    lisinopril (ZESTRIL) 5 mg tablet    lithium carbonate (LITHOBID) 300 mg CR tablet    nirmatrelvir & ritonavir (Paxlovid, 150/100,) tablet therapy pack    pravastatin (PRAVACHOL) 40 mg tablet    traZODone (DESYREL) 100 mg tablet    clobetasol (TEMOVATE) 0.05 % external solution    glucose blood test strip    Current Facility-Administered Medications:     aspirin (ECOTRIN LOW STRENGTH) EC tablet 162 mg, HS    heparin (porcine) subcutaneous injection 5,000 Units, Q8H DANYEL    insulin lispro (HumALOG/ADMELOG) 100 units/mL subcutaneous injection 1-6 Units, 4x Daily (AC & HS) **AND** Fingerstick Glucose (POCT), 4x Daily AC and at bedtime    levothyroxine tablet 88 mcg, Early Morning    lisinopril (ZESTRIL) tablet 5 mg, Daily    lithium carbonate (LITHOBID) CR tablet 300 mg, HS    nystatin (MYCOSTATIN) powder, BID    ondansetron (ZOFRAN) injection 4 mg, Q6H PRN    pantoprazole (PROTONIX) EC tablet 40 mg, Early  Morning    traZODone (DESYREL) tablet 200 mg, HS    No Known Allergies        Objective     Blood pressure 130/79, pulse 83, temperature 98.5 °F (36.9 °C), resp. rate 20, height 5' (1.524 m), weight 84.5 kg (186 lb 4.6 oz), SpO2 97%. Body mass index is 36.38 kg/m².      Intake/Output Summary (Last 24 hours) at 10/5/2024 1616  Last data filed at 10/5/2024 1150  Gross per 24 hour   Intake 1000 ml   Output 1000 ml   Net 0 ml         PHYSICAL EXAM:      General Appearance:   Alert, cooperative, no distress   HEENT:   Normocephalic, atraumatic, anicteric.     Neck:  Supple, symmetrical, trachea midline   Lungs:   Clear to auscultation bilaterally; no rales, rhonchi or wheezing; respirations unlabored    Heart::   Regular rate and rhythm; no murmur, rub, or gallop.   Abdomen:   Soft, non-tender, non-distended; normal bowel sounds; no masses, no organomegaly    Genitalia:   Deferred    Rectal:   Deferred    Extremities:  No cyanosis, clubbing or edema    Pulses:  2+ and symmetric all extremities    Skin:  No jaundice, rashes, or lesions    Lymph nodes:  No palpable cervical lymphadenopathy        Lab Results:   Admission on 10/04/2024   Component Date Value    WBC 10/05/2024 12.65 (H)     RBC 10/05/2024 3.50 (L)     Hemoglobin 10/05/2024 10.9 (L)     Hematocrit 10/05/2024 34.3 (L)     MCV 10/05/2024 98     MCH 10/05/2024 31.1     MCHC 10/05/2024 31.8     RDW 10/05/2024 13.2     MPV 10/05/2024 9.7     Platelets 10/05/2024 284     nRBC 10/05/2024 0     Segmented % 10/05/2024 85 (H)     Immature Grans % 10/05/2024 1     Lymphocytes % 10/05/2024 7 (L)     Monocytes % 10/05/2024 7     Eosinophils Relative 10/05/2024 0     Basophils Relative 10/05/2024 0     Absolute Neutrophils 10/05/2024 10.70 (H)     Absolute Immature Grans 10/05/2024 0.12     Absolute Lymphocytes 10/05/2024 0.89     Absolute Monocytes 10/05/2024 0.88     Eosinophils Absolute 10/05/2024 0.02     Basophils Absolute 10/05/2024 0.04     Sodium 10/05/2024 135      Potassium 10/05/2024 4.1     Chloride 10/05/2024 102     CO2 10/05/2024 26     ANION GAP 10/05/2024 7     BUN 10/05/2024 20     Creatinine 10/05/2024 1.13     Glucose 10/05/2024 229 (H)     Calcium 10/05/2024 9.6     AST 10/05/2024 572 (H)     ALT 10/05/2024 198 (H)     Alkaline Phosphatase 10/05/2024 82     Total Protein 10/05/2024 6.1 (L)     Albumin 10/05/2024 3.6     Total Bilirubin 10/05/2024 0.45     eGFR 10/05/2024 50     Lipase 10/05/2024 32     Color, UA 10/04/2024 Yellow     Clarity, UA 10/04/2024 Clear     Specific Gravity, UA 10/04/2024 1.025     pH, UA 10/04/2024 6.0     Leukocytes, UA 10/04/2024 Negative     Nitrite, UA 10/04/2024 Negative     Protein, UA 10/04/2024 Trace (A)     Glucose, UA 10/04/2024 1+ (A)     Ketones, UA 10/04/2024 Trace (A)     Urobilinogen, UA 10/04/2024 1.0     Bilirubin, UA 10/04/2024 Negative     Occult Blood, UA 10/04/2024 Negative     RBC, UA 10/04/2024 0-1     WBC, UA 10/04/2024 4-10 (A)     Epithelial Cells 10/04/2024 Moderate (A)     Bacteria, UA 10/04/2024 Occasional     Hyaline Casts, UA 10/04/2024 4-10 (A)     Sodium 10/05/2024 135     Potassium 10/05/2024 4.4     Chloride 10/05/2024 106     CO2 10/05/2024 23     ANION GAP 10/05/2024 6     BUN 10/05/2024 16     Creatinine 10/05/2024 1.03     Glucose 10/05/2024 196 (H)     Glucose, Fasting 10/05/2024 196 (H)     Calcium 10/05/2024 9.4     AST 10/05/2024 317 (H)     ALT 10/05/2024 233 (H)     Alkaline Phosphatase 10/05/2024 80     Total Protein 10/05/2024 6.3 (L)     Albumin 10/05/2024 3.8     Total Bilirubin 10/05/2024 0.52     eGFR 10/05/2024 55     Magnesium 10/05/2024 1.9     Phosphorus 10/05/2024 2.5     WBC 10/05/2024 12.78 (H)     RBC 10/05/2024 3.52 (L)     Hemoglobin 10/05/2024 11.1 (L)     Hematocrit 10/05/2024 34.2 (L)     MCV 10/05/2024 97     MCH 10/05/2024 31.5     MCHC 10/05/2024 32.5     RDW 10/05/2024 13.3     MPV 10/05/2024 9.7     Platelets 10/05/2024 263     nRBC 10/05/2024 0     Segmented %  10/05/2024 84 (H)     Immature Grans % 10/05/2024 1     Lymphocytes % 10/05/2024 9 (L)     Monocytes % 10/05/2024 6     Eosinophils Relative 10/05/2024 0     Basophils Relative 10/05/2024 0     Absolute Neutrophils 10/05/2024 10.72 (H)     Absolute Immature Grans 10/05/2024 0.10     Absolute Lymphocytes 10/05/2024 1.15     Absolute Monocytes 10/05/2024 0.76     Eosinophils Absolute 10/05/2024 0.03     Basophils Absolute 10/05/2024 0.02     POC Glucose 10/05/2024 194 (H)     LACTIC ACID 10/05/2024 1.1     POC Glucose 10/05/2024 139        Imaging Studies: I have personally reviewed pertinent imaging studies.

## 2024-10-06 VITALS
RESPIRATION RATE: 18 BRPM | BODY MASS INDEX: 36.57 KG/M2 | SYSTOLIC BLOOD PRESSURE: 144 MMHG | OXYGEN SATURATION: 98 % | WEIGHT: 186.29 LBS | HEIGHT: 60 IN | HEART RATE: 71 BPM | DIASTOLIC BLOOD PRESSURE: 70 MMHG | TEMPERATURE: 98.6 F

## 2024-10-06 PROBLEM — R74.01 TRANSAMINITIS: Status: ACTIVE | Noted: 2024-10-06

## 2024-10-06 LAB
ALBUMIN SERPL BCG-MCNC: 3.5 G/DL (ref 3.5–5)
ALP SERPL-CCNC: 61 U/L (ref 34–104)
ALT SERPL W P-5'-P-CCNC: 132 U/L (ref 7–52)
ANION GAP SERPL CALCULATED.3IONS-SCNC: 4 MMOL/L (ref 4–13)
AST SERPL W P-5'-P-CCNC: 55 U/L (ref 13–39)
BASOPHILS # BLD AUTO: 0.05 THOUSANDS/ΜL (ref 0–0.1)
BASOPHILS NFR BLD AUTO: 1 % (ref 0–1)
BILIRUB SERPL-MCNC: 0.42 MG/DL (ref 0.2–1)
BUN SERPL-MCNC: 18 MG/DL (ref 5–25)
CALCIUM SERPL-MCNC: 9.8 MG/DL (ref 8.4–10.2)
CHLORIDE SERPL-SCNC: 109 MMOL/L (ref 96–108)
CO2 SERPL-SCNC: 25 MMOL/L (ref 21–32)
CREAT SERPL-MCNC: 0.92 MG/DL (ref 0.6–1.3)
EOSINOPHIL # BLD AUTO: 0.05 THOUSAND/ΜL (ref 0–0.61)
EOSINOPHIL NFR BLD AUTO: 1 % (ref 0–6)
ERYTHROCYTE [DISTWIDTH] IN BLOOD BY AUTOMATED COUNT: 13.2 % (ref 11.6–15.1)
EST. AVERAGE GLUCOSE BLD GHB EST-MCNC: 126 MG/DL
GFR SERPL CREATININE-BSD FRML MDRD: 64 ML/MIN/1.73SQ M
GLUCOSE SERPL-MCNC: 141 MG/DL (ref 65–140)
GLUCOSE SERPL-MCNC: 149 MG/DL (ref 65–140)
GLUCOSE SERPL-MCNC: 217 MG/DL (ref 65–140)
HAV IGM SER QL: NORMAL
HBA1C MFR BLD: 6 %
HBV CORE IGM SER QL: NORMAL
HBV SURFACE AG SER QL: NORMAL
HCT VFR BLD AUTO: 32.8 % (ref 34.8–46.1)
HCV AB SER QL: NORMAL
HGB BLD-MCNC: 10.5 G/DL (ref 11.5–15.4)
IMM GRANULOCYTES # BLD AUTO: 0.1 THOUSAND/UL (ref 0–0.2)
IMM GRANULOCYTES NFR BLD AUTO: 1 % (ref 0–2)
LYMPHOCYTES # BLD AUTO: 1.28 THOUSANDS/ΜL (ref 0.6–4.47)
LYMPHOCYTES NFR BLD AUTO: 14 % (ref 14–44)
MCH RBC QN AUTO: 31.3 PG (ref 26.8–34.3)
MCHC RBC AUTO-ENTMCNC: 32 G/DL (ref 31.4–37.4)
MCV RBC AUTO: 98 FL (ref 82–98)
MONOCYTES # BLD AUTO: 0.6 THOUSAND/ΜL (ref 0.17–1.22)
MONOCYTES NFR BLD AUTO: 6 % (ref 4–12)
NEUTROPHILS # BLD AUTO: 7.25 THOUSANDS/ΜL (ref 1.85–7.62)
NEUTS SEG NFR BLD AUTO: 77 % (ref 43–75)
NRBC BLD AUTO-RTO: 0 /100 WBCS
PLATELET # BLD AUTO: 263 THOUSANDS/UL (ref 149–390)
PMV BLD AUTO: 10 FL (ref 8.9–12.7)
POTASSIUM SERPL-SCNC: 4.4 MMOL/L (ref 3.5–5.3)
PROT SERPL-MCNC: 5.8 G/DL (ref 6.4–8.4)
RBC # BLD AUTO: 3.35 MILLION/UL (ref 3.81–5.12)
SODIUM SERPL-SCNC: 138 MMOL/L (ref 135–147)
WBC # BLD AUTO: 9.33 THOUSAND/UL (ref 4.31–10.16)

## 2024-10-06 PROCEDURE — 80053 COMPREHEN METABOLIC PANEL: CPT | Performed by: HOSPITALIST

## 2024-10-06 PROCEDURE — 85025 COMPLETE CBC W/AUTO DIFF WBC: CPT | Performed by: HOSPITALIST

## 2024-10-06 PROCEDURE — 82948 REAGENT STRIP/BLOOD GLUCOSE: CPT

## 2024-10-06 PROCEDURE — 99239 HOSP IP/OBS DSCHRG MGMT >30: CPT | Performed by: HOSPITALIST

## 2024-10-06 RX ORDER — PANTOPRAZOLE SODIUM 40 MG/1
40 TABLET, DELAYED RELEASE ORAL DAILY
Qty: 30 TABLET | Refills: 0 | Status: SHIPPED | OUTPATIENT
Start: 2024-10-06 | End: 2024-11-05

## 2024-10-06 RX ADMIN — INSULIN LISPRO 2 UNITS: 100 INJECTION, SOLUTION INTRAVENOUS; SUBCUTANEOUS at 12:00

## 2024-10-06 RX ADMIN — HEPARIN SODIUM 5000 UNITS: 5000 INJECTION, SOLUTION INTRAVENOUS; SUBCUTANEOUS at 05:00

## 2024-10-06 RX ADMIN — PANTOPRAZOLE SODIUM 40 MG: 40 TABLET, DELAYED RELEASE ORAL at 05:00

## 2024-10-06 RX ADMIN — LEVOTHYROXINE SODIUM 88 MCG: 88 TABLET ORAL at 05:00

## 2024-10-06 RX ADMIN — NYSTATIN: 100000 POWDER TOPICAL at 10:07

## 2024-10-06 RX ADMIN — LISINOPRIL 5 MG: 5 TABLET ORAL at 10:06

## 2024-10-06 NOTE — ED PROVIDER NOTES
Final diagnoses:   Abdominal pain   Transaminitis   Cholelithiasis     ED Disposition       ED Disposition   Admit    Condition   Stable    Date/Time   Sat Oct 5, 2024  4:25 AM    Comment   Case was discussed with KOFFI and the patient's admission status was agreed to be Admission Status: observation status to the service of Dr. Huff.               Assessment & Plan       Medical Decision Making  69-year-old female with diffuse abdominal pain sitting COVID.  Awaiting results of CT scan at the time of signout to Dr. Costa.  Liver labs noted to be abnormal.  She never had these before.  These will rule require further evaluation.  Patient stable at the time of signout.    Amount and/or Complexity of Data Reviewed  External Data Reviewed: notes.  Labs: ordered.  Radiology: ordered.    Risk  OTC drugs.  Prescription drug management.  Decision regarding hospitalization.             Medications   ondansetron (ZOFRAN) injection 4 mg (has no administration in time range)   heparin (porcine) subcutaneous injection 5,000 Units (has no administration in time range)   sodium chloride 0.9 % bolus 1,000 mL (0 mL Intravenous Stopped 10/5/24 0204)   iohexol (OMNIPAQUE) 350 MG/ML injection (SINGLE-DOSE) 100 mL (100 mL Intravenous Given 10/5/24 0132)   pantoprazole (PROTONIX) injection 40 mg (40 mg Intravenous Given 10/5/24 0446)   sucralfate (CARAFATE) tablet 1 g (1 g Oral Given 10/5/24 0447)       ED Risk Strat Scores                           SBIRT 22yo+      Flowsheet Row Most Recent Value   Initial Alcohol Screen: US AUDIT-C     1. How often do you have a drink containing alcohol? 0 Filed at: 10/04/2024 2249   2. How many drinks containing alcohol do you have on a typical day you are drinking?  0 Filed at: 10/04/2024 2249   3a. Male UNDER 65: How often do you have five or more drinks on one occasion? 0 Filed at: 10/04/2024 2249   3b. FEMALE Any Age, or MALE 65+: How often do you have 4 or more drinks on one occassion? 0  Filed at: 10/04/2024 2249   Audit-C Score 0 Filed at: 10/04/2024 2249   SHARAD: How many times in the past year have you...    Used an illegal drug or used a prescription medication for non-medical reasons? Never Filed at: 10/04/2024 2249                            History of Present Illness       Chief Complaint   Patient presents with    Abdominal Pain     Epigastric pain that began a few hours ago; no N/V/D       Past Medical History:   Diagnosis Date    Allergic     Depression     Diabetes mellitus (HCC)     Disease of thyroid gland     GERD (gastroesophageal reflux disease)     Hyperchloremia     Hypertension     Psychiatric disorder       Past Surgical History:   Procedure Laterality Date    ANKLE SURGERY      Incision    DILATION AND CURETTAGE OF UTERUS        Family History   Problem Relation Age of Onset    No Known Problems Family     Diabetes Mother     Heart disease Mother     Stroke Mother     Diabetes Father     Stroke Father     No Known Problems Maternal Grandmother     No Known Problems Maternal Grandfather     No Known Problems Paternal Grandmother     No Known Problems Paternal Grandfather     Diabetes Brother     No Known Problems Paternal Aunt       Social History     Tobacco Use    Smoking status: Never     Passive exposure: Never    Smokeless tobacco: Never   Vaping Use    Vaping status: Never Used   Substance Use Topics    Alcohol use: Not Currently    Drug use: No      E-Cigarette/Vaping    E-Cigarette Use Never User       E-Cigarette/Vaping Substances    Nicotine No     THC No     CBD No     Flavoring No     Other No     Unknown No       I have reviewed and agree with the history as documented.     D9-year-old female complains of abdominal pain.  Notes that she has had COVID diagnosis for approximately the past 5 days.  Stated the abdominal pain started earlier today.  She had COVID before and it did not present like this.  Rates the pain as moderate to severe.  There is some associated  nausea.  No diarrhea.  No trauma to the abdomen.        Review of Systems   Constitutional:  Positive for activity change, chills, fatigue and fever.   HENT:  Positive for congestion.    Eyes:  Negative for visual disturbance.   Respiratory:  Positive for cough and shortness of breath. Negative for chest tightness.    Cardiovascular:  Positive for palpitations. Negative for chest pain.   Gastrointestinal:  Positive for abdominal pain and nausea. Negative for diarrhea.   Endocrine: Negative for polyuria.   Genitourinary:  Negative for dysuria.   Musculoskeletal:  Positive for arthralgias and myalgias.   Skin:  Negative for rash.   Neurological:  Negative for dizziness, weakness and numbness.           Objective       ED Triage Vitals   Temperature Pulse Blood Pressure Respirations SpO2 Patient Position - Orthostatic VS   10/05/24 0005 10/04/24 2247 10/04/24 2247 10/04/24 2247 10/04/24 2247 10/04/24 2247   97.7 °F (36.5 °C) 84 164/72 18 98 % Lying      Temp Source Heart Rate Source BP Location FiO2 (%) Pain Score    10/05/24 0005 10/04/24 2247 10/04/24 2247 -- 10/05/24 0559    Temporal Monitor Left arm  1      Vitals      Date and Time Temp Pulse SpO2 Resp BP Pain Score FACES Pain Rating User   10/05/24 2318 98.6 °F (37 °C) -- -- 18 127/72 -- -- DII   10/05/24 1619 -- -- -- 20 -- -- -- KO   10/05/24 1619 98.5 °F (36.9 °C) 77 98 % -- 126/71 -- -- DII   10/05/24 0740 -- -- -- -- -- No Pain -- KO   10/05/24 0737 98.5 °F (36.9 °C) 83 97 % 20 130/79 -- -- DII   10/05/24 0559 -- -- -- -- -- 1 -- SM   10/05/24 0505 -- -- -- 18 -- -- -- SM   10/05/24 0505 98.8 °F (37.1 °C) 91 98 % -- 113/95 -- -- DII   10/05/24 0400 -- 70 97 % 18 140/62 -- -- KB   10/05/24 0300 -- 90 98 % 18 159/73 -- -- KB   10/05/24 0045 -- 76 97 % 18 138/67 -- -- KB   10/05/24 0035 -- 85 98 % 20 138/67 -- -- EM   10/05/24 0005 97.7 °F (36.5 °C) -- -- -- -- -- -- AM   10/04/24 2247 -- 84 98 % 18 164/72 -- -- KB            Physical Exam  Constitutional:        General: She is not in acute distress.     Appearance: She is well-developed. She is ill-appearing.   HENT:      Head: Normocephalic and atraumatic.   Eyes:      Conjunctiva/sclera: Conjunctivae normal.      Pupils: Pupils are equal, round, and reactive to light.   Cardiovascular:      Rate and Rhythm: Normal rate and regular rhythm.      Heart sounds: Normal heart sounds.   Pulmonary:      Effort: Pulmonary effort is normal. No respiratory distress.      Breath sounds: Normal breath sounds.   Abdominal:      General: Bowel sounds are normal.      Palpations: Abdomen is soft.      Tenderness: There is generalized abdominal tenderness. There is no right CVA tenderness, left CVA tenderness, guarding or rebound.   Musculoskeletal:         General: Normal range of motion.      Cervical back: Normal range of motion and neck supple.   Skin:     General: Skin is warm and dry.      Capillary Refill: Capillary refill takes less than 2 seconds.   Neurological:      Mental Status: She is alert and oriented to person, place, and time.   Psychiatric:         Behavior: Behavior normal.         Results Reviewed       Procedure Component Value Units Date/Time    CBC and differential [[id number]]  (Abnormal) Collected: 10/05/24 0618    Lab Status: Final result Specimen: Blood from Hand, Right Updated: 10/05/24 0629     WBC 12.78 Thousand/uL      RBC 3.52 Million/uL      Hemoglobin 11.1 g/dL      Hematocrit 34.2 %      MCV 97 fL      MCH 31.5 pg      MCHC 32.5 g/dL      RDW 13.3 %      MPV 9.7 fL      Platelets 263 Thousands/uL      nRBC 0 /100 WBCs      Segmented % 84 %      Immature Grans % 1 %      Lymphocytes % 9 %      Monocytes % 6 %      Eosinophils Relative 0 %      Basophils Relative 0 %      Absolute Neutrophils 10.72 Thousands/µL      Absolute Immature Grans 0.10 Thousand/uL      Absolute Lymphocytes 1.15 Thousands/µL      Absolute Monocytes 0.76 Thousand/µL      Eosinophils Absolute 0.03 Thousand/µL      Basophils  Absolute 0.02 Thousands/µL     Comprehensive metabolic panel [[id number]]  (Abnormal) Collected: 10/05/24 0555    Lab Status: Final result Specimen: Blood from Arm, Left Updated: 10/05/24 0621     Sodium 135 mmol/L      Potassium 4.4 mmol/L      Chloride 106 mmol/L      CO2 23 mmol/L      ANION GAP 6 mmol/L      BUN 16 mg/dL      Creatinine 1.03 mg/dL      Glucose 196 mg/dL      Glucose, Fasting 196 mg/dL      Calcium 9.4 mg/dL       U/L       U/L      Alkaline Phosphatase 80 U/L      Total Protein 6.3 g/dL      Albumin 3.8 g/dL      Total Bilirubin 0.52 mg/dL      eGFR 55 ml/min/1.73sq m     Narrative:      National [employer] guidelines for Chronic Kidney Disease (CKD):     Stage 1 with normal or high GFR (GFR > 90 mL/min/1.73 square meters)    Stage 2 Mild CKD (GFR = 60-89 mL/min/1.73 square meters)    Stage 3A Moderate CKD (GFR = 45-59 mL/min/1.73 square meters)    Stage 3B Moderate CKD (GFR = 30-44 mL/min/1.73 square meters)    Stage 4 Severe CKD (GFR = 15-29 mL/min/1.73 square meters)    Stage 5 End Stage CKD (GFR <15 mL/min/1.73 square meters)  Note: GFR calculation is accurate only with a steady state creatinine    Magnesium [[id number]]  (Normal) Collected: 10/05/24 0555    Lab Status: Final result Specimen: Blood from Arm, Left Updated: 10/05/24 0621     Magnesium 1.9 mg/dL     Phosphorus [[id number]]  (Normal) Collected: 10/05/24 0555    Lab Status: Final result Specimen: Blood from Arm, Left Updated: 10/05/24 0621     Phosphorus 2.5 mg/dL     CMP [[id number]]  (Abnormal) Collected: 10/05/24 0003    Lab Status: Final result Specimen: Blood from Arm, Left Updated: 10/05/24 0028     Sodium 135 mmol/L      Potassium 4.1 mmol/L      Chloride 102 mmol/L      CO2 26 mmol/L      ANION GAP 7 mmol/L      BUN 20 mg/dL      Creatinine 1.13 mg/dL      Glucose 229 mg/dL      Calcium 9.6 mg/dL       U/L       U/L      Alkaline Phosphatase 82 U/L      Total Protein 6.1 g/dL       Albumin 3.6 g/dL      Total Bilirubin 0.45 mg/dL      eGFR 50 ml/min/1.73sq m     Narrative:      National Kid[employer] Disease Foundation guidelines for Chronic Kidney Disease (CKD):     Stage 1 with normal or high GFR (GFR > 90 mL/min/1.73 square meters)    Stage 2 Mild CKD (GFR = 60-89 mL/min/1.73 square meters)    Stage 3A Moderate CKD (GFR = 45-59 mL/min/1.73 square meters)    Stage 3B Moderate CKD (GFR = 30-44 mL/min/1.73 square meters)    Stage 4 Severe CKD (GFR = 15-29 mL/min/1.73 square meters)    Stage 5 End Stage CKD (GFR <15 mL/min/1.73 square meters)  Note: GFR calculation is accurate only with a steady state creatinine    Lipase [[id number]]  (Normal) Collected: 10/05/24 0003    Lab Status: Final result Specimen: Blood from Arm, Left Updated: 10/05/24 0028     Lipase 32 u/L     CBC and differential [[id number]]  (Abnormal) Collected: 10/05/24 0003    Lab Status: Final result Specimen: Blood from Arm, Left Updated: 10/05/24 0010     WBC 12.65 Thousand/uL      RBC 3.50 Million/uL      Hemoglobin 10.9 g/dL      Hematocrit 34.3 %      MCV 98 fL      MCH 31.1 pg      MCHC 31.8 g/dL      RDW 13.2 %      MPV 9.7 fL      Platelets 284 Thousands/uL      nRBC 0 /100 WBCs      Segmented % 85 %      Immature Grans % 1 %      Lymphocytes % 7 %      Monocytes % 7 %      Eosinophils Relative 0 %      Basophils Relative 0 %      Absolute Neutrophils 10.70 Thousands/µL      Absolute Immature Grans 0.12 Thousand/uL      Absolute Lymphocytes 0.89 Thousands/µL      Absolute Monocytes 0.88 Thousand/µL      Eosinophils Absolute 0.02 Thousand/µL      Basophils Absolute 0.04 Thousands/µL     Urine Microscopic [[id number]]  (Abnormal) Collected: 10/04/24 2342    Lab Status: Final result Specimen: Urine, Clean Catch Updated: 10/05/24 0000     RBC, UA 0-1 /hpf      WBC, UA 4-10 /hpf      Epithelial Cells Moderate /hpf      Bacteria, UA Occasional /hpf      Hyaline Casts, UA 4-10 /lpf     UA w Reflex to Microscopic w Reflex to Culture  [[id number]]  (Abnormal) Collected: 10/04/24 2342    Lab Status: Final result Specimen: Urine, Clean Catch Updated: 10/04/24 2349     Color, UA Yellow     Clarity, UA Clear     Specific Gravity, UA 1.025     pH, UA 6.0     Leukocytes, UA Negative     Nitrite, UA Negative     Protein, UA Trace mg/dl      Glucose, UA 1+ mg/dl      Ketones, UA Trace mg/dl      Urobilinogen, UA 1.0 E.U./dl      Bilirubin, UA Negative     Occult Blood, UA Negative            US right upper quadrant with liver dopplers   Final Interpretation by Nissa Hale MD (10/05 0926)      Cholelithiasis without acute cholecystitis.      Resident: FREYA GEORGE I, the attending radiologist, have reviewed the images and agree with the final report above.      Workstation performed: [id number]         CT Abdomen pelvis with contrast   Final Interpretation by Braden Hopson DO (10/05 0356)      Partially distended bladder. Mild circumferential bladder wall thickening noted, possibly exaggerated by under distention. Superimposed cystitis considered in the appropriate clinical setting. Correlation with the patient's symptoms, laboratory values,    and urinalysis recommended.      Cholelithiasis, and other findings as above.            Workstation performed: [id number]             Procedures    ED Medication and Procedure Management   Prior to Admission Medications   Prescriptions Last Dose Informant Patient Reported? Taking?   Cholecalciferol (Vitamin D) 50 MCG (2000 UT) tablet 10/4/2024 at 2100 Self No Yes   Sig: Take 1 tablet (2,000 Units total) by mouth daily   aspirin (ECOTRIN LOW STRENGTH) 81 mg EC tablet 10/4/2024 at 2130 Self Yes Yes   Sig: Take 81 mg by mouth daily   clobetasol (TEMOVATE) 0.05 % external solution  Self No No   Sig: Apply to dry scalp daily for up to 4 weeks.  Leave shampoo on the scalp x 15 minutes, then rinse.   glucose blood (Contour Next Test) test strip   No Yes   Sig: Use 1 each 2 (two) times a day Use  as instructed   glucose blood test strip  Self Yes No   Sig: Use 1 each as needed Use as instructed   levothyroxine 88 mcg tablet 10/4/2024 at 10 am  No Yes   Sig: Take 1 tablet (88 mcg total) by mouth daily   linaGLIPtin (Tradjenta) 5 MG TABS 10/4/2024 at 10 am Self No Yes   Sig: Take 5 mg by mouth daily   lisinopril (ZESTRIL) 5 mg tablet 10/4/2024 at 10 am  No Yes   Sig: Take 1 tablet (5 mg total) by mouth daily   lithium carbonate (LITHOBID) 300 mg CR tablet Past Week at 2300 Self Yes Yes   nirmatrelvir & ritonavir (Paxlovid, 150/100,) tablet therapy pack 10/5/2024 at 2300  No Yes   Sig: Take 2 tablets by mouth 2 (two) times a day for 5 days Take 1 nirmatrelvir tablet + 1 ritonavir tablet together per dose   pravastatin (PRAVACHOL) 40 mg tablet Past Week at [id number]  No Yes   Sig: Take 1 tablet (40 mg total) by mouth daily   traZODone (DESYREL) 100 mg tablet Past Week at 2300 Self Yes Yes   Sig: Take 200 mg by mouth daily at bedtime      Facility-Administered Medications: None     Current Discharge Medication List        CONTINUE these medications which have NOT CHANGED    Details   aspirin (ECOTRIN LOW STRENGTH) 81 mg EC tablet Take 81 mg by mouth daily      Cholecalciferol (Vitamin D) 50 MCG (2000 UT) tablet Take 1 tablet (2,000 Units total) by mouth daily  Qty: 90 tablet, Refills: 3    Associated Diagnoses: Vitamin D deficiency      !! glucose blood (Contour Next Test) test strip Use 1 each 2 (two) times a day Use as instructed  Qty: 100 strip, Refills: 5    Associated Diagnoses: Type 2 diabetes mellitus with stage 3a chronic kidney disease, without long-term current use of insulin (HCC)      levothyroxine 88 mcg tablet Take 1 tablet (88 mcg total) by mouth daily  Qty: 90 tablet, Refills: 1    Associated Diagnoses: Acquired hypothyroidism      linaGLIPtin (Tradjenta) 5 MG TABS Take 5 mg by mouth daily  Qty: 90 tablet, Refills: 3    Associated Diagnoses: Type 2 diabetes mellitus with stage 3a chronic kidney  disease, without long-term current use of insulin (HCC)      lisinopril (ZESTRIL) 5 mg tablet Take 1 tablet (5 mg total) by mouth daily  Qty: 90 tablet, Refills: 0    Associated Diagnoses: Type 2 diabetes mellitus with stage 3a chronic kidney disease, without long-term current use of insulin (HCC); Essential hypertension      lithium carbonate (LITHOBID) 300 mg CR tablet       pravastatin (PRAVACHOL) 40 mg tablet Take 1 tablet (40 mg total) by mouth daily  Qty: 90 tablet, Refills: 3    Associated Diagnoses: Hypercholesterolemia      traZODone (DESYREL) 100 mg tablet Take 200 mg by mouth daily at bedtime      clobetasol (TEMOVATE) 0.05 % external solution Apply to dry scalp daily for up to 4 weeks.  Leave shampoo on the scalp x 15 minutes, then rinse.  Qty: 50 mL, Refills: 2    Associated Diagnoses: Seborrheic dermatitis      !! glucose blood test strip Use 1 each as needed Use as instructed       !! - Potential duplicate medications found. Please discuss with provider.        STOP taking these medications       nirmatrelvir & ritonavir (Paxlovid, 150/100,) tablet therapy pack Comments:   Reason for Stopping:             No discharge procedures on file.  ED SEPSIS DOCUMENTATION   Time reflects when diagnosis was documented in both MDM as applicable and the Disposition within this note       Time User Action Codes Description Comment    10/5/2024  4:25 AM Aditya Castro [R10.9] Abdominal pain     10/5/2024  4:25 AM Aditya Castro [R74.01] Transaminitis     10/5/2024  4:25 AM Aditya Castro [K80.20] Cholelithiasis                  Aditya Lima MD  10/06/24 0052

## 2024-10-06 NOTE — ASSESSMENT & PLAN NOTE
Significantly improved, if not almost resolved  Transaminitis was noted in the setting of recently having COVID-19, and being treated with Paxlovid  Additional etiology could include the possibility of a passed gallstone  GI workup as above  Cleared by GI for discharge, patient will need periodic outpatient CMP monitoring via her PCP

## 2024-10-06 NOTE — PLAN OF CARE
Problem: PAIN - ADULT  Goal: Verbalizes/displays adequate comfort level or baseline comfort level  Description: Interventions:  - Encourage patient to monitor pain and request assistance  - Assess pain using appropriate pain scale  - Administer analgesics based on type and severity of pain and evaluate response  - Implement non-pharmacological measures as appropriate and evaluate response  - Consider cultural and social influences on pain and pain management  - Notify physician/advanced practitioner if interventions unsuccessful or patient reports new pain  Outcome: Progressing     Problem: INFECTION - ADULT  Goal: Absence or prevention of progression during hospitalization  Description: INTERVENTIONS:  - Assess and monitor for signs and symptoms of infection  - Monitor lab/diagnostic results  - Monitor all insertion sites, i.e. indwelling lines, tubes, and drains  - Monitor endotracheal if appropriate and nasal secretions for changes in amount and color  - Ruffin appropriate cooling/warming therapies per order  - Administer medications as ordered  - Instruct and encourage patient and family to use good hand hygiene technique  - Identify and instruct in appropriate isolation precautions for identified infection/condition  Outcome: Progressing  Goal: Absence of fever/infection during neutropenic period  Description: INTERVENTIONS:  - Monitor WBC    Outcome: Progressing     Problem: SAFETY ADULT  Goal: Patient will remain free of falls  Description: INTERVENTIONS:  - Educate patient/family on patient safety including physical limitations  - Instruct patient to call for assistance with activity   - Consult OT/PT to assist with strengthening/mobility   - Keep Call bell within reach  - Keep bed low and locked with side rails adjusted as appropriate  - Keep care items and personal belongings within reach  - Initiate and maintain comfort rounds  - Make Fall Risk Sign visible to staff  - Offer Toileting every 2 Hours,  in advance of need  - Initiate/Maintain alarm  - Obtain necessary fall risk management equipment:   - Apply yellow socks and bracelet for high fall risk patients  - Consider moving patient to room near nurses station  Outcome: Progressing  Goal: Maintain or return to baseline ADL function  Description: INTERVENTIONS:  -  Assess patient's ability to carry out ADLs; assess patient's baseline for ADL function and identify physical deficits which impact ability to perform ADLs (bathing, care of mouth/teeth, toileting, grooming, dressing, etc.)  - Assess/evaluate cause of self-care deficits   - Assess range of motion  - Assess patient's mobility; develop plan if impaired  - Assess patient's need for assistive devices and provide as appropriate  - Encourage maximum independence but intervene and supervise when necessary  - Involve family in performance of ADLs  - Assess for home care needs following discharge   - Consider OT consult to assist with ADL evaluation and planning for discharge  - Provide patient education as appropriate  Outcome: Progressing  Goal: Maintains/Returns to pre admission functional level  Description: INTERVENTIONS:  - Perform AM-PAC 6 Click Basic Mobility/ Daily Activity assessment daily.  - Set and communicate daily mobility goal to care team and patient/family/caregiver.   - Collaborate with rehabilitation services on mobility goals if consulted  - Perform Range of Motion 4 times a day.  - Reposition patient every 2 hours.  - Dangle patient 3 times a day  - Stand patient 3 times a day  - Ambulate patient 3 times a day  - Out of bed to chair 3 times a day   - Out of bed for meals 3 times a day  - Out of bed for toileting  - Record patient progress and toleration of activity level   Outcome: Progressing

## 2024-10-06 NOTE — NURSING NOTE
Patient's IV removed with catheter tip intact. DSD and pressure applied. AVS reviewed with patient. No questions/concerns. Supervisor set up transportation for patient home.

## 2024-10-06 NOTE — ASSESSMENT & PLAN NOTE
Recently diagnosed and started on Paxlovid  Complete outpatient course  Patient has remained asymptomatic otherwise and on room air  Okay for discharge home

## 2024-10-06 NOTE — DISCHARGE SUMMARY
Discharge Summary - Hospitalist   Name: Lisa Bocanegraorry 69 y.o. female I MRN: [id number]  Unit/Bed#: -01 I Date of Admission: 10/4/2024   Date of Service: 10/6/2024 I Hospital Day: 1     Assessment & Plan  Generalized abdominal pain  Has resolved  Most likely secondary to gastritis, and/or a very mild case of food poisoning  Imaging thus far-  CT abdomen pelvis-no acute abdominal pathology.  Gallbladder revealed cholelithiasis without findings of acute cholecystitis.  Stomach, and bowel grossly unremarkable.  There were some concerns for a partially distended bladder and mild circumferential bladder wall thickening, however patient had no UTI-like symptoms.  Right upper quadrant ultrasound-cholelithiasis without acute cholecystitis  Status post a GI evaluation  No further inpatient testing, treatment, and/or workup is needed  Patient is tolerating a diet well, and passing her stools normally  Patient is medically stable for discharge  Patient has been provided with a new prescription for Protonix  Okay for release on all other preadmission medications at the preadmission doses  Eventual outpatient follow-up with PCP, and GI as needed  Bipolar disorder (HCC)  Continue lithium and trazodone at home doses in the post discharge setting  Hypercholesterolemia  Cleared by GI to resume statin therapy at time of discharge  Hypothyroidism  DC home on Synthroid as the patient was taking prior to arrival  Type 2 diabetes mellitus with stage 3a chronic kidney disease, without long-term current use of insulin ([facility])  Lab Results   Component Value Date    HGBA1C 6.0 (H) 10/05/2024       Recent Labs     10/05/24  1616 10/05/24  2057 10/06/24  0757 10/06/24  1143   POCGLU 209* 221* 141* 217*       Blood Sugar Average: Last 72 hrs:  (P) [id number]  DC home on preadmit meds at preadmit doses  Class 2 obesity due to excess calories in adult  Patient's BMI is 36.3  Dietary, weight loss, and lifestyle modification  counseling was provided  Essential hypertension  DC home on lisinopril as the patient was taking prior to arrival  COVID-19  Recently diagnosed and started on Paxlovid  Complete outpatient course  Patient has remained asymptomatic otherwise and on room air  Okay for discharge home  Transaminitis  Significantly improved, if not almost resolved  Transaminitis was noted in the setting of recently having COVID-19, and being treated with Paxlovid  Additional etiology could include the possibility of a passed gallstone  GI workup as above  Cleared by GI for discharge, patient will need periodic outpatient CMP monitoring via her PCP     Medical Problems       Resolved Problems  Date Reviewed: 10/1/2024   None       Discharging Physician / Practitioner: Brody Germain MD  PCP: Gadiel Young PA-C  Admission Date:   Admission Orders (From admission, onward)       Ordered        10/05/24 1454  INPATIENT ADMISSION  Once            10/05/24 0424  Place in Observation  Once                          Discharge Date: 10/06/24    Consultations During Hospital Stay:  Gastroenterology    Procedures Performed:   None    Significant Findings / Test Results:   CT abdomen/pelvis-see the full report for details  Right upper quadrant ultrasound -cholelithiasis without evidence of cholecystitis.  Please see the full report for additional details.    Incidental Findings:   None    Test Results Pending at Discharge (will require follow up):   None     Outpatient Tests Requested:  None    Complications: None    Reason for Admission: Abdominal pain, transaminitis    Hospital Course:   Lisa Rich is a 69 y.o. female patient who originally presented to the hospital on 10/4/2024 due to abdominal pain.  Please refer to the initial history and physical examination completed by STEVE Simmons for the initial presenting features and complaints.  In brief, the patient is a 69-year-old female who was admitted to the hospital after she  came in with abdominal pain, and was found to have a transaminitis.  Of note, the patient recently was diagnosed with COVID-19 and completed a course of Paxlovid prior to coming into the hospital.  After being admitted to the hospital, a CAT scan of the abdomen, and a right upper quadrant ultrasound was performed with the results as outlined above.  They were both grossly within normal limits.  Patient was seen in conjunction with GI.  Ultimately was felt that she had a gastritis with a possible component of mild food poisoning.  She was treated conservatively.  Diet was advanced as tolerated starting on Saturday, 10/5/2024.  By Sunday, 10/6/2024, the patient felt well and was back to baseline.  LFTs significantly down trended also.  Patient was given a new prescription for Protonix but otherwise discharged home on all of her other preadmission medications, at the preadmission doses.  She will follow-up in the near future in the outpatient setting with her PCP.  Please refer to the assessment/plan portion of this discharge summary as outlined above for additional details regarding her stay.      Please see above list of diagnoses and related plan for additional information.     Condition at Discharge: good    Discharge Day Visit / Exam:   Subjective: Patient seen, looks and feels well, wants to go home, is asking to see if we can get her a ride to go home  Vitals: Blood Pressure: 144/70 (10/06/24 0650)  Pulse: 71 (10/06/24 0650)  Temperature: 98.6 °F (37 °C) (10/05/24 2318)  Temp Source: Oral (10/05/24 0505)  Respirations: 18 (10/06/24 0650)  Height: 5' (152.4 cm) (10/05/24 0527)  Weight - Scale: 84.5 kg (186 lb 4.6 oz) (10/05/24 0527)  SpO2: 98 % (10/06/24 0650)  Physical Exam  Constitutional:       General: She is not in acute distress.     Appearance: Normal appearance. She is normal weight. She is not ill-appearing.   HENT:      Head: Normocephalic and atraumatic.      Nose: Nose normal.      Mouth/Throat:       Mouth: Mucous membranes are moist.   Eyes:      Extraocular Movements: Extraocular movements intact.      Pupils: Pupils are equal, round, and reactive to light.   Cardiovascular:      Rate and Rhythm: Normal rate and regular rhythm.      Pulses: Normal pulses.      Heart sounds: Normal heart sounds. No murmur heard.     No friction rub. No gallop.   Pulmonary:      Effort: Pulmonary effort is normal. No respiratory distress.      Breath sounds: Normal breath sounds. No wheezing, rhonchi or rales.   Abdominal:      General: There is no distension.      Palpations: Abdomen is soft. There is no mass.      Tenderness: There is no abdominal tenderness.      Hernia: No hernia is present.   Musculoskeletal:         General: No swelling or tenderness. Normal range of motion.      Cervical back: Normal range of motion and neck supple. No rigidity.      Right lower leg: No edema.      Left lower leg: No edema.   Skin:     General: Skin is warm.      Capillary Refill: Capillary refill takes less than 2 seconds.      Findings: No erythema or rash.   Neurological:      General: No focal deficit present.      Mental Status: She is alert and oriented to person, place, and time. Mental status is at baseline.      Cranial Nerves: No cranial nerve deficit.      Motor: No weakness.   Psychiatric:         Mood and Affect: Mood normal.         Behavior: Behavior normal.          Discussion with Family: Patient declined call to .     Discharge instructions/Information to patient and family:   See after visit summary for information provided to patient and family.      Provisions for Follow-Up Care:  See after visit summary for information related to follow-up care and any pertinent home health orders.      Mobility at time of Discharge:   Basic Mobility Inpatient Raw Score: 24  JH-HLM Goal: 8: Walk 250 feet or more  JH-HLM Achieved: 7: Walk 25 feet or more  HLM Goal achieved. Continue to encourage appropriate mobility.      Disposition:   Home    Planned Readmission: None    Discharge Medications:  See after visit summary for reconciled discharge medications provided to patient and/or family.      Administrative Statements   Discharge Statement:  I have spent a total time of 35 minutes in caring for this patient on the day of the visit/encounter. >30 minutes of time was spent on: Prognosis, Instructions for management, Patient and family education, Importance of tx compliance, Risk factor reductions, Counseling / Coordination of care, Documenting in the medical record, and Communicating with other healthcare professionals .    **Please Note: This note may have been constructed using a voice recognition system**

## 2024-10-06 NOTE — ASSESSMENT & PLAN NOTE
Has resolved  Most likely secondary to gastritis, and/or a very mild case of food poisoning  Imaging thus far-  CT abdomen pelvis-no acute abdominal pathology.  Gallbladder revealed cholelithiasis without findings of acute cholecystitis.  Stomach, and bowel grossly unremarkable.  There were some concerns for a partially distended bladder and mild circumferential bladder wall thickening, however patient had no UTI-like symptoms.  Right upper quadrant ultrasound-cholelithiasis without acute cholecystitis  Status post a GI evaluation  No further inpatient testing, treatment, and/or workup is needed  Patient is tolerating a diet well, and passing her stools normally  Patient is medically stable for discharge  Patient has been provided with a new prescription for Protonix  Okay for release on all other preadmission medications at the preadmission doses  Eventual outpatient follow-up with PCP, and GI as needed

## 2024-10-06 NOTE — ASSESSMENT & PLAN NOTE
Lab Results   Component Value Date    HGBA1C 6.0 (H) 10/05/2024       Recent Labs     10/05/24  1616 10/05/24  2057 10/06/24  0757 10/06/24  1143   POCGLU 209* 221* 141* 217*       Blood Sugar Average: Last 72 hrs:  (P) [id number]  DC home on preadmit meds at preadmit doses

## 2024-10-06 NOTE — DISCHARGE INSTR - AVS FIRST PAGE
Dear Lisa Bocanegraorry,     It was our pleasure to care for you here at [facility].  It is our hope that we were always able to exceed the expected standards for your care during your stay.  You were hospitalized due to gastritis, and elevated liver function testing.  You were cared for on the medical/surgical floor by Brody Germain MD with the [facility] who covers for your primary care physician (PCP), Gadiel Young PA-C, while you were hospitalized.  You were additionally seen by the [facility].  If you have any questions or concerns related to this hospitalization, you may contact us at .  For follow up as well as any medication refills, we recommend that you follow up with your primary care physician.  A registered nurse will reach out to you by phone within a few days after your discharge to answer any additional questions that you may have after going home.  However, at this time we provide for you here, the most important instructions / recommendations at discharge:     Notable Medication Adjustments -   New prescription Protonix 40 mg take 1 tablet daily by mouth 30 minutes prior to the first meal of the day  Okay to continue all other preadmission medications at the preadmission doses  Testing Required after Discharge -   To be further determined in the outpatient setting by your primary care provider, and/or by gastroenterology  Important follow up information -   Please follow-up with the providers as outlined in this discharge package  Other Instructions -   Please maintain a healthy diabetic, low-sodium diet  Please review this entire after visit summary as additional general instructions including medication list, appointments, activity, diet, any pertinent wound care, and other additional recommendations from your care team that may be provided for you.      Sincerely,     Brody Germain,  MD

## 2024-10-07 ENCOUNTER — TRANSITIONAL CARE MANAGEMENT (OUTPATIENT)
Dept: FAMILY MEDICINE CLINIC | Facility: HOME HEALTHCARE | Age: 69
End: 2024-10-07

## 2024-10-07 NOTE — UTILIZATION REVIEW
Callie Michel RN   Registered Nurse  Specialty: Utilization Review     Utilization Review      Signed     Date of Service: 10/5/2024 10:22 AM     Signed       Expand All Collapse All    Initial Clinical Review     WAS OBSERVATION 10/02/2024 @ 0424 CONVERTED TO INPATIENT ADMISSION 10/05/2024 @ 1454 DUE TO CONTINUED STAY REQUIRED TO CARE FOR PATIENT WITH Generalized abdominal pain.        Admission: Date/Time/Statement:   Admission Orders (From admission, onward)          Ordered         10/05/24 1454   INPATIENT ADMISSION  Once             10/05/24 0424   Place in Observation  Once                                     Orders Placed This Encounter   Procedures    INPATIENT ADMISSION       Standing Status:   Standing       Number of Occurrences:   1       Order Specific Question:   Level of Care       Answer:   Med Surg [16]       Order Specific Question:   Estimated length of stay       Answer:   More than 2 Midnights       Order Specific Question:   Certification       Answer:   I certify that inpatient services are medically necessary for this patient for a duration of greater than two midnights. See H&P and MD Progress Notes for additional information about the patient's course of treatment.      ED Arrival Information         Expected   -    Arrival   10/4/2024 22:32    Acuity   Urgent                 Means of arrival   Walk-In    Escorted by   [facility]    Service   Hospitalist    Admission type   Emergency                 Arrival complaint   COVID         10/04/24 1324 First Provider Evaluation of Patient                 Chief Complaint   Patient presents with    Abdominal Pain       Epigastric pain that began a few hours ago; no N/V/D         Initial Presentation: 69 y.o. female to ED via walk in from home.    Admitted to observation with Dx: Generalized abdominal pain .  Presented to ED with Abdominal Pain after eating [employer] [place].     PMHx:diabetes, hypertension, bipolar disorder,  hypothyroidism, and hyperlipidemia .   Date: 10/04/2024   On exam: Obese.  Bowel sounds normal, abd soft & tender.  Imaging shows:  CT abd/Pel: Partially distended bladder. Mild circumferential bladder wall thickening noted, possibly exaggerated by under distention. Superimposed cystitis considered in the appropriate clinical setting. Correlation with the patient's symptoms, laboratory values, and urinalysis recommended.  Cholelithiasis, and other findings as above.   US RUQ with liver dopplers:  Cholelithiasis without acute cholecystitis.  ED treatment NPO.  Consult GI.  Labs.  Trend LFTs.    Plan includes Regular diet.  Up & OOB as tolerated.  I&O q8h  Ray SCDs.     Anticipated Length of Stay/Certification Statement: inpatient services are medically necessary for this patient for a duration of greater than two midnights.      Day 2: 10/05/2024  Advance diet as tolerated.  Consult GI.  Trend LFTs.  Recently diagnosed  COVID 19 and started on Paxlovid      10/05/2024  Consult GI:    Acute abdominal pain.  No history of abdominal pain in the past.  No other GI symptoms.  Differentials for abdominal pain are likely food poisoning, gastritis.  Her abdominal pain is already improving with initiation of PPI and Carafate.  She recently was diagnosed with COVID infection which may be contributing towards abnormal liver tests which are acutely elevated.  Other possibility is viral hepatitis.  She does have cholelithiasis but no evidence of cholecystitis or CBD obstruction.  I will check lactate to make sure there is no underlying ischemic etiology for the abdominal pain.  Since her symptoms have improved, patient's diet can be advanced as well.   1.  Abdominal pain  2.  Cholelithiasis  3.  Abnormal LFTs  4.  COVID infection   Will continue to monitor abdominal pain as we advance patient's diet.  We will continue pantoprazole and Carafate.  If patient symptoms worsen with meal intake, endoscopic evaluation would be  warranted.  Unlikely cholelithiasis causing patient's symptoms since no chronicity and currently no evidence of cholecystitis or cholangitis.  We will check acute hepatitis panel.  Continue to monitor liver tests.      ED Treatment-Medication Administration from 10/04/2024 2232 to 10/05/2024 0501           Date/Time Order Dose Route Action       10/05/2024 0004 sodium chloride 0.9 % bolus 1,000 mL 1,000 mL Intravenous New Bag       10/05/2024 0132 iohexol (OMNIPAQUE) 350 MG/ML injection (SINGLE-DOSE) 100 mL 100 mL Intravenous Given       10/05/2024 0446 pantoprazole (PROTONIX) injection 40 mg 40 mg Intravenous Given       10/05/2024 0447 sucralfate (CARAFATE) tablet 1 g 1 g Oral Given                Scheduled Medications:    Scheduled Medications ONLY (does not pull in infusions nor PRN medications order   aspirin, 162 mg, Oral, HS  heparin (porcine), 5,000 Units, Subcutaneous, Q8H DANYEL  insulin lispro, 1-6 Units, Subcutaneous, 4x Daily (AC & HS)  levothyroxine, 88 mcg, Oral, Early Morning  lisinopril, 5 mg, Oral, Daily  lithium carbonate, 300 mg, Oral, HS  nystatin, , Topical, BID  pantoprazole, 40 mg, Oral, Early Morning  traZODone, 200 mg, Oral, HS         Continuous IV Infusions:  Infusions Meds - Displays dose, route, & frequency only         PRN Meds:    PRN Medications - displays dose, route, and frequency   ondansetron, 4 mg, Intravenous, Q6H PRN                 ED Triage Vitals   Temperature Pulse Respirations Blood Pressure SpO2 Pain Score   10/05/24 0005 10/04/24 2247 10/04/24 2247 10/04/24 2247 10/04/24 2247 10/05/24 0559   97.7 °F (36.5 °C) 84 18 164/72 98 % 1      Weight (last 2 days)         Date/Time Weight     10/05/24 0527 84.5 (186.29)     10/05/24 0500 84.5 (186.29)                Vital Signs (last 3 days)         Date/Time Temp Pulse Resp BP MAP (mmHg) SpO2 O2 Device Patient Position - Orthostatic VS [place] Coma Scale Score Pain     10/05/24 0740 -- -- -- -- -- -- -- -- 15 No Pain     10/05/24  07:37:05 98.5 °F (36.9 °C) 83 20 130/79 96 97 % -- -- -- --     10/05/24 0559 -- -- -- -- -- -- -- -- 15 1     10/05/24 05:05:59 98.8 °F (37.1 °C) 91 18 113/95 101 98 % -- Sitting -- --     10/05/24 0400 -- 70 18 140/62 89 97 % -- -- -- --     10/05/24 0300 -- 90 18 159/73 105 98 % -- -- -- --     10/05/24 0121 -- -- -- -- -- -- -- -- 15 --     10/05/24 0045 -- 76 18 138/67 95 97 % -- -- -- --     10/05/24 0035 -- 85 20 138/67 -- 98 % None (Room air) Sitting -- --     10/05/24 0005 97.7 °F (36.5 °C) -- -- -- -- -- -- -- -- --     10/04/24 2247 -- 84 18 164/72 103 98 % None (Room air) Lying -- --                   Pertinent Labs/Diagnostic Test Results:   Radiology:  US right upper quadrant with liver dopplers   Final Interpretation by Nissa Hale MD (10/05 0926)   Cholelithiasis without acute cholecystitis.       CT Abdomen pelvis with contrast   Final Interpretation by Braden Hopson DO (10/05 0356)   Partially distended bladder. Mild circumferential bladder wall thickening noted, possibly exaggerated by under distention. Superimposed cystitis considered in the appropriate clinical setting. Correlation with the patient's symptoms, laboratory values,    and urinalysis recommended.       Cholelithiasis, and other findings as above.          Cardiology:  No orders to display      GI:  No orders to display             Results from last 7 days   Lab Units 10/05/24  0618 10/05/24  0003   WBC Thousand/uL 12.78* 12.65*   HEMOGLOBIN g/dL 11.1* 10.9*   HEMATOCRIT % 34.2* 34.3*   PLATELETS Thousands/uL 263 284   TOTAL NEUT ABS Thousands/µL 10.72* 10.70*            Results from last 7 days   Lab Units 10/05/24  0555 10/05/24  0003   SODIUM mmol/L 135 135   POTASSIUM mmol/L 4.4 4.1   CHLORIDE mmol/L 106 102   CO2 mmol/L 23 26   ANION GAP mmol/L 6 7   BUN mg/dL 16 20   CREATININE mg/dL 1.03 1.13   EGFR ml/min/1.73sq m 55 50   CALCIUM mg/dL 9.4 9.6   MAGNESIUM mg/dL 1.9  --    PHOSPHORUS mg/dL 2.5  --              Results from last 7 days   Lab Units 10/05/24  0555 10/05/24  0003   AST U/L 317* 572*   ALT U/L 233* 198*   ALK PHOS U/L 80 82   TOTAL PROTEIN g/dL 6.3* 6.1*   ALBUMIN g/dL 3.8 3.6   TOTAL BILIRUBIN mg/dL 0.52 0.45            Results from last 7 days   Lab Units 10/05/24  1148 10/05/24  0735   POC GLUCOSE mg/dl 139 194*            Results from last 7 days   Lab Units 10/05/24  0555 10/05/24  0003   GLUCOSE RANDOM mg/dL 196* 229*           Results from last 7 days   Lab Units 10/05/24  1127   LACTIC ACID mmol/L 1.1           Results from last 7 days   Lab Units 10/05/24  0003   LIPASE u/L 32           Results from last 7 days   Lab Units 10/04/24  2342   CLARITY UA   Clear   COLOR UA   Yellow   SPEC GRAV UA   1.025   PH UA   6.0   GLUCOSE UA mg/dl 1+*   KETONES UA mg/dl Trace*   BLOOD UA   Negative   PROTEIN UA mg/dl Trace*   NITRITE UA   Negative   BILIRUBIN UA   Negative   UROBILINOGEN UA E.U./dl 1.0   LEUKOCYTES UA   Negative   WBC UA /hpf 4-10*   RBC UA /hpf 0-1   BACTERIA UA /hpf Occasional   EPITHELIAL CELLS WET PREP /hpf Moderate*      Medical History        Past Medical History:   Diagnosis Date    Allergic      Depression      Diabetes mellitus (HCC)      Disease of thyroid gland      GERD (gastroesophageal reflux disease)      Hyperchloremia      Hypertension      Psychiatric disorder           Present on Admission:   Bipolar disorder (HCC)   Hypercholesterolemia   Type 2 diabetes mellitus with stage 3a chronic kidney disease, without long-term current use of insulin (HCC)   Hypothyroidism   Essential hypertension   Class 2 obesity due to excess calories in adult        Admitting Diagnosis: Cholelithiasis [K80.20]  Abdominal pain [R10.9]  Transaminitis [R74.01]  Age/Sex: 69 y.o. female     Network Utilization Review Department  ATTENTION: Please call with any questions or concerns to [phone] and carefully listen to the prompts so that you are directed to the right person. All voicemails are  confidential.   For Discharge needs, contact Care Management DC Support Team at [phone] opt. 2  Send all requests for admission clinical reviews, approved or denied determinations and any other requests to dedicated fax number below belonging to the campus where the patient is receiving treatment. List of dedicated fax numbers for the Facilities:  FACILITY NAME UR FAX NUMBER   ADMISSION DENIALS (Administrative/Medical Necessity) [phone]   DISCHARGE SUPPORT TEAM (NETWORK) [phone]   PARENT CHILD HEALTH (Maternity/NICU/Pediatrics) [phone]   [facility] [phone]   [facility] [phone]   [facility] [phone]   [facility] [phone]   [facility] [phone]   [facility] [phone]   [facility] [phone]   [facility] [phone]   [facility] [phone]   [facility] [phone]   [facility] [phone]   [facility] [phone]

## 2024-10-08 ENCOUNTER — TRANSITIONAL CARE MANAGEMENT (OUTPATIENT)
Dept: FAMILY MEDICINE CLINIC | Facility: HOME HEALTHCARE | Age: 69
End: 2024-10-08

## 2024-10-09 NOTE — UTILIZATION REVIEW
NOTIFICATION OF ADMISSION DISCHARGE   This is a Notification of Discharge from [facility]. Please be advised that this patient has been discharge from our facility. Below you will find the admission and discharge date and time including the patient’s disposition.   UTILIZATION REVIEW CONTACT:  Braden Barrett  Utilization   Network Utilization Review Department  Phone: [phone] x carefully listen to the prompts. All voicemails are confidential.  Email: [email or web address]     ADMISSION INFORMATION  PRESENTATION DATE: 10/4/2024 10:46 PM  OBERVATION ADMISSION DATE: 10/05/2024 0424  INPATIENT ADMISSION DATE: 10/5/24  2:54 PM   DISCHARGE DATE: 10/6/2024  1:21 PM   DISPOSITION:Home/Self Care    Network Utilization Review Department  ATTENTION: Please call with any questions or concerns to [phone] and carefully listen to the prompts so that you are directed to the right person. All voicemails are confidential.   For Discharge needs, contact Care Management DC Support Team at [phone] opt. 2  Send all requests for admission clinical reviews, approved or denied determinations and any other requests to dedicated fax number below belonging to the campus where the patient is receiving treatment. List of dedicated fax numbers for the Facilities:  FACILITY NAME UR FAX NUMBER   ADMISSION DENIALS (Administrative/Medical Necessity) [phone]   DISCHARGE SUPPORT TEAM ([facility]) [phone]   PARENT CHILD HEALTH (Maternity/NICU/Pediatrics) [phone]   [facility] [phone]   [facility] [phone]   [facility] [phone]   [facility] [phone]   [facility] [phone]   [facility] [phone]   [facility] [phone]   [facility]  [phone]   [facility] [phone]   [facility] [phone]   [facility] [phone]   [facility] [phone]

## 2024-10-14 ENCOUNTER — OFFICE VISIT (OUTPATIENT)
Dept: FAMILY MEDICINE CLINIC | Facility: HOME HEALTHCARE | Age: 69
End: 2024-10-14
Payer: COMMERCIAL

## 2024-10-14 VITALS
SYSTOLIC BLOOD PRESSURE: 112 MMHG | HEART RATE: 81 BPM | BODY MASS INDEX: 36.05 KG/M2 | RESPIRATION RATE: 18 BRPM | TEMPERATURE: 99.4 F | WEIGHT: 184.6 LBS | DIASTOLIC BLOOD PRESSURE: 68 MMHG | OXYGEN SATURATION: 98 %

## 2024-10-14 DIAGNOSIS — Z13.820 ENCOUNTER FOR OSTEOPOROSIS SCREENING IN ASYMPTOMATIC POSTMENOPAUSAL PATIENT: ICD-10-CM

## 2024-10-14 DIAGNOSIS — Z78.0 ENCOUNTER FOR OSTEOPOROSIS SCREENING IN ASYMPTOMATIC POSTMENOPAUSAL PATIENT: ICD-10-CM

## 2024-10-14 DIAGNOSIS — Z78.9 TRANSITION OF CARE: Primary | ICD-10-CM

## 2024-10-14 DIAGNOSIS — Z12.31 ENCOUNTER FOR SCREENING MAMMOGRAM FOR MALIGNANT NEOPLASM OF BREAST: ICD-10-CM

## 2024-10-14 DIAGNOSIS — R74.01 TRANSAMINITIS: ICD-10-CM

## 2024-10-14 PROCEDURE — 99495 TRANSJ CARE MGMT MOD F2F 14D: CPT | Performed by: PHYSICIAN ASSISTANT

## 2024-10-14 NOTE — ASSESSMENT & PLAN NOTE
Will repeat CMP to ensure improvement/resolution.  Orders:    Comprehensive metabolic panel; Future

## 2024-10-14 NOTE — PROGRESS NOTES
Transition of Care Visit  Name: Lisa Rich      : 1955      MRN: [id number]  Encounter Provider: Gadiel Young PA-C  Encounter Date: 10/14/2024   Encounter department: [facility]    Assessment & Plan  Transition of care         Transaminitis  Will repeat CMP to ensure improvement/resolution.  Orders:    Comprehensive metabolic panel; Future    Encounter for osteoporosis screening in asymptomatic postmenopausal patient    Orders:    DXA bone density spine hip and pelvis; Future    Encounter for screening mammogram for malignant neoplasm of breast    Orders:    Mammo screening bilateral w 3d and cad; Future         History of Present Illness     Transitional Care Management Review:   Lisa Rich is a 69 y.o. female here for TCM follow up.     During the TCM phone call patient stated:  TCM Call       Date and time call was made  10/8/2024 12:41 PM    Hospital care reviewed  Discussed with Inpatient Physician    Patient was hospitialized at  [facility]    Date of Admission  10/04/24    Date of discharge  10/06/24    Diagnosis  gall stones    Disposition  Home    Current Symptoms  None          TCM Call       Should patient be enrolled in anticoag monitoring?  No    Scheduled for follow up?  No    Did you obtain your prescribed medications  Yes    Do you need help managing your prescriptions or medications  No    Is transportation to your appointment needed  Yes    Living Arrangements  Family members    Support System  Family    The type of support provided  None    Do you have social support  Yes, as much as I need    Are you recieving any outpatient services  No    Are you recieving home care services  No    Are you using any community resources  No    Current waiver services  No    Have you fallen in the last 12 months  No    Interperter language line needed  No          Lisa is a 69 year old female with history of HLD, hypothyroidism, type 2 DM, and  bipolar disorder, presenting for TCM.    Patient was hospitalized 10/4-10/6 with abdominal pain.  Patient had been diagnosed with COVID on 9/30 and was started on Paxlovid.  She presented to the ED on 10/4 with abdominal pain, transaminitis, and cholelithiasis without signs of cholecystitis.    Today patient reports feeling well.  She denies any abdominal pain since hospital discharge.  COVID symptoms have also resolved.     HTN is currently managed with lisinopril 5 mg daily.  /68 today.  Denies chest pain, palpitations, or LE edema.     HLD is managed with pravastatin 40 mg daily.  Last lipid panel from 4/2024 with total cholesterol 173, LDL 71.     Hypothyroidism managed with levothyroxine 88 mcg daily.  Last TSH from 7/2024 WNL.     Type 2 DM managed with Tradjenta 5 mg daily.  Last A1c 7/2024 was 5.8.  Eye exam UTD, done at [facility].    She is following with [facility] for bipolar disorder, managed with trazodone and lithium.      Review of Systems   Constitutional:  Negative for chills, diaphoresis and fever.   Respiratory:  Negative for cough, chest tightness, shortness of breath and wheezing.    Cardiovascular:  Negative for chest pain, palpitations and leg swelling.   Gastrointestinal:  Negative for abdominal pain, constipation, diarrhea, nausea and vomiting.   Skin:  Negative for rash and wound.   Neurological:  Negative for dizziness, syncope, weakness, light-headedness and headaches.     Objective     /68   Pulse 81   Temp 99.4 °F (37.4 °C)   Resp 18   Wt 83.7 kg (184 lb 9.6 oz)   SpO2 98%   BMI 36.05 kg/m²     Physical Exam  Vitals and nursing note reviewed.   Constitutional:       General: She is not in acute distress.     Appearance: Normal appearance. She is obese.      Comments: Ambulating with a cane   Eyes:      Extraocular Movements: Extraocular movements intact.      Conjunctiva/sclera: Conjunctivae normal.      Pupils: Pupils are equal, round, and reactive to light.    Cardiovascular:      Rate and Rhythm: Normal rate and regular rhythm.      Heart sounds: Normal heart sounds. No murmur heard.  Pulmonary:      Effort: Pulmonary effort is normal. No respiratory distress.      Breath sounds: Normal breath sounds. No wheezing.   Musculoskeletal:      Cervical back: Normal range of motion and neck supple.      Right lower leg: No edema.      Left lower leg: No edema.   Skin:     General: Skin is warm and dry.   Neurological:      General: No focal deficit present.      Mental Status: She is alert and oriented to person, place, and time.      Cranial Nerves: No cranial nerve deficit.      Motor: No weakness.   Psychiatric:         Mood and Affect: Mood normal.         Behavior: Behavior normal.       Medications have been reviewed by provider in current encounter

## 2024-10-15 ENCOUNTER — OFFICE VISIT (OUTPATIENT)
Dept: PODIATRY | Facility: CLINIC | Age: 69
End: 2024-10-15
Payer: COMMERCIAL

## 2024-10-15 VITALS
SYSTOLIC BLOOD PRESSURE: 120 MMHG | HEIGHT: 60 IN | BODY MASS INDEX: 36.12 KG/M2 | DIASTOLIC BLOOD PRESSURE: 80 MMHG | WEIGHT: 184 LBS | HEART RATE: 65 BPM

## 2024-10-15 DIAGNOSIS — B35.1 ONYCHOMYCOSIS: Primary | ICD-10-CM

## 2024-10-15 DIAGNOSIS — I87.2 VENOUS INSUFFICIENCY OF BOTH LOWER EXTREMITIES: ICD-10-CM

## 2024-10-15 DIAGNOSIS — E11.49 CONTROLLED TYPE 2 DIABETES MELLITUS WITH OTHER NEUROLOGIC COMPLICATION, WITHOUT LONG-TERM CURRENT USE OF INSULIN (HCC): ICD-10-CM

## 2024-10-15 PROCEDURE — RECHECK: Performed by: PODIATRIST

## 2024-10-15 PROCEDURE — 11721 DEBRIDE NAIL 6 OR MORE: CPT | Performed by: PODIATRIST

## 2024-10-15 NOTE — PROGRESS NOTES
Ambulatory Visit  Name: Lisa Rich      : 1955      MRN: [id number]  Encounter Provider: Shan Art DPM  Encounter Date: 10/15/2024   Encounter department: [facility] [place]    Assessment & Plan  Onychomycosis       Debride mycotic nails and thin the nail plates x 10 with the use of a nail nipper manually and an electric Dremel bur was used to reduce the thickness of the nail beds and smoothed the distal aspect of the nails.   Venous insufficiency of both lower extremities         Controlled type 2 diabetes mellitus with other neurologic complication, without long-term current use of insulin ([facility])    Lab Results   Component Value Date    HGBA1C 6.0 (H) 10/05/2024            Pt was instructed to use lotion once a day on both feet such as cerave, Cetaphil or similar thick style of lotion.     Return in about 10 weeks (around 2024).     History of Present Illness     Lisa Rich is a 69 y.o. female who presents chief complaint of painful elongated nails on both feet and scaling skin present also on both feet.  She is a diabetic.    History obtained from : patient  Review of Systems  Medical History Reviewed by provider this encounter:       Current Outpatient Medications on File Prior to Visit   Medication Sig Dispense Refill    aspirin (ECOTRIN LOW STRENGTH) 81 mg EC tablet Take 81 mg by mouth daily      Cholecalciferol (Vitamin D) 50 MCG (2000 UT) tablet Take 1 tablet (2,000 Units total) by mouth daily 90 tablet 3    glucose blood (Contour Next Test) test strip Use 1 each 2 (two) times a day Use as instructed 100 strip 5    glucose blood test strip Use 1 each as needed Use as instructed      levothyroxine 88 mcg tablet Take 1 tablet (88 mcg total) by mouth daily 90 tablet 1    linaGLIPtin (Tradjenta) 5 MG TABS Take 5 mg by mouth daily 90 tablet 3    lisinopril (ZESTRIL) 5 mg tablet Take 1 tablet (5 mg total) by mouth daily 90 tablet 0    lithium carbonate (LITHOBID) 300  mg CR tablet       pantoprazole (PROTONIX) 40 mg tablet Take 1 tablet (40 mg total) by mouth daily 30 tablet 0    pravastatin (PRAVACHOL) 40 mg tablet Take 1 tablet (40 mg total) by mouth daily 90 tablet 3    traZODone (DESYREL) 100 mg tablet Take 200 mg by mouth daily at bedtime      clobetasol (TEMOVATE) 0.05 % external solution Apply to dry scalp daily for up to 4 weeks.  Leave shampoo on the scalp x 15 minutes, then rinse. (Patient not taking: Reported on 10/14/2024) 50 mL 2    [DISCONTINUED] hydrOXYzine pamoate (VISTARIL) 50 mg capsule Take 1 capsule (50 mg total) by mouth daily at bedtime as needed (sleep) (Patient not taking: Reported on 3/23/2022 ) 30 capsule 0     No current facility-administered medications on file prior to visit.      Social History     Tobacco Use    Smoking status: Never     Passive exposure: Never    Smokeless tobacco: Never   Vaping Use    Vaping status: Never Used   Substance and Sexual Activity    Alcohol use: Not Currently    Drug use: No    Sexual activity: Not Currently         Objective     /80   Pulse 65   Ht 5' (1.524 m)   Wt 83.5 kg (184 lb)   BMI 35.94 kg/m²     Physical Exam  Vascular status is a faint 1/4 DP 0/4 PT negative digital hair normal distal cooling slightly delayed capillary refill with edema present bilaterally.  The capillary refill is approximately 2 to 3 seconds and the edema is +1 pitting    Derm nails are brittle elongated hypertrophic yellow-brown discoloration with subungual debris x 10.  There is an increased thickness and the nails are approximately 2 to 4 mm with excessive length of approximately 1 cm.  There is white flaky tissue present on the dorsal and plantar aspects of both feet going up into the lower extremity no signs of any dried blood present within the tissue and no fissures are present.  There is dependent rubor noted bilaterally on both feet.  Skin was thin in appearance and there is loss of turgor.    Ortho mild hammertoe  deformities are present on the fifth digits bilaterally which are a slight adductovarus position.    Neuro light touch is in place but the 0.5 monofilament was decreased.  There was lack of sensation in the plantar aspect of the hallux, plantar aspect of the third and fourth mets, decreased sensation submet 3 the patient had slight sensation it on the plantar aspect of the arch.    Administrative Statements   I have spent a total time of 16 minutes in caring for this patient on the day of the visit/encounter including Risks and benefits of tx options, Instructions for management, Patient and family education, Importance of tx compliance, Counseling / Coordination of care, Documenting in the medical record, Reviewing / ordering tests, medicine, procedures  , and Obtaining or reviewing history  .

## 2024-10-24 ENCOUNTER — HOSPITAL ENCOUNTER (OUTPATIENT)
Dept: BONE DENSITY | Facility: HOSPITAL | Age: 69
End: 2024-10-24
Payer: COMMERCIAL

## 2024-10-24 ENCOUNTER — HOSPITAL ENCOUNTER (OUTPATIENT)
Dept: MAMMOGRAPHY | Facility: HOSPITAL | Age: 69
End: 2024-10-24
Payer: COMMERCIAL

## 2024-10-24 VITALS — BODY MASS INDEX: 36.12 KG/M2 | WEIGHT: 184 LBS | HEIGHT: 60 IN

## 2024-10-24 DIAGNOSIS — Z12.31 ENCOUNTER FOR SCREENING MAMMOGRAM FOR MALIGNANT NEOPLASM OF BREAST: ICD-10-CM

## 2024-10-24 DIAGNOSIS — Z13.820 ENCOUNTER FOR OSTEOPOROSIS SCREENING IN ASYMPTOMATIC POSTMENOPAUSAL PATIENT: ICD-10-CM

## 2024-10-24 DIAGNOSIS — Z78.0 ENCOUNTER FOR OSTEOPOROSIS SCREENING IN ASYMPTOMATIC POSTMENOPAUSAL PATIENT: ICD-10-CM

## 2024-10-24 PROCEDURE — 77080 DXA BONE DENSITY AXIAL: CPT

## 2024-10-24 PROCEDURE — 77063 BREAST TOMOSYNTHESIS BI: CPT

## 2024-10-24 PROCEDURE — 77067 SCR MAMMO BI INCL CAD: CPT

## 2024-11-04 ENCOUNTER — LAB (OUTPATIENT)
Age: 69
End: 2024-11-04
Payer: COMMERCIAL

## 2024-11-04 DIAGNOSIS — R74.01 TRANSAMINITIS: ICD-10-CM

## 2024-11-04 LAB
ALBUMIN SERPL BCG-MCNC: 4 G/DL (ref 3.5–5)
ALP SERPL-CCNC: 66 U/L (ref 34–104)
ALT SERPL W P-5'-P-CCNC: 13 U/L (ref 7–52)
ANION GAP SERPL CALCULATED.3IONS-SCNC: 7 MMOL/L (ref 4–13)
AST SERPL W P-5'-P-CCNC: 21 U/L (ref 13–39)
BILIRUB SERPL-MCNC: 0.76 MG/DL (ref 0.2–1)
BUN SERPL-MCNC: 14 MG/DL (ref 5–25)
CALCIUM SERPL-MCNC: 9.8 MG/DL (ref 8.4–10.2)
CHLORIDE SERPL-SCNC: 105 MMOL/L (ref 96–108)
CO2 SERPL-SCNC: 28 MMOL/L (ref 21–32)
CREAT SERPL-MCNC: 0.97 MG/DL (ref 0.6–1.3)
GFR SERPL CREATININE-BSD FRML MDRD: 60 ML/MIN/1.73SQ M
GLUCOSE P FAST SERPL-MCNC: 103 MG/DL (ref 65–99)
POTASSIUM SERPL-SCNC: 4.4 MMOL/L (ref 3.5–5.3)
PROT SERPL-MCNC: 6.6 G/DL (ref 6.4–8.4)
SODIUM SERPL-SCNC: 140 MMOL/L (ref 135–147)

## 2024-11-04 PROCEDURE — 80053 COMPREHEN METABOLIC PANEL: CPT

## 2024-11-04 PROCEDURE — 36415 COLL VENOUS BLD VENIPUNCTURE: CPT

## 2024-11-08 ENCOUNTER — TELEPHONE (OUTPATIENT)
Age: 69
End: 2024-11-08

## 2024-11-08 ENCOUNTER — OFFICE VISIT (OUTPATIENT)
Dept: URGENT CARE | Facility: CLINIC | Age: 69
End: 2024-11-08
Payer: COMMERCIAL

## 2024-11-08 ENCOUNTER — APPOINTMENT (OUTPATIENT)
Dept: RADIOLOGY | Facility: CLINIC | Age: 69
End: 2024-11-08
Payer: COMMERCIAL

## 2024-11-08 VITALS
HEART RATE: 80 BPM | SYSTOLIC BLOOD PRESSURE: 120 MMHG | OXYGEN SATURATION: 98 % | TEMPERATURE: 98 F | RESPIRATION RATE: 18 BRPM | DIASTOLIC BLOOD PRESSURE: 76 MMHG

## 2024-11-08 DIAGNOSIS — S80.02XA CONTUSION OF LEFT KNEE, INITIAL ENCOUNTER: Primary | ICD-10-CM

## 2024-11-08 DIAGNOSIS — M17.12 OSTEOARTHRITIS OF LEFT KNEE, UNSPECIFIED OSTEOARTHRITIS TYPE: ICD-10-CM

## 2024-11-08 DIAGNOSIS — S80.212A KNEE ABRASION, LEFT, INITIAL ENCOUNTER: ICD-10-CM

## 2024-11-08 DIAGNOSIS — M25.562 ACUTE PAIN OF LEFT KNEE: ICD-10-CM

## 2024-11-08 PROCEDURE — 73564 X-RAY EXAM KNEE 4 OR MORE: CPT

## 2024-11-08 PROCEDURE — 99214 OFFICE O/P EST MOD 30 MIN: CPT | Performed by: NURSE PRACTITIONER

## 2024-11-08 PROCEDURE — S9088 SERVICES PROVIDED IN URGENT: HCPCS | Performed by: NURSE PRACTITIONER

## 2024-11-08 NOTE — TELEPHONE ENCOUNTER
Attempted to call pt back to let her know that Dr Wilson agreed with the plan, phone rang and rang but no answer then disconnected. Will attempt to call again later

## 2024-11-08 NOTE — PROGRESS NOTES
[facility]        NAME: Lisa Rich is a 69 y.o. female  : 1955    MRN: [id number]  DATE: 2024  TIME: 3:06 PM    Assessment and Plan   Contusion of left knee, initial encounter [S80.02XA]  1. Contusion of left knee, initial encounter  Diclofenac Sodium (VOLTAREN) 1 %      2. Acute pain of left knee  XR knee 4+ vw left injury    Diclofenac Sodium (VOLTAREN) 1 %    acute on chronic      3. Osteoarthritis of left knee, unspecified osteoarthritis type  XR knee 4+ vw left injury    Diclofenac Sodium (VOLTAREN) 1 %      4. Knee abrasion, left, initial encounter  XR knee 4+ vw left injury    Diclofenac Sodium (VOLTAREN) 1 %            Patient Instructions       Follow up with PCP in 3-5 days.  Proceed to  ER if symptoms worsen.    If tests have been performed at [facility], our office will contact you with results if changes need to be made to the care plan discussed with you at the visit.  [facility].    Your xray was preliminarily read by your provider.  A radiologist will read the xray and you will be notified if it is abnormal.    You have been prescribed voltaren gel to apply to the knee  Take tylenol for pain     apply lidoderm patches to the knee for pain  See Dr. Wilson on Monday as schedule      Chief Complaint     Chief Complaint   Patient presents with    Leg Pain     Called Dr. Wilson's office due to left leg pain that starts in the hip and radiates to the knee. Dr Wilson's office did not have any openings and she will be seen on Monday. Told to come get checked due to pain. Took tylenol for pain.          History of Present Illness       This is a 69 year old female who has hx of osteoarthritis left knee and states that she fell a few weeks ago and now has acute on chronic left knee pain.  She states she called Dr. Dubois today and was told to take tylenol and go to UC or ED if worsens.   She states she took tylenol a little while ago with  no relief.  She is unable to take NSAIDS due to her renal function.   She states that Dr. Wilson gives her injections for pain - she does have appt with him for this coming Monday.  PMH is listed and reviewed.   She has a healing abrasion on the left knee.         Review of Systems   Review of Systems   Constitutional: Negative.    HENT: Negative.     Eyes: Negative.    Respiratory: Negative.     Cardiovascular: Negative.    Gastrointestinal: Negative.    Endocrine: Negative.    Genitourinary: Negative.    Musculoskeletal:         Left knee pain     Skin:  Positive for wound.   Allergic/Immunologic: Negative.    Neurological: Negative.    Hematological: Negative.    Psychiatric/Behavioral: Negative.           Current Medications       Current Outpatient Medications:     aspirin (ECOTRIN LOW STRENGTH) 81 mg EC tablet, Take 81 mg by mouth daily, Disp: , Rfl:     Cholecalciferol (Vitamin D) 50 MCG (2000 UT) tablet, Take 1 tablet (2,000 Units total) by mouth daily, Disp: 90 tablet, Rfl: 3    Diclofenac Sodium (VOLTAREN) 1 %, Apply 2 g topically 4 (four) times a day, Disp: 100 g, Rfl: 0    levothyroxine 88 mcg tablet, Take 1 tablet (88 mcg total) by mouth daily, Disp: 90 tablet, Rfl: 1    linaGLIPtin (Tradjenta) 5 MG TABS, Take 5 mg by mouth daily, Disp: 90 tablet, Rfl: 3    lisinopril (ZESTRIL) 5 mg tablet, Take 1 tablet (5 mg total) by mouth daily, Disp: 90 tablet, Rfl: 0    lithium carbonate (LITHOBID) 300 mg CR tablet, , Disp: , Rfl:     pravastatin (PRAVACHOL) 40 mg tablet, Take 1 tablet (40 mg total) by mouth daily, Disp: 90 tablet, Rfl: 3    traZODone (DESYREL) 100 mg tablet, Take 200 mg by mouth daily at bedtime, Disp: , Rfl:     clobetasol (TEMOVATE) 0.05 % external solution, Apply to dry scalp daily for up to 4 weeks.  Leave shampoo on the scalp x 15 minutes, then rinse. (Patient not taking: Reported on 10/14/2024), Disp: 50 mL, Rfl: 2    glucose blood (Contour Next Test) test strip, Use 1 each 2 (two) times a  day Use as instructed, Disp: 100 strip, Rfl: 5    glucose blood test strip, Use 1 each as needed Use as instructed, Disp: , Rfl:     pantoprazole (PROTONIX) 40 mg tablet, Take 1 tablet (40 mg total) by mouth daily, Disp: 30 tablet, Rfl: 0    Current Allergies     Allergies as of 11/08/2024    (No Known Allergies)            The following portions of the patient's history were reviewed and updated as appropriate: allergies, current medications, past family history, past medical history, past social history, past surgical history and problem list.     Past Medical History:   Diagnosis Date    Allergic     Depression     Diabetes mellitus (HCC)     Disease of thyroid gland     GERD (gastroesophageal reflux disease)     Hyperchloremia     Hypertension     Psychiatric disorder        Past Surgical History:   Procedure Laterality Date    ANKLE SURGERY      Incision    DILATION AND CURETTAGE OF UTERUS         Family History   Problem Relation Age of Onset    No Known Problems Family     Diabetes Mother     Heart disease Mother     Stroke Mother     Diabetes Father     Stroke Father     No Known Problems Maternal Grandmother     No Known Problems Maternal Grandfather     No Known Problems Paternal Grandmother     No Known Problems Paternal Grandfather     Diabetes Brother     No Known Problems Paternal Aunt          Medications have been verified.        Objective   /76   Pulse 80   Temp 98 °F (36.7 °C) (Temporal)   Resp 18   SpO2 98%   No LMP recorded. Patient is postmenopausal.       Physical Exam     Physical Exam  Vitals and nursing note reviewed.   Constitutional:       General: She is not in acute distress.     Appearance: Normal appearance. She is obese. She is not ill-appearing, toxic-appearing or diaphoretic.   HENT:      Head: Normocephalic and atraumatic.   Eyes:      Extraocular Movements: Extraocular movements intact.   Cardiovascular:      Rate and Rhythm: Normal rate.      Pulses: Normal pulses.    Pulmonary:      Effort: Pulmonary effort is normal.   Musculoskeletal:         General: Signs of injury present. No swelling, tenderness or deformity. Normal range of motion.      Cervical back: Normal range of motion.      Comments: Left knee ROM for pt   No laxity, TTP, redness NVI    Skin:     General: Skin is warm and dry.      Capillary Refill: Capillary refill takes less than 2 seconds.      Comments: Healing scabbed abrasion to left knee    Neurological:      General: No focal deficit present.      Mental Status: She is alert and oriented to person, place, and time.   Psychiatric:         Mood and Affect: Mood normal.         Behavior: Behavior normal.         Thought Content: Thought content normal.         Judgment: Judgment normal.           PDMP portal reviewed as per [place] and [place] [employer] policy   No red flags     Preliminary reading left knee  No acute process/changes  + osteoarthritis   Waiting on rad read

## 2024-11-08 NOTE — PATIENT INSTRUCTIONS
Your xray was preliminarily read by your provider.  A radiologist will read the xray and you will be notified if it is abnormal.    You have been prescribed voltaren gel to apply to the knee  Take tylenol for pain     apply lidoderm patches to the knee for pain  See Dr. Wilson on Monday as scheduled

## 2024-11-08 NOTE — TELEPHONE ENCOUNTER
Called and spoke with pt, she states she has pain in her left leg not just in the knee but the whole leg. The pain started a few days ago and she states she can hardly walk because the pain is so severe, she can bear partial weight on it. She has no swelling, no redness and no warmth. She has had no recent falls or injury. The pain is 7/10 aching pain that sometimes feels like shooting pain, pt not sure if it is originating from the knee. She did not take anything for it when I spoke to her, advised to try Tylenol and see if that helps relieve the pain and we will see her Monday, if pain becomes too severe advised urgent care or ER over the weekend. Please review, thanks!

## 2024-11-08 NOTE — TELEPHONE ENCOUNTER
Caller: patient    Doctor: Steve    Reason for call: pt called stating she is having a hard time applying weight to her left leg. She stated it's her whole leg. She is not taking anything for the pain. She has appt  this coming Monday 11/11    Call back#: [phone]

## 2024-11-08 NOTE — TELEPHONE ENCOUNTER
Called pt back and  answered said he had just dropped pt off at urgent care because the pain was so bad.

## 2024-11-10 NOTE — RESULT ENCOUNTER NOTE
XR KNEE 4+ VW LEFT INJURY   INDICATION: M25.562: Pain in left knee  M17.12: Unilateral primary osteoarthritis, left knee  S80.212A: Abrasion, left knee, initial encounter.   COMPARISON: Compared with 3/7/2024   FINDINGS:   No acute fracture or dislocation.   No joint effusion.   Moderate tricompartmental osteoarthritis, evidenced by articular cartilage loss, marginal osteophytes, and subchondral sclerosis.   No lytic or blastic osseous lesion.   Unremarkable soft tissues.   IMPRESSION:   No acute osseous abnormality.     Preliminary reading discussed with pt at  and were similar  Has appt with Dr. Wilson on 11/11/2024

## 2024-11-11 ENCOUNTER — OFFICE VISIT (OUTPATIENT)
Dept: OBGYN CLINIC | Facility: CLINIC | Age: 69
End: 2024-11-11
Payer: COMMERCIAL

## 2024-11-11 VITALS
SYSTOLIC BLOOD PRESSURE: 145 MMHG | BODY MASS INDEX: 36.12 KG/M2 | HEIGHT: 60 IN | DIASTOLIC BLOOD PRESSURE: 78 MMHG | WEIGHT: 184 LBS | HEART RATE: 96 BPM

## 2024-11-11 DIAGNOSIS — M17.12 PRIMARY OSTEOARTHRITIS OF LEFT KNEE: Primary | ICD-10-CM

## 2024-11-11 PROCEDURE — 20610 DRAIN/INJ JOINT/BURSA W/O US: CPT | Performed by: ORTHOPAEDIC SURGERY

## 2024-11-11 PROCEDURE — 99213 OFFICE O/P EST LOW 20 MIN: CPT | Performed by: ORTHOPAEDIC SURGERY

## 2024-11-11 RX ORDER — TRIAMCINOLONE ACETONIDE 40 MG/ML
80 INJECTION, SUSPENSION INTRA-ARTICULAR; INTRAMUSCULAR
Status: COMPLETED | OUTPATIENT
Start: 2024-11-11 | End: 2024-11-11

## 2024-11-11 RX ORDER — BUPIVACAINE HYDROCHLORIDE 2.5 MG/ML
4 INJECTION, SOLUTION INFILTRATION; PERINEURAL
Status: COMPLETED | OUTPATIENT
Start: 2024-11-11 | End: 2024-11-11

## 2024-11-11 RX ADMIN — TRIAMCINOLONE ACETONIDE 80 MG: 40 INJECTION, SUSPENSION INTRA-ARTICULAR; INTRAMUSCULAR at 14:00

## 2024-11-11 RX ADMIN — BUPIVACAINE HYDROCHLORIDE 4 ML: 2.5 INJECTION, SOLUTION INFILTRATION; PERINEURAL at 14:00

## 2024-11-11 NOTE — PROGRESS NOTES
Assessment/Plan:   Diagnoses and all orders for this visit:    Primary osteoarthritis of left knee  -     Large joint arthrocentesis       The patient has a history of left knee osteoarthritis. She recently experienced a fall which resulted in a contusion of the left knee as well. She was offered and accepted an injection(s) of kenalog and marcaine to her Left knee(s) for symptomatic relief of pain and inflammation. Patient tolerated the treatment(s) well. Ice and post injection protocol advised. Weightbearing activities as tolerated. She will be seen for follow-up in 3 months for re-evaluation and consideration for repeat injections as necessary. Patient expresses understanding and is in agreement with this treatment plan. The patient was given the opportunity to ask questions or present concerns.    The patient has degenerative joint disease of her left knee.  Under aseptic technique, the left knee was injected with Kenalog and Marcaine.  She tolerated the procedures quite well.  Return back in 3 months for reevaluation      Subjective:   Patient ID: Lisa Rich  1955     HPI  Patient is a 69 y.o. female who presents for follow-up evaluation of her Left knee(s). The patient experienced a fall on 11/8/2024 which resulted in increased knee pain. The pain has improved since the fall. On today's presentation, she reports some continued pain along the medial aspect of her knee(s). She states that the pain is exacerbated by weight bearing activities, knee flexion, and ambulating stairs. She denies feelings of instability or weakness. She denies numbness or tingling.        The following portions of the patient's history were reviewed and updated as appropriate:  Past medical history, past surgical history, Family history, social history, current medications and allergies    Past Medical History:   Diagnosis Date    Allergic     Depression     Diabetes mellitus (HCC)     Disease of thyroid gland     GERD  (gastroesophageal reflux disease)     Hyperchloremia     Hypertension     Psychiatric disorder        Past Surgical History:   Procedure Laterality Date    ANKLE SURGERY      Incision    DILATION AND CURETTAGE OF UTERUS         Family History   Problem Relation Age of Onset    No Known Problems Family     Diabetes Mother     Heart disease Mother     Stroke Mother     Diabetes Father     Stroke Father     No Known Problems Maternal Grandmother     No Known Problems Maternal Grandfather     No Known Problems Paternal Grandmother     No Known Problems Paternal Grandfather     Diabetes Brother     No Known Problems Paternal Aunt        Social History     Socioeconomic History    Marital status: Single     Spouse name: None    Number of children: None    Years of education: None    Highest education level: None   Occupational History    None   Tobacco Use    Smoking status: Never     Passive exposure: Never    Smokeless tobacco: Never   Vaping Use    Vaping status: Never Used   Substance and Sexual Activity    Alcohol use: Not Currently    Drug use: No    Sexual activity: Not Currently   Other Topics Concern    None   Social History Narrative    None     Social Determinants of Health     Financial Resource Strain: Low Risk  (4/29/2024)    [facility]    Overall Financial Resource Strain (CARDIA)     Difficulty of Paying Living Expenses: Not very hard   Food Insecurity: No Food Insecurity (10/5/2024)    Nursing - Inadequate Food Risk Classification     Worried About Running Out of Food in the Last Year: Never true     Ran Out of Food in the Last Year: Never true     Ran Out of Food in the Last Year: Not on file   Transportation Needs: No Transportation Needs (10/5/2024)    PRAPARE - Transportation     Lack of Transportation (Medical): No     Lack of Transportation (Non-Medical): No   Physical Activity: Not on file   Stress: Not on file   Social Connections:  Unknown (11/1/2024)    Received from [facility]     How often do you feel lonely or isolated from those around you? (Adult - for ages 18 years and over): Not on file   Intimate Partner Violence: Not At Risk (4/29/2024)    [facility]    Humiliation, Afraid, Rape, and Kick questionnaire     Fear of Current or Ex-Partner: No     Emotionally Abused: No     Physically Abused: No     Sexually Abused: No   Housing Stability: Low Risk  (10/5/2024)    Housing Stability Vital Sign     Unable to Pay for Housing in the Last Year: No     Number of Times Moved in the Last Year: 0     Homeless in the Last Year: No         Current Outpatient Medications:     aspirin (ECOTRIN LOW STRENGTH) 81 mg EC tablet, Take 81 mg by mouth daily, Disp: , Rfl:     Cholecalciferol (Vitamin D) 50 MCG (2000 UT) tablet, Take 1 tablet (2,000 Units total) by mouth daily, Disp: 90 tablet, Rfl: 3    Diclofenac Sodium (VOLTAREN) 1 %, Apply 2 g topically 4 (four) times a day, Disp: 100 g, Rfl: 0    glucose blood (Contour Next Test) test strip, Use 1 each 2 (two) times a day Use as instructed, Disp: 100 strip, Rfl: 5    glucose blood test strip, Use 1 each as needed Use as instructed, Disp: , Rfl:     levothyroxine 88 mcg tablet, Take 1 tablet (88 mcg total) by mouth daily, Disp: 90 tablet, Rfl: 1    linaGLIPtin (Tradjenta) 5 MG TABS, Take 5 mg by mouth daily, Disp: 90 tablet, Rfl: 3    lisinopril (ZESTRIL) 5 mg tablet, Take 1 tablet (5 mg total) by mouth daily, Disp: 90 tablet, Rfl: 0    lithium carbonate (LITHOBID) 300 mg CR tablet, , Disp: , Rfl:     pravastatin (PRAVACHOL) 40 mg tablet, Take 1 tablet (40 mg total) by mouth daily, Disp: 90 tablet, Rfl: 3    traZODone (DESYREL) 100 mg tablet, Take 200 mg by mouth daily at bedtime, Disp: , Rfl:     clobetasol (TEMOVATE) 0.05 % external solution, Apply to dry scalp daily for up to 4 weeks.  Leave shampoo on the scalp x 15  minutes, then rinse. (Patient not taking: Reported on 10/14/2024), Disp: 50 mL, Rfl: 2    pantoprazole (PROTONIX) 40 mg tablet, Take 1 tablet (40 mg total) by mouth daily, Disp: 30 tablet, Rfl: 0    No Known Allergies    Review of Systems   Constitutional:  Negative for chills, fever and unexpected weight change.   HENT:  Negative for hearing loss, nosebleeds and sore throat.    Eyes:  Negative for pain, redness and visual disturbance.   Respiratory:  Negative for cough, shortness of breath and wheezing.    Cardiovascular:  Negative for chest pain, palpitations and leg swelling.   Gastrointestinal:  Negative for abdominal pain, nausea and vomiting.   Endocrine: Negative for polydipsia and polyuria.   Genitourinary:  Negative for dysuria and hematuria.   Skin:  Negative for rash and wound.   Neurological:  Negative for dizziness, numbness and headaches.   Psychiatric/Behavioral:  Negative for decreased concentration and suicidal ideas. The patient is not nervous/anxious.    All other systems reviewed and are negative.       Objective:  /78 (BP Location: Left arm, Patient Position: Sitting, Cuff Size: Large)   Pulse 96   Ht 5' (1.524 m)   Wt 83.5 kg (184 lb)   BMI 35.94 kg/m²     Ortho Exam  left knee(s) -   Patient ambulates with steady gait pattern  Uses walker assistive device  Genu Varus anatomical deformity  Abrasion present on anterior knee.    Mild generalized soft tissue swelling or effusion noted  ROM (5° - 115°)   Strength: 5/5 throughout  TTP over medial joint line, TTP over lateral joint line, TTP over pes anserine bursa, no popliteal fullness appreciated on exam   Flexor and extensor mechanisms are intact   Knee is stable to varus and valgus stress  - Lachman's  - Anterior Drawer, - Posterior Drawer  - Braulio's  Patella tracks centrally with palpable crepitus  Calf compartments are soft and supple  - Lidia's sign  2+ DP and PT pulses with brisk capillary refill to the toes  Sural, saphenous,  tibial, superficial, and deep peroneal motor and sensory distributions intact  Sensation light touch intact distally      Physical Exam  HENT:      Head: Normocephalic and atraumatic.      Nose: Nose normal.   Eyes:      Conjunctiva/sclera: Conjunctivae normal.   Cardiovascular:      Rate and Rhythm: Normal rate.   Pulmonary:      Effort: Pulmonary effort is normal.   Musculoskeletal:      Cervical back: Neck supple.   Skin:     General: Skin is warm and dry.      Capillary Refill: Capillary refill takes less than 2 seconds.   Neurological:      Mental Status: She is alert and oriented to person, place, and time.   Psychiatric:         Mood and Affect: Mood normal.         Behavior: Behavior normal.          Diagnostic Test Review:  X-Ray of left knee obtained on 11/8/2024 were reviewed and demonstrate no acute osseous abnormalities or fractures. Moderate osteoarthritis.       Large joint arthrocentesis: L knee  Universal Protocol:  procedure performed by consultantConsent: Verbal consent obtained.  Risks and benefits: risks, benefits and alternatives were discussed  Consent given by: patient  Timeout called at: 11/11/2024 2:09 PM.  Patient understanding: patient states understanding of the procedure being performed  Patient consent: the patient's understanding of the procedure matches consent given  Site marked: the operative site was marked  Radiology Images displayed and confirmed. If images not available, report reviewed: imaging studies available  Patient identity confirmed: verbally with patient  Supporting Documentation  Indications: pain and joint swelling   Procedure Details  Location: knee - L knee  Needle gauge: 21 G.  Ultrasound guidance: no  Approach: anterolateral  Medications administered: 4 mL bupivacaine 0.25 %; 80 mg triamcinolone acetonide 40 mg/mL    Patient tolerance: patient tolerated the procedure well with no immediate complications  Dressing:  Sterile dressing applied             Scribe  Attestation      I,:  Yeni Orozco am acting as a scribe while in the presence of the attending physician.:       I,:  Barrett Wilson, DO personally performed the services described in this documentation    as scribed in my presence.:               wage earner, full time/none

## 2024-11-15 ENCOUNTER — HOSPITAL ENCOUNTER (OUTPATIENT)
Dept: ULTRASOUND IMAGING | Facility: HOSPITAL | Age: 69
End: 2024-11-15
Payer: COMMERCIAL

## 2024-11-15 ENCOUNTER — RESULTS FOLLOW-UP (OUTPATIENT)
Dept: FAMILY MEDICINE CLINIC | Facility: HOME HEALTHCARE | Age: 69
End: 2024-11-15

## 2024-11-15 ENCOUNTER — HOSPITAL ENCOUNTER (OUTPATIENT)
Dept: MAMMOGRAPHY | Facility: HOSPITAL | Age: 69
End: 2024-11-15
Payer: COMMERCIAL

## 2024-11-15 DIAGNOSIS — I10 ESSENTIAL HYPERTENSION: ICD-10-CM

## 2024-11-15 DIAGNOSIS — K21.9 GASTROESOPHAGEAL REFLUX DISEASE, UNSPECIFIED WHETHER ESOPHAGITIS PRESENT: ICD-10-CM

## 2024-11-15 DIAGNOSIS — N18.31 TYPE 2 DIABETES MELLITUS WITH STAGE 3A CHRONIC KIDNEY DISEASE, WITHOUT LONG-TERM CURRENT USE OF INSULIN (HCC): ICD-10-CM

## 2024-11-15 DIAGNOSIS — R92.8 ABNORMAL MAMMOGRAM: ICD-10-CM

## 2024-11-15 DIAGNOSIS — E11.22 TYPE 2 DIABETES MELLITUS WITH STAGE 3A CHRONIC KIDNEY DISEASE, WITHOUT LONG-TERM CURRENT USE OF INSULIN (HCC): ICD-10-CM

## 2024-11-15 PROCEDURE — 77065 DX MAMMO INCL CAD UNI: CPT

## 2024-11-15 PROCEDURE — G0279 TOMOSYNTHESIS, MAMMO: HCPCS

## 2024-11-15 PROCEDURE — 76642 ULTRASOUND BREAST LIMITED: CPT

## 2024-11-22 RX ORDER — LISINOPRIL 5 MG/1
5 TABLET ORAL DAILY
Qty: 90 TABLET | Refills: 1 | Status: SHIPPED | OUTPATIENT
Start: 2024-11-22 | End: 2024-12-26 | Stop reason: SDUPTHER

## 2024-11-22 RX ORDER — PANTOPRAZOLE SODIUM 40 MG/1
40 TABLET, DELAYED RELEASE ORAL DAILY
Qty: 90 TABLET | Refills: 1 | Status: SHIPPED | OUTPATIENT
Start: 2024-11-22

## 2024-11-22 NOTE — TELEPHONE ENCOUNTER
Called patient Lisa   and Verified by name and date of birth. Went over results patient understands.    Patient is requesting refills on medication sent them to provider for review   ----- Message from Gadiel Young PA-C sent at 11/15/2024  2:56 PM EST -----  Breast US came back normal.

## 2024-12-26 DIAGNOSIS — N18.31 TYPE 2 DIABETES MELLITUS WITH STAGE 3A CHRONIC KIDNEY DISEASE, WITHOUT LONG-TERM CURRENT USE OF INSULIN (HCC): ICD-10-CM

## 2024-12-26 DIAGNOSIS — I10 ESSENTIAL HYPERTENSION: ICD-10-CM

## 2024-12-26 DIAGNOSIS — E11.22 TYPE 2 DIABETES MELLITUS WITH STAGE 3A CHRONIC KIDNEY DISEASE, WITHOUT LONG-TERM CURRENT USE OF INSULIN (HCC): ICD-10-CM

## 2024-12-26 RX ORDER — LISINOPRIL 5 MG/1
5 TABLET ORAL DAILY
Qty: 90 TABLET | Refills: 1 | Status: SHIPPED | OUTPATIENT
Start: 2024-12-26

## 2025-01-08 ENCOUNTER — APPOINTMENT (OUTPATIENT)
Age: 70
End: 2025-01-08
Payer: COMMERCIAL

## 2025-01-08 DIAGNOSIS — F31.32 MODERATE DEPRESSED BIPOLAR I DISORDER (HCC): ICD-10-CM

## 2025-01-08 DIAGNOSIS — Z79.899 NEED FOR PROPHYLACTIC CHEMOTHERAPY: ICD-10-CM

## 2025-01-08 LAB
ALBUMIN SERPL BCG-MCNC: 4 G/DL (ref 3.5–5)
ALP SERPL-CCNC: 75 U/L (ref 34–104)
ALT SERPL W P-5'-P-CCNC: 17 U/L (ref 7–52)
ANION GAP SERPL CALCULATED.3IONS-SCNC: 7 MMOL/L (ref 4–13)
AST SERPL W P-5'-P-CCNC: 14 U/L (ref 13–39)
BASOPHILS # BLD AUTO: 0.08 THOUSANDS/ΜL (ref 0–0.1)
BASOPHILS NFR BLD AUTO: 1 % (ref 0–1)
BILIRUB SERPL-MCNC: 1.05 MG/DL (ref 0.2–1)
BUN SERPL-MCNC: 19 MG/DL (ref 5–25)
CALCIUM SERPL-MCNC: 10.2 MG/DL (ref 8.4–10.2)
CHLORIDE SERPL-SCNC: 104 MMOL/L (ref 96–108)
CO2 SERPL-SCNC: 28 MMOL/L (ref 21–32)
CREAT SERPL-MCNC: 1.26 MG/DL (ref 0.6–1.3)
EOSINOPHIL # BLD AUTO: 0.19 THOUSAND/ΜL (ref 0–0.61)
EOSINOPHIL NFR BLD AUTO: 2 % (ref 0–6)
ERYTHROCYTE [DISTWIDTH] IN BLOOD BY AUTOMATED COUNT: 12.8 % (ref 11.6–15.1)
EST. AVERAGE GLUCOSE BLD GHB EST-MCNC: 134 MG/DL
GFR SERPL CREATININE-BSD FRML MDRD: 43 ML/MIN/1.73SQ M
GLUCOSE P FAST SERPL-MCNC: 108 MG/DL (ref 65–99)
HBA1C MFR BLD: 6.3 %
HCT VFR BLD AUTO: 38.9 % (ref 34.8–46.1)
HGB BLD-MCNC: 12.1 G/DL (ref 11.5–15.4)
IMM GRANULOCYTES # BLD AUTO: 0.05 THOUSAND/UL (ref 0–0.2)
IMM GRANULOCYTES NFR BLD AUTO: 1 % (ref 0–2)
LITHIUM SERPL-SCNC: 1.03 MMOL/L (ref 0.6–1.2)
LYMPHOCYTES # BLD AUTO: 1.41 THOUSANDS/ΜL (ref 0.6–4.47)
LYMPHOCYTES NFR BLD AUTO: 15 % (ref 14–44)
MCH RBC QN AUTO: 30.4 PG (ref 26.8–34.3)
MCHC RBC AUTO-ENTMCNC: 31.1 G/DL (ref 31.4–37.4)
MCV RBC AUTO: 98 FL (ref 82–98)
MONOCYTES # BLD AUTO: 0.83 THOUSAND/ΜL (ref 0.17–1.22)
MONOCYTES NFR BLD AUTO: 9 % (ref 4–12)
NEUTROPHILS # BLD AUTO: 6.85 THOUSANDS/ΜL (ref 1.85–7.62)
NEUTS SEG NFR BLD AUTO: 72 % (ref 43–75)
NRBC BLD AUTO-RTO: 0 /100 WBCS
PLATELET # BLD AUTO: 310 THOUSANDS/UL (ref 149–390)
PMV BLD AUTO: 9.5 FL (ref 8.9–12.7)
POTASSIUM SERPL-SCNC: 4.8 MMOL/L (ref 3.5–5.3)
PROT SERPL-MCNC: 6.6 G/DL (ref 6.4–8.4)
RBC # BLD AUTO: 3.98 MILLION/UL (ref 3.81–5.12)
SODIUM SERPL-SCNC: 139 MMOL/L (ref 135–147)
T4 FREE SERPL-MCNC: 1.29 NG/DL (ref 0.61–1.12)
TSH SERPL DL<=0.05 MIU/L-ACNC: 6.14 UIU/ML (ref 0.45–4.5)
WBC # BLD AUTO: 9.41 THOUSAND/UL (ref 4.31–10.16)

## 2025-01-08 PROCEDURE — 36415 COLL VENOUS BLD VENIPUNCTURE: CPT

## 2025-01-08 PROCEDURE — 80178 ASSAY OF LITHIUM: CPT

## 2025-01-08 PROCEDURE — 80053 COMPREHEN METABOLIC PANEL: CPT

## 2025-01-08 PROCEDURE — 84443 ASSAY THYROID STIM HORMONE: CPT

## 2025-01-08 PROCEDURE — 84439 ASSAY OF FREE THYROXINE: CPT

## 2025-01-08 PROCEDURE — 83036 HEMOGLOBIN GLYCOSYLATED A1C: CPT

## 2025-01-08 PROCEDURE — 85025 COMPLETE CBC W/AUTO DIFF WBC: CPT

## 2025-01-09 ENCOUNTER — RA CDI HCC (OUTPATIENT)
Dept: OTHER | Facility: HOSPITAL | Age: 70
End: 2025-01-09

## 2025-01-10 NOTE — PROGRESS NOTES
HCC coding opportunities          Chart Reviewed number of suggestions sent to Provider: 2   E66.01  Recommend adding I12.9 - combination code - hypertensive renal disease     Medicare Insurance: [employer]

## 2025-01-14 ENCOUNTER — OFFICE VISIT (OUTPATIENT)
Dept: FAMILY MEDICINE CLINIC | Facility: HOME HEALTHCARE | Age: 70
End: 2025-01-14
Payer: COMMERCIAL

## 2025-01-14 VITALS
BODY MASS INDEX: 35.42 KG/M2 | OXYGEN SATURATION: 98 % | SYSTOLIC BLOOD PRESSURE: 144 MMHG | DIASTOLIC BLOOD PRESSURE: 60 MMHG | HEART RATE: 89 BPM | RESPIRATION RATE: 18 BRPM | TEMPERATURE: 98.6 F | HEIGHT: 60 IN | WEIGHT: 180.4 LBS

## 2025-01-14 DIAGNOSIS — E11.22 TYPE 2 DIABETES MELLITUS WITH STAGE 3A CHRONIC KIDNEY DISEASE, WITHOUT LONG-TERM CURRENT USE OF INSULIN (HCC): Primary | ICD-10-CM

## 2025-01-14 DIAGNOSIS — E03.9 ACQUIRED HYPOTHYROIDISM: ICD-10-CM

## 2025-01-14 DIAGNOSIS — E78.00 HYPERCHOLESTEROLEMIA: ICD-10-CM

## 2025-01-14 DIAGNOSIS — N18.31 TYPE 2 DIABETES MELLITUS WITH STAGE 3A CHRONIC KIDNEY DISEASE, WITHOUT LONG-TERM CURRENT USE OF INSULIN (HCC): Primary | ICD-10-CM

## 2025-01-14 PROBLEM — U07.1 COVID-19: Status: RESOLVED | Noted: 2024-10-05 | Resolved: 2025-01-14

## 2025-01-14 PROCEDURE — 99214 OFFICE O/P EST MOD 30 MIN: CPT | Performed by: PHYSICIAN ASSISTANT

## 2025-01-14 NOTE — ASSESSMENT & PLAN NOTE
Continue tradjenta as prescribed.  Focus on healthy diet and exercise.  Will repeat A1c at next visit and consider additional medication if indicated at that time.  Lab Results   Component Value Date    HGBA1C 6.3 (H) 01/08/2025     Orders:  •  linaGLIPtin (Tradjenta) 5 MG TABS; Take 5 mg by mouth daily

## 2025-01-14 NOTE — PROGRESS NOTES
Name: Lisa Rich      : 1955      MRN: [id number]  Encounter Provider: Gadiel Young PA-C  Encounter Date: 2025   Encounter department: [facility]  :  Assessment & Plan  Type 2 diabetes mellitus with stage 3a chronic kidney disease, without long-term current use of insulin (HCC)  Continue tradjenta as prescribed.  Focus on healthy diet and exercise.  Will repeat A1c at next visit and consider additional medication if indicated at that time.  Lab Results   Component Value Date    HGBA1C 6.3 (H) 2025     Orders:  •  linaGLIPtin (Tradjenta) 5 MG TABS; Take 5 mg by mouth daily    Acquired hypothyroidism  Continue levothyroxine as prescribed.  Orders:  •  levothyroxine 88 mcg tablet; Take 1 tablet (88 mcg total) by mouth daily    Hypercholesterolemia  Continue pravastatin as prescribed.  Orders:  •  pravastatin (PRAVACHOL) 40 mg tablet; Take 1 tablet (40 mg total) by mouth daily           History of Present Illness     Lisa is a 69 year old female with history of HLD, hypothyroidism, type 2 DM, and bipolar disorder, presenting for follow up.     HTN is currently managed with lisinopril 5 mg daily.  BP elevated at 144/60 today.  Denies chest pain, palpitations, or LE edema.     HLD is managed with pravastatin 40 mg daily.  Last lipid panel from 2024 with total cholesterol 173, LDL 71.     Hypothyroidism managed with levothyroxine 88 mcg daily.  Last TSH from 2025 slightly elevated at 6.14 and T4 1.29.     Type 2 DM managed with Tradjenta 5 mg daily.  Last A1c 10/2024 was 6.0, increased to 6.3 today.  Eye exam UTD, done [place] [facility] [place].    She is following with [facility] for bipolar disorder, managed with trazodone and lithium.      Review of Systems   Constitutional:  Negative for chills, diaphoresis and fever.   Respiratory:  Negative for cough, chest tightness, shortness of breath and wheezing.    Cardiovascular:  Negative for chest pain, palpitations and  leg swelling.   Gastrointestinal:  Negative for abdominal pain, constipation, diarrhea, nausea and vomiting.   Skin:  Negative for rash and wound.   Neurological:  Negative for dizziness, syncope, weakness, light-headedness and headaches.       Objective   /60 (BP Location: Left arm, Patient Position: Sitting, Cuff Size: Standard)   Pulse 89   Temp 98.6 °F (37 °C) (Tympanic)   Resp 18   Ht 5' (1.524 m)   Wt 81.8 kg (180 lb 6.4 oz)   SpO2 98%   BMI 35.23 kg/m²      Physical Exam  Vitals and nursing note reviewed.   Constitutional:       General: She is not in acute distress.     Appearance: Normal appearance. She is obese.      Comments: Ambulating with a cane   Eyes:      Extraocular Movements: Extraocular movements intact.      Conjunctiva/sclera: Conjunctivae normal.      Pupils: Pupils are equal, round, and reactive to light.   Cardiovascular:      Rate and Rhythm: Normal rate and regular rhythm.      Heart sounds: Normal heart sounds. No murmur heard.  Pulmonary:      Effort: Pulmonary effort is normal. No respiratory distress.      Breath sounds: Normal breath sounds. No wheezing.   Musculoskeletal:      Cervical back: Normal range of motion and neck supple.      Right lower leg: No edema.      Left lower leg: No edema.   Skin:     General: Skin is warm and dry.   Neurological:      General: No focal deficit present.      Mental Status: She is alert and oriented to person, place, and time.      Cranial Nerves: No cranial nerve deficit.      Motor: No weakness.   Psychiatric:         Mood and Affect: Mood normal.         Behavior: Behavior normal.

## 2025-01-14 NOTE — ASSESSMENT & PLAN NOTE
Continue levothyroxine as prescribed.  Orders:  •  levothyroxine 88 mcg tablet; Take 1 tablet (88 mcg total) by mouth daily

## 2025-01-14 NOTE — ASSESSMENT & PLAN NOTE
Continue pravastatin as prescribed.  Orders:  •  pravastatin (PRAVACHOL) 40 mg tablet; Take 1 tablet (40 mg total) by mouth daily

## 2025-01-16 PROBLEM — R74.01 TRANSAMINITIS: Status: RESOLVED | Noted: 2024-10-06 | Resolved: 2025-01-16

## 2025-01-16 RX ORDER — LINAGLIPTIN 5 MG/1
5 TABLET, FILM COATED ORAL DAILY
Qty: 90 TABLET | Refills: 3 | Status: SHIPPED | OUTPATIENT
Start: 2025-01-16

## 2025-01-16 RX ORDER — PRAVASTATIN SODIUM 40 MG
40 TABLET ORAL DAILY
Qty: 90 TABLET | Refills: 3 | Status: SHIPPED | OUTPATIENT
Start: 2025-01-16

## 2025-01-16 RX ORDER — LEVOTHYROXINE SODIUM 88 UG/1
88 TABLET ORAL DAILY
Qty: 90 TABLET | Refills: 3 | Status: SHIPPED | OUTPATIENT
Start: 2025-01-16

## 2025-01-22 LAB
LEFT EYE DIABETIC RETINOPATHY: NORMAL
RIGHT EYE DIABETIC RETINOPATHY: NORMAL

## 2025-02-12 ENCOUNTER — APPOINTMENT (OUTPATIENT)
Age: 70
End: 2025-02-12
Payer: COMMERCIAL

## 2025-02-12 DIAGNOSIS — F31.32 MODERATE DEPRESSED BIPOLAR I DISORDER (HCC): ICD-10-CM

## 2025-02-12 DIAGNOSIS — Z79.899 DRUG THERAPY: ICD-10-CM

## 2025-02-12 PROCEDURE — 36415 COLL VENOUS BLD VENIPUNCTURE: CPT

## 2025-02-12 PROCEDURE — 80053 COMPREHEN METABOLIC PANEL: CPT

## 2025-02-13 LAB
ALBUMIN SERPL BCG-MCNC: 4.3 G/DL (ref 3.5–5)
ALP SERPL-CCNC: 76 U/L (ref 34–104)
ALT SERPL W P-5'-P-CCNC: 10 U/L (ref 7–52)
ANION GAP SERPL CALCULATED.3IONS-SCNC: 9 MMOL/L (ref 4–13)
AST SERPL W P-5'-P-CCNC: 15 U/L (ref 13–39)
BILIRUB SERPL-MCNC: 1.18 MG/DL (ref 0.2–1)
BUN SERPL-MCNC: 17 MG/DL (ref 5–25)
CALCIUM SERPL-MCNC: 10.3 MG/DL (ref 8.4–10.2)
CHLORIDE SERPL-SCNC: 102 MMOL/L (ref 96–108)
CO2 SERPL-SCNC: 27 MMOL/L (ref 21–32)
CREAT SERPL-MCNC: 1.23 MG/DL (ref 0.6–1.3)
GFR SERPL CREATININE-BSD FRML MDRD: 45 ML/MIN/1.73SQ M
GLUCOSE P FAST SERPL-MCNC: 103 MG/DL (ref 65–99)
POTASSIUM SERPL-SCNC: 4.6 MMOL/L (ref 3.5–5.3)
PROT SERPL-MCNC: 6.8 G/DL (ref 6.4–8.4)
SODIUM SERPL-SCNC: 138 MMOL/L (ref 135–147)

## 2025-02-25 ENCOUNTER — HOSPITAL ENCOUNTER (EMERGENCY)
Facility: HOSPITAL | Age: 70
Discharge: HOME | End: 2025-02-25
Attending: EMERGENCY MEDICINE | Admitting: EMERGENCY MEDICINE
Payer: COMMERCIAL

## 2025-02-25 ENCOUNTER — APPOINTMENT (EMERGENCY)
Dept: CT IMAGING | Facility: HOSPITAL | Age: 70
End: 2025-02-25
Payer: COMMERCIAL

## 2025-02-25 VITALS
TEMPERATURE: 98 F | BODY MASS INDEX: 35.15 KG/M2 | OXYGEN SATURATION: 99 % | WEIGHT: 180 LBS | DIASTOLIC BLOOD PRESSURE: 65 MMHG | RESPIRATION RATE: 18 BRPM | HEART RATE: 70 BPM | SYSTOLIC BLOOD PRESSURE: 131 MMHG

## 2025-02-25 DIAGNOSIS — R10.9 ABDOMINAL PAIN: Primary | ICD-10-CM

## 2025-02-25 LAB
ALBUMIN SERPL BCG-MCNC: 4 G/DL (ref 3.5–5)
ALP SERPL-CCNC: 66 U/L (ref 34–104)
ALT SERPL W P-5'-P-CCNC: 10 U/L (ref 7–52)
ANION GAP SERPL CALCULATED.3IONS-SCNC: 7 MMOL/L (ref 4–13)
AST SERPL W P-5'-P-CCNC: 16 U/L (ref 13–39)
BACTERIA UR QL AUTO: NORMAL /HPF
BASOPHILS # BLD AUTO: 0.04 THOUSANDS/ÂΜL (ref 0–0.1)
BASOPHILS NFR BLD AUTO: 1 % (ref 0–1)
BILIRUB SERPL-MCNC: 0.57 MG/DL (ref 0.2–1)
BILIRUB UR QL STRIP: NEGATIVE
BUN SERPL-MCNC: 15 MG/DL (ref 5–25)
CALCIUM SERPL-MCNC: 9.4 MG/DL (ref 8.4–10.2)
CHLORIDE SERPL-SCNC: 102 MMOL/L (ref 96–108)
CLARITY UR: CLEAR
CO2 SERPL-SCNC: 28 MMOL/L (ref 21–32)
COLOR UR: YELLOW
CREAT SERPL-MCNC: 1.14 MG/DL (ref 0.6–1.3)
EOSINOPHIL # BLD AUTO: 0.2 THOUSAND/ÂΜL (ref 0–0.61)
EOSINOPHIL NFR BLD AUTO: 2 % (ref 0–6)
ERYTHROCYTE [DISTWIDTH] IN BLOOD BY AUTOMATED COUNT: 12.6 % (ref 11.6–15.1)
GFR SERPL CREATININE-BSD FRML MDRD: 49 ML/MIN/1.73SQ M
GLUCOSE SERPL-MCNC: 226 MG/DL (ref 65–140)
GLUCOSE UR STRIP-MCNC: NEGATIVE MG/DL
HCT VFR BLD AUTO: 35.3 % (ref 34.8–46.1)
HGB BLD-MCNC: 11.1 G/DL (ref 11.5–15.4)
HGB UR QL STRIP.AUTO: ABNORMAL
IMM GRANULOCYTES # BLD AUTO: 0.02 THOUSAND/UL (ref 0–0.2)
IMM GRANULOCYTES NFR BLD AUTO: 0 % (ref 0–2)
KETONES UR STRIP-MCNC: NEGATIVE MG/DL
LEUKOCYTE ESTERASE UR QL STRIP: NEGATIVE
LIPASE SERPL-CCNC: 23 U/L (ref 11–82)
LYMPHOCYTES # BLD AUTO: 1.8 THOUSANDS/ÂΜL (ref 0.6–4.47)
LYMPHOCYTES NFR BLD AUTO: 21 % (ref 14–44)
MCH RBC QN AUTO: 30.6 PG (ref 26.8–34.3)
MCHC RBC AUTO-ENTMCNC: 31.4 G/DL (ref 31.4–37.4)
MCV RBC AUTO: 97 FL (ref 82–98)
MONOCYTES # BLD AUTO: 0.62 THOUSAND/ÂΜL (ref 0.17–1.22)
MONOCYTES NFR BLD AUTO: 7 % (ref 4–12)
NEUTROPHILS # BLD AUTO: 5.93 THOUSANDS/ÂΜL (ref 1.85–7.62)
NEUTS SEG NFR BLD AUTO: 69 % (ref 43–75)
NITRITE UR QL STRIP: NEGATIVE
NON-SQ EPI CELLS URNS QL MICRO: NORMAL /HPF
NRBC BLD AUTO-RTO: 0 /100 WBCS
PH UR STRIP.AUTO: 6 [PH]
PLATELET # BLD AUTO: 286 THOUSANDS/UL (ref 149–390)
PMV BLD AUTO: 9.5 FL (ref 8.9–12.7)
POTASSIUM SERPL-SCNC: 3.7 MMOL/L (ref 3.5–5.3)
PROT SERPL-MCNC: 6.5 G/DL (ref 6.4–8.4)
PROT UR STRIP-MCNC: NEGATIVE MG/DL
RBC # BLD AUTO: 3.63 MILLION/UL (ref 3.81–5.12)
RBC #/AREA URNS AUTO: NORMAL /HPF
SODIUM SERPL-SCNC: 137 MMOL/L (ref 135–147)
SP GR UR STRIP.AUTO: 1.01
UROBILINOGEN UR QL STRIP.AUTO: 0.2 E.U./DL
WBC # BLD AUTO: 8.61 THOUSAND/UL (ref 4.31–10.16)
WBC #/AREA URNS AUTO: NORMAL /HPF

## 2025-02-25 PROCEDURE — 36415 COLL VENOUS BLD VENIPUNCTURE: CPT

## 2025-02-25 PROCEDURE — 83690 ASSAY OF LIPASE: CPT

## 2025-02-25 PROCEDURE — 99285 EMERGENCY DEPT VISIT HI MDM: CPT | Performed by: EMERGENCY MEDICINE

## 2025-02-25 PROCEDURE — 96360 HYDRATION IV INFUSION INIT: CPT

## 2025-02-25 PROCEDURE — 96361 HYDRATE IV INFUSION ADD-ON: CPT

## 2025-02-25 PROCEDURE — 81003 URINALYSIS AUTO W/O SCOPE: CPT

## 2025-02-25 PROCEDURE — 93005 ELECTROCARDIOGRAM TRACING: CPT

## 2025-02-25 PROCEDURE — 81001 URINALYSIS AUTO W/SCOPE: CPT

## 2025-02-25 PROCEDURE — 80053 COMPREHEN METABOLIC PANEL: CPT

## 2025-02-25 PROCEDURE — 85025 COMPLETE CBC W/AUTO DIFF WBC: CPT

## 2025-02-25 PROCEDURE — 74177 CT ABD & PELVIS W/CONTRAST: CPT

## 2025-02-25 PROCEDURE — 99284 EMERGENCY DEPT VISIT MOD MDM: CPT

## 2025-02-25 RX ORDER — ACETAMINOPHEN 325 MG/1
650 TABLET ORAL ONCE
Status: COMPLETED | OUTPATIENT
Start: 2025-02-25 | End: 2025-02-25

## 2025-02-25 RX ADMIN — ACETAMINOPHEN 650 MG: 325 TABLET ORAL at 20:00

## 2025-02-25 RX ADMIN — SODIUM CHLORIDE 1000 ML: 0.9 INJECTION, SOLUTION INTRAVENOUS at 20:01

## 2025-02-25 RX ADMIN — IOHEXOL 100 ML: 350 INJECTION, SOLUTION INTRAVENOUS at 20:17

## 2025-02-26 ENCOUNTER — OFFICE VISIT (OUTPATIENT)
Dept: FAMILY MEDICINE CLINIC | Facility: HOME HEALTHCARE | Age: 70
End: 2025-02-26
Payer: COMMERCIAL

## 2025-02-26 VITALS
SYSTOLIC BLOOD PRESSURE: 158 MMHG | DIASTOLIC BLOOD PRESSURE: 78 MMHG | RESPIRATION RATE: 18 BRPM | HEIGHT: 60 IN | BODY MASS INDEX: 35.73 KG/M2 | OXYGEN SATURATION: 98 % | WEIGHT: 182 LBS | HEART RATE: 82 BPM | TEMPERATURE: 98.5 F

## 2025-02-26 DIAGNOSIS — N18.31 TYPE 2 DIABETES MELLITUS WITH STAGE 3A CHRONIC KIDNEY DISEASE, WITHOUT LONG-TERM CURRENT USE OF INSULIN (HCC): ICD-10-CM

## 2025-02-26 DIAGNOSIS — N18.30 STAGE 3 CHRONIC KIDNEY DISEASE, UNSPECIFIED WHETHER STAGE 3A OR 3B CKD (HCC): Primary | ICD-10-CM

## 2025-02-26 DIAGNOSIS — E11.22 TYPE 2 DIABETES MELLITUS WITH STAGE 3A CHRONIC KIDNEY DISEASE, WITHOUT LONG-TERM CURRENT USE OF INSULIN (HCC): ICD-10-CM

## 2025-02-26 DIAGNOSIS — F31.32 MODERATE DEPRESSED BIPOLAR I DISORDER (HCC): ICD-10-CM

## 2025-02-26 DIAGNOSIS — I10 ESSENTIAL HYPERTENSION: ICD-10-CM

## 2025-02-26 LAB
ATRIAL RATE: 72 BPM
P AXIS: 58 DEGREES
PR INTERVAL: 170 MS
QRS AXIS: 21 DEGREES
QRSD INTERVAL: 76 MS
QT INTERVAL: 362 MS
QTC INTERVAL: 396 MS
T WAVE AXIS: -24 DEGREES
VENTRICULAR RATE: 72 BPM

## 2025-02-26 PROCEDURE — 93010 ELECTROCARDIOGRAM REPORT: CPT | Performed by: INTERNAL MEDICINE

## 2025-02-26 PROCEDURE — 99213 OFFICE O/P EST LOW 20 MIN: CPT | Performed by: PHYSICIAN ASSISTANT

## 2025-02-26 NOTE — ASSESSMENT & PLAN NOTE
Kidney function has been stable.  Recently dc'd [facility] per psych.  Will refer to nephrology for further monitoring and focus on control of BP and BS.    Lab Results   Component Value Date    EGFR 49 02/25/2025    EGFR 45 02/12/2025    EGFR 43 01/08/2025    CREATININE 1.14 02/25/2025    CREATININE 1.23 02/12/2025    CREATININE 1.26 01/08/2025     Orders:  •  Ambulatory Referral to Nephrology; Future

## 2025-02-26 NOTE — ED ATTENDING ATTESTATION
2/25/2025  I, Arthur Coleman Jr, DO, saw and evaluated the patient. I have discussed the patient with the resident/non-physician practitioner and agree with the resident's/non-physician practitioner's findings, Plan of Care, and MDM as documented in the resident's/non-physician practitioner's note, except where noted. All available labs and Radiology studies were reviewed.  I was present for key portions of any procedure(s) performed by the resident/non-physician practitioner and I was immediately available to provide assistance.       At this point I agree with the current assessment done in the Emergency Department.  I have conducted an independent evaluation of this patient a history and physical is as follows:    Patient is a 69-year-old female who presents to the emergency department complaining of flank pain dizziness as well as chills that have come and gone intermittently over the last 3 days.  The patient notes she has an appointment with her doctor tomorrow but states that because of the pain she was concerned so she called 911.  Patient states that the pain is an achiness and does not come all the time.  Patient also describes her dizziness as a sense of feeling off balance and is only with change in position.  The patient does not have any chills but states that she gets chills and dizziness intermittently throughout her life for different reasons and is not sure if it is related to the abdominal discomfort.  The patient notes that she has not had any abdominal surgeries.  Patient will get lab tests while in the emergency department.    Heart is regular rhythm without clicks rubs gallops lungs are clear.  Abdomen is soft with mild left-sided abdominal tenderness but no guarding rigidity rebound or masses.  Skin is without rash.    ED Course  ED Course as of 02/28/25 1002   e Feb 25, 2025 1958 GLUCOSE(!): 226         Critical Care Time  ECG 12 Lead Documentation Only    Date/Time: 2/25/2025 8:22  PM    Performed by: Arthur Coleman Jr., DO  Authorized by: Arthur Coleman Jr., DO    ECG reviewed by me, the ED Provider: yes    Patient location:  ED  Comments:      EKG is a sinus rhythm at 72 beats a minute with a normal axis.  There is nonspecific lateral T wave flattening.  There is low voltage noted.

## 2025-02-26 NOTE — ASSESSMENT & PLAN NOTE
Continue tradjenta as prescribed.    Lab Results   Component Value Date    HGBA1C 6.3 (H) 01/08/2025       Orders:  •  Ambulatory Referral to Nephrology; Future

## 2025-02-26 NOTE — ASSESSMENT & PLAN NOTE
Continue lisinopril as prescribed.  Would consider increasing this dose if BP remains elevated at next check.  Orders:  •  Ambulatory Referral to Nephrology; Future

## 2025-02-26 NOTE — DISCHARGE INSTRUCTIONS
Lisa Bocanegraorry was seen and evaluated today in the emergency department over your concern of abdominal pain.  The workup that we performed showed no acute intra-abdominal pathology.  Please return to the emergency department if you experience turn of your symptoms or any other signs and symptoms that may be concerning to you.  Please follow-up with your primary care doctor within 1 week.  All questions were answered prior to discharge.  Thank you for choosing . [facility] for your care.    Follow-up with primary care provider

## 2025-02-26 NOTE — ED PROVIDER NOTES
Time reflects when diagnosis was documented in both MDM as applicable and the Disposition within this note       Time User Action Codes Description Comment    2/25/2025  9:22 PM Enrike Hodges Add [R10.9] Abdominal pain           ED Disposition       ED Disposition   Discharge    Condition   Stable    Date/Time   Tue Feb 25, 2025  9:19 PM    Comment   Lisa A McGorry discharge to home/self care.                   Assessment & Plan       Medical Decision Making  69 female presents emergency department planing of intermittent abdominal pain and weakness    DDx: Urinary tract infection, pyelonephritis, renal stone, diverticulitis, diverticulosis, dehydration, metabolic or electrolyte derangement    Plan: CMP, CBC, lipase, UA, CT scan IV fluids and symptomatic treatment EKG    See ED course for further discussion    ECG is overall unremarkable.  CMP is unremarkable.  CBC shows anemia of 11.1.  UA is not concerning for urinary tract infection.  CT abdomen pelvis unremarkable for acute intra-abdominal pathology.  Pain is well-controlled in emergency department.  No acute life-threatening emergencies identified during this workup.  Stable for discharge home.    Disposition: Discharged with instructions to obtain outpatient follow up of patient's symptoms and findings, with strict return precautions if patient develops new or worsening symptoms. Patient understands this plan and is agreeable. All questions answered. Patient discharged home with return precautions.      Amount and/or Complexity of Data Reviewed  Radiology: ordered.    Risk  OTC drugs.  Prescription drug management.        ED Course as of 02/25/25 2209 Tue Feb 25, 2025 2001 EKG   2008 Procedure Note: EKG  Date/Time: 02/25/25 8:08 PM   Interpreted by: Enrike Hodges  Indications / Diagnosis: weakness  ECG reviewed by me, the ED Provider: yes   The EKG demonstrates:  Rate: 70 BPM  Rhythm: normal sinus  Intervals: normal intervals  Axis: normal  axis  QRS/Blocks: normal QRS  ST Changes: No acute ST Changes, no STD/YANIQUE.         Medications   sodium chloride 0.9 % bolus 1,000 mL (0 mL Intravenous Stopped 2/25/25 2202)   acetaminophen (TYLENOL) tablet 650 mg (650 mg Oral Given 2/25/25 2000)   iohexol (OMNIPAQUE) 350 MG/ML injection (MULTI-DOSE) 100 mL (100 mL Intravenous Given 2/25/25 2017)       ED Risk Strat Scores                            SBIRT 22yo+      Flowsheet Row Most Recent Value   Initial Alcohol Screen: US AUDIT-C     1. How often do you have a drink containing alcohol? 0 Filed at: 02/25/2025 1914   2. How many drinks containing alcohol do you have on a typical day you are drinking?  0 Filed at: 02/25/2025 1914   3a. Male UNDER 65: How often do you have five or more drinks on one occasion? 0 Filed at: 02/25/2025 1914   3b. FEMALE Any Age, or MALE 65+: How often do you have 4 or more drinks on one occassion? 0 Filed at: 02/25/2025 1914   Audit-C Score 0 Filed at: 02/25/2025 1914   SHARAD: How many times in the past year have you...    Used an illegal drug or used a prescription medication for non-medical reasons? Never Filed at: 02/25/2025 1914                            History of Present Illness       Chief Complaint   Patient presents with    Flank Pain     Patient arrives with complaints of bilateral flank pain rating 2/10, denies urinary symptoms. One hour PTA experienced a few second episode of dizziness and chills.        Past Medical History:   Diagnosis Date    Allergic     Depression     Diabetes mellitus (HCC)     Disease of thyroid gland     GERD (gastroesophageal reflux disease)     Hyperchloremia     Hypertension     Psychiatric disorder       Past Surgical History:   Procedure Laterality Date    ANKLE SURGERY      Incision    DILATION AND CURETTAGE OF UTERUS        Family History   Problem Relation Age of Onset    No Known Problems Family     Diabetes Mother     Heart disease Mother     Stroke Mother     Diabetes Father     Stroke  Father     No Known Problems Maternal Grandmother     No Known Problems Maternal Grandfather     No Known Problems Paternal Grandmother     No Known Problems Paternal Grandfather     Diabetes Brother     No Known Problems Paternal Aunt       Social History     Tobacco Use    Smoking status: Never     Passive exposure: Never    Smokeless tobacco: Never   Vaping Use    Vaping status: Never Used   Substance Use Topics    Alcohol use: Not Currently    Drug use: No      E-Cigarette/Vaping    E-Cigarette Use Never User       E-Cigarette/Vaping Substances    Nicotine No     THC No     CBD No     Flavoring No     Other No     Unknown No       I have reviewed and agree with the history as documented.     69-year-old female with a history of bipolar disorder, cessation of lithium 2 weeks ago by primary care doctor due to CKD stage III, diabetes, hypothyroidism, hypertension, presents emergency department complaining abdominal.  The  goes close started earlier today.  Intermittent fevers or chills.  Intermittent feelings of weakness.  She is unsure of all his complaints are related.  She denies dysuria and hematuria.  No history of kidney stones.  She denies any abdominal surgery however on chart review it appears she had dilation curettage of the uterus at some point in her life.  Also previous ankle surgery.  She endorses chills however no fevers.        Review of Systems   Gastrointestinal:  Positive for abdominal pain and nausea. Negative for vomiting.   Neurological:  Positive for light-headedness.   All other systems reviewed and are negative.          Objective       ED Triage Vitals [02/25/25 1913]   Temperature Pulse Blood Pressure Respirations SpO2 Patient Position - Orthostatic VS   98.2 °F (36.8 °C) 81 152/72 18 99 % Sitting      Temp Source Heart Rate Source BP Location FiO2 (%) Pain Score    Temporal Monitor Left arm -- 2      Vitals      Date and Time Temp Pulse SpO2 Resp BP Pain Score FACES Pain Rating  User   02/25/25 2200 98 °F (36.7 °C) 70 99 % -- 131/65 -- -- EM   02/25/25 2000 -- 75 99 % 18 102/56 2 -- EM   02/25/25 1913 98.2 °F (36.8 °C) 81 99 % 18 152/72 2 -- DK            Physical Exam  Vitals and nursing note reviewed.   Constitutional:       General: She is not in acute distress.     Appearance: She is well-developed.   HENT:      Head: Normocephalic and atraumatic.   Eyes:      Conjunctiva/sclera: Conjunctivae normal.   Cardiovascular:      Rate and Rhythm: Normal rate and regular rhythm.      Heart sounds: No murmur heard.  Pulmonary:      Effort: Pulmonary effort is normal. No respiratory distress.      Breath sounds: Normal breath sounds.   Abdominal:      Palpations: Abdomen is soft.      Tenderness: There is abdominal tenderness. There is no right CVA tenderness or left CVA tenderness.       Musculoskeletal:         General: No swelling.      Cervical back: Neck supple.   Skin:     General: Skin is warm and dry.      Capillary Refill: Capillary refill takes less than 2 seconds.   Neurological:      General: No focal deficit present.      Mental Status: She is alert and oriented to person, place, and time. Mental status is at baseline.      GCS: GCS eye subscore is 4. GCS verbal subscore is 5. GCS motor subscore is 6.      Cranial Nerves: No cranial nerve deficit.      Sensory: No sensory deficit.      Motor: No weakness.      Coordination: Coordination normal.      Gait: Gait normal.   Psychiatric:         Mood and Affect: Mood normal.         Results Reviewed       Procedure Component Value Units Date/Time    Comprehensive metabolic panel [[id number]]  (Abnormal) Collected: 02/25/25 1935    Lab Status: Final result Specimen: Blood from Arm, Left Updated: 02/25/25 1957     Sodium 137 mmol/L      Potassium 3.7 mmol/L      Chloride 102 mmol/L      CO2 28 mmol/L      ANION GAP 7 mmol/L      BUN 15 mg/dL      Creatinine 1.14 mg/dL      Glucose 226 mg/dL      Calcium 9.4 mg/dL      AST 16 U/L      ALT 10  U/L      Alkaline Phosphatase 66 U/L      Total Protein 6.5 g/dL      Albumin 4.0 g/dL      Total Bilirubin 0.57 mg/dL      eGFR 49 ml/min/1.73sq m     Narrative:      National Kid[employer] Foundation guidelines for Chronic Kidney Disease (CKD):     Stage 1 with normal or high GFR (GFR > 90 mL/min/1.73 square meters)    Stage 2 Mild CKD (GFR = 60-89 mL/min/1.73 square meters)    Stage 3A Moderate CKD (GFR = 45-59 mL/min/1.73 square meters)    Stage 3B Moderate CKD (GFR = 30-44 mL/min/1.73 square meters)    Stage 4 Severe CKD (GFR = 15-29 mL/min/1.73 square meters)    Stage 5 End Stage CKD (GFR <15 mL/min/1.73 square meters)  Note: GFR calculation is accurate only with a steady state creatinine    Lipase [[id number]]  (Normal) Collected: 02/25/25 1935    Lab Status: Final result Specimen: Blood from Arm, Left Updated: 02/25/25 1957     Lipase 23 u/L     Urine Microscopic [[id number]]  (Normal) Collected: 02/25/25 1921    Lab Status: Final result Specimen: Urine, Clean Catch Updated: 02/1956     RBC, UA 1-2 /hpf      WBC, UA 0-5 /hpf      Epithelial Cells Occasional /hpf      Bacteria, UA Occasional /hpf     CBC and differential [[id number]]  (Abnormal) Collected: 02/25/25 1935    Lab Status: Final result Specimen: Blood from Arm, Left Updated: 02/25/25 1942     WBC 8.61 Thousand/uL      RBC 3.63 Million/uL      Hemoglobin 11.1 g/dL      Hematocrit 35.3 %      MCV 97 fL      MCH 30.6 pg      MCHC 31.4 g/dL      RDW 12.6 %      MPV 9.5 fL      Platelets 286 Thousands/uL      nRBC 0 /100 WBCs      Segmented % 69 %      Immature Grans % 0 %      Lymphocytes % 21 %      Monocytes % 7 %      Eosinophils Relative 2 %      Basophils Relative 1 %      Absolute Neutrophils 5.93 Thousands/µL      Absolute Immature Grans 0.02 Thousand/uL      Absolute Lymphocytes 1.80 Thousands/µL      Absolute Monocytes 0.62 Thousand/µL      Eosinophils Absolute 0.20 Thousand/µL      Basophils Absolute 0.04 Thousands/µL     UA w Reflex to  Microscopic w Reflex to Culture [[id number]]  (Abnormal) Collected: 02/25/25 1921    Lab Status: Final result Specimen: Urine, Clean Catch Updated: 02/25/25 1934     Color, UA Yellow     Clarity, UA Clear     Specific Gravity, UA 1.010     pH, UA 6.0     Leukocytes, UA Negative     Nitrite, UA Negative     Protein, UA Negative mg/dl      Glucose, UA Negative mg/dl      Ketones, UA Negative mg/dl      Urobilinogen, UA 0.2 E.U./dl      Bilirubin, UA Negative     Occult Blood, UA 1+            CT abdomen pelvis with contrast   Final Interpretation by Ivonne Goss MD (02/25 2101)      No acute abdominal or pelvic pathology.      No bowel obstruction. Normal appendix. Evaluation for bowel inflammation elsewhere somewhat limited by underdistention and lack of oral contrast, however no convincing inflammatory changes. Please note mild inflammation may not be apparent.      Additional findings as above.                  Workstation performed: [id number]             Procedures    ED Medication and Procedure Management   Prior to Admission Medications   Prescriptions Last Dose Informant Patient Reported? Taking?   Cholecalciferol (Vitamin D) 50 MCG (2000 UT) tablet  Self No No   Sig: Take 1 tablet (2,000 Units total) by mouth daily   Diclofenac Sodium (VOLTAREN) 1 %   No No   Sig: Apply 2 g topically 4 (four) times a day   aspirin (ECOTRIN LOW STRENGTH) 81 mg EC tablet  Self Yes No   Sig: Take 81 mg by mouth daily   clobetasol (TEMOVATE) 0.05 % external solution  Self No No   Sig: Apply to dry scalp daily for up to 4 weeks.  Leave shampoo on the scalp x 15 minutes, then rinse.   Patient not taking: Reported on 10/14/2024   glucose blood (Contour Next Test) test strip   No No   Sig: Use 1 each 2 (two) times a day Use as instructed   glucose blood test strip  Self Yes No   Sig: Use 1 each as needed Use as instructed   levothyroxine 88 mcg tablet   No No   Sig: Take 1 tablet (88 mcg total) by mouth daily   linaGLIPtin  (Tradjenta) 5 MG TABS   No No   Sig: Take 5 mg by mouth daily   lisinopril (ZESTRIL) 5 mg tablet   No No   Sig: Take 1 tablet (5 mg total) by mouth daily   lithium carbonate (LITHOBID) 300 mg CR tablet  Self Yes No   pantoprazole (PROTONIX) 40 mg tablet   No No   Sig: Take 1 tablet (40 mg total) by mouth daily   pravastatin (PRAVACHOL) 40 mg tablet   No No   Sig: Take 1 tablet (40 mg total) by mouth daily   traZODone (DESYREL) 100 mg tablet  Self Yes No   Sig: Take 200 mg by mouth daily at bedtime      Facility-Administered Medications: None     Patient's Medications   Discharge Prescriptions    No medications on file     No discharge procedures on file.  ED SEPSIS DOCUMENTATION   Time reflects when diagnosis was documented in both MDM as applicable and the Disposition within this note       Time User Action Codes Description Comment    2/25/2025  9:22 PM Enrike Hodges Add [R10.9] Abdominal pain                  Enrike Hodges DO  02/25/25 8156

## 2025-02-26 NOTE — PROGRESS NOTES
Name: Lisa Rich      : 1955      MRN: [id number]  Encounter Provider: Gadiel Young PA-C  Encounter Date: 2025   Encounter department: [facility]  :  Assessment & Plan  Stage 3 chronic kidney disease, unspecified whether stage 3a or 3b CKD (HCC)    Kidney function has been stable.  Recently dc'd [place] per psych.  Will refer to nephrology for further monitoring and focus on control of BP and BS.    Lab Results   Component Value Date    EGFR 49 2025    EGFR 45 2025    EGFR 43 2025    CREATININE 1.14 2025    CREATININE 1.23 2025    CREATININE 1.26 2025     Orders:  •  Ambulatory Referral to Nephrology; Future    Moderate depressed bipolar I disorder (HCC)  Continue as per psych       Essential hypertension  Continue lisinopril as prescribed.  Would consider increasing this dose if BP remains elevated at next check.  Orders:  •  Ambulatory Referral to Nephrology; Future    Type 2 diabetes mellitus with stage 3a chronic kidney disease, without long-term current use of insulin (HCC)  Continue tradjenta as prescribed.    Lab Results   Component Value Date    HGBA1C 6.3 (H) 2025       Orders:  •  Ambulatory Referral to Nephrology; Future           History of Present Illness   Lisa is a 69 year old female with history of HLD, hypothyroidism, type 2 DM, and bipolar disorder, presenting for follow up.     Patient was seen in the ED yesterday with abdominal pain.  Labs and CT abdomen/pelvis were overall WNL.  Patient denies any concerns today and reports that abdominal pain has since resolved.    HTN is currently managed with lisinopril 5 mg daily.  BP elevated at 158/78 today, normal in the ED yesterday.  Denies chest pain, palpitations, or LE edema.     HLD is managed with pravastatin 40 mg daily.  Last lipid panel from 2024 with total cholesterol 173, LDL 71.     Hypothyroidism managed with levothyroxine 88 mcg daily.  Last  TSH from 1/2025 slightly elevated at 6.14 and T4 1.29.     Type 2 DM managed with Tradjenta 5 mg daily.  Last A1c 6.3 in 1/2025.  Eye exam UTD, done at [facility].    She is following with [facility] for bipolar disorder, managed with trazodone.  Patient was previously also taking lithium which she states was discontinued at her recent visit due to her kidney function.  She was not started on anything else.      Review of Systems   Constitutional:  Negative for chills, diaphoresis and fever.   Respiratory:  Negative for cough, chest tightness, shortness of breath and wheezing.    Cardiovascular:  Negative for chest pain, palpitations and leg swelling.   Gastrointestinal:  Negative for abdominal pain, constipation, diarrhea, nausea and vomiting.   Skin:  Negative for rash and wound.   Neurological:  Negative for dizziness, syncope, weakness, light-headedness and headaches.       Objective   /78 (BP Location: Left arm, Patient Position: Sitting, Cuff Size: Standard)   Pulse 82   Temp 98.5 °F (36.9 °C) (Tympanic)   Resp 18   Ht 5' (1.524 m)   Wt 82.6 kg (182 lb)   SpO2 98%   BMI 35.54 kg/m²      Physical Exam  Vitals and nursing note reviewed.   Constitutional:       General: She is not in acute distress.     Appearance: Normal appearance. She is obese.      Comments: Ambulating with a cane   Eyes:      Extraocular Movements: Extraocular movements intact.      Conjunctiva/sclera: Conjunctivae normal.      Pupils: Pupils are equal, round, and reactive to light.   Cardiovascular:      Rate and Rhythm: Normal rate and regular rhythm.      Heart sounds: Normal heart sounds. No murmur heard.  Pulmonary:      Effort: Pulmonary effort is normal. No respiratory distress.      Breath sounds: Normal breath sounds. No wheezing.   Musculoskeletal:      Cervical back: Normal range of motion and neck supple.      Right lower leg: No edema.      Left lower leg: No edema.   Skin:     General: Skin is warm and dry.    Neurological:      General: No focal deficit present.      Mental Status: She is alert and oriented to person, place, and time.      Cranial Nerves: No cranial nerve deficit.      Motor: No weakness.   Psychiatric:         Mood and Affect: Mood normal.         Behavior: Behavior normal.

## 2025-03-03 ENCOUNTER — TELEPHONE (OUTPATIENT)
Dept: NEPHROLOGY | Facility: CLINIC | Age: 70
End: 2025-03-03

## 2025-03-03 DIAGNOSIS — N18.30 STAGE 3 CHRONIC KIDNEY DISEASE, UNSPECIFIED WHETHER STAGE 3A OR 3B CKD (HCC): Primary | ICD-10-CM

## 2025-03-03 NOTE — TELEPHONE ENCOUNTER
I called and spoke with Lisa regarding completing blood and urine studies prior to upcoming appt on 03/10/2025

## 2025-03-04 ENCOUNTER — APPOINTMENT (OUTPATIENT)
Age: 70
End: 2025-03-04
Payer: COMMERCIAL

## 2025-03-04 DIAGNOSIS — N18.30 STAGE 3 CHRONIC KIDNEY DISEASE, UNSPECIFIED WHETHER STAGE 3A OR 3B CKD (HCC): ICD-10-CM

## 2025-03-04 LAB
25(OH)D3 SERPL-MCNC: 50 NG/ML (ref 30–100)
ANION GAP SERPL CALCULATED.3IONS-SCNC: 10 MMOL/L (ref 4–13)
BACTERIA UR QL AUTO: NORMAL /HPF
BASOPHILS # BLD AUTO: 0.06 THOUSANDS/ÂΜL (ref 0–0.1)
BASOPHILS NFR BLD AUTO: 1 % (ref 0–1)
BILIRUB UR QL STRIP: NEGATIVE
BUN SERPL-MCNC: 10 MG/DL (ref 5–25)
CALCIUM SERPL-MCNC: 9.8 MG/DL (ref 8.4–10.2)
CHLORIDE SERPL-SCNC: 103 MMOL/L (ref 96–108)
CLARITY UR: CLEAR
CO2 SERPL-SCNC: 30 MMOL/L (ref 21–32)
COLOR UR: COLORLESS
CREAT SERPL-MCNC: 0.96 MG/DL (ref 0.6–1.3)
CREAT UR-MCNC: 65.6 MG/DL
CREAT UR-MCNC: 65.6 MG/DL
EOSINOPHIL # BLD AUTO: 0.12 THOUSAND/ÂΜL (ref 0–0.61)
EOSINOPHIL NFR BLD AUTO: 2 % (ref 0–6)
ERYTHROCYTE [DISTWIDTH] IN BLOOD BY AUTOMATED COUNT: 12.7 % (ref 11.6–15.1)
GFR SERPL CREATININE-BSD FRML MDRD: 60 ML/MIN/1.73SQ M
GLUCOSE SERPL-MCNC: 101 MG/DL (ref 65–140)
GLUCOSE UR STRIP-MCNC: NEGATIVE MG/DL
HCT VFR BLD AUTO: 38.3 % (ref 34.8–46.1)
HGB BLD-MCNC: 12.2 G/DL (ref 11.5–15.4)
HGB UR QL STRIP.AUTO: NEGATIVE
IMM GRANULOCYTES # BLD AUTO: 0.02 THOUSAND/UL (ref 0–0.2)
IMM GRANULOCYTES NFR BLD AUTO: 0 % (ref 0–2)
KETONES UR STRIP-MCNC: NEGATIVE MG/DL
LEUKOCYTE ESTERASE UR QL STRIP: NEGATIVE
LYMPHOCYTES # BLD AUTO: 1.64 THOUSANDS/ÂΜL (ref 0.6–4.47)
LYMPHOCYTES NFR BLD AUTO: 23 % (ref 14–44)
MAGNESIUM SERPL-MCNC: 1.9 MG/DL (ref 1.9–2.7)
MCH RBC QN AUTO: 30.7 PG (ref 26.8–34.3)
MCHC RBC AUTO-ENTMCNC: 31.9 G/DL (ref 31.4–37.4)
MCV RBC AUTO: 96 FL (ref 82–98)
MICROALBUMIN UR-MCNC: <7 MG/L
MONOCYTES # BLD AUTO: 0.47 THOUSAND/ÂΜL (ref 0.17–1.22)
MONOCYTES NFR BLD AUTO: 7 % (ref 4–12)
NEUTROPHILS # BLD AUTO: 4.73 THOUSANDS/ÂΜL (ref 1.85–7.62)
NEUTS SEG NFR BLD AUTO: 67 % (ref 43–75)
NITRITE UR QL STRIP: NEGATIVE
NON-SQ EPI CELLS URNS QL MICRO: NORMAL /HPF
NRBC BLD AUTO-RTO: 0 /100 WBCS
PH UR STRIP.AUTO: 6.5 [PH]
PHOSPHATE SERPL-MCNC: 3.3 MG/DL (ref 2.3–4.1)
PLATELET # BLD AUTO: 297 THOUSANDS/UL (ref 149–390)
PMV BLD AUTO: 9.9 FL (ref 8.9–12.7)
POTASSIUM SERPL-SCNC: 4.2 MMOL/L (ref 3.5–5.3)
PROT UR STRIP-MCNC: NEGATIVE MG/DL
PROT UR-MCNC: <4 MG/DL
PTH-INTACT SERPL-MCNC: 48 PG/ML (ref 12–88)
RBC # BLD AUTO: 3.98 MILLION/UL (ref 3.81–5.12)
RBC #/AREA URNS AUTO: NORMAL /HPF
SODIUM SERPL-SCNC: 143 MMOL/L (ref 135–147)
SP GR UR STRIP.AUTO: 1.01 (ref 1–1.03)
UROBILINOGEN UR STRIP-ACNC: <2 MG/DL
WBC # BLD AUTO: 7.04 THOUSAND/UL (ref 4.31–10.16)
WBC #/AREA URNS AUTO: NORMAL /HPF

## 2025-03-04 PROCEDURE — 36415 COLL VENOUS BLD VENIPUNCTURE: CPT

## 2025-03-04 PROCEDURE — 85025 COMPLETE CBC W/AUTO DIFF WBC: CPT

## 2025-03-04 PROCEDURE — 82043 UR ALBUMIN QUANTITATIVE: CPT

## 2025-03-04 PROCEDURE — 84100 ASSAY OF PHOSPHORUS: CPT

## 2025-03-04 PROCEDURE — 81001 URINALYSIS AUTO W/SCOPE: CPT

## 2025-03-04 PROCEDURE — 83735 ASSAY OF MAGNESIUM: CPT

## 2025-03-04 PROCEDURE — 83970 ASSAY OF PARATHORMONE: CPT

## 2025-03-04 PROCEDURE — 80048 BASIC METABOLIC PNL TOTAL CA: CPT

## 2025-03-04 PROCEDURE — 82306 VITAMIN D 25 HYDROXY: CPT

## 2025-03-04 PROCEDURE — 82570 ASSAY OF URINE CREATININE: CPT

## 2025-03-04 PROCEDURE — 84156 ASSAY OF PROTEIN URINE: CPT

## 2025-03-05 ENCOUNTER — RESULTS FOLLOW-UP (OUTPATIENT)
Dept: OTHER | Facility: HOSPITAL | Age: 70
End: 2025-03-05

## 2025-03-10 ENCOUNTER — CONSULT (OUTPATIENT)
Dept: NEPHROLOGY | Facility: CLINIC | Age: 70
End: 2025-03-10
Payer: COMMERCIAL

## 2025-03-10 VITALS
TEMPERATURE: 98.6 F | HEIGHT: 60 IN | OXYGEN SATURATION: 99 % | DIASTOLIC BLOOD PRESSURE: 78 MMHG | HEART RATE: 84 BPM | BODY MASS INDEX: 35.14 KG/M2 | SYSTOLIC BLOOD PRESSURE: 138 MMHG | WEIGHT: 179 LBS

## 2025-03-10 DIAGNOSIS — N18.30 STAGE 3 CHRONIC KIDNEY DISEASE, UNSPECIFIED WHETHER STAGE 3A OR 3B CKD (HCC): ICD-10-CM

## 2025-03-10 DIAGNOSIS — I10 ESSENTIAL HYPERTENSION: ICD-10-CM

## 2025-03-10 DIAGNOSIS — E11.22 TYPE 2 DIABETES MELLITUS WITH STAGE 3A CHRONIC KIDNEY DISEASE, WITHOUT LONG-TERM CURRENT USE OF INSULIN (HCC): Primary | ICD-10-CM

## 2025-03-10 DIAGNOSIS — N18.31 TYPE 2 DIABETES MELLITUS WITH STAGE 3A CHRONIC KIDNEY DISEASE, WITHOUT LONG-TERM CURRENT USE OF INSULIN (HCC): Primary | ICD-10-CM

## 2025-03-10 DIAGNOSIS — F31.32 MODERATE DEPRESSED BIPOLAR I DISORDER (HCC): ICD-10-CM

## 2025-03-10 PROCEDURE — 99204 OFFICE O/P NEW MOD 45 MIN: CPT | Performed by: INTERNAL MEDICINE

## 2025-03-10 NOTE — PROGRESS NOTES
[facility] [place]  [place]ult[place] - [place]phrology    Lisa ZARATE McGorry 69 y.o. female MRN: [id number]      A/P:      1. Type 2 diabetes mellitus with stage 3a chronic kidney disease, without long-term current use of insulin ([facility])  -     Ambulatory Referral to Nephrology  2. Stage 3 chronic kidney disease, unspecified whether stage 3a or 3b CKD ([facility])  -     Ambulatory Referral to Nephrology  -     Urinalysis with microscopic; Future; Expected date: 09/02/2025  -     PTH, intact; Future; Expected date: 09/02/2025  -     Albumin / creatinine urine ratio; Future; Expected date: 09/02/2025  -     Protein / creatinine ratio, urine; Future; Expected date: 09/02/2025  -     Comprehensive metabolic panel; Future; Expected date: 09/02/2025  -     CBC and differential; Future; Expected date: 09/02/2025  -     Magnesium; Future; Expected date: 09/02/2025  -     Phosphorus; Future; Expected date: 09/02/2025  -     Uric acid; Future; Expected date: 09/02/2025  3. Essential hypertension  -     Ambulatory Referral to Nephrology  4. Moderate depressed bipolar I disorder (HCC)       I have reviewed pertinent labs and imaging with patient.      Type 2 DM with CKD  A1C well controlled, < 7. She is only on tradjenta, which is okay for her kidneys. She has hypoglycemic awareness and drinks OJ if she is low. Advise to fu with PCP regarding DM management.  No need for SGLT2I and would be concerned for worsening hypoglycemia with additional therapy.       2.CKD3  Baseline 0.9-1.1 dating back to 2021.   Cr carlito to 1.2 in January and suspect lead to discontinuation of Lithium, although a minimal rise in Cr noted.   Most recent Cr 0.96 on 3/4/25. Metabolic parameters and volume status stable.    Etiology CKD potentially 2/2 lithium induced damage, age related eGFR loss, non proteinuric DKD.     Diabetes and HTN well controlled at this time.    Nephrology would defer ongoing lithium needs and appropriateness to  psychiatry colleagues. Reached out to primary to discuss.   Complications of lithium also discussed with patient including CKD, parathyroid abnormalities, cystic kidney disease and nephrogenic diabetes insipidus. Her Na trends appear well controlled and urine specific gravity appropriate. Her fluid intake is not aggressive and denies changes to urination. Denies concern for polyuria at this time.  No comments on recent CT regarding cystic kidney disease.  Parathyroid function currently appropriate and calcium normal. She did have slight elevation in calcium on 2/12 but normalized. UACR WNL.     No changes made to medications at this visit.   Reached out to primary to discuss care.   Advised patient to reach out to psychiatry for sooner appt.  Hydrate to thirst, at least 2 quart per day.   Follow up in 6 months with labs prior for ongoing monitoring.   Explained CKD stages, Cr and eGFR trends and eGFR meaning to patient in simple terms.    3. Essential HTN  BP well controlled on minimal medication, lisinopril 5mg/day.  No need for titration at this time.   BP goal <130/80.     4. Moderate depressed bipolar 1 disorder  Reviewed Cr trends dating back to 2021-- Cr 0.9-1.1 since that time.   Reached out to primary to discuss psychiatric concerns as patient is not on any medications at this time.       The patient and any family members present were counseled today on risks benefits and alternatives of any medications, and were agreeable to the treatment plan.  They were counseled on diagnostic testing if necessary, and projected outcomes as are available and medically indicated.  All questions were asked and answered during the encounter. If more questions are to arise the patient will call the office. Alarm and return precautions discussed and accepted.       Thank you for allowing us to participate in the care of your patient.        History of Present Illness   Physician Requesting Consult: No att. providers  found    HPI: Lisa Rich is a 69 y.o. year old female who presents for CKD evaluation at discretion of PCP. Her psychiatrist had monitored blood work in the past and transitioned to new psychiatrist. I do not have access to their records to understand monitoring process or if provider aware of CKD. Cr 0.9-1.1 dating back to 2021.Cr 1. 1/8 and repeated 1.2 on 2/12, not far above baseline. Due to rise in Cr, suspect psychiatrist stopped lithium and did not replace with alternative medication. Per patient, off medication for 3-4 weeks and is having mood swings of which her psychiatrist is aware. Appt with psych not until April 14th. She reports ltihium use for unknown amount of year. April 2024 she had kidney injury attributed to volume depletion, she had Cr rise to 2.11 and repeated at 1.65 later that day.     She has type 2 Dm managed with tradjenta> 5 years. Follows with Dr Carroll/regina. She denies retinopathy, unclear on neuropathy.   Follows with podiatry for toe nail care.   Denies Htn hx but she is on Lisinopril 5mg/day, does not monitor at home. BP appears reasonable.     She follows BG q every other day at 110. She reports occasional low BG.     Denies NSAID.    Denies family hx kidney disease.    Denies nephrolithiasis. Denies following with urology.    Denies hematuria, foamy urine, recurrent UTI/ pyelo/    Denies HIV/Hepatitis/TB.    Reports beer every now agan.Denies recreational drug use /tobacco use.    2 X 16.9 BOTTLES WATER, 8 Ounce glasses of cranberry, 2 cups of tea--20 ounces tea.         Constitutional ROS- 3 lb lost in a couple week.   HEENT ROS- Denies headaches or history of trauma, blurred vision..  Endocrine ROS- + DM /hypothyroidism   Cardiovascular ROS- Denies chest pain, palpitation, dyspnea exertion, orthopnea, claudication.  Pulmonary ROS-  Denies cough, hemoptysis, shortness of breath.  GI ROS- Denies abdominal pain, diarrhea, nausea, swallowing problems, vomiting, constipation,  blood in stools, fecal incontinence.   Hematological ROS- Denies history of easy bruising, blood clots, bleeding or blood transfusions.  Genitourinary ROS- Denies recent hematuria, pyuria, flank pain, change in urinary stream, decreased urinary output, increased urinary frequency, nocturia, foamy urine, or urinary incontinence.  Lymphatic ROS- Denies lymphadenopathy.  Musculoskeletal ROS- Denies history of muscle weakness, joint pain.  Dermatological ROS- Denies rash, wounds, ulcers, itching, jaundice.  Psychiatric ROS- bipolar disorder   Neurological ROS- No stroke or TIA symptoms.     Historical Information   Past Medical History:   Diagnosis Date    Allergic     Depression     Diabetes mellitus (HCC)     Disease of thyroid gland     GERD (gastroesophageal reflux disease)     Hyperchloremia     Hypertension     Psychiatric disorder      Past Surgical History:   Procedure Laterality Date    ANKLE SURGERY      Incision    DILATION AND CURETTAGE OF UTERUS       Social History   Social History     Substance and Sexual Activity   Alcohol Use Not Currently     Social History     Substance and Sexual Activity   Drug Use No     Social History     Tobacco Use   Smoking Status Never    Passive exposure: Never   Smokeless Tobacco Never     Family History   Problem Relation Age of Onset    No Known Problems Family     Diabetes Mother     Heart disease Mother     Stroke Mother     Diabetes Father     Stroke Father     No Known Problems Maternal Grandmother     No Known Problems Maternal Grandfather     No Known Problems Paternal Grandmother     No Known Problems Paternal Grandfather     Diabetes Brother     No Known Problems Paternal Aunt        Meds/Allergies   all current active meds have been reviewed, current meds:     Current Outpatient Medications:     aspirin (ECOTRIN LOW STRENGTH) 81 mg EC tablet, Take 81 mg by mouth daily, Disp: , Rfl:     Cholecalciferol (Vitamin D) 50 MCG (2000 UT) tablet, Take 1 tablet (2,000 Units  total) by mouth daily, Disp: 90 tablet, Rfl: 3    Diclofenac Sodium (VOLTAREN) 1 %, Apply 2 g topically 4 (four) times a day, Disp: 100 g, Rfl: 0    glucose blood (Contour Next Test) test strip, Use 1 each 2 (two) times a day Use as instructed, Disp: 100 strip, Rfl: 5    glucose blood test strip, Use 1 each as needed Use as instructed, Disp: , Rfl:     levothyroxine 88 mcg tablet, Take 1 tablet (88 mcg total) by mouth daily, Disp: 90 tablet, Rfl: 3    linaGLIPtin (Tradjenta) 5 MG TABS, Take 5 mg by mouth daily, Disp: 90 tablet, Rfl: 3    lisinopril (ZESTRIL) 5 mg tablet, Take 1 tablet (5 mg total) by mouth daily, Disp: 90 tablet, Rfl: 1    pravastatin (PRAVACHOL) 40 mg tablet, Take 1 tablet (40 mg total) by mouth daily, Disp: 90 tablet, Rfl: 3    traZODone (DESYREL) 100 mg tablet, Take 200 mg by mouth daily at bedtime, Disp: , Rfl:     clobetasol (TEMOVATE) 0.05 % external solution, Apply to dry scalp daily for up to 4 weeks.  Leave shampoo on the scalp x 15 minutes, then rinse. (Patient not taking: Reported on 10/14/2024), Disp: 50 mL, Rfl: 2    pantoprazole (PROTONIX) 40 mg tablet, Take 1 tablet (40 mg total) by mouth daily (Patient not taking: Reported on 3/10/2025), Disp: 90 tablet, Rfl: 1     No Known Allergies    Objective     Vitals:    03/10/25 1304   BP: 138/78   Pulse: 84   Temp: 98.6 °F (37 °C)   SpO2: 99%       General Appearance:    No acute distress. Cooperative. Appears stated age.   Head:    Normocephalic. Atraumatic.    Eyes:    Lids, conjunctiva normal. No scleral icterus.   Ears:    Normal external ears.   Nose:   Nares normal. No drainage.   Mouth:   Lips, tongue normal. Mucosa normal. Phonation normal.   Neck:   Supple. Symmetrical.   Back:     Symmetric. No CVA tenderness.   Lungs:     Normal respiratory effort. Clear to auscultation bilaterally.   Chest wall:    No tenderness or deformity.   Heart:    Regular rate and rhythm. Normal S1 and S2. No murmur. No JVD. No edema.   Abdomen:     Soft.  Non-tender. Bowel sounds active.   Genitourinary:      Extremities:   Extremities normal. Atraumatic. No cyanosis.   Skin:   Warm and dry. No pallor, jaundice, rash, ecchymoses.   Neurologic:   Alert and oriented to person, place, time. No focal deficit.         Current Weight: Weight - Scale: 81.2 kg (179 lb)    First Weight:    Wt Readings from Last 3 Encounters:   03/10/25 81.2 kg (179 lb)   02/26/25 82.6 kg (182 lb)   02/25/25 81.6 kg (180 lb)        Lab Results:  I have personally reviewed pertinent labs.      3/4/25  Na 143, K 4.2, Cl 103, TCO2 30, BUN 10, Cr 0.96 eGFR 60 ml/min.         Radiology review:  Ct abd/pelvis symmetric renal enhancement, no calculi, no hydronephrosis, no renal lesions.       Yasmin Nails,       This consultation note was produced in part using a dictation device which may document imprecise wording from author's original intent.

## 2025-03-10 NOTE — PATIENT INSTRUCTIONS
You have stage 3 kidney disease, most recent function is 60%. Salomon yaoave sita stage 3 lkidney disease. Your kidney function can be mildly off due to age,diabetes, medication like Lithium.  Discussed different effects that lithium have --chronic kidney disease, cysts in the kidneys, nephrogenic diabetes insipidus. Do not see evidence of issues due to your kidney aside from chronic kidney disease.   No changes to your medications.  Tradjenta is okay for your kidney.  Advise you see psychiatrist sooner for appt.

## 2025-03-21 ENCOUNTER — TELEPHONE (OUTPATIENT)
Dept: FAMILY MEDICINE CLINIC | Facility: CLINIC | Age: 70
End: 2025-03-21

## 2025-03-21 NOTE — TELEPHONE ENCOUNTER
Patient would like to know if a handicap placard can be filled out due to the issues she has with the pain I dania quick

## 2025-03-24 ENCOUNTER — HOSPITAL ENCOUNTER (EMERGENCY)
Facility: HOSPITAL | Age: 70
Discharge: HOME | End: 2025-03-24
Attending: EMERGENCY MEDICINE
Payer: COMMERCIAL

## 2025-03-24 ENCOUNTER — OFFICE VISIT (OUTPATIENT)
Dept: OBGYN CLINIC | Facility: CLINIC | Age: 70
End: 2025-03-24
Payer: COMMERCIAL

## 2025-03-24 ENCOUNTER — OFFICE VISIT (OUTPATIENT)
Dept: URGENT CARE | Facility: CLINIC | Age: 70
End: 2025-03-24
Payer: COMMERCIAL

## 2025-03-24 VITALS
TEMPERATURE: 97.7 F | HEART RATE: 87 BPM | DIASTOLIC BLOOD PRESSURE: 62 MMHG | SYSTOLIC BLOOD PRESSURE: 132 MMHG | RESPIRATION RATE: 20 BRPM | OXYGEN SATURATION: 99 %

## 2025-03-24 VITALS
RESPIRATION RATE: 16 BRPM | OXYGEN SATURATION: 99 % | TEMPERATURE: 97.7 F | SYSTOLIC BLOOD PRESSURE: 124 MMHG | DIASTOLIC BLOOD PRESSURE: 65 MMHG | HEART RATE: 72 BPM

## 2025-03-24 VITALS — WEIGHT: 179 LBS | BODY MASS INDEX: 35.14 KG/M2 | HEIGHT: 60 IN

## 2025-03-24 DIAGNOSIS — R42 DIZZY: Primary | ICD-10-CM

## 2025-03-24 DIAGNOSIS — R73.9 HYPERGLYCEMIA: ICD-10-CM

## 2025-03-24 DIAGNOSIS — E11.9 DIABETES (HCC): Primary | ICD-10-CM

## 2025-03-24 DIAGNOSIS — R11.0 NAUSEA: ICD-10-CM

## 2025-03-24 DIAGNOSIS — M17.12 PRIMARY OSTEOARTHRITIS OF LEFT KNEE: Primary | ICD-10-CM

## 2025-03-24 DIAGNOSIS — T14.8XXA BRUISING: ICD-10-CM

## 2025-03-24 LAB
ALBUMIN SERPL BCG-MCNC: 3.8 G/DL (ref 3.5–5)
ALP SERPL-CCNC: 57 U/L (ref 34–104)
ALT SERPL W P-5'-P-CCNC: 13 U/L (ref 7–52)
ANION GAP SERPL CALCULATED.3IONS-SCNC: 7 MMOL/L (ref 4–13)
APTT PPP: 29 SECONDS (ref 23–34)
AST SERPL W P-5'-P-CCNC: 23 U/L (ref 13–39)
BASOPHILS # BLD AUTO: 0.07 THOUSANDS/ÂΜL (ref 0–0.1)
BASOPHILS NFR BLD AUTO: 1 % (ref 0–1)
BILIRUB SERPL-MCNC: 0.59 MG/DL (ref 0.2–1)
BUN SERPL-MCNC: 17 MG/DL (ref 5–25)
CALCIUM SERPL-MCNC: 9.3 MG/DL (ref 8.4–10.2)
CHLORIDE SERPL-SCNC: 105 MMOL/L (ref 96–108)
CO2 SERPL-SCNC: 27 MMOL/L (ref 21–32)
CREAT SERPL-MCNC: 1.12 MG/DL (ref 0.6–1.3)
EOSINOPHIL # BLD AUTO: 0.12 THOUSAND/ÂΜL (ref 0–0.61)
EOSINOPHIL NFR BLD AUTO: 1 % (ref 0–6)
ERYTHROCYTE [DISTWIDTH] IN BLOOD BY AUTOMATED COUNT: 12.5 % (ref 11.6–15.1)
FLUAV AG UPPER RESP QL IA.RAPID: NEGATIVE
FLUBV AG UPPER RESP QL IA.RAPID: NEGATIVE
GFR SERPL CREATININE-BSD FRML MDRD: 50 ML/MIN/1.73SQ M
GLUCOSE SERPL-MCNC: 230 MG/DL (ref 65–140)
GLUCOSE SERPL-MCNC: 271 MG/DL (ref 65–140)
GLUCOSE SERPL-MCNC: 277 MG/DL (ref 65–140)
HCT VFR BLD AUTO: 36.7 % (ref 34.8–46.1)
HGB BLD-MCNC: 11.6 G/DL (ref 11.5–15.4)
IMM GRANULOCYTES # BLD AUTO: 0.02 THOUSAND/UL (ref 0–0.2)
IMM GRANULOCYTES NFR BLD AUTO: 0 % (ref 0–2)
INR PPP: 0.89 (ref 0.85–1.19)
LYMPHOCYTES # BLD AUTO: 1.24 THOUSANDS/ÂΜL (ref 0.6–4.47)
LYMPHOCYTES NFR BLD AUTO: 14 % (ref 14–44)
MAGNESIUM SERPL-MCNC: 2 MG/DL (ref 1.9–2.7)
MCH RBC QN AUTO: 30.1 PG (ref 26.8–34.3)
MCHC RBC AUTO-ENTMCNC: 31.6 G/DL (ref 31.4–37.4)
MCV RBC AUTO: 95 FL (ref 82–98)
MONOCYTES # BLD AUTO: 0.61 THOUSAND/ÂΜL (ref 0.17–1.22)
MONOCYTES NFR BLD AUTO: 7 % (ref 4–12)
NEUTROPHILS # BLD AUTO: 7.15 THOUSANDS/ÂΜL (ref 1.85–7.62)
NEUTS SEG NFR BLD AUTO: 77 % (ref 43–75)
NRBC BLD AUTO-RTO: 0 /100 WBCS
PLATELET # BLD AUTO: 343 THOUSANDS/UL (ref 149–390)
PMV BLD AUTO: 9.5 FL (ref 8.9–12.7)
POTASSIUM SERPL-SCNC: 4 MMOL/L (ref 3.5–5.3)
PROT SERPL-MCNC: 6.3 G/DL (ref 6.4–8.4)
PROTHROMBIN TIME: 12.5 SECONDS (ref 12.3–15)
RBC # BLD AUTO: 3.85 MILLION/UL (ref 3.81–5.12)
SARS-COV+SARS-COV-2 AG RESP QL IA.RAPID: NEGATIVE
SODIUM SERPL-SCNC: 139 MMOL/L (ref 135–147)
WBC # BLD AUTO: 9.21 THOUSAND/UL (ref 4.31–10.16)

## 2025-03-24 PROCEDURE — 82948 REAGENT STRIP/BLOOD GLUCOSE: CPT | Performed by: PHYSICIAN ASSISTANT

## 2025-03-24 PROCEDURE — 99284 EMERGENCY DEPT VISIT MOD MDM: CPT

## 2025-03-24 PROCEDURE — 87804 INFLUENZA ASSAY W/OPTIC: CPT | Performed by: EMERGENCY MEDICINE

## 2025-03-24 PROCEDURE — 80053 COMPREHEN METABOLIC PANEL: CPT | Performed by: EMERGENCY MEDICINE

## 2025-03-24 PROCEDURE — 87811 SARS-COV-2 COVID19 W/OPTIC: CPT | Performed by: EMERGENCY MEDICINE

## 2025-03-24 PROCEDURE — 85730 THROMBOPLASTIN TIME PARTIAL: CPT | Performed by: EMERGENCY MEDICINE

## 2025-03-24 PROCEDURE — 36415 COLL VENOUS BLD VENIPUNCTURE: CPT | Performed by: EMERGENCY MEDICINE

## 2025-03-24 PROCEDURE — 82948 REAGENT STRIP/BLOOD GLUCOSE: CPT

## 2025-03-24 PROCEDURE — S9088 SERVICES PROVIDED IN URGENT: HCPCS | Performed by: PHYSICIAN ASSISTANT

## 2025-03-24 PROCEDURE — 83735 ASSAY OF MAGNESIUM: CPT | Performed by: EMERGENCY MEDICINE

## 2025-03-24 PROCEDURE — 99213 OFFICE O/P EST LOW 20 MIN: CPT | Performed by: ORTHOPAEDIC SURGERY

## 2025-03-24 PROCEDURE — 99284 EMERGENCY DEPT VISIT MOD MDM: CPT | Performed by: EMERGENCY MEDICINE

## 2025-03-24 PROCEDURE — 99213 OFFICE O/P EST LOW 20 MIN: CPT | Performed by: PHYSICIAN ASSISTANT

## 2025-03-24 PROCEDURE — 85610 PROTHROMBIN TIME: CPT | Performed by: EMERGENCY MEDICINE

## 2025-03-24 PROCEDURE — 20610 DRAIN/INJ JOINT/BURSA W/O US: CPT | Performed by: ORTHOPAEDIC SURGERY

## 2025-03-24 PROCEDURE — 85025 COMPLETE CBC W/AUTO DIFF WBC: CPT | Performed by: EMERGENCY MEDICINE

## 2025-03-24 RX ORDER — QUETIAPINE FUMARATE 50 MG/1
50 TABLET, FILM COATED ORAL
COMMUNITY
Start: 2025-03-17

## 2025-03-24 RX ORDER — BUPIVACAINE HYDROCHLORIDE 2.5 MG/ML
4 INJECTION, SOLUTION INFILTRATION; PERINEURAL
Status: COMPLETED | OUTPATIENT
Start: 2025-03-24 | End: 2025-03-24

## 2025-03-24 RX ORDER — TRIAMCINOLONE ACETONIDE 40 MG/ML
80 INJECTION, SUSPENSION INTRA-ARTICULAR; INTRAMUSCULAR
Status: COMPLETED | OUTPATIENT
Start: 2025-03-24 | End: 2025-03-24

## 2025-03-24 RX ADMIN — TRIAMCINOLONE ACETONIDE 80 MG: 40 INJECTION, SUSPENSION INTRA-ARTICULAR; INTRAMUSCULAR at 14:00

## 2025-03-24 RX ADMIN — BUPIVACAINE HYDROCHLORIDE 4 ML: 2.5 INJECTION, SOLUTION INFILTRATION; PERINEURAL at 14:00

## 2025-03-24 NOTE — ASSESSMENT & PLAN NOTE
Orders:    Large joint arthrocentesis: L knee  Reviewed physical exam with the patient at time of visit. She recently experienced a flare-up on symptoms. She was offered and accepted an injection(s) of kenalog and marcaine to her Left knee(s) for symptomatic relief of pain and inflammation. Patient tolerated the treatment(s) well. Ice and post injection protocol advised. Weightbearing activities as tolerated. She will be seen for follow-up in 3 months for re-evaluation and consideration for repeat injections as necessary. Patient expresses understanding and is in agreement with this treatment plan. The patient was given the opportunity to ask questions or present concerns.      The patient has degenerative joint disease of her left knee.  Under aseptic technique, the left knee was injected with Kenalog and Marcaine.  She tolerated procedure well.  Return back in 3 months for evaluation

## 2025-03-24 NOTE — PROGRESS NOTES
Assessment & Plan  Primary osteoarthritis of left knee    Orders:    Large joint arthrocentesis: L knee  Reviewed physical exam with the patient at time of visit. She recently experienced a flare-up on symptoms. She was offered and accepted an injection(s) of kenalog and marcaine to her Left knee(s) for symptomatic relief of pain and inflammation. Patient tolerated the treatment(s) well. Ice and post injection protocol advised. Weightbearing activities as tolerated. She will be seen for follow-up in 3 months for re-evaluation and consideration for repeat injections as necessary. Patient expresses understanding and is in agreement with this treatment plan. The patient was given the opportunity to ask questions or present concerns.      The patient has degenerative joint disease of her left knee.  Under aseptic technique, the left knee was injected with Kenalog and Marcaine.  She tolerated procedure well.  Return back in 3 months for evaluation      Subjective:   Patient ID: Lsia Rich  1955     HPI  Patient is a 69 y.o. female who presents for follow-up evaluation of her Left knee(s). The patient was last seen on 11/11/2024, where she received a CS injection for treatment of osteoarthritis of her Left knee(s). The patient reports relief following the previous CS injection. However, the pain returned approximately 2-3 weeks ago. On today's presentation, she reports pain along the medial and lateral aspect of her knee(s). She states that the pain is exacerbated by weight bearing activities, knee flexion, and ambulating stairs. She also reports recent bruising. She denies feelings of instability or weakness. She denies numbness or tingling.          The following portions of the patient's history were reviewed and updated as appropriate:  Past medical history, past surgical history, Family history, social history, current medications and allergies    Past Medical History:   Diagnosis Date    Allergic      Depression     Diabetes mellitus (HCC)     Disease of thyroid gland     GERD (gastroesophageal reflux disease)     Hyperchloremia     Hypertension     Psychiatric disorder        Past Surgical History:   Procedure Laterality Date    ANKLE SURGERY      Incision    DILATION AND CURETTAGE OF UTERUS         Family History   Problem Relation Age of Onset    No Known Problems Family     Diabetes Mother     Heart disease Mother     Stroke Mother     Diabetes Father     Stroke Father     No Known Problems Maternal Grandmother     No Known Problems Maternal Grandfather     No Known Problems Paternal Grandmother     No Known Problems Paternal Grandfather     Diabetes Brother     No Known Problems Paternal Aunt        Social History     Socioeconomic History    Marital status: Single     Spouse name: None    Number of children: None    Years of education: None    Highest education level: None   Occupational History    None   Tobacco Use    Smoking status: Never     Passive exposure: Never    Smokeless tobacco: Never   Vaping Use    Vaping status: Never Used   Substance and Sexual Activity    Alcohol use: Not Currently    Drug use: No    Sexual activity: Not Currently   Other Topics Concern    None   Social History Narrative    None     Social Drivers of Health     Financial Resource Strain: Low Risk  (4/29/2024)    [facility]    Overall Financial Resource Strain (CARDIA)     Difficulty of Paying Living Expenses: Not very hard   Food Insecurity: No Food Insecurity (10/5/2024)    Nursing - Inadequate Food Risk Classification     Worried About Running Out of Food in the Last Year: Never true     Ran Out of Food in the Last Year: Never true     Ran Out of Food in the Last Year: Not on file   Transportation Needs: No Transportation Needs (10/5/2024)    PRAPARE - Transportation     Lack of Transportation (Medical): No     Lack of Transportation (Non-Medical): No    Physical Activity: Not on file   Stress: Not on file   Social Connections: Unknown (11/1/2024)    Received from [employer]     How often do you feel lonely or isolated from those around you? (Adult - for ages 18 years and over): Not on file   Intimate Partner Violence: Not At Risk (4/29/2024)    [facility] from [facility], [facility], [facility]    Humiliation, Afraid, Rape, and Kick questionnaire     Fear of Current or Ex-Partner: No     Emotionally Abused: No     Physically Abused: No     Sexually Abused: No   Housing Stability: Low Risk  (10/5/2024)    Housing Stability Vital Sign     Unable to Pay for Housing in the Last Year: No     Number of Times Moved in the Last Year: 0     Homeless in the Last Year: No         Current Outpatient Medications:     aspirin (ECOTRIN LOW STRENGTH) 81 mg EC tablet, Take 81 mg by mouth daily, Disp: , Rfl:     Cholecalciferol (Vitamin D) 50 MCG (2000 UT) tablet, Take 1 tablet (2,000 Units total) by mouth daily, Disp: 90 tablet, Rfl: 3    Diclofenac Sodium (VOLTAREN) 1 %, Apply 2 g topically 4 (four) times a day, Disp: 100 g, Rfl: 0    glucose blood (Contour Next Test) test strip, Use 1 each 2 (two) times a day Use as instructed, Disp: 100 strip, Rfl: 5    glucose blood test strip, Use 1 each as needed Use as instructed, Disp: , Rfl:     levothyroxine 88 mcg tablet, Take 1 tablet (88 mcg total) by mouth daily, Disp: 90 tablet, Rfl: 3    linaGLIPtin (Tradjenta) 5 MG TABS, Take 5 mg by mouth daily, Disp: 90 tablet, Rfl: 3    lisinopril (ZESTRIL) 5 mg tablet, Take 1 tablet (5 mg total) by mouth daily, Disp: 90 tablet, Rfl: 1    pravastatin (PRAVACHOL) 40 mg tablet, Take 1 tablet (40 mg total) by mouth daily, Disp: 90 tablet, Rfl: 3    QUEtiapine (SEROquel) 50 mg tablet, Take 50 mg by mouth daily at bedtime, Disp: , Rfl:     traZODone (DESYREL) 100 mg tablet, Take 200 mg by mouth daily at bedtime, Disp: , Rfl:      clobetasol (TEMOVATE) 0.05 % external solution, Apply to dry scalp daily for up to 4 weeks.  Leave shampoo on the scalp x 15 minutes, then rinse. (Patient not taking: Reported on 10/14/2024), Disp: 50 mL, Rfl: 2    pantoprazole (PROTONIX) 40 mg tablet, Take 1 tablet (40 mg total) by mouth daily (Patient not taking: Reported on 3/10/2025), Disp: 90 tablet, Rfl: 1    No Known Allergies    Review of Systems   Constitutional:  Negative for chills, fever and unexpected weight change.   HENT:  Negative for hearing loss, nosebleeds and sore throat.    Eyes:  Negative for pain, redness and visual disturbance.   Respiratory:  Negative for cough, shortness of breath and wheezing.    Cardiovascular:  Negative for chest pain, palpitations and leg swelling.   Gastrointestinal:  Negative for abdominal pain, nausea and vomiting.   Endocrine: Negative for polydipsia and polyuria.   Genitourinary:  Negative for dysuria and hematuria.   Skin:  Negative for rash and wound.   Neurological:  Negative for dizziness, numbness and headaches.   Psychiatric/Behavioral:  Negative for decreased concentration and suicidal ideas. The patient is not nervous/anxious.    All other systems reviewed and are negative.       Objective:  Ht 5' (1.524 m)   Wt 81.2 kg (179 lb)   BMI 34.96 kg/m²     Ortho Exam  left knee(s) -   Patient ambulates with steady gait pattern  Uses walker assistive device  Genu Varus anatomical deformity  Abrasion present on anterior knee.    Mild generalized soft tissue swelling or effusion noted  ROM (5° - 115°)   Strength: 5/5 throughout  TTP over medial joint line, TTP over lateral joint line, TTP over pes anserine bursa, no popliteal fullness appreciated on exam   Flexor and extensor mechanisms are intact   Knee is stable to varus and valgus stress  - Lachman's  - Anterior Drawer, - Posterior Drawer  - Braulio's  Patella tracks centrally with palpable crepitus  Calf compartments are soft and supple  - Lidia's  sign  2+ DP and PT pulses with brisk capillary refill to the toes  Sural, saphenous, tibial, superficial, and deep peroneal motor and sensory distributions intact  Sensation light touch intact distally      Physical Exam  HENT:      Head: Normocephalic and atraumatic.      Nose: Nose normal.   Eyes:      Conjunctiva/sclera: Conjunctivae normal.   Cardiovascular:      Rate and Rhythm: Normal rate.   Pulmonary:      Effort: Pulmonary effort is normal.   Musculoskeletal:      Cervical back: Neck supple.   Skin:     General: Skin is warm and dry.      Capillary Refill: Capillary refill takes less than 2 seconds.   Neurological:      Mental Status: She is alert and oriented to person, place, and time.   Psychiatric:         Mood and Affect: Mood normal.         Behavior: Behavior normal.          Diagnostic Test Review:  X-Ray of left knee obtained on 11/8/2024 were reviewed and demonstrate no acute osseous abnormalities or fractures. Moderate osteoarthritis.       Large joint arthrocentesis: L knee  Universal Protocol:  procedure performed by consultantConsent: Verbal consent obtained.  Risks and benefits: risks, benefits and alternatives were discussed  Consent given by: patient  Timeout called at: 3/24/2025 1:35 PM.  Patient understanding: patient states understanding of the procedure being performed  Patient consent: the patient's understanding of the procedure matches consent given  Site marked: the operative site was marked  Radiology Images displayed and confirmed. If images not available, report reviewed: imaging studies available  Patient identity confirmed: verbally with patient  Supporting Documentation  Indications: pain and joint swelling   Procedure Details  Location: knee - L knee  Needle gauge: 21 G.  Ultrasound guidance: no  Approach: anterolateral  Medications administered: 4 mL bupivacaine 0.25 %; 80 mg triamcinolone acetonide 40 mg/mL    Patient tolerance: patient tolerated the procedure well with no  immediate complications  Dressing:  Sterile dressing applied             Scribe Attestation      I,:  Yeni Orozco am acting as a scribe while in the presence of the attending physician.:       I,:  Barrett Wilson DO personally performed the services described in this documentation    as scribed in my presence.:

## 2025-03-24 NOTE — DISCHARGE INSTRUCTIONS
Your blood work was of reassuring against renal failure, liver failure, abnormal blood counts such as low platelets or other causes of bruising or bleeding.  Your blood sugar was slightly elevated likely related to increased carb intake as well as steroid use, however this does not require any emergent intervention.    I recommend that you follow-up with your primary physician for further care and reevaluation.

## 2025-03-24 NOTE — ED PROVIDER NOTES
Time reflects when diagnosis was documented in both MDM as applicable and the Disposition within this note       Time User Action Codes Description Comment    3/24/2025  6:32 PM Shan Mchugh [E11.9] Diabetes (HCC)     3/24/2025  6:32 PM Shan Mchugh [T14.8XXA] Bruising     3/24/2025  6:32 PM Shan Mchugh [R73.9] Hyperglycemia     3/24/2025  6:32 PM Shan Mchugh [R11.0] Nausea           ED Disposition       ED Disposition   Discharge    Condition   Stable    Date/Time   Mon Mar 24, 2025  6:32 PM    Comment   Lisa A McGorry discharge to home/self care.                   Assessment & Plan       Medical Decision Making  69-year-old female with past medical history of bipolar disorder, diabetes, CKD presents with elevated blood sugar.  Also noted two bruises. Patient was sent in by urgent care; note reviewed. Unclear as to any specific concerns. No call/report received in ER. Patient ate carbohydrate heavy meal after triamcinolone joint injection and had a moderately elevated blood sugar.    Will get a BMP r/o electrolyte abnormalities  PT/INR to rule out coagulopathy  CBC r/o thrombocytopenia, anemia    Clinically I suspect her symptoms are likely due to her increased carbohydrate/food intake and mild trauma causing bruising which was forgotten.  No bony tenderness.  No cardiac symptoms    Problems Addressed:  Bruising: acute illness or injury  Diabetes (HCC): chronic illness or injury  Hyperglycemia: acute illness or injury  Nausea: acute illness or injury    Amount and/or Complexity of Data Reviewed  Labs: ordered.        ED Course as of 03/24/25 1952   Mon Mar 24, 2025   1951 Labs reassuring, mild hyperglycemia, patient is comfortable with discharge home.       Medications - No data to display    ED Risk Strat Scores                                                History of Present Illness       Chief Complaint   Patient presents with    Dizziness    Nausea     Bleeding/Bruising     Random bruising       Past Medical History:   Diagnosis Date    Allergic     Depression     Diabetes mellitus (HCC)     Disease of thyroid gland     GERD (gastroesophageal reflux disease)     Hyperchloremia     Hypertension     Psychiatric disorder       Past Surgical History:   Procedure Laterality Date    ANKLE SURGERY      Incision    DILATION AND CURETTAGE OF UTERUS        Family History   Problem Relation Age of Onset    No Known Problems Family     Diabetes Mother     Heart disease Mother     Stroke Mother     Diabetes Father     Stroke Father     No Known Problems Maternal Grandmother     No Known Problems Maternal Grandfather     No Known Problems Paternal Grandmother     No Known Problems Paternal Grandfather     Diabetes Brother     No Known Problems Paternal Aunt       Social History     Tobacco Use    Smoking status: Never     Passive exposure: Never    Smokeless tobacco: Never   Vaping Use    Vaping status: Never Used   Substance Use Topics    Alcohol use: Not Currently    Drug use: No      E-Cigarette/Vaping    E-Cigarette Use Never User       E-Cigarette/Vaping Substances    Nicotine No     THC No     CBD No     Flavoring No     Other No     Unknown No       I have reviewed and agree with the history as documented.     Patient presents with elevated blood sugar, bruising noted earlier today.  She states she felt slightly nauseated after her appointment for a knee arthrocentesis and steroid injection however did eat a large carbohydrate meal prior      Dizziness  Associated symptoms: nausea    Nausea  The primary symptoms include nausea.       Review of Systems   Gastrointestinal:  Positive for nausea.   Neurological:  Positive for dizziness.           Objective       ED Triage Vitals   Temperature Pulse Blood Pressure Respirations SpO2 Patient Position - Orthostatic VS   03/24/25 1705 03/24/25 1705 03/24/25 1706 03/24/25 1705 03/24/25 1705 03/24/25 1705   97.7 °F (36.5 °C) 83  112/52 16 99 % Sitting      Temp Source Heart Rate Source BP Location FiO2 (%) Pain Score    03/24/25 1705 03/24/25 1705 03/24/25 1705 -- 03/24/25 1705    Tympanic Monitor Left arm  No Pain      Vitals      Date and Time Temp Pulse SpO2 Resp BP Pain Score FACES Pain Rating User   03/24/25 1845 -- 72 99 % 16 124/65 -- -- AM   03/24/25 1830 -- 71 99 % 16 140/63 -- -- AM   03/24/25 1706 -- -- -- -- 112/52 -- -- NAD   03/24/25 1705 97.7 °F (36.5 °C) 83 99 % 16 -- No Pain -- NAD            Physical Exam  Vitals and nursing note reviewed.   Constitutional:       Appearance: Normal appearance. She is well-developed.   HENT:      Head: Normocephalic and atraumatic.   Eyes:      Conjunctiva/sclera: Conjunctivae normal.      Pupils: Pupils are equal, round, and reactive to light.   Neck:      Trachea: No tracheal deviation.   Cardiovascular:      Rate and Rhythm: Normal rate and regular rhythm.      Heart sounds: Normal heart sounds. No murmur heard.  Pulmonary:      Effort: Pulmonary effort is normal. No respiratory distress.      Breath sounds: Normal breath sounds. No wheezing or rales.   Abdominal:      General: Bowel sounds are normal. There is no distension.      Palpations: Abdomen is soft.      Tenderness: There is no abdominal tenderness.   Musculoskeletal:         General: No deformity.      Cervical back: Normal range of motion and neck supple.   Skin:     General: Skin is warm and dry.      Capillary Refill: Capillary refill takes less than 2 seconds.   Neurological:      General: No focal deficit present.      Mental Status: She is alert and oriented to person, place, and time.      Sensory: No sensory deficit.   Psychiatric:         Mood and Affect: Mood normal.         Judgment: Judgment normal.         Results Reviewed       Procedure Component Value Units Date/Time    Comprehensive metabolic panel [[id number]]  (Abnormal) Collected: 03/24/25 1805    Lab Status: Final result Specimen: Blood from Arm, Left  Updated: 03/24/25 1829     Sodium 139 mmol/L      Potassium 4.0 mmol/L      Chloride 105 mmol/L      CO2 27 mmol/L      ANION GAP 7 mmol/L      BUN 17 mg/dL      Creatinine 1.12 mg/dL      Glucose 230 mg/dL      Calcium 9.3 mg/dL      AST 23 U/L      ALT 13 U/L      Alkaline Phosphatase 57 U/L      Total Protein 6.3 g/dL      Albumin 3.8 g/dL      Total Bilirubin 0.59 mg/dL      eGFR 50 ml/min/1.73sq m     Narrative:      Slightly Hemolyzed:Results may be affected.  National Kid[employer] Foundation guidelines for Chronic Kidney Disease (CKD):     Stage 1 with normal or high GFR (GFR > 90 mL/min/1.73 square meters)    Stage 2 Mild CKD (GFR = 60-89 mL/min/1.73 square meters)    Stage 3A Moderate CKD (GFR = 45-59 mL/min/1.73 square meters)    Stage 3B Moderate CKD (GFR = 30-44 mL/min/1.73 square meters)    Stage 4 Severe CKD (GFR = 15-29 mL/min/1.73 square meters)    Stage 5 End Stage CKD (GFR <15 mL/min/1.73 square meters)  Note: GFR calculation is accurate only with a steady state creatinine    Magnesium [[id number]]  (Normal) Collected: 03/24/25 1805    Lab Status: Final result Specimen: Blood from Arm, Left Updated: 03/24/25 1829     Magnesium 2.0 mg/dL     Narrative:      Slightly Hemolyzed:Results may be affected.    FLU/COVID Rapid Antigen (30 min. TAT) - Preferred screening test in ED [[id number]]  (Normal) Collected: 03/24/25 1725    Lab Status: Final result Specimen: Nares from Nose Updated: 03/24/25 1751     SARS COV Rapid Antigen Negative     Influenza A Rapid Antigen Negative     Influenza B Rapid Antigen Negative    Narrative:      This test has been performed using the Quidel Rosana 2 FLU+SARS Antigen test under the Emergency Use Authorization (EUA). This test has been validated by the  and verified by the performing laboratory. The Rosana uses lateral flow immunofluorescent sandwich assay to detect SARS-COV, Influenza A and Influenza B Antigen.     The Quidel Rosana 2 SARS Antigen test does not  differentiate between SARS-CoV and SARS-CoV-2.     Negative results are presumptive and may be confirmed with a molecular assay, if necessary, for patient management. Negative results do not rule out SARS-CoV-2 or influenza infection and should not be used as the sole basis for treatment or patient management decisions. A negative test result may occur if the level of antigen in a sample is below the limit of detection of this test.     Positive results are indicative of the presence of viral antigens, but do not rule out bacterial infection or co-infection with other viruses.     All test results should be used as an adjunct to clinical observations and other information available to the provider.    FOR PEDIATRIC PATIENTS - copy/paste COVID Guidelines URL to browser: [email or web address]    Protime-INR [[id number]]  (Normal) Collected: 03/24/25 1728    Lab Status: Final result Specimen: Blood from Arm, Left Updated: 03/24/25 1749     Protime 12.5 seconds      INR 0.89    Narrative:      INR Therapeutic Range    Indication                                             INR Range      Atrial Fibrillation                                               2.0-3.0  Hypercoagulable State                                    2.0.2.3  Left Ventricular Asist Device                            2.0-3.0  Mechanical Heart Valve                                  -    Aortic(with afib, MI, embolism, HF, LA enlargement,    and/or coagulopathy)                                     2.0-3.0 (2.5-3.5)     Mitral                                                             2.5-3.5  Prosthetic/Bioprosthetic Heart Valve               2.0-3.0  Venous thromboembolism (VTE: VT, PE        2.0-3.0    APTT [[id number]]  (Normal) Collected: 03/24/25 1728    Lab Status: Final result Specimen: Blood from Arm, Left Updated: 03/24/25 1749     PTT 29 seconds     CBC and differential [[id number]]  (Abnormal)  Collected: 03/24/25 1725    Lab Status: Final result Specimen: Blood from Arm, Right Updated: 03/24/25 1734     WBC 9.21 Thousand/uL      RBC 3.85 Million/uL      Hemoglobin 11.6 g/dL      Hematocrit 36.7 %      MCV 95 fL      MCH 30.1 pg      MCHC 31.6 g/dL      RDW 12.5 %      MPV 9.5 fL      Platelets 343 Thousands/uL      nRBC 0 /100 WBCs      Segmented % 77 %      Immature Grans % 0 %      Lymphocytes % 14 %      Monocytes % 7 %      Eosinophils Relative 1 %      Basophils Relative 1 %      Absolute Neutrophils 7.15 Thousands/µL      Absolute Immature Grans 0.02 Thousand/uL      Absolute Lymphocytes 1.24 Thousands/µL      Absolute Monocytes 0.61 Thousand/µL      Eosinophils Absolute 0.12 Thousand/µL      Basophils Absolute 0.07 Thousands/µL     Fingerstick Glucose (POCT) [[id number]]  (Abnormal) Collected: 03/24/25 1728    Lab Status: Final result Specimen: Blood Updated: 03/24/25 1728     POC Glucose 277 mg/dl             No orders to display       Procedures    ED Medication and Procedure Management   Prior to Admission Medications   Prescriptions Last Dose Informant Patient Reported? Taking?   Cholecalciferol (Vitamin D) 50 MCG (2000 UT) tablet  Self No No   Sig: Take 1 tablet (2,000 Units total) by mouth daily   Diclofenac Sodium (VOLTAREN) 1 %  Self No No   Sig: Apply 2 g topically 4 (four) times a day   QUEtiapine (SEROquel) 50 mg tablet   Yes No   Sig: Take 50 mg by mouth daily at bedtime   aspirin (ECOTRIN LOW STRENGTH) 81 mg EC tablet  Self Yes No   Sig: Take 81 mg by mouth daily   clobetasol (TEMOVATE) 0.05 % external solution  Self No No   Sig: Apply to dry scalp daily for up to 4 weeks.  Leave shampoo on the scalp x 15 minutes, then rinse.   Patient not taking: Reported on 10/14/2024   glucose blood (Contour Next Test) test strip  Self No No   Sig: Use 1 each 2 (two) times a day Use as instructed   glucose blood test strip  Self Yes No   Sig: Use 1 each as needed Use as instructed   levothyroxine 88  mcg tablet  Self No No   Sig: Take 1 tablet (88 mcg total) by mouth daily   linaGLIPtin (Tradjenta) 5 MG TABS  Self No No   Sig: Take 5 mg by mouth daily   lisinopril (ZESTRIL) 5 mg tablet  Self No No   Sig: Take 1 tablet (5 mg total) by mouth daily   pantoprazole (PROTONIX) 40 mg tablet  Self No No   Sig: Take 1 tablet (40 mg total) by mouth daily   Patient not taking: Reported on 3/10/2025   pravastatin (PRAVACHOL) 40 mg tablet  Self No No   Sig: Take 1 tablet (40 mg total) by mouth daily   traZODone (DESYREL) 100 mg tablet  Self Yes No   Sig: Take 200 mg by mouth daily at bedtime      Facility-Administered Medications Last Administration Doses Remaining   bupivacaine (MARCAINE) 0.25 % injection 4 mL 3/24/2025  2:00 PM 0   triamcinolone acetonide (Kenalog-40) 40 mg/mL injection 80 mg 3/24/2025  2:00 PM 0        Discharge Medication List as of 3/24/2025  6:33 PM        CONTINUE these medications which have NOT CHANGED    Details   aspirin (ECOTRIN LOW STRENGTH) 81 mg EC tablet Take 81 mg by mouth daily, Historical Med      Cholecalciferol (Vitamin D) 50 MCG (2000 UT) tablet Take 1 tablet (2,000 Units total) by mouth daily, Starting Fri 3/8/2024, Normal      clobetasol (TEMOVATE) 0.05 % external solution Apply to dry scalp daily for up to 4 weeks.  Leave shampoo on the scalp x 15 minutes, then rinse., Normal      Diclofenac Sodium (VOLTAREN) 1 % Apply 2 g topically 4 (four) times a day, Starting Fri 11/8/2024, Normal      !! glucose blood (Contour Next Test) test strip Use 1 each 2 (two) times a day Use as instructed, Starting Mon 6/17/2024, Normal      !! glucose blood test strip Use 1 each as needed Use as instructed, Historical Med      levothyroxine 88 mcg tablet Take 1 tablet (88 mcg total) by mouth daily, Starting Thu 1/16/2025, Normal      linaGLIPtin (Tradjenta) 5 MG TABS Take 5 mg by mouth daily, Starting Thu 1/16/2025, Normal      lisinopril (ZESTRIL) 5 mg tablet Take 1 tablet (5 mg total) by mouth daily,  Starting Thu 12/26/2024, Normal      pantoprazole (PROTONIX) 40 mg tablet Take 1 tablet (40 mg total) by mouth daily, Starting Fri 11/22/2024, Normal      pravastatin (PRAVACHOL) 40 mg tablet Take 1 tablet (40 mg total) by mouth daily, Starting Thu 1/16/2025, Normal      QUEtiapine (SEROquel) 50 mg tablet Take 50 mg by mouth daily at bedtime, Starting Mon 3/17/2025, Historical Med      traZODone (DESYREL) 100 mg tablet Take 200 mg by mouth daily at bedtime, Starting Mon 2/19/2024, Historical Med       !! - Potential duplicate medications found. Please discuss with provider.        No discharge procedures on file.  ED SEPSIS DOCUMENTATION   Time reflects when diagnosis was documented in both MDM as applicable and the Disposition within this note       Time User Action Codes Description Comment    3/24/2025  6:32 PM Shan Mchugh [E11.9] Diabetes (HCC)     3/24/2025  6:32 PM Shan cMhugh [T14.8XXA] Bruising     3/24/2025  6:32 PM Shan Mchugh [R73.9] Hyperglycemia     3/24/2025  6:32 PM Shan Mchugh [R11.0] Nausea                  Shan Mchugh DO  03/24/25 1952

## 2025-03-25 ENCOUNTER — TELEPHONE (OUTPATIENT)
Dept: FAMILY MEDICINE CLINIC | Facility: HOME HEALTHCARE | Age: 70
End: 2025-03-25

## 2025-03-28 ENCOUNTER — APPOINTMENT (EMERGENCY)
Dept: RADIOLOGY | Facility: HOSPITAL | Age: 70
End: 2025-03-28
Payer: COMMERCIAL

## 2025-03-28 ENCOUNTER — HOSPITAL ENCOUNTER (EMERGENCY)
Facility: HOSPITAL | Age: 70
Discharge: HOME | End: 2025-03-28
Attending: EMERGENCY MEDICINE
Payer: COMMERCIAL

## 2025-03-28 VITALS
HEART RATE: 68 BPM | HEIGHT: 60 IN | DIASTOLIC BLOOD PRESSURE: 60 MMHG | RESPIRATION RATE: 22 BRPM | TEMPERATURE: 97.4 F | WEIGHT: 179 LBS | SYSTOLIC BLOOD PRESSURE: 150 MMHG | BODY MASS INDEX: 35.14 KG/M2 | OXYGEN SATURATION: 100 %

## 2025-03-28 DIAGNOSIS — L08.9 INFECTION OF TOE: ICD-10-CM

## 2025-03-28 DIAGNOSIS — L97.509: Primary | ICD-10-CM

## 2025-03-28 DIAGNOSIS — L30.4 INTERTRIGO: ICD-10-CM

## 2025-03-28 LAB
ALBUMIN SERPL BCG-MCNC: 3.8 G/DL (ref 3.5–5)
ALP SERPL-CCNC: 52 U/L (ref 34–104)
ALT SERPL W P-5'-P-CCNC: 11 U/L (ref 7–52)
ANION GAP SERPL CALCULATED.3IONS-SCNC: 6 MMOL/L (ref 4–13)
APTT PPP: 28 SECONDS (ref 23–34)
AST SERPL W P-5'-P-CCNC: 14 U/L (ref 13–39)
ATRIAL RATE: 72 BPM
BASOPHILS # BLD AUTO: 0.04 THOUSANDS/ÂΜL (ref 0–0.1)
BASOPHILS NFR BLD AUTO: 1 % (ref 0–1)
BILIRUB SERPL-MCNC: 0.61 MG/DL (ref 0.2–1)
BUN SERPL-MCNC: 16 MG/DL (ref 5–25)
CALCIUM SERPL-MCNC: 9.3 MG/DL (ref 8.4–10.2)
CHLORIDE SERPL-SCNC: 107 MMOL/L (ref 96–108)
CO2 SERPL-SCNC: 28 MMOL/L (ref 21–32)
CREAT SERPL-MCNC: 1 MG/DL (ref 0.6–1.3)
CRP SERPL QL: 2.4 MG/L
EOSINOPHIL # BLD AUTO: 0.09 THOUSAND/ÂΜL (ref 0–0.61)
EOSINOPHIL NFR BLD AUTO: 1 % (ref 0–6)
ERYTHROCYTE [DISTWIDTH] IN BLOOD BY AUTOMATED COUNT: 12.5 % (ref 11.6–15.1)
ERYTHROCYTE [SEDIMENTATION RATE] IN BLOOD: 4 MM/HOUR (ref 0–29)
GFR SERPL CREATININE-BSD FRML MDRD: 58 ML/MIN/1.73SQ M
GLUCOSE SERPL-MCNC: 105 MG/DL (ref 65–140)
HCT VFR BLD AUTO: 33.2 % (ref 34.8–46.1)
HGB BLD-MCNC: 10.5 G/DL (ref 11.5–15.4)
IMM GRANULOCYTES # BLD AUTO: 0.02 THOUSAND/UL (ref 0–0.2)
IMM GRANULOCYTES NFR BLD AUTO: 0 % (ref 0–2)
INR PPP: 0.91 (ref 0.85–1.19)
LYMPHOCYTES # BLD AUTO: 1.74 THOUSANDS/ÂΜL (ref 0.6–4.47)
LYMPHOCYTES NFR BLD AUTO: 23 % (ref 14–44)
MCH RBC QN AUTO: 30 PG (ref 26.8–34.3)
MCHC RBC AUTO-ENTMCNC: 31.6 G/DL (ref 31.4–37.4)
MCV RBC AUTO: 95 FL (ref 82–98)
MONOCYTES # BLD AUTO: 0.58 THOUSAND/ÂΜL (ref 0.17–1.22)
MONOCYTES NFR BLD AUTO: 8 % (ref 4–12)
NEUTROPHILS # BLD AUTO: 5.08 THOUSANDS/ÂΜL (ref 1.85–7.62)
NEUTS SEG NFR BLD AUTO: 67 % (ref 43–75)
NRBC BLD AUTO-RTO: 0 /100 WBCS
P AXIS: 69 DEGREES
PLATELET # BLD AUTO: 286 THOUSANDS/UL (ref 149–390)
PMV BLD AUTO: 9.4 FL (ref 8.9–12.7)
POTASSIUM SERPL-SCNC: 4.1 MMOL/L (ref 3.5–5.3)
PR INTERVAL: 168 MS
PROT SERPL-MCNC: 6.1 G/DL (ref 6.4–8.4)
PROTHROMBIN TIME: 12.7 SECONDS (ref 12.3–15)
QRS AXIS: 44 DEGREES
QRSD INTERVAL: 84 MS
QT INTERVAL: 376 MS
QTC INTERVAL: 411 MS
RBC # BLD AUTO: 3.5 MILLION/UL (ref 3.81–5.12)
SODIUM SERPL-SCNC: 141 MMOL/L (ref 135–147)
T WAVE AXIS: 54 DEGREES
VENTRICULAR RATE: 72 BPM
WBC # BLD AUTO: 7.55 THOUSAND/UL (ref 4.31–10.16)

## 2025-03-28 PROCEDURE — 80053 COMPREHEN METABOLIC PANEL: CPT

## 2025-03-28 PROCEDURE — 36415 COLL VENOUS BLD VENIPUNCTURE: CPT

## 2025-03-28 PROCEDURE — 96374 THER/PROPH/DIAG INJ IV PUSH: CPT

## 2025-03-28 PROCEDURE — 93005 ELECTROCARDIOGRAM TRACING: CPT

## 2025-03-28 PROCEDURE — 93010 ELECTROCARDIOGRAM REPORT: CPT | Performed by: INTERNAL MEDICINE

## 2025-03-28 PROCEDURE — 85652 RBC SED RATE AUTOMATED: CPT

## 2025-03-28 PROCEDURE — 99284 EMERGENCY DEPT VISIT MOD MDM: CPT

## 2025-03-28 PROCEDURE — 85730 THROMBOPLASTIN TIME PARTIAL: CPT

## 2025-03-28 PROCEDURE — 73660 X-RAY EXAM OF TOE(S): CPT

## 2025-03-28 PROCEDURE — 86140 C-REACTIVE PROTEIN: CPT

## 2025-03-28 PROCEDURE — 85025 COMPLETE CBC W/AUTO DIFF WBC: CPT

## 2025-03-28 PROCEDURE — 85610 PROTHROMBIN TIME: CPT

## 2025-03-28 RX ORDER — CEPHALEXIN 500 MG/1
500 CAPSULE ORAL EVERY 6 HOURS SCHEDULED
Qty: 27 CAPSULE | Refills: 0 | Status: SHIPPED | OUTPATIENT
Start: 2025-03-28 | End: 2025-04-04

## 2025-03-28 RX ORDER — DIPHENHYDRAMINE HYDROCHLORIDE 50 MG/ML
25 INJECTION, SOLUTION INTRAMUSCULAR; INTRAVENOUS ONCE
Status: COMPLETED | OUTPATIENT
Start: 2025-03-28 | End: 2025-03-28

## 2025-03-28 RX ORDER — CEPHALEXIN 500 MG/1
500 CAPSULE ORAL EVERY 6 HOURS SCHEDULED
Qty: 27 CAPSULE | Refills: 0 | Status: SHIPPED | OUTPATIENT
Start: 2025-03-28 | End: 2025-03-28

## 2025-03-28 RX ORDER — KETOCONAZOLE 20 MG/G
CREAM TOPICAL DAILY
Qty: 15 G | Refills: 0 | Status: SHIPPED | OUTPATIENT
Start: 2025-03-28

## 2025-03-28 RX ORDER — CEPHALEXIN 500 MG/1
500 CAPSULE ORAL ONCE
Status: COMPLETED | OUTPATIENT
Start: 2025-03-28 | End: 2025-03-28

## 2025-03-28 RX ADMIN — DIPHENHYDRAMINE HYDROCHLORIDE 25 MG: 50 INJECTION, SOLUTION INTRAMUSCULAR; INTRAVENOUS at 09:36

## 2025-03-28 RX ADMIN — CEPHALEXIN 500 MG: 500 CAPSULE ORAL at 10:51

## 2025-03-28 NOTE — ED PROVIDER NOTES
ED Disposition       None          Assessment & Plan       Medical Decision Making  Patient is a 69 y.o F with a PMH of DM, HTN, CKD presenting to the ER complaining of bruising, infection of left third toe, and reports of possible side effects to new medications for the last 10 days. Patient reports infeciton of left third toe has been going on for about 2 weeks and she has outpatient follow up with podiatry in about 1 week. Patient states bruising is to her right knee and is not associated with any trauma. Patient takes baby aspirin daily, denies any other blood thinner use. Patient reports feelings of abdominal fullness/swelling of tongue since starting quetiapine 10 days ago.     VS stable, patient in no acute distress. Physical exam revealing excoriation at left upper thigh/inguinal region, likely intertrigo. Patient has bruising to her right knee and left forearm; no obvious swelling or deformities noted to this area, and area is not tender to palpation. Patient had bruising noted in previous ER visit. No urticarial rash noted to the body, patient handling secretions well, airway is patent, no obvious signs of allergic reaction on physical exam. Patient has ulcer to left 3rd toe with overlying erythema/swelling to that digit; left DP pulses present, ROM/sensation of left toes intact. Remainder of physical exam is benign.     Will order basic labs, EKG, esr/crp, and coag studies. EKG showing NSR at 72 bpm; no signs of arrhythmia/ischemia on my interpretation, qtc is normal. Labs showing hgb of 10.5, down from 11.6 4 days ago but around patient's baseline. Remainder of labs WNL. Discussed case with on call podiatrist who recommended treating diabetic ulcer/infection of third toe with 1 week course of Keflex. They recommended follow up outpatient with podiatry. Instructed patient to apply ketoconazole cream daily for 2 weeks to treat intertrigo, recommended patient keep area clean/dry to prevent worsening of  "intertrigo. Patient reports her psychiatrist is out of network but states she will be able to follow up with her for adjustment of current medications. Recommended patient follow up with psychiatrist as soon as possible for adjustment of current medications. Recommended patient return to the ER if symptoms worsen or change. Patient is agreeable with plan and is stable at discharge.     Amount and/or Complexity of Data Reviewed  Labs: ordered.  Radiology: ordered.    Risk  Prescription drug management.        ED Course as of 03/28/25 1045   Fri Mar 28, 2025   1020 XR 3rd toe showing \"No radiographic evidence of osteomyelitis.\"       Medications - No data to display    ED Risk Strat Scores                                                History of Present Illness       No chief complaint on file.      Past Medical History:   Diagnosis Date    Allergic     Depression     Diabetes mellitus (HCC)     Disease of thyroid gland     GERD (gastroesophageal reflux disease)     Hyperchloremia     Hypertension     Psychiatric disorder       Past Surgical History:   Procedure Laterality Date    ANKLE SURGERY      Incision    DILATION AND CURETTAGE OF UTERUS        Family History   Problem Relation Age of Onset    No Known Problems Family     Diabetes Mother     Heart disease Mother     Stroke Mother     Diabetes Father     Stroke Father     No Known Problems Maternal Grandmother     No Known Problems Maternal Grandfather     No Known Problems Paternal Grandmother     No Known Problems Paternal Grandfather     Diabetes Brother     No Known Problems Paternal Aunt       Social History     Tobacco Use    Smoking status: Never     Passive exposure: Never    Smokeless tobacco: Never   Vaping Use    Vaping status: Never Used   Substance Use Topics    Alcohol use: Not Currently    Drug use: No      E-Cigarette/Vaping    E-Cigarette Use Never User       E-Cigarette/Vaping Substances    Nicotine No     THC No     CBD No     Flavoring No     " Other No     Unknown No       I have reviewed and agree with the history as documented.     Patient is a 69 y.o F with a PMH of DM, HTN, CKD presenting to the ER complaining of bruising, infection of left third toe, and feeling of possible side effects to new medications for the last 10 days. Patient states she was just started on Seroquel 10 days ago and reports side effects to this medication.  Patient states she has felt abdominal fullness since starting this medication.  She also reports bruising to her right knee, denies trauma to the area.  Patient also reporting irritation to left upper thigh/groin region. Patient states she is taking all medications at prescribed. Patient states she has an appointment with podiatry in one week for evaluation of infection of left third toe. Patient denies chest pain, difficulty swallowing, drooling, respiratory distress, hoarseness, urticarial rash, lower extremity swelling, headaches, visual disturbances, and dysuria.           Review of Systems   Constitutional:  Negative for chills and fever.   HENT:  Negative for drooling, ear pain, sore throat, trouble swallowing and voice change.    Eyes:  Negative for visual disturbance.   Respiratory:  Negative for cough, shortness of breath, wheezing and stridor.    Cardiovascular:  Negative for chest pain, palpitations and leg swelling.   Gastrointestinal:  Negative for abdominal pain, nausea and vomiting.   Genitourinary:  Negative for dysuria.   Skin:  Negative for rash.   Neurological:  Negative for dizziness, syncope and facial asymmetry.   All other systems reviewed and are negative.          Objective       ED Triage Vitals   Temp Pulse BP Resp SpO2 Patient Position - Orthostatic VS   -- -- -- -- -- --      Temp src Heart Rate Source BP Location FiO2 (%) Pain Score    -- -- -- -- --      Vitals    None         Physical Exam  Vitals and nursing note reviewed.   Constitutional:       General: She is not in acute distress.      Appearance: Normal appearance. She is well-developed. She is not ill-appearing or toxic-appearing.   HENT:      Head: Normocephalic and atraumatic.      Nose: Nose normal.      Mouth/Throat:      Mouth: Mucous membranes are moist.      Tongue: No lesions.      Pharynx: Oropharynx is clear. Uvula midline. No pharyngeal swelling, posterior oropharyngeal erythema or uvula swelling.   Eyes:      Conjunctiva/sclera: Conjunctivae normal.   Cardiovascular:      Rate and Rhythm: Normal rate and regular rhythm.      Heart sounds: Normal heart sounds. No murmur heard.     No friction rub. No gallop.   Pulmonary:      Effort: Pulmonary effort is normal. No respiratory distress.      Breath sounds: Normal breath sounds. No stridor. No wheezing or rales.   Abdominal:      General: Abdomen is flat. There is no distension.      Palpations: Abdomen is soft.      Tenderness: There is no abdominal tenderness. There is no guarding or rebound.      Hernia: No hernia is present.   Musculoskeletal:         General: No swelling.      Cervical back: Neck supple.      Right lower leg: No edema.      Left lower leg: No swelling or tenderness. No edema.      Left foot: Normal range of motion. Swelling and tenderness present. Normal pulse.      Comments: Ulcer noted to left 3rd toe with overlying erythema/swelling to that digit; left DP pulses present/normal, ROM/sensation of left toes intact   Lymphadenopathy:      Cervical: No cervical adenopathy.   Skin:     General: Skin is warm and dry.      Capillary Refill: Capillary refill takes less than 2 seconds.      Findings: Rash is not urticarial.      Comments: Excoriation noted over left upper thigh/inguinal region in skin folds, likely intertrigo. Skin is intact, no obvious signs of overlying cellulitic infection to the area. No urticarial rash noted to entire body.    Neurological:      Mental Status: She is alert.   Psychiatric:         Mood and Affect: Mood normal.         Results Reviewed        None            No orders to display       Procedures    ED Medication and Procedure Management   Prior to Admission Medications   Prescriptions Last Dose Informant Patient Reported? Taking?   Cholecalciferol (Vitamin D) 50 MCG (2000 UT) tablet  Self No No   Sig: Take 1 tablet (2,000 Units total) by mouth daily   Diclofenac Sodium (VOLTAREN) 1 %  Self No No   Sig: Apply 2 g topically 4 (four) times a day   QUEtiapine (SEROquel) 50 mg tablet   Yes No   Sig: Take 50 mg by mouth daily at bedtime   aspirin (ECOTRIN LOW STRENGTH) 81 mg EC tablet  Self Yes No   Sig: Take 81 mg by mouth daily   clobetasol (TEMOVATE) 0.05 % external solution  Self No No   Sig: Apply to dry scalp daily for up to 4 weeks.  Leave shampoo on the scalp x 15 minutes, then rinse.   Patient not taking: Reported on 10/14/2024   glucose blood (Contour Next Test) test strip  Self No No   Sig: Use 1 each 2 (two) times a day Use as instructed   glucose blood test strip  Self Yes No   Sig: Use 1 each as needed Use as instructed   levothyroxine 88 mcg tablet  Self No No   Sig: Take 1 tablet (88 mcg total) by mouth daily   linaGLIPtin (Tradjenta) 5 MG TABS  Self No No   Sig: Take 5 mg by mouth daily   lisinopril (ZESTRIL) 5 mg tablet  Self No No   Sig: Take 1 tablet (5 mg total) by mouth daily   pantoprazole (PROTONIX) 40 mg tablet  Self No No   Sig: Take 1 tablet (40 mg total) by mouth daily   Patient not taking: Reported on 3/10/2025   pravastatin (PRAVACHOL) 40 mg tablet  Self No No   Sig: Take 1 tablet (40 mg total) by mouth daily   traZODone (DESYREL) 100 mg tablet  Self Yes No   Sig: Take 200 mg by mouth daily at bedtime      Facility-Administered Medications: None     Patient's Medications   Discharge Prescriptions    No medications on file     No discharge procedures on file.  ED SEPSIS DOCUMENTATION            Maddie Carbajal PA-C  03/28/25 1412       Maddie Carbajal PA-C  03/28/25 1413

## 2025-03-28 NOTE — DISCHARGE INSTRUCTIONS
Please take Keflex as prescribed to treat infection of toe. Please follow up with podiatry at wound care center for further management/evaluation of infection of toe. Please apply ketoconazole cream as prescribed to affected area for two weeks. Please follow up with psychiatrist for adjustment of current medication regiment. Please return to the ER if symptoms worsen or change.

## 2025-04-01 ENCOUNTER — OFFICE VISIT (OUTPATIENT)
Facility: HOSPITAL | Age: 70
End: 2025-04-01
Payer: COMMERCIAL

## 2025-04-01 VITALS
HEART RATE: 75 BPM | BODY MASS INDEX: 35.14 KG/M2 | WEIGHT: 179 LBS | RESPIRATION RATE: 20 BRPM | HEIGHT: 60 IN | SYSTOLIC BLOOD PRESSURE: 144 MMHG | TEMPERATURE: 97.5 F | DIASTOLIC BLOOD PRESSURE: 77 MMHG

## 2025-04-01 DIAGNOSIS — L97.521 DIABETIC ULCER OF TOE OF LEFT FOOT ASSOCIATED WITH DIABETES MELLITUS DUE TO UNDERLYING CONDITION, LIMITED TO BREAKDOWN OF SKIN (HCC): Primary | ICD-10-CM

## 2025-04-01 DIAGNOSIS — E08.621 DIABETIC ULCER OF TOE OF LEFT FOOT ASSOCIATED WITH DIABETES MELLITUS DUE TO UNDERLYING CONDITION, LIMITED TO BREAKDOWN OF SKIN (HCC): Primary | ICD-10-CM

## 2025-04-01 PROCEDURE — 11042 DBRDMT SUBQ TIS 1ST 20SQCM/<: CPT

## 2025-04-01 PROCEDURE — 99213 OFFICE O/P EST LOW 20 MIN: CPT

## 2025-04-01 PROCEDURE — 99214 OFFICE O/P EST MOD 30 MIN: CPT

## 2025-04-01 RX ORDER — LIDOCAINE 40 MG/G
CREAM TOPICAL ONCE
Status: COMPLETED | OUTPATIENT
Start: 2025-04-01 | End: 2025-04-01

## 2025-04-01 RX ADMIN — LIDOCAINE: 40 CREAM TOPICAL at 14:33

## 2025-04-01 NOTE — PROGRESS NOTES
Wound Procedure Treatment Left Toe D3, third    Performed by: Rajani Covington RN  Authorized by: Jhon Osullivan DPM  Associated wounds:   Wound 04/01/25 Diabetic Ulcer Toe D3, third Left    Wound cleansed with:  Soap and water   Applied topical:  Medihoney gel   Applied secondary dressing:  Gauze   Dressing secured with:  Hardik and Tape

## 2025-04-01 NOTE — PROGRESS NOTES
"Patient ID: Lisa Bocanegraorredu is a 69 y.o. female Date of Birth 1955     Diagnosis:  1. Skin ulcer of toe of left foot, limited to breakdown of skin (HCC)  -     Wound cleansing and dressings Left Toe D3, third; Future      Diagnosis ICD-10-CM Associated Orders   1. Skin ulcer of toe of left foot, limited to breakdown of skin (HCC)  L97.521 Wound cleansing and dressings Left Toe D3, third             Assessment & Plan:  Patient presents for evaluation of left third toe ulcer.  Ulcer is stable with no acute clinical signs of infection.  Obtain 3-4 servings of protein daily for wound healing.  Discussed resources for nutrition including \"myplate website\".  Discussed portion control and decreasing high starch foods, ultra processed foods.  Follow-up with podiatry outpatient for routine diabetic foot care, has scheduled upcoming appointment this Friday   Wound care dressings instructions listed below.  Surgical debridement performed see procedure note.  Discussed DM risk to lower extremities, proper shoe gear, and daily monitoring of feet.   Discussed weight loss and suitable exercise regiment.   Educated on A1C and the risks of poorly controlled Diabetes. Reviewed recent A1C:  Lab Results   Component Value Date    HGBA1C 6.3 (H) 01/08/2025     If the patient notices any systemic signs of infection including but not limited to fever, chills, nausea, vomiting or significant worsening of wound with increased drainage, redness or streaking up the foot or leg the patient should notify the office or present to the emergency room.      If wound debridement was completed today this was done in an attempt to promote healing, decrease risk of infection and for limb salvage efforts.     Goal of treatment: wound healing     Efforts to decrease pressure on the wound(s), evaluation and discussion of nutritional status, peripheral vascular status, and infection control have all been addressed with this patient.    Return in 2 " "weeks (on 4/15/2025) for Recheck, Arrive 15 minutes before you appointment time.      Chief Complaint   Patient presents with    New Patient Visit     Left 3rd toe wound for \"about 2 weeks\"  arrived with no dressing CHON. Patient states she sometimes applies powder her feet and wound area.          Subjective:   Patient presents for evaluation of left third digit ulcer.  She reports she does not recall how long ulcer has been present.  Patient's nails are significantly long and she is planning to get routine footcare this coming Friday.  She reports pain to toe but denies any drainage, redness or swelling.         The following portions of the patient's history were reviewed and updated as appropriate:   Patient Active Problem List   Diagnosis    Moderate depressed bipolar I disorder (HCC)    Constipation    Hypercholesterolemia    Hypothyroidism    Type 2 diabetes mellitus with stage 3a chronic kidney disease, without long-term current use of insulin ([facility])    Class 2 obesity due to excess calories with body mass index (BMI) of 35.0 to 35.9 in adult    Postmenopausal    Atypical squamous cells of undetermined significance on cytologic smear of cervix (ASC-US)    Essential hypertension    Vitamin D deficiency    Vitamin B12 deficiency    Stage 3 chronic kidney disease, unspecified whether stage 3a or 3b CKD (HCC)    Generalized abdominal pain    Primary osteoarthritis of left knee     Past Medical History:   Diagnosis Date    Allergic     Depression     Diabetes mellitus (HCC)     Disease of thyroid gland     GERD (gastroesophageal reflux disease)     Hyperchloremia     Hypertension     Psychiatric disorder      Past Surgical History:   Procedure Laterality Date    ANKLE SURGERY      Incision    DILATION AND CURETTAGE OF UTERUS       Social History     Socioeconomic History    Marital status: Single     Spouse name: Not on file    Number of children: Not on file    Years of education: Not on file    Highest education " level: Not on file   Occupational History    Not on file   Tobacco Use    Smoking status: Never     Passive exposure: Never    Smokeless tobacco: Never   Vaping Use    Vaping status: Never Used   Substance and Sexual Activity    Alcohol use: Not Currently    Drug use: No    Sexual activity: Not Currently   Other Topics Concern    Not on file   Social History Narrative    Not on file     Social Drivers of Health     Financial Resource Strain: Low Risk  (4/29/2024)    [facility]    Overall Financial Resource Strain (CARDIA)     Difficulty of Paying Living Expenses: Not very hard   Food Insecurity: No Food Insecurity (10/5/2024)    Nursing - Inadequate Food Risk Classification     Worried About Running Out of Food in the Last Year: Never true     Ran Out of Food in the Last Year: Never true     Ran Out of Food in the Last Year: Not on file   Transportation Needs: No Transportation Needs (10/5/2024)    PRAPARE - Transportation     Lack of Transportation (Medical): No     Lack of Transportation (Non-Medical): No   Physical Activity: Not on file   Stress: Not on file   Social Connections: Unknown (11/1/2024)    Received from [facility]    Social [facility]     How often do you feel lonely or isolated from those around you? (Adult - for ages 18 years and over): Not on file   Intimate Partner Violence: Not At Risk (4/29/2024)    [facility]    Humiliation, Afraid, Rape, and Kick questionnaire     Fear of Current or Ex-Partner: No     Emotionally Abused: No     Physically Abused: No     Sexually Abused: No   Housing Stability: Low Risk  (10/5/2024)    Housing Stability Vital Sign     Unable to Pay for Housing in the Last Year: No     Number of Times Moved in the Last Year: 0     Homeless in the Last Year: No        Current Outpatient Medications:     cephalexin (KEFLEX) 500  mg capsule, Take 1 capsule (500 mg total) by mouth every 6 (six) hours for 7 days, Disp: 27 capsule, Rfl: 0    Cholecalciferol (Vitamin D) 50 MCG (2000 UT) tablet, Take 1 tablet (2,000 Units total) by mouth daily, Disp: 90 tablet, Rfl: 3    glucose blood (Contour Next Test) test strip, Use 1 each 2 (two) times a day Use as instructed, Disp: 100 strip, Rfl: 5    glucose blood test strip, Use 1 each as needed Use as instructed, Disp: , Rfl:     ketoconazole (NIZORAL) 2 % cream, Apply topically daily Apply topically to affected area daily for 2 weeks, Disp: 15 g, Rfl: 0    levothyroxine 88 mcg tablet, Take 1 tablet (88 mcg total) by mouth daily, Disp: 90 tablet, Rfl: 3    linaGLIPtin (Tradjenta) 5 MG TABS, Take 5 mg by mouth daily, Disp: 90 tablet, Rfl: 3    lisinopril (ZESTRIL) 5 mg tablet, Take 1 tablet (5 mg total) by mouth daily, Disp: 90 tablet, Rfl: 1    pravastatin (PRAVACHOL) 40 mg tablet, Take 1 tablet (40 mg total) by mouth daily, Disp: 90 tablet, Rfl: 3    QUEtiapine (SEROquel) 50 mg tablet, Take 50 mg by mouth daily at bedtime, Disp: , Rfl:     traZODone (DESYREL) 100 mg tablet, Take 200 mg by mouth daily at bedtime, Disp: , Rfl:     aspirin (ECOTRIN LOW STRENGTH) 81 mg EC tablet, Take 81 mg by mouth daily (Patient not taking: Reported on 4/1/2025), Disp: , Rfl:     clobetasol (TEMOVATE) 0.05 % external solution, Apply to dry scalp daily for up to 4 weeks.  Leave shampoo on the scalp x 15 minutes, then rinse. (Patient not taking: Reported on 10/14/2024), Disp: 50 mL, Rfl: 2    Diclofenac Sodium (VOLTAREN) 1 %, Apply 2 g topically 4 (four) times a day (Patient not taking: Reported on 4/1/2025), Disp: 100 g, Rfl: 0    pantoprazole (PROTONIX) 40 mg tablet, Take 1 tablet (40 mg total) by mouth daily (Patient not taking: Reported on 3/10/2025), Disp: 90 tablet, Rfl: 1  Family History   Problem Relation Age of Onset    No Known Problems Family     Diabetes Mother     Heart disease Mother     Stroke Mother      Diabetes Father     Stroke Father     No Known Problems Maternal Grandmother     No Known Problems Maternal Grandfather     No Known Problems Paternal Grandmother     No Known Problems Paternal Grandfather     Diabetes Brother     No Known Problems Paternal Aunt       Review of Systems    Allergies  Patient has no known allergies.    Objective:  /77 (Patient Position: Sitting)   Pulse 75   Temp 97.5 °F (36.4 °C) (Temporal)   Resp 20   Ht 5' (1.524 m)   Wt 81.2 kg (179 lb)   BMI 34.96 kg/m²     Physical Exam            Wound 04/01/25 Toe D3, third Left (Active)   Wound Image   04/01/25 1408   Wound Description Pink 04/01/25 1316   Non-staged Wound Description Full thickness 04/01/25 1316   Wound Length (cm) 0.1 cm 04/01/25 1316   Wound Width (cm) 0.1 cm 04/01/25 1316   Wound Depth (cm) 0.1 cm 04/01/25 1316   Wound Surface Area (cm^2) 0.01 cm^2 04/01/25 1316   Wound Volume (cm^3) 0.001 cm^3 04/01/25 1316   Calculated Wound Volume (cm^3) 0 cm^3 04/01/25 1316   Drainage Amount Scant 04/01/25 1316   Drainage Description Serous 04/01/25 1316   Debby-wound Assessment Callus 04/01/25 1316   Treatments Cleansed 04/01/25 1316   Dressing Status Other (Comment) 04/01/25 1316                               Procedures           Wound Instructions:  Orders Placed This Encounter   Procedures    Wound cleansing and dressings Left Toe D3, third     Wash your hands with soap and water.  Remove old dressing, discard into plastic bag and place in trash.  Cleanse the wound with unscented soap and water (unscented dove soap)  prior to applying a clean dressing. Do not use tissue or cotton balls. Do not scrub the wound. Pat dry using gauze.  Shower no - sponge bathe only       Apply Medihoney  to the toe  wound.  Cover with gauze  Secure with tape   Change dressing every other day     Follow up in 2 weeks at wound management center     Keep appointment with podiatry on Friday       Avoid any smoking and/or second hand smoke      "Standing Status:   Future     Expiration Date:   4/15/2025         Jhon Osullivan, PENG    Portions of the record may have been created with voice recognition software. Occasional wrong word or \"sound a like\" substitutions may have occurred due to the inherent limitations of voice recognition software. Read the chart carefully and recognize, using context, where substitutions have occurred.  " "\"sound a like\" substitutions may have occurred due to the inherent limitations of voice recognition software. Read the chart carefully and recognize, using context, where substitutions have occurred.  "

## 2025-04-01 NOTE — PATIENT INSTRUCTIONS
Orders Placed This Encounter   Procedures    Wound cleansing and dressings Left Toe D3, third     Wash your hands with soap and water.  Remove old dressing, discard into plastic bag and place in trash.  Cleanse the wound with unscented soap and water (unscented dove soap)  prior to applying a clean dressing. Do not use tissue or cotton balls. Do not scrub the wound. Pat dry using gauze.  Shower no - sponge bathe only       Apply Medihoney  to the toe  wound.  Cover with gauze  Secure with tape   Change dressing every other day     Follow up in 2 weeks at wound management center     Keep appointment with podiatry on Friday       Avoid any smoking and/or second hand smoke     Standing Status:   Future     Expiration Date:   4/15/2025

## 2025-04-02 ENCOUNTER — OFFICE VISIT (OUTPATIENT)
Dept: FAMILY MEDICINE CLINIC | Facility: HOME HEALTHCARE | Age: 70
End: 2025-04-02
Payer: COMMERCIAL

## 2025-04-02 VITALS
OXYGEN SATURATION: 98 % | HEART RATE: 93 BPM | BODY MASS INDEX: 34.47 KG/M2 | TEMPERATURE: 99 F | HEIGHT: 60 IN | SYSTOLIC BLOOD PRESSURE: 126 MMHG | WEIGHT: 175.6 LBS | RESPIRATION RATE: 18 BRPM | DIASTOLIC BLOOD PRESSURE: 76 MMHG

## 2025-04-02 DIAGNOSIS — L97.521 SKIN ULCER OF TOE OF LEFT FOOT, LIMITED TO BREAKDOWN OF SKIN (HCC): Primary | ICD-10-CM

## 2025-04-02 DIAGNOSIS — F31.9 BIPOLAR 1 DISORDER (HCC): ICD-10-CM

## 2025-04-02 PROCEDURE — 99213 OFFICE O/P EST LOW 20 MIN: CPT | Performed by: PHYSICIAN ASSISTANT
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
## 2025-04-03 ENCOUNTER — APPOINTMENT (EMERGENCY)
Dept: RADIOLOGY | Facility: HOSPITAL | Age: 70
End: 2025-04-03
Payer: COMMERCIAL

## 2025-04-03 ENCOUNTER — HOSPITAL ENCOUNTER (INPATIENT)
Facility: HOSPITAL | Age: 70
LOS: 5 days | End: 2025-04-08
Attending: EMERGENCY MEDICINE | Admitting: INTERNAL MEDICINE
Payer: COMMERCIAL

## 2025-04-03 ENCOUNTER — APPOINTMENT (EMERGENCY)
Dept: NON INVASIVE DIAGNOSTICS | Facility: HOSPITAL | Age: 70
End: 2025-04-03
Payer: COMMERCIAL

## 2025-04-03 DIAGNOSIS — E11.22 TYPE 2 DIABETES MELLITUS WITH STAGE 3A CHRONIC KIDNEY DISEASE, WITHOUT LONG-TERM CURRENT USE OF INSULIN (HCC): ICD-10-CM

## 2025-04-03 DIAGNOSIS — L97.509 DM TYPE 2 WITH DIABETIC FOOT ULCER (HCC): Primary | ICD-10-CM

## 2025-04-03 DIAGNOSIS — E11.621 DM TYPE 2 WITH DIABETIC FOOT ULCER (HCC): Primary | ICD-10-CM

## 2025-04-03 DIAGNOSIS — E03.9 ACQUIRED HYPOTHYROIDISM: ICD-10-CM

## 2025-04-03 DIAGNOSIS — I10 ESSENTIAL HYPERTENSION: ICD-10-CM

## 2025-04-03 DIAGNOSIS — N18.31 TYPE 2 DIABETES MELLITUS WITH STAGE 3A CHRONIC KIDNEY DISEASE, WITHOUT LONG-TERM CURRENT USE OF INSULIN (HCC): ICD-10-CM

## 2025-04-03 DIAGNOSIS — L97.521 SKIN ULCER OF TOE OF LEFT FOOT, LIMITED TO BREAKDOWN OF SKIN (HCC): ICD-10-CM

## 2025-04-03 DIAGNOSIS — F31.32 MODERATE DEPRESSED BIPOLAR I DISORDER (HCC): ICD-10-CM

## 2025-04-03 DIAGNOSIS — E78.00 HYPERCHOLESTEROLEMIA: ICD-10-CM

## 2025-04-03 DIAGNOSIS — Z00.8 MEDICAL CLEARANCE FOR PSYCHIATRIC ADMISSION: ICD-10-CM

## 2025-04-03 DIAGNOSIS — N18.30 STAGE 3 CHRONIC KIDNEY DISEASE, UNSPECIFIED WHETHER STAGE 3A OR 3B CKD (HCC): ICD-10-CM

## 2025-04-03 DIAGNOSIS — Z60.9 SOCIAL PROBLEM: ICD-10-CM

## 2025-04-03 PROBLEM — S91.109A OPEN TOE WOUND: Status: ACTIVE | Noted: 2025-04-03

## 2025-04-03 LAB
ALBUMIN SERPL BCG-MCNC: 4.3 G/DL (ref 3.5–5)
ALP SERPL-CCNC: 58 U/L (ref 34–104)
ALT SERPL W P-5'-P-CCNC: 18 U/L (ref 7–52)
ANION GAP SERPL CALCULATED.3IONS-SCNC: 8 MMOL/L (ref 4–13)
APTT PPP: 28 SECONDS (ref 23–34)
AST SERPL W P-5'-P-CCNC: 21 U/L (ref 13–39)
BASOPHILS # BLD AUTO: 0.05 THOUSANDS/ÂΜL (ref 0–0.1)
BASOPHILS NFR BLD AUTO: 1 % (ref 0–1)
BILIRUB SERPL-MCNC: 1.16 MG/DL (ref 0.2–1)
BUN SERPL-MCNC: 21 MG/DL (ref 5–25)
CALCIUM SERPL-MCNC: 9.7 MG/DL (ref 8.4–10.2)
CHLORIDE SERPL-SCNC: 106 MMOL/L (ref 96–108)
CO2 SERPL-SCNC: 25 MMOL/L (ref 21–32)
CREAT SERPL-MCNC: 1.04 MG/DL (ref 0.6–1.3)
EOSINOPHIL # BLD AUTO: 0.06 THOUSAND/ÂΜL (ref 0–0.61)
EOSINOPHIL NFR BLD AUTO: 1 % (ref 0–6)
ERYTHROCYTE [DISTWIDTH] IN BLOOD BY AUTOMATED COUNT: 12.7 % (ref 11.6–15.1)
GFR SERPL CREATININE-BSD FRML MDRD: 55 ML/MIN/1.73SQ M
GLUCOSE SERPL-MCNC: 142 MG/DL (ref 65–140)
GLUCOSE SERPL-MCNC: 190 MG/DL (ref 65–140)
GLUCOSE SERPL-MCNC: 73 MG/DL (ref 65–140)
HCT VFR BLD AUTO: 39 % (ref 34.8–46.1)
HGB BLD-MCNC: 12.5 G/DL (ref 11.5–15.4)
IMM GRANULOCYTES # BLD AUTO: 0.03 THOUSAND/UL (ref 0–0.2)
IMM GRANULOCYTES NFR BLD AUTO: 0 % (ref 0–2)
INR PPP: 0.92 (ref 0.85–1.19)
LACTATE SERPL-SCNC: 0.7 MMOL/L (ref 0.5–2)
LYMPHOCYTES # BLD AUTO: 1.42 THOUSANDS/ÂΜL (ref 0.6–4.47)
LYMPHOCYTES NFR BLD AUTO: 13 % (ref 14–44)
MCH RBC QN AUTO: 29.7 PG (ref 26.8–34.3)
MCHC RBC AUTO-ENTMCNC: 32.1 G/DL (ref 31.4–37.4)
MCV RBC AUTO: 93 FL (ref 82–98)
MONOCYTES # BLD AUTO: 0.68 THOUSAND/ÂΜL (ref 0.17–1.22)
MONOCYTES NFR BLD AUTO: 6 % (ref 4–12)
NEUTROPHILS # BLD AUTO: 8.39 THOUSANDS/ÂΜL (ref 1.85–7.62)
NEUTS SEG NFR BLD AUTO: 79 % (ref 43–75)
NRBC BLD AUTO-RTO: 0 /100 WBCS
PLATELET # BLD AUTO: 318 THOUSANDS/UL (ref 149–390)
PMV BLD AUTO: 9.5 FL (ref 8.9–12.7)
POTASSIUM SERPL-SCNC: 3.8 MMOL/L (ref 3.5–5.3)
PROCALCITONIN SERPL-MCNC: <0.05 NG/ML
PROT SERPL-MCNC: 6.8 G/DL (ref 6.4–8.4)
PROTHROMBIN TIME: 12.8 SECONDS (ref 12.3–15)
RBC # BLD AUTO: 4.21 MILLION/UL (ref 3.81–5.12)
SODIUM SERPL-SCNC: 139 MMOL/L (ref 135–147)
WBC # BLD AUTO: 10.63 THOUSAND/UL (ref 4.31–10.16)

## 2025-04-03 PROCEDURE — 83605 ASSAY OF LACTIC ACID: CPT | Performed by: EMERGENCY MEDICINE

## 2025-04-03 PROCEDURE — 85610 PROTHROMBIN TIME: CPT | Performed by: EMERGENCY MEDICINE

## 2025-04-03 PROCEDURE — 84145 PROCALCITONIN (PCT): CPT | Performed by: EMERGENCY MEDICINE

## 2025-04-03 PROCEDURE — 73630 X-RAY EXAM OF FOOT: CPT

## 2025-04-03 PROCEDURE — 99285 EMERGENCY DEPT VISIT HI MDM: CPT | Performed by: EMERGENCY MEDICINE

## 2025-04-03 PROCEDURE — 93923 UPR/LXTR ART STDY 3+ LVLS: CPT

## 2025-04-03 PROCEDURE — 87040 BLOOD CULTURE FOR BACTERIA: CPT | Performed by: EMERGENCY MEDICINE

## 2025-04-03 PROCEDURE — 93923 UPR/LXTR ART STDY 3+ LVLS: CPT | Performed by: SURGERY

## 2025-04-03 PROCEDURE — 99284 EMERGENCY DEPT VISIT MOD MDM: CPT

## 2025-04-03 PROCEDURE — 99223 1ST HOSP IP/OBS HIGH 75: CPT | Performed by: INTERNAL MEDICINE

## 2025-04-03 PROCEDURE — 85025 COMPLETE CBC W/AUTO DIFF WBC: CPT | Performed by: EMERGENCY MEDICINE

## 2025-04-03 PROCEDURE — 80053 COMPREHEN METABOLIC PANEL: CPT | Performed by: EMERGENCY MEDICINE

## 2025-04-03 PROCEDURE — 96365 THER/PROPH/DIAG IV INF INIT: CPT

## 2025-04-03 PROCEDURE — 36415 COLL VENOUS BLD VENIPUNCTURE: CPT | Performed by: EMERGENCY MEDICINE

## 2025-04-03 PROCEDURE — 82948 REAGENT STRIP/BLOOD GLUCOSE: CPT

## 2025-04-03 PROCEDURE — 85730 THROMBOPLASTIN TIME PARTIAL: CPT | Performed by: EMERGENCY MEDICINE

## 2025-04-03 RX ORDER — ACETAMINOPHEN 325 MG/1
650 TABLET ORAL EVERY 4 HOURS PRN
Status: DISCONTINUED | OUTPATIENT
Start: 2025-04-03 | End: 2025-04-08 | Stop reason: HOSPADM

## 2025-04-03 RX ORDER — LEVOTHYROXINE SODIUM 88 UG/1
88 TABLET ORAL
Status: DISCONTINUED | OUTPATIENT
Start: 2025-04-04 | End: 2025-04-08 | Stop reason: HOSPADM

## 2025-04-03 RX ORDER — QUETIAPINE FUMARATE 50 MG/1
50 TABLET, FILM COATED ORAL
Status: DISCONTINUED | OUTPATIENT
Start: 2025-04-03 | End: 2025-04-04

## 2025-04-03 RX ORDER — PRAVASTATIN SODIUM 40 MG
40 TABLET ORAL DAILY
Status: DISCONTINUED | OUTPATIENT
Start: 2025-04-03 | End: 2025-04-08 | Stop reason: HOSPADM

## 2025-04-03 RX ORDER — INSULIN LISPRO 100 [IU]/ML
1-6 INJECTION, SOLUTION INTRAVENOUS; SUBCUTANEOUS
Status: DISCONTINUED | OUTPATIENT
Start: 2025-04-03 | End: 2025-04-08 | Stop reason: HOSPADM

## 2025-04-03 RX ORDER — CEPHALEXIN 500 MG/1
500 CAPSULE ORAL EVERY 6 HOURS SCHEDULED
Status: DISCONTINUED | OUTPATIENT
Start: 2025-04-03 | End: 2025-04-04

## 2025-04-03 RX ORDER — LISINOPRIL 5 MG/1
5 TABLET ORAL DAILY
Status: DISCONTINUED | OUTPATIENT
Start: 2025-04-03 | End: 2025-04-08 | Stop reason: HOSPADM

## 2025-04-03 RX ORDER — HEPARIN SODIUM 5000 [USP'U]/ML
5000 INJECTION, SOLUTION INTRAVENOUS; SUBCUTANEOUS EVERY 8 HOURS SCHEDULED
Status: DISCONTINUED | OUTPATIENT
Start: 2025-04-03 | End: 2025-04-08 | Stop reason: HOSPADM

## 2025-04-03 RX ORDER — ONDANSETRON 2 MG/ML
4 INJECTION INTRAMUSCULAR; INTRAVENOUS EVERY 6 HOURS PRN
Status: DISCONTINUED | OUTPATIENT
Start: 2025-04-03 | End: 2025-04-08 | Stop reason: HOSPADM

## 2025-04-03 RX ORDER — TRAZODONE HYDROCHLORIDE 100 MG/1
200 TABLET ORAL
Status: DISCONTINUED | OUTPATIENT
Start: 2025-04-03 | End: 2025-04-04

## 2025-04-03 RX ORDER — CEFAZOLIN SODIUM 2 G/50ML
2000 SOLUTION INTRAVENOUS ONCE
Status: COMPLETED | OUTPATIENT
Start: 2025-04-03 | End: 2025-04-03

## 2025-04-03 RX ADMIN — CEFAZOLIN SODIUM 2000 MG: 2 SOLUTION INTRAVENOUS at 13:47

## 2025-04-03 RX ADMIN — LISINOPRIL 5 MG: 5 TABLET ORAL at 15:41

## 2025-04-03 RX ADMIN — PRAVASTATIN SODIUM 40 MG: 40 TABLET ORAL at 15:41

## 2025-04-03 RX ADMIN — TRAZODONE HYDROCHLORIDE 200 MG: 100 TABLET ORAL at 21:37

## 2025-04-03 RX ADMIN — HEPARIN SODIUM 5000 UNITS: 5000 INJECTION INTRAVENOUS; SUBCUTANEOUS at 15:41

## 2025-04-03 RX ADMIN — QUETIAPINE FUMARATE 50 MG: 50 TABLET ORAL at 21:37

## 2025-04-03 RX ADMIN — HEPARIN SODIUM 5000 UNITS: 5000 INJECTION INTRAVENOUS; SUBCUTANEOUS at 21:37

## 2025-04-03 RX ADMIN — CEPHALEXIN 500 MG: 500 CAPSULE ORAL at 21:40

## 2025-04-03 SDOH — SOCIAL STABILITY - SOCIAL INSECURITY: PROBLEM RELATED TO SOCIAL ENVIRONMENT, UNSPECIFIED: Z60.9

## 2025-04-03 NOTE — NURSING NOTE
In bathroom she locked the bathroom door. Instructed to open it. I spoke with her brother and gave update.

## 2025-04-03 NOTE — ASSESSMENT & PLAN NOTE
"Lab Results   Component Value Date    HGBA1C 6.3 (H) 01/08/2025       No results for input(s): \"POCGLU\" in the last 72 hours.    Blood Sugar Average: Last 72 hrs:    Continue Accu-Cheks before meals and at bedtime with sliding scale insulin coverage accordingly  Hold home oral antihyperglycemics  Humalog SQ before meals  Monitor glucose and adjust medications accordingly  "

## 2025-04-03 NOTE — PLAN OF CARE
Problem: Potential for Falls  Goal: Patient will remain free of falls  Description: INTERVENTIONS:- Educate patient/family on patient safety including physical limitations- Instruct patient to call for assistance with activity - Consult OT/PT to assist with strengthening/mobility - Keep Call bell within reach- Keep bed low and locked with side rails adjusted as appropriate- Keep care items and personal belongings within reach- Initiate and maintain comfort rounds- Make Fall Risk Sign visible to staff- Offer Toileting every 2 Hours, in advance of need- Initiate/Maintain bed alarm- Obtain necessary fall risk management equipment: d- Apply yellow socks and bracelet for high fall risk patients- Consider moving patient to room near nurses station  INTERVENTIONS:- Educate patient/family on patient safety including physical limitations- Instruct patient to call for assistance with activity - Consult OT/PT to assist with strengthening/mobility - Keep Call bell within reach- Keep bed low and locked with side rails adjusted as appropriate- Keep care items and personal belongings within reach- Initiate and maintain comfort rounds- Make Fall Risk Sign visible to staff- Offer Toileting every  Hours, in advance of need- Initiate/Maintain bed alarm- Obtain necessary fall risk management equipment: yellow socks- Apply yellow socks and bracelet for high fall risk patients- Consider moving patient to room near nurses station  Outcome: Not Progressing     Problem: PAIN - ADULT  Goal: Verbalizes/displays adequate comfort level or baseline comfort level  Description: Interventions:- Encourage patient to monitor pain and request assistance- Assess pain using appropriate pain scale- Administer analgesics based on type and severity of pain and evaluate response- Implement non-pharmacological measures as appropriate and evaluate response- Consider cultural and social influences on pain and pain management- Notify physician/advanced  practitioner if interventions unsuccessful or patient reports new pain  Outcome: Not Progressing     Problem: INFECTION - ADULT  Goal: Absence or prevention of progression during hospitalization  Description: INTERVENTIONS:- Assess and monitor for signs and symptoms of infection- Monitor lab/diagnostic results- Monitor all insertion sites, i.e. indwelling lines, tubes, and drains- Monitor endotracheal if appropriate and nasal secretions for changes in amount and color- [place] appropriate cooling/warming therapies per order- Administer medications as ordered- Instruct and encourage patient and family to use good hand hygiene technique- Identify and instruct in appropriate isolation precautions for identified infection/condition  Outcome: Not Progressing  Goal: Absence of fever/infection during neutropenic period  Description: INTERVENTIONS:- Monitor WBC  Outcome: Not Progressing     Problem: SAFETY ADULT  Goal: Patient will remain free of falls  Description: INTERVENTIONS:- Educate patient/family on patient safety including physical limitations- Instruct patient to call for assistance with activity - Consult OT/PT to assist with strengthening/mobility - Keep Call bell within reach- Keep bed low and locked with side rails adjusted as appropriate- Keep care items and personal belongings within reach- Initiate and maintain comfort rounds- Make Fall Risk Sign visible to staff- Offer Toileting every 2 Hours, in advance of need- Initiate/Maintain bed alarm- Obtain necessary fall risk management equipment: d- Apply yellow socks and bracelet for high fall risk patients- Consider moving patient to room near nurses station  INTERVENTIONS:- Educate patient/family on patient safety including physical limitations- Instruct patient to call for assistance with activity - Consult OT/PT to assist with strengthening/mobility - Keep Call bell within reach- Keep bed low and locked with side rails adjusted as appropriate- Keep care  items and personal belongings within reach- Initiate and maintain comfort rounds- Make Fall Risk Sign visible to staff- Offer Toileting every  Hours, in advance of need- Initiate/Maintain bed alarm- Obtain necessary fall risk management equipment: yellow socks- Apply yellow socks and bracelet for high fall risk patients- Consider moving patient to room near nurses station  Outcome: Not Progressing  Goal: Maintain or return to baseline ADL function  Description: INTERVENTIONS:-  Assess patient's ability to carry out ADLs; assess patient's baseline for ADL function and identify physical deficits which impact ability to perform ADLs (bathing, care of mouth/teeth, toileting, grooming, dressing, etc.)- Assess/evaluate cause of self-care deficits - Assess range of motion- Assess patient's mobility; develop plan if impaired- Assess patient's need for assistive devices and provide as appropriate- Encourage maximum independence but intervene and supervise when necessary- Involve family in performance of ADLs- Assess for home care needs following discharge - Consider OT consult to assist with ADL evaluation and planning for discharge- Provide patient education as appropriate  Outcome: Not Progressing  Goal: Maintains/Returns to pre admission functional level  Description: INTERVENTIONS:- Perform AM-PAC 6 Click Basic Mobility/ Daily Activity assessment daily.- Set and communicate daily mobility goal to care team and patient/family/caregiver. - Collaborate with rehabilitation services on mobility goals if consulted- Perform Range of Motion 3 times a day.- Reposition patient every 2 hours.- Dangle patient 3 times a day- Stand patient 3 times a day- Ambulate patient 3 times a day- Out of bed to chair 3 times a day - Out of bed for meals 3 times a day- Out of bed for toileting- Record patient progress and toleration of activity level   Outcome: Not Progressing     Problem: DISCHARGE PLANNING  Goal: Discharge to home or other  facility with appropriate resources  Description: INTERVENTIONS:- Identify barriers to discharge w/patient and caregiver- Arrange for needed discharge resources and transportation as appropriate- Identify discharge learning needs (meds, wound care, etc.)- Arrange for interpretive services to assist at discharge as needed- Refer to Case Management Department for coordinating discharge planning if the patient needs post-hospital services based on physician/advanced practitioner order or complex needs related to functional status, cognitive ability, or social support system  Outcome: Not Progressing     Problem: Knowledge Deficit  Goal: Patient/family/caregiver demonstrates understanding of disease process, treatment plan, medications, and discharge instructions  Description: Complete learning assessment and assess knowledge base.Interventions:- Provide teaching at level of understanding- Provide teaching via preferred learning methods  Outcome: Not Progressing     Problem: SKIN/TISSUE INTEGRITY - ADULT  Goal: Incision(s), wounds(s) or drain site(s) healing without S/S of infection  Description: INTERVENTIONS- Assess and document dressing, incision, wound bed, drain sites and surrounding tissue- Provide patient and family education- Perform skin care/dressing changes every shift  Outcome: Not Progressing

## 2025-04-03 NOTE — ASSESSMENT & PLAN NOTE
Patient with ulcer of left third toe  ED visit 3/28- prescribed Keflex 500 mg Q6H x 7 days (ends tomorrow)  Seen by wound care/podiatry, Dr. Osullivan, on 4/1  Routine diabetic foot exam scheduled for tomorrow 4/4  Patient endorses chills but denies foot pain, numbness, tingling  Does not meet SIRS criteria  XR foot 4/3- No radiographic evidence of osteomyelitis   Follow-up VAS arterial duplex  Podiatry consultation appreciated  Continue PTA Keflex for now ; likely discontinue if patient remains nonseptic appearing

## 2025-04-03 NOTE — NURSING NOTE
Denies pain to left 3rd toe. She ate well her meal tray. Left 3rd to slight pink no distresss call bell near

## 2025-04-03 NOTE — ED PROVIDER NOTES
"Time reflects when diagnosis was documented in both MDM as applicable and the Disposition within this note       Time User Action Codes Description Comment    4/3/2025  2:20 PM Ryan Yoon Add [E11.621,  L97.509] DM type 2 with diabetic foot ulcer (HCC)           ED Disposition       ED Disposition   Admit    Condition   Stable    Date/Time   Thu Apr 3, 2025  2:20 PM    Comment   Case was discussed with Dr. Huff and the patient's admission status was agreed to be Admission Status: inpatient status to the service of Dr. Huff .               Assessment & Plan       Medical Decision Making  Amount and/or Complexity of Data Reviewed  Labs: ordered.  Radiology: ordered.    Risk  Prescription drug management.  Decision regarding hospitalization.        Left toe third digit discoloration:    69-year-old female with a history of hypertension chronic kidney disease diabetes resents to the emergency department for return visit of bruising of the left third toe.  Workup at that time was unremarkable (28th of March).  Patient does have an ulcer to the left toe with overlying erythema induration and swelling to the digit.  DP also's are intact range of motion and sensation is intact.  A 10-day course of Keflex, followed up with wound care w/ podiatry.    Discussed with the dietary advise arterial vascular studies, admit for IV antibiotics and consultation, case was discussed with the hospitalist service.    Portions of the record may have been created with voice recognition software. Occasional wrong word or \"sound a like\" substitutions may have occurred due to the inherent limitations of voice recognition software. Read the chart carefully and recognize, using context, where substitutions have occurred.       ED Course as of 04/03/25 1557   Thu Apr 03, 2025   1307 Patient seen and evaluated, longstanding history of contusion on the left foot, is a diabetic seen and evaluated multiple times for this previously, claims it " is getting worse, photo inserted in chart, baseline labs obtained along with x-ray of the foot.    Focused differential diagnosis in this patient is as follows: Contusion versus cellulitis versus osteomyelitis, patient has a strong history of bipolar disorder which makes the reliable history vehicle to obtain.   1351 Podiatry contacted w/ photo inserted into chart.   1408 Case discussed with podiatry, Dr. Osullivan, admission, vascular workup, broad-spectrum antibiotics.   1421 Case d/w Dr. Huff will admit to [facility] service.   1439 As per vascular technician, imaging unremarkable.       Medications   levothyroxine tablet 88 mcg (has no administration in time range)   lisinopril (ZESTRIL) tablet 5 mg (5 mg Oral Given 4/3/25 1541)   pravastatin (PRAVACHOL) tablet 40 mg (40 mg Oral Given 4/3/25 1541)   QUEtiapine (SEROquel) tablet 50 mg (has no administration in time range)   traZODone (DESYREL) tablet 200 mg (has no administration in time range)   acetaminophen (TYLENOL) tablet 650 mg (has no administration in time range)   ondansetron (ZOFRAN) injection 4 mg (has no administration in time range)   heparin (porcine) subcutaneous injection 5,000 Units (5,000 Units Subcutaneous Given 4/3/25 1541)   insulin lispro (HumALOG/ADMELOG) 100 units/mL subcutaneous injection 1-6 Units (has no administration in time range)   insulin lispro (HumALOG/ADMELOG) 100 units/mL subcutaneous injection 1-6 Units (has no administration in time range)   cephalexin (KEFLEX) capsule 500 mg (has no administration in time range)   ceFAZolin (ANCEF) IVPB (premix in dextrose) 2,000 mg 50 mL (0 mg Intravenous Stopped 4/3/25 1417)       ED Risk Strat Scores                            SBIRT 20yo+      Flowsheet Row Most Recent Value   Initial Alcohol Screen: US AUDIT-C     1. How often do you have a drink containing alcohol? 0 Filed at: 04/03/2025 1237   2. How many drinks containing alcohol do you have on a typical day you are drinking?  0 Filed at:  04/03/2025 1237   3a. Male UNDER 65: How often do you have five or more drinks on one occasion? 0 Filed at: 04/03/2025 1237   3b. FEMALE Any Age, or MALE 65+: How often do you have 4 or more drinks on one occassion? 0 Filed at: 04/03/2025 1237   Audit-C Score 0 Filed at: 04/03/2025 1237   SHARAD: How many times in the past year have you...    Used an illegal drug or used a prescription medication for non-medical reasons? Never Filed at: 04/03/2025 1237                            History of Present Illness       Chief Complaint   Patient presents with    Wound Check     Patient arrived from home for wound check of left foot middle toe. Patient denies pain.       Past Medical History:   Diagnosis Date    Allergic     Depression     Diabetes mellitus (HCC)     Disease of thyroid gland     GERD (gastroesophageal reflux disease)     Hyperchloremia     Hypertension     Psychiatric disorder       Past Surgical History:   Procedure Laterality Date    ANKLE SURGERY      Incision    DILATION AND CURETTAGE OF UTERUS        Family History   Problem Relation Age of Onset    No Known Problems Family     Diabetes Mother     Heart disease Mother     Stroke Mother     Diabetes Father     Stroke Father     No Known Problems Maternal Grandmother     No Known Problems Maternal Grandfather     No Known Problems Paternal Grandmother     No Known Problems Paternal Grandfather     Diabetes Brother     No Known Problems Paternal Aunt       Social History     Tobacco Use    Smoking status: Never     Passive exposure: Never    Smokeless tobacco: Never   Vaping Use    Vaping status: Never Used   Substance Use Topics    Alcohol use: Not Currently    Drug use: No      E-Cigarette/Vaping    E-Cigarette Use Never User       E-Cigarette/Vaping Substances    Nicotine No     THC No     CBD No     Flavoring No     Other No     Unknown No       I have reviewed and agree with the history as documented.     HPI    69-year-old female, history of central  hypertension moderate to suppressive bipolar 1 disorder, obesity, presents the emergency department with a history of diabetes, stage III chronic kidney disease, previously seen and treated for an ulcer on the toe with Keflex, finished antibiotics, symptoms are getting worse in terms of the pain.  Patient denies any fever, chills, recent new traumatic injury to the distal left affected extremity.      Review of Systems   Constitutional: Negative.  Negative for diaphoresis, fatigue and fever.   HENT: Negative.     Eyes: Negative.    Respiratory: Negative.  Negative for chest tightness, shortness of breath and wheezing.    Cardiovascular: Negative.  Negative for chest pain, palpitations and leg swelling.   Gastrointestinal: Negative.    Endocrine: Negative.    Genitourinary: Negative.    Musculoskeletal: Negative.    Skin:  Positive for wound.   Allergic/Immunologic: Negative.    Neurological: Negative.    Hematological: Negative.    Psychiatric/Behavioral: Negative.             Objective       ED Triage Vitals [04/03/25 1236]   Temperature Pulse Blood Pressure Respirations SpO2 Patient Position - Orthostatic VS   98.2 °F (36.8 °C) 92 (!) 187/77 18 97 % Sitting      Temp Source Heart Rate Source BP Location FiO2 (%) Pain Score    Temporal Monitor Left arm -- No Pain      Vitals      Date and Time Temp Pulse SpO2 Resp BP Pain Score FACES Pain Rating User   04/03/25 1353 -- 69 96 % 16 165/86 -- -- AM   04/03/25 1245 -- 77 97 % 16 133/63 -- -- AM   04/03/25 1236 98.2 °F (36.8 °C) 92 97 % 18 187/77 No Pain -- AS            Physical Exam  Vitals and nursing note reviewed.   Constitutional:       General: She is not in acute distress.     Appearance: Normal appearance. She is normal weight. She is not ill-appearing, toxic-appearing or diaphoretic.   HENT:      Head: Normocephalic and atraumatic.      Right Ear: External ear normal.      Left Ear: External ear normal.      Nose: Nose normal.      Mouth/Throat:      Mouth:  Mucous membranes are moist.      Pharynx: Oropharynx is clear.   Eyes:      Extraocular Movements: Extraocular movements intact.      Conjunctiva/sclera: Conjunctivae normal.      Pupils: Pupils are equal, round, and reactive to light.   Cardiovascular:      Rate and Rhythm: Normal rate and regular rhythm.      Pulses: Normal pulses.      Heart sounds: Normal heart sounds.   Pulmonary:      Effort: Pulmonary effort is normal. No respiratory distress.      Breath sounds: Normal breath sounds. No stridor. No wheezing, rhonchi or rales.   Chest:      Chest wall: No tenderness.   Abdominal:      General: Abdomen is flat. Bowel sounds are normal.      Palpations: Abdomen is soft.   Musculoskeletal:         General: Swelling, tenderness and deformity present.      Cervical back: Normal range of motion.      Comments: See photo inserted into chart.   Skin:     General: Skin is warm.      Capillary Refill: Capillary refill takes less than 2 seconds.   Neurological:      General: No focal deficit present.      Mental Status: She is alert and oriented to person, place, and time. Mental status is at baseline.   Psychiatric:         Mood and Affect: Mood normal.         Behavior: Behavior normal.           Results Reviewed       Procedure Component Value Units Date/Time    Platelet count [[id number]]     Lab Status: No result Specimen: Blood     Blood culture #1 [[id number]] Collected: 04/03/25 1347    Lab Status: In process Specimen: Blood from Hand, Right Updated: 04/03/25 1351    Comprehensive metabolic panel [[id number]]  (Abnormal) Collected: 04/03/25 1314    Lab Status: Final result Specimen: Blood from Arm, Left Updated: 04/03/25 1347     Sodium 139 mmol/L      Potassium 3.8 mmol/L      Chloride 106 mmol/L      CO2 25 mmol/L      ANION GAP 8 mmol/L      BUN 21 mg/dL      Creatinine 1.04 mg/dL      Glucose 190 mg/dL      Calcium 9.7 mg/dL      AST 21 U/L      ALT 18 U/L      Alkaline Phosphatase 58 U/L      Total Protein 6.8  g/dL      Albumin 4.3 g/dL      Total Bilirubin 1.16 mg/dL      eGFR 55 ml/min/1.73sq m     Narrative:      [employer] guidelines for Chronic Kidney Disease (CKD):     Stage 1 with normal or high GFR (GFR > 90 mL/min/1.73 square meters)    Stage 2 Mild CKD (GFR = 60-89 mL/min/1.73 square meters)    Stage 3A Moderate CKD (GFR = 45-59 mL/min/1.73 square meters)    Stage 3B Moderate CKD (GFR = 30-44 mL/min/1.73 square meters)    Stage 4 Severe CKD (GFR = 15-29 mL/min/1.73 square meters)    Stage 5 End Stage CKD (GFR <15 mL/min/1.73 square meters)  Note: GFR calculation is accurate only with a steady state creatinine    Lactic acid [[id number]]  (Normal) Collected: 04/03/25 1314    Lab Status: Final result Specimen: Blood from Arm, Left Updated: 04/03/25 1346     LACTIC ACID 0.7 mmol/L     Narrative:      Result may be elevated if tourniquet was used during collection.    Protime-INR [[id number]]  (Normal) Collected: 04/03/25 1314    Lab Status: Final result Specimen: Blood from Arm, Left Updated: 04/03/25 1336     Protime 12.8 seconds      INR 0.92    Narrative:      INR Therapeutic Range    Indication                                             INR Range      Atrial Fibrillation                                               2.0-3.0  Hypercoagulable State                                    2.0.2.3  Left Ventricular Asist Device                            2.0-3.0  Mechanical Heart Valve                                  -    Aortic(with afib, MI, embolism, HF, LA enlargement,    and/or coagulopathy)                                     2.0-3.0 (2.5-3.5)     Mitral                                                             2.5-3.5  Prosthetic/Bioprosthetic Heart Valve               2.0-3.0  Venous thromboembolism (VTE: VT, PE        2.0-3.0    APTT [[id number]]  (Normal) Collected: 04/03/25 1314    Lab Status: Final result Specimen: Blood from Arm, Left Updated: 04/03/25 1336     PTT 28 seconds      CBC and differential [[id number]]  (Abnormal) Collected: 04/03/25 1314    Lab Status: Final result Specimen: Blood from Arm, Left Updated: 04/03/25 1322     WBC 10.63 Thousand/uL      RBC 4.21 Million/uL      Hemoglobin 12.5 g/dL      Hematocrit 39.0 %      MCV 93 fL      MCH 29.7 pg      MCHC 32.1 g/dL      RDW 12.7 %      MPV 9.5 fL      Platelets 318 Thousands/uL      nRBC 0 /100 WBCs      Segmented % 79 %      Immature Grans % 0 %      Lymphocytes % 13 %      Monocytes % 6 %      Eosinophils Relative 1 %      Basophils Relative 1 %      Absolute Neutrophils 8.39 Thousands/µL      Absolute Immature Grans 0.03 Thousand/uL      Absolute Lymphocytes 1.42 Thousands/µL      Absolute Monocytes 0.68 Thousand/µL      Eosinophils Absolute 0.06 Thousand/µL      Basophils Absolute 0.05 Thousands/µL     Procalcitonin [[id number]] Collected: 04/03/25 1314    Lab Status: In process Specimen: Blood from Arm, Left Updated: 04/03/25 1319    Blood culture #2 [[id number]] Collected: 04/03/25 1314    Lab Status: In process Specimen: Blood from Arm, Left Updated: 04/03/25 1318            VAS PRISCILA and waveform analysis, multiple levels   Final Interpretation by Natalia Ram DO (04/03 1537)      XR foot 3+ views LEFT   Final Interpretation by Jamie Ramirez MD (04/03 1337)   No radiographic evidence of osteomyelitis.         Computerized Assisted Algorithm (CAA) may have been used to analyze all applicable images.         Workstation performed: [id number]             Procedures    ED Medication and Procedure Management   Prior to Admission Medications   Prescriptions Last Dose Informant Patient Reported? Taking?   Cholecalciferol (Vitamin D) 50 MCG (2000 UT) tablet  Self No Yes   Sig: Take 1 tablet (2,000 Units total) by mouth daily   Diclofenac Sodium (VOLTAREN) 1 %  Self No No   Sig: Apply 2 g topically 4 (four) times a day   Patient not taking: Reported on 4/1/2025   QUEtiapine (SEROquel) 50 mg tablet   Yes Yes   Sig: Take 50 mg by  mouth daily at bedtime   aspirin (ECOTRIN LOW STRENGTH) 81 mg EC tablet  Self Yes No   Sig: Take 81 mg by mouth daily   Patient not taking: Reported on 4/1/2025   cephalexin (KEFLEX) 500 mg capsule   No Yes   Sig: Take 1 capsule (500 mg total) by mouth every 6 (six) hours for 7 days   clobetasol (TEMOVATE) 0.05 % external solution  Self No No   Sig: Apply to dry scalp daily for up to 4 weeks.  Leave shampoo on the scalp x 15 minutes, then rinse.   Patient not taking: Reported on 10/14/2024   glucose blood (Contour Next Test) test strip Unknown Self No No   Sig: Use 1 each 2 (two) times a day Use as instructed   glucose blood test strip Unknown Self Yes No   Sig: Use 1 each as needed Use as instructed   ketoconazole (NIZORAL) 2 % cream   No Yes   Sig: Apply topically daily Apply topically to affected area daily for 2 weeks   levothyroxine 88 mcg tablet  Self No Yes   Sig: Take 1 tablet (88 mcg total) by mouth daily   linaGLIPtin (Tradjenta) 5 MG TABS  Self No Yes   Sig: Take 5 mg by mouth daily   lisinopril (ZESTRIL) 5 mg tablet  Self No Yes   Sig: Take 1 tablet (5 mg total) by mouth daily   pantoprazole (PROTONIX) 40 mg tablet  Self No No   Sig: Take 1 tablet (40 mg total) by mouth daily   Patient not taking: Reported on 3/10/2025   pravastatin (PRAVACHOL) 40 mg tablet  Self No Yes   Sig: Take 1 tablet (40 mg total) by mouth daily   traZODone (DESYREL) 100 mg tablet  Self Yes Yes   Sig: Take 200 mg by mouth daily at bedtime      Facility-Administered Medications: None     Patient's Medications   Discharge Prescriptions    No medications on file     No discharge procedures on file.  ED SEPSIS DOCUMENTATION   Time reflects when diagnosis was documented in both MDM as applicable and the Disposition within this note       Time User Action Codes Description Comment    4/3/2025  2:20 PM Ryan Yoon Add [E11.621,  L97.509] DM type 2 with diabetic foot ulcer (HCC)                  Ryan Yoon III,  DO  04/03/25 1557

## 2025-04-03 NOTE — H&P
"H&P - Hospitalist   Name: Lisa Rich 69 y.o. female I MRN: [id number]  Unit/Bed#: ED 17 I Date of Admission: 4/3/2025   Date of Service: 4/3/2025 I Hospital Day: 0     Assessment & Plan  Toe ulcer (HCC)  Patient with ulcer of left third toe  ED visit 3/28- prescribed Keflex 500 mg Q6H x 7 days (ends tomorrow)  Seen by wound care/podiatry, Dr. Osullivan, on 4/1  Routine diabetic foot exam scheduled for tomorrow 4/4  Patient endorses chills but denies foot pain, numbness, tingling  Does not meet SIRS criteria  XR foot 4/3- No radiographic evidence of osteomyelitis   Follow-up VAS arterial duplex  Podiatry consultation appreciated  Continue PTA Keflex for now ; likely discontinue if patient remains nonseptic appearing  Type 2 diabetes mellitus with stage 3a chronic kidney disease, without long-term current use of insulin ([facility])  Lab Results   Component Value Date    HGBA1C 6.3 (H) 01/08/2025       No results for input(s): \"POCGLU\" in the last 72 hours.    Blood Sugar Average: Last 72 hrs:    Continue Accu-Cheks before meals and at bedtime with sliding scale insulin coverage accordingly  Hold home oral antihyperglycemics  Humalog SQ before meals  Monitor glucose and adjust medications accordingly  Moderate depressed bipolar I disorder (HCC)  Continue Seroquel and trazodone  Hypothyroidism  Continue levothyroxine 88 mcg p.o. daily  Essential hypertension  Continue lisinopril 5 mg p.o. daily  Hydralazine as needed for SBP > 160 mmHg    VTE Pharmacologic Prophylaxis: VTE Score: 3 Moderate Risk (Score 3-4) - Pharmacological DVT Prophylaxis Ordered: heparin.  Code Status: Level 1 - Full Code  Discussion with family: Patient declined call to .     Anticipated Length of Stay: Patient will be admitted on an inpatient basis with an anticipated length of stay of greater than 2 midnights secondary to toe ulcer.    History of Present Illness   Chief Complaint: chills, ulcer of left third toe    Lisa Rich is a " 69 y.o. female with a PMH of moderate depressed bipolar disorder, hypothyroidism, type 2 DM, hypertension, and stage 3 CKD who presents with chills and a known ulcer of the left third toe. Patient states that her brother dropped her off at the ED. She has been seen multiple times for her toe ulcer over the past few days- ED, PCP, podiatry, wound care. Patient is only complaining of chills. Denies numbness, tingling, toe pain, N/V/D.     Review of Systems   Unable to perform ROS: Psychiatric disorder   Constitutional:  Positive for chills. Negative for fever.   Gastrointestinal:  Negative for diarrhea, nausea and vomiting.   Skin:  Positive for wound.   Neurological:  Negative for numbness.       Historical Information   Past Medical History:   Diagnosis Date    Allergic     Depression     Diabetes mellitus (HCC)     Disease of thyroid gland     GERD (gastroesophageal reflux disease)     Hyperchloremia     Hypertension     Psychiatric disorder      Past Surgical History:   Procedure Laterality Date    ANKLE SURGERY      Incision    DILATION AND CURETTAGE OF UTERUS       Social History     Tobacco Use    Smoking status: Never     Passive exposure: Never    Smokeless tobacco: Never   Vaping Use    Vaping status: Never Used   Substance and Sexual Activity    Alcohol use: Not Currently    Drug use: No    Sexual activity: Not Currently     E-Cigarette/Vaping    E-Cigarette Use Never User      E-Cigarette/Vaping Substances    Nicotine No     THC No     CBD No     Flavoring No     Other No     Unknown No      Meds/Allergies   I have reviewed home medications using recent Epic encounter.  Prior to Admission medications    Medication Sig Start Date End Date Taking? Authorizing Provider   cephalexin (KEFLEX) 500 mg capsule Take 1 capsule (500 mg total) by mouth every 6 (six) hours for 7 days 3/28/25 4/4/25 Yes Maddie Carbajal PA-C   Cholecalciferol (Vitamin D) 50 MCG (2000 UT) tablet Take 1 tablet (2,000 Units total) by mouth  daily 3/8/24  Yes Gadiel Young PA-C   ketoconazole (NIZORAL) 2 % cream Apply topically daily Apply topically to affected area daily for 2 weeks 3/28/25  Yes Maddie Carbajal PA-C   levothyroxine 88 mcg tablet Take 1 tablet (88 mcg total) by mouth daily 1/16/25  Yes Gadiel Young PA-C   linaGLIPtin (Tradjenta) 5 MG TABS Take 5 mg by mouth daily 1/16/25  Yes Gadiel Young PA-C   lisinopril (ZESTRIL) 5 mg tablet Take 1 tablet (5 mg total) by mouth daily 12/26/24  Yes Annetta Marti PA-C   pravastatin (PRAVACHOL) 40 mg tablet Take 1 tablet (40 mg total) by mouth daily 1/16/25  Yes Gadiel Young PA-C   QUEtiapine (SEROquel) 50 mg tablet Take 50 mg by mouth daily at bedtime 3/17/25  Yes Historical Provider, MD   traZODone (DESYREL) 100 mg tablet Take 200 mg by mouth daily at bedtime 2/19/24  Yes Historical Provider, MD   aspirin (ECOTRIN LOW STRENGTH) 81 mg EC tablet Take 81 mg by mouth daily  Patient not taking: Reported on 4/1/2025    Historical Provider, MD   clobetasol (TEMOVATE) 0.05 % external solution Apply to dry scalp daily for up to 4 weeks.  Leave shampoo on the scalp x 15 minutes, then rinse.  Patient not taking: Reported on 10/14/2024 8/24/23   Gadiel Young PA-C   Diclofenac Sodium (VOLTAREN) 1 % Apply 2 g topically 4 (four) times a day  Patient not taking: Reported on 4/1/2025 11/8/24   STEVE Nicole   glucose blood (Contour Next Test) test strip Use 1 each 2 (two) times a day Use as instructed 6/17/24   Leticia Terrell MD   glucose blood test strip Use 1 each as needed Use as instructed    Historical Provider, MD   pantoprazole (PROTONIX) 40 mg tablet Take 1 tablet (40 mg total) by mouth daily  Patient not taking: Reported on 3/10/2025 11/22/24   Gadiel L Berkel, PA-C   hydrOXYzine pamoate (VISTARIL) 50 mg capsule Take 1 capsule (50 mg total) by mouth daily at bedtime as needed (sleep)  Patient not taking: Reported on 3/23/2022  3/3/22 4/26/22  Gadiel Young PA-C     No Known  Allergies    Objective :  Temp:  [98.2 °F (36.8 °C)] 98.2 °F (36.8 °C)  HR:  [69-92] 69  BP: (133-187)/(63-86) 165/86  Resp:  [16-18] 16  SpO2:  [96 %-97 %] 96 %  O2 Device: None (Room air)    Physical Exam  Vitals and nursing note reviewed.   Constitutional:       General: She is not in acute distress.     Appearance: She is well-developed.   HENT:      Head: Normocephalic and atraumatic.   Eyes:      Conjunctiva/sclera: Conjunctivae normal.   Cardiovascular:      Rate and Rhythm: Normal rate and regular rhythm.      Heart sounds: No murmur heard.  Pulmonary:      Effort: Pulmonary effort is normal. No respiratory distress.      Breath sounds: Normal breath sounds.   Abdominal:      Palpations: Abdomen is soft.      Tenderness: There is no abdominal tenderness.   Musculoskeletal:         General: No swelling.   Skin:     General: Skin is warm and dry.      Capillary Refill: Capillary refill takes less than 2 seconds.   Neurological:      Mental Status: She is alert.   Psychiatric:         Speech: Speech is tangential.          Lab Results: I have reviewed the following results:  Results from last 7 days   Lab Units 04/03/25  1314   WBC Thousand/uL 10.63*   HEMOGLOBIN g/dL 12.5   HEMATOCRIT % 39.0   PLATELETS Thousands/uL 318   SEGS PCT % 79*   LYMPHO PCT % 13*   MONO PCT % 6   EOS PCT % 1     Results from last 7 days   Lab Units 04/03/25  1314   SODIUM mmol/L 139   POTASSIUM mmol/L 3.8   CHLORIDE mmol/L 106   CO2 mmol/L 25   BUN mg/dL 21   CREATININE mg/dL 1.04   ANION GAP mmol/L 8   CALCIUM mg/dL 9.7   ALBUMIN g/dL 4.3   TOTAL BILIRUBIN mg/dL 1.16*   ALK PHOS U/L 58   ALT U/L 18   AST U/L 21   GLUCOSE RANDOM mg/dL 190*     Results from last 7 days   Lab Units 04/03/25  1314   INR  0.92         Lab Results   Component Value Date    HGBA1C 6.3 (H) 01/08/2025    HGBA1C 6.0 (H) 10/05/2024    HGBA1C 5.8 (H) 07/30/2024     Results from last 7 days   Lab Units 04/03/25  1314   LACTIC ACID mmol/L 0.7       Imaging Results  Review: No pertinent imaging studies reviewed.  Other Study Results Review: No additional pertinent studies reviewed.    Administrative Statements   I have spent a total time of 65 minutes in caring for this patient on the day of the visit/encounter including Diagnostic results, Prognosis, Risks and benefits of tx options, Instructions for management, Patient and family education, Importance of tx compliance, Risk factor reductions, Impressions, Counseling / Coordination of care, Documenting in the medical record, Reviewing/placing orders in the medical record (including tests, medications, and/or procedures), Obtaining or reviewing history  , and Communicating with other healthcare professionals .    ** Please Note: This note has been constructed using a voice recognition system. **

## 2025-04-03 NOTE — Clinical Note
Case was discussed with thomas and the patient's admission status was agreed to be Admission Status: inpatient status to the service of Dr. thomas Pinon

## 2025-04-04 ENCOUNTER — TELEPHONE (OUTPATIENT)
Dept: PSYCHIATRY | Facility: CLINIC | Age: 70
End: 2025-04-04

## 2025-04-04 PROBLEM — Z60.9 SOCIAL PROBLEM: Status: ACTIVE | Noted: 2025-04-04

## 2025-04-04 LAB
ANION GAP SERPL CALCULATED.3IONS-SCNC: 6 MMOL/L (ref 4–13)
BASOPHILS # BLD AUTO: 0.04 THOUSANDS/ÂΜL (ref 0–0.1)
BASOPHILS NFR BLD AUTO: 1 % (ref 0–1)
BUN SERPL-MCNC: 19 MG/DL (ref 5–25)
CALCIUM SERPL-MCNC: 9.4 MG/DL (ref 8.4–10.2)
CHLORIDE SERPL-SCNC: 109 MMOL/L (ref 96–108)
CO2 SERPL-SCNC: 26 MMOL/L (ref 21–32)
CREAT SERPL-MCNC: 0.95 MG/DL (ref 0.6–1.3)
EOSINOPHIL # BLD AUTO: 0.12 THOUSAND/ÂΜL (ref 0–0.61)
EOSINOPHIL NFR BLD AUTO: 1 % (ref 0–6)
ERYTHROCYTE [DISTWIDTH] IN BLOOD BY AUTOMATED COUNT: 12.9 % (ref 11.6–15.1)
GFR SERPL CREATININE-BSD FRML MDRD: 61 ML/MIN/1.73SQ M
GLUCOSE SERPL-MCNC: 100 MG/DL (ref 65–140)
GLUCOSE SERPL-MCNC: 140 MG/DL (ref 65–140)
GLUCOSE SERPL-MCNC: 182 MG/DL (ref 65–140)
GLUCOSE SERPL-MCNC: 247 MG/DL (ref 65–140)
GLUCOSE SERPL-MCNC: 98 MG/DL (ref 65–140)
HCT VFR BLD AUTO: 34.6 % (ref 34.8–46.1)
HGB BLD-MCNC: 10.9 G/DL (ref 11.5–15.4)
IMM GRANULOCYTES # BLD AUTO: 0.04 THOUSAND/UL (ref 0–0.2)
IMM GRANULOCYTES NFR BLD AUTO: 1 % (ref 0–2)
LYMPHOCYTES # BLD AUTO: 1.86 THOUSANDS/ÂΜL (ref 0.6–4.47)
LYMPHOCYTES NFR BLD AUTO: 22 % (ref 14–44)
MAGNESIUM SERPL-MCNC: 1.9 MG/DL (ref 1.9–2.7)
MCH RBC QN AUTO: 29.6 PG (ref 26.8–34.3)
MCHC RBC AUTO-ENTMCNC: 31.5 G/DL (ref 31.4–37.4)
MCV RBC AUTO: 94 FL (ref 82–98)
MONOCYTES # BLD AUTO: 0.68 THOUSAND/ÂΜL (ref 0.17–1.22)
MONOCYTES NFR BLD AUTO: 8 % (ref 4–12)
NEUTROPHILS # BLD AUTO: 5.87 THOUSANDS/ÂΜL (ref 1.85–7.62)
NEUTS SEG NFR BLD AUTO: 67 % (ref 43–75)
NRBC BLD AUTO-RTO: 0 /100 WBCS
PHOSPHATE SERPL-MCNC: 3.1 MG/DL (ref 2.3–4.1)
PLATELET # BLD AUTO: 268 THOUSANDS/UL (ref 149–390)
PMV BLD AUTO: 9.8 FL (ref 8.9–12.7)
POTASSIUM SERPL-SCNC: 4 MMOL/L (ref 3.5–5.3)
RBC # BLD AUTO: 3.68 MILLION/UL (ref 3.81–5.12)
SODIUM SERPL-SCNC: 141 MMOL/L (ref 135–147)
WBC # BLD AUTO: 8.61 THOUSAND/UL (ref 4.31–10.16)

## 2025-04-04 PROCEDURE — 85025 COMPLETE CBC W/AUTO DIFF WBC: CPT | Performed by: INTERNAL MEDICINE

## 2025-04-04 PROCEDURE — 99221 1ST HOSP IP/OBS SF/LOW 40: CPT | Performed by: PODIATRIST

## 2025-04-04 PROCEDURE — 83735 ASSAY OF MAGNESIUM: CPT | Performed by: INTERNAL MEDICINE

## 2025-04-04 PROCEDURE — 84100 ASSAY OF PHOSPHORUS: CPT | Performed by: INTERNAL MEDICINE

## 2025-04-04 PROCEDURE — 82948 REAGENT STRIP/BLOOD GLUCOSE: CPT

## 2025-04-04 PROCEDURE — 80048 BASIC METABOLIC PNL TOTAL CA: CPT | Performed by: INTERNAL MEDICINE

## 2025-04-04 PROCEDURE — 99232 SBSQ HOSP IP/OBS MODERATE 35: CPT | Performed by: INTERNAL MEDICINE

## 2025-04-04 RX ORDER — NYSTATIN 100000 [USP'U]/G
POWDER TOPICAL 2 TIMES DAILY
Status: DISCONTINUED | OUTPATIENT
Start: 2025-04-04 | End: 2025-04-08 | Stop reason: HOSPADM

## 2025-04-04 RX ORDER — QUETIAPINE FUMARATE 100 MG/1
100 TABLET, FILM COATED ORAL
Status: DISCONTINUED | OUTPATIENT
Start: 2025-04-04 | End: 2025-04-08 | Stop reason: HOSPADM

## 2025-04-04 RX ORDER — TRAZODONE HYDROCHLORIDE 50 MG/1
50 TABLET ORAL
Status: DISCONTINUED | OUTPATIENT
Start: 2025-04-04 | End: 2025-04-08 | Stop reason: HOSPADM

## 2025-04-04 RX ADMIN — INSULIN LISPRO 3 UNITS: 100 INJECTION, SOLUTION INTRAVENOUS; SUBCUTANEOUS at 21:44

## 2025-04-04 RX ADMIN — NYSTATIN: 100000 POWDER TOPICAL at 15:23

## 2025-04-04 RX ADMIN — CEPHALEXIN 500 MG: 500 CAPSULE ORAL at 12:18

## 2025-04-04 RX ADMIN — HEPARIN SODIUM 5000 UNITS: 5000 INJECTION INTRAVENOUS; SUBCUTANEOUS at 21:44

## 2025-04-04 RX ADMIN — TRAZODONE HYDROCHLORIDE 50 MG: 50 TABLET ORAL at 21:44

## 2025-04-04 RX ADMIN — INSULIN LISPRO 1 UNITS: 100 INJECTION, SOLUTION INTRAVENOUS; SUBCUTANEOUS at 12:18

## 2025-04-04 RX ADMIN — CEPHALEXIN 500 MG: 500 CAPSULE ORAL at 05:32

## 2025-04-04 RX ADMIN — HEPARIN SODIUM 5000 UNITS: 5000 INJECTION INTRAVENOUS; SUBCUTANEOUS at 05:32

## 2025-04-04 RX ADMIN — LISINOPRIL 5 MG: 5 TABLET ORAL at 08:36

## 2025-04-04 RX ADMIN — QUETIAPINE FUMARATE 100 MG: 100 TABLET ORAL at 21:44

## 2025-04-04 RX ADMIN — PRAVASTATIN SODIUM 40 MG: 40 TABLET ORAL at 08:36

## 2025-04-04 RX ADMIN — LEVOTHYROXINE SODIUM 88 MCG: 88 TABLET ORAL at 05:32

## 2025-04-04 RX ADMIN — HEPARIN SODIUM 5000 UNITS: 5000 INJECTION INTRAVENOUS; SUBCUTANEOUS at 15:23

## 2025-04-04 NOTE — TELEPHONE ENCOUNTER
Consult placed on [facility] queue at 1525 to be seen by an [facility] psychiatrist.

## 2025-04-04 NOTE — DISCHARGE SUMMARY
Discharge Summary - Hospitalist   Name: Lisa Rich 69 y.o. female I MRN: [id number]  Unit/Bed#: -01 I Date of Admission: 4/3/2025   Date of Service: 4/4/2025 I Hospital Day: 1     Assessment & Plan  Toe ulcer (HCC)  Patient with ulcer of left third toe  ED visit 3/28- prescribed Keflex 500 mg Q6H x 7 days (ends tomorrow)  Seen by wound care/podiatry, Dr. Osullivan, on 4/1  Routine diabetic foot exam scheduled for tomorrow 4/4  Patient endorses chills but denies foot pain, numbness, tingling  Does not meet SIRS criteria  XR foot 4/3- No radiographic evidence of osteomyelitis   Follow-up VAS arterial duplex  Podiatry consultation appreciated  Continue PTA Keflex for now ; likely discontinue if patient remains nonseptic appearing  Type 2 diabetes mellitus with stage 3a chronic kidney disease, without long-term current use of insulin ([facility])  Lab Results   Component Value Date    HGBA1C 6.3 (H) 01/08/2025       Recent Labs     04/03/25  1652 04/03/25  2105 04/04/25  0705   POCGLU 73 142* 98       Blood Sugar Average: Last 72 hrs:  (P) 104.1072517685194485  Continue Accu-Cheks before meals and at bedtime with sliding scale insulin coverage accordingly  Hold home oral antihyperglycemics  Humalog SQ before meals  Monitor glucose and adjust medications accordingly  Moderate depressed bipolar I disorder (HCC)  Continue Seroquel and trazodone  Hypothyroidism  Continue levothyroxine 88 mcg p.o. daily  Essential hypertension  Continue lisinopril 5 mg p.o. daily  Hydralazine as needed for SBP > 160 mmHg     Medical Problems       Resolved Problems  Date Reviewed: 4/2/2025   None       Discharging Physician / Practitioner: Danny Huff DO  PCP: Gadiel Young PA-C  Admission Date:   Admission Orders (From admission, onward)       Ordered        04/03/25 1421  INPATIENT ADMISSION  Once                          Discharge Date: 04/04/25    Consultations During Hospital Stay:  Podiatry    Procedures Performed:   Not  applicable    Significant Findings / Test Results:   Not applicable    Incidental Findings:   Not applicable    Test Results Pending at Discharge (will require follow up):   Not applicable     Outpatient Tests Requested:  Not applicable    Complications: Not applicable    Reason for Admission: Toe ulcer    Hospital Course:   Lisa Rich is a 69 y.o. female with a PMH of moderate depressed bipolar disorder, hypothyroidism, type 2 DM, hypertension, and stage 3 CKD who presents with chills and a known ulcer of the left third toe. Patient states that her brother dropped her off at the ED. She has been seen multiple times for her toe ulcer over the past few days- ED, PCP, podiatry, wound care. Patient is only complaining of chills. Denies numbness, tingling, toe pain, N/V/D.     Podiatry has signed off and states the patient is appropriate for outpatient follow-up.  She was strongly encouraged to resume all PTA medications and to follow-up with her podiatrist within 3-5 days.  All questions and concerns were answered prior to departure and the patient endorses understanding and agreement with the plan.  This serves as a brief discharge summary.  Please see full medical record for more details.    Condition at Discharge: stable    Discharge Day Visit / Exam:   Subjective:  Patient seen and examined at bedside. No acute events overnight. Denies chest pain, SOB, diaphoresis, nausea/vomiting/diarrhea, fevers/chills.     Vitals: Blood Pressure: 138/73 (04/04/25 0707)  Pulse: 76 (04/04/25 0707)  Temperature: 98.2 °F (36.8 °C) (04/04/25 0707)  Temp Source: Oral (04/04/25 0707)  Respirations: 16 (04/04/25 0707)  Height: 5' (152.4 cm) (04/03/25 1728)  Weight - Scale: 78.4 kg (172 lb 13.5 oz) (04/03/25 1700)  SpO2: 98 % (04/04/25 0707)    Physical Exam  Vitals and nursing note reviewed.   Constitutional:       General: She is not in acute distress.     Appearance: She is well-developed. She is obese.   HENT:      Head:  Normocephalic and atraumatic.   Eyes:      Conjunctiva/sclera: Conjunctivae normal.   Cardiovascular:      Rate and Rhythm: Normal rate and regular rhythm.      Heart sounds: No murmur heard.  Pulmonary:      Effort: Pulmonary effort is normal. No respiratory distress.      Breath sounds: Normal breath sounds.   Abdominal:      Palpations: Abdomen is soft.      Tenderness: There is no abdominal tenderness.   Musculoskeletal:         General: No swelling.      Cervical back: Neck supple.   Skin:     General: Skin is warm and dry.      Capillary Refill: Capillary refill takes less than 2 seconds.   Neurological:      Mental Status: She is alert.   Psychiatric:         Mood and Affect: Mood normal.       Discussion with Family: Patient declined call to .     Discharge instructions/Information to patient and family:   See after visit summary for information provided to patient and family.      Provisions for Follow-Up Care:  See after visit summary for information related to follow-up care and any pertinent home health orders.      Mobility at time of Discharge:   Basic Mobility Inpatient Raw Score: 20  JH-HLM Goal: 6: Walk 10 steps or more  JH-HLM Achieved: 6: Walk 10 steps or more  HLM Goal achieved. Continue to encourage appropriate mobility.     Disposition:   Home    Planned Readmission: Not applicable    Discharge Medications:  See after visit summary for reconciled discharge medications provided to patient and/or family.      Administrative Statements   Discharge Statement:  I have spent a total time of 65 minutes in caring for this patient on the day of the visit/encounter. >30 minutes of time was spent on: Diagnostic results, Prognosis, Risks and benefits of tx options, Instructions for management, Patient and family education, Importance of tx compliance, Risk factor reductions, Impressions, Counseling / Coordination of care, Documenting in the medical record, Reviewing / ordering tests,  medicine, procedures  , and Communicating with other healthcare professionals .    **Please Note: This note may have been constructed using a voice recognition system**

## 2025-04-04 NOTE — DISCHARGE INSTR - OTHER ORDERS
Skin care plans:  1-Hydraguard to bilateral sacrum, buttock and heels BID and PRN  2-Elevate heels to offload pressure.  3-Ehob cushion in chair when out of bed.  4-Moisturize skin daily with skin nourishing cream.  5-Turn/reposition q2h for pressure re-distribution on skin.  6-Cleanse bilateral abdominal and breast folds with soap and water, and pat completely dry. Apply Nystatin powder BID. Gently remove excess to prevent caking.

## 2025-04-04 NOTE — ASSESSMENT & PLAN NOTE
Lab Results   Component Value Date    HGBA1C 6.3 (H) 01/08/2025       Recent Labs     04/03/25  1652 04/03/25  2105 04/04/25  0705   POCGLU 73 142* 98       Blood Sugar Average: Last 72 hrs:  (P) 104.6213848227233867  Continue Accu-Cheks before meals and at bedtime with sliding scale insulin coverage accordingly  Hold home oral antihyperglycemics  Humalog SQ before meals  Monitor glucose and adjust medications accordingly

## 2025-04-04 NOTE — ASSESSMENT & PLAN NOTE
Lab Results   Component Value Date    HGBA1C 6.3 (H) 01/08/2025       Recent Labs     04/03/25  1652 04/03/25  2105 04/04/25  0705 04/04/25  1122   POCGLU 73 142* 98 182*       Blood Sugar Average: Last 72 hrs:  (P) 123.75  Continue Accu-Cheks before meals and at bedtime with sliding scale insulin coverage accordingly  Hold home oral antihyperglycemics  Humalog SQ before meals  Monitor glucose and adjust medications accordingly

## 2025-04-04 NOTE — UTILIZATION REVIEW
Initial Clinical Review    Admission: Date/Time/Statement:   Admission Orders (From admission, onward)       Ordered        04/03/25 1421  INPATIENT ADMISSION  Once                          Orders Placed This Encounter   Procedures    INPATIENT ADMISSION     Standing Status:   Standing     Number of Occurrences:   1     Level of Care:   Med Surg [16]     Estimated length of stay:   More than 2 Midnights     Certification:   I certify that inpatient services are medically necessary for this patient for a duration of greater than two midnights. See H&P and MD Progress Notes for additional information about the patient's course of treatment.     ED Arrival Information       Expected   -    Arrival   4/3/2025 12:22    Acuity   Urgent              Means of arrival   Walk-In    Escorted by   Family Member    Service   Hospitalist    Admission type   Emergency              Arrival complaint   foot infection             Chief Complaint   Patient presents with    Wound Check     Patient arrived from home for wound check of left foot middle toe. Patient denies pain.       Initial Presentation: 69 y.o. female presented to ED from home with left middle toe foot ulcer. a PMH of moderate depressed bipolar disorder, hypothyroidism, type 2 DM, hypertension, and stage 3 CKD who presents with chills and a known ulcer of the left third toe. Patient states that her brother dropped her off at the ED. She has been seen multiple times for her toe ulcer over the past few days- ED, PCP, podiatry, wound care. Patient is only complaining of chills On exam please see below pictures, Speech is tangential. Plans/p IV Ancef x 1 dose, PO Keflex, accu checks with [facility], consult Podiatry, wound care and supportive care                      Anticipated Length of Stay/Certification Statement:  Patient will be admitted on an inpatient basis with an anticipated length of stay of greater than 2 midnights secondary to toe ulcer.     Date:04-04-25    Day 2:  continue accu checks with [facility] has signed off and states the patient is appropriate for outpatient follow-up. She was strongly encouraged to resume all PTA medications and to follow-up with her podiatrist within 3-5 days. All questions and concerns were answered prior to departure and the patient endorses understanding and agreement with the plan. This serves as a brief discharge summary.   Podiatry consult:  ASSESSMENT:  Lisa Rich is a 69 y.o. female with:   Elongated toenails  Scab of toe  Diabetes  PLAN:   On exam today there is no further drainage, ulceration is healed.  No further care necessary she is to follow-up with  on an outpatient basis for at risk footcare  Stable for discharge from a podiatric standpoint, no dressing necessary will sign off for now  Rest of care per primary team.            ED Treatment-Medication Administration from 04/03/2025 1222 to 04/03/2025 1717         Date/Time Order Dose Route Action     04/03/2025 1347 ceFAZolin (ANCEF) IVPB (premix in dextrose) 2,000 mg 50 mL 2,000 mg Intravenous New Bag     04/03/2025 1541 lisinopril (ZESTRIL) tablet 5 mg 5 mg Oral Given     04/03/2025 1541 pravastatin (PRAVACHOL) tablet 40 mg 40 mg Oral Given     04/03/2025 1541 heparin (porcine) subcutaneous injection 5,000 Units 5,000 Units Subcutaneous Given            Scheduled Medications:  cephalexin, 500 mg, Oral, Q6H DANYEL  heparin (porcine), 5,000 Units, Subcutaneous, Q8H DANYEL  insulin lispro, 1-6 Units, Subcutaneous, TID AC  insulin lispro, 1-6 Units, Subcutaneous, HS  levothyroxine, 88 mcg, Oral, Early Morning  lisinopril, 5 mg, Oral, Daily  pravastatin, 40 mg, Oral, Daily  QUEtiapine, 50 mg, Oral, HS  traZODone, 200 mg, Oral, HS      Continuous IV Infusions:     PRN Meds:  acetaminophen, 650 mg, Oral, Q4H PRN  ondansetron, 4 mg, Intravenous, Q6H PRN      ED Triage Vitals [04/03/25 1236]   Temperature Pulse Respirations Blood Pressure SpO2 Pain Score   98.2 °F (36.8 °C)  92 18 (!) 187/77 97 % No Pain     Weight (last 2 days)       Date/Time Weight    04/03/25 1700 78.4 (172.84)    04/03/25 1236 79.6 (175.49)            Vital Signs (last 3 days)       Date/Time Temp Pulse Resp BP MAP (mmHg) SpO2 O2 Device Patient Position - Orthostatic VS Pain    04/04/25 07:07:13 98.2 °F (36.8 °C) 76 16 138/73 95 98 % -- Lying --    04/03/25 22:21:14 98.6 °F (37 °C) 79 17 112/73 86 97 % None (Room air) Lying No Pain    04/03/25 1941 -- -- -- -- -- 95 % None (Room air) -- --    04/03/25 1811 -- -- -- -- -- -- -- -- No Pain    04/03/25 1728 99.9 °F (37.7 °C) 73 16 154/81 105 98 % None (Room air) Lying No Pain    04/03/25 17:24:10 -- 69 17 154/81 105 98 % -- -- --    04/03/25 1545 -- 71 16 142/89 109 97 % -- -- --    04/03/25 1353 -- 69 16 165/86 117 96 % -- -- --    04/03/25 1245 -- 77 16 133/63 90 97 % -- -- --    04/03/25 1236 98.2 °F (36.8 °C) 92 18 187/77 -- 97 % None (Room air) Sitting No Pain              Pertinent Labs/Diagnostic Test Results:   Radiology:  VAS PRISCILA and waveform analysis, multiple levels   Final Interpretation by Natalia Ram DO (04/03 1537)      XR foot 3+ views LEFT   Final Interpretation by Jamie Ramirez MD (04/03 1337)   No radiographic evidence of osteomyelitis.         Computerized Assisted Algorithm (CAA) may have been used to analyze all applicable images.         Workstation performed: [id number]           Cardiology:  No orders to display     GI:  No orders to display           Results from last 7 days   Lab Units 04/04/25  0433 04/03/25  1314   WBC Thousand/uL 8.61 10.63*   HEMOGLOBIN g/dL 10.9* 12.5   HEMATOCRIT % 34.6* 39.0   PLATELETS Thousands/uL 268 318   TOTAL NEUT ABS Thousands/µL 5.87 8.39*         Results from last 7 days   Lab Units 04/04/25  0433 04/03/25  1314   SODIUM mmol/L 141 139   POTASSIUM mmol/L 4.0 3.8   CHLORIDE mmol/L 109* 106   CO2 mmol/L 26 25   ANION GAP mmol/L 6 8   BUN mg/dL 19 21   CREATININE mg/dL 0.95 1.04   EGFR ml/min/1.73sq m 61  55   CALCIUM mg/dL 9.4 9.7   MAGNESIUM mg/dL 1.9  --    PHOSPHORUS mg/dL 3.1  --      Results from last 7 days   Lab Units 04/03/25  1314   AST U/L 21   ALT U/L 18   ALK PHOS U/L 58   TOTAL PROTEIN g/dL 6.8   ALBUMIN g/dL 4.3   TOTAL BILIRUBIN mg/dL 1.16*     Results from last 7 days   Lab Units 04/04/25  0705 04/03/25  2105 04/03/25  1652   POC GLUCOSE mg/dl 98 142* 73     Results from last 7 days   Lab Units 04/04/25  0433 04/03/25  1314   GLUCOSE RANDOM mg/dL 100 190*     Results from last 7 days   Lab Units 04/03/25  1314   PROTIME seconds 12.8   INR  0.92   PTT seconds 28         Results from last 7 days   Lab Units 04/03/25  1314   PROCALCITONIN ng/ml <0.05     Results from last 7 days   Lab Units 04/03/25  1314   LACTIC ACID mmol/L 0.7       Results from last 7 days   Lab Units 04/03/25  1347 04/03/25  1314   BLOOD CULTURE  Received in Microbiology Lab. Culture in Progress. Received in Microbiology Lab. Culture in Progress.                   Past Medical History:   Diagnosis Date    Allergic     Depression     Diabetes mellitus (HCC)     Disease of thyroid gland     GERD (gastroesophageal reflux disease)     Hyperchloremia     Hypertension     Psychiatric disorder      Present on Admission:   Toe ulcer (HCC)   Type 2 diabetes mellitus with stage 3a chronic kidney disease, without long-term current use of insulin (HCC)   Hypothyroidism   Essential hypertension   Moderate depressed bipolar I disorder (HCC)      Admitting Diagnosis: DM type 2 with diabetic foot ulcer (HCC) [E11.621, L97.509]  Visit for wound check [Z51.89]  Age/Sex: 69 y.o. female    Network Utilization Review Department  ATTENTION: Please call with any questions or concerns to [phone] and carefully listen to the prompts so that you are directed to the right person. All voicemails are confidential.   For Discharge needs, contact Care Management DC Support Team at [phone] opt. 2  Send all requests for admission clinical reviews,  approved or denied determinations and any other requests to dedicated fax number below belonging to the campus where the patient is receiving treatment. List of dedicated fax numbers for the Facilities:  FACILITY NAME UR FAX NUMBER   ADMISSION DENIALS (Administrative/Medical Necessity) [phone]   DISCHARGE SUPPORT TEAM (NETWORK) [phone]   PARENT CHILD HEALTH (Maternity/NICU/Pediatrics) [phone]   [facility] [phone]   [facility] [phone]   [facility] [phone]   [facility] [phone]   [facility] [phone]   [facility] [phone]   [facility] [phone]   [facility] [phone]   [facility] [phone]   [facility] [phone]   [facility] [phone]   [facility] [phone]
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
## 2025-04-04 NOTE — CONSULTS
[facility] Podiatry - Consultation    Patient Information:   Lisa Rich 69 y.o. female MRN: [id number]  Unit/Bed#: -01 Encounter: [id number]  PCP: Gadiel Young PA-C  Date of Admission:  4/3/2025  Date of Consultation: 04/04/25  Requesting Physician: Danny Huff DO      ASSESSMENT:    Lisa Rich is a 69 y.o. female with:    Elongated toenails  Scab of toe  Diabetes    PLAN:    On exam today there is no further drainage, ulceration is healed.  No further care necessary she is to follow-up with  on an outpatient basis for at risk footcare  Stable for discharge from a podiatric standpoint, no dressing necessary will sign off for now  Rest of care per primary team.      SUBJECTIVE    History of Present Illness:    Lisa Rich is a 69 y.o. female with past medical history of diabetes, toe ulceration who presents with questionable toe ulceration the lateral aspect of her left third toe which hurts.     Review of Systems:    Constitutional: Negative.    HENT: Negative.    Eyes: Negative.    Respiratory: Negative.    Cardiovascular: Negative.    Gastrointestinal: Negative.    Musculoskeletal: Weak and  Skin: Negative  Neurological: Numbness  Psych: Negative.     Past Medical and Surgical History:     Past Medical History:   Diagnosis Date    Allergic     Depression     Diabetes mellitus (HCC)     Disease of thyroid gland     GERD (gastroesophageal reflux disease)     Hyperchloremia     Hypertension     Psychiatric disorder        Past Surgical History:   Procedure Laterality Date    ANKLE SURGERY      Incision    DILATION AND CURETTAGE OF UTERUS         Meds/Allergies:      Medications Prior to Admission:     cephalexin (KEFLEX) 500 mg capsule    Cholecalciferol (Vitamin D) 50 MCG (2000 UT) tablet    ketoconazole (NIZORAL) 2 % cream    levothyroxine 88 mcg tablet    linaGLIPtin (Tradjenta) 5 MG TABS    lisinopril (ZESTRIL) 5 mg tablet    pravastatin (PRAVACHOL) 40 mg tablet     QUEtiapine (SEROquel) 50 mg tablet    traZODone (DESYREL) 100 mg tablet    aspirin (ECOTRIN LOW STRENGTH) 81 mg EC tablet    clobetasol (TEMOVATE) 0.05 % external solution    Diclofenac Sodium (VOLTAREN) 1 %    glucose blood (Contour Next Test) test strip    glucose blood test strip    pantoprazole (PROTONIX) 40 mg tablet    No Known Allergies    Social History:     Marital Status: Unknown    Substance Use History:   Social History     Substance and Sexual Activity   Alcohol Use Not Currently     Social History     Tobacco Use   Smoking Status Never    Passive exposure: Never   Smokeless Tobacco Never     Social History     Substance and Sexual Activity   Drug Use No       Family History:    Family History   Problem Relation Age of Onset    No Known Problems Family     Diabetes Mother     Heart disease Mother     Stroke Mother     Diabetes Father     Stroke Father     No Known Problems Maternal Grandmother     No Known Problems Maternal Grandfather     No Known Problems Paternal Grandmother     No Known Problems Paternal Grandfather     Diabetes Brother     No Known Problems Paternal Aunt          OBJECTIVE:    Vitals:   Blood Pressure: 138/73 (04/04/25 0707)  Pulse: 76 (04/04/25 0707)  Temperature: 98.2 °F (36.8 °C) (04/04/25 0707)  Temp Source: Oral (04/04/25 0707)  Respirations: 16 (04/04/25 0707)  Height: 5' (152.4 cm) (04/03/25 1728)  Weight - Scale: 78.4 kg (172 lb 13.5 oz) (04/03/25 1700)  SpO2: 98 % (04/04/25 0707)    Physical Exam:     General Appearance: Alert, cooperative, no distress.  HEENT: Head normocephalic, atraumatic, without obvious abnormality.  Heart: Normal rate and rhythm.  Lungs: Non-labored breathing. No respiratory distress.  Abdomen: Without distension.  Psychiatric: AAOx3  Lower Extremity:  Vascular:   Toenails are very elongated which is likely source of this ulceration on the left third toe.  No drainage today on examination.  Additional Data:     Lab Results: I have personally reviewed  "pertinent labs including:    Results from last 7 days   Lab Units 04/04/25  0433   WBC Thousand/uL 8.61   HEMOGLOBIN g/dL 10.9*   HEMATOCRIT % 34.6*   PLATELETS Thousands/uL 268   SEGS PCT % 67   LYMPHO PCT % 22   MONO PCT % 8   EOS PCT % 1     Results from last 7 days   Lab Units 04/04/25  0433 04/03/25  1314   POTASSIUM mmol/L 4.0 3.8   CHLORIDE mmol/L 109* 106   CO2 mmol/L 26 25   BUN mg/dL 19 21   CREATININE mg/dL 0.95 1.04   CALCIUM mg/dL 9.4 9.7   ALK PHOS U/L  --  58   ALT U/L  --  18   AST U/L  --  21     Results from last 7 days   Lab Units 04/03/25  1314   INR  0.92       Cultures: I have personally reviewed pertinent cultures including:    Results from last 7 days   Lab Units 04/03/25  1347 04/03/25  1314   BLOOD CULTURE  Received in Microbiology Lab. Culture in Progress. Received in Microbiology Lab. Culture in Progress.           Imaging: I have personally reviewed pertinent films in PACS.  EKG, Pathology, and Other Studies: I have personally reviewed pertinent reports.        ** Please Note: Portions of the record may have been created with voice recognition software. Occasional wrong word or \"sound a like\" substitutions may have occurred due to the inherent limitations of voice recognition software. Read the chart carefully and recognize, using context, where substitutions have occurred. **    "

## 2025-04-04 NOTE — ASSESSMENT & PLAN NOTE
Patient with ulcer of left third toe  ED visit 3/28- prescribed Keflex 500 mg Q6H x 7 days (ends tomorrow)  Seen by wound care/podiatry, Dr. Osullivan, on 4/1  Patient endorses chills but denies foot pain, numbness, tingling  Does not meet SIRS criteria  XR foot 4/3- No radiographic evidence of osteomyelitis   VAS arterial duplex within normal limits  Podiatry has signed off  No need for antibiotics  Outpatient follow-up with Podiatry

## 2025-04-04 NOTE — PLAN OF CARE
Problem: Potential for Falls  Goal: Patient will remain free of falls  Description: INTERVENTIONS:- Educate patient/family on patient safety including physical limitations- Instruct patient to call for assistance with activity - Consult OT/PT to assist with strengthening/mobility - Keep Call bell within reach- Keep bed low and locked with side rails adjusted as appropriate- Keep care items and personal belongings within reach- Initiate and maintain comfort rounds- Make Fall Risk Sign visible to staff- Offer Toileting every 2 Hours, in advance of need- Initiate/Maintain bed alarm- Obtain necessary fall risk management equipment: d- Apply yellow socks and bracelet for high fall risk patients- Consider moving patient to room near nurses station  INTERVENTIONS:- Educate patient/family on patient safety including physical limitations- Instruct patient to call for assistance with activity - Consult OT/PT to assist with strengthening/mobility - Keep Call bell within reach- Keep bed low and locked with side rails adjusted as appropriate- Keep care items and personal belongings within reach- Initiate and maintain comfort rounds- Make Fall Risk Sign visible to staff- Offer Toileting every  Hours, in advance of need- Initiate/Maintain bed alarm- Obtain necessary fall risk management equipment: yellow socks- Apply yellow socks and bracelet for high fall risk patients- Consider moving patient to room near nurses station  Outcome: Progressing
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
## 2025-04-04 NOTE — WOUND OSTOMY CARE
Consult Note - Wound   Lisa A McGorry 69 y.o. female MRN: [id number]  Unit/Bed#: -01 Encounter: [id number]        History and Present Illness:  Patient is a 68 yo male that was admitted to [facility]  for treatment of toe ulcer. Patient has a PMH of oderate depressed bipolar disorder, hypothyroidism, type 2 DM, hypertension, and stage 3 CKD.     Wound Care was consulted for abdominal fold and toe wound. Podiatry  consulted and noted toe wound is healed; recommendations in place.     Virtual wound care consultation completed for patient per discussion with primary RN, assessment of pictures in media, as well as consideration of previous wound care notes     Assessment Findings:     Primary nurse reports B/L sacro-buttocks is dry, intact, pink in color and blanches. No skin loss or wounds present. Recommend preventative hydragaurd to area.     Bilateral abdominal and breast fold MASD/ITD - appears to be scattered linear shaped intact areas of erythema. Does not appear to have any open aspects, moisture trapping reported. Recommend Nystatin powder BID.       Orders listed below and wound care will continue to follow, call or Secure Chat with questions.     Skin care plans:  1-Hydraguard to bilateral sacrum, buttock and heels BID and PRN  2-Elevate heels to offload pressure.  3-Ehob cushion in chair when out of bed.  4-Moisturize skin daily with skin nourishing cream.  5-Turn/reposition q2h for pressure re-distribution on skin.  6-Cleanse bilateral abdominal and breast folds with soap and water, and pat completely dry. Apply Nystatin powder BID. Gently remove excess to prevent caking.                 Abimbola Hyde RN, BSN, CCRN
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
## 2025-04-04 NOTE — CONSULTS
"TeleConsultation - Behavioral Health   Name: Lisa Rich 69 y.o. female I MRN: [id number]  Unit/Bed#: -01 I Date of Admission: 4/3/2025   Date of Service: 4/4/2025 I Hospital Day: 1  Inpatient consult to Psychiatry  Consult performed by: Ruben Pink MD  Consult ordered by: Danny Huff DO        Physician Requesting Consult: Danny Huff DO  Principal Problem:Toe ulcer (HCC)  Reason for Consult: manic symptoms    Assessment & Plan     Bipolar I disorder, severe, current episode manic, with psychotic features    70 YO F, lives with brother, with hx of mental illness (bipolar disorder), and PMHx of  hypothyroidism, type 2 DM, hypertension, and stage 3 CKD who presented to the ED 4/3/25 with chills and a known ulcer of the left third toe.    As per H&P done 4/3/25  \"Patient states that her brother dropped her off at the ED. She has been seen multiple times for her toe ulcer over the past few days    Toe ulcer (HCC)  Patient with ulcer of left third toe  ED visit 3/28- prescribed Keflex 500 mg Q6H x 7 days (ends tomorrow)  Seen by wound care/podiatry, Dr. Osullivan, on 4/1  Routine diabetic foot exam scheduled for tomorrow 4/4  Patient endorses chills but denies foot pain, numbness, tingling  Does not meet SIRS criteria  XR foot 4/3- No radiographic evidence of osteomyelitis   Follow-up VAS arterial duplex  Podiatry consultation appreciated  Continue PTA Keflex for now ; likely discontinue if patient remains nonseptic appearing\"    Psychiatry consulted today due to concern about manic symptoms.     On evaluation patient was found sitting on the chair next to her bed, was calm and cooperative.  Noted to have rapid, pressured, over productive, tangential/disorganized speech/thought process.  She also expressed paranoid delusions towards her brother stating that she does not feel safe going back to his house and was not able to explain in a linear or logical manner why she does not want to return to his " house.   She acknowledged having a history of mental illness, and being prescribed psychotropic medications, reported compliance with them but was not able to state the exact doses of medications.   Denied any recent intoxicating substance use    Collateral was attempted from her brother, via the number in the EMR demographic section, call kept ringing and he did not answer,no option for voicemail.     As per EMR review patient was recently seen at her family physician's office on 4/2/2025 and at that visit was also noted to be tangential and reporting paranoid delusions towards her brother.     Based on the information above patient appears to be psychiatrically decompensated, either she has not been fully compliant with her psychotropic medications or alternatively the medications are in need of being adjusted if she has been compliant with them. Furthermore, her mood stabilizer (Seroquel) dose is minute compared to her antidepressant (trazodone), which could also be contributing to her becoming manic.   Considering her acute manic state she requires inpatient psychiatric hospitalization for medication management    TREATMENT PLAN RECOMMENDATIONS:  Medications:   INCREASE seroquel to 100mg qhs   DECREASE Trazodone to 50mg qhs        Informed consent for the above medication has been obtained including discussion of the risks, benefits and alternatives: No. Unable to obtain due to patient condition, mental status.    Disposition: The patient meets criteria for inpatient psychiatric hospitalization when medically stable due to an acute psychiatric illness.    Legal Status Recommendation: Follow hospital policy to place on involuntary hold.    Multiple Antipsychotic Review: N/A    Psychotherapy/Psychoeducation: Provided to patient based on their needs and abilities in a manner that they could understand and accommodated their learning style.    Other/Medical Work Up and/or treatment modality recommendations:  "  Medical optimization/stabilization as per primary team    Patient Caregiver/Family Education: N/A    Follow-up: Re-consult PRN    Report regarding the above Assessment and Treatment plan was provided to: Dr Huff and Elsi BROOKS    History of Present Illness    68 YO F, lives with brother, with hx of mental illness (bipolar disorder), and PMHx of  hypothyroidism, type 2 DM, hypertension, and stage 3 CKD who presented to the ED 4/3/25 with chills and a known ulcer of the left third toe.    As per H&P done 4/3/25  \"Patient states that her brother dropped her off at the ED. She has been seen multiple times for her toe ulcer over the past few days    Toe ulcer (HCC)  Patient with ulcer of left third toe  ED visit 3/28- prescribed Keflex 500 mg Q6H x 7 days (ends tomorrow)  Seen by wound care/podiatry, Dr. Osullivan, on 4/1  Routine diabetic foot exam scheduled for tomorrow 4/4  Patient endorses chills but denies foot pain, numbness, tingling  Does not meet SIRS criteria  XR foot 4/3- No radiographic evidence of osteomyelitis   Follow-up VAS arterial duplex  Podiatry consultation appreciated  Continue PTA Keflex for now ; likely discontinue if patient remains nonseptic appearing\"    Psychiatry consulted today due to concern about manic symptoms.     On evaluation patient was found sitting on the chair next to her bed, was calm and cooperative.  Noted to have rapid, pressured, over productive, tangential/disorganized speech/thought process.  She also expressed paranoid delusions towards her brother stating that she does not feel safe going back to his house and was not able to explain in a linear or logical manner why she does not want to return to his house.   She acknowledged having a history of mental illness, and being prescribed psychotropic medications, reported compliance with them but was not able to state the exact doses of medications.   Denied any recent intoxicating substance use    Collateral was attempted from " her brother, via the number in the EMR demographic section, call kept ringing and he did not answer,no option for voicemail.     As per EMR review patient was recently seen at her family physician's office on 4/2/2025 and at that visit was also noted to be tangential and reporting paranoid delusions towards her brother.      Psychiatric Review Of Systems:  Unable to conduct a meaningful psychiatric review of systems due to patient's manic episode    Historical Information   Past Psychiatric History:   Unable to obtain a meaningful history from patient due to her mental status/carlie    Substance Abuse History:  Unable to obtain a meaningful history from patient due to her mental status/carlie    Family Psychiatric History:   Unable to obtain a meaningful history from patient due to her mental status/carlie    Social History:  Unable to obtain a meaningful history from patient due to her mental status/carlie    Traumatic History:   Unable to obtain a meaningful history from patient due to her mental status/carlie    Medical History Review: I have reviewed the patient's PMH, PSH, Social History, Family History, Meds, and Allergies     Meds/Allergies   Current Facility-Administered Medications   Medication Dose Route Frequency Provider Last Rate    acetaminophen  650 mg Oral Q4H PRN Danny C Yorty, DO      heparin (porcine)  5,000 Units Subcutaneous Q8H DANYEL Danny C Yorty, DO      insulin lispro  1-6 Units Subcutaneous TID AC Danny C Yorty, DO      insulin lispro  1-6 Units Subcutaneous HS Danny C Yorty, DO      levothyroxine  88 mcg Oral Early Morning Danny C Yorty, DO      lisinopril  5 mg Oral Daily Danny C Yorty, DO      nystatin   Topical BID Danny C Yorty, DO      ondansetron  4 mg Intravenous Q6H PRN Danny C Yorty, DO      pravastatin  40 mg Oral Daily Danny C Yorty, DO      QUEtiapine  50 mg Oral HS Danny C Yorty, DO      traZODone  200 mg Oral HS Danny C Yorty, DO        No Known  "Allergies    Objective :  Temp:  [98.2 °F (36.8 °C)-99.9 °F (37.7 °C)] 99 °F (37.2 °C)  HR:  [] 100  BP: (112-154)/(72-81) 121/72  Resp:  [16-17] 16  SpO2:  [95 %-98 %] 98 %  O2 Device: None (Room air)    Mental Status Evaluation:  Appearance:  older than stated age   Behavior:  normal   Speech:  pressured and tangential   Mood:  euphoric   Affect:  increased in intensity, increased in range, and mood-congruent   Language: wnl   Thought Process:  disorganized and tangential   Associations tangential associations   Thought Content:  delusions  persecutory   Perceptual Disturbances: None   Risk Potential: Suicidal Ideations none  Homicidal Ideations none  Potential for Aggression No   Sensorium:  person, place, time/date, and situation   Cognition:  recent and remote memory grossly intact   Consciousness:  alert and awake    Attention: attention span appeared shorter than expected for age   Intellect: not examined   Fund of Knowledge: vocabulary: fair   Insight:  impaired due to manic episode   Judgment: impaired due to manic episode                 Lab Results: I have reviewed the following lab results:   .     04/04/25  0433   WBC 8.61   HGB 10.9*   HCT 34.6*      SODIUM 141   K 4.0   *   CO2 26   BUN 19   CREATININE 0.95   GLUC 100   MG 1.9   PHOS 3.1          Lipid Profile:   Lab Results   Component Value Date    CHOLESTEROL 173 04/25/2024    HDL 79 04/25/2024    TRIG 114 04/25/2024    LDLCALC 71 04/25/2024    NONHDLC 94 04/25/2024   Thyroid Studies:   Lab Results   Component Value Date    OJY0XFCMVTHF 6.142 (H) 01/08/2025    T3FREE 3.24 07/11/2023    FREET4 1.29 (H) 01/08/2025    R4RARPR 0.80 03/10/2022     Ammonia: No results found for: \"AMMONIA\"  Drug Levels:   Lab Results   Component Value Date    LITHIUM 1.03 01/08/2025             Code Status: Level 1 - Full Code  Advance Directive and Living Will:      Power of :    POLST:      Screenings:   1. Nutrition Assessment (completed by " Staff):   Nutrition  Feeding: Able to feed self  Diet Type: Regular/House  Appetite: Good  2. Pain Screening  Pain Assessment  Pain Assessment Tool: 0-10  Pain Score: 0  Pain Location/Orientation: Location: Generalized (denies)  Patient's Stated Pain Goal: No pain  Multiple Pain Sites: No  3. Suicide Screening  ED Crisis Suicide Risk Assessment:      C-SSRS Screening (Nursing Assessment - recent):    C-SSRS Screening (Nursing Assessment - since last contact):      CSSRS    1. In the last month have you wished you were dead or wished you could go to sleep and not wake up? no    2. In the last month, have you actually had thoughts about killing yourself? no     If Yes to question 2, ask questions 3,4,5, and 6.  If No to question 2, skip to question 6    3. Have you been thinking about how you might do this? no  If Yes - describe frequency, intensity and duration:    4. Have you had these thoughts and had some intention of acting on them? no  If yes - describe the extent to which the patient expects to carry out the plan an believes the plan to be lethal or self-injurious:    5. Have you started to work out or worked out the details of how to kill yourself? Did you intend to carry out this plan? no  If yes - describe timing, location, lethality, access to means, preparatory acts: Always ask Question 6    6. Have you done anything, started to do anything, or prepared to do anything to end your life in the last 3 months? NO What about in your lifetime?  NO    Safe-T not required as suicide screen was negative     Suicide Risk Level: n/a  Interventions: Please follow hospital policies congruent with suicide risk level indicated including observation status, please see below for disposition, follow-up, and medication recommendations:      Administrative Statements   VIRTUAL CARE DOCUMENTATION:     1. This service was provided via Telemedicine using [employer]     2. Parties in the room with patient during teleconsult  Patient only    3. Confidentiality My office door was closed     4. Participants No one else was in the room    5. Patient acknowledged consent and understanding of privacy and security of the  Telemedicine consult. I informed the patient that I have reviewed their record in Epic and presented the opportunity for them to ask any questions regarding the visit today.  The patient agreed to participate.    6. I spent a total of 50 minutes on the date of the service which included preparing to see the patient, face-to-face patient care, completing clinical documentation, performing a psychiatrically appropriate examination, communicating with other HCPs (not separately reported), and independently interpreting results (not separately reported).

## 2025-04-05 LAB
ANION GAP SERPL CALCULATED.3IONS-SCNC: 6 MMOL/L (ref 4–13)
BASOPHILS # BLD AUTO: 0.03 THOUSANDS/ÂΜL (ref 0–0.1)
BASOPHILS NFR BLD AUTO: 0 % (ref 0–1)
BUN SERPL-MCNC: 23 MG/DL (ref 5–25)
CALCIUM SERPL-MCNC: 9.2 MG/DL (ref 8.4–10.2)
CHLORIDE SERPL-SCNC: 109 MMOL/L (ref 96–108)
CO2 SERPL-SCNC: 25 MMOL/L (ref 21–32)
CREAT SERPL-MCNC: 0.89 MG/DL (ref 0.6–1.3)
EOSINOPHIL # BLD AUTO: 0.17 THOUSAND/ÂΜL (ref 0–0.61)
EOSINOPHIL NFR BLD AUTO: 2 % (ref 0–6)
ERYTHROCYTE [DISTWIDTH] IN BLOOD BY AUTOMATED COUNT: 12.8 % (ref 11.6–15.1)
GFR SERPL CREATININE-BSD FRML MDRD: 66 ML/MIN/1.73SQ M
GLUCOSE SERPL-MCNC: 101 MG/DL (ref 65–140)
GLUCOSE SERPL-MCNC: 116 MG/DL (ref 65–140)
GLUCOSE SERPL-MCNC: 154 MG/DL (ref 65–140)
GLUCOSE SERPL-MCNC: 161 MG/DL (ref 65–140)
GLUCOSE SERPL-MCNC: 200 MG/DL (ref 65–140)
HCT VFR BLD AUTO: 32.8 % (ref 34.8–46.1)
HGB BLD-MCNC: 10.6 G/DL (ref 11.5–15.4)
IMM GRANULOCYTES # BLD AUTO: 0.03 THOUSAND/UL (ref 0–0.2)
IMM GRANULOCYTES NFR BLD AUTO: 0 % (ref 0–2)
LYMPHOCYTES # BLD AUTO: 2.15 THOUSANDS/ÂΜL (ref 0.6–4.47)
LYMPHOCYTES NFR BLD AUTO: 28 % (ref 14–44)
MAGNESIUM SERPL-MCNC: 1.9 MG/DL (ref 1.9–2.7)
MCH RBC QN AUTO: 29.9 PG (ref 26.8–34.3)
MCHC RBC AUTO-ENTMCNC: 32.3 G/DL (ref 31.4–37.4)
MCV RBC AUTO: 93 FL (ref 82–98)
MONOCYTES # BLD AUTO: 0.68 THOUSAND/ÂΜL (ref 0.17–1.22)
MONOCYTES NFR BLD AUTO: 9 % (ref 4–12)
NEUTROPHILS # BLD AUTO: 4.72 THOUSANDS/ÂΜL (ref 1.85–7.62)
NEUTS SEG NFR BLD AUTO: 61 % (ref 43–75)
NRBC BLD AUTO-RTO: 0 /100 WBCS
PHOSPHATE SERPL-MCNC: 3.3 MG/DL (ref 2.3–4.1)
PLATELET # BLD AUTO: 254 THOUSANDS/UL (ref 149–390)
PMV BLD AUTO: 9.9 FL (ref 8.9–12.7)
POTASSIUM SERPL-SCNC: 4 MMOL/L (ref 3.5–5.3)
RBC # BLD AUTO: 3.54 MILLION/UL (ref 3.81–5.12)
SODIUM SERPL-SCNC: 140 MMOL/L (ref 135–147)
WBC # BLD AUTO: 7.78 THOUSAND/UL (ref 4.31–10.16)

## 2025-04-05 PROCEDURE — 85025 COMPLETE CBC W/AUTO DIFF WBC: CPT | Performed by: INTERNAL MEDICINE

## 2025-04-05 PROCEDURE — 84100 ASSAY OF PHOSPHORUS: CPT | Performed by: INTERNAL MEDICINE

## 2025-04-05 PROCEDURE — 83735 ASSAY OF MAGNESIUM: CPT | Performed by: INTERNAL MEDICINE

## 2025-04-05 PROCEDURE — 99232 SBSQ HOSP IP/OBS MODERATE 35: CPT | Performed by: INTERNAL MEDICINE

## 2025-04-05 PROCEDURE — 80048 BASIC METABOLIC PNL TOTAL CA: CPT | Performed by: INTERNAL MEDICINE

## 2025-04-05 PROCEDURE — 82948 REAGENT STRIP/BLOOD GLUCOSE: CPT

## 2025-04-05 RX ADMIN — QUETIAPINE FUMARATE 100 MG: 100 TABLET ORAL at 21:22

## 2025-04-05 RX ADMIN — NYSTATIN: 100000 POWDER TOPICAL at 09:21

## 2025-04-05 RX ADMIN — HEPARIN SODIUM 5000 UNITS: 5000 INJECTION INTRAVENOUS; SUBCUTANEOUS at 05:09

## 2025-04-05 RX ADMIN — LISINOPRIL 5 MG: 5 TABLET ORAL at 09:21

## 2025-04-05 RX ADMIN — INSULIN LISPRO 1 UNITS: 100 INJECTION, SOLUTION INTRAVENOUS; SUBCUTANEOUS at 12:14

## 2025-04-05 RX ADMIN — HEPARIN SODIUM 5000 UNITS: 5000 INJECTION INTRAVENOUS; SUBCUTANEOUS at 21:22

## 2025-04-05 RX ADMIN — HEPARIN SODIUM 5000 UNITS: 5000 INJECTION INTRAVENOUS; SUBCUTANEOUS at 14:23

## 2025-04-05 RX ADMIN — INSULIN LISPRO 1 UNITS: 100 INJECTION, SOLUTION INTRAVENOUS; SUBCUTANEOUS at 16:53

## 2025-04-05 RX ADMIN — TRAZODONE HYDROCHLORIDE 50 MG: 50 TABLET ORAL at 21:22

## 2025-04-05 RX ADMIN — PRAVASTATIN SODIUM 40 MG: 40 TABLET ORAL at 09:21

## 2025-04-05 RX ADMIN — LEVOTHYROXINE SODIUM 88 MCG: 88 TABLET ORAL at 05:08

## 2025-04-05 RX ADMIN — INSULIN LISPRO 2 UNITS: 100 INJECTION, SOLUTION INTRAVENOUS; SUBCUTANEOUS at 21:23

## 2025-04-05 NOTE — PLAN OF CARE
Problem: Potential for Falls  Goal: Patient will remain free of falls  Description: INTERVENTIONS:- Educate patient/family on patient safety including physical limitations- Instruct patient to call for assistance with activity - Consult OT/PT to assist with strengthening/mobility - Keep Call bell within reach- Keep bed low and locked with side rails adjusted as appropriate- Keep care items and personal belongings within reach- Initiate and maintain comfort rounds- Make Fall Risk Sign visible to staff- Offer Toileting every 2 Hours, in advance of need- Initiate/Maintain bed alarm- Obtain necessary fall risk management equipment: d- Apply yellow socks and bracelet for high fall risk patients- Consider moving patient to room near nurses station  INTERVENTIONS:- Educate patient/family on patient safety including physical limitations- Instruct patient to call for assistance with activity - Consult OT/PT to assist with strengthening/mobility - Keep Call bell within reach- Keep bed low and locked with side rails adjusted as appropriate- Keep care items and personal belongings within reach- Initiate and maintain comfort rounds- Make Fall Risk Sign visible to staff- Offer Toileting every  Hours, in advance of need- Initiate/Maintain bed alarm- Obtain necessary fall risk management equipment: yellow socks- Apply yellow socks and bracelet for high fall risk patients- Consider moving patient to room near nurses station  Outcome: Progressing     Problem: PAIN - ADULT  Goal: Verbalizes/displays adequate comfort level or baseline comfort level  Description: Interventions:- Encourage patient to monitor pain and request assistance- Assess pain using appropriate pain scale- Administer analgesics based on type and severity of pain and evaluate response- Implement non-pharmacological measures as appropriate and evaluate response- Consider cultural and social influences on pain and pain management- Notify physician/advanced  practitioner if interventions unsuccessful or patient reports new pain  Outcome: Progressing     Problem: INFECTION - ADULT  Goal: Absence or prevention of progression during hospitalization  Description: INTERVENTIONS:- Assess and monitor for signs and symptoms of infection- Monitor lab/diagnostic results- Monitor all insertion sites, i.e. indwelling lines, tubes, and drains- Monitor endotracheal if appropriate and nasal secretions for changes in amount and color- [place] appropriate cooling/warming therapies per order- Administer medications as ordered- Instruct and encourage patient and family to use good hand hygiene technique- Identify and instruct in appropriate isolation precautions for identified infection/condition  Outcome: Progressing  Goal: Absence of fever/infection during neutropenic period  Description: INTERVENTIONS:- Monitor WBC  Outcome: Progressing     Problem: SAFETY ADULT  Goal: Patient will remain free of falls  Description: INTERVENTIONS:- Educate patient/family on patient safety including physical limitations- Instruct patient to call for assistance with activity - Consult OT/PT to assist with strengthening/mobility - Keep Call bell within reach- Keep bed low and locked with side rails adjusted as appropriate- Keep care items and personal belongings within reach- Initiate and maintain comfort rounds- Make Fall Risk Sign visible to staff- Offer Toileting every 2 Hours, in advance of need- Initiate/Maintain bed alarm- Obtain necessary fall risk management equipment: d- Apply yellow socks and bracelet for high fall risk patients- Consider moving patient to room near nurses station  INTERVENTIONS:- Educate patient/family on patient safety including physical limitations- Instruct patient to call for assistance with activity - Consult OT/PT to assist with strengthening/mobility - Keep Call bell within reach- Keep bed low and locked with side rails adjusted as appropriate- Keep care items and  personal belongings within reach- Initiate and maintain comfort rounds- Make Fall Risk Sign visible to staff- Offer Toileting every  Hours, in advance of need- Initiate/Maintain bed alarm- Obtain necessary fall risk management equipment: yellow socks- Apply yellow socks and bracelet for high fall risk patients- Consider moving patient to room near nurses station  Outcome: Progressing  Goal: Maintain or return to baseline ADL function  Description: INTERVENTIONS:-  Assess patient's ability to carry out ADLs; assess patient's baseline for ADL function and identify physical deficits which impact ability to perform ADLs (bathing, care of mouth/teeth, toileting, grooming, dressing, etc.)- Assess/evaluate cause of self-care deficits - Assess range of motion- Assess patient's mobility; develop plan if impaired- Assess patient's need for assistive devices and provide as appropriate- Encourage maximum independence but intervene and supervise when necessary- Involve family in performance of ADLs- Assess for home care needs following discharge - Consider OT consult to assist with ADL evaluation and planning for discharge- Provide patient education as appropriate  Outcome: Progressing  Goal: Maintains/Returns to pre admission functional level  Description: INTERVENTIONS:- Perform AM-PAC 6 Click Basic Mobility/ Daily Activity assessment daily.- Set and communicate daily mobility goal to care team and patient/family/caregiver. - Collaborate with rehabilitation services on mobility goals if consulted- Perform Range of Motion 3 times a day.- Reposition patient every 2 hours.- Dangle patient 3 times a day- Stand patient 3 times a day- Ambulate patient 3 times a day- Out of bed to chair 3 times a day - Out of bed for meals 3 times a day- Out of bed for toileting- Record patient progress and toleration of activity level   Outcome: Progressing     Problem: DISCHARGE PLANNING  Goal: Discharge to home or other facility with appropriate  resources  Description: INTERVENTIONS:- Identify barriers to discharge w/patient and caregiver- Arrange for needed discharge resources and transportation as appropriate- Identify discharge learning needs (meds, wound care, etc.)- Arrange for interpretive services to assist at discharge as needed- Refer to Case Management Department for coordinating discharge planning if the patient needs post-hospital services based on physician/advanced practitioner order or complex needs related to functional status, cognitive ability, or social support system  Outcome: Progressing     Problem: Knowledge Deficit  Goal: Patient/family/caregiver demonstrates understanding of disease process, treatment plan, medications, and discharge instructions  Description: Complete learning assessment and assess knowledge base.Interventions:- Provide teaching at level of understanding- Provide teaching via preferred learning methods  Outcome: Progressing     Problem: SKIN/TISSUE INTEGRITY - ADULT  Goal: Incision(s), wounds(s) or drain site(s) healing without S/S of infection  Description: INTERVENTIONS- Assess and document dressing, incision, wound bed, drain sites and surrounding tissue- Provide patient and family education- Perform skin care/dressing changes every shift  Outcome: Progressing

## 2025-04-05 NOTE — ASSESSMENT & PLAN NOTE
Patient has been having manic episodes during her hospital course  Psychiatry was consulted and recommended inpatient [facility] admission  Continue Seroquel and trazodone as per Psychiatry  Patient is medically stable for discharge to [facility]
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
## 2025-04-05 NOTE — PLAN OF CARE
Problem: Potential for Falls  Goal: Patient will remain free of falls  Description: INTERVENTIONS:- Educate patient/family on patient safety including physical limitations- Instruct patient to call for assistance with activity - Consult OT/PT to assist with strengthening/mobility - Keep Call bell within reach- Keep bed low and locked with side rails adjusted as appropriate- Keep care items and personal belongings within reach- Initiate and maintain comfort rounds- Make Fall Risk Sign visible to staff- Offer Toileting every 2 Hours, in advance of need- Initiate/Maintain bed alarm- Obtain necessary fall risk management equipment: d- Apply yellow socks and bracelet for high fall risk patients- Consider moving patient to room near nurses station  INTERVENTIONS:- Educate patient/family on patient safety including physical limitations- Instruct patient to call for assistance with activity - Consult OT/PT to assist with strengthening/mobility - Keep Call bell within reach- Keep bed low and locked with side rails adjusted as appropriate- Keep care items and personal belongings within reach- Initiate and maintain comfort rounds- Make Fall Risk Sign visible to staff- Offer Toileting every  Hours, in advance of need- Initiate/Maintain bed alarm- Obtain necessary fall risk management equipment: yellow socks- Apply yellow socks and bracelet for high fall risk patients- Consider moving patient to room near nurses station  Outcome: Progressing     Problem: PAIN - ADULT  Goal: Verbalizes/displays adequate comfort level or baseline comfort level  Description: Interventions:- Encourage patient to monitor pain and request assistance- Assess pain using appropriate pain scale- Administer analgesics based on type and severity of pain and evaluate response- Implement non-pharmacological measures as appropriate and evaluate response- Consider cultural and social influences on pain and pain management- Notify physician/advanced  practitioner if interventions unsuccessful or patient reports new pain  Outcome: Progressing     Problem: INFECTION - ADULT  Goal: Absence or prevention of progression during hospitalization  Description: INTERVENTIONS:- Assess and monitor for signs and symptoms of infection- Monitor lab/diagnostic results- Monitor all insertion sites, i.e. indwelling lines, tubes, and drains- Monitor endotracheal if appropriate and nasal secretions for changes in amount and color- [place] appropriate cooling/warming therapies per order- Administer medications as ordered- Instruct and encourage patient and family to use good hand hygiene technique- Identify and instruct in appropriate isolation precautions for identified infection/condition  Outcome: Progressing  Goal: Absence of fever/infection during neutropenic period  Description: INTERVENTIONS:- Monitor WBC  Outcome: Progressing     Problem: SAFETY ADULT  Goal: Patient will remain free of falls  Description: INTERVENTIONS:- Educate patient/family on patient safety including physical limitations- Instruct patient to call for assistance with activity - Consult OT/PT to assist with strengthening/mobility - Keep Call bell within reach- Keep bed low and locked with side rails adjusted as appropriate- Keep care items and personal belongings within reach- Initiate and maintain comfort rounds- Make Fall Risk Sign visible to staff- Offer Toileting every 2 Hours, in advance of need- Initiate/Maintain bed alarm- Obtain necessary fall risk management equipment: d- Apply yellow socks and bracelet for high fall risk patients- Consider moving patient to room near nurses station  INTERVENTIONS:- Educate patient/family on patient safety including physical limitations- Instruct patient to call for assistance with activity - Consult OT/PT to assist with strengthening/mobility - Keep Call bell within reach- Keep bed low and locked with side rails adjusted as appropriate- Keep care items and  personal belongings within reach- Initiate and maintain comfort rounds- Make Fall Risk Sign visible to staff- Offer Toileting every  Hours, in advance of need- Initiate/Maintain bed alarm- Obtain necessary fall risk management equipment: yellow socks- Apply yellow socks and bracelet for high fall risk patients- Consider moving patient to room near nurses station  Outcome: Progressing  Goal: Maintain or return to baseline ADL function  Description: INTERVENTIONS:-  Assess patient's ability to carry out ADLs; assess patient's baseline for ADL function and identify physical deficits which impact ability to perform ADLs (bathing, care of mouth/teeth, toileting, grooming, dressing, etc.)- Assess/evaluate cause of self-care deficits - Assess range of motion- Assess patient's mobility; develop plan if impaired- Assess patient's need for assistive devices and provide as appropriate- Encourage maximum independence but intervene and supervise when necessary- Involve family in performance of ADLs- Assess for home care needs following discharge - Consider OT consult to assist with ADL evaluation and planning for discharge- Provide patient education as appropriate  Outcome: Progressing  Goal: Maintains/Returns to pre admission functional level  Description: INTERVENTIONS:- Perform AM-PAC 6 Click Basic Mobility/ Daily Activity assessment daily.- Set and communicate daily mobility goal to care team and patient/family/caregiver. - Collaborate with rehabilitation services on mobility goals if consulted- Perform Range of Motion 3 times a day.- Reposition patient every 2 hours.- Dangle patient 3 times a day- Stand patient 3 times a day- Ambulate patient 3 times a day- Out of bed to chair 3 times a day - Out of bed for meals 3 times a day- Out of bed for toileting- Record patient progress and toleration of activity level   Outcome: Progressing     Problem: DISCHARGE PLANNING  Goal: Discharge to home or other facility with appropriate  resources  Description: INTERVENTIONS:- Identify barriers to discharge w/patient and caregiver- Arrange for needed discharge resources and transportation as appropriate- Identify discharge learning needs (meds, wound care, etc.)- Arrange for interpretive services to assist at discharge as needed- Refer to Case Management Department for coordinating discharge planning if the patient needs post-hospital services based on physician/advanced practitioner order or complex needs related to functional status, cognitive ability, or social support system  Outcome: Progressing     Problem: Knowledge Deficit  Goal: Patient/family/caregiver demonstrates understanding of disease process, treatment plan, medications, and discharge instructions  Description: Complete learning assessment and assess knowledge base.Interventions:- Provide teaching at level of understanding- Provide teaching via preferred learning methods  Outcome: Progressing     Problem: SKIN/TISSUE INTEGRITY - ADULT  Goal: Incision(s), wounds(s) or drain site(s) healing without S/S of infection  Description: INTERVENTIONS- Assess and document dressing, incision, wound bed, drain sites and surrounding tissue- Provide patient and family education- Perform skin care/dressing changes every shift  Outcome: Progressing     Problem: Prexisting or High Potential for Compromised Skin Integrity  Goal: Skin integrity is maintained or improved  Description: INTERVENTIONS:- Identify patients at risk for skin breakdown- Assess and monitor skin integrity- Assess and monitor nutrition and hydration status- Monitor labs - Assess for incontinence - Turn and reposition patient- Assist with mobility/ambulation- Relieve pressure over bony prominences- Avoid friction and shearing- Provide appropriate hygiene as needed including keeping skin clean and dry- Evaluate need for skin moisturizer/barrier cream- Collaborate with interdisciplinary team - Patient/family teaching- Consider wound  care consult   Outcome: Progressing

## 2025-04-05 NOTE — ASSESSMENT & PLAN NOTE
Lab Results   Component Value Date    HGBA1C 6.3 (H) 01/08/2025       Recent Labs     04/04/25  1122 04/04/25  1611 04/04/25 2045 04/05/25  0739   POCGLU 182* 140 247* 101       Blood Sugar Average: Last 72 hrs:  (P) 140.3200000202507717  Continue Accu-Cheks before meals and at bedtime with sliding scale insulin coverage accordingly  Hold home oral antihyperglycemics  Humalog SQ before meals  Monitor glucose and adjust medications accordingly

## 2025-04-06 LAB
ANION GAP SERPL CALCULATED.3IONS-SCNC: 6 MMOL/L (ref 4–13)
BASOPHILS # BLD AUTO: 0.04 THOUSANDS/ÂΜL (ref 0–0.1)
BASOPHILS NFR BLD AUTO: 1 % (ref 0–1)
BUN SERPL-MCNC: 17 MG/DL (ref 5–25)
CALCIUM SERPL-MCNC: 9.4 MG/DL (ref 8.4–10.2)
CHLORIDE SERPL-SCNC: 108 MMOL/L (ref 96–108)
CO2 SERPL-SCNC: 26 MMOL/L (ref 21–32)
CREAT SERPL-MCNC: 0.79 MG/DL (ref 0.6–1.3)
EOSINOPHIL # BLD AUTO: 0.16 THOUSAND/ÂΜL (ref 0–0.61)
EOSINOPHIL NFR BLD AUTO: 2 % (ref 0–6)
ERYTHROCYTE [DISTWIDTH] IN BLOOD BY AUTOMATED COUNT: 12.8 % (ref 11.6–15.1)
GFR SERPL CREATININE-BSD FRML MDRD: 77 ML/MIN/1.73SQ M
GLUCOSE SERPL-MCNC: 111 MG/DL (ref 65–140)
GLUCOSE SERPL-MCNC: 115 MG/DL (ref 65–140)
GLUCOSE SERPL-MCNC: 133 MG/DL (ref 65–140)
GLUCOSE SERPL-MCNC: 137 MG/DL (ref 65–140)
GLUCOSE SERPL-MCNC: 231 MG/DL (ref 65–140)
HCT VFR BLD AUTO: 34.8 % (ref 34.8–46.1)
HGB BLD-MCNC: 11.1 G/DL (ref 11.5–15.4)
IMM GRANULOCYTES # BLD AUTO: 0.03 THOUSAND/UL (ref 0–0.2)
IMM GRANULOCYTES NFR BLD AUTO: 0 % (ref 0–2)
LYMPHOCYTES # BLD AUTO: 2.08 THOUSANDS/ÂΜL (ref 0.6–4.47)
LYMPHOCYTES NFR BLD AUTO: 25 % (ref 14–44)
MAGNESIUM SERPL-MCNC: 1.9 MG/DL (ref 1.9–2.7)
MCH RBC QN AUTO: 29.5 PG (ref 26.8–34.3)
MCHC RBC AUTO-ENTMCNC: 31.9 G/DL (ref 31.4–37.4)
MCV RBC AUTO: 93 FL (ref 82–98)
MONOCYTES # BLD AUTO: 0.64 THOUSAND/ÂΜL (ref 0.17–1.22)
MONOCYTES NFR BLD AUTO: 8 % (ref 4–12)
NEUTROPHILS # BLD AUTO: 5.29 THOUSANDS/ÂΜL (ref 1.85–7.62)
NEUTS SEG NFR BLD AUTO: 64 % (ref 43–75)
NRBC BLD AUTO-RTO: 0 /100 WBCS
PHOSPHATE SERPL-MCNC: 2.8 MG/DL (ref 2.3–4.1)
PLATELET # BLD AUTO: 276 THOUSANDS/UL (ref 149–390)
PMV BLD AUTO: 9.9 FL (ref 8.9–12.7)
POTASSIUM SERPL-SCNC: 4.2 MMOL/L (ref 3.5–5.3)
RBC # BLD AUTO: 3.76 MILLION/UL (ref 3.81–5.12)
SODIUM SERPL-SCNC: 140 MMOL/L (ref 135–147)
WBC # BLD AUTO: 8.24 THOUSAND/UL (ref 4.31–10.16)

## 2025-04-06 PROCEDURE — 85025 COMPLETE CBC W/AUTO DIFF WBC: CPT | Performed by: INTERNAL MEDICINE

## 2025-04-06 PROCEDURE — 99232 SBSQ HOSP IP/OBS MODERATE 35: CPT | Performed by: INTERNAL MEDICINE

## 2025-04-06 PROCEDURE — 80048 BASIC METABOLIC PNL TOTAL CA: CPT | Performed by: INTERNAL MEDICINE

## 2025-04-06 PROCEDURE — 83735 ASSAY OF MAGNESIUM: CPT | Performed by: INTERNAL MEDICINE

## 2025-04-06 PROCEDURE — 84100 ASSAY OF PHOSPHORUS: CPT | Performed by: INTERNAL MEDICINE

## 2025-04-06 PROCEDURE — 82948 REAGENT STRIP/BLOOD GLUCOSE: CPT

## 2025-04-06 RX ADMIN — TRAZODONE HYDROCHLORIDE 50 MG: 50 TABLET ORAL at 21:17

## 2025-04-06 RX ADMIN — HEPARIN SODIUM 5000 UNITS: 5000 INJECTION INTRAVENOUS; SUBCUTANEOUS at 13:49

## 2025-04-06 RX ADMIN — PRAVASTATIN SODIUM 40 MG: 40 TABLET ORAL at 08:07

## 2025-04-06 RX ADMIN — HEPARIN SODIUM 5000 UNITS: 5000 INJECTION INTRAVENOUS; SUBCUTANEOUS at 05:00

## 2025-04-06 RX ADMIN — LISINOPRIL 5 MG: 5 TABLET ORAL at 08:07

## 2025-04-06 RX ADMIN — LEVOTHYROXINE SODIUM 88 MCG: 88 TABLET ORAL at 05:00

## 2025-04-06 RX ADMIN — INSULIN LISPRO 3 UNITS: 100 INJECTION, SOLUTION INTRAVENOUS; SUBCUTANEOUS at 21:17

## 2025-04-06 RX ADMIN — HEPARIN SODIUM 5000 UNITS: 5000 INJECTION INTRAVENOUS; SUBCUTANEOUS at 21:17

## 2025-04-06 RX ADMIN — QUETIAPINE FUMARATE 100 MG: 100 TABLET ORAL at 21:17

## 2025-04-06 NOTE — ASSESSMENT & PLAN NOTE
Lab Results   Component Value Date    HGBA1C 6.3 (H) 01/08/2025       Recent Labs     04/05/25  1105 04/05/25  1620 04/05/25 2038 04/06/25  0657   POCGLU 154* 161* 200* 111       Blood Sugar Average: Last 72 hrs:  (P) 146.6571484580774985  Continue Accu-Cheks before meals and at bedtime with sliding scale insulin coverage accordingly  Hold home oral antihyperglycemics  Humalog SQ before meals  Monitor glucose and adjust medications accordingly
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
## 2025-04-06 NOTE — ASSESSMENT & PLAN NOTE
Patient with ulcer of left third toe  ED visit 3/28- prescribed Keflex 500 mg Q6H x 7 days (ends tomorrow)  Seen by wound care/podiatry, Dr. Osullivan, on 4/1  Patient endorses chills but denies foot pain, numbness, tingling  Does not meet SIRS criteria  XR foot 4/3- No radiographic evidence of osteomyelitis   VAS arterial duplex within normal limits  Podiatry has signed off  No need for antibiotics  Outpatient follow-up with Podiatry  Patient is medically stable for discharge to [facility]

## 2025-04-07 ENCOUNTER — TELEPHONE (OUTPATIENT)
Dept: PSYCHIATRY | Facility: CLINIC | Age: 70
End: 2025-04-07

## 2025-04-07 LAB
ANION GAP SERPL CALCULATED.3IONS-SCNC: 9 MMOL/L (ref 4–13)
BACTERIA UR QL AUTO: ABNORMAL /HPF
BASOPHILS # BLD AUTO: 0.05 THOUSANDS/ÂΜL (ref 0–0.1)
BASOPHILS NFR BLD AUTO: 1 % (ref 0–1)
BILIRUB UR QL STRIP: NEGATIVE
BUN SERPL-MCNC: 19 MG/DL (ref 5–25)
CALCIUM SERPL-MCNC: 10.1 MG/DL (ref 8.4–10.2)
CHLORIDE SERPL-SCNC: 107 MMOL/L (ref 96–108)
CLARITY UR: CLEAR
CO2 SERPL-SCNC: 25 MMOL/L (ref 21–32)
COLOR UR: YELLOW
CREAT SERPL-MCNC: 0.92 MG/DL (ref 0.6–1.3)
EOSINOPHIL # BLD AUTO: 0.18 THOUSAND/ÂΜL (ref 0–0.61)
EOSINOPHIL NFR BLD AUTO: 2 % (ref 0–6)
ERYTHROCYTE [DISTWIDTH] IN BLOOD BY AUTOMATED COUNT: 12.9 % (ref 11.6–15.1)
GFR SERPL CREATININE-BSD FRML MDRD: 64 ML/MIN/1.73SQ M
GLUCOSE SERPL-MCNC: 102 MG/DL (ref 65–140)
GLUCOSE SERPL-MCNC: 106 MG/DL (ref 65–140)
GLUCOSE SERPL-MCNC: 134 MG/DL (ref 65–140)
GLUCOSE SERPL-MCNC: 136 MG/DL (ref 65–140)
GLUCOSE SERPL-MCNC: 147 MG/DL (ref 65–140)
GLUCOSE UR STRIP-MCNC: NEGATIVE MG/DL
HCT VFR BLD AUTO: 38.8 % (ref 34.8–46.1)
HGB BLD-MCNC: 12.5 G/DL (ref 11.5–15.4)
HGB UR QL STRIP.AUTO: NEGATIVE
IMM GRANULOCYTES # BLD AUTO: 0.01 THOUSAND/UL (ref 0–0.2)
IMM GRANULOCYTES NFR BLD AUTO: 0 % (ref 0–2)
KETONES UR STRIP-MCNC: NEGATIVE MG/DL
LEUKOCYTE ESTERASE UR QL STRIP: NEGATIVE
LYMPHOCYTES # BLD AUTO: 2.33 THOUSANDS/ÂΜL (ref 0.6–4.47)
LYMPHOCYTES NFR BLD AUTO: 27 % (ref 14–44)
MAGNESIUM SERPL-MCNC: 2 MG/DL (ref 1.9–2.7)
MCH RBC QN AUTO: 29.6 PG (ref 26.8–34.3)
MCHC RBC AUTO-ENTMCNC: 32.2 G/DL (ref 31.4–37.4)
MCV RBC AUTO: 92 FL (ref 82–98)
MONOCYTES # BLD AUTO: 0.67 THOUSAND/ÂΜL (ref 0.17–1.22)
MONOCYTES NFR BLD AUTO: 8 % (ref 4–12)
NEUTROPHILS # BLD AUTO: 5.26 THOUSANDS/ÂΜL (ref 1.85–7.62)
NEUTS SEG NFR BLD AUTO: 62 % (ref 43–75)
NITRITE UR QL STRIP: NEGATIVE
NON-SQ EPI CELLS URNS QL MICRO: ABNORMAL /HPF
NRBC BLD AUTO-RTO: 0 /100 WBCS
PH UR STRIP.AUTO: 6 [PH]
PHOSPHATE SERPL-MCNC: 3.4 MG/DL (ref 2.3–4.1)
PLATELET # BLD AUTO: 272 THOUSANDS/UL (ref 149–390)
PMV BLD AUTO: 9.8 FL (ref 8.9–12.7)
POTASSIUM SERPL-SCNC: 4.1 MMOL/L (ref 3.5–5.3)
PROT UR STRIP-MCNC: NEGATIVE MG/DL
RBC # BLD AUTO: 4.22 MILLION/UL (ref 3.81–5.12)
RBC #/AREA URNS AUTO: ABNORMAL /HPF
SODIUM SERPL-SCNC: 141 MMOL/L (ref 135–147)
SP GR UR STRIP.AUTO: 1.01
UROBILINOGEN UR QL STRIP.AUTO: 0.2 E.U./DL
WBC # BLD AUTO: 8.5 THOUSAND/UL (ref 4.31–10.16)
WBC #/AREA URNS AUTO: ABNORMAL /HPF

## 2025-04-07 PROCEDURE — 85025 COMPLETE CBC W/AUTO DIFF WBC: CPT | Performed by: INTERNAL MEDICINE

## 2025-04-07 PROCEDURE — 84100 ASSAY OF PHOSPHORUS: CPT | Performed by: INTERNAL MEDICINE

## 2025-04-07 PROCEDURE — 81001 URINALYSIS AUTO W/SCOPE: CPT | Performed by: INTERNAL MEDICINE

## 2025-04-07 PROCEDURE — 82948 REAGENT STRIP/BLOOD GLUCOSE: CPT

## 2025-04-07 PROCEDURE — 80048 BASIC METABOLIC PNL TOTAL CA: CPT | Performed by: INTERNAL MEDICINE

## 2025-04-07 PROCEDURE — 93005 ELECTROCARDIOGRAM TRACING: CPT

## 2025-04-07 PROCEDURE — 83735 ASSAY OF MAGNESIUM: CPT | Performed by: INTERNAL MEDICINE

## 2025-04-07 PROCEDURE — 99232 SBSQ HOSP IP/OBS MODERATE 35: CPT | Performed by: INTERNAL MEDICINE

## 2025-04-07 RX ADMIN — HEPARIN SODIUM 5000 UNITS: 5000 INJECTION INTRAVENOUS; SUBCUTANEOUS at 05:14

## 2025-04-07 RX ADMIN — HEPARIN SODIUM 5000 UNITS: 5000 INJECTION INTRAVENOUS; SUBCUTANEOUS at 13:42

## 2025-04-07 RX ADMIN — TRAZODONE HYDROCHLORIDE 50 MG: 50 TABLET ORAL at 21:33

## 2025-04-07 RX ADMIN — LEVOTHYROXINE SODIUM 88 MCG: 88 TABLET ORAL at 05:14

## 2025-04-07 RX ADMIN — PRAVASTATIN SODIUM 40 MG: 40 TABLET ORAL at 08:47

## 2025-04-07 RX ADMIN — QUETIAPINE FUMARATE 100 MG: 100 TABLET ORAL at 21:33

## 2025-04-07 RX ADMIN — HEPARIN SODIUM 5000 UNITS: 5000 INJECTION INTRAVENOUS; SUBCUTANEOUS at 21:33

## 2025-04-07 RX ADMIN — LISINOPRIL 5 MG: 5 TABLET ORAL at 08:47

## 2025-04-07 NOTE — ASSESSMENT & PLAN NOTE
Lab Results   Component Value Date    HGBA1C 6.3 (H) 01/08/2025       Recent Labs     04/06/25  2045 04/07/25  0723 04/07/25  1106 04/07/25  1625   POCGLU 231* 106 134 136       Blood Sugar Average: Last 72 hrs:  (P) 151.4  Continue Accu-Cheks before meals and at bedtime with sliding scale insulin coverage accordingly  Hold home oral antihyperglycemics  Humalog SQ before meals  Monitor glucose and adjust medications accordingly
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
## 2025-04-07 NOTE — PLAN OF CARE
Problem: Potential for Falls  Goal: Patient will remain free of falls  Description: INTERVENTIONS:- Educate patient/family on patient safety including physical limitations- Instruct patient to call for assistance with activity - Consult OT/PT to assist with strengthening/mobility - Keep Call bell within reach- Keep bed low and locked with side rails adjusted as appropriate- Keep care items and personal belongings within reach- Initiate and maintain comfort rounds- Make Fall Risk Sign visible to staff- Offer Toileting every 2 Hours, in advance of need- Initiate/Maintain bed alarm- Obtain necessary fall risk management equipment: d- Apply yellow socks and bracelet for high fall risk patients- Consider moving patient to room near nurses station  INTERVENTIONS:- Educate patient/family on patient safety including physical limitations- Instruct patient to call for assistance with activity - Consult OT/PT to assist with strengthening/mobility - Keep Call bell within reach- Keep bed low and locked with side rails adjusted as appropriate- Keep care items and personal belongings within reach- Initiate and maintain comfort rounds- Make Fall Risk Sign visible to staff- Offer Toileting every  Hours, in advance of need- Initiate/Maintain bed alarm- Obtain necessary fall risk management equipment: yellow socks- Apply yellow socks and bracelet for high fall risk patients- Consider moving patient to room near nurses station  Outcome: Progressing     Problem: PAIN - ADULT  Goal: Verbalizes/displays adequate comfort level or baseline comfort level  Description: Interventions:- Encourage patient to monitor pain and request assistance- Assess pain using appropriate pain scale- Administer analgesics based on type and severity of pain and evaluate response- Implement non-pharmacological measures as appropriate and evaluate response- Consider cultural and social influences on pain and pain management- Notify physician/advanced  practitioner if interventions unsuccessful or patient reports new pain  Outcome: Progressing     Problem: INFECTION - ADULT  Goal: Absence or prevention of progression during hospitalization  Description: INTERVENTIONS:- Assess and monitor for signs and symptoms of infection- Monitor lab/diagnostic results- Monitor all insertion sites, i.e. indwelling lines, tubes, and drains- Monitor endotracheal if appropriate and nasal secretions for changes in amount and color- [place] appropriate cooling/warming therapies per order- Administer medications as ordered- Instruct and encourage patient and family to use good hand hygiene technique- Identify and instruct in appropriate isolation precautions for identified infection/condition  Outcome: Progressing  Goal: Absence of fever/infection during neutropenic period  Description: INTERVENTIONS:- Monitor WBC  Outcome: Progressing     Problem: SAFETY ADULT  Goal: Patient will remain free of falls  Description: INTERVENTIONS:- Educate patient/family on patient safety including physical limitations- Instruct patient to call for assistance with activity - Consult OT/PT to assist with strengthening/mobility - Keep Call bell within reach- Keep bed low and locked with side rails adjusted as appropriate- Keep care items and personal belongings within reach- Initiate and maintain comfort rounds- Make Fall Risk Sign visible to staff- Offer Toileting every 2 Hours, in advance of need- Initiate/Maintain bed alarm- Obtain necessary fall risk management equipment: d- Apply yellow socks and bracelet for high fall risk patients- Consider moving patient to room near nurses station  INTERVENTIONS:- Educate patient/family on patient safety including physical limitations- Instruct patient to call for assistance with activity - Consult OT/PT to assist with strengthening/mobility - Keep Call bell within reach- Keep bed low and locked with side rails adjusted as appropriate- Keep care items and  personal belongings within reach- Initiate and maintain comfort rounds- Make Fall Risk Sign visible to staff- Offer Toileting every  Hours, in advance of need- Initiate/Maintain bed alarm- Obtain necessary fall risk management equipment: yellow socks- Apply yellow socks and bracelet for high fall risk patients- Consider moving patient to room near nurses station  Outcome: Progressing  Goal: Maintain or return to baseline ADL function  Description: INTERVENTIONS:-  Assess patient's ability to carry out ADLs; assess patient's baseline for ADL function and identify physical deficits which impact ability to perform ADLs (bathing, care of mouth/teeth, toileting, grooming, dressing, etc.)- Assess/evaluate cause of self-care deficits - Assess range of motion- Assess patient's mobility; develop plan if impaired- Assess patient's need for assistive devices and provide as appropriate- Encourage maximum independence but intervene and supervise when necessary- Involve family in performance of ADLs- Assess for home care needs following discharge - Consider OT consult to assist with ADL evaluation and planning for discharge- Provide patient education as appropriate  Outcome: Progressing  Goal: Maintains/Returns to pre admission functional level  Description: INTERVENTIONS:- Perform AM-PAC 6 Click Basic Mobility/ Daily Activity assessment daily.- Set and communicate daily mobility goal to care team and patient/family/caregiver. - Collaborate with rehabilitation services on mobility goals if consulted- Perform Range of Motion 3 times a day.- Reposition patient every 2 hours.- Dangle patient 3 times a day- Stand patient 3 times a day- Ambulate patient 3 times a day- Out of bed to chair 3 times a day - Out of bed for meals 3 times a day- Out of bed for toileting- Record patient progress and toleration of activity level   Outcome: Progressing     Problem: DISCHARGE PLANNING  Goal: Discharge to home or other facility with appropriate  resources  Description: INTERVENTIONS:- Identify barriers to discharge w/patient and caregiver- Arrange for needed discharge resources and transportation as appropriate- Identify discharge learning needs (meds, wound care, etc.)- Arrange for interpretive services to assist at discharge as needed- Refer to Case Management Department for coordinating discharge planning if the patient needs post-hospital services based on physician/advanced practitioner order or complex needs related to functional status, cognitive ability, or social support system  Outcome: Progressing     Problem: Knowledge Deficit  Goal: Patient/family/caregiver demonstrates understanding of disease process, treatment plan, medications, and discharge instructions  Description: Complete learning assessment and assess knowledge base.Interventions:- Provide teaching at level of understanding- Provide teaching via preferred learning methods  Outcome: Progressing     Problem: SKIN/TISSUE INTEGRITY - ADULT  Goal: Incision(s), wounds(s) or drain site(s) healing without S/S of infection  Description: INTERVENTIONS- Assess and document dressing, incision, wound bed, drain sites and surrounding tissue- Provide patient and family education- Perform skin care/dressing changes every shift  Outcome: Progressing     Problem: Prexisting or High Potential for Compromised Skin Integrity  Goal: Skin integrity is maintained or improved  Description: INTERVENTIONS:- Identify patients at risk for skin breakdown- Assess and monitor skin integrity- Assess and monitor nutrition and hydration status- Monitor labs - Assess for incontinence - Turn and reposition patient- Assist with mobility/ambulation- Relieve pressure over bony prominences- Avoid friction and shearing- Provide appropriate hygiene as needed including keeping skin clean and dry- Evaluate need for skin moisturizer/barrier cream- Collaborate with interdisciplinary team - Patient/family teaching- Consider wound  care consult   Outcome: Progressing

## 2025-04-07 NOTE — CONSULTS
.TeleConsultation - Behavioral Health   Name: Lisa ZARATE McGorry 69 y.o. female I MRN: [id number]  Unit/Bed#: -01 I Date of Admission: 4/3/2025   Date of Service: 4/7/2025 I Hospital Day: 4  Inpatient consult to Psychiatry  Consult performed by: Cinda Moss MD  Consult ordered by: Lance Zayas MD        Physician Requesting Consult: Lance Zayas, *  Principal Problem:Toe ulcer (HCC)  Reason for Consult: Re eval for psych admission    Assessment & Plan   68 YO F, lives with brother, with hx of mental illness (bipolar disorder), and PMHx of  hypothyroidism, type 2 DM, hypertension, and stage 3 CKD who presented to the ED 4/3/25 with chills and a known ulcer of the left third toe     Pt on evaluation today continues with rapid, pressured speech, flight of ideas, loose associations. She is difficult to redirect and cannot answer most questions appropriately, focused on her brother mostly. Pt says she filed PFA Thursday and police has been involved many times, against her brother.  She has also mentioned throughout stay that she Is unsafe with him there.  She has hx of bipolar disorder but has been on insufficient medications.  Pt is asking to be discharged home.      Agree with prior psych consult: Based on the information above patient appears to be psychiatrically decompensated, either she has not been fully compliant with her psychotropic medications or alternatively the medications are in need of being adjusted if she has been compliant with them. Furthermore, her mood stabilizer (Seroquel) dose is minute compared to her antidepressant (trazodone), which could also be contributing to her becoming manic.   Considering her acute manic state she requires inpatient psychiatric hospitalization for medication managemen    TREATMENT PLAN RECOMMENDATIONS:  Pt meets criteria for involuntary psychiatric admission  Admit to behavioral health unit once medically cleared  Can increase seroquel to  150 mg qhs if still on medical floor  Monitor labs, EKG  Precautions: fall         Informed consent for the above medication has been obtained including discussion of the risks, benefits and alternatives: Yes    Disposition: The patient meets criteria for inpatient psychiatric hospitalization when medically stable due to an acute psychiatric illness.    Legal Status Recommendation: Follow hospital policy to maintain current involuntary hold.    Multiple Antipsychotic Review: N/A    Psychotherapy/Psychoeducation: N/A to this case.    Other/Medical Work Up and/or treatment modality recommendations: N/A to this case.    Patient Caregiver/Family Education: N/A    Follow-up: Re-consult PRN    Report regarding the above Assessment and Treatment plan was provided to: Dr Zayas    History of Present Illness    Per Prior psych consult: On evaluation patient was found sitting on the chair next to her bed, was calm and cooperative.  Noted to have rapid, pressured, over productive, tangential/disorganized speech/thought process.  She also expressed paranoid delusions towards her brother stating that she does not feel safe going back to his house and was not able to explain in a linear or logical manner why she does not want to return to his house.   She acknowledged having a history of mental illness, and being prescribed psychotropic medications, reported compliance with them but was not able to state the exact doses of medications.   Denied any recent intoxicating substance use    Pt seen via telepsych    On evaluation today, patient is eating her lunch in a side chair, bouncing up and down while talking.    Patient says she is in the hospital due to a toe infection. She says she had 3 hours to live or she would die prior to coming to the hospital.    Patient begins talking about her brother smoking in the house which is affecting her health.  She switches to the topic of wrestling and someone named Fernando, continued jumping  from topic to topic with flight of ideas.  It is unclear if patient sees a psychiatrist or not, she begins talking about going to an appointment but cannot stay on topic.  She states she takes trazodone but “Im not sleeping because my brother drives my car” then says she is bipolar and should be on Lithium, but was taken off of it due to kidney disease.   Patient says she put a PFA on Thursday and her brother should not be there anymore. She says she has had to call the police due to her brother making keys to her car.  She says she cannot go home if her brother is there, and how he only takes her out of the house once in awhile.    Pt with flight of ideas, rapid pressured speech, unable to continue conversation- she is hyperfocused on her brother and car with disorganized thoughts.  Possibly paranoid against her brother- no collateral, unclear situation.     Psychiatric Review Of Systems:      Historical Information   Past Psychiatric History:   Past psych admissions  Bipolar disorder    Substance Abuse History:  denies      Family Psychiatric History: unknown     Social History:  Unable to obtain due to clinical status      Medical History Review: I have reviewed the patient's PMH, PSH, Social History, Family History, Meds, and Allergies     Meds/Allergies   Current Facility-Administered Medications   Medication Dose Route Frequency Provider Last Rate    acetaminophen  650 mg Oral Q4H PRN Danny C Yorty, DO      heparin (porcine)  5,000 Units Subcutaneous Q8H DANYEL Danny C Yorty, DO      insulin lispro  1-6 Units Subcutaneous TID AC Danny C Yorty, DO      insulin lispro  1-6 Units Subcutaneous HS Danny C Yorty, DO      levothyroxine  88 mcg Oral Early Morning Danny C Yorty, DO      lisinopril  5 mg Oral Daily Danny C Yorty, DO      nystatin   Topical BID Danny C Yorty, DO      ondansetron  4 mg Intravenous Q6H PRN Danny C Yorty, DO      pravastatin  40 mg Oral Daily Danny C Yorty, DO      QUEtiapine  " 100 mg Oral HS Danny C Yorty, DO      traZODone  50 mg Oral HS Danny C Yorty, DO        No Known Allergies    Objective :  Temp:  [97.9 °F (36.6 °C)-99.6 °F (37.6 °C)] 97.9 °F (36.6 °C)  HR:  [81-96] 81  BP: (112-136)/(65-91) 136/79  Resp:  [17-18] 18  SpO2:  [97 %-98 %] 98 %  O2 Device: None (Room air)    Mental Status Evaluation:  Appearance:  older than stated age   Behavior:  restless and fidgety   Speech:  pressured and tangential   Mood:  euphoric   Affect:  increased in intensity, increased in range, and mood-congruent   Language: naming objects   Thought Process:  loose associations   Associations tangential associations   Thought Content:  normal   Perceptual Disturbances: None   Risk Potential: Suicidal Ideations none  Homicidal Ideations none  Potential for Aggression No   Sensorium:  person, place, and time/date   Cognition:  recent and remote memory grossly intact   Consciousness:  alert    Attention: attention span appeared shorter than expected for age   Intellect: not examined   Fund of Knowledge: fair   Insight:  impaired due to manic   Judgment: impaired due to manic                 Lab Results: I have reviewed the following lab results:   .     04/07/25  0510   WBC 8.50   HGB 12.5   HCT 38.8      SODIUM 141   K 4.1      CO2 25   BUN 19   CREATININE 0.92   GLUC 102   MG 2.0   PHOS 3.4          Lipid Profile:   Lab Results   Component Value Date    CHOLESTEROL 173 04/25/2024    HDL 79 04/25/2024    TRIG 114 04/25/2024    LDLCALC 71 04/25/2024    NONHDLC 94 04/25/2024   Thyroid Studies:   Lab Results   Component Value Date    UMX0SZCWJTSZ 6.142 (H) 01/08/2025    T3FREE 3.24 07/11/2023    FREET4 1.29 (H) 01/08/2025    E9MOCJL 0.80 03/10/2022     Ammonia: No results found for: \"AMMONIA\"  Drug Levels:   Lab Results   Component Value Date    LITHIUM 1.03 01/08/2025       Imaging Results Review: No pertinent imaging studies reviewed.  Other Study Results Review: No additional pertinent " studies reviewed.    Code Status: Level 1 - Full Code  Advance Directive and Living Will:      Power of :    POLST:      Screenings:   1. Nutrition Assessment (completed by Staff):   Nutrition  Feeding: Able to feed self  Diet Type: Regular/House  Appetite: Fair  2. Pain Screening  Pain Assessment  Pain Assessment Tool: 0-10  Pain Score: 0  Pain Location/Orientation: Location: Generalized (denies)  Patient's Stated Pain Goal: No pain  Multiple Pain Sites: No  3. Suicide Screening  ED Crisis Suicide Risk Assessment: Suicide Risk Assessment  Violence Risk to Self: Denies ideation within past 6 months  Protective Factors: The patient does not want to die, The patient has no thoughts of suicide    C-SSRS Screening (Nursing Assessment - recent):    C-SSRS Screening (Nursing Assessment - since last contact):      Suicide Risk Assessment completed by the Consultant: The following ratings are based on assessment at the time of the interview. Demographic risk factors include: . Recent Specific Risk Factors include: chronic health problems.    Administrative Statements   VIRTUAL CARE DOCUMENTATION:     1. This service was provided via Telemedicine using [employer]     2. Parties in the room with patient during teleconsult Patient only    3. Confidentiality My office door was closed     4. Participants No one else was in the room    5. Patient acknowledged consent and understanding of privacy and security of the  Telemedicine consult. I informed the patient that I have reviewed their record in Epic and presented the opportunity for them to ask any questions regarding the visit today.  The patient agreed to participate.    6. I have spent a total time of 25 minutes in caring for this patient on the day of the visit/encounter including Documenting in the medical record, Obtaining or reviewing history  , and Communicating with other healthcare professionals , not including the time spent for establishing the  audio/video connection.

## 2025-04-07 NOTE — ASSESSMENT & PLAN NOTE
Patient has been having manic episodes during her hospital course  Psychiatry was consulted and recommended inpatient U admission  Continue Seroquel and trazodone as per Psychiatry  Patient is medically stable for discharge to U  Crisis/BHU requesting TSH, EKG, UA which have been ordered

## 2025-04-07 NOTE — TELEPHONE ENCOUNTER
Consult placed on [facility] queue at 0938 to be seen by an [facility] psychiatrist.

## 2025-04-07 NOTE — PLAN OF CARE
Problem: Potential for Falls  Goal: Patient will remain free of falls  Description: INTERVENTIONS:- Educate patient/family on patient safety including physical limitations- Instruct patient to call for assistance with activity - Consult OT/PT to assist with strengthening/mobility - Keep Call bell within reach- Keep bed low and locked with side rails adjusted as appropriate- Keep care items and personal belongings within reach- Initiate and maintain comfort rounds- Make Fall Risk Sign visible to staff- Offer Toileting every 2 Hours, in advance of need- Initiate/Maintain bed alarm- Obtain necessary fall risk management equipment: d- Apply yellow socks and bracelet for high fall risk patients- Consider moving patient to room near nurses station  INTERVENTIONS:- Educate patient/family on patient safety including physical limitations- Instruct patient to call for assistance with activity - Consult OT/PT to assist with strengthening/mobility - Keep Call bell within reach- Keep bed low and locked with side rails adjusted as appropriate- Keep care items and personal belongings within reach- Initiate and maintain comfort rounds- Make Fall Risk Sign visible to staff- Offer Toileting every  Hours, in advance of need- Initiate/Maintain bed alarm- Obtain necessary fall risk management equipment: yellow socks- Apply yellow socks and bracelet for high fall risk patients- Consider moving patient to room near nurses station  Outcome: Progressing     Problem: PAIN - ADULT  Goal: Verbalizes/displays adequate comfort level or baseline comfort level  Description: Interventions:- Encourage patient to monitor pain and request assistance- Assess pain using appropriate pain scale- Administer analgesics based on type and severity of pain and evaluate response- Implement non-pharmacological measures as appropriate and evaluate response- Consider cultural and social influences on pain and pain management- Notify physician/advanced  practitioner if interventions unsuccessful or patient reports new pain  Outcome: Progressing     Problem: SAFETY ADULT  Goal: Maintain or return to baseline ADL function  Description: INTERVENTIONS:-  Assess patient's ability to carry out ADLs; assess patient's baseline for ADL function and identify physical deficits which impact ability to perform ADLs (bathing, care of mouth/teeth, toileting, grooming, dressing, etc.)- Assess/evaluate cause of self-care deficits - Assess range of motion- Assess patient's mobility; develop plan if impaired- Assess patient's need for assistive devices and provide as appropriate- Encourage maximum independence but intervene and supervise when necessary- Involve family in performance of ADLs- Assess for home care needs following discharge - Consider OT consult to assist with ADL evaluation and planning for discharge- Provide patient education as appropriate  Outcome: Progressing     Problem: Knowledge Deficit  Goal: Patient/family/caregiver demonstrates understanding of disease process, treatment plan, medications, and discharge instructions  Description: Complete learning assessment and assess knowledge base.Interventions:- Provide teaching at level of understanding- Provide teaching via preferred learning methods  Outcome: Progressing     Problem: DISCHARGE PLANNING  Goal: Discharge to home or other facility with appropriate resources  Description: INTERVENTIONS:- Identify barriers to discharge w/patient and caregiver- Arrange for needed discharge resources and transportation as appropriate- Identify discharge learning needs (meds, wound care, etc.)- Arrange for interpretive services to assist at discharge as needed- Refer to Case Management Department for coordinating discharge planning if the patient needs post-hospital services based on physician/advanced practitioner order or complex needs related to functional status, cognitive ability, or social support system  Outcome:  Progressing

## 2025-04-07 NOTE — CASE MANAGEMENT
Case Management Discharge Planning Note    Patient name Lisa A [facility]  Location /-01 MRN [id number]  : 1955 Date 2025       Current Admission Date: 4/3/2025  Current Admission Diagnosis:Toe ulcer (HCC)   Patient Active Problem List    Diagnosis Date Noted Date Diagnosed    Social problem 2025     Toe ulcer (HCC) 2025     Primary osteoarthritis of left knee 2025     Generalized abdominal pain 10/05/2024     Stage 3 chronic kidney disease, unspecified whether stage 3a or 3b CKD (HCC) 2022     Vitamin B12 deficiency 2022     Vitamin D deficiency 2022     Essential hypertension 2021     Atypical squamous cells of undetermined significance on cytologic smear of cervix (ASC-US) 2021     Type 2 diabetes mellitus with stage 3a chronic kidney disease, without long-term current use of insulin ([facility]) 10/18/2019     Class 2 obesity due to excess calories with body mass index (BMI) of 35.0 to 35.9 in adult 10/18/2019     Postmenopausal 10/18/2019     Moderate depressed bipolar I disorder (HCC) 2014     Constipation 2014     Hypercholesterolemia 2014     Hypothyroidism 2014       LOS (days): 4  Geometric Mean LOS (GMLOS) (days): 2  Days to GMLOS:-2.1     OBJECTIVE:  Risk of Unplanned Readmission Score: 19.28         Current admission status: Inpatient   Preferred Pharmacy:   [facility] [place] [facility] [address]  [place] [address]  Phone: [phone] Fax: [phone]    Primary Care Provider: Gadiel Young PA-C    Primary Insurance: [facility]  Secondary Insurance: [place] [employer] [facility]    DISCHARGE DETAILS:    Discharge planning discussed with:: patient & cm was given permission to call her brother at 16:45 pm- he is aware she needs an inpt bhu stay this is all cm was told to tell him  Freedom of Choice: Yes  Comments - Freedom of Choice: psych consult  completed again - pt meets critreria for an involuntary inpt psych admission  CM contacted family/caregiver?: Yes  Were Treatment Team discharge recommendations reviewed with patient/caregiver?: Yes  Did patient/caregiver verbalize understanding of patient care needs?: Yes  Were patient/caregiver advised of the risks associated with not following Treatment Team discharge recommendations?: Yes    Contacts  Patient Contacts: Jeramie Adams  Relationship to Patient:: Family (brother)  Contact Method: Phone  Phone Number: [phone]  Reason/Outcome: Discharge Planning    Requested Home Health Care         Is the patient interested in HHC at discharge?: No         Other Referral/Resources/Interventions Provided:  Interventions: Psych Rehab  Referral Comments: pt meets criteria for an involuntaru admission crisis is working on finding a bed    Would you like to participate in our [facility] service program?  : No - Declined    Treatment Team Recommendation: Inpatient Behavioral Health (inpt BHU - TBD)

## 2025-04-07 NOTE — ASSESSMENT & PLAN NOTE
Patient with ulcer of left third toe  ED visit 3/28- prescribed Keflex 500 mg Q6H x 7 days which has been completed  XR foot 4/3- No radiographic evidence of osteomyelitis   VAS arterial duplex within normal limits  Podiatry has signed off  No need for further antibiotics  Outpatient follow-up with Podiatry  Patient is medically stable for discharge to [facility]

## 2025-04-07 NOTE — PROGRESS NOTES
Progress Note - Hospitalist   Name: Lisa Bocanegraorry 69 y.o. female I MRN: [id number]  Unit/Bed#: -01 I Date of Admission: 4/3/2025   Date of Service: 4/7/2025 I Hospital Day: 4    Assessment & Plan  Toe ulcer (HCC)  Patient with ulcer of left third toe  ED visit 3/28- prescribed Keflex 500 mg Q6H x 7 days which has been completed  XR foot 4/3- No radiographic evidence of osteomyelitis   VAS arterial duplex within normal limits  Podiatry has signed off  No need for further antibiotics  Outpatient follow-up with Podiatry  Patient is medically stable for discharge to [facility]  Moderate depressed bipolar I disorder (HCC)  Patient has been having manic episodes during her hospital course  Psychiatry was consulted and recommended inpatient U admission  Continue Seroquel and trazodone as per Psychiatry  Patient is medically stable for discharge to [facility]  Crisis/U requesting TSH, EKG, UA which have been ordered  Social problem  Patient states her brother is a threat to her at home  Case Management consulted for safe disposition planning  Medically stable for discharge pending safe disposition planning  Type 2 diabetes mellitus with stage 3a chronic kidney disease, without long-term current use of insulin ([facility])  Lab Results   Component Value Date    HGBA1C 6.3 (H) 01/08/2025       Recent Labs     04/06/25  2045 04/07/25  0723 04/07/25  1106 04/07/25  1625   POCGLU 231* 106 134 136       Blood Sugar Average: Last 72 hrs:  (P) 151.4  Continue Accu-Cheks before meals and at bedtime with sliding scale insulin coverage accordingly  Hold home oral antihyperglycemics  Humalog SQ before meals  Monitor glucose and adjust medications accordingly  Hypothyroidism  Continue levothyroxine 88 mcg p.o. daily  Essential hypertension  Continue lisinopril 5 mg p.o. daily  Hydralazine as needed for SBP > 160 mmHg    VTE Pharmacologic Prophylaxis: VTE Score: 3 Moderate Risk (Score 3-4) - Pharmacological DVT Prophylaxis Ordered:  heparin.    Mobility:   Basic Mobility Inpatient Raw Score: 23  JH-HLM Goal: 7: Walk 25 feet or more  JH-HLM Achieved: 7: Walk 25 feet or more  JH-HLM Goal achieved. Continue to encourage appropriate mobility.    Patient Centered Rounds: I performed bedside rounds with nursing staff today.   Discussions with Specialists or Other Care Team Provider: yes    Education and Discussions with Family / Patient:  Updated patient regarding plan of care.     Current Length of Stay: 4 day(s)  Current Patient Status: Inpatient   Certification Statement: The patient will continue to require additional inpatient hospital stay due to pending U  Discharge Plan: Anticipate discharge tomorrow to inpatient psych.    Code Status: Level 1 - Full Code    Subjective   No overnight events noted     Objective :  Temp:  [97.9 °F (36.6 °C)-100.1 °F (37.8 °C)] 100.1 °F (37.8 °C)  HR:  [81-99] 99  BP: (135-153)/(65-85) 153/85  Resp:  [16-18] 16  SpO2:  [97 %-98 %] 98 %  O2 Device: None (Room air)    Body mass index is 33.76 kg/m².     Input and Output Summary (last 24 hours):     Intake/Output Summary (Last 24 hours) at 4/7/2025 1634  Last data filed at 4/7/2025 0523  Gross per 24 hour   Intake 580 ml   Output 300 ml   Net 280 ml       Physical Exam  Constitutional:       General: She is not in acute distress.  HENT:      Head: Normocephalic and atraumatic.      Nose: Nose normal.      Mouth/Throat:      Mouth: Mucous membranes are moist.   Eyes:      Extraocular Movements: Extraocular movements intact.      Conjunctiva/sclera: Conjunctivae normal.   Cardiovascular:      Rate and Rhythm: Normal rate and regular rhythm.   Pulmonary:      Effort: Pulmonary effort is normal. No respiratory distress.   Abdominal:      Palpations: Abdomen is soft.      Tenderness: There is no abdominal tenderness.   Musculoskeletal:         General: Normal range of motion.      Cervical back: Normal range of motion and neck supple.   Skin:     General: Skin is warm  and dry.   Neurological:      General: No focal deficit present.      Mental Status: She is alert. Mental status is at baseline.      Cranial Nerves: No cranial nerve deficit.   Psychiatric:         Mood and Affect: Mood is anxious.         Speech: Speech is tangential.       Lines/Drains:        Lab Results: I have reviewed the following results:   Results from last 7 days   Lab Units 04/07/25  0510   WBC Thousand/uL 8.50   HEMOGLOBIN g/dL 12.5   HEMATOCRIT % 38.8   PLATELETS Thousands/uL 272   SEGS PCT % 62   LYMPHO PCT % 27   MONO PCT % 8   EOS PCT % 2     Results from last 7 days   Lab Units 04/07/25  0510 04/04/25  0433 04/03/25  1314   SODIUM mmol/L 141   < > 139   POTASSIUM mmol/L 4.1   < > 3.8   CHLORIDE mmol/L 107   < > 106   CO2 mmol/L 25   < > 25   BUN mg/dL 19   < > 21   CREATININE mg/dL 0.92   < > 1.04   ANION GAP mmol/L 9   < > 8   CALCIUM mg/dL 10.1   < > 9.7   ALBUMIN g/dL  --   --  4.3   TOTAL BILIRUBIN mg/dL  --   --  1.16*   ALK PHOS U/L  --   --  58   ALT U/L  --   --  18   AST U/L  --   --  21   GLUCOSE RANDOM mg/dL 102   < > 190*    < > = values in this interval not displayed.     Results from last 7 days   Lab Units 04/03/25  1314   INR  0.92     Results from last 7 days   Lab Units 04/07/25  1625 04/07/25  1106 04/07/25  0723 04/06/25  2045 04/06/25  1617 04/06/25  1059 04/06/25  0657 04/05/25  2038 04/05/25  1620 04/05/25  1105 04/05/25  0739 04/04/25  2045   POC GLUCOSE mg/dl 136 134 106 231* 133 137 111 200* 161* 154* 101 247*         Results from last 7 days   Lab Units 04/03/25  1314   LACTIC ACID mmol/L 0.7   PROCALCITONIN ng/ml <0.05       Recent Cultures (last 7 days):   Results from last 7 days   Lab Units 04/03/25  1347 04/03/25  1314   BLOOD CULTURE  No Growth at 72 hrs. No Growth at 72 hrs.       Imaging Results Review: No pertinent imaging studies reviewed.  Other Study Results Review: No additional pertinent studies reviewed.    Last 24 Hours Medication List:     Current  Facility-Administered Medications:     acetaminophen (TYLENOL) tablet 650 mg, Q4H PRN    heparin (porcine) subcutaneous injection 5,000 Units, Q8H DANYEL **AND** [CANCELED] Platelet count, Once    insulin lispro (HumALOG/ADMELOG) 100 units/mL subcutaneous injection 1-6 Units, TID AC **AND** Fingerstick Glucose (POCT), TID AC    insulin lispro (HumALOG/ADMELOG) 100 units/mL subcutaneous injection 1-6 Units, HS    levothyroxine tablet 88 mcg, Early Morning    lisinopril (ZESTRIL) tablet 5 mg, Daily    nystatin (MYCOSTATIN) powder, BID    ondansetron (ZOFRAN) injection 4 mg, Q6H PRN    pravastatin (PRAVACHOL) tablet 40 mg, Daily    QUEtiapine (SEROquel) tablet 100 mg, HS    traZODone (DESYREL) tablet 50 mg, HS    Administrative Statements   Today, Patient Was Seen By: Lance Zayas MD      **Please Note: This note may have been constructed using a voice recognition system.**

## 2025-04-08 ENCOUNTER — HOSPITAL ENCOUNTER (INPATIENT)
Facility: HOSPITAL | Age: 70
LOS: 27 days | Discharge: NURSING, REHAB OR LONG-TERM CARE | DRG: 885 | End: 2025-05-05
Attending: PSYCHIATRY & NEUROLOGY | Admitting: PSYCHIATRY & NEUROLOGY
Payer: COMMERCIAL

## 2025-04-08 VITALS
OXYGEN SATURATION: 97 % | BODY MASS INDEX: 33.93 KG/M2 | DIASTOLIC BLOOD PRESSURE: 82 MMHG | HEART RATE: 99 BPM | WEIGHT: 172.84 LBS | SYSTOLIC BLOOD PRESSURE: 162 MMHG | TEMPERATURE: 98 F | HEIGHT: 60 IN | RESPIRATION RATE: 15 BRPM

## 2025-04-08 DIAGNOSIS — L97.521 SKIN ULCER OF TOE OF LEFT FOOT, LIMITED TO BREAKDOWN OF SKIN (HCC): ICD-10-CM

## 2025-04-08 DIAGNOSIS — E78.00 HYPERCHOLESTEROLEMIA: ICD-10-CM

## 2025-04-08 DIAGNOSIS — I10 ESSENTIAL HYPERTENSION: ICD-10-CM

## 2025-04-08 DIAGNOSIS — Z00.8 MEDICAL CLEARANCE FOR PSYCHIATRIC ADMISSION: ICD-10-CM

## 2025-04-08 DIAGNOSIS — E11.22 TYPE 2 DIABETES MELLITUS WITH STAGE 3A CHRONIC KIDNEY DISEASE, WITHOUT LONG-TERM CURRENT USE OF INSULIN (HCC): ICD-10-CM

## 2025-04-08 DIAGNOSIS — E03.9 ACQUIRED HYPOTHYROIDISM: ICD-10-CM

## 2025-04-08 DIAGNOSIS — F31.2 BIPOLAR AFFECTIVE DISORDER, CURRENTLY MANIC, SEVERE, WITH PSYCHOTIC FEATURES (HCC): Primary | ICD-10-CM

## 2025-04-08 DIAGNOSIS — N18.31 TYPE 2 DIABETES MELLITUS WITH STAGE 3A CHRONIC KIDNEY DISEASE, WITHOUT LONG-TERM CURRENT USE OF INSULIN (HCC): ICD-10-CM

## 2025-04-08 DIAGNOSIS — N18.30 STAGE 3 CHRONIC KIDNEY DISEASE, UNSPECIFIED WHETHER STAGE 3A OR 3B CKD (HCC): ICD-10-CM

## 2025-04-08 DIAGNOSIS — I82.409 DVT (DEEP VENOUS THROMBOSIS) (HCC): ICD-10-CM

## 2025-04-08 LAB
ATRIAL RATE: 79 BPM
BACTERIA BLD CULT: NORMAL
BACTERIA BLD CULT: NORMAL
GLUCOSE SERPL-MCNC: 154 MG/DL (ref 65–140)
GLUCOSE SERPL-MCNC: 203 MG/DL (ref 65–140)
GLUCOSE SERPL-MCNC: 301 MG/DL (ref 65–140)
GLUCOSE SERPL-MCNC: 96 MG/DL (ref 65–140)
P AXIS: 68 DEGREES
PR INTERVAL: 154 MS
QRS AXIS: 24 DEGREES
QRSD INTERVAL: 80 MS
QT INTERVAL: 342 MS
QTC INTERVAL: 392 MS
T WAVE AXIS: 8 DEGREES
T4 FREE SERPL-MCNC: 1.06 NG/DL (ref 0.61–1.12)
TSH SERPL DL<=0.05 MIU/L-ACNC: 14.87 UIU/ML (ref 0.45–4.5)
VENTRICULAR RATE: 79 BPM

## 2025-04-08 PROCEDURE — 99239 HOSP IP/OBS DSCHRG MGMT >30: CPT | Performed by: INTERNAL MEDICINE

## 2025-04-08 PROCEDURE — 93010 ELECTROCARDIOGRAM REPORT: CPT | Performed by: INTERNAL MEDICINE

## 2025-04-08 PROCEDURE — 84439 ASSAY OF FREE THYROXINE: CPT | Performed by: INTERNAL MEDICINE

## 2025-04-08 PROCEDURE — 82948 REAGENT STRIP/BLOOD GLUCOSE: CPT

## 2025-04-08 PROCEDURE — 97162 PT EVAL MOD COMPLEX 30 MIN: CPT

## 2025-04-08 PROCEDURE — 84443 ASSAY THYROID STIM HORMONE: CPT | Performed by: INTERNAL MEDICINE

## 2025-04-08 RX ORDER — QUETIAPINE FUMARATE 100 MG/1
100 TABLET, FILM COATED ORAL
Status: CANCELLED | OUTPATIENT
Start: 2025-04-08

## 2025-04-08 RX ORDER — PRAVASTATIN SODIUM 40 MG
40 TABLET ORAL DAILY
Status: DISCONTINUED | OUTPATIENT
Start: 2025-04-09 | End: 2025-05-05 | Stop reason: HOSPADM

## 2025-04-08 RX ORDER — MAGNESIUM HYDROXIDE/ALUMINUM HYDROXICE/SIMETHICONE 120; 1200; 1200 MG/30ML; MG/30ML; MG/30ML
30 SUSPENSION ORAL EVERY 4 HOURS PRN
Status: DISCONTINUED | OUTPATIENT
Start: 2025-04-08 | End: 2025-05-05 | Stop reason: HOSPADM

## 2025-04-08 RX ORDER — ACETAMINOPHEN 325 MG/1
650 TABLET ORAL EVERY 4 HOURS PRN
Status: CANCELLED | OUTPATIENT
Start: 2025-04-08

## 2025-04-08 RX ORDER — OLANZAPINE 5 MG/1
5 TABLET, FILM COATED ORAL
Status: DISCONTINUED | OUTPATIENT
Start: 2025-04-08 | End: 2025-05-05 | Stop reason: HOSPADM

## 2025-04-08 RX ORDER — OLANZAPINE 5 MG/1
5 TABLET, FILM COATED ORAL
Status: CANCELLED | OUTPATIENT
Start: 2025-04-08

## 2025-04-08 RX ORDER — OLANZAPINE 2.5 MG/1
2.5 TABLET, FILM COATED ORAL
Status: CANCELLED | OUTPATIENT
Start: 2025-04-08

## 2025-04-08 RX ORDER — BISACODYL 10 MG
10 SUPPOSITORY, RECTAL RECTAL DAILY PRN
Status: CANCELLED | OUTPATIENT
Start: 2025-04-08

## 2025-04-08 RX ORDER — LORAZEPAM 2 MG/ML
1 INJECTION INTRAMUSCULAR
Status: CANCELLED | OUTPATIENT
Start: 2025-04-08

## 2025-04-08 RX ORDER — LEVOTHYROXINE SODIUM 88 UG/1
88 TABLET ORAL
Status: CANCELLED | OUTPATIENT
Start: 2025-04-09

## 2025-04-08 RX ORDER — INSULIN LISPRO 100 [IU]/ML
1-6 INJECTION, SOLUTION INTRAVENOUS; SUBCUTANEOUS
Status: DISCONTINUED | OUTPATIENT
Start: 2025-04-08 | End: 2025-04-29

## 2025-04-08 RX ORDER — HYDROXYZINE HYDROCHLORIDE 50 MG/1
50 TABLET, FILM COATED ORAL
Status: DISCONTINUED | OUTPATIENT
Start: 2025-04-08 | End: 2025-05-05 | Stop reason: HOSPADM

## 2025-04-08 RX ORDER — BENZTROPINE MESYLATE 1 MG/ML
1 INJECTION, SOLUTION INTRAMUSCULAR; INTRAVENOUS
Status: DISCONTINUED | OUTPATIENT
Start: 2025-04-08 | End: 2025-05-05 | Stop reason: HOSPADM

## 2025-04-08 RX ORDER — ACETAMINOPHEN 325 MG/1
975 TABLET ORAL EVERY 6 HOURS PRN
Status: DISCONTINUED | OUTPATIENT
Start: 2025-04-08 | End: 2025-05-05 | Stop reason: HOSPADM

## 2025-04-08 RX ORDER — HYDROXYZINE HYDROCHLORIDE 50 MG/1
50 TABLET, FILM COATED ORAL
Status: CANCELLED | OUTPATIENT
Start: 2025-04-08

## 2025-04-08 RX ORDER — INSULIN LISPRO 100 [IU]/ML
1-6 INJECTION, SOLUTION INTRAVENOUS; SUBCUTANEOUS
Status: CANCELLED | OUTPATIENT
Start: 2025-04-08

## 2025-04-08 RX ORDER — HYDROXYZINE HYDROCHLORIDE 25 MG/1
25 TABLET, FILM COATED ORAL
Status: DISCONTINUED | OUTPATIENT
Start: 2025-04-08 | End: 2025-05-05 | Stop reason: HOSPADM

## 2025-04-08 RX ORDER — HEPARIN SODIUM 5000 [USP'U]/ML
5000 INJECTION, SOLUTION INTRAVENOUS; SUBCUTANEOUS EVERY 8 HOURS SCHEDULED
Status: DISCONTINUED | OUTPATIENT
Start: 2025-04-08 | End: 2025-04-08

## 2025-04-08 RX ORDER — TRAZODONE HYDROCHLORIDE 50 MG/1
50 TABLET ORAL
Status: CANCELLED | OUTPATIENT
Start: 2025-04-08

## 2025-04-08 RX ORDER — BENZTROPINE MESYLATE 0.5 MG/1
0.5 TABLET ORAL
Status: CANCELLED | OUTPATIENT
Start: 2025-04-08

## 2025-04-08 RX ORDER — POLYETHYLENE GLYCOL 3350 17 G/17G
17 POWDER, FOR SOLUTION ORAL DAILY PRN
Status: CANCELLED | OUTPATIENT
Start: 2025-04-08

## 2025-04-08 RX ORDER — HEPARIN SODIUM 5000 [USP'U]/ML
5000 INJECTION, SOLUTION INTRAVENOUS; SUBCUTANEOUS EVERY 8 HOURS SCHEDULED
Status: CANCELLED | OUTPATIENT
Start: 2025-04-08

## 2025-04-08 RX ORDER — LEVOTHYROXINE SODIUM 88 UG/1
88 TABLET ORAL
Status: DISCONTINUED | OUTPATIENT
Start: 2025-04-09 | End: 2025-05-05 | Stop reason: HOSPADM

## 2025-04-08 RX ORDER — ACETAMINOPHEN 325 MG/1
975 TABLET ORAL EVERY 6 HOURS PRN
Status: CANCELLED | OUTPATIENT
Start: 2025-04-08

## 2025-04-08 RX ORDER — TRAZODONE HYDROCHLORIDE 50 MG/1
50 TABLET ORAL
Status: DISCONTINUED | OUTPATIENT
Start: 2025-04-08 | End: 2025-04-13

## 2025-04-08 RX ORDER — OLANZAPINE 10 MG/2ML
5 INJECTION, POWDER, FOR SOLUTION INTRAMUSCULAR
Status: DISCONTINUED | OUTPATIENT
Start: 2025-04-08 | End: 2025-05-05 | Stop reason: HOSPADM

## 2025-04-08 RX ORDER — LISINOPRIL 5 MG/1
5 TABLET ORAL DAILY
Status: DISCONTINUED | OUTPATIENT
Start: 2025-04-09 | End: 2025-04-24

## 2025-04-08 RX ORDER — LORAZEPAM 1 MG/1
1 TABLET ORAL
Status: DISCONTINUED | OUTPATIENT
Start: 2025-04-08 | End: 2025-05-05 | Stop reason: HOSPADM

## 2025-04-08 RX ORDER — TRAZODONE HYDROCHLORIDE 50 MG/1
50 TABLET ORAL
Status: DISCONTINUED | OUTPATIENT
Start: 2025-04-08 | End: 2025-05-05 | Stop reason: HOSPADM

## 2025-04-08 RX ORDER — PROPRANOLOL HYDROCHLORIDE 10 MG/1
5 TABLET ORAL EVERY 8 HOURS PRN
Status: DISCONTINUED | OUTPATIENT
Start: 2025-04-08 | End: 2025-05-05 | Stop reason: HOSPADM

## 2025-04-08 RX ORDER — POLYETHYLENE GLYCOL 3350 17 G/17G
17 POWDER, FOR SOLUTION ORAL DAILY PRN
Status: DISCONTINUED | OUTPATIENT
Start: 2025-04-08 | End: 2025-04-10

## 2025-04-08 RX ORDER — HYDROXYZINE HYDROCHLORIDE 25 MG/1
25 TABLET, FILM COATED ORAL
Status: CANCELLED | OUTPATIENT
Start: 2025-04-08

## 2025-04-08 RX ORDER — BENZTROPINE MESYLATE 1 MG/ML
1 INJECTION, SOLUTION INTRAMUSCULAR; INTRAVENOUS
Status: CANCELLED | OUTPATIENT
Start: 2025-04-08

## 2025-04-08 RX ORDER — ACETAMINOPHEN 325 MG/1
650 TABLET ORAL EVERY 4 HOURS PRN
Status: DISCONTINUED | OUTPATIENT
Start: 2025-04-08 | End: 2025-05-05 | Stop reason: HOSPADM

## 2025-04-08 RX ORDER — AMOXICILLIN 250 MG
1 CAPSULE ORAL DAILY PRN
Status: CANCELLED | OUTPATIENT
Start: 2025-04-08

## 2025-04-08 RX ORDER — NYSTATIN 100000 [USP'U]/G
POWDER TOPICAL 2 TIMES DAILY
Status: CANCELLED | OUTPATIENT
Start: 2025-04-08

## 2025-04-08 RX ORDER — OLANZAPINE 2.5 MG/1
2.5 TABLET, FILM COATED ORAL
Status: DISCONTINUED | OUTPATIENT
Start: 2025-04-08 | End: 2025-05-05 | Stop reason: HOSPADM

## 2025-04-08 RX ORDER — PANTOPRAZOLE SODIUM 40 MG/1
40 TABLET, DELAYED RELEASE ORAL ONCE
Status: COMPLETED | OUTPATIENT
Start: 2025-04-08 | End: 2025-04-08

## 2025-04-08 RX ORDER — BISACODYL 10 MG
10 SUPPOSITORY, RECTAL RECTAL DAILY PRN
Status: DISCONTINUED | OUTPATIENT
Start: 2025-04-08 | End: 2025-04-10

## 2025-04-08 RX ORDER — PRAVASTATIN SODIUM 40 MG
40 TABLET ORAL DAILY
Status: CANCELLED | OUTPATIENT
Start: 2025-04-09

## 2025-04-08 RX ORDER — ONDANSETRON 2 MG/ML
4 INJECTION INTRAMUSCULAR; INTRAVENOUS EVERY 6 HOURS PRN
Status: CANCELLED | OUTPATIENT
Start: 2025-04-08

## 2025-04-08 RX ORDER — LORAZEPAM 1 MG/1
1 TABLET ORAL
Status: CANCELLED | OUTPATIENT
Start: 2025-04-08

## 2025-04-08 RX ORDER — LISINOPRIL 5 MG/1
5 TABLET ORAL DAILY
Status: CANCELLED | OUTPATIENT
Start: 2025-04-09

## 2025-04-08 RX ORDER — QUETIAPINE FUMARATE 100 MG/1
100 TABLET, FILM COATED ORAL
Status: DISCONTINUED | OUTPATIENT
Start: 2025-04-08 | End: 2025-04-09

## 2025-04-08 RX ORDER — OLANZAPINE 10 MG/2ML
5 INJECTION, POWDER, FOR SOLUTION INTRAMUSCULAR
Status: CANCELLED | OUTPATIENT
Start: 2025-04-08

## 2025-04-08 RX ORDER — BENZTROPINE MESYLATE 0.5 MG/1
0.5 TABLET ORAL
Status: DISCONTINUED | OUTPATIENT
Start: 2025-04-08 | End: 2025-05-05 | Stop reason: HOSPADM

## 2025-04-08 RX ORDER — PROPRANOLOL HYDROCHLORIDE 10 MG/1
5 TABLET ORAL EVERY 8 HOURS PRN
Status: CANCELLED | OUTPATIENT
Start: 2025-04-08

## 2025-04-08 RX ORDER — NYSTATIN 100000 [USP'U]/G
POWDER TOPICAL 2 TIMES DAILY
Status: DISCONTINUED | OUTPATIENT
Start: 2025-04-09 | End: 2025-05-05 | Stop reason: HOSPADM

## 2025-04-08 RX ORDER — LORAZEPAM 2 MG/ML
1 INJECTION INTRAMUSCULAR
Status: DISCONTINUED | OUTPATIENT
Start: 2025-04-08 | End: 2025-05-05 | Stop reason: HOSPADM

## 2025-04-08 RX ORDER — INSULIN LISPRO 100 [IU]/ML
1-6 INJECTION, SOLUTION INTRAVENOUS; SUBCUTANEOUS
Status: DISCONTINUED | OUTPATIENT
Start: 2025-04-09 | End: 2025-04-29

## 2025-04-08 RX ORDER — MAGNESIUM HYDROXIDE/ALUMINUM HYDROXICE/SIMETHICONE 120; 1200; 1200 MG/30ML; MG/30ML; MG/30ML
30 SUSPENSION ORAL EVERY 4 HOURS PRN
Status: CANCELLED | OUTPATIENT
Start: 2025-04-08

## 2025-04-08 RX ORDER — AMOXICILLIN 250 MG
1 CAPSULE ORAL DAILY PRN
Status: DISCONTINUED | OUTPATIENT
Start: 2025-04-08 | End: 2025-05-05 | Stop reason: HOSPADM

## 2025-04-08 RX ADMIN — LISINOPRIL 5 MG: 5 TABLET ORAL at 08:27

## 2025-04-08 RX ADMIN — TRAZODONE HYDROCHLORIDE 50 MG: 50 TABLET ORAL at 21:21

## 2025-04-08 RX ADMIN — PANTOPRAZOLE SODIUM 40 MG: 40 TABLET, DELAYED RELEASE ORAL at 15:52

## 2025-04-08 RX ADMIN — HEPARIN SODIUM 5000 UNITS: 5000 INJECTION INTRAVENOUS; SUBCUTANEOUS at 05:31

## 2025-04-08 RX ADMIN — LEVOTHYROXINE SODIUM 88 MCG: 88 TABLET ORAL at 05:31

## 2025-04-08 RX ADMIN — INSULIN LISPRO 2 UNITS: 100 INJECTION, SOLUTION INTRAVENOUS; SUBCUTANEOUS at 16:28

## 2025-04-08 RX ADMIN — INSULIN LISPRO 4 UNITS: 100 INJECTION, SOLUTION INTRAVENOUS; SUBCUTANEOUS at 21:21

## 2025-04-08 RX ADMIN — HEPARIN SODIUM 5000 UNITS: 5000 INJECTION INTRAVENOUS; SUBCUTANEOUS at 14:39

## 2025-04-08 RX ADMIN — INSULIN LISPRO 1 UNITS: 100 INJECTION, SOLUTION INTRAVENOUS; SUBCUTANEOUS at 11:25

## 2025-04-08 RX ADMIN — QUETIAPINE FUMARATE 100 MG: 100 TABLET ORAL at 21:21

## 2025-04-08 RX ADMIN — PRAVASTATIN SODIUM 40 MG: 40 TABLET ORAL at 08:27

## 2025-04-08 NOTE — ASSESSMENT & PLAN NOTE
Patient has been having manic episodes during her hospital course  Psychiatry was consulted and recommended inpatient U admission  Continue Seroquel and trazodone as per Psychiatry  Patient is medically stable for discharge to U  Crisis/[facility]HU requesting TSH, EKG, UA which have been completed

## 2025-04-08 NOTE — ASSESSMENT & PLAN NOTE
Patient states her brother is a threat to her at home  Case Management consulted for safe disposition planning  Patient will be discharged to behavioral health unit

## 2025-04-08 NOTE — DISCHARGE SUMMARY
Discharge Summary - Hospitalist   Name: Lisa Rich 69 y.o. female I MRN: [id number]  Unit/Bed#: -01 I Date of Admission: 4/3/2025   Date of Service: 4/8/2025 I Hospital Day: 5     Assessment & Plan  Toe ulcer (HCC)  Patient with ulcer of left third toe  ED visit 3/28- prescribed Keflex 500 mg Q6H x 7 days which has been completed  XR foot 4/3- No radiographic evidence of osteomyelitis   VAS arterial duplex within normal limits  Podiatry has signed off  No need for further antibiotics  Outpatient follow-up with Podiatry  Patient is medically stable for discharge to [facility]  Moderate depressed bipolar I disorder (HCC)  Patient has been having manic episodes during her hospital course  Psychiatry was consulted and recommended inpatient [facility] admission  Continue Seroquel and trazodone as per Psychiatry  Patient is medically stable for discharge to [facility]  Crisis/U requesting TSH, EKG, UA which have been completed  Social problem  Patient states her brother is a threat to her at home  Case Management consulted for safe disposition planning  Patient will be discharged to behavioral health unit  Type 2 diabetes mellitus with stage 3a chronic kidney disease, without long-term current use of insulin ([facility])  Lab Results   Component Value Date    HGBA1C 6.3 (H) 01/08/2025       Recent Labs     04/07/25  1106 04/07/25  1625 04/07/25  2101 04/08/25  0744   POCGLU 134 136 147* 96       Blood Sugar Average: Last 72 hrs:  (P) 142.2722511667211239  Resume home diabetic regimen on discharge  Hypothyroidism  Continue levothyroxine 88 mcg p.o. daily  TSH level elevated, free T4 normal  Counseled patient on importance of medication compliance.  Continue current dose of levothyroxine.  Repeat TSH level in 4 weeks  Essential hypertension  Continue lisinopril 5 mg p.o. daily  Follow-up with PCP as outpatient for further blood pressure management     Medical Problems       Resolved Problems  Date Reviewed: 4/2/2025   None        Discharging Physician / Practitioner: Lance Zayas MD  PCP: Gadiel Young PA-C  Admission Date:   Admission Orders (From admission, onward)       Ordered        04/03/25 1421  INPATIENT ADMISSION  Once                          Discharge Date: 04/08/25    Consultations During Hospital Stay:  Podiatry, psychiatry    Procedures Performed:   None    Significant Findings / Test Results:   Toe ulcer, bipolar disorder    Incidental Findings:   None    Test Results Pending at Discharge (will require follow up):   None     Outpatient Tests Requested:  Routine labs with PCP as outpatient    Complications: None    Reason for Admission: Toe ulcer, bipolar disorder    Hospital Course:   Lisa Rich is a 69 y.o. female patient who originally presented to the hospital on 4/3/2025 due to left foot ulcer.  Patient was seen by podiatry.  Completed course of antibiotics.  X-ray with no obvious signs of osteomyelitis.  Per podiatry, no further inpatient treatment needed.  Patient was also seen by psychiatry due to history of bipolar and concern for possible manic episode.  Psychiatry recommending inpatient behavioral health admission.  Patient medically optimized for [facility].  Crisis team working on bed placement, hopefully later today.    Please see above list of diagnoses and related plan for additional information.     Condition at Discharge: stable    Discharge Day Visit / Exam:   Subjective: No complaints at this time  Vitals: Blood Pressure: 162/82 (04/08/25 0708)  Pulse: 99 (04/07/25 2306)  Temperature: 98 °F (36.7 °C) (04/08/25 0708)  Temp Source: Oral (04/06/25 1353)  Respirations: 15 (04/08/25 0708)  Height: 5' (152.4 cm) (04/03/25 1728)  Weight - Scale: 78.4 kg (172 lb 13.5 oz) (04/03/25 1700)  SpO2: 97 % (04/07/25 2306)    Physical Exam  Constitutional:       General: She is not in acute distress.     Appearance: She is obese.   HENT:      Head: Normocephalic and atraumatic.      Nose: Nose normal.       Mouth/Throat:      Mouth: Mucous membranes are moist.   Eyes:      Extraocular Movements: Extraocular movements intact.      Conjunctiva/sclera: Conjunctivae normal.   Cardiovascular:      Rate and Rhythm: Normal rate and regular rhythm.   Pulmonary:      Effort: Pulmonary effort is normal. No respiratory distress.   Abdominal:      Palpations: Abdomen is soft.      Tenderness: There is no abdominal tenderness.   Musculoskeletal:         General: Normal range of motion.      Cervical back: Normal range of motion and neck supple.   Skin:     General: Skin is warm and dry.   Neurological:      General: No focal deficit present.      Mental Status: She is alert. Mental status is at baseline.      Cranial Nerves: No cranial nerve deficit.   Psychiatric:         Mood and Affect: Mood is anxious.         Speech: Speech is tangential.         Behavior: Behavior normal.          Discussion with Family: Updated  (brother) via phone.    Discharge instructions/Information to patient and family:   See after visit summary for information provided to patient and family.      Provisions for Follow-Up Care:  See after visit summary for information related to follow-up care and any pertinent home health orders.      Mobility at time of Discharge:   Basic Mobility Inpatient Raw Score: 23  JH-HLM Goal: 7: Walk 25 feet or more  JH-HLM Achieved: 7: Walk 25 feet or more  HLM Goal achieved. Continue to encourage appropriate mobility.     Disposition:   Inpatient [facility]iatry at [facility]    Planned Readmission: no    Discharge Medications:  See after visit summary for reconciled discharge medications provided to patient and/or family.      Administrative Statements   Discharge Statement:  I have spent a total time of 35 minutes in caring for this patient on the day of the visit/encounter. >30 minutes of time was spent on: Counseling / Coordination of care, Documenting in the medical record, Reviewing / ordering tests, medicine,  procedures  , and Communicating with other healthcare professionals .    **Please Note: This note may have been constructed using a voice recognition system**

## 2025-04-08 NOTE — ED NOTES
Patient is accepted at [facility] 6T.  Patient is accepted by Dr. Ramez Daily.     Transportation is arranged with [employer].     Transportation is scheduled for 17:00.   Patient may go to the floor at 13:00.          Nurse report is to be called to [phone] prior to patient transfer.

## 2025-04-08 NOTE — PLAN OF CARE
Problem: PAIN - ADULT  Goal: Verbalizes/displays adequate comfort level or baseline comfort level  Description: Interventions:- Encourage patient to monitor pain and request assistance- Assess pain using appropriate pain scale- Administer analgesics based on type and severity of pain and evaluate response- Implement non-pharmacological measures as appropriate and evaluate response- Consider cultural and social influences on pain and pain management- Notify physician/advanced practitioner if interventions unsuccessful or patient reports new pain  Outcome: Not Progressing     Problem: INFECTION - ADULT  Goal: Absence or prevention of progression during hospitalization  Description: INTERVENTIONS:- Assess and monitor for signs and symptoms of infection- Monitor lab/diagnostic results- Monitor all insertion sites, i.e. indwelling lines, tubes, and drains- Monitor endotracheal if appropriate and nasal secretions for changes in amount and color- [place] appropriate cooling/warming therapies per order- Administer medications as ordered- Instruct and encourage patient and family to use good hand hygiene technique- Identify and instruct in appropriate isolation precautions for identified infection/condition  Outcome: Not Progressing  Goal: Absence of fever/infection during neutropenic period  Description: INTERVENTIONS:- Monitor WBC  Outcome: Not Progressing     Problem: SAFETY ADULT  Goal: Patient will remain free of falls  Description: INTERVENTIONS:- Educate patient/family on patient safety including physical limitations  Outcome: Not Progressing  Goal: Maintain or return to baseline ADL function  Description: INTERVENTIONS:-  Assess patient's ability to carry out ADLs; assess patient's baseline for ADL function and identify physical deficits which impact ability to perform ADLs (bathing, care of mouth/teeth, toileting, grooming, dressing, etc.)- Assess/evaluate cause of self-care deficits - Assess range of motion-  Assess patient's mobility; develop plan if impaired- Assess patient's need for assistive devices and provide as appropriate- Encourage maximum independence but intervene and supervise when necessary- Involve family in performance of ADLs- Assess for home care needs following discharge - Consider OT consult to assist with ADL evaluation and planning for discharge- Provide patient education as appropriate  Outcome: Not Progressing  Goal: Maintains/Returns to pre admission functional level  Outcome: Not Progressing     Problem: DISCHARGE PLANNING  Goal: Discharge to home or other facility with appropriate resources  Description: INTERVENTIONS:- Identify barriers to discharge w/patient and caregiver- Arrange for needed discharge resources and transportation as appropriate- Identify discharge learning needs (meds, wound care, etc.)- Arrange for interpretive services to assist at discharge as needed- Refer to Case Management Department for coordinating discharge planning if the patient needs post-hospital services based on physician/advanced practitioner order or complex needs related to functional status, cognitive ability, or social support system  Outcome: Not Progressing     Problem: Knowledge Deficit  Goal: Patient/family/caregiver demonstrates understanding of disease process, treatment plan, medications, and discharge instructions  Description: Complete learning assessment and assess knowledge base.Interventions:- Provide teaching at level of understanding- Provide teaching via preferred learning methods  Outcome: Not Progressing     Problem: Alteration in Thoughts and Perception  Goal: Treatment Goal: Gain control of psychotic behaviors/thinking, reduce/eliminate presenting symptoms and demonstrate improved reality functioning upon discharge  Outcome: Not Progressing  Goal: Verbalize thoughts and feelings  Description: Interventions:- Promote a nonjudgmental and trusting relationship with the patient through  active listening and therapeutic communication- Assess patient's level of functioning, behavior and potential for risk- Engage patient in 1 on 1 interactions- Encourage patient to express fears, feelings, frustrations, and discuss symptoms  - [place] patient to reality, help patient recognize reality-based thinking - Administer medications as ordered and assess for potential side effects- Provide the patient education related to the signs and symptoms of the illness and desired effects of prescribed medications  Outcome: Not Progressing  Goal: Refrain from acting on delusional thinking/internal stimuli  Description: Interventions:- Monitor patient closely, per order - Utilize least restrictive measures - Set reasonable limits, give positive feedback for acceptable - Administer medications as ordered and monitor of potential side effects  Outcome: Not Progressing  Goal: Agree to be compliant with medication regime, as prescribed and report medication side effects  Description: Interventions:- Offer appropriate PRN medication and supervise ingestion; conduct AIMS, as needed   Outcome: Not Progressing  Goal: Attend and participate in unit activities, including therapeutic, recreational, and educational groups  Description: Interventions:-Encourage Visitation and family involvement in care  Outcome: Not Progressing  Goal: Recognize dysfunctional thoughts, communicate reality-based thoughts at the time of discharge  Description: Interventions:- Provide medication and psycho-education to assist patient in compliance and developing insight into his/her illness   Outcome: Not Progressing  Goal: Complete daily ADLs, including personal hygiene independently, as able  Description: Interventions:- Observe, teach, and assist patient with ADLS- Monitor and promote a balance of rest/activity, with adequate nutrition and elimination   Outcome: Not Progressing     Problem: Ineffective Coping  Goal: Cooperates with admission  process  Description: Interventions: - Complete admission process  Outcome: Not Progressing  Goal: Identifies ineffective coping skills  Outcome: Not Progressing  Goal: Identifies healthy coping skills  Outcome: Not Progressing  Goal: Demonstrates healthy coping skills  Outcome: Not Progressing  Goal: Participates in unit activities  Description: Interventions:- Provide therapeutic environment - Provide required programming - Redirect inappropriate behaviors   Outcome: Not Progressing  Goal: Patient/Family participate in treatment and DC plans  Description: Interventions:- Provide therapeutic environment  Outcome: Not Progressing  Goal: Patient/Family verbalizes awareness of resources  Outcome: Not Progressing  Goal: Understands least restrictive measures  Description: Interventions:- Utilize least restrictive behavior  Outcome: Not Progressing  Goal: Free from restraint events  Description: - Utilize least restrictive measures - Provide behavioral interventions - Redirect inappropriate behaviors   Outcome: Not Progressing     Problem: Risk for Self Injury/Neglect  Goal: Treatment Goal: Remain safe during length of stay, learn and adopt new coping skills, and be free of self-injurious ideation, impulses and acts at the time of discharge  Outcome: Not Progressing  Goal: Verbalize thoughts and feelings  Description: Interventions:- Assess and re-assess patient's lethality and potential for self-injury- Engage patient in 1:1 interactions, daily, for a minimum of 15 minutes- Encourage patient to express feelings, fears, frustrations, hopes- Establish rapport/trust with patient   Outcome: Not Progressing  Goal: Refrain from harming self  Description: Interventions:- Monitor patient closely, per order- Develop a trusting relationship- Supervise medication ingestion, monitor effects and side effects   Outcome: Not Progressing  Goal: Attend and participate in unit activities, including therapeutic, recreational, and  educational groups  Description: Interventions:- Provide therapeutic and educational activities daily, encourage attendance and participation, and document same in the medical record- Obtain collateral information, encourage visitation and family involvement in care   Outcome: Not Progressing  Goal: Recognize maladaptive responses and adopt new coping mechanisms  Outcome: Not Progressing  Goal: Complete daily ADLs, including personal hygiene independently, as able  Description: Interventions:- Observe, teach, and assist patient with ADLS- Monitor and promote a balance of rest/activity, with adequate nutrition and elimination  Outcome: Not Progressing     Problem: Depression  Goal: Treatment Goal: Demonstrate behavioral control of depressive symptoms, verbalize feelings of improved mood/affect, and adopt new coping skills prior to discharge  Outcome: Not Progressing  Goal: Verbalize thoughts and feelings  Description: Interventions:- Assess and re-assess patient's level of risk - Engage patient in 1:1 interactions, daily, for a minimum of 15 minutes - Encourage patient to express feelings, fears, frustrations, hopes   Outcome: Not Progressing  Goal: Refrain from harming self  Description: Interventions:- Monitor patient closely, per order - Supervise medication ingestion, monitor effects and side effects   Outcome: Not Progressing  Goal: Refrain from isolation  Description: Interventions:- Develop a trusting relationship - Encourage socialization   Outcome: Not Progressing  Goal: Refrain from self-neglect  Outcome: Not Progressing  Goal: Attend and participate in unit activities, including therapeutic, recreational, and educational groups  Description: Interventions:- Provide therapeutic and educational activities daily, encourage attendance and participation, and document same in the medical record   Outcome: Not Progressing  Goal: Complete daily ADLs, including personal hygiene independently, as able  Description:  Interventions:- Observe, teach, and assist patient with ADLS-  Monitor and promote a balance of rest/activity, with adequate nutrition and elimination   Outcome: Not Progressing     Problem: Anxiety  Goal: Anxiety is at manageable level  Description: Interventions:- Assess and monitor patient's anxiety level. - Monitor for signs and symptoms (heart palpitations, chest pain, shortness of breath, headaches, nausea, feeling jumpy, restlessness, irritable, apprehensive). - Collaborate with interdisciplinary team and initiate plan and interventions as ordered.- [place] patient to unit/surroundings- Explain treatment plan- Encourage participation in care- Encourage verbalization of concerns/fears- Identify coping mechanisms- Assist in developing anxiety-reducing skills- Administer/offer alternative therapies- Limit or eliminate stimulants  Outcome: Not Progressing     Problem: Risk for Violence/Aggression Toward Others  Goal: Treatment Goal: Refrain from acts of violence/aggression during length of stay, and demonstrate improved impulse control at the time of discharge  Outcome: Not Progressing  Goal: Verbalize thoughts and feelings  Description: Interventions:- Assess and re-assess patient's level of risk, every waking shift- Engage patient in 1:1 interactions, daily, for a minimum of 15 minutes - Allow patient to express feelings and frustrations in a safe and non-threatening manner - Establish rapport/trust with patient   Outcome: Not Progressing  Goal: Refrain from harming others  Outcome: Not Progressing  Goal: Refrain from destructive acts on the environment or property  Outcome: Not Progressing  Goal: Control angry outbursts  Description: Interventions:- Monitor patient closely, per order- Ensure early verbal de-escalation- Monitor prn medication needs- Set reasonable/therapeutic limits, outline behavioral expectations, and consequences - Provide a non-threatening milieu, utilizing the least restrictive  interventions   Outcome: Not Progressing  Goal: Attend and participate in unit activities, including therapeutic, recreational, and educational groups  Description: Interventions:- Provide therapeutic and educational activities daily, encourage attendance and participation, and document same in the medical record   Outcome: Not Progressing  Goal: Identify appropriate positive anger management techniques  Description: Interventions:- Offer anger management and coping skills groups - Staff will provide positive feedback for appropriate anger control  Outcome: Not Progressing     Problem: Alteration in Orientation  Goal: Treatment Goal: Demonstrate a reduction of confusion and improved orientation to person, place, time and/or situation upon discharge, according to optimum baseline/ability  Outcome: Not Progressing  Goal: Interact with staff daily  Description: Interventions:- Assess and re-assess patient's level of orientation- Engage patient in 1 on 1 interactions, daily, for a minimum of 15 minutes - Establish rapport/trust with patient   Outcome: Not Progressing  Goal: Express concerns related to confused thinking related to:  Description: Interventions:- Encourage patient to express feelings, fears, frustrations, hopes- Assign consistent caregivers - [place]/re-orient patient as needed- Allow comfort items, as appropriate- Provide visual cues, signs, etc.   Outcome: Not Progressing  Goal: Allow medical examinations, as recommended  Description: Interventions:- Provide physical/neurological exams and/or referrals, per provider   Outcome: Not Progressing  Goal: Cooperate with recommended testing/procedures  Description: Interventions:- Determine need for ancillary testing- Observe for mental status changes- Implement falls/precaution protocol   Outcome: Not Progressing  Goal: Attend and participate in unit activities, including therapeutic, recreational, and educational groups  Description: Interventions:- Provide  therapeutic and educational activities daily, encourage attendance and participation, and document same in the medical record - Provide appropriate opportunities for reminiscence - Provide a consistent daily routine - Encourage family contact/visitation   Outcome: Not Progressing  Goal: Complete daily ADLs, including personal hygiene independently, as able  Description: Interventions:- Observe, teach, and assist patient with ADLS  Outcome: Not Progressing     Problem: Individualized Interventions  Goal: Patient will verbalize appropriate use of telephone within 5 days  Description: Interventions:- Treatment team to determine use of supervised phone privileges   Outcome: Not Progressing  Goal: Patient will verbalize need for hospitalization and will no longer attempt elopement within 5 days  Description: Interventions:- Ongoing education to help patient understand need for hospitalization  Outcome: Not Progressing  Goal: Patient will recognize inappropriate behaviors and develop alternative behaviors within 5 days  Description: Interventions:- Patient in collaboration with Treatment Team will develop a behavior management plan to help identify effective coping skills to deal with stressors  Outcome: Not Progressing

## 2025-04-08 NOTE — PHYSICAL THERAPY NOTE
PHYSICAL THERAPY EVALUATION  NAME:  Lisa Rich  DATE: 04/08/25    AGE:   69 y.o.  Mrn:   [id number]  ADMIT DX:  DM type 2 with diabetic foot ulcer (HCC) [E11.621, L97.509]  Visit for wound check [Z51.89]  Problem List:   Patient Active Problem List   Diagnosis    Moderate depressed bipolar I disorder (HCC)    Constipation    Hypercholesterolemia    Hypothyroidism    Type 2 diabetes mellitus with stage 3a chronic kidney disease, without long-term current use of insulin (HCC)    Class 2 obesity due to excess calories with body mass index (BMI) of 35.0 to 35.9 in adult    Postmenopausal    Atypical squamous cells of undetermined significance on cytologic smear of cervix (ASC-US)    Essential hypertension    Vitamin D deficiency    Vitamin B12 deficiency    Stage 3 chronic kidney disease, unspecified whether stage 3a or 3b CKD (HCC)    Generalized abdominal pain    Primary osteoarthritis of left knee    Toe ulcer (HCC)    Social problem       Past Medical History  Past Medical History:   Diagnosis Date    Allergic     Depression     Diabetes mellitus (HCC)     Disease of thyroid gland     GERD (gastroesophageal reflux disease)     Hyperchloremia     Hypertension     Psychiatric disorder        Past Surgical History  Past Surgical History:   Procedure Laterality Date    ANKLE SURGERY      Incision    DILATION AND CURETTAGE OF UTERUS         Length Of Stay: 5  Performed at least 2 patient identifiers during session: Name and ID bracelet       04/08/25 1015   PT Last Visit   PT Visit Date 04/08/25   Note Type   Note type Evaluation   Pain Assessment   Pain Assessment Tool 0-10   Pain Score No Pain   Restrictions/Precautions   Weight Bearing Precautions Per Order No   Other Precautions Bed Alarm;Chair Alarm;Cognitive;Fall Risk   Home Living   Type of Home House   Home Layout Two level;Bed/bath upstairs;Stairs to enter with rails  (2 YANIQUE)   Home Equipment Walker;Cane   Prior Function   Level of [place]  Independent with ADLs;Independent with functional mobility   Lives With Family  (brother)   Receives Help From Family   IADLs Family/Friend/Other provides transportation   Comments due to poor cogntion home living and PLOF obtained via chart review   General   Family/Caregiver Present No   Cognition   Orientation Level Oriented to person;Oriented to time;Disoriented to situation;Oriented to place   Comments stating unrealistic statements; tangential   RLE Assessment   RLE Assessment WFL   LLE Assessment   LLE Assessment WFL   Vision-Basic Assessment   Current Vision Wears glasses all the time   Coordination   Sensation WFL   Bed Mobility   Supine to Sit 6  Modified independent   Additional items HOB elevated   Additional Comments pt denied dizziness with transitional movement   Transfers   Sit to Stand 6  Modified independent   Additional items Increased time required   Stand to Sit 6  Modified independent   Additional items Increased time required   Toilet transfer 5  Supervision   Additional items Standard toilet;Verbal cues;Increased time required  (grab bar; washed hands at sink with Supervision)   Additional Comments pt required cues for proper hand placement   Ambulation/Elevation   Gait pattern Decreased foot clearance;Improper Weight shift   Gait Assistance 5  Supervision   Additional items Assist x 1;Verbal cues   Assistive Device   (furniture walking)   Distance 20 ft x 2   Ambulation/Elevation Additional Comments poor safety awareness noted   Balance   Static Sitting Good   Dynamic Sitting Fair +   Static Standing Fair   Dynamic Standing Fair   Ambulatory Fair   Endurance Deficit   Endurance Deficit No   Activity Tolerance   Activity Tolerance Patient tolerated treatment well   Assessment   Prognosis Fair   Assessment Pt is 69 y.o. female seen for PT evaluation s/p admit to [facility] on 4/3/2025 w/ Toe ulcer (HCC). PT consulted to assess pt's functional mobility and d/c needs. Order placed for PT  eval and tx, w/ activity as tolerated order. Pt agreeable to PT  session upon arrival, pt found supine in bed.  PTA, pt was independent w/ all functional mobility w/ no AD and lives w/ brother in 2 level home .  Pt to benefit from continued PT tx to address deficits and maximize level of functional independent mobility and consistency. Upon conclusion pt  seated in recliner. Complexity: Comorbidities affecting pt's physical performance at time of assessment include: DM, htn, and psychiatric disorder and bipolar disorder . Personal factors affecting pt at time of IE include: decreased social skills, limited insight into impairments, depression, anxiety, and decreased cognition. Please find objective findings from PT assessment regarding body systems outlined above with impairments and limitations including impaired balance, gait deviations, decreased safety awareness, impaired judgement, fall risk, and decreased cognition.  Pt's clinical presentation is currently evolving seen in pt's presentation of confusion. The patient's AM-PAC Basic Mobility Inpatient Short Form Raw Score is 22.  Based on patient presentations and impairments, pt would most appropriately benefit from Level 3 resource intensity upon discharge. A Raw score of greater than 16 suggests the patient may benefit from discharge to home. Please also refer to the recommendation of the Physical Therapist for safe discharge planning. RN verbalized pt appropriate for PT session.   Barriers to Discharge   (cogntive status)   Goals   Patient Goals to have her baby in the bed   LTG Expiration Date 04/18/25   Long Term Goal #1 Pt will: Perform ambulation with MI  for 250 feet to increase Indep in home environment. Increase dynamic standing balance to F+ to decrease fall risk. Increase OOB activity tolerance to 10 minutes without s/s of exertion to decrease fall risk.   Plan   Treatment/Interventions Functional transfer training;Therapeutic exercise;Endurance  training;Gait training;Bed mobility;Equipment eval/education   PT Frequency 1-2x/wk   Discharge Recommendation   Rehab Resource Intensity Level, PT III (Minimum Resource Intensity)   AM-PAC Basic Mobility Inpatient   Turning in Flat Bed Without Bedrails 4   Lying on Back to Sitting on Edge of Flat Bed Without Bedrails 4   Moving Bed to Chair 4   Standing Up From Chair Using Arms 4   Walk in Room 3   Climb 3-5 Stairs With Railing 3   Basic Mobility Inpatient Raw Score 22   Basic Mobility Standardized Score 47.4   [facility] Highest Level Of Mobility   -HLM Goal 7: Walk 25 feet or more   -HLM Achieved 7: Walk 25 feet or more       Time In: 1013  Time Out: 1031  Total Evaluation Minutes: 18    Jovana Garcia, PT

## 2025-04-08 NOTE — ED NOTES
Clinical faxed to [employer].  Confirmation letter received.    COB completed with Eladio at [facility].

## 2025-04-08 NOTE — ASSESSMENT & PLAN NOTE
Patient with ulcer of left third toe  ED visit 3/28- prescribed Keflex 500 mg Q6H x 7 days which has been completed  XR foot 4/3- No radiographic evidence of osteomyelitis   VAS arterial duplex within normal limits  Podiatry has signed off  No need for further antibiotics  Outpatient follow-up with Podiatry  Patient is medically stable for discharge to [facility], [place]

## 2025-04-08 NOTE — CASE MANAGEMENT
Case Management Discharge Planning Note    Patient name Lisa A [facility]  Location /-01 MRN [id number]  : 1955 Date 2025       Current Admission Date: 4/3/2025  Current Admission Diagnosis:Toe ulcer (HCC)   Patient Active Problem List    Diagnosis Date Noted Date Diagnosed    Social problem 2025     Toe ulcer (HCC) 2025     Primary osteoarthritis of left knee 2025     Generalized abdominal pain 10/05/2024     Stage 3 chronic kidney disease, unspecified whether stage 3a or 3b CKD (HCC) 2022     Vitamin B12 deficiency 2022     Vitamin D deficiency 2022     Essential hypertension 2021     Atypical squamous cells of undetermined significance on cytologic smear of cervix (ASC-US) 2021     Type 2 diabetes mellitus with stage 3a chronic kidney disease, without long-term current use of insulin ([facility]) 10/18/2019     Class 2 obesity due to excess calories with body mass index (BMI) of 35.0 to 35.9 in adult 10/18/2019     Postmenopausal 10/18/2019     Moderate depressed bipolar I disorder (HCC) 2014     Constipation 2014     Hypercholesterolemia 2014     Hypothyroidism 2014       LOS (days): 5  Geometric Mean LOS (GMLOS) (days): 2  Days to GMLOS:-3     OBJECTIVE:  Risk of Unplanned Readmission Score: 17.98         Current admission status: Inpatient   Preferred Pharmacy:   [facility] - [place] [facility] [address]  [place] [address]  Phone: [phone] Fax: [phone]    Primary Care Provider: Gadiel Young PA-C    Primary Insurance: [facility]  Secondary Insurance: [place] [employer] [facility] [employer] [facility]    DISCHARGE DETAILS:    Discharge planning discussed with:: patient - cm was not able to call him with where she is going -  cm was able to make her brother aware she was going for inpt psych - cm was not able to tell him where she is going  Freedom of Choice: Yes  Comments  - Freedom of Choice: pt is in agreement with the d/c & d/c plan - pt has a bed at [facility] 6T  CM contacted family/caregiver?: No- see comments (cm was told to not call her brother)  Were Treatment Team discharge recommendations reviewed with patient/caregiver?: Yes (pt)  Did patient/caregiver verbalize understanding of patient care needs?: N/A- going to facility       Contacts  Reason/Outcome: Discharge Planning    Requested Home Health Care         Is the patient interested in HHC at discharge?: No    DME Referral Provided  Referral made for DME?: No    Other Referral/Resources/Interventions Provided:  Interventions: Psych Rehab  Referral Comments: pt meets criteria for inpt bhu admission    Would you like to participate in our [facility] service program?  : No - Declined    Treatment Team Recommendation: Inpatient Behavioral Health ([facility] - secure psych transport)  Discharge Destination Plan:: Inpatient Behavioral Health ([facility] secure psych transport)  Transport at Discharge : Other (Comment) (secure psych transport)      Pt is agreement with the d/c & d/c plan

## 2025-04-08 NOTE — ASSESSMENT & PLAN NOTE
Continue levothyroxine 88 mcg p.o. daily  TSH level elevated, free T4 normal  Counseled patient on importance of medication compliance.  Continue current dose of levothyroxine.  Repeat TSH level in 4 weeks

## 2025-04-08 NOTE — LETTER
ID # [id number]    [facility] # [id number]    UR Phone # [phone]    DISCHARGE SUMMARY

## 2025-04-08 NOTE — ASSESSMENT & PLAN NOTE
Lab Results   Component Value Date    HGBA1C 6.3 (H) 01/08/2025       Recent Labs     04/07/25  1106 04/07/25  1625 04/07/25  2101 04/08/25  0744   POCGLU 134 136 147* 96       Blood Sugar Average: Last 72 hrs:  (P) [id number]  Resume home diabetic regimen on discharge

## 2025-04-08 NOTE — ASSESSMENT & PLAN NOTE
Continue lisinopril 5 mg p.o. daily  Follow-up with PCP as outpatient for further blood pressure management

## 2025-04-08 NOTE — NURSING NOTE
Pt stable for discharge. AVS reviewed with Or[facility] from [place]. All questions answered. All belongings with pt.  time is 1700.

## 2025-04-09 PROBLEM — Z00.8 MEDICAL CLEARANCE FOR PSYCHIATRIC ADMISSION: Status: ACTIVE | Noted: 2020-01-20

## 2025-04-09 LAB
25(OH)D3 SERPL-MCNC: 40 NG/ML (ref 30–100)
ALBUMIN SERPL BCG-MCNC: 3.6 G/DL (ref 3.5–5)
ALP SERPL-CCNC: 96 U/L (ref 34–104)
ALT SERPL W P-5'-P-CCNC: 226 U/L (ref 7–52)
ANION GAP SERPL CALCULATED.3IONS-SCNC: 7 MMOL/L (ref 4–13)
AST SERPL W P-5'-P-CCNC: 213 U/L (ref 13–39)
ATRIAL RATE: 77 BPM
BASOPHILS # BLD AUTO: 0.04 THOUSANDS/ÂΜL (ref 0–0.1)
BASOPHILS NFR BLD AUTO: 1 % (ref 0–1)
BILIRUB SERPL-MCNC: 0.86 MG/DL (ref 0.2–1)
BUN SERPL-MCNC: 24 MG/DL (ref 5–25)
CALCIUM SERPL-MCNC: 9.4 MG/DL (ref 8.4–10.2)
CHLORIDE SERPL-SCNC: 108 MMOL/L (ref 96–108)
CHOLEST SERPL-MCNC: 144 MG/DL (ref ?–200)
CO2 SERPL-SCNC: 24 MMOL/L (ref 21–32)
CREAT SERPL-MCNC: 1.02 MG/DL (ref 0.6–1.3)
EOSINOPHIL # BLD AUTO: 0.13 THOUSAND/ÂΜL (ref 0–0.61)
EOSINOPHIL NFR BLD AUTO: 2 % (ref 0–6)
ERYTHROCYTE [DISTWIDTH] IN BLOOD BY AUTOMATED COUNT: 13.1 % (ref 11.6–15.1)
EST. AVERAGE GLUCOSE BLD GHB EST-MCNC: 128 MG/DL
FOLATE SERPL-MCNC: 9.7 NG/ML
GFR SERPL CREATININE-BSD FRML MDRD: 56 ML/MIN/1.73SQ M
GLUCOSE P FAST SERPL-MCNC: 89 MG/DL (ref 65–99)
GLUCOSE SERPL-MCNC: 106 MG/DL (ref 65–140)
GLUCOSE SERPL-MCNC: 141 MG/DL (ref 65–140)
GLUCOSE SERPL-MCNC: 196 MG/DL (ref 65–140)
GLUCOSE SERPL-MCNC: 225 MG/DL (ref 65–140)
GLUCOSE SERPL-MCNC: 89 MG/DL (ref 65–140)
HBA1C MFR BLD: 6.1 %
HCT VFR BLD AUTO: 33.2 % (ref 34.8–46.1)
HDLC SERPL-MCNC: 64 MG/DL
HGB BLD-MCNC: 10.6 G/DL (ref 11.5–15.4)
IMM GRANULOCYTES # BLD AUTO: 0.04 THOUSAND/UL (ref 0–0.2)
IMM GRANULOCYTES NFR BLD AUTO: 1 % (ref 0–2)
LDLC SERPL CALC-MCNC: 68 MG/DL (ref 0–100)
LYMPHOCYTES # BLD AUTO: 1.57 THOUSANDS/ÂΜL (ref 0.6–4.47)
LYMPHOCYTES NFR BLD AUTO: 21 % (ref 14–44)
MAGNESIUM SERPL-MCNC: 2 MG/DL (ref 1.9–2.7)
MCH RBC QN AUTO: 29.8 PG (ref 26.8–34.3)
MCHC RBC AUTO-ENTMCNC: 31.9 G/DL (ref 31.4–37.4)
MCV RBC AUTO: 93 FL (ref 82–98)
MONOCYTES # BLD AUTO: 0.62 THOUSAND/ÂΜL (ref 0.17–1.22)
MONOCYTES NFR BLD AUTO: 8 % (ref 4–12)
NEUTROPHILS # BLD AUTO: 5.17 THOUSANDS/ÂΜL (ref 1.85–7.62)
NEUTS SEG NFR BLD AUTO: 67 % (ref 43–75)
NONHDLC SERPL-MCNC: 80 MG/DL
NRBC BLD AUTO-RTO: 0 /100 WBCS
P AXIS: 66 DEGREES
PHOSPHATE SERPL-MCNC: 3.9 MG/DL (ref 2.3–4.1)
PLATELET # BLD AUTO: 251 THOUSANDS/UL (ref 149–390)
PMV BLD AUTO: 10.4 FL (ref 8.9–12.7)
POTASSIUM SERPL-SCNC: 4.2 MMOL/L (ref 3.5–5.3)
PR INTERVAL: 156 MS
PROT SERPL-MCNC: 5.9 G/DL (ref 6.4–8.4)
QRS AXIS: 27 DEGREES
QRSD INTERVAL: 84 MS
QT INTERVAL: 360 MS
QTC INTERVAL: 408 MS
RBC # BLD AUTO: 3.56 MILLION/UL (ref 3.81–5.12)
SODIUM SERPL-SCNC: 139 MMOL/L (ref 135–147)
T WAVE AXIS: 43 DEGREES
T4 FREE SERPL-MCNC: 0.97 NG/DL (ref 0.61–1.12)
TRIGL SERPL-MCNC: 60 MG/DL (ref ?–150)
TSH SERPL DL<=0.05 MIU/L-ACNC: 15.42 UIU/ML (ref 0.45–4.5)
VENTRICULAR RATE: 77 BPM
VIT B12 SERPL-MCNC: 895 PG/ML (ref 180–914)
WBC # BLD AUTO: 7.57 THOUSAND/UL (ref 4.31–10.16)

## 2025-04-09 PROCEDURE — 82948 REAGENT STRIP/BLOOD GLUCOSE: CPT

## 2025-04-09 PROCEDURE — 99222 1ST HOSP IP/OBS MODERATE 55: CPT | Performed by: NURSE PRACTITIONER

## 2025-04-09 PROCEDURE — 84443 ASSAY THYROID STIM HORMONE: CPT

## 2025-04-09 PROCEDURE — 83036 HEMOGLOBIN GLYCOSYLATED A1C: CPT

## 2025-04-09 PROCEDURE — 82746 ASSAY OF FOLIC ACID SERUM: CPT

## 2025-04-09 PROCEDURE — 85025 COMPLETE CBC W/AUTO DIFF WBC: CPT

## 2025-04-09 PROCEDURE — 83735 ASSAY OF MAGNESIUM: CPT

## 2025-04-09 PROCEDURE — 93010 ELECTROCARDIOGRAM REPORT: CPT | Performed by: STUDENT IN AN ORGANIZED HEALTH CARE EDUCATION/TRAINING PROGRAM

## 2025-04-09 PROCEDURE — 84439 ASSAY OF FREE THYROXINE: CPT

## 2025-04-09 PROCEDURE — 80053 COMPREHEN METABOLIC PANEL: CPT

## 2025-04-09 PROCEDURE — 84100 ASSAY OF PHOSPHORUS: CPT

## 2025-04-09 PROCEDURE — 99222 1ST HOSP IP/OBS MODERATE 55: CPT | Performed by: PSYCHIATRY & NEUROLOGY

## 2025-04-09 PROCEDURE — 82306 VITAMIN D 25 HYDROXY: CPT

## 2025-04-09 PROCEDURE — 82607 VITAMIN B-12: CPT

## 2025-04-09 PROCEDURE — 80061 LIPID PANEL: CPT

## 2025-04-09 PROCEDURE — 93005 ELECTROCARDIOGRAM TRACING: CPT

## 2025-04-09 RX ORDER — OLANZAPINE 5 MG/1
5 TABLET, FILM COATED ORAL
Status: DISCONTINUED | OUTPATIENT
Start: 2025-04-09 | End: 2025-04-10

## 2025-04-09 RX ORDER — MUPIROCIN 20 MG/G
OINTMENT TOPICAL DAILY
Status: DISCONTINUED | OUTPATIENT
Start: 2025-04-09 | End: 2025-05-05 | Stop reason: HOSPADM

## 2025-04-09 RX ADMIN — INSULIN LISPRO 2 UNITS: 100 INJECTION, SOLUTION INTRAVENOUS; SUBCUTANEOUS at 17:30

## 2025-04-09 RX ADMIN — LEVOTHYROXINE SODIUM 88 MCG: 88 TABLET ORAL at 06:15

## 2025-04-09 RX ADMIN — LISINOPRIL 5 MG: 5 TABLET ORAL at 08:48

## 2025-04-09 RX ADMIN — OLANZAPINE 5 MG: 5 TABLET, FILM COATED ORAL at 21:38

## 2025-04-09 RX ADMIN — INSULIN LISPRO 2 UNITS: 100 INJECTION, SOLUTION INTRAVENOUS; SUBCUTANEOUS at 21:38

## 2025-04-09 RX ADMIN — TRAZODONE HYDROCHLORIDE 50 MG: 50 TABLET ORAL at 21:38

## 2025-04-09 RX ADMIN — PRAVASTATIN SODIUM 40 MG: 40 TABLET ORAL at 08:48

## 2025-04-09 RX ADMIN — NYSTATIN: 100000 POWDER TOPICAL at 09:06

## 2025-04-09 RX ADMIN — MUPIROCIN: 20 OINTMENT TOPICAL at 13:22

## 2025-04-09 RX ADMIN — NYSTATIN: 100000 POWDER TOPICAL at 17:36

## 2025-04-09 NOTE — PROGRESS NOTES
04/09/25 1411   Team Meeting   Meeting Type Tx Team Meeting   Initial Conference Date 04/09/25   Team Members Present   Team Members Present Physician;Nurse;   Physician Team Member Ramez   Nursing Team Member Neyda   Care Management Team Member Stanford   Patient/Family Present   Patient Present Yes   Patient's Family Present No     Tx plan was reviewed and discussed with Pt. Pt was encouraged to attend groups. Medication was discussed with Pt. Pt signed tx plan.
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
## 2025-04-09 NOTE — CONSULTS
Consultation - Hospitalist   Name: Lisa Rich 69 y.o. female I MRN: [id number]  Unit/Bed#: [facility] 609-01 I Date of Admission: 4/8/2025   Date of Service: 4/9/2025 I Hospital Day: 1   Inpatient consult for Medical Clearance for  patient  Consult performed by: STEVE Lopez  Consult ordered by: STEVE Dawson        Physician Requesting Evaluation: Rachel Myrcik DO   Reason for Evaluation / Principal Problem: Medical clearance to [facility]      Assessment & Plan  Medical clearance for psychiatric admission  Admission labs: CBC, CMP, lipid panel acceptable  Vitals stable   UA unremarkable  EKG reveals NSR, 79 bpm   Patient is medically cleared for admission to [facility] and treatment of underlying psychiatric illness based on available results  Please contact SLIM with any questions or concerns  Type 2 diabetes mellitus with stage 3a chronic kidney disease, without long-term current use of insulin (HCC)  Lab Results   Component Value Date    HGBA1C 6.1 (H) 04/09/2025       Recent Labs     04/08/25  1624 04/08/25  2118 04/09/25  0711 04/09/25  1217   POCGLU 203* 301* 106 141*       Blood Sugar Average: Last 72 hrs:  (P) [id number]  On Tradjenta 5 mg daily at home   Monitor ACCU checks AC + HS  Toe ulcer (HCC)  Patient with ulcer of left third toe.  ED visit on 3/28 where he was prescribed Keflex 500 mg every 6 hours for 7 days which she completed.  X-ray 4/3 with no evidence of osteo  Arterial duplex within normal limits  No need for further antibiotics  Appreciate podiatry follow-up - Recommend daily local wound care to the right hallux nailbed ulceration and the left lateral third toe ulceration consisting of mupirocin ointment and a Band-Aid   Essential hypertension  Blood pressure stable, continue lisinopril 5 mg daily with holding parameters  Hypothyroidism  TSH elevated, free T4 normal  Encourage compliance with Synthroid  Continue Synthroid 88 mcg daily  Moderate depressed  "bipolar I disorder (HCC)  Admitted to [facility]  Management per primary service     Please contact the SecureChat role,\" \", with any questions/concerns.   psychiatry medical provider     Collaboration of Care: Were Recommendations Directly Discussed with Primary Treatment Team? - Yes     History of Present Illness   Lisacaren Rich is a 69 y.o. female who is originally admitted to the psychiatry service due to depression and carlie. We are consulted for medical clearance for admission to Behavioral Health Unit and treatment of underlying psychiatric illness.  Patient was [facility] to [facility] for toe ulcer. she was seen by podiatry. She completed a week of antibiotics. She is to continue local wound care. She does not need any further antibiotics. She is resting in a chair without complaints.     Review of Systems   Constitutional:  Negative for chills and fever.   HENT:  Negative for ear pain and sore throat.    Eyes:  Negative for pain and visual disturbance.   Respiratory:  Negative for cough and shortness of breath.    Cardiovascular:  Negative for chest pain and palpitations.   Gastrointestinal:  Negative for abdominal pain and vomiting.   Genitourinary:  Negative for dysuria and hematuria.   Musculoskeletal:  Negative for arthralgias and back pain.   Skin:  Negative for color change and rash.   Neurological:  Negative for seizures and syncope.   All other systems reviewed and are negative.    Historical Information   Past Medical History:   Diagnosis Date    Allergic     Depression     Diabetes mellitus (HCC)     Disease of thyroid gland     GERD (gastroesophageal reflux disease)     Hyperchloremia     Hypertension     Psychiatric disorder      Past Surgical History:   Procedure Laterality Date    ANKLE SURGERY      Incision    DILATION AND CURETTAGE OF UTERUS       Social History     Tobacco Use    Smoking status: Never     Passive exposure: Never    Smokeless tobacco: Never   Vaping Use    Vaping " status: Never Used   Substance and Sexual Activity    Alcohol use: Not Currently    Drug use: No    Sexual activity: Not Currently     E-Cigarette/Vaping    E-Cigarette Use Never User      E-Cigarette/Vaping Substances    Nicotine No     THC No     CBD No     Flavoring No     Other No     Unknown No      Family history non-contributory  Marital Status: Unknown    Meds/Allergies   I have reviewed home medications using recent Epic encounter.  Prior to Admission medications    Medication Sig Start Date End Date Taking? Authorizing Provider   glucose blood (Contour Next Test) test strip Use 1 each 2 (two) times a day Use as instructed 6/17/24  Yes Leticia Terrell MD   glucose blood test strip Use 1 each as needed Use as instructed   Yes Historical Provider, MD   levothyroxine 88 mcg tablet Take 1 tablet (88 mcg total) by mouth daily 1/16/25  Yes Gadiel Young PA-C   lisinopril (ZESTRIL) 5 mg tablet Take 1 tablet (5 mg total) by mouth daily 12/26/24  Yes Annetta Marti PA-C   pravastatin (PRAVACHOL) 40 mg tablet Take 1 tablet (40 mg total) by mouth daily 1/16/25  Yes Gadiel Young PA-C   QUEtiapine (SEROquel) 50 mg tablet Take 100 mg by mouth daily at bedtime 3/17/25  Yes Historical Provider, MD   traZODone (DESYREL) 100 mg tablet Take 50 mg by mouth daily at bedtime 2/19/24  Yes Historical Provider, MD   Cholecalciferol (Vitamin D) 50 MCG (2000 UT) tablet Take 1 tablet (2,000 Units total) by mouth daily  Patient not taking: Reported on 4/8/2025 3/8/24   Gadiel Young PA-C   ketoconazole (NIZORAL) 2 % cream Apply topically daily Apply topically to affected area daily for 2 weeks  Patient not taking: Reported on 4/8/2025 3/28/25   Maddie Carbajal PA-C   linaGLIPtin (Tradjenta) 5 MG TABS Take 5 mg by mouth daily 1/16/25   Gadiel Young PA-C   hydrOXYzine pamoate (VISTARIL) 50 mg capsule Take 1 capsule (50 mg total) by mouth daily at bedtime as needed (sleep)  Patient not taking: Reported on 3/23/2022  3/3/22  4/26/22  Gadiel Young PA-C     No Known Allergies    Objective :  Temp:  [97.2 °F (36.2 °C)-97.6 °F (36.4 °C)] 97.6 °F (36.4 °C)  HR:  [] 77  BP: (122-146)/(56-97) 122/56  Resp:  [14-17] 14  SpO2:  [94 %-98 %] 98 %  O2 Device: None (Room air)    Height: 5' (152.4 cm) (04/08/25 1900)  Weight - Scale: 78.2 kg (172 lb 6.4 oz) (04/08/25 1900)  Physical Exam  Constitutional:       General: She is not in acute distress.     Appearance: She is not toxic-appearing or diaphoretic.   HENT:      Head: Normocephalic.      Mouth/Throat:      Mouth: Mucous membranes are moist.   Cardiovascular:      Rate and Rhythm: Normal rate.   Pulmonary:      Effort: Pulmonary effort is normal. No respiratory distress.      Breath sounds: Normal breath sounds.   Abdominal:      General: Abdomen is flat. Bowel sounds are normal. There is no distension.      Palpations: Abdomen is soft.      Tenderness: There is no abdominal tenderness. There is no guarding or rebound.   Musculoskeletal:         General: Normal range of motion.      Cervical back: Normal range of motion.      Right lower leg: No edema.      Left lower leg: No edema.   Skin:     General: Skin is warm and dry.      Capillary Refill: Capillary refill takes less than 2 seconds.      Comments: Left 3rd toe ulcer, see picture    Neurological:      Mental Status: She is alert and oriented to person, place, and time.   Psychiatric:         Behavior: Behavior normal.             Lab Results: I have reviewed the following results:  Results from last 7 days   Lab Units 04/09/25  0551   WBC Thousand/uL 7.57   HEMOGLOBIN g/dL 10.6*   HEMATOCRIT % 33.2*   PLATELETS Thousands/uL 251   SEGS PCT % 67   LYMPHO PCT % 21   MONO PCT % 8   EOS PCT % 2     Results from last 7 days   Lab Units 04/09/25  0551   SODIUM mmol/L 139   POTASSIUM mmol/L 4.2   CHLORIDE mmol/L 108   CO2 mmol/L 24   BUN mg/dL 24   CREATININE mg/dL 1.02   ANION GAP mmol/L 7   CALCIUM mg/dL 9.4   ALBUMIN g/dL 3.6   TOTAL  BILIRUBIN mg/dL 0.86   ALK PHOS U/L 96   ALT U/L 226*   AST U/L 213*   GLUCOSE RANDOM mg/dL 89     Results from last 7 days   Lab Units 04/03/25  1314   INR  0.92         Lab Results   Component Value Date/Time    HGBA1C 6.1 (H) 04/09/2025 05:51 AM    HGBA1C 6.3 (H) 01/08/2025 12:12 PM    HGBA1C 6.0 (H) 10/05/2024 11:27 AM     Results from last 7 days   Lab Units 04/09/25  1217 04/09/25  0711 04/08/25  2118 04/08/25  1624 04/08/25  1116 04/08/25  0744 04/07/25  2101 04/07/25  1625 04/07/25  1106 04/07/25  0723 04/06/25  2045 04/06/25  1617   POC GLUCOSE mg/dl 141* 106 301* 203* 154* 96 147* 136 134 106 231* 133       Imaging Results Review: I reviewed radiology reports from this admission including: xray(s) and Ultrasound(s).  Other Study Results Review: EKG was personally reviewed and my interpretation is: NSR. HR 79..    Administrative Statements   I have spent a total time of   minutes in caring for this patient on the day of the visit/encounter including Diagnostic results, Patient and family education, Importance of tx compliance, Risk factor reductions, Impressions, Counseling / Coordination of care, Documenting in the medical record, Reviewing/placing orders in the medical record (including tests, medications, and/or procedures), Obtaining or reviewing history  , and Communicating with other healthcare professionals .  ** Please Note: This note has been constructed using a voice recognition system. **

## 2025-04-09 NOTE — ASSESSMENT & PLAN NOTE
Ablation time: 7 minutes 15 seconds [facility]  Management per primary service

## 2025-04-09 NOTE — PROGRESS NOTES
"Pt attended group,.  Pt tangential and needed limit setting.  Pt focused on doing the \"tom tom tom\".  Pt did state she has medical needs, kidney, wounds/foot and needs to take her medications.  Pt also noting other factors that are factors in mh recovery.  Pt very tangential needing redirection.      04/09/25 1100   Activity/Group Checklist   Group Other (Comment)  (MH Recovery: focus, problem solving and repsonsibility)   Attendance Attended   Attendance Duration (min) 46-60   Interactions Disorganized interaction   Affect/Mood Wide   Goals Achieved Identified triggers;Identified feelings;Discussed coping strategies;Discussed self-esteem issues;Identified relapse prevention strategies;Able to listen to others;Able to engage in interactions        "

## 2025-04-09 NOTE — TREATMENT TEAM
04/08/25 2019   Provider Notification   Reason for Communication Admission   Provider Name Kayce King   Provider Role Hospitalist   Method of Communication Other (Comment)  (EPIC secure chat)   Response Waiting for response   Notification Time 2019     Notified providers that PTA med list reviewed using [facility] MAR

## 2025-04-09 NOTE — DISCHARGE INSTR - OTHER ORDERS
Skin Care Plan:  1-Left Lateral 3rd Toe Eschar: Apply 3m no sting skin prep to eschar daily.    2-Turn/reposition q2h or when medically stable for pressure re-distribution on skin.  3-Elevate heels to offload pressure.  4-Moisturize skin daily with skin nourishing cream  5-Ehob cushion in chair when out of bed.  6-Preventative Hydraguard to bilateral sacro-buttocks and heels BID and PRN.     If you are experiencing a mental health emergency, you may call the [facility] Crisis Intervention Office 24 hours a day, 7 days per week at [phone].     In [facility], call [phone].     When you need someone to listen, the Warmline is available for 16 hours a day, 7 days a week, from the time of 7-10am and 2pm-2am.  It is not available from the hours of 2am-6am and 10am-2pm. A representative can be reached at [phone].
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
## 2025-04-09 NOTE — WOUND OSTOMY CARE
Consult Note - Wound   Lisa A McGorry 69 y.o. female MRN: [id number]  [facility] Encounter: [id number]        History and Present Illness:  Patient is a 70 yo female that was admitted to [facility]. PMH:   moderate depressed bipolar disorder, hypothyroidism, type 2 DM, hypertension, and stage 3 CKD. Podiatry consulted for toe- awaiting recommendations. See lying on regular mattress.      Wound Care was consulted for left foot 3rd toe ulcer     Assessment Findings:   B/L heels are dry intact and aylse with no skin loss or wounds present. Recommend preventative Hydraguard Cream and proper offloading/ repositioning.      Patient declined assessment of sacro-buttocks. She reports no wounds or skin loss to the area.     Left Lateral 3rd Toe: intact, dry brown eschar. No skin loss or drainage noted. Recommend 3m no sting skin prep     No induration, fluctuance, odor, warmth/temperature differences, redness, or purulence noted to the above noted wounds and skin areas assessed. New dressings applied per orders listed below. Patient tolerated well- no s/s of non-verbal pain or discomfort observed during the encounter. Bedside nurse aware of plan of care. See flow sheets for more detailed assessment findings.      Orders listed below and wound care will continue to follow, call or Secure Chat Message with questions.     Skin Care Plan:  1-Left Lateral 3rd Toe Eschar: Apply 3m no sting skin prep to eschar daily.    2-Turn/reposition q2h or when medically stable for pressure re-distribution on skin.  3-Elevate heels to offload pressure.  4-Moisturize skin daily with skin nourishing cream  5-Ehob cushion in chair when out of bed.  6-Preventative Hydraguard to bilateral sacro-buttocks and heels BID and PRN.       Wounds:  Wound 04/01/25 Diabetic Ulcer Toe D3, third Left (Active)   Wound Image   04/09/25 1033   Wound Description Intact;Dry;Brown 04/09/25 1033   Non-staged Wound Description Not applicable 04/09/25  1033   Wound Length (cm) 0.7 cm 04/09/25 1033   Wound Width (cm) 0.5 cm 04/09/25 1033   Wound Depth (cm) 0 cm 04/09/25 1033   Wound Surface Area (cm^2) 0.35 cm^2 04/09/25 1033   Wound Volume (cm^3) 0 cm^3 04/09/25 1033   Calculated Wound Volume (cm^3) 0 cm^3 04/09/25 1033   Drainage Amount None 04/09/25 1033   Debby-wound Assessment Intact 04/09/25 1033   Treatments Site care 04/09/25 1033   Wound Site Closure None 04/09/25 1033   Dressing Other (Comment) 04/09/25 1033   Dressing Changed New 04/09/25 1033   Patient Tolerance Tolerated well 04/09/25 1033   Dressing Status Intact 04/09/25 1033               Abimbola Jones RN, BSN, CWOCN

## 2025-04-09 NOTE — ASSESSMENT & PLAN NOTE
Patient with ulcer of left third toe.  ED visit on 3/28 where he was prescribed Keflex 500 mg every 6 hours for 7 days which she completed.  X-ray 4/3 with no evidence of osteo  Arterial duplex within normal limits  No need for further antibiotics  Appreciate podiatry follow-up - Recommend daily local wound care to the right hallux nailbed ulceration and the left lateral third toe ulceration consisting of mupirocin ointment and a Band-Aid

## 2025-04-09 NOTE — ASSESSMENT & PLAN NOTE
TSH elevated, free T4 normal  Encourage compliance with Synthroid  Continue Synthroid 88 mcg daily

## 2025-04-09 NOTE — PLAN OF CARE
Problem: Alteration in Thoughts and Perception  Goal: Verbalize thoughts and feelings  Description: Interventions:- Promote a nonjudgmental and trusting relationship with the patient through active listening and therapeutic communication- Assess patient's level of functioning, behavior and potential for risk- Engage patient in 1 on 1 interactions- Encourage patient to express fears, feelings, frustrations, and discuss symptoms  - [place] patient to reality, help patient recognize reality-based thinking - Administer medications as ordered and assess for potential side effects- Provide the patient education related to the signs and symptoms of the illness and desired effects of prescribed medications  Outcome: Progressing     Problem: Risk for Self Injury/Neglect  Goal: Treatment Goal: Remain safe during length of stay, learn and adopt new coping skills, and be free of self-injurious ideation, impulses and acts at the time of discharge  Outcome: Progressing  Goal: Verbalize thoughts and feelings  Description: Interventions:- Assess and re-assess patient's lethality and potential for self-injury- Engage patient in 1:1 interactions, daily, for a minimum of 15 minutes- Encourage patient to express feelings, fears, frustrations, hopes- Establish rapport/trust with patient   Outcome: Progressing  Goal: Refrain from harming self  Description: Interventions:- Monitor patient closely, per order- Develop a trusting relationship- Supervise medication ingestion, monitor effects and side effects   Outcome: Progressing  Goal: Attend and participate in unit activities, including therapeutic, recreational, and educational groups  Description: Interventions:- Provide therapeutic and educational activities daily, encourage attendance and participation, and document same in the medical record- Obtain collateral information, encourage visitation and family involvement in care   Outcome: Progressing  Goal: Recognize maladaptive  responses and adopt new coping mechanisms  Outcome: Progressing  Goal: Complete daily ADLs, including personal hygiene independently, as able  Description: Interventions:- Observe, teach, and assist patient with ADLS- Monitor and promote a balance of rest/activity, with adequate nutrition and elimination  Outcome: Progressing     Problem: Depression  Goal: Treatment Goal: Demonstrate behavioral control of depressive symptoms, verbalize feelings of improved mood/affect, and adopt new coping skills prior to discharge  Outcome: Progressing  Goal: Verbalize thoughts and feelings  Description: Interventions:- Assess and re-assess patient's level of risk - Engage patient in 1:1 interactions, daily, for a minimum of 15 minutes - Encourage patient to express feelings, fears, frustrations, hopes   Outcome: Progressing  Goal: Refrain from harming self  Description: Interventions:- Monitor patient closely, per order - Supervise medication ingestion, monitor effects and side effects   Outcome: Progressing  Goal: Refrain from isolation  Description: Interventions:- Develop a trusting relationship - Encourage socialization   Outcome: Progressing  Goal: Refrain from self-neglect  Outcome: Progressing  Goal: Attend and participate in unit activities, including therapeutic, recreational, and educational groups  Description: Interventions:- Provide therapeutic and educational activities daily, encourage attendance and participation, and document same in the medical record   Outcome: Progressing  Goal: Complete daily ADLs, including personal hygiene independently, as able  Description: Interventions:- Observe, teach, and assist patient with ADLS-  Monitor and promote a balance of rest/activity, with adequate nutrition and elimination   Outcome: Progressing

## 2025-04-09 NOTE — ASSESSMENT & PLAN NOTE
Admission labs: CBC, CMP, lipid panel acceptable  Vitals stable   UA unremarkable  EKG reveals NSR, 79 bpm   Patient is medically cleared for admission to [facility] and treatment of underlying psychiatric illness based on available results  Please contact SLIM with any questions or concerns

## 2025-04-09 NOTE — ASSESSMENT & PLAN NOTE
Lab Results   Component Value Date    HGBA1C 6.1 (H) 04/09/2025       Recent Labs     04/08/25  1624 04/08/25  2118 04/09/25  0711 04/09/25  1217   POCGLU 203* 301* 106 141*       Blood Sugar Average: Last 72 hrs:  (P) [id number]  On Tradjenta 5 mg daily at home   Monitor ACCU checks AC + HS
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
## 2025-04-09 NOTE — PROGRESS NOTES
Pastoral Care Progress Note          Chaplaincy Interventions Utilized:   Empowerment: Encouraged focus on present, Encouraged self-care, Facilitated group experience, and Normalized experience of patient/family    Exploration: Explored hope, Explored emotional needs & resources, and Explored spiritual needs & resources    Relationship Building: Cultivated a relationship of care and support, Listened empathically, and Hospitality    The pt participated in spirituality group facilitated by the  today. Pt needed redirection as she took the group off topic. Pt was accepting of redirection and listened well to others. Pt was taken out of group by other staff to talk with her, but up to that point had participated.

## 2025-04-09 NOTE — CONSULTS
Consult Note- Podiatry   [facility]  Lisa Rich 69 y.o. female MRN: [id number]  Unit/Bed#: -01 Encounter: [id number]    Assessment & Plan     Assessment:  1. Onychomycosis x 10  2. DM with polyneuropathy and PAD bilaterally  3. Pain toes b/l  4.  Right hallux superficial nailbed ulceration, no acute signs of infection  5.  Left lateral third toe superficial ulceration, no acute signs of infection     Plan:  -Patient was seen and evaluated at bedside with all questions and concerns addressed.  -Recommend daily local wound care to the right hallux nailbed ulceration and the left lateral third toe ulceration consisting of mupirocin ointment and a Band-Aid.  -No indications for antibiotics or surgery from podiatry standpoint.  - d/w pt that pain and discomfort is secondary to toe nail thickening, as well as to bilateral foot ulcerations  - Hallucal mycotic nails x2 debrided decreasing thickness by 1 mm. Mycotic toe nails 2-5 b/l were trimmed, decreasing length, without incidence utilizing a sharp nail nipper.    - Podiatry signing off, thank you for the consult.     History of Present Illness     HPI:  Lisa Rich is a 69 y.o. female who presents with painful, elongated toenails and ulcerations of the right hallux and left lateral third toe. They have difficulty applying their socks and shoes due to the elongation of the nails. The pressure within their shoe gear is painful and they have been unable to cut their nails adequately. Patient states pain is 1/10 in shoe gear. Pain with pressure. Requires at risk foot care.     Inpatient consult to Podiatry  Consult performed by: Saad Butts DPM  Consult ordered by: Rachel Myrick DO        Review of Systems   Constitutional: Negative.    HENT: Negative.    Eyes: Negative.    Respiratory: Negative.    Cardiovascular: Negative.    Gastrointestinal: Negative.    Musculoskeletal: Negative   Skin: elongated thickened toenails,  bilateral foot wounds  Neurological: Negative.        Historical Information   Past Medical History:   Diagnosis Date    Allergic     Depression     Diabetes mellitus (HCC)     Disease of thyroid gland     GERD (gastroesophageal reflux disease)     Hyperchloremia     Hypertension     Psychiatric disorder      Past Surgical History:   Procedure Laterality Date    ANKLE SURGERY      Incision    DILATION AND CURETTAGE OF UTERUS       Social History   Social History     Substance and Sexual Activity   Alcohol Use Not Currently     Social History     Substance and Sexual Activity   Drug Use No     Social History     Tobacco Use   Smoking Status Never    Passive exposure: Never   Smokeless Tobacco Never     Family History:   Family History   Problem Relation Age of Onset    No Known Problems Family     Diabetes Mother     Heart disease Mother     Stroke Mother     Diabetes Father     Stroke Father     No Known Problems Maternal Grandmother     No Known Problems Maternal Grandfather     No Known Problems Paternal Grandmother     No Known Problems Paternal Grandfather     Diabetes Brother     No Known Problems Paternal Aunt        Meds/Allergies     Medications Prior to Admission:     glucose blood (Contour Next Test) test strip    glucose blood test strip    levothyroxine 88 mcg tablet    lisinopril (ZESTRIL) 5 mg tablet    pravastatin (PRAVACHOL) 40 mg tablet    QUEtiapine (SEROquel) 50 mg tablet    traZODone (DESYREL) 100 mg tablet    Cholecalciferol (Vitamin D) 50 MCG (2000 UT) tablet    ketoconazole (NIZORAL) 2 % cream    linaGLIPtin (Tradjenta) 5 MG TABS  No Known Allergies    Objective   First Vitals:   Blood Pressure: 134/97 (04/08/25 1807)  Pulse: 96 (04/08/25 1807)  Temperature: (!) 97.4 °F (36.3 °C) (04/08/25 1807)  Temp Source: Temporal (04/08/25 1807)  Respirations: 17 (04/08/25 1807)  Height: 5' (152.4 cm) (04/08/25 1900)  Weight - Scale: 78.2 kg (172 lb 6.4 oz) (04/08/25 1900)  SpO2: 94 % (04/08/25  1807)    Current Vitals:   Blood Pressure: 122/56 (04/09/25 0805)  Pulse: 77 (04/09/25 0805)  Temperature: 97.6 °F (36.4 °C) (04/09/25 0805)  Temp Source: Temporal (04/09/25 0805)  Respirations: 14 (04/09/25 0805)  Height: 5' (152.4 cm) (04/08/25 1900)  Weight - Scale: 78.2 kg (172 lb 6.4 oz) (04/08/25 1900)  SpO2: 98 % (04/09/25 0805)    /56 (BP Location: Right arm)   Pulse 77   Temp 97.6 °F (36.4 °C) (Temporal)   Resp 14   Ht 5' (1.524 m)   Wt 78.2 kg (172 lb 6.4 oz)   SpO2 98%   BMI 33.67 kg/m²     General Appearance:    Alert, cooperative, no distress            Extremities:   MMT is 4/5 to all compartments of the LE, +0/4 edema B/L, Digital ROM is intact,    Pulses:   R DP is +1/4, R PT is +0/4, L DP is +1/4, L PT is +0/4, CFT< 3sec to all digits. Thin/shiny skin noted to the B/L LE, pigmentary changes to B/L LE. Absent digital hair growth b/l.    Skin:   Nail thickening b/l. Nails are yellow, discolored, thickened, elongated, with notable subungual debris and > 2 mm thickness noted to toenails 1-5 B/L.     Left lateral third toe superficial ulceration measuring 1.5 x 1.0 x 0.1 cm down to level of dermal tissue.  Wound base is 100% granular with an overlying layer of fibrous slough.  Minimal serous drainage.  No erythema or increased warmth.  No palpable fluctuance or crepitation.  No acute signs of infection.    Right hallux central nailbed ulceration encountered during toenail debridement.  Lesion measures approximately 0.3 x 0.3 x 0.1 cm.  Wound base is 100% granular.  Mild serosanguineous drainage. No erythema or increased warmth.  No palpable fluctuance or crepitation.  No acute signs of infection.       Neurologic:   Gross sensation is diminished. Protective sensation is diminished               Lab Results:   Admission on 04/08/2025   Component Date Value    TSH 3RD GENERATON 04/09/2025 15.420 (H)     POC Glucose 04/08/2025 301 (H)     Sodium 04/09/2025 139     Potassium 04/09/2025 4.2      Chloride 04/09/2025 108     CO2 04/09/2025 24     ANION GAP 04/09/2025 7     BUN 04/09/2025 24     Creatinine 04/09/2025 1.02     Glucose 04/09/2025 89     Glucose, Fasting 04/09/2025 89     Calcium 04/09/2025 9.4     AST 04/09/2025 213 (H)     ALT 04/09/2025 226 (H)     Alkaline Phosphatase 04/09/2025 96     Total Protein 04/09/2025 5.9 (L)     Albumin 04/09/2025 3.6     Total Bilirubin 04/09/2025 0.86     eGFR 04/09/2025 56     Magnesium 04/09/2025 2.0     Phosphorus 04/09/2025 3.9     WBC 04/09/2025 7.57     RBC 04/09/2025 3.56 (L)     Hemoglobin 04/09/2025 10.6 (L)     Hematocrit 04/09/2025 33.2 (L)     MCV 04/09/2025 93     MCH 04/09/2025 29.8     MCHC 04/09/2025 31.9     RDW 04/09/2025 13.1     MPV 04/09/2025 10.4     Platelets 04/09/2025 251     nRBC 04/09/2025 0     Segmented % 04/09/2025 67     Immature Grans % 04/09/2025 1     Lymphocytes % 04/09/2025 21     Monocytes % 04/09/2025 8     Eosinophils Relative 04/09/2025 2     Basophils Relative 04/09/2025 1     Absolute Neutrophils 04/09/2025 5.17     Absolute Immature Grans 04/09/2025 0.04     Absolute Lymphocytes 04/09/2025 1.57     Absolute Monocytes 04/09/2025 0.62     Eosinophils Absolute 04/09/2025 0.13     Basophils Absolute 04/09/2025 0.04     Cholesterol 04/09/2025 144     Triglycerides 04/09/2025 60     HDL, Direct 04/09/2025 64     LDL Calculated 04/09/2025 68     Non-HDL-Chol (CHOL-HDL) 04/09/2025 80     POC Glucose 04/09/2025 106      Imaging: I have personally reviewed pertinent films in PACS  EKG, Pathology, and Other Studies: I have personally reviewed pertinent reports.      Code Status: Level 1 - Full Code  Advance Directive and Living Will:      Power of :

## 2025-04-09 NOTE — NURSING NOTE
"Patient is bright approach, visible in the milieu, calm, and social. Patient is medication compliant, and cooperative with care and treatment. Patient denies SI, AH, VH. Patient endorses no anxiety and no depression. Patient states everything is \"tom, tom, tom\". Patient offers no c/o at the moment. Safety checks ongoing.   "

## 2025-04-10 LAB
ANION GAP SERPL CALCULATED.3IONS-SCNC: 9 MMOL/L (ref 4–13)
BUN SERPL-MCNC: 29 MG/DL (ref 5–25)
CALCIUM SERPL-MCNC: 10.1 MG/DL (ref 8.4–10.2)
CHLORIDE SERPL-SCNC: 104 MMOL/L (ref 96–108)
CO2 SERPL-SCNC: 23 MMOL/L (ref 21–32)
CREAT SERPL-MCNC: 1.02 MG/DL (ref 0.6–1.3)
GFR SERPL CREATININE-BSD FRML MDRD: 56 ML/MIN/1.73SQ M
GLUCOSE SERPL-MCNC: 141 MG/DL (ref 65–140)
GLUCOSE SERPL-MCNC: 155 MG/DL (ref 65–140)
GLUCOSE SERPL-MCNC: 162 MG/DL (ref 65–140)
GLUCOSE SERPL-MCNC: 172 MG/DL (ref 65–140)
GLUCOSE SERPL-MCNC: 209 MG/DL (ref 65–140)
POTASSIUM SERPL-SCNC: 4.8 MMOL/L (ref 3.5–5.3)
SODIUM SERPL-SCNC: 136 MMOL/L (ref 135–147)

## 2025-04-10 PROCEDURE — 87040 BLOOD CULTURE FOR BACTERIA: CPT

## 2025-04-10 PROCEDURE — 82948 REAGENT STRIP/BLOOD GLUCOSE: CPT

## 2025-04-10 PROCEDURE — 80048 BASIC METABOLIC PNL TOTAL CA: CPT

## 2025-04-10 PROCEDURE — 99232 SBSQ HOSP IP/OBS MODERATE 35: CPT | Performed by: PSYCHIATRY & NEUROLOGY

## 2025-04-10 RX ORDER — OLANZAPINE 5 MG/1
5 TABLET, FILM COATED ORAL 2 TIMES DAILY
Status: COMPLETED | OUTPATIENT
Start: 2025-04-10 | End: 2025-04-11

## 2025-04-10 RX ORDER — BISACODYL 10 MG
10 SUPPOSITORY, RECTAL RECTAL DAILY PRN
Status: DISCONTINUED | OUTPATIENT
Start: 2025-04-10 | End: 2025-05-05 | Stop reason: HOSPADM

## 2025-04-10 RX ORDER — POLYETHYLENE GLYCOL 3350 17 G/17G
17 POWDER, FOR SOLUTION ORAL DAILY PRN
Status: DISCONTINUED | OUTPATIENT
Start: 2025-04-10 | End: 2025-05-05 | Stop reason: HOSPADM

## 2025-04-10 RX ADMIN — INSULIN LISPRO 2 UNITS: 100 INJECTION, SOLUTION INTRAVENOUS; SUBCUTANEOUS at 16:55

## 2025-04-10 RX ADMIN — OLANZAPINE 5 MG: 5 TABLET, FILM COATED ORAL at 21:48

## 2025-04-10 RX ADMIN — INSULIN LISPRO 1 UNITS: 100 INJECTION, SOLUTION INTRAVENOUS; SUBCUTANEOUS at 12:04

## 2025-04-10 RX ADMIN — NYSTATIN 1 APPLICATION: 100000 POWDER TOPICAL at 17:27

## 2025-04-10 RX ADMIN — NYSTATIN: 100000 POWDER TOPICAL at 08:54

## 2025-04-10 RX ADMIN — MUPIROCIN: 20 OINTMENT TOPICAL at 08:54

## 2025-04-10 RX ADMIN — TRAZODONE HYDROCHLORIDE 50 MG: 50 TABLET ORAL at 21:48

## 2025-04-10 RX ADMIN — ALUMINUM HYDROXIDE, MAGNESIUM HYDROXIDE, AND SIMETHICONE 30 ML: 200; 200; 20 SUSPENSION ORAL at 14:38

## 2025-04-10 RX ADMIN — PRAVASTATIN SODIUM 40 MG: 40 TABLET ORAL at 08:40

## 2025-04-10 RX ADMIN — INSULIN LISPRO 1 UNITS: 100 INJECTION, SOLUTION INTRAVENOUS; SUBCUTANEOUS at 21:50

## 2025-04-10 RX ADMIN — INSULIN LISPRO 2 UNITS: 100 INJECTION, SOLUTION INTRAVENOUS; SUBCUTANEOUS at 08:40

## 2025-04-10 RX ADMIN — LISINOPRIL 5 MG: 5 TABLET ORAL at 08:40

## 2025-04-10 RX ADMIN — LEVOTHYROXINE SODIUM 88 MCG: 88 TABLET ORAL at 06:09

## 2025-04-10 NOTE — PLAN OF CARE
Problem: Alteration in Thoughts and Perception  Goal: Verbalize thoughts and feelings  Description: Interventions:- Promote a nonjudgmental and trusting relationship with the patient through active listening and therapeutic communication- Assess patient's level of functioning, behavior and potential for risk- Engage patient in 1 on 1 interactions- Encourage patient to express fears, feelings, frustrations, and discuss symptoms  - [place] patient to reality, help patient recognize reality-based thinking - Administer medications as ordered and assess for potential side effects- Provide the patient education related to the signs and symptoms of the illness and desired effects of prescribed medications  Outcome: Progressing     Problem: Ineffective Coping  Goal: Participates in unit activities  Description: Interventions:- Provide therapeutic environment - Provide required programming - Redirect inappropriate behaviors   Outcome: Progressing     Problem: Risk for Self Injury/Neglect  Goal: Treatment Goal: Remain safe during length of stay, learn and adopt new coping skills, and be free of self-injurious ideation, impulses and acts at the time of discharge  Outcome: Progressing  Goal: Verbalize thoughts and feelings  Description: Interventions:- Assess and re-assess patient's lethality and potential for self-injury- Engage patient in 1:1 interactions, daily, for a minimum of 15 minutes- Encourage patient to express feelings, fears, frustrations, hopes- Establish rapport/trust with patient   Outcome: Progressing  Goal: Refrain from harming self  Description: Interventions:- Monitor patient closely, per order- Develop a trusting relationship- Supervise medication ingestion, monitor effects and side effects   Outcome: Progressing  Goal: Attend and participate in unit activities, including therapeutic, recreational, and educational groups  Description: Interventions:- Provide therapeutic and educational activities daily,  encourage attendance and participation, and document same in the medical record- Obtain collateral information, encourage visitation and family involvement in care   Outcome: Progressing  Goal: Recognize maladaptive responses and adopt new coping mechanisms  Outcome: Progressing  Goal: Complete daily ADLs, including personal hygiene independently, as able  Description: Interventions:- Observe, teach, and assist patient with ADLS- Monitor and promote a balance of rest/activity, with adequate nutrition and elimination  Outcome: Progressing

## 2025-04-10 NOTE — PROGRESS NOTES
04/10/25 0900   Team Meeting   Meeting Type Daily Rounds   Team Members Present   Team Members Present Physician;Nurse;   Physician Team Member Tiny/Dilma/Ramez   Nursing Team Member Rafaela/Carmen/Linda   Care Management Team Member Stanford   OT Team Member Alix   Patient/Family Present   Patient Present No   Patient's Family Present No     Patient is sexually preoccupied with staff. She is euphoric and manic at times. Patient talks about her brother's sexual preoccupation. She is eating and sleeping well. Seroquel was discontinued and Zyprexa was started. Patient discharge is pending stabilization of mood and medications.

## 2025-04-10 NOTE — ASSESSMENT & PLAN NOTE
Increase Zyprexa to 5 mg twice daily due to current symptoms of carlie  Unable to initiate lithium as patient reports worsening kidney function while taking lithium, liver enzymes currently increased so will avoid Depakote at this time

## 2025-04-10 NOTE — TREATMENT TEAM
04/10/25 1900   Provider Notification   Reason for Communication Evaluate   Provider Name Inez Renny WILSON DO   Provider Role Hospitalist   Method of Communication Other (Comment)  (EPIC secure chat)   Response See orders   Notification Time 1900     Notified provider of new onset pitting edema and redness to left hand. Patient denies any pain or discomfort. VSS. See new orders.

## 2025-04-10 NOTE — PROGRESS NOTES
Progress Note - Behavioral Health   Name: Lisa Bocanegraorry 69 y.o. female I MRN: [id number]  Unit/Bed#: [facility] 609-01 I Date of Admission: 4/8/2025   Date of Service: 4/10/2025 I Hospital Day: 2    Assessment & Plan  Bipolar affective disorder, current episode manic (HCC)  Increase Zyprexa to 5 mg twice daily due to current symptoms of carlie  Unable to initiate lithium as patient reports worsening kidney function while taking lithium, liver enzymes currently increased so will avoid Depakote at this time    Current medications:  Current Facility-Administered Medications   Medication Dose Route Frequency Provider Last Rate    acetaminophen  650 mg Oral Q4H PRN STEVE Dawson      acetaminophen  650 mg Oral Q4H PRN STEVE Dawson      acetaminophen  975 mg Oral Q6H PRN STEVE Dawson      aluminum-magnesium hydroxide-simethicone  30 mL Oral Q4H PRN STEVE Dawson      benztropine  1 mg Intramuscular Q4H PRN Max 6/day STEVE Dawson      benztropine  0.5 mg Oral Q4H PRN Max 6/day STEVE Dawson      bisacodyl  10 mg Rectal Daily PRN STEVE Dawson      hydrOXYzine HCL  25 mg Oral Q6H PRN Max 4/day STEVE Dawson      hydrOXYzine HCL  50 mg Oral Q6H PRN Max 4/day STEVE Dawson      insulin lispro  1-6 Units Subcutaneous TID AC Lance Zayas MD      insulin lispro  1-6 Units Subcutaneous HS Lance Zayas MD      levothyroxine  88 mcg Oral Early Morning Lance Zayas MD      lisinopril  5 mg Oral Daily Lance Zayas MD      LORazepam  1 mg Intramuscular Q6H PRN Max 3/day STEVE Dawson      LORazepam  1 mg Oral Q6H PRN Max 3/day STEVE Dawson      mupirocin   Topical Daily Saad Butts DPM      nystatin   Topical BID Lance Zayas MD      OLANZapine  5 mg Intramuscular Q3H PRN Max 3/day STEVE Dawson      OLANZapine  2.5 mg Oral Q4H PRN Max 6/day STEVE Dawson      OLANZapine  5 mg Oral Q4H PRN Max 3/day  STEVE Dawson      OLANZapine  5 mg Oral Q3H PRN Max 3/day STEVE Dawson      OLANZapine  5 mg Oral HS Racheltricia Myrick,       polyethylene glycol  17 g Oral Daily PRN STEVE Dawson      pravastatin  40 mg Oral Daily Lance Zayas MD      propranolol  5 mg Oral Q8H PRN STEVE Dawson      senna-docusate sodium  1 tablet Oral Daily PRN STEVE Dawson      traZODone  50 mg Oral HS Lance Zayas MD      traZODone  50 mg Oral HS PRN STEVE Dawson          Risks/Benefits of Treatment:     Risks, benefits, and possible side effects of medications explained to patient. Patient has limited understanding of risks and benefits of treatment at this time, but agrees to take medications as prescribed.    Progress Toward Goals:  Limited improvement since starting medication, patient still actively manic.  Plan to increase Zyprexa    Treatment Planning:      - Encourage early mobility and having a structured day  - Provide frequent re-orientation, and cognitive stimulation  - Ensure assistive devices are in proper working order (eye-glasses, hearing aids)  - Encourage adequate hydration, nutrition and monitor bowel movements  - Maintain sleep-wake cycle: Uninterrupted sleep time; low-level lighting at night  - Fall precaution  - f/u SLIM recs regarding the medical problems   - Continue medication titration and treatment plan; adjust medication to optimize treatment response and as clinically indicated.   - Observation:Routine  - Legal Status: 302  - VS: as per unit protocol  - Encourage group attendance and milieu therapy  - Dispo: To be determined  - Long Stay Certification : Not Applicable  - Estimated Discharge Day:  14 days (4/24/2025)    Subjective     Per staff over the last 24 hours: Sexually preoccupied, euphoric and currently manic appearing. She is eating and sleeping well.     Patient was visited on unit for continuing care; chart reviewed and discussed with  multidisciplinary treatment team.  On approach, the patient was over bright, displayed pressured speech, disorganized thought process, difficult to interrupt and denying symptoms of depression and anxiety. Denied any changes in appetite, and energy level. Denied A/VH currently.  Denied SI/HI, intent or plan upon direct inquiry at this time.    Patient continues to be visible in the milieu and interacts with staff and peers. No reports of aggression or self-injurious behavior on unit. No PRN medications used in the past 24 hours.    Patient accepted all offered medications and no adverse effects of medications noted or reported.    Sleep: insomnia  Appetite: normal  Medication side effects: No  ROS: review of systems as noted above in HPI/Subjective report, all other systems are negative    Objective :  Temp:  [97.2 °F (36.2 °C)-98 °F (36.7 °C)] 97.2 °F (36.2 °C)  HR:  [80-91] 86  BP: (112-149)/(58-66) 149/66  Resp:  [17-18] 17  SpO2:  [94 %-98 %] 98 %  O2 Device: None (Room air)    Temp:  [97.2 °F (36.2 °C)-98 °F (36.7 °C)] 97.2 °F (36.2 °C)  HR:  [80-91] 86  BP: (112-149)/(58-66) 149/66  Resp:  [17-18] 17  SpO2:  [94 %-98 %] 98 %  O2 Device: None (Room air)    Mental Status Examination:  Appearance:  age appropriate, casually dressed, looks stated age   Behavior:  bizarre, psychomotor agitation, inappropriate   Speech:  pressured, hypertalkative   Mood:  euphoric   Affect:  brighter, increased in intensity, increased in range   Thought Process:  disorganized, tangential, increased rate of thoughts   Associations: tangential associations   Thought Content:  grandiose delusions   Perceptual Disturbances: no auditory hallucinations, no visual hallucinations, does not appear responding to internal stimuli   Risk Potential: Suicidal ideation - None at present  Homicidal ideation - None at present  Potential for aggression - Yes, due to poor impulse control   Sensorium:  oriented to person, place, and time/date   Memory:   recent and remote memory grossly intact   Consciousness:  alert and awake   Attention/Concentration: attention span and concentration appear shorter than expected for age   Insight:  poor   Judgment: poor   Gait/Station: normal gait/station   Motor Activity: no abnormal movements           Lab Results: I have reviewed the following results:  Most Recent Labs:   Lab Results   Component Value Date    WBC 7.57 04/09/2025    RBC 3.56 (L) 04/09/2025    HGB 10.6 (L) 04/09/2025    HCT 33.2 (L) 04/09/2025     04/09/2025    RDW 13.1 04/09/2025    NEUTROABS 5.17 04/09/2025    SODIUM 139 04/09/2025    K 4.2 04/09/2025     04/09/2025    CO2 24 04/09/2025    BUN 24 04/09/2025    CREATININE 1.02 04/09/2025    GLUC 89 04/09/2025    CALCIUM 9.4 04/09/2025     (H) 04/09/2025     (H) 04/09/2025    ALKPHOS 96 04/09/2025    TP 5.9 (L) 04/09/2025    ALB 3.6 04/09/2025    TBILI 0.86 04/09/2025    CHOLESTEROL 144 04/09/2025    HDL 64 04/09/2025    TRIG 60 04/09/2025    LDLCALC 68 04/09/2025    NONHDLC 80 04/09/2025    LITHIUM 1.03 01/08/2025    [id number] 15.420 (H) 04/09/2025    FREET4 0.97 04/09/2025    T3FREE 3.24 07/11/2023    HGBA1C 6.1 (H) 04/09/2025     04/09/2025       Administrative Statements     Counseling / Coordination of Care:   Patient's progress discussed with staff in treatment team meeting.  Medication changes reviewed with staff in treatment team meeting.  Medications, treatment progress and treatment plan reviewed with patient.  Reoriented to reality and reassured.  Encouraged participation in milieu and group therapy on the unit..    Rachel Myrick DO 04/10/25

## 2025-04-10 NOTE — PROGRESS NOTES
Pastoral Care Progress Note          Chaplaincy Interventions Utilized:   Empowerment: Provided chaplaincy education and Reframed experience of patient/family    Exploration: Explored spiritual needs & resources    Relationship Building: Cultivated a relationship of care and support and Hospitality    Ritual: Provided Restorationist resources    The pt requested that she be brought a bible.  provided her with a bible and checked in with how she was doing today. The pt expressed that she was well, and she was grateful for her bible.

## 2025-04-10 NOTE — NURSING NOTE
"Patient was visible and social with peers. Denies all psych s/s. No behaviors noted but remained hyperverbal and manic with euphoria. Continuously saying and suggesting \"tom, tom, tom\". No c/o pain. No needs expressed. Safety checks ongoing.      "

## 2025-04-10 NOTE — NURSING NOTE
Patient was visible and social with select peers in the milieu. Denies all psych s/s. No behaviors noted but remained hyperverbal and manic with euphoric. No c/o pain. No needs expressed. Took her HS medications. Safety checks ongoing.

## 2025-04-10 NOTE — ASSESSMENT & PLAN NOTE
Discontinued Seroquel and started Zyprexa 5 mg due to current symptoms of carlie.   Unable to initiate lithium as patient reports worsening kidney function while taking lithium, liver enzymes currently increased so will avoid Depakote at this time

## 2025-04-10 NOTE — TREATMENT TEAM
"Pt attended group.  Pt needing redirection.  Tangential, pressured speech and difficult to focus.  Pt multiple times talking about doing the \"tom tom tom\" and stands up and dances.     04/10/25 1330   Activity/Group Checklist   Group Other (Comment)  (Positive reflection and reminicing)   Attendance Attended   Attendance Duration (min) 46-60   Interactions Disorganized interaction   Affect/Mood Wide   Goals Achieved Identified triggers;Identified feelings;Discussed coping strategies;Able to listen to others;Able to engage in interactions          "

## 2025-04-11 ENCOUNTER — APPOINTMENT (INPATIENT)
Dept: NON INVASIVE DIAGNOSTICS | Facility: HOSPITAL | Age: 70
DRG: 885 | End: 2025-04-11
Payer: COMMERCIAL

## 2025-04-11 LAB
BASOPHILS # BLD AUTO: 0.07 THOUSANDS/ÂΜL (ref 0–0.1)
BASOPHILS NFR BLD AUTO: 1 % (ref 0–1)
EOSINOPHIL # BLD AUTO: 0.34 THOUSAND/ÂΜL (ref 0–0.61)
EOSINOPHIL NFR BLD AUTO: 3 % (ref 0–6)
ERYTHROCYTE [DISTWIDTH] IN BLOOD BY AUTOMATED COUNT: 13.3 % (ref 11.6–15.1)
GLUCOSE SERPL-MCNC: 131 MG/DL (ref 65–140)
GLUCOSE SERPL-MCNC: 136 MG/DL (ref 65–140)
GLUCOSE SERPL-MCNC: 150 MG/DL (ref 65–140)
GLUCOSE SERPL-MCNC: 165 MG/DL (ref 65–140)
HCT VFR BLD AUTO: 36.7 % (ref 34.8–46.1)
HGB BLD-MCNC: 11.8 G/DL (ref 11.5–15.4)
IMM GRANULOCYTES # BLD AUTO: 0.07 THOUSAND/UL (ref 0–0.2)
IMM GRANULOCYTES NFR BLD AUTO: 1 % (ref 0–2)
LACTATE SERPL-SCNC: 1.6 MMOL/L (ref 0.5–2)
LYMPHOCYTES # BLD AUTO: 2.56 THOUSANDS/ÂΜL (ref 0.6–4.47)
LYMPHOCYTES NFR BLD AUTO: 22 % (ref 14–44)
MCH RBC QN AUTO: 30.3 PG (ref 26.8–34.3)
MCHC RBC AUTO-ENTMCNC: 32.2 G/DL (ref 31.4–37.4)
MCV RBC AUTO: 94 FL (ref 82–98)
MONOCYTES # BLD AUTO: 1.08 THOUSAND/ÂΜL (ref 0.17–1.22)
MONOCYTES NFR BLD AUTO: 9 % (ref 4–12)
NEUTROPHILS # BLD AUTO: 7.55 THOUSANDS/ÂΜL (ref 1.85–7.62)
NEUTS SEG NFR BLD AUTO: 64 % (ref 43–75)
NRBC BLD AUTO-RTO: 0 /100 WBCS
PLATELET # BLD AUTO: 279 THOUSANDS/UL (ref 149–390)
PMV BLD AUTO: 10 FL (ref 8.9–12.7)
RBC # BLD AUTO: 3.9 MILLION/UL (ref 3.81–5.12)
WBC # BLD AUTO: 11.67 THOUSAND/UL (ref 4.31–10.16)

## 2025-04-11 PROCEDURE — 99232 SBSQ HOSP IP/OBS MODERATE 35: CPT | Performed by: PSYCHIATRY & NEUROLOGY

## 2025-04-11 PROCEDURE — 85025 COMPLETE CBC W/AUTO DIFF WBC: CPT

## 2025-04-11 PROCEDURE — 82948 REAGENT STRIP/BLOOD GLUCOSE: CPT

## 2025-04-11 PROCEDURE — 93971 EXTREMITY STUDY: CPT

## 2025-04-11 PROCEDURE — 93971 EXTREMITY STUDY: CPT | Performed by: SURGERY

## 2025-04-11 PROCEDURE — 83605 ASSAY OF LACTIC ACID: CPT

## 2025-04-11 RX ORDER — OLANZAPINE 5 MG/1
5 TABLET, FILM COATED ORAL DAILY
Status: DISCONTINUED | OUTPATIENT
Start: 2025-04-12 | End: 2025-04-13

## 2025-04-11 RX ORDER — CEPHALEXIN 500 MG/1
500 CAPSULE ORAL EVERY 6 HOURS SCHEDULED
Status: DISCONTINUED | OUTPATIENT
Start: 2025-04-11 | End: 2025-04-16

## 2025-04-11 RX ORDER — CEFAZOLIN SODIUM 2 G/50ML
2000 SOLUTION INTRAVENOUS EVERY 8 HOURS
Status: DISCONTINUED | OUTPATIENT
Start: 2025-04-11 | End: 2025-04-11

## 2025-04-11 RX ADMIN — OLANZAPINE 5 MG: 5 TABLET, FILM COATED ORAL at 08:24

## 2025-04-11 RX ADMIN — INSULIN LISPRO 1 UNITS: 100 INJECTION, SOLUTION INTRAVENOUS; SUBCUTANEOUS at 12:35

## 2025-04-11 RX ADMIN — CEPHALEXIN 500 MG: 500 CAPSULE ORAL at 17:08

## 2025-04-11 RX ADMIN — CEPHALEXIN 500 MG: 500 CAPSULE ORAL at 05:53

## 2025-04-11 RX ADMIN — MUPIROCIN: 20 OINTMENT TOPICAL at 09:45

## 2025-04-11 RX ADMIN — LEVOTHYROXINE SODIUM 88 MCG: 88 TABLET ORAL at 05:53

## 2025-04-11 RX ADMIN — PRAVASTATIN SODIUM 40 MG: 40 TABLET ORAL at 08:24

## 2025-04-11 RX ADMIN — NYSTATIN: 100000 POWDER TOPICAL at 09:45

## 2025-04-11 RX ADMIN — LISINOPRIL 5 MG: 5 TABLET ORAL at 08:24

## 2025-04-11 RX ADMIN — CEPHALEXIN 500 MG: 500 CAPSULE ORAL at 12:35

## 2025-04-11 RX ADMIN — TRAZODONE HYDROCHLORIDE 50 MG: 50 TABLET ORAL at 21:25

## 2025-04-11 RX ADMIN — NYSTATIN: 100000 POWDER TOPICAL at 17:13

## 2025-04-11 RX ADMIN — INSULIN LISPRO 1 UNITS: 100 INJECTION, SOLUTION INTRAVENOUS; SUBCUTANEOUS at 17:07

## 2025-04-11 RX ADMIN — OLANZAPINE 5 MG: 5 TABLET, FILM COATED ORAL at 21:26

## 2025-04-11 RX ADMIN — APIXABAN 10 MG: 5 TABLET, FILM COATED ORAL at 12:44

## 2025-04-11 RX ADMIN — APIXABAN 10 MG: 5 TABLET, FILM COATED ORAL at 18:08

## 2025-04-11 NOTE — NURSING NOTE
"Patient visible in milieu and social with peers. Displays pressured, rambling, repetitive speech with euphoric affect. Sexually preoccupied. Continuously referring to \"tom-tom-tom\" as a sexual innuendo. Denies all psychiatric s/s. Left hand edema and redness noted. Provider aware. Denies pain at this time. Remains compliant with all offered medications. Denies any additional needs at this time.   "

## 2025-04-11 NOTE — NURSING NOTE
Patient was visible and social with peers. Denies all psych s/s. No behaviors noted, remains hyper-verbal and preoccupied. No c/o pain. Treatments completed, Safety checks ongoing.

## 2025-04-11 NOTE — PLAN OF CARE
Problem: PAIN - ADULT  Goal: Verbalizes/displays adequate comfort level or baseline comfort level  Description: Interventions:- Encourage patient to monitor pain and request assistance- Assess pain using appropriate pain scale- Administer analgesics based on type and severity of pain and evaluate response- Implement non-pharmacological measures as appropriate and evaluate response- Consider cultural and social influences on pain and pain management- Notify physician/advanced practitioner if interventions unsuccessful or patient reports new pain  Outcome: Progressing     Problem: INFECTION - ADULT  Goal: Absence or prevention of progression during hospitalization  Description: INTERVENTIONS:- Assess and monitor for signs and symptoms of infection- Monitor lab/diagnostic results- Monitor all insertion sites, i.e. indwelling lines, tubes, and drains- Monitor endotracheal if appropriate and nasal secretions for changes in amount and color- [place] appropriate cooling/warming therapies per order- Administer medications as ordered- Instruct and encourage patient and family to use good hand hygiene technique- Identify and instruct in appropriate isolation precautions for identified infection/condition  Outcome: Progressing  Goal: Absence of fever/infection during neutropenic period  Description: INTERVENTIONS:- Monitor WBC  Outcome: Progressing     Problem: SAFETY ADULT  Goal: Patient will remain free of falls  Description: INTERVENTIONS:- Educate patient/family on patient safety including physical limitations  Outcome: Progressing  Goal: Maintain or return to baseline ADL function  Description: INTERVENTIONS:-  Assess patient's ability to carry out ADLs; assess patient's baseline for ADL function and identify physical deficits which impact ability to perform ADLs (bathing, care of mouth/teeth, toileting, grooming, dressing, etc.)- Assess/evaluate cause of self-care deficits - Assess range of motion- Assess patient's  mobility; develop plan if impaired- Assess patient's need for assistive devices and provide as appropriate- Encourage maximum independence but intervene and supervise when necessary- Involve family in performance of ADLs- Assess for home care needs following discharge - Consider OT consult to assist with ADL evaluation and planning for discharge- Provide patient education as appropriate  Outcome: Progressing  Goal: Maintains/Returns to pre admission functional level  Outcome: Progressing     Problem: DISCHARGE PLANNING  Goal: Discharge to home or other facility with appropriate resources  Description: INTERVENTIONS:- Identify barriers to discharge w/patient and caregiver- Arrange for needed discharge resources and transportation as appropriate- Identify discharge learning needs (meds, wound care, etc.)- Arrange for interpretive services to assist at discharge as needed- Refer to Case Management Department for coordinating discharge planning if the patient needs post-hospital services based on physician/advanced practitioner order or complex needs related to functional status, cognitive ability, or social support system  Outcome: Progressing     Problem: Knowledge Deficit  Goal: Patient/family/caregiver demonstrates understanding of disease process, treatment plan, medications, and discharge instructions  Description: Complete learning assessment and assess knowledge base.Interventions:- Provide teaching at level of understanding- Provide teaching via preferred learning methods  Outcome: Progressing     Problem: Alteration in Thoughts and Perception  Goal: Treatment Goal: Gain control of psychotic behaviors/thinking, reduce/eliminate presenting symptoms and demonstrate improved reality functioning upon discharge  Outcome: Progressing  Goal: Verbalize thoughts and feelings  Description: Interventions:- Promote a nonjudgmental and trusting relationship with the patient through active listening and therapeutic  communication- Assess patient's level of functioning, behavior and potential for risk- Engage patient in 1 on 1 interactions- Encourage patient to express fears, feelings, frustrations, and discuss symptoms  - [place] patient to reality, help patient recognize reality-based thinking - Administer medications as ordered and assess for potential side effects- Provide the patient education related to the signs and symptoms of the illness and desired effects of prescribed medications  Outcome: Progressing  Goal: Refrain from acting on delusional thinking/internal stimuli  Description: Interventions:- Monitor patient closely, per order - Utilize least restrictive measures - Set reasonable limits, give positive feedback for acceptable - Administer medications as ordered and monitor of potential side effects  Outcome: Progressing  Goal: Agree to be compliant with medication regime, as prescribed and report medication side effects  Description: Interventions:- Offer appropriate PRN medication and supervise ingestion; conduct AIMS, as needed   Outcome: Progressing  Goal: Attend and participate in unit activities, including therapeutic, recreational, and educational groups  Description: Interventions:-Encourage Visitation and family involvement in care  Outcome: Progressing  Goal: Recognize dysfunctional thoughts, communicate reality-based thoughts at the time of discharge  Description: Interventions:- Provide medication and psycho-education to assist patient in compliance and developing insight into his/her illness   Outcome: Progressing  Goal: Complete daily ADLs, including personal hygiene independently, as able  Description: Interventions:- Observe, teach, and assist patient with ADLS- Monitor and promote a balance of rest/activity, with adequate nutrition and elimination   Outcome: Progressing     Problem: Ineffective Coping  Goal: Cooperates with admission process  Description: Interventions: - Complete admission  process  Outcome: Progressing  Goal: Identifies ineffective coping skills  Outcome: Progressing  Goal: Identifies healthy coping skills  Outcome: Progressing  Goal: Demonstrates healthy coping skills  Outcome: Progressing  Goal: Participates in unit activities  Description: Interventions:- Provide therapeutic environment - Provide required programming - Redirect inappropriate behaviors   Outcome: Progressing  Goal: Patient/Family participate in treatment and DC plans  Description: Interventions:- Provide therapeutic environment  Outcome: Progressing  Goal: Patient/Family verbalizes awareness of resources  Outcome: Progressing  Goal: Understands least restrictive measures  Description: Interventions:- Utilize least restrictive behavior  Outcome: Progressing  Goal: Free from restraint events  Description: - Utilize least restrictive measures - Provide behavioral interventions - Redirect inappropriate behaviors   Outcome: Progressing     Problem: Risk for Self Injury/Neglect  Goal: Treatment Goal: Remain safe during length of stay, learn and adopt new coping skills, and be free of self-injurious ideation, impulses and acts at the time of discharge  Outcome: Progressing  Goal: Verbalize thoughts and feelings  Description: Interventions:- Assess and re-assess patient's lethality and potential for self-injury- Engage patient in 1:1 interactions, daily, for a minimum of 15 minutes- Encourage patient to express feelings, fears, frustrations, hopes- Establish rapport/trust with patient   Outcome: Progressing  Goal: Refrain from harming self  Description: Interventions:- Monitor patient closely, per order- Develop a trusting relationship- Supervise medication ingestion, monitor effects and side effects   Outcome: Progressing  Goal: Attend and participate in unit activities, including therapeutic, recreational, and educational groups  Description: Interventions:- Provide therapeutic and educational activities daily, encourage  attendance and participation, and document same in the medical record- Obtain collateral information, encourage visitation and family involvement in care   Outcome: Progressing  Goal: Recognize maladaptive responses and adopt new coping mechanisms  Outcome: Progressing  Goal: Complete daily ADLs, including personal hygiene independently, as able  Description: Interventions:- Observe, teach, and assist patient with ADLS- Monitor and promote a balance of rest/activity, with adequate nutrition and elimination  Outcome: Progressing     Problem: Depression  Goal: Treatment Goal: Demonstrate behavioral control of depressive symptoms, verbalize feelings of improved mood/affect, and adopt new coping skills prior to discharge  Outcome: Progressing  Goal: Verbalize thoughts and feelings  Description: Interventions:- Assess and re-assess patient's level of risk - Engage patient in 1:1 interactions, daily, for a minimum of 15 minutes - Encourage patient to express feelings, fears, frustrations, hopes   Outcome: Progressing  Goal: Refrain from harming self  Description: Interventions:- Monitor patient closely, per order - Supervise medication ingestion, monitor effects and side effects   Outcome: Progressing  Goal: Refrain from isolation  Description: Interventions:- Develop a trusting relationship - Encourage socialization   Outcome: Progressing  Goal: Refrain from self-neglect  Outcome: Progressing  Goal: Attend and participate in unit activities, including therapeutic, recreational, and educational groups  Description: Interventions:- Provide therapeutic and educational activities daily, encourage attendance and participation, and document same in the medical record   Outcome: Progressing  Goal: Complete daily ADLs, including personal hygiene independently, as able  Description: Interventions:- Observe, teach, and assist patient with ADLS-  Monitor and promote a balance of rest/activity, with adequate nutrition and  elimination   Outcome: Progressing     Problem: Anxiety  Goal: Anxiety is at manageable level  Description: Interventions:- Assess and monitor patient's anxiety level. - Monitor for signs and symptoms (heart palpitations, chest pain, shortness of breath, headaches, nausea, feeling jumpy, restlessness, irritable, apprehensive). - Collaborate with interdisciplinary team and initiate plan and interventions as ordered.- [place] patient to unit/surroundings- Explain treatment plan- Encourage participation in care- Encourage verbalization of concerns/fears- Identify coping mechanisms- Assist in developing anxiety-reducing skills- Administer/offer alternative therapies- Limit or eliminate stimulants  Outcome: Progressing     Problem: Risk for Violence/Aggression Toward Others  Goal: Treatment Goal: Refrain from acts of violence/aggression during length of stay, and demonstrate improved impulse control at the time of discharge  Outcome: Progressing  Goal: Verbalize thoughts and feelings  Description: Interventions:- Assess and re-assess patient's level of risk, every waking shift- Engage patient in 1:1 interactions, daily, for a minimum of 15 minutes - Allow patient to express feelings and frustrations in a safe and non-threatening manner - Establish rapport/trust with patient   Outcome: Progressing  Goal: Refrain from harming others  Outcome: Progressing  Goal: Refrain from destructive acts on the environment or property  Outcome: Progressing  Goal: Control angry outbursts  Description: Interventions:- Monitor patient closely, per order- Ensure early verbal de-escalation- Monitor prn medication needs- Set reasonable/therapeutic limits, outline behavioral expectations, and consequences - Provide a non-threatening milieu, utilizing the least restrictive interventions   Outcome: Progressing  Goal: Attend and participate in unit activities, including therapeutic, recreational, and educational groups  Description:  Interventions:- Provide therapeutic and educational activities daily, encourage attendance and participation, and document same in the medical record   Outcome: Progressing  Goal: Identify appropriate positive anger management techniques  Description: Interventions:- Offer anger management and coping skills groups - Staff will provide positive feedback for appropriate anger control  Outcome: Progressing     Problem: Alteration in Orientation  Goal: Treatment Goal: Demonstrate a reduction of confusion and improved orientation to person, place, time and/or situation upon discharge, according to optimum baseline/ability  Outcome: Progressing  Goal: Interact with staff daily  Description: Interventions:- Assess and re-assess patient's level of orientation- Engage patient in 1 on 1 interactions, daily, for a minimum of 15 minutes - Establish rapport/trust with patient   Outcome: Progressing  Goal: Express concerns related to confused thinking related to:  Description: Interventions:- Encourage patient to express feelings, fears, frustrations, hopes- Assign consistent caregivers - [place]/re-orient patient as needed- Allow comfort items, as appropriate- Provide visual cues, signs, etc.   Outcome: Progressing  Goal: Allow medical examinations, as recommended  Description: Interventions:- Provide physical/neurological exams and/or referrals, per provider   Outcome: Progressing  Goal: Cooperate with recommended testing/procedures  Description: Interventions:- Determine need for ancillary testing- Observe for mental status changes- Implement falls/precaution protocol   Outcome: Progressing  Goal: Attend and participate in unit activities, including therapeutic, recreational, and educational groups  Description: Interventions:- Provide therapeutic and educational activities daily, encourage attendance and participation, and document same in the medical record - Provide appropriate opportunities for reminiscence - Provide a  consistent daily routine - Encourage family contact/visitation   Outcome: Progressing  Goal: Complete daily ADLs, including personal hygiene independently, as able  Description: Interventions:- Observe, teach, and assist patient with ADLS  Outcome: Progressing     Problem: Individualized Interventions  Goal: Patient will verbalize appropriate use of telephone within 5 days  Description: Interventions:- Treatment team to determine use of supervised phone privileges   Outcome: Progressing  Goal: Patient will verbalize need for hospitalization and will no longer attempt elopement within 5 days  Description: Interventions:- Ongoing education to help patient understand need for hospitalization  Outcome: Progressing  Goal: Patient will recognize inappropriate behaviors and develop alternative behaviors within 5 days  Description: Interventions:- Patient in collaboration with Treatment Team will develop a behavior management plan to help identify effective coping skills to deal with stressors  Outcome: Progressing

## 2025-04-11 NOTE — PROGRESS NOTES
Pt attended music group. Pt left group angry due to a peer getting agitated by pt bumping table while dancing. Pt returned towards end of group. Pt loud, intrusive, sexually preoccupied, getting very close to this writer and grazing inappropriately. Pt difficult to redirect. Will follow up later with pt to discuss personal boundaries.    04/11/25 1330   Activity/Group Checklist   Group Music   Attendance Attended   Attendance Duration (min) 16-30   Interactions Disorganized interaction   Affect/Mood Wide  (See note)   Goals Achieved Able to listen to others;Able to engage in interactions

## 2025-04-11 NOTE — PROGRESS NOTES
Progress Note - Behavioral Health   Name: Lisa Hernandezy 69 y.o. female I MRN: [id number]  Unit/Bed#: [facility] 609-01 I Date of Admission: 4/8/2025   Date of Service: 4/11/2025 I Hospital Day: 3    Assessment & Plan  Bipolar affective disorder, current episode manic (HCC)  Increase Zyprexa to 5 mg qAM and 7.5 mg qHS daily due to current symptoms of carlie starting 4/12/25  Unable to initiate lithium as patient reports worsening kidney function while taking lithium, liver enzymes currently increased so will avoid Depakote at this time    Current medications:  Current Facility-Administered Medications   Medication Dose Route Frequency Provider Last Rate    acetaminophen  650 mg Oral Q4H PRN STEVE Dawson      acetaminophen  650 mg Oral Q4H PRN STEVE Dawson      acetaminophen  975 mg Oral Q6H PRN STEVE Dawson      aluminum-magnesium hydroxide-simethicone  30 mL Oral Q4H PRN STEVE Dawson      apixaban  10 mg Oral BID STEVE Murcia      [START ON 4/18/2025] apixaban  5 mg Oral BID STEVE Murcia      benztropine  1 mg Intramuscular Q4H PRN Max 6/day STEVE Dawson      benztropine  0.5 mg Oral Q4H PRN Max 6/day STEVE Dawson      bisacodyl  10 mg Rectal Daily PRN STEVE Dawson      cephalexin  500 mg Oral Q6H DANYEL Lawson DO      hydrOXYzine HCL  25 mg Oral Q6H PRN Max 4/day STEVE Dawson      hydrOXYzine HCL  50 mg Oral Q6H PRN Max 4/day STEVE Dawson      insulin lispro  1-6 Units Subcutaneous TID AC Lance Zayas MD      insulin lispro  1-6 Units Subcutaneous HS Lance Zayas MD      levothyroxine  88 mcg Oral Early Morning Lance Zayas MD      lisinopril  5 mg Oral Daily Lance Zayas MD      LORazepam  1 mg Intramuscular Q6H PRN Max 3/day STEVE Dawson      LORazepam  1 mg Oral Q6H PRN Max 3/day STEVE Dawson      mupirocin   Topical Daily Saad Butts, PENG      nystatin   Topical  BID Lance Zayas MD      OLANZapine  5 mg Intramuscular Q3H PRN Max 3/day STEVE Dawson      OLANZapine  2.5 mg Oral Q4H PRN Max 6/day STEVE Dawson      OLANZapine  5 mg Oral Q4H PRN Max 3/day STEVE Dawson      OLANZapine  5 mg Oral Q3H PRN Max 3/day STEVE Dawson      OLANZapine  5 mg Oral BID Rachel Ramez, DO      [START ON 4/12/2025] OLANZapine  5 mg Oral Daily Rachel Ramez, DO      [START ON 4/12/2025] OLANZapine  7.5 mg Oral HS Rachel Myrick, DO      polyethylene glycol  17 g Oral Daily PRN STEVE Dawson      pravastatin  40 mg Oral Daily Lance Zayas MD      propranolol  5 mg Oral Q8H PRN STEVE Dawson      senna-docusate sodium  1 tablet Oral Daily PRN STEVE Dawson      traZODone  50 mg Oral HS Lance Zayas MD      traZODone  50 mg Oral HS PRN STEVE Dawson          Risks/Benefits of Treatment:     Risks, benefits, and possible side effects of medications explained to patient. Patient has limited understanding of risks and benefits of treatment at this time, but agrees to take medications as prescribed.    Progress Toward Goals: Minimal improvement since starting medication, plan to increase Zyprexa starting tomorrow  Treatment Planning:      - Encourage early mobility and having a structured day  - Provide frequent re-orientation, and cognitive stimulation  - Ensure assistive devices are in proper working order (eye-glasses, hearing aids)  - Encourage adequate hydration, nutrition and monitor bowel movements  - Maintain sleep-wake cycle: Uninterrupted sleep time; low-level lighting at night  - Fall precaution  - f/u [facility]IM recs regarding the medical problems   - Continue medication titration and treatment plan; adjust medication to optimize treatment response and as clinically indicated.   - Observation:Routine  - Legal Status: 201  - VS: as per unit protocol  - Encourage group attendance and milieu therapy  - Dispo: To be  determined  - Long Stay Certification : Not Applicable  - Estimated Discharge Day:  14 days (4/25/2025)    Subjective       Patient was visited on unit for continuing care; chart reviewed and discussed with multidisciplinary treatment team.  Patient hyperactive, pressured speech, thought process is tangential, disorganized and illogical.  Patient intrusive, poor boundaries, inappropriate and requires frequent redirection.  She is loud, sexually preoccupied and has been demonstrating irritability towards certain peers.  Denied A/VH currently.  Denied SI/HI, intent or plan upon direct inquiry at this time.    Patient continues to be selectively interactive with staff and peers. No reports of aggression or self-injurious behavior on unit.     Patient accepted all offered medications and no adverse effects of medications noted or reported.    Sleep: insomnia  Appetite: normal  Medication side effects: No  ROS: review of systems as noted above in HPI/Subjective report, all other systems are negative    Objective :  Temp:  [97.2 °F (36.2 °C)-97.4 °F (36.3 °C)] 97.4 °F (36.3 °C)  HR:  [90] 90  BP: (135-138)/(60-79) 138/79  Resp:  [15-17] 17  SpO2:  [95 %] 95 %  O2 Device: None (Room air)    Temp:  [97.2 °F (36.2 °C)-97.4 °F (36.3 °C)] 97.4 °F (36.3 °C)  HR:  [90] 90  BP: (135-138)/(60-79) 138/79  Resp:  [15-17] 17  SpO2:  [95 %] 95 %  O2 Device: None (Room air)    Mental Status Examination:  Appearance:  age appropriate, casually dressed, dressed appropriately   Behavior:  bizarre, uncooperative, restless, inappropriate, requires redirection   Speech:  pressured, hypertalkative   Mood:  labile   Affect:  increased in intensity   Thought Process:  disorganized, illogical, tangential, impaired abstract reasoning   Associations: tangential associations   Thought Content:  bizarre delusions, preoccupied, grandiose ideas   Perceptual Disturbances: no auditory hallucinations, no visual hallucinations, does not appear responding  to internal stimuli   Risk Potential: Suicidal ideation - None at present  Homicidal ideation - None at present  Potential for aggression - Yes, due to poor impulse control   Sensorium:  oriented to person, place, and time/date   Memory:  recent and remote memory grossly intact   Consciousness:  alert and awake   Attention/Concentration: poor concentration and poor attention span   Insight:  poor   Judgment: poor   Gait/Station: normal gait/station   Motor Activity: no abnormal movements          Lab Results: I have reviewed the following results:  Most Recent Labs:   Lab Results   Component Value Date    WBC 11.67 (H) 04/11/2025    RBC 3.90 04/11/2025    HGB 11.8 04/11/2025    HCT 36.7 04/11/2025     04/11/2025    RDW 13.3 04/11/2025    NEUTROABS 7.55 04/11/2025    SODIUM 136 04/10/2025    K 4.8 04/10/2025     04/10/2025    CO2 23 04/10/2025    BUN 29 (H) 04/10/2025    CREATININE 1.02 04/10/2025    GLUC 155 (H) 04/10/2025    CALCIUM 10.1 04/10/2025     (H) 04/09/2025     (H) 04/09/2025    ALKPHOS 96 04/09/2025    TP 5.9 (L) 04/09/2025    ALB 3.6 04/09/2025    TBILI 0.86 04/09/2025    CHOLESTEROL 144 04/09/2025    HDL 64 04/09/2025    TRIG 60 04/09/2025    LDLCALC 68 04/09/2025    NONHDLC 80 04/09/2025    LITHIUM 1.03 01/08/2025    OQD2YSXXHKJO 15.420 (H) 04/09/2025    FREET4 0.97 04/09/2025    T3FREE 3.24 07/11/2023    HGBA1C 6.1 (H) 04/09/2025     04/09/2025       Administrative Statements     Counseling / Coordination of Care:   Patient's progress discussed with staff in treatment team meeting.  Medication changes reviewed with staff in treatment team meeting.  Medications, treatment progress and treatment plan reviewed with patient.  Supportive therapy provided to patient.  Encouraged participation in milieu and group therapy on the unit..    Rachel Myrick DO 04/11/25

## 2025-04-11 NOTE — QUICK NOTE
Notified by radiology at 12:16 PM that patient had a left acute occlusive thrombus of a small segment of her left cephalic distal upper arm.  Patient started on Eliquis 10 mg p.o. twice daily x 7 days then 5 mg p.o. twice daily.  Patient will need to follow-up with her PCP.  I have contacted case management at this time to find out which drug will be covered by her insurance in preparation for discharge.  Will continue to monitor closely.

## 2025-04-11 NOTE — TREATMENT TEAM
Pt attended group.  Pt restless, tangential and in and out.  Pt needing redirection and limit set.  Pt expressed concern about her arm./medical needs and trying to show her wounds.  Pt did state she did not want to come to group but she was glad she made herself and attended.       04/11/25 1100   Activity/Group Checklist   Group Other (Comment)  ( Relapse and recovery tips)   Attendance Attended  (in and out)   Attendance Duration (min) 46-60   Interactions Disorganized interaction   Affect/Mood Wide   Goals Achieved Identified feelings;Identified triggers;Identified relapse prevention strategies;Discussed coping strategies;Discussed self-esteem issues;Able to engage in interactions;Able to listen to others

## 2025-04-11 NOTE — PROGRESS NOTES
04/11/25 0700   Team Meeting   Meeting Type Daily Rounds   Team Members Present   Team Members Present Physician;Nurse;   Physician Team Member Tiny/Dilma/Ramez   Nursing Team Member Rafaela/Carmen/Linda   Care Management Team Member Stanford   OT Team Member Alix   Patient/Family Present   Patient Present No   Patient's Family Present No     Patient rambles, is sexually preoccupied, and manic at times. Her hand is swollen and red. Cellulitis is expected and they were placed on keflex. Doppler ordered to rule out DBT. She was unable to sleep last night. Patient discharge is pending stabilization of mood and medications.

## 2025-04-11 NOTE — QUICK NOTE
Notified by the nurse that the patient has swelling and redness of the left hand.  Per the nurse edema and redness are limited to the hand.  There was no IV in the left hand but they were trying to draw blood from that site.  CBC with elevated leukocytic count.  BMP is unremarkable.  Lactic acid is normal.  Will order vas Doppler upper extremity to rule out DVT.  Due to high suspicious for cellulitis will start cefazolin IV antibiotics.  Addendum  No IV antibiotics can be given on the units will switch to Keflex oral

## 2025-04-11 NOTE — PROGRESS NOTES
Pastoral Care Progress Note          Chaplaincy Interventions Utilized:   Empowerment: Encouraged focus on present, Encouraged self-care, Facilitated group experience, and Normalized experience of patient/family    Exploration: Explored hope, Explored emotional needs & resources, and Explored spiritual needs & resources    Relationship Building: Cultivated a relationship of care and support, Listened empathically, and Hospitality    The pt participated in spirituality group facilitated by the  today. Pt arrived about residential through group, but was a respectful listener as others shared.

## 2025-04-11 NOTE — ASSESSMENT & PLAN NOTE
Increase Zyprexa to 5 mg qAM and 7.5 mg qHS daily due to current symptoms of carlie starting 4/12/25  Unable to initiate lithium as patient reports worsening kidney function while taking lithium, liver enzymes currently increased so will avoid Depakote at this time

## 2025-04-12 LAB
GLUCOSE SERPL-MCNC: 138 MG/DL (ref 65–140)
GLUCOSE SERPL-MCNC: 152 MG/DL (ref 65–140)
GLUCOSE SERPL-MCNC: 173 MG/DL (ref 65–140)
GLUCOSE SERPL-MCNC: 250 MG/DL (ref 65–140)

## 2025-04-12 PROCEDURE — 82948 REAGENT STRIP/BLOOD GLUCOSE: CPT

## 2025-04-12 PROCEDURE — 99232 SBSQ HOSP IP/OBS MODERATE 35: CPT | Performed by: PSYCHIATRY & NEUROLOGY

## 2025-04-12 RX ADMIN — LISINOPRIL 5 MG: 5 TABLET ORAL at 08:36

## 2025-04-12 RX ADMIN — CEPHALEXIN 500 MG: 500 CAPSULE ORAL at 17:16

## 2025-04-12 RX ADMIN — APIXABAN 10 MG: 5 TABLET, FILM COATED ORAL at 17:16

## 2025-04-12 RX ADMIN — OLANZAPINE 5 MG: 5 TABLET, FILM COATED ORAL at 08:37

## 2025-04-12 RX ADMIN — INSULIN LISPRO 1 UNITS: 100 INJECTION, SOLUTION INTRAVENOUS; SUBCUTANEOUS at 21:17

## 2025-04-12 RX ADMIN — TRAZODONE HYDROCHLORIDE 50 MG: 50 TABLET ORAL at 21:14

## 2025-04-12 RX ADMIN — CEPHALEXIN 500 MG: 500 CAPSULE ORAL at 00:19

## 2025-04-12 RX ADMIN — INSULIN LISPRO 1 UNITS: 100 INJECTION, SOLUTION INTRAVENOUS; SUBCUTANEOUS at 12:29

## 2025-04-12 RX ADMIN — NYSTATIN 1 APPLICATION: 100000 POWDER TOPICAL at 08:38

## 2025-04-12 RX ADMIN — APIXABAN 10 MG: 5 TABLET, FILM COATED ORAL at 08:37

## 2025-04-12 RX ADMIN — CEPHALEXIN 500 MG: 500 CAPSULE ORAL at 06:40

## 2025-04-12 RX ADMIN — PRAVASTATIN SODIUM 40 MG: 40 TABLET ORAL at 08:37

## 2025-04-12 RX ADMIN — INSULIN LISPRO 3 UNITS: 100 INJECTION, SOLUTION INTRAVENOUS; SUBCUTANEOUS at 16:53

## 2025-04-12 RX ADMIN — MUPIROCIN 1 APPLICATION: 20 OINTMENT TOPICAL at 08:38

## 2025-04-12 RX ADMIN — LEVOTHYROXINE SODIUM 88 MCG: 88 TABLET ORAL at 06:40

## 2025-04-12 RX ADMIN — OLANZAPINE 7.5 MG: 2.5 TABLET, FILM COATED ORAL at 21:14

## 2025-04-12 RX ADMIN — CEPHALEXIN 500 MG: 500 CAPSULE ORAL at 12:29

## 2025-04-12 RX ADMIN — NYSTATIN 1 APPLICATION: 100000 POWDER TOPICAL at 17:16

## 2025-04-12 NOTE — PROGRESS NOTES
Progress Note - Behavioral Health   Name: Lisa Bocanegraorry 69 y.o. female I MRN: [id number]   Unit/Bed#: [facility] 609-01 I Date of Admission: 4/8/2025   Date of Service: 4/12/2025 I Hospital Day: 4     Assessment & Plan  Bipolar affective disorder, current episode manic (HCC)  Continue Zyprexa to 5 mg qAM and 7.5 mg qHS daily due to current symptoms of carlie (increased on 4/12/25)  Unable to initiate lithium as patient reports worsening kidney function while taking lithium, liver enzymes currently increased so will avoid Depakote at this time     Risks/Benefits of Treatment:     Risks, benefits, and possible side effects of medications explained to patient. Patient has limited understanding of risks and benefits of treatment at this time, but agrees to take medications as prescribed.    Progress Toward Goals: no significant improvement    Treatment Planning:      - Encourage early mobility and having a structured day  - Provide frequent re-orientation, and cognitive stimulation  - Ensure assistive devices are in proper working order (eye-glasses, hearing aids)  - Encourage adequate hydration, nutrition and monitor bowel movements  - Maintain sleep-wake cycle: Uninterrupted sleep time; low-level lighting at night  - Fall precaution  - f/u [facility] recs regarding the medical problems   - Continue medication titration and treatment plan; adjust medication to optimize treatment response and as clinically indicated.   - Observation:Routine  - Legal Status: 201  - VS: as per unit protocol  - Encourage group attendance and milieu therapy  - Dispo: To be determined  - Long Stay Certification : Not Applicable  - Estimated Discharge Day: 13 days (4/25/2025)    Subjective:    Patient was seen today for continuation of care, records reviewed and patient was discussed with the morning case review team.  Started on Eliquis 10mg PO BID x7 days and then 5mg PO BID for left acute occlusive thrombus of a small segment of her left cephalic  "distal upper arm.    Lisa was seen today for psychiatric follow-up.  On assessment today, Lisa was seen in the hallway.  She is hyperverbal, tangential, sexually preoccupied, and difficult to follow and understand in conversation.  She is going around the hallways saying \"kehinde-kehinde\". She has been bothering other peers and is intrusive when this writer is trying to talk to other patients.  She needs redirection from making sexually inappropriate comments.  States she feels \"too good\" but unable to deescalate herself at times.  We reviewed once more the specific as-needed medications they can use going forward if they experience any insomnia or destabilization of their mood, they understood and were agreeable. Milieu visibility and group attendance encouraged to promote an active participation in treatment.    Lisa denies acute suicidal/self-harm ideation/intent/plan upon direct inquiry at this time. Lisa is able to contract for safety while on the unit and would feel comfortable seeking staff support should suicidal symptoms or urges appear or worsen. Lisa remains behaviorally appropriate, no agitation or aggression noted on exam or in report. Lisa also denies HI, and does not appear overtly manic.  Lisa remains adherent to her current psychotropic medication regimen and denies any side effects from medications, as well as none noted on exam.    Patient is not yet ready for discharge.  Patient's condition currently requires active psychopharmacological medication management, interdisciplinary coordination with case management, and the utilization of adjunctive milieu and group therapy to augment psychopharmacological efficacy.  The patients risk of morbidity, and progression or decompensation of psychiatric disease, is higher without this current treatment.  Patient cannot be safety treated at a lower level of care and requires further psychiatric evaluation, medication treatment, other treatment which " can be reasonably be provided only in a psychiatric hospital.  Discharge to an unsupervised setting will represent a significant deterioration in ability to function and present a severe risk to the patients physical and mental health.    Review of Systems:  Behavior over the last 24 hours: Unchanged  Sleep: disrupted sleep  Appetite: adequate  Medication side effects: none reported  ROS:no complaints, all other systems are negative    Objective:    Vitals:  Vitals:    04/12/25 0730   BP: 147/76   Pulse: 103   Resp: 17   Temp: 98.3 °F (36.8 °C)   SpO2: 96%     Laboratory Results:  I have personally reviewed all pertinent laboratory/tests results.  Most Recent Labs:   Lab Results   Component Value Date    WBC 11.67 (H) 04/11/2025    RBC 3.90 04/11/2025    HGB 11.8 04/11/2025    HCT 36.7 04/11/2025     04/11/2025    RDW 13.3 04/11/2025    NEUTROABS 7.55 04/11/2025    SODIUM 136 04/10/2025    K 4.8 04/10/2025     04/10/2025    CO2 23 04/10/2025    BUN 29 (H) 04/10/2025    CREATININE 1.02 04/10/2025    GLUC 155 (H) 04/10/2025    GLUF 89 04/09/2025    CALCIUM 10.1 04/10/2025     (H) 04/09/2025     (H) 04/09/2025    ALKPHOS 96 04/09/2025    TP 5.9 (L) 04/09/2025    ALB 3.6 04/09/2025    TBILI 0.86 04/09/2025    CHOLESTEROL 144 04/09/2025    HDL 64 04/09/2025    TRIG 60 04/09/2025    LDLCALC 68 04/09/2025    NONHDLC 80 04/09/2025    LITHIUM 1.03 01/08/2025    [id number] 15.420 (H) 04/09/2025    FREET4 0.97 04/09/2025    T3FREE 3.24 07/11/2023    HGBA1C 6.1 (H) 04/09/2025     04/09/2025     Mental Status Evaluation:  Appearance:  age appropriate, casually dressed   Behavior:  bizarre, uncooperative, not redirectable, restless and fidgety   Speech:  pressured, hypertalkative   Mood:  labile   Affect:  overbright, increased in intensity   Thought Process:  disorganized, illogical   Associations: tangential associations   Thought Content:  bizarre delusions, grandiose ideas, negative  thinking, ruminating thoughts   Perceptual Disturbances: no auditory hallucinations, no visual hallucinations, denies when asked, does not appear responding to internal stimuli   Risk Potential: Suicidal ideation - None at present, contracts for safety on the unit, would talk to staff if not feeling safe on the unit  Homicidal ideation - None at present  Potential for aggression - Yes, due to poor impulse control   Sensorium:  oriented to person, place, and time/date   Memory:  recent memory mildly impaired   Consciousness:  alert and awake   Attention/Concentration: poor concentration and poor attention span   Insight:  poor   Judgment: poor   Gait/Station: normal gait/station, normal balance   Motor Activity: no abnormal movements     Cognitive Assessment : deferred  Pain : deferred  Pain Scale : deferred    Suicide/Homicide Risk Assessment:  Risk of Harm to Self:   Nursing Suicide Risk Assessment Last 24 hours: C-SSRS Risk (Since Last Contact)  Calculated C-SSRS Risk Score (Since Last Contact): No Risk Indicated    Risk of Harm to Others:  Nursing Homicide Risk Assessment: Violence Risk to Others: Denies within past 6 months    Behavioral Health Medications: all current active meds have been reviewed and continue current psychiatric medications.  Current Facility-Administered Medications   Medication Dose Route Frequency Provider Last Rate    acetaminophen  650 mg Oral Q4H PRN STEVE Dawson      acetaminophen  650 mg Oral Q4H PRN STEVE Dawson      acetaminophen  975 mg Oral Q6H PRN STEVE Dawson      aluminum-magnesium hydroxide-simethicone  30 mL Oral Q4H PRN STEVE Dawson      apixaban  10 mg Oral BID STEVE Murcia      [START ON 4/18/2025] apixaban  5 mg Oral BID STEVE Murcia      benztropine  1 mg Intramuscular Q4H PRN Max 6/day STEVE Dawson      benztropine  0.5 mg Oral Q4H PRN Max 6/day STEVE Dawson      bisacodyl  10 mg Rectal Daily PRN Gianna  STEVE Perera      cephalexin  500 mg Oral Q6H DANYEL Lawson,       hydrOXYzine HCL  25 mg Oral Q6H PRN Max 4/day STEVE Dawson      hydrOXYzine HCL  50 mg Oral Q6H PRN Max 4/day STEVE Dawson      insulin lispro  1-6 Units Subcutaneous TID AC Lance Zayas MD      insulin lispro  1-6 Units Subcutaneous HS Lance Zayas MD      levothyroxine  88 mcg Oral Early Morning Lance Zayas MD      lisinopril  5 mg Oral Daily Lance Zayas MD      LORazepam  1 mg Intramuscular Q6H PRN Max 3/day STEVE Dawson      LORazepam  1 mg Oral Q6H PRN Max 3/day STEVE Dawson      mupirocin   Topical Daily Saad Butts, DPMEENA      nystatin   Topical BID Lance Zayas MD      OLANZapine  5 mg Intramuscular Q3H PRN Max 3/day STEVE Dawson      OLANZapine  2.5 mg Oral Q4H PRN Max 6/day STEVE Dawson      OLANZapine  5 mg Oral Q4H PRN Max 3/day STEVE Dawson      OLANZapine  5 mg Oral Q3H PRN Max 3/day STEVE Dawson      OLANZapine  5 mg Oral Daily Rachel Ramez, DO      OLANZapine  7.5 mg Oral HS Rachel Ramez, DO      polyethylene glycol  17 g Oral Daily PRN STEVE Dawson      pravastatin  40 mg Oral Daily Lance Zayas MD      propranolol  5 mg Oral Q8H PRN STEVE Dawson      senna-docusate sodium  1 tablet Oral Daily PRN STEVE Dawson      traZODone  50 mg Oral HS Lance Zayas MD      traZODone  50 mg Oral HS PRN STEVE Dawson       Administrative Statements:  Patient's progress discussed with staff in treatment team meeting.  Medications, treatment progress and treatment plan reviewed with patient.   Educated on importance of medication and treatment compliance.  Reassurance and supportive therapy provided.   Encouraged participation in milieu and group therapy on the unit.    STEVE Dawson 04/12/25

## 2025-04-12 NOTE — ASSESSMENT & PLAN NOTE
Continue Zyprexa to 5 mg qAM and 7.5 mg qHS daily due to current symptoms of carlie (increased on 4/12/25)  Unable to initiate lithium as patient reports worsening kidney function while taking lithium, liver enzymes currently increased so will avoid Depakote at this time

## 2025-04-12 NOTE — NURSING NOTE
Patient awake and visible in the milieu. Pleasant and social, able to make needs known. Denies all psych s/s. Medication compliant and cooperative with care. Safety precautions maintained.

## 2025-04-12 NOTE — NURSING NOTE
"Pt appeared irritable following dinner, pt reports having negative interaction with another peer which upset her. Pt repetitive, stating,\" I almost  3 times, no one else on this unit did!\" Pt able to respond to verbal counseling.   "

## 2025-04-13 LAB
GLUCOSE SERPL-MCNC: 137 MG/DL (ref 65–140)
GLUCOSE SERPL-MCNC: 144 MG/DL (ref 65–140)
GLUCOSE SERPL-MCNC: 153 MG/DL (ref 65–140)
GLUCOSE SERPL-MCNC: 210 MG/DL (ref 65–140)

## 2025-04-13 PROCEDURE — 99232 SBSQ HOSP IP/OBS MODERATE 35: CPT | Performed by: PSYCHIATRY & NEUROLOGY

## 2025-04-13 PROCEDURE — 82948 REAGENT STRIP/BLOOD GLUCOSE: CPT

## 2025-04-13 RX ORDER — TRAZODONE HYDROCHLORIDE 100 MG/1
100 TABLET ORAL
Status: DISCONTINUED | OUTPATIENT
Start: 2025-04-13 | End: 2025-05-05 | Stop reason: HOSPADM

## 2025-04-13 RX ORDER — OLANZAPINE 5 MG/1
5 TABLET, FILM COATED ORAL DAILY
Status: COMPLETED | OUTPATIENT
Start: 2025-04-13 | End: 2025-04-13

## 2025-04-13 RX ORDER — CLONAZEPAM 0.5 MG/1
0.5 TABLET ORAL 2 TIMES DAILY
Status: DISCONTINUED | OUTPATIENT
Start: 2025-04-13 | End: 2025-04-16

## 2025-04-13 RX ADMIN — APIXABAN 10 MG: 5 TABLET, FILM COATED ORAL at 17:14

## 2025-04-13 RX ADMIN — TRAZODONE HYDROCHLORIDE 100 MG: 100 TABLET ORAL at 21:34

## 2025-04-13 RX ADMIN — NYSTATIN 1 APPLICATION: 100000 POWDER TOPICAL at 08:35

## 2025-04-13 RX ADMIN — CEPHALEXIN 500 MG: 500 CAPSULE ORAL at 05:24

## 2025-04-13 RX ADMIN — OLANZAPINE 5 MG: 5 TABLET, FILM COATED ORAL at 08:34

## 2025-04-13 RX ADMIN — CLONAZEPAM 0.5 MG: 0.5 TABLET ORAL at 17:14

## 2025-04-13 RX ADMIN — PRAVASTATIN SODIUM 40 MG: 40 TABLET ORAL at 08:34

## 2025-04-13 RX ADMIN — INSULIN LISPRO 2 UNITS: 100 INJECTION, SOLUTION INTRAVENOUS; SUBCUTANEOUS at 21:35

## 2025-04-13 RX ADMIN — LEVOTHYROXINE SODIUM 88 MCG: 88 TABLET ORAL at 05:25

## 2025-04-13 RX ADMIN — APIXABAN 10 MG: 5 TABLET, FILM COATED ORAL at 08:33

## 2025-04-13 RX ADMIN — OLANZAPINE 7.5 MG: 2.5 TABLET, FILM COATED ORAL at 21:33

## 2025-04-13 RX ADMIN — CEPHALEXIN 500 MG: 500 CAPSULE ORAL at 00:03

## 2025-04-13 RX ADMIN — CEPHALEXIN 500 MG: 500 CAPSULE ORAL at 12:18

## 2025-04-13 RX ADMIN — LISINOPRIL 5 MG: 5 TABLET ORAL at 08:33

## 2025-04-13 RX ADMIN — INSULIN LISPRO 1 UNITS: 100 INJECTION, SOLUTION INTRAVENOUS; SUBCUTANEOUS at 17:15

## 2025-04-13 RX ADMIN — CEPHALEXIN 500 MG: 500 CAPSULE ORAL at 17:14

## 2025-04-13 RX ADMIN — MUPIROCIN 1 APPLICATION: 20 OINTMENT TOPICAL at 08:35

## 2025-04-13 RX ADMIN — NYSTATIN 1 APPLICATION: 100000 POWDER TOPICAL at 17:15

## 2025-04-13 NOTE — PLAN OF CARE
Problem: PAIN - ADULT  Goal: Verbalizes/displays adequate comfort level or baseline comfort level  Description: Interventions:- Encourage patient to monitor pain and request assistance- Assess pain using appropriate pain scale- Administer analgesics based on type and severity of pain and evaluate response- Implement non-pharmacological measures as appropriate and evaluate response- Consider cultural and social influences on pain and pain management- Notify physician/advanced practitioner if interventions unsuccessful or patient reports new pain  Outcome: Progressing     Problem: INFECTION - ADULT  Goal: Absence or prevention of progression during hospitalization  Description: INTERVENTIONS:- Assess and monitor for signs and symptoms of infection- Monitor lab/diagnostic results- Monitor all insertion sites, i.e. indwelling lines, tubes, and drains- Monitor endotracheal if appropriate and nasal secretions for changes in amount and color- [place] appropriate cooling/warming therapies per order- Administer medications as ordered- Instruct and encourage patient and family to use good hand hygiene technique- Identify and instruct in appropriate isolation precautions for identified infection/condition  Outcome: Progressing  Goal: Absence of fever/infection during neutropenic period  Description: INTERVENTIONS:- Monitor WBC  Outcome: Progressing     Problem: SAFETY ADULT  Goal: Patient will remain free of falls  Description: INTERVENTIONS:- Educate patient/family on patient safety including physical limitations  Outcome: Progressing  Goal: Maintain or return to baseline ADL function  Description: INTERVENTIONS:-  Assess patient's ability to carry out ADLs; assess patient's baseline for ADL function and identify physical deficits which impact ability to perform ADLs (bathing, care of mouth/teeth, toileting, grooming, dressing, etc.)- Assess/evaluate cause of self-care deficits - Assess range of motion- Assess patient's  mobility; develop plan if impaired- Assess patient's need for assistive devices and provide as appropriate- Encourage maximum independence but intervene and supervise when necessary- Involve family in performance of ADLs- Assess for home care needs following discharge - Consider OT consult to assist with ADL evaluation and planning for discharge- Provide patient education as appropriate  Outcome: Progressing  Goal: Maintains/Returns to pre admission functional level  Outcome: Progressing     Problem: DISCHARGE PLANNING  Goal: Discharge to home or other facility with appropriate resources  Description: INTERVENTIONS:- Identify barriers to discharge w/patient and caregiver- Arrange for needed discharge resources and transportation as appropriate- Identify discharge learning needs (meds, wound care, etc.)- Arrange for interpretive services to assist at discharge as needed- Refer to Case Management Department for coordinating discharge planning if the patient needs post-hospital services based on physician/advanced practitioner order or complex needs related to functional status, cognitive ability, or social support system  Outcome: Progressing     Problem: Knowledge Deficit  Goal: Patient/family/caregiver demonstrates understanding of disease process, treatment plan, medications, and discharge instructions  Description: Complete learning assessment and assess knowledge base.Interventions:- Provide teaching at level of understanding- Provide teaching via preferred learning methods  Outcome: Progressing     Problem: Alteration in Thoughts and Perception  Goal: Treatment Goal: Gain control of psychotic behaviors/thinking, reduce/eliminate presenting symptoms and demonstrate improved reality functioning upon discharge  Outcome: Progressing  Goal: Verbalize thoughts and feelings  Description: Interventions:- Promote a nonjudgmental and trusting relationship with the patient through active listening and therapeutic  communication- Assess patient's level of functioning, behavior and potential for risk- Engage patient in 1 on 1 interactions- Encourage patient to express fears, feelings, frustrations, and discuss symptoms  - [place] patient to reality, help patient recognize reality-based thinking - Administer medications as ordered and assess for potential side effects- Provide the patient education related to the signs and symptoms of the illness and desired effects of prescribed medications  Outcome: Progressing  Goal: Refrain from acting on delusional thinking/internal stimuli  Description: Interventions:- Monitor patient closely, per order - Utilize least restrictive measures - Set reasonable limits, give positive feedback for acceptable - Administer medications as ordered and monitor of potential side effects  Outcome: Progressing  Goal: Agree to be compliant with medication regime, as prescribed and report medication side effects  Description: Interventions:- Offer appropriate PRN medication and supervise ingestion; conduct AIMS, as needed   Outcome: Progressing  Goal: Attend and participate in unit activities, including therapeutic, recreational, and educational groups  Description: Interventions:-Encourage Visitation and family involvement in care  Outcome: Progressing  Goal: Recognize dysfunctional thoughts, communicate reality-based thoughts at the time of discharge  Description: Interventions:- Provide medication and psycho-education to assist patient in compliance and developing insight into his/her illness   Outcome: Progressing  Goal: Complete daily ADLs, including personal hygiene independently, as able  Description: Interventions:- Observe, teach, and assist patient with ADLS- Monitor and promote a balance of rest/activity, with adequate nutrition and elimination   Outcome: Progressing     Problem: Ineffective Coping  Goal: Cooperates with admission process  Description: Interventions: - Complete admission  process  Outcome: Progressing  Goal: Identifies ineffective coping skills  Outcome: Progressing  Goal: Identifies healthy coping skills  Outcome: Progressing  Goal: Demonstrates healthy coping skills  Outcome: Progressing  Goal: Participates in unit activities  Description: Interventions:- Provide therapeutic environment - Provide required programming - Redirect inappropriate behaviors   Outcome: Progressing  Goal: Patient/Family participate in treatment and DC plans  Description: Interventions:- Provide therapeutic environment  Outcome: Progressing  Goal: Patient/Family verbalizes awareness of resources  Outcome: Progressing  Goal: Understands least restrictive measures  Description: Interventions:- Utilize least restrictive behavior  Outcome: Progressing  Goal: Free from restraint events  Description: - Utilize least restrictive measures - Provide behavioral interventions - Redirect inappropriate behaviors   Outcome: Progressing     Problem: Risk for Self Injury/Neglect  Goal: Treatment Goal: Remain safe during length of stay, learn and adopt new coping skills, and be free of self-injurious ideation, impulses and acts at the time of discharge  Outcome: Progressing  Goal: Verbalize thoughts and feelings  Description: Interventions:- Assess and re-assess patient's lethality and potential for self-injury- Engage patient in 1:1 interactions, daily, for a minimum of 15 minutes- Encourage patient to express feelings, fears, frustrations, hopes- Establish rapport/trust with patient   Outcome: Progressing  Goal: Refrain from harming self  Description: Interventions:- Monitor patient closely, per order- Develop a trusting relationship- Supervise medication ingestion, monitor effects and side effects   Outcome: Progressing  Goal: Attend and participate in unit activities, including therapeutic, recreational, and educational groups  Description: Interventions:- Provide therapeutic and educational activities daily, encourage  attendance and participation, and document same in the medical record- Obtain collateral information, encourage visitation and family involvement in care   Outcome: Progressing  Goal: Recognize maladaptive responses and adopt new coping mechanisms  Outcome: Progressing  Goal: Complete daily ADLs, including personal hygiene independently, as able  Description: Interventions:- Observe, teach, and assist patient with ADLS- Monitor and promote a balance of rest/activity, with adequate nutrition and elimination  Outcome: Progressing     Problem: Depression  Goal: Treatment Goal: Demonstrate behavioral control of depressive symptoms, verbalize feelings of improved mood/affect, and adopt new coping skills prior to discharge  Outcome: Progressing  Goal: Verbalize thoughts and feelings  Description: Interventions:- Assess and re-assess patient's level of risk - Engage patient in 1:1 interactions, daily, for a minimum of 15 minutes - Encourage patient to express feelings, fears, frustrations, hopes   Outcome: Progressing  Goal: Refrain from harming self  Description: Interventions:- Monitor patient closely, per order - Supervise medication ingestion, monitor effects and side effects   Outcome: Progressing  Goal: Refrain from isolation  Description: Interventions:- Develop a trusting relationship - Encourage socialization   Outcome: Progressing  Goal: Refrain from self-neglect  Outcome: Progressing  Goal: Attend and participate in unit activities, including therapeutic, recreational, and educational groups  Description: Interventions:- Provide therapeutic and educational activities daily, encourage attendance and participation, and document same in the medical record   Outcome: Progressing  Goal: Complete daily ADLs, including personal hygiene independently, as able  Description: Interventions:- Observe, teach, and assist patient with ADLS-  Monitor and promote a balance of rest/activity, with adequate nutrition and  elimination   Outcome: Progressing     Problem: Anxiety  Goal: Anxiety is at manageable level  Description: Interventions:- Assess and monitor patient's anxiety level. - Monitor for signs and symptoms (heart palpitations, chest pain, shortness of breath, headaches, nausea, feeling jumpy, restlessness, irritable, apprehensive). - Collaborate with interdisciplinary team and initiate plan and interventions as ordered.- [place] patient to unit/surroundings- Explain treatment plan- Encourage participation in care- Encourage verbalization of concerns/fears- Identify coping mechanisms- Assist in developing anxiety-reducing skills- Administer/offer alternative therapies- Limit or eliminate stimulants  Outcome: Progressing     Problem: Risk for Violence/Aggression Toward Others  Goal: Treatment Goal: Refrain from acts of violence/aggression during length of stay, and demonstrate improved impulse control at the time of discharge  Outcome: Progressing  Goal: Verbalize thoughts and feelings  Description: Interventions:- Assess and re-assess patient's level of risk, every waking shift- Engage patient in 1:1 interactions, daily, for a minimum of 15 minutes - Allow patient to express feelings and frustrations in a safe and non-threatening manner - Establish rapport/trust with patient   Outcome: Progressing  Goal: Refrain from harming others  Outcome: Progressing  Goal: Refrain from destructive acts on the environment or property  Outcome: Progressing  Goal: Control angry outbursts  Description: Interventions:- Monitor patient closely, per order- Ensure early verbal de-escalation- Monitor prn medication needs- Set reasonable/therapeutic limits, outline behavioral expectations, and consequences - Provide a non-threatening milieu, utilizing the least restrictive interventions   Outcome: Progressing  Goal: Attend and participate in unit activities, including therapeutic, recreational, and educational groups  Description:  Interventions:- Provide therapeutic and educational activities daily, encourage attendance and participation, and document same in the medical record   Outcome: Progressing  Goal: Identify appropriate positive anger management techniques  Description: Interventions:- Offer anger management and coping skills groups - Staff will provide positive feedback for appropriate anger control  Outcome: Progressing     Problem: Alteration in Orientation  Goal: Treatment Goal: Demonstrate a reduction of confusion and improved orientation to person, place, time and/or situation upon discharge, according to optimum baseline/ability  Outcome: Progressing  Goal: Interact with staff daily  Description: Interventions:- Assess and re-assess patient's level of orientation- Engage patient in 1 on 1 interactions, daily, for a minimum of 15 minutes - Establish rapport/trust with patient   Outcome: Progressing  Goal: Express concerns related to confused thinking related to:  Description: Interventions:- Encourage patient to express feelings, fears, frustrations, hopes- Assign consistent caregivers - [place]/re-orient patient as needed- Allow comfort items, as appropriate- Provide visual cues, signs, etc.   Outcome: Progressing  Goal: Allow medical examinations, as recommended  Description: Interventions:- Provide physical/neurological exams and/or referrals, per provider   Outcome: Progressing  Goal: Cooperate with recommended testing/procedures  Description: Interventions:- Determine need for ancillary testing- Observe for mental status changes- Implement falls/precaution protocol   Outcome: Progressing  Goal: Attend and participate in unit activities, including therapeutic, recreational, and educational groups  Description: Interventions:- Provide therapeutic and educational activities daily, encourage attendance and participation, and document same in the medical record - Provide appropriate opportunities for reminiscence - Provide a  consistent daily routine - Encourage family contact/visitation   Outcome: Progressing  Goal: Complete daily ADLs, including personal hygiene independently, as able  Description: Interventions:- Observe, teach, and assist patient with ADLS  Outcome: Progressing     Problem: Individualized Interventions  Goal: Patient will verbalize appropriate use of telephone within 5 days  Description: Interventions:- Treatment team to determine use of supervised phone privileges   Outcome: Progressing  Goal: Patient will verbalize need for hospitalization and will no longer attempt elopement within 5 days  Description: Interventions:- Ongoing education to help patient understand need for hospitalization  Outcome: Progressing  Goal: Patient will recognize inappropriate behaviors and develop alternative behaviors within 5 days  Description: Interventions:- Patient in collaboration with Treatment Team will develop a behavior management plan to help identify effective coping skills to deal with stressors  Outcome: Progressing

## 2025-04-13 NOTE — NURSING NOTE
Patient pleasant on approach, hyper verbal, disorganized and pressured speech present. Visible in milieu, social with peers. Denies all psych s/s. Medication compliant. Safety precautions maintained.

## 2025-04-13 NOTE — NURSING NOTE
"Pt rambling and tangential, disorganized speech. In a.m., pt  preoccupied with roommate and accusing roommate of being \"porn actress\". Pt states she knows roommate and that she lives two doors down from her but that she uses another name \"Jojo\". Redirected for appropriate speech. Pt displays elevated mood during shift. Visible in dayroom during shift. Good intake at meals. Pt denies all symptoms at current time.   "

## 2025-04-13 NOTE — PROGRESS NOTES
Progress Note - Behavioral Health   Name: Lisa Bocanegraorry 69 y.o. female I MRN: [id number]   Unit/Bed#: [facility] 609-01 I Date of Admission: 4/8/2025   Date of Service: 4/13/2025 I Hospital Day: 5   Assessment & Plan  Bipolar affective disorder, current episode manic (HCC)  Increase AM dose of Zyprexa to 7.5mg and continue PM Zyprexa 7.5 mg qHS due to current symptoms of carlie (increased on 4/14/25)  Increase Trazodone to 100mg PO QHS for insomnia (started on 4/13/2025)  Will start Klonopin 0.5mg PO BID for manic behaviors, can utilize until Zyprexa is at a therapeutic dose (started on 4/13/2025)  Unable to initiate lithium as patient reports worsening kidney function while taking lithium, liver enzymes currently increased so will avoid Depakote at this time  Will order [facility] for tomorrow to re-assess LFT's - if improving possibly can start Depakote as patient is very manic  Risks/Benefits of Treatment:     Risks, benefits, and possible side effects of medications explained to patient. Patient has limited understanding of risks and benefits of treatment at this time, but agrees to take medications as prescribed.    Progress Toward Goals: no significant improvement    Treatment Planning:      - Encourage early mobility and having a structured day  - Provide frequent re-orientation, and cognitive stimulation  - Ensure assistive devices are in proper working order (eye-glasses, hearing aids)  - Encourage adequate hydration, nutrition and monitor bowel movements  - Maintain sleep-wake cycle: Uninterrupted sleep time; low-level lighting at night  - Fall precaution  - f/u SLIM recs regarding the medical problems   - Continue medication titration and treatment plan; adjust medication to optimize treatment response and as clinically indicated.   - Observation:Routine  - Legal Status: 201  - VS: as per unit protocol  - Encourage group attendance and milieu therapy  - Dispo: To be determined  - Long Stay Certification : Not  Applicable  - Estimated Discharge Day: 4/26/2025    Subjective:    Patient was seen today for continuation of care, records reviewed and patient was discussed with the morning case review team.  Started on Eliquis 10mg PO BID x7 days and then 5mg PO BID for left acute occlusive thrombus of a small segment of her left cephalic distal upper arm.    Lisa was seen today for psychiatric follow-up.  She is going around the unit making sexual comments and gestures to this writer.  Patient redirected.  Remains manic, hyperactive, disorganized, and loud.  She was later heard singing (very loudly) in the milieu and can be disruptive to her peers.  Will increase Zyprexa in the AM as noted above and start Klonopin.  Poor sleep overnight.  Oral intake is fair.  We reviewed once more the specific as-needed medications they can use going forward if they experience any insomnia or destabilization of their mood, they understood and were agreeable.  Milieu visibility and group attendance encouraged to promote an active participation in treatment.    Patient denies acute suicidal/self-harm ideation/intent/plan upon direct inquiry at this time. Patient is able to contract for safety while on the unit and would feel comfortable seeking staff support should suicidal symptoms or urges appear or worsen.  Patient also denies HI. Patient remains adherent to her current psychotropic medication regimen and denies any side effects from medications, as well as none noted on exam.    Patient is not yet ready for discharge.  Patient's condition currently requires active psychopharmacological medication management, interdisciplinary coordination with case management, and the utilization of adjunctive milieu and group therapy to augment psychopharmacological efficacy.  The patients risk of morbidity, and progression or decompensation of psychiatric disease, is higher without this current treatment.  Patient cannot be safety treated at a lower  level of care and requires further psychiatric evaluation, medication treatment, other treatment which can be reasonably be provided only in a psychiatric hospital.  Discharge to an unsupervised setting will represent a significant deterioration in ability to function and present a severe risk to the patients physical and mental health.    Review of Systems:  Behavior over the last 24 hours: Unchanged  Sleep: disrupted sleep  Appetite: adequate  Medication side effects: none reported  ROS:no complaints, all other systems are negative    Objective:    Vitals:  Vitals:    04/13/25 0826   BP: 135/74   Pulse: 90   Resp: 17   Temp: 97.7 °F (36.5 °C)   SpO2: 96%     Laboratory Results:  I have personally reviewed all pertinent laboratory/tests results.  Most Recent Labs:   Lab Results   Component Value Date    WBC 11.67 (H) 04/11/2025    RBC 3.90 04/11/2025    HGB 11.8 04/11/2025    HCT 36.7 04/11/2025     04/11/2025    RDW 13.3 04/11/2025    NEUTROABS 7.55 04/11/2025    SODIUM 136 04/10/2025    K 4.8 04/10/2025     04/10/2025    CO2 23 04/10/2025    BUN 29 (H) 04/10/2025    CREATININE 1.02 04/10/2025    GLUC 155 (H) 04/10/2025    GLUF 89 04/09/2025    CALCIUM 10.1 04/10/2025     (H) 04/09/2025     (H) 04/09/2025    ALKPHOS 96 04/09/2025    TP 5.9 (L) 04/09/2025    ALB 3.6 04/09/2025    TBILI 0.86 04/09/2025    CHOLESTEROL 144 04/09/2025    HDL 64 04/09/2025    TRIG 60 04/09/2025    LDLCALC 68 04/09/2025    NONHDLC 80 04/09/2025    LITHIUM 1.03 01/08/2025    RDK0ASZTWYFS 15.420 (H) 04/09/2025    FREET4 0.97 04/09/2025    T3FREE 3.24 07/11/2023    HGBA1C 6.1 (H) 04/09/2025     04/09/2025     Mental Status Evaluation:  Appearance:  age appropriate, casually dressed   Behavior:  bizarre, uncooperative, not redirectable, restless and fidgety   Speech:  pressured, hypertalkative   Mood:  manic   Affect:  overbright, increased in intensity   Thought Process:  Disorganized, illogical    Associations: Tangential associations   Thought Content:  bizarre delusions, grandiose ideas, negative thinking, ruminating thoughts, sexually preoccupied   Perceptual Disturbances: no auditory hallucinations, no visual hallucinations, denies when asked, does not appear responding to internal stimuli   Risk Potential: Suicidal ideation - None at present, contracts for safety on the unit, would talk to staff if not feeling safe on the unit  Homicidal ideation - None at present  Potential for aggression - Yes, due to poor impulse control   Sensorium:  oriented to person, place, and time/date   Memory:  recent memory mildly impaired   Consciousness:  alert and awake   Attention/Concentration: poor concentration and poor attention span   Insight:  poor   Judgment: poor   Gait/Station: normal gait/station, normal balance   Motor Activity: no abnormal movements     Cognitive Assessment : deferred  Pain : deferred  Pain Scale : deferred    Suicide/Homicide Risk Assessment:  Risk of Harm to Self:   Nursing Suicide Risk Assessment Last 24 hours: C-SSRS Risk (Since Last Contact)  Calculated C-SSRS Risk Score (Since Last Contact): No Risk Indicated    Risk of Harm to Others:  Nursing Homicide Risk Assessment: Violence Risk to Others: Denies within past 6 months    Behavioral Health Medications: all current active meds have been reviewed and continue current psychiatric medications.  Current Facility-Administered Medications   Medication Dose Route Frequency Provider Last Rate    acetaminophen  650 mg Oral Q4H PRN STEVE Dawson      acetaminophen  650 mg Oral Q4H PRN STEVE Dawson      acetaminophen  975 mg Oral Q6H PRN STEVE Dawson      aluminum-magnesium hydroxide-simethicone  30 mL Oral Q4H PRN STEVE Dawson      apixaban  10 mg Oral BID STEVE Murcia      [START ON 4/18/2025] apixaban  5 mg Oral BID STEVE Murcia      benztropine  1 mg Intramuscular Q4H PRN Max 6/day Gianna  STEVE Perera      benztropine  0.5 mg Oral Q4H PRN Max 6/day STEVE Dawson      bisacodyl  10 mg Rectal Daily PRN STEVE Dawson      cephalexin  500 mg Oral Q6H DANYEL Lawson DO      clonazePAM  0.5 mg Oral BID STEVE Dawson      hydrOXYzine HCL  25 mg Oral Q6H PRN Max 4/day STEVE Dawson      hydrOXYzine HCL  50 mg Oral Q6H PRN Max 4/day STEVE Dawson      insulin lispro  1-6 Units Subcutaneous TID AC Lance Zayas MD      insulin lispro  1-6 Units Subcutaneous HS Lance Zayas MD      levothyroxine  88 mcg Oral Early Morning Lance Zayas MD      lisinopril  5 mg Oral Daily Lance Zayas MD      LORazepam  1 mg Intramuscular Q6H PRN Max 3/day STEVE Dawson      LORazepam  1 mg Oral Q6H PRN Max 3/day STEVE Dawson      mupirocin   Topical Daily Saad Butts, DPM      nystatin   Topical BID Lance Zayas MD      OLANZapine  5 mg Intramuscular Q3H PRN Max 3/day STEVE Dawson      OLANZapine  2.5 mg Oral Q4H PRN Max 6/day STEVE Dawson      OLANZapine  5 mg Oral Q4H PRN Max 3/day STEVE Dawson      OLANZapine  5 mg Oral Q3H PRN Max 3/day STEVE Dawson      OLANZapine  7.5 mg Oral HS Rachel Myrick DO      [START ON 4/14/2025] OLANZapine  7.5 mg Oral Daily STEVE Dawson      polyethylene glycol  17 g Oral Daily PRN STEVE Dawson      pravastatin  40 mg Oral Daily Lance Zayas MD      propranolol  5 mg Oral Q8H PRN STEVE Dawson      senna-docusate sodium  1 tablet Oral Daily PRN STEVE Dawson      traZODone  100 mg Oral HS STEVE Dawson      traZODone  50 mg Oral HS PRN STEVE Dawson       Administrative Statements:  Patient's progress discussed with staff in treatment team meeting.  Medications, treatment progress and treatment plan reviewed with patient.   Educated on importance of medication and treatment compliance.  Reassurance and supportive therapy  provided.   Encouraged participation in milieu and group therapy on the unit.    STEVE Dawson 04/13/25

## 2025-04-13 NOTE — ASSESSMENT & PLAN NOTE
Increase AM dose of Zyprexa to 7.5mg and continue PM Zyprexa 7.5 mg qHS due to current symptoms of carlie (increased on 4/14/25)  Increase Trazodone to 100mg PO QHS for insomnia (started on 4/13/2025)  Will start Klonopin 0.5mg PO BID for manic behaviors, can utilize until Zyprexa is at a therapeutic dose (started on 4/13/2025)  Unable to initiate lithium as patient reports worsening kidney function while taking lithium, liver enzymes currently increased so will avoid Depakote at this time  Will order [facility] for tomorrow to re-assess LFT's - if improving possibly can start Depakote as patient is very manic

## 2025-04-14 LAB
ALBUMIN SERPL BCG-MCNC: 4.3 G/DL (ref 3.5–5)
ALP SERPL-CCNC: 79 U/L (ref 34–104)
ALT SERPL W P-5'-P-CCNC: 46 U/L (ref 7–52)
ANION GAP SERPL CALCULATED.3IONS-SCNC: 6 MMOL/L (ref 4–13)
AST SERPL W P-5'-P-CCNC: 19 U/L (ref 13–39)
BILIRUB SERPL-MCNC: 1.17 MG/DL (ref 0.2–1)
BUN SERPL-MCNC: 18 MG/DL (ref 5–25)
CALCIUM SERPL-MCNC: 9.9 MG/DL (ref 8.4–10.2)
CHLORIDE SERPL-SCNC: 103 MMOL/L (ref 96–108)
CO2 SERPL-SCNC: 30 MMOL/L (ref 21–32)
CREAT SERPL-MCNC: 0.87 MG/DL (ref 0.6–1.3)
GFR SERPL CREATININE-BSD FRML MDRD: 68 ML/MIN/1.73SQ M
GLUCOSE P FAST SERPL-MCNC: 113 MG/DL (ref 65–99)
GLUCOSE SERPL-MCNC: 113 MG/DL (ref 65–140)
GLUCOSE SERPL-MCNC: 113 MG/DL (ref 65–140)
GLUCOSE SERPL-MCNC: 166 MG/DL (ref 65–140)
GLUCOSE SERPL-MCNC: 213 MG/DL (ref 65–140)
GLUCOSE SERPL-MCNC: 219 MG/DL (ref 65–140)
POTASSIUM SERPL-SCNC: 4.3 MMOL/L (ref 3.5–5.3)
PROT SERPL-MCNC: 6.7 G/DL (ref 6.4–8.4)
SODIUM SERPL-SCNC: 139 MMOL/L (ref 135–147)

## 2025-04-14 PROCEDURE — 80053 COMPREHEN METABOLIC PANEL: CPT

## 2025-04-14 PROCEDURE — 99232 SBSQ HOSP IP/OBS MODERATE 35: CPT | Performed by: STUDENT IN AN ORGANIZED HEALTH CARE EDUCATION/TRAINING PROGRAM

## 2025-04-14 PROCEDURE — 82948 REAGENT STRIP/BLOOD GLUCOSE: CPT

## 2025-04-14 RX ADMIN — INSULIN LISPRO 2 UNITS: 100 INJECTION, SOLUTION INTRAVENOUS; SUBCUTANEOUS at 17:24

## 2025-04-14 RX ADMIN — CEPHALEXIN 500 MG: 500 CAPSULE ORAL at 12:31

## 2025-04-14 RX ADMIN — CEPHALEXIN 500 MG: 500 CAPSULE ORAL at 06:09

## 2025-04-14 RX ADMIN — OLANZAPINE 7.5 MG: 2.5 TABLET, FILM COATED ORAL at 08:02

## 2025-04-14 RX ADMIN — LISINOPRIL 5 MG: 5 TABLET ORAL at 08:02

## 2025-04-14 RX ADMIN — NYSTATIN 1 APPLICATION: 100000 POWDER TOPICAL at 17:24

## 2025-04-14 RX ADMIN — MUPIROCIN 1 APPLICATION: 20 OINTMENT TOPICAL at 08:03

## 2025-04-14 RX ADMIN — NYSTATIN 1 APPLICATION: 100000 POWDER TOPICAL at 08:03

## 2025-04-14 RX ADMIN — INSULIN LISPRO 1 UNITS: 100 INJECTION, SOLUTION INTRAVENOUS; SUBCUTANEOUS at 12:31

## 2025-04-14 RX ADMIN — OLANZAPINE 5 MG: 5 TABLET, FILM COATED ORAL at 12:54

## 2025-04-14 RX ADMIN — CLONAZEPAM 0.5 MG: 0.5 TABLET ORAL at 17:23

## 2025-04-14 RX ADMIN — CEPHALEXIN 500 MG: 500 CAPSULE ORAL at 17:23

## 2025-04-14 RX ADMIN — INSULIN LISPRO 2 UNITS: 100 INJECTION, SOLUTION INTRAVENOUS; SUBCUTANEOUS at 21:26

## 2025-04-14 RX ADMIN — APIXABAN 10 MG: 5 TABLET, FILM COATED ORAL at 08:02

## 2025-04-14 RX ADMIN — PRAVASTATIN SODIUM 40 MG: 40 TABLET ORAL at 08:02

## 2025-04-14 RX ADMIN — CLONAZEPAM 0.5 MG: 0.5 TABLET ORAL at 08:03

## 2025-04-14 RX ADMIN — APIXABAN 10 MG: 5 TABLET, FILM COATED ORAL at 17:23

## 2025-04-14 RX ADMIN — TRAZODONE HYDROCHLORIDE 100 MG: 100 TABLET ORAL at 21:15

## 2025-04-14 RX ADMIN — LEVOTHYROXINE SODIUM 88 MCG: 88 TABLET ORAL at 06:09

## 2025-04-14 RX ADMIN — OLANZAPINE 7.5 MG: 2.5 TABLET, FILM COATED ORAL at 21:15

## 2025-04-14 RX ADMIN — CEPHALEXIN 500 MG: 500 CAPSULE ORAL at 00:10

## 2025-04-14 NOTE — PROGRESS NOTES
Progress Note - Behavioral Health   Name: Lisa ZARATE McGorry 69 y.o. female I MRN: [id number]  Unit/Bed#: [facility] 605[facility]02 I Date of Admission: 4/8/2025   Date of Service: 4/14/2025 I Hospital Day: 6    Assessment & Plan  Bipolar affective disorder, current episode manic (HCC)  As of 0/14/2025 the patient appears less sexually preoccupied and is denying symptoms.  She continues to endorse some poor sleep and at times is tangential.    Continue AM dose of Zyprexa to 7.5mg and continue PM Zyprexa 7.5 mg qHS due to current symptoms of carlie (increased on 4/14/25)  Continue Trazodone to 100mg PO QHS for insomnia (started on 4/13/2025)  Continue Klonopin 0.5mg PO BID for manic behaviors, can utilize until Zyprexa is at a therapeutic dose (started on 4/13/2025)  Unable to initiate lithium as patient reports worsening kidney function while taking lithium, liver enzymes currently increased so will avoid Depakote at this time  CMP on 04/14 patient LFTs WNL    Current medications:  Current Facility-Administered Medications   Medication Dose Route Frequency Provider Last Rate    acetaminophen  650 mg Oral Q4H PRN STEVE Dawson      acetaminophen  650 mg Oral Q4H PRN STEVE Dawson      acetaminophen  975 mg Oral Q6H PRN STEVE Dawson      aluminum-magnesium hydroxide-simethicone  30 mL Oral Q4H PRN STEVE Dawson      apixaban  10 mg Oral BID STEVE Murcia      [START ON 4/18/2025] apixaban  5 mg Oral BID STEVE Murcia      benztropine  1 mg Intramuscular Q4H PRN Max 6/day STEVE Dawson      benztropine  0.5 mg Oral Q4H PRN Max 6/day STEVE Dawson      bisacodyl  10 mg Rectal Daily PRN STEVE Dawson      cephalexin  500 mg Oral Q6H DANYEL Lawson DO      clonazePAM  0.5 mg Oral BID STEVE Dawson      hydrOXYzine HCL  25 mg Oral Q6H PRN Max 4/day Gianna Hangey, CRNP      hydrOXYzine HCL  50 mg Oral Q6H PRN Max 4/day STEVE Dawson      insulin lispro  1-6 Units  Subcutaneous TID AC Lance Zayas MD      insulin lispro  1-6 Units Subcutaneous HS Lance Zayas MD      levothyroxine  88 mcg Oral Early Morning Lance Zayas MD      lisinopril  5 mg Oral Daily Lance Zayas MD      LORazepam  1 mg Intramuscular Q6H PRN Max 3/day Gianna Perera, CRMATHEW      LORazepam  1 mg Oral Q6H PRN Max 3/day Gianna Perera, STEVE      mupirocin   Topical Daily Saad Butts, DPM      nystatin   Topical BID Lance Zayas MD      OLANZapine  5 mg Intramuscular Q3H PRN Max 3/day Gianna Perera, STEVE      OLANZapine  2.5 mg Oral Q4H PRN Max 6/day Gianna Perera, CRNP      OLANZapine  5 mg Oral Q4H PRN Max 3/day Gianna Perera, CRNP      OLANZapine  5 mg Oral Q3H PRN Max 3/day Gianna Perera, LISANP      OLANZapine  7.5 mg Oral HS Rachel Myrick,       OLANZapine  7.5 mg Oral Daily Gianna Perera, CRNP      polyethylene glycol  17 g Oral Daily PRN Gianna Perera, CRNP      pravastatin  40 mg Oral Daily Lance Zayas MD      propranolol  5 mg Oral Q8H PRN Gianna Perera, STEVE      senna-docusate sodium  1 tablet Oral Daily PRN Gianna Perera, CRNP      traZODone  100 mg Oral HS Gianna Perera, CRNP      traZODone  50 mg Oral HS PRN Gianna Perera, STEVE          Risks/Benefits of Treatment:     Risks, benefits, and possible side effects of medications explained to patient and patient verbalizes understanding and agreement for treatment.    Progress Toward Goals: improving    Treatment Planning:      - Encourage early mobility and having a structured day  - Provide frequent re-orientation, and cognitive stimulation  - Ensure assistive devices are in proper working order (eye-glasses, hearing aids)  - Encourage adequate hydration, nutrition and monitor bowel movements  - Maintain sleep-wake cycle: Uninterrupted sleep time; low-level lighting at night  - Fall precaution  - f/u SLIM recs regarding the medical problems   - Continue medication titration and  "treatment plan; adjust medication to optimize treatment response and as clinically indicated.   - Observation:Routine  - Legal Status: 201  - VS: as per unit protocol  - Encourage group attendance and milieu therapy  - Dispo: To be determined  - Long Stay Certification : Not Applicable  - Estimated Discharge Day: 5/5/2025     Subjective       Patient was visited on unit for continuing care; chart reviewed and discussed with multidisciplinary treatment team.     Patient continues to be visible in the milieu and interacts with staff and peers.  Patient has not received PRNs in the past 24 hours.  Patient has been sleeping poorly.she remains bizarre, disorganized, and sexually preoccupied at times.  She has been arguing with peers.    Patient accepted all offered medications and no adverse effects of medications noted or reported.    Patient today was intermittently tangential but otherwise pleasant and cooperative.  Patient frequently said \"tom-tom,\" and \"kehinde-kehinde,\" to describe multiple things she was discussing.  She reported her mood is \"good.\"  She reports that she continues to have poor sleep and sleeps an estimated 4 to 5 hours per night.  She stated that she feels like this is enough sleep.  She endorsed an adequate appetite.  She denied medication side effects.  She denied suicidal and homicidal ideation.  She denied auditory and visual hallucinations.    Sleep: normal  Appetite: normal  Medication side effects: No  ROS: review of systems as noted above in HPI/Subjective report, all other systems are negative    Objective :  Temp:  [96.5 °F (35.8 °C)-97.8 °F (36.6 °C)] 96.5 °F (35.8 °C)  HR:  [] 107  BP: (113-142)/(56-94) 142/94  Resp:  [18] 18  SpO2:  [93 %-98 %] 97 %  O2 Device: None (Room air)    Temp:  [96.5 °F (35.8 °C)-97.8 °F (36.6 °C)] 96.5 °F (35.8 °C)  HR:  [] 107  BP: (113-142)/(56-94) 142/94  Resp:  [18] 18  SpO2:  [93 %-98 %] 97 %  O2 Device: None (Room air)    Mental Status " "Evaluation:    Appearance:  disheveled, marginal hygiene, wearing hospital clothes, looks older than stated age, overweight   Behavior:  pleasant, cooperative   Speech:  normal rate, normal volume, normal pitch   Mood:  \"Good.\"   Affect:  blunted   Thought Process:  tangential, disorganized at times   Thought Content:  no overt delusions   Perceptual Disturbances: denies auditory or visual hallucinations when asked, but appears preoccupied   Risk Potential: Suicidal Ideation -  None at present  Homicidal Ideation -  None at present  Potential for Aggression - Not at present   Sensorium:  oriented to person, place, and time/date   Memory:  recent and remote memory grossly intact   Consciousness:  alert and awake   Attention/Concentration: attention span and concentration are age appropriate   Insight:  limited   Judgment: limited   Gait/Station: normal gait/station, normal balance   Motor Activity: no abnormal movements       Pain : denied      Lab Results: I have reviewed the following results:  Results from the past 24 hours:   Recent Results (from the past 24 hours)   Fingerstick Glucose (POCT)    Collection Time: 04/13/25  4:18 PM   Result Value Ref Range    POC Glucose 153 (H) 65 - 140 mg/dl   Fingerstick Glucose (POCT)    Collection Time: 04/13/25  8:56 PM   Result Value Ref Range    POC Glucose 210 (H) 65 - 140 mg/dl   Comprehensive metabolic panel    Collection Time: 04/14/25  5:48 AM   Result Value Ref Range    Sodium 139 135 - 147 mmol/L    Potassium 4.3 3.5 - 5.3 mmol/L    Chloride 103 96 - 108 mmol/L    CO2 30 21 - 32 mmol/L    ANION GAP 6 4 - 13 mmol/L    BUN 18 5 - 25 mg/dL    Creatinine 0.87 0.60 - 1.30 mg/dL    Glucose 113 65 - 140 mg/dL    Glucose, Fasting 113 (H) 65 - 99 mg/dL    Calcium 9.9 8.4 - 10.2 mg/dL    AST 19 13 - 39 U/L    ALT 46 7 - 52 U/L    Alkaline Phosphatase 79 34 - 104 U/L    Total Protein 6.7 6.4 - 8.4 g/dL    Albumin 4.3 3.5 - 5.0 g/dL    Total Bilirubin 1.17 (H) 0.20 - 1.00 mg/dL "    eGFR 68 ml/min/1.73sq m   Fingerstick Glucose (POCT)    Collection Time: 04/14/25  7:24 AM   Result Value Ref Range    POC Glucose 113 65 - 140 mg/dl   Fingerstick Glucose (POCT)    Collection Time: 04/14/25 11:46 AM   Result Value Ref Range    POC Glucose 166 (H) 65 - 140 mg/dl       Administrative Statements     Counseling / Coordination of Care:   Patient's progress discussed with staff in treatment team meeting.  Medication changes reviewed with staff in treatment team meeting..    Andrae Samuels DO 04/14/25  PGY-II

## 2025-04-14 NOTE — TREATMENT TEAM
Pt left group early with doctor.  Pt was complimentary about learning things during groups.  Pt less restless and more focused on group topics.      04/14/25 1100   Activity/Group Checklist   Group Other (Comment)  (Mental Helath and Vitality factors)   Attendance Attended  (left early with doctor)   Attendance Duration (min) 16-30   Interactions Other (Comment)  (less tangential)   Affect/Mood Appropriate   Goals Achieved Identified feelings;Identified triggers;Identified relapse prevention strategies;Discussed coping strategies;Discussed self-esteem issues;Able to listen to others;Able to engage in interactions;Able to reflect/comment on own behavior;Able to self-disclose;Able to recieve feedback

## 2025-04-14 NOTE — PROGRESS NOTES
04/14/25 0800   Team Meeting   Meeting Type Daily Rounds   Team Members Present   Team Members Present Physician;Nurse;   Physician Team Member Tiny/Dilma/Ramez   Nursing Team Member Rafaela/Carmen/Linda   Care Management Team Member Stanford   OT Team Member Alix   Patient/Family Present   Patient Present No   Patient's Family Present No     Patient is bizarre, disorganized, sexually preoccupied. She has been refusing to sleep in her room and has been arguing with other residents. She takes her medications and has been eating. Her sleep has been broken. She had a BM on the 13th and shower on the 12th. Her Zyprexa and trazodone were increased and she was placed on Klonopin. Patient discharge is pending stabilization of mood and medications.

## 2025-04-14 NOTE — PROGRESS NOTES
"PT disorganized, wide, repetitive, at times agitated with other patients. Thinks other pt's are talking about her. Tangential, lacks personal boundaries, pt believes this writer is in danger of \"sex cult\" ran by \"Mayur Uribe and Randy.\" Sexually preoccupied. Pt followed this writer to door, pt extremely difficult to redirect and attempting to get close to / touch this writer, pt repeating \"I love you.\"   04/14/25 1330   Activity/Group Checklist   Group Cognitive therapy   Attendance Attended   Attendance Duration (min) 31-45   Interactions Disorganized interaction   Affect/Mood Wide  (Tangential, lacks personal boundaries, pt believes this writer is in danger of \"sex cult\" ran by \"Mayur Uribe and Randy.\" Pt followed this writer to door, pt extremely difficult to redirect and attempting to get close to / touch this writer.)   Goals Achieved Identified feelings;Able to listen to others;Able to engage in interactions;Identified triggers;Discussed coping strategies;Identified resources and support systems;Able to self-disclose;Able to recieve feedback;Able to give feedback to another     "

## 2025-04-14 NOTE — NURSING NOTE
Pt visible on unit, rambling and tangential during interactions. Displays elevated mood. Pt denies all symptoms currently. Pt displayed brief period of irritability during shift but was cooperative throughout shift. Pt observed coloring in dayroom. Ambulates independently on unit.

## 2025-04-14 NOTE — PLAN OF CARE
Problem: PAIN - ADULT  Goal: Verbalizes/displays adequate comfort level or baseline comfort level  Description: Interventions:- Encourage patient to monitor pain and request assistance- Assess pain using appropriate pain scale- Administer analgesics based on type and severity of pain and evaluate response- Implement non-pharmacological measures as appropriate and evaluate response- Consider cultural and social influences on pain and pain management- Notify physician/advanced practitioner if interventions unsuccessful or patient reports new pain  Outcome: Progressing     Problem: INFECTION - ADULT  Goal: Absence or prevention of progression during hospitalization  Description: INTERVENTIONS:- Assess and monitor for signs and symptoms of infection- Monitor lab/diagnostic results- Monitor all insertion sites, i.e. indwelling lines, tubes, and drains- Monitor endotracheal if appropriate and nasal secretions for changes in amount and color- [place] appropriate cooling/warming therapies per order- Administer medications as ordered- Instruct and encourage patient and family to use good hand hygiene technique- Identify and instruct in appropriate isolation precautions for identified infection/condition  Outcome: Progressing  Goal: Absence of fever/infection during neutropenic period  Description: INTERVENTIONS:- Monitor WBC  Outcome: Progressing     Problem: SAFETY ADULT  Goal: Patient will remain free of falls  Description: INTERVENTIONS:- Educate patient/family on patient safety including physical limitations  Outcome: Progressing  Goal: Maintain or return to baseline ADL function  Description: INTERVENTIONS:-  Assess patient's ability to carry out ADLs; assess patient's baseline for ADL function and identify physical deficits which impact ability to perform ADLs (bathing, care of mouth/teeth, toileting, grooming, dressing, etc.)- Assess/evaluate cause of self-care deficits - Assess range of motion- Assess patient's  mobility; develop plan if impaired- Assess patient's need for assistive devices and provide as appropriate- Encourage maximum independence but intervene and supervise when necessary- Involve family in performance of ADLs- Assess for home care needs following discharge - Consider OT consult to assist with ADL evaluation and planning for discharge- Provide patient education as appropriate  Outcome: Progressing  Goal: Maintains/Returns to pre admission functional level  Outcome: Progressing     Problem: DISCHARGE PLANNING  Goal: Discharge to home or other facility with appropriate resources  Description: INTERVENTIONS:- Identify barriers to discharge w/patient and caregiver- Arrange for needed discharge resources and transportation as appropriate- Identify discharge learning needs (meds, wound care, etc.)- Arrange for interpretive services to assist at discharge as needed- Refer to Case Management Department for coordinating discharge planning if the patient needs post-hospital services based on physician/advanced practitioner order or complex needs related to functional status, cognitive ability, or social support system  Outcome: Progressing     Problem: Knowledge Deficit  Goal: Patient/family/caregiver demonstrates understanding of disease process, treatment plan, medications, and discharge instructions  Description: Complete learning assessment and assess knowledge base.Interventions:- Provide teaching at level of understanding- Provide teaching via preferred learning methods  Outcome: Progressing     Problem: Alteration in Thoughts and Perception  Goal: Treatment Goal: Gain control of psychotic behaviors/thinking, reduce/eliminate presenting symptoms and demonstrate improved reality functioning upon discharge  Outcome: Progressing  Goal: Verbalize thoughts and feelings  Description: Interventions:- Promote a nonjudgmental and trusting relationship with the patient through active listening and therapeutic  communication- Assess patient's level of functioning, behavior and potential for risk- Engage patient in 1 on 1 interactions- Encourage patient to express fears, feelings, frustrations, and discuss symptoms  - [place] patient to reality, help patient recognize reality-based thinking - Administer medications as ordered and assess for potential side effects- Provide the patient education related to the signs and symptoms of the illness and desired effects of prescribed medications  Outcome: Progressing  Goal: Refrain from acting on delusional thinking/internal stimuli  Description: Interventions:- Monitor patient closely, per order - Utilize least restrictive measures - Set reasonable limits, give positive feedback for acceptable - Administer medications as ordered and monitor of potential side effects  Outcome: Progressing  Goal: Agree to be compliant with medication regime, as prescribed and report medication side effects  Description: Interventions:- Offer appropriate PRN medication and supervise ingestion; conduct AIMS, as needed   Outcome: Progressing  Goal: Attend and participate in unit activities, including therapeutic, recreational, and educational groups  Description: Interventions:-Encourage Visitation and family involvement in care  Outcome: Progressing  Goal: Recognize dysfunctional thoughts, communicate reality-based thoughts at the time of discharge  Description: Interventions:- Provide medication and psycho-education to assist patient in compliance and developing insight into his/her illness   Outcome: Progressing  Goal: Complete daily ADLs, including personal hygiene independently, as able  Description: Interventions:- Observe, teach, and assist patient with ADLS- Monitor and promote a balance of rest/activity, with adequate nutrition and elimination   Outcome: Progressing     Problem: Ineffective Coping  Goal: Cooperates with admission process  Description: Interventions: - Complete admission  process  Outcome: Progressing  Goal: Identifies ineffective coping skills  Outcome: Progressing  Goal: Identifies healthy coping skills  Outcome: Progressing  Goal: Demonstrates healthy coping skills  Outcome: Progressing  Goal: Participates in unit activities  Description: Interventions:- Provide therapeutic environment - Provide required programming - Redirect inappropriate behaviors   Outcome: Progressing  Goal: Patient/Family participate in treatment and DC plans  Description: Interventions:- Provide therapeutic environment  Outcome: Progressing  Goal: Patient/Family verbalizes awareness of resources  Outcome: Progressing  Goal: Understands least restrictive measures  Description: Interventions:- Utilize least restrictive behavior  Outcome: Progressing  Goal: Free from restraint events  Description: - Utilize least restrictive measures - Provide behavioral interventions - Redirect inappropriate behaviors   Outcome: Progressing     Problem: Risk for Self Injury/Neglect  Goal: Treatment Goal: Remain safe during length of stay, learn and adopt new coping skills, and be free of self-injurious ideation, impulses and acts at the time of discharge  Outcome: Progressing  Goal: Verbalize thoughts and feelings  Description: Interventions:- Assess and re-assess patient's lethality and potential for self-injury- Engage patient in 1:1 interactions, daily, for a minimum of 15 minutes- Encourage patient to express feelings, fears, frustrations, hopes- Establish rapport/trust with patient   Outcome: Progressing  Goal: Refrain from harming self  Description: Interventions:- Monitor patient closely, per order- Develop a trusting relationship- Supervise medication ingestion, monitor effects and side effects   Outcome: Progressing  Goal: Attend and participate in unit activities, including therapeutic, recreational, and educational groups  Description: Interventions:- Provide therapeutic and educational activities daily, encourage  attendance and participation, and document same in the medical record- Obtain collateral information, encourage visitation and family involvement in care   Outcome: Progressing  Goal: Recognize maladaptive responses and adopt new coping mechanisms  Outcome: Progressing  Goal: Complete daily ADLs, including personal hygiene independently, as able  Description: Interventions:- Observe, teach, and assist patient with ADLS- Monitor and promote a balance of rest/activity, with adequate nutrition and elimination  Outcome: Progressing     Problem: Depression  Goal: Treatment Goal: Demonstrate behavioral control of depressive symptoms, verbalize feelings of improved mood/affect, and adopt new coping skills prior to discharge  Outcome: Progressing  Goal: Verbalize thoughts and feelings  Description: Interventions:- Assess and re-assess patient's level of risk - Engage patient in 1:1 interactions, daily, for a minimum of 15 minutes - Encourage patient to express feelings, fears, frustrations, hopes   Outcome: Progressing  Goal: Refrain from harming self  Description: Interventions:- Monitor patient closely, per order - Supervise medication ingestion, monitor effects and side effects   Outcome: Progressing  Goal: Refrain from isolation  Description: Interventions:- Develop a trusting relationship - Encourage socialization   Outcome: Progressing  Goal: Refrain from self-neglect  Outcome: Progressing  Goal: Attend and participate in unit activities, including therapeutic, recreational, and educational groups  Description: Interventions:- Provide therapeutic and educational activities daily, encourage attendance and participation, and document same in the medical record   Outcome: Progressing  Goal: Complete daily ADLs, including personal hygiene independently, as able  Description: Interventions:- Observe, teach, and assist patient with ADLS-  Monitor and promote a balance of rest/activity, with adequate nutrition and  elimination   Outcome: Progressing     Problem: Anxiety  Goal: Anxiety is at manageable level  Description: Interventions:- Assess and monitor patient's anxiety level. - Monitor for signs and symptoms (heart palpitations, chest pain, shortness of breath, headaches, nausea, feeling jumpy, restlessness, irritable, apprehensive). - Collaborate with interdisciplinary team and initiate plan and interventions as ordered.- [place] patient to unit/surroundings- Explain treatment plan- Encourage participation in care- Encourage verbalization of concerns/fears- Identify coping mechanisms- Assist in developing anxiety-reducing skills- Administer/offer alternative therapies- Limit or eliminate stimulants  Outcome: Progressing     Problem: Risk for Violence/Aggression Toward Others  Goal: Treatment Goal: Refrain from acts of violence/aggression during length of stay, and demonstrate improved impulse control at the time of discharge  Outcome: Progressing  Goal: Verbalize thoughts and feelings  Description: Interventions:- Assess and re-assess patient's level of risk, every waking shift- Engage patient in 1:1 interactions, daily, for a minimum of 15 minutes - Allow patient to express feelings and frustrations in a safe and non-threatening manner - Establish rapport/trust with patient   Outcome: Progressing  Goal: Refrain from harming others  Outcome: Progressing  Goal: Refrain from destructive acts on the environment or property  Outcome: Progressing  Goal: Control angry outbursts  Description: Interventions:- Monitor patient closely, per order- Ensure early verbal de-escalation- Monitor prn medication needs- Set reasonable/therapeutic limits, outline behavioral expectations, and consequences - Provide a non-threatening milieu, utilizing the least restrictive interventions   Outcome: Progressing  Goal: Attend and participate in unit activities, including therapeutic, recreational, and educational groups  Description:  Interventions:- Provide therapeutic and educational activities daily, encourage attendance and participation, and document same in the medical record   Outcome: Progressing  Goal: Identify appropriate positive anger management techniques  Description: Interventions:- Offer anger management and coping skills groups - Staff will provide positive feedback for appropriate anger control  Outcome: Progressing     Problem: Alteration in Orientation  Goal: Treatment Goal: Demonstrate a reduction of confusion and improved orientation to person, place, time and/or situation upon discharge, according to optimum baseline/ability  Outcome: Progressing  Goal: Interact with staff daily  Description: Interventions:- Assess and re-assess patient's level of orientation- Engage patient in 1 on 1 interactions, daily, for a minimum of 15 minutes - Establish rapport/trust with patient   Outcome: Progressing  Goal: Express concerns related to confused thinking related to:  Description: Interventions:- Encourage patient to express feelings, fears, frustrations, hopes- Assign consistent caregivers - [place]/re-orient patient as needed- Allow comfort items, as appropriate- Provide visual cues, signs, etc.   Outcome: Progressing  Goal: Allow medical examinations, as recommended  Description: Interventions:- Provide physical/neurological exams and/or referrals, per provider   Outcome: Progressing  Goal: Cooperate with recommended testing/procedures  Description: Interventions:- Determine need for ancillary testing- Observe for mental status changes- Implement falls/precaution protocol   Outcome: Progressing  Goal: Attend and participate in unit activities, including therapeutic, recreational, and educational groups  Description: Interventions:- Provide therapeutic and educational activities daily, encourage attendance and participation, and document same in the medical record - Provide appropriate opportunities for reminiscence - Provide a  consistent daily routine - Encourage family contact/visitation   Outcome: Progressing  Goal: Complete daily ADLs, including personal hygiene independently, as able  Description: Interventions:- Observe, teach, and assist patient with ADLS  Outcome: Progressing     Problem: Individualized Interventions  Goal: Patient will verbalize appropriate use of telephone within 5 days  Description: Interventions:- Treatment team to determine use of supervised phone privileges   Outcome: Progressing  Goal: Patient will verbalize need for hospitalization and will no longer attempt elopement within 5 days  Description: Interventions:- Ongoing education to help patient understand need for hospitalization  Outcome: Progressing  Goal: Patient will recognize inappropriate behaviors and develop alternative behaviors within 5 days  Description: Interventions:- Patient in collaboration with Treatment Team will develop a behavior management plan to help identify effective coping skills to deal with stressors  Outcome: Progressing

## 2025-04-14 NOTE — NURSING NOTE
"Patient calm and cooperative, with disorganized and rambling speech. Preoccupied and delusional about roommate stated \" she is dangerous, she wants to have sex with me. I don't want sex I am 70.\" Patient redirected, not effective stated \" I am not sleeping in the room with her\". Denies all psych s/s. Medication compliant. Safety precautions maintained.    "

## 2025-04-14 NOTE — ASSESSMENT & PLAN NOTE
As of 0/14/2025 the patient appears less sexually preoccupied and is denying symptoms.  She continues to endorse some poor sleep and at times is tangential.    Continue AM dose of Zyprexa to 7.5mg and continue PM Zyprexa 7.5 mg qHS due to current symptoms of carlie (increased on 4/14/25)  Continue Trazodone to 100mg PO QHS for insomnia (started on 4/13/2025)  Continue Klonopin 0.5mg PO BID for manic behaviors, can utilize until Zyprexa is at a therapeutic dose (started on 4/13/2025)  Unable to initiate lithium as patient reports worsening kidney function while taking lithium, liver enzymes currently increased so will avoid Depakote at this time  CMP on 04/14 patient LFTs WNL

## 2025-04-14 NOTE — NURSING NOTE
Pt room was changed due to refusing to sleep in room with roommate. Pt became agitated and also refused to sleep in new room assignment due to a male peer located in a nearby room. Pt resistant to explanation and reassurances provided. Pt became agitated and slamming hand on nurse desk several times. Pt accepted zyprexa po for moderate agitation, will monitor for effectiveness.

## 2025-04-15 LAB
BACTERIA BLD CULT: NORMAL
BACTERIA BLD CULT: NORMAL
GLUCOSE SERPL-MCNC: 104 MG/DL (ref 65–140)
GLUCOSE SERPL-MCNC: 111 MG/DL (ref 65–140)
GLUCOSE SERPL-MCNC: 192 MG/DL (ref 65–140)
GLUCOSE SERPL-MCNC: 204 MG/DL (ref 65–140)

## 2025-04-15 PROCEDURE — 82948 REAGENT STRIP/BLOOD GLUCOSE: CPT

## 2025-04-15 PROCEDURE — 99232 SBSQ HOSP IP/OBS MODERATE 35: CPT | Performed by: STUDENT IN AN ORGANIZED HEALTH CARE EDUCATION/TRAINING PROGRAM

## 2025-04-15 RX ORDER — OLANZAPINE 10 MG/1
10 TABLET, FILM COATED ORAL
Status: DISCONTINUED | OUTPATIENT
Start: 2025-04-15 | End: 2025-04-17

## 2025-04-15 RX ADMIN — CEPHALEXIN 500 MG: 500 CAPSULE ORAL at 00:29

## 2025-04-15 RX ADMIN — OLANZAPINE 10 MG: 10 TABLET, FILM COATED ORAL at 21:14

## 2025-04-15 RX ADMIN — TRAZODONE HYDROCHLORIDE 100 MG: 100 TABLET ORAL at 21:14

## 2025-04-15 RX ADMIN — CEPHALEXIN 500 MG: 500 CAPSULE ORAL at 06:11

## 2025-04-15 RX ADMIN — NYSTATIN: 100000 POWDER TOPICAL at 08:09

## 2025-04-15 RX ADMIN — LEVOTHYROXINE SODIUM 88 MCG: 88 TABLET ORAL at 06:11

## 2025-04-15 RX ADMIN — PRAVASTATIN SODIUM 40 MG: 40 TABLET ORAL at 08:05

## 2025-04-15 RX ADMIN — MUPIROCIN: 20 OINTMENT TOPICAL at 08:09

## 2025-04-15 RX ADMIN — CLONAZEPAM 0.5 MG: 0.5 TABLET ORAL at 08:05

## 2025-04-15 RX ADMIN — CLONAZEPAM 0.5 MG: 0.5 TABLET ORAL at 17:25

## 2025-04-15 RX ADMIN — APIXABAN 10 MG: 5 TABLET, FILM COATED ORAL at 08:05

## 2025-04-15 RX ADMIN — CEPHALEXIN 500 MG: 500 CAPSULE ORAL at 17:25

## 2025-04-15 RX ADMIN — APIXABAN 10 MG: 5 TABLET, FILM COATED ORAL at 17:25

## 2025-04-15 RX ADMIN — INSULIN LISPRO 2 UNITS: 100 INJECTION, SOLUTION INTRAVENOUS; SUBCUTANEOUS at 17:23

## 2025-04-15 RX ADMIN — OLANZAPINE 7.5 MG: 2.5 TABLET, FILM COATED ORAL at 08:05

## 2025-04-15 RX ADMIN — LISINOPRIL 5 MG: 5 TABLET ORAL at 08:05

## 2025-04-15 RX ADMIN — INSULIN LISPRO 2 UNITS: 100 INJECTION, SOLUTION INTRAVENOUS; SUBCUTANEOUS at 13:10

## 2025-04-15 RX ADMIN — NYSTATIN: 100000 POWDER TOPICAL at 21:15

## 2025-04-15 RX ADMIN — CEPHALEXIN 500 MG: 500 CAPSULE ORAL at 13:10

## 2025-04-15 RX ADMIN — CEPHALEXIN 500 MG: 500 CAPSULE ORAL at 23:45

## 2025-04-15 NOTE — WOUND OSTOMY CARE
Progress Note - Wound   Lisa A McGorry 69 y.o. female MRN: [id number]  Unit/Bed#: [facility] 605-02 Encounter: [id number]        Assessment:   Patient is seen for wound care follow-up.     Findings:  Wound is dry intact stable  Continue treatment to toe of 3M and dsd.  Wound care will sign off  Re consult wound care in needed    Skin care plans:  1-Left Lateral 3rd Toe Eschar: Apply 3m no sting skin prep to eschar daily.   WOUNDS:  Wound 04/01/25 Diabetic Ulcer Toe D3, third Left (Active)   Wound Image   04/15/25 1012   Wound Description Brown;Intact;Dry 04/15/25 1012   Non-staged Wound Description Partial thickness 04/15/25 1012   Wound Length (cm) 0.8 cm 04/15/25 1012   Wound Width (cm) 0.4 cm 04/15/25 1012   Wound Depth (cm) 0.1 cm 04/15/25 1012   Wound Surface Area (cm^2) 0.32 cm^2 04/15/25 1012   Wound Volume (cm^3) 0.032 cm^3 04/15/25 1012   Calculated Wound Volume (cm^3) 0.03 cm^3 04/15/25 1012   Drainage Amount ANDREAS 04/15/25 1012   [place]by-wound Assessment Clean;Dry 04/15/25 1012   Treatments Cleansed;Site care 04/15/25 1012   Dressing Protective barrier 04/15/25 1012   Dressing Changed Reinforced 04/15/25 1012   Patient Tolerance Tolerated well 04/15/25 1012   Dressing Status Reinforced 04/15/25 1012          Wound Care will sign off  Call or Secure Chat  with any questions    Rafaela BARNESN RN [facility]

## 2025-04-15 NOTE — ASSESSMENT & PLAN NOTE
As of 04/15/2025 the patient remains tangential with rapid, pressured speech but otherwise is denying symptoms.  She was in agreement to titrate Zyprexa.     Increase PM Zyprexa dose to 10 mg and continue AM dose of Zyprexa to 7.5 mg due to current symptoms of carlie (increased on 4/14/25)  Continue Trazodone to 100mg PO QHS for insomnia (started on 4/13/2025)  Continue Klonopin 0.5mg PO BID for manic behaviors, can utilize until Zyprexa is at a therapeutic dose (started on 4/13/2025)  Unable to initiate lithium as patient reports worsening kidney function while taking lithium, liver enzymes currently increased so will avoid Depakote at this time  CMP on 04/14 patient LFTs WNL

## 2025-04-15 NOTE — PROGRESS NOTES
Progress Note - Behavioral Health   Name: Lisa Bocanegraorry 69 y.o. female I MRN: [id number]  Unit/Bed#: [facility] 605-02 I Date of Admission: 4/8/2025   Date of Service: 4/15/2025 I Hospital Day: 7    Assessment & Plan  Bipolar affective disorder, current episode manic (HCC)  As of 04/15/2025 the patient remains tangential with rapid, pressured speech but otherwise is denying symptoms.  She was in agreement to titrate Zyprexa.     Increase PM Zyprexa dose to 10 mg and continue AM dose of Zyprexa to 7.5 mg due to current symptoms of carlie (increased on 4/14/25)  Continue Trazodone to 100mg PO QHS for insomnia (started on 4/13/2025)  Continue Klonopin 0.5mg PO BID for manic behaviors, can utilize until Zyprexa is at a therapeutic dose (started on 4/13/2025)  Unable to initiate lithium as patient reports worsening kidney function while taking lithium, liver enzymes currently increased so will avoid Depakote at this time  CMP on 04/14 patient LFTs WNL    Current medications:  Current Facility-Administered Medications   Medication Dose Route Frequency Provider Last Rate    acetaminophen  650 mg Oral Q4H PRN STEVE Dawson      acetaminophen  650 mg Oral Q4H PRN STEVE Dawson      acetaminophen  975 mg Oral Q6H PRN STEVE Dawson      aluminum-magnesium hydroxide-simethicone  30 mL Oral Q4H PRN STEVE Dawson      apixaban  10 mg Oral BID STEVE Murcia      [START ON 4/18/2025] apixaban  5 mg Oral BID STEVE Murcia      benztropine  1 mg Intramuscular Q4H PRN Max 6/day STEVE Dawson      benztropine  0.5 mg Oral Q4H PRN Max 6/day STEVE Dawson      bisacodyl  10 mg Rectal Daily PRN STEVE Dawson      cephalexin  500 mg Oral Q6H DANYEL Lawson DO      clonazePAM  0.5 mg Oral BID STEVE Dawson      hydrOXYzine HCL  25 mg Oral Q6H PRN Max 4/day STEVE Dawson      hydrOXYzine HCL  50 mg Oral Q6H PRN Max 4/day STEVE Dawson      insulin lispro  1-6 Units  Subcutaneous TID AC Lance Zayas MD      insulin lispro  1-6 Units Subcutaneous HS Lance Zayas MD      levothyroxine  88 mcg Oral Early Morning Lance Zayas MD      lisinopril  5 mg Oral Daily Lance Zayas MD      LORazepam  1 mg Intramuscular Q6H PRN Max 3/day Gianna Perera, CRMATHEW      LORazepam  1 mg Oral Q6H PRN Max 3/day Gianna Perera, STEVE      mupirocin   Topical Daily Saad Butts, DPM      nystatin   Topical BID Lance Zayas MD      OLANZapine  5 mg Intramuscular Q3H PRN Max 3/day Gianna Perera, STEVE      OLANZapine  2.5 mg Oral Q4H PRN Max 6/day Gianna Perera, CRNP      OLANZapine  5 mg Oral Q4H PRN Max 3/day Gianna Perera, CRNP      OLANZapine  5 mg Oral Q3H PRN Max 3/day Gianna Perera, LISANP      OLANZapine  7.5 mg Oral HS Rachel Myrick,       OLANZapine  7.5 mg Oral Daily Gianna Perera, CRNP      polyethylene glycol  17 g Oral Daily PRN Gianna Perera, CRNP      pravastatin  40 mg Oral Daily Lance Zayas MD      propranolol  5 mg Oral Q8H PRN Gianna Perera, STEVE      senna-docusate sodium  1 tablet Oral Daily PRN Gianna Perera, CRNP      traZODone  100 mg Oral HS Gianna Perera, CRNP      traZODone  50 mg Oral HS PRN Gianna Perera, STEVE          Risks/Benefits of Treatment:     Risks, benefits, and possible side effects of medications explained to patient and patient verbalizes understanding and agreement for treatment.    Progress Toward Goals: improving    Treatment Planning:      - Encourage early mobility and having a structured day  - Provide frequent re-orientation, and cognitive stimulation  - Ensure assistive devices are in proper working order (eye-glasses, hearing aids)  - Encourage adequate hydration, nutrition and monitor bowel movements  - Maintain sleep-wake cycle: Uninterrupted sleep time; low-level lighting at night  - Fall precaution  - f/u SLIM recs regarding the medical problems   - Continue medication titration and  "treatment plan; adjust medication to optimize treatment response and as clinically indicated.   - Observation:Routine  - Legal Status: 201  - VS: as per unit protocol  - Encourage group attendance and milieu therapy  - Dispo: To be determined  - Estimated Discharge Day: 5/5/2025     Subjective       Patient was visited on unit for continuing care; chart reviewed and discussed with multidisciplinary treatment team.     Patient continues to be visible in the milieu and interacts with staff and peers.  Per nursing the patient remains bizarre and was briefly agitated about having to change rooms which caused her to receive as needed Zyprexa p.o.  Otherwise, has been in good behavioral control.  She remains sexually preoccupied.     Patient accepted all offered medications and no adverse effects of medications noted or reported.    Patient today was bright upon approach.  She continues to have rapid, pressured speech and is tangential.  She reported her mood is \"good.\"  Patient reports that she is sleeping well and has an adequate appetite.  She denied medication side effects.  She was in agreement to increase her evening dose of Zyprexa.  She denied suicidal and homicidal ideation.  She denied auditory and visual hallucinations.    Sleep: normal  Appetite: normal  Medication side effects: No  ROS: review of systems as noted above in HPI/Subjective report, all other systems are negative    Objective :  Temp:  [96.9 °F (36.1 °C)-97 °F (36.1 °C)] 97 °F (36.1 °C)  HR:  [87-98] 98  BP: (106-146)/(64-72) 146/72  Resp:  [18] 18  SpO2:  [95 %-98 %] 95 %  O2 Device: None (Room air)    Temp:  [96.9 °F (36.1 °C)-97 °F (36.1 °C)] 97 °F (36.1 °C)  HR:  [87-98] 98  BP: (106-146)/(64-72) 146/72  Resp:  [18] 18  SpO2:  [95 %-98 %] 95 %  O2 Device: None (Room air)    Mental Status Evaluation:    Appearance:  casually dressed, marginal hygiene, looks stated age, overweight   Behavior:  pleasant, cooperative   Speech:  increased rate, " "pressured   Mood:  \"Good.\"   Affect:  normal range and intensity   Thought Process:  tangential   Thought Content:  no overt delusions   Perceptual Disturbances: no auditory hallucinations, no visual hallucinations, does not appear responding to internal stimuli   Risk Potential: Suicidal Ideation -  None at present  Homicidal Ideation -  None at present  Potential for Aggression - Not at present   Sensorium:  oriented to person, place, and time/date   Memory:  recent and remote memory grossly intact   Consciousness:  alert and awake   Attention/Concentration: attention span and concentration are age appropriate   Insight:  limited   Judgment: limited   Gait/Station: normal gait/station, normal balance   Motor Activity: no abnormal movements         Lab Results: I have reviewed the following results:  Results from the past 24 hours:   Recent Results (from the past 24 hours)   Fingerstick Glucose (POCT)    Collection Time: 04/14/25 11:46 AM   Result Value Ref Range    POC Glucose 166 (H) 65 - 140 mg/dl   Fingerstick Glucose (POCT)    Collection Time: 04/14/25  4:08 PM   Result Value Ref Range    POC Glucose 213 (H) 65 - 140 mg/dl   Fingerstick Glucose (POCT)    Collection Time: 04/14/25  9:25 PM   Result Value Ref Range    POC Glucose 219 (H) 65 - 140 mg/dl   Fingerstick Glucose (POCT)    Collection Time: 04/15/25  7:19 AM   Result Value Ref Range    POC Glucose 104 65 - 140 mg/dl   Fingerstick Glucose (POCT)    Collection Time: 04/15/25 11:24 AM   Result Value Ref Range    POC Glucose 204 (H) 65 - 140 mg/dl       Administrative Statements     Counseling / Coordination of Care:   Patient's progress discussed with staff in treatment team meeting.  Medication changes reviewed with staff in treatment team meeting..    Andrae Samuels DO 04/15/25  PGY-II  "

## 2025-04-15 NOTE — PLAN OF CARE
Problem: SAFETY ADULT  Goal: Patient will remain free of falls  Description: INTERVENTIONS:- Educate patient/family on patient safety including physical limitations  Outcome: Progressing     Problem: Alteration in Thoughts and Perception  Goal: Treatment Goal: Gain control of psychotic behaviors/thinking, reduce/eliminate presenting symptoms and demonstrate improved reality functioning upon discharge  Outcome: Progressing  Goal: Verbalize thoughts and feelings  Description: Interventions:- Promote a nonjudgmental and trusting relationship with the patient through active listening and therapeutic communication- Assess patient's level of functioning, behavior and potential for risk- Engage patient in 1 on 1 interactions- Encourage patient to express fears, feelings, frustrations, and discuss symptoms  - [place] patient to reality, help patient recognize reality-based thinking - Administer medications as ordered and assess for potential side effects- Provide the patient education related to the signs and symptoms of the illness and desired effects of prescribed medications  Outcome: Progressing  Goal: Refrain from acting on delusional thinking/internal stimuli  Description: Interventions:- Monitor patient closely, per order - Utilize least restrictive measures - Set reasonable limits, give positive feedback for acceptable - Administer medications as ordered and monitor of potential side effects  Outcome: Progressing  Goal: Agree to be compliant with medication regime, as prescribed and report medication side effects  Description: Interventions:- Offer appropriate PRN medication and supervise ingestion; conduct AIMS, as needed   Outcome: Progressing     Problem: Ineffective Coping  Goal: Identifies healthy coping skills  Outcome: Progressing     Problem: Risk for Self Injury/Neglect  Goal: Treatment Goal: Remain safe during length of stay, learn and adopt new coping skills, and be free of self-injurious ideation,  impulses and acts at the time of discharge  Outcome: Progressing  Goal: Refrain from harming self  Description: Interventions:- Monitor patient closely, per order- Develop a trusting relationship- Supervise medication ingestion, monitor effects and side effects   Outcome: Progressing     Problem: Depression  Goal: Treatment Goal: Demonstrate behavioral control of depressive symptoms, verbalize feelings of improved mood/affect, and adopt new coping skills prior to discharge  Outcome: Progressing  Goal: Refrain from isolation  Description: Interventions:- Develop a trusting relationship - Encourage socialization   Outcome: Progressing  Goal: Refrain from self-neglect  Outcome: Progressing     Problem: Anxiety  Goal: Anxiety is at manageable level  Description: Interventions:- Assess and monitor patient's anxiety level. - Monitor for signs and symptoms (heart palpitations, chest pain, shortness of breath, headaches, nausea, feeling jumpy, restlessness, irritable, apprehensive). - Collaborate with interdisciplinary team and initiate plan and interventions as ordered.- [place] patient to unit/surroundings- Explain treatment plan- Encourage participation in care- Encourage verbalization of concerns/fears- Identify coping mechanisms- Assist in developing anxiety-reducing skills- Administer/offer alternative therapies- Limit or eliminate stimulants  Outcome: Progressing

## 2025-04-15 NOTE — PROGRESS NOTES
04/15/25 0800   Team Meeting   Meeting Type Daily Rounds   Team Members Present   Team Members Present Physician;Nurse;   Physician Team Member Tiny/Dilma/Ramez   Nursing Team Member Rafaela/Carmen/Linda   Care Management Team Member Stanford   OT Team Member Alix   Patient/Family Present   Patient Present No   Patient's Family Present No     Patient was agitated regarding a room change this morning. She continues to be sexually preoccupied. She is bizarre at times but is compliant with medications. She had a BM and shower on the 14th. She is eating well. She is on Keflex for cellulitis. She is attending groups. Patient discharge is pending stabilization of mood and medications.

## 2025-04-15 NOTE — NURSING NOTE
No agitation noted, patient is calm, visible in the milieu, social, and pleasant. Patient denies all psych s/s. Patient is medication compliant. Safety checks ongoing.
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
## 2025-04-16 LAB
GLUCOSE SERPL-MCNC: 104 MG/DL (ref 65–140)
GLUCOSE SERPL-MCNC: 152 MG/DL (ref 65–140)
GLUCOSE SERPL-MCNC: 162 MG/DL (ref 65–140)
GLUCOSE SERPL-MCNC: 174 MG/DL (ref 65–140)

## 2025-04-16 PROCEDURE — 82948 REAGENT STRIP/BLOOD GLUCOSE: CPT

## 2025-04-16 RX ORDER — CEPHALEXIN 500 MG/1
500 CAPSULE ORAL EVERY 8 HOURS SCHEDULED
Status: COMPLETED | OUTPATIENT
Start: 2025-04-16 | End: 2025-04-18

## 2025-04-16 RX ORDER — CLONAZEPAM 0.5 MG/1
0.25 TABLET ORAL 2 TIMES DAILY
Status: DISCONTINUED | OUTPATIENT
Start: 2025-04-16 | End: 2025-05-01

## 2025-04-16 RX ADMIN — INSULIN LISPRO 1 UNITS: 100 INJECTION, SOLUTION INTRAVENOUS; SUBCUTANEOUS at 21:19

## 2025-04-16 RX ADMIN — INSULIN LISPRO 1 UNITS: 100 INJECTION, SOLUTION INTRAVENOUS; SUBCUTANEOUS at 17:07

## 2025-04-16 RX ADMIN — OLANZAPINE 10 MG: 10 TABLET, FILM COATED ORAL at 21:19

## 2025-04-16 RX ADMIN — CLONAZEPAM 0.25 MG: 0.5 TABLET ORAL at 17:04

## 2025-04-16 RX ADMIN — CEPHALEXIN 500 MG: 500 CAPSULE ORAL at 06:02

## 2025-04-16 RX ADMIN — OLANZAPINE 7.5 MG: 2.5 TABLET, FILM COATED ORAL at 08:54

## 2025-04-16 RX ADMIN — PRAVASTATIN SODIUM 40 MG: 40 TABLET ORAL at 08:55

## 2025-04-16 RX ADMIN — CEPHALEXIN 500 MG: 500 CAPSULE ORAL at 14:25

## 2025-04-16 RX ADMIN — TRAZODONE HYDROCHLORIDE 100 MG: 100 TABLET ORAL at 21:19

## 2025-04-16 RX ADMIN — LISINOPRIL 5 MG: 5 TABLET ORAL at 08:55

## 2025-04-16 RX ADMIN — CEPHALEXIN 500 MG: 500 CAPSULE ORAL at 21:19

## 2025-04-16 RX ADMIN — LEVOTHYROXINE SODIUM 88 MCG: 88 TABLET ORAL at 06:02

## 2025-04-16 RX ADMIN — MUPIROCIN: 20 OINTMENT TOPICAL at 12:22

## 2025-04-16 RX ADMIN — NYSTATIN: 100000 POWDER TOPICAL at 12:22

## 2025-04-16 RX ADMIN — NYSTATIN: 100000 POWDER TOPICAL at 19:10

## 2025-04-16 RX ADMIN — APIXABAN 10 MG: 5 TABLET, FILM COATED ORAL at 08:55

## 2025-04-16 RX ADMIN — INSULIN LISPRO 1 UNITS: 100 INJECTION, SOLUTION INTRAVENOUS; SUBCUTANEOUS at 12:10

## 2025-04-16 RX ADMIN — APIXABAN 10 MG: 5 TABLET, FILM COATED ORAL at 17:04

## 2025-04-16 RX ADMIN — CLONAZEPAM 0.5 MG: 0.5 TABLET ORAL at 08:55

## 2025-04-16 NOTE — NURSING NOTE
Patient visible in milieu. Social with peers. Hyperverbal and tangential speech. Denies all psychiatric s/s. Compliant with all offered medications. Denies any additional needs at this time.

## 2025-04-16 NOTE — PROGRESS NOTES
Pastoral Care Progress Note          Chaplaincy Interventions Utilized:   Empowerment: Encouraged focus on present, Encouraged self-care, Facilitated group experience, and Normalized experience of patient/family    Exploration: Explored hope, Explored emotional needs & resources, and Explored spiritual needs & resources    Relationship Building: Cultivated a relationship of care and support, Listened empathically, and Hospitality    The pt participated in spirituality group facilitated by the  today. Pt was active in discussion.

## 2025-04-16 NOTE — CASE MANAGEMENT
CM reached out to patient pharmacy as Elliquis was recently started and it is not clear wether or not it is covered or needs a prior auth. Pharmacist stated that medication was filled and there is no co-pay for patient.     [facility]   [phone]

## 2025-04-16 NOTE — ASSESSMENT & PLAN NOTE
As of 04/16/2025 patient is less labile and tangential.  She is denying psych symptoms.  However, patient is reporting chills and does appear more lethargic than days prior.  Klonopin could be attributing to the patient's lethargy.    Continue PM Zyprexa dose 10 mg and AM dose of Zyprexa to 7.5 mg due to current symptoms of carlie (increased on 4/14/25)  Continue Trazodone to 100mg PO QHS for insomnia (started on 4/13/2025)  Decrease Klonopin to 0.25mg PO BID for manic behaviors, can utilize until Zyprexa is at a therapeutic dose (started on 4/13/2025)  Unable to initiate lithium as patient reports worsening kidney function while taking lithium, liver enzymes currently increased so will avoid Depakote at this time  CMP on 04/14 patient LFTs WNL

## 2025-04-16 NOTE — NURSING NOTE
Patient visible in milieu but appears more sedated than previous days. Speech is still rambling and tangential. Denies all psychiatric s/s and pain. Remains cooperative with all offered medications. Denies any additional needs at this time.

## 2025-04-16 NOTE — NURSING NOTE
Patient stated she is sleepy and tired today. Patient was advised to use walker while ambulating but refused it stating she cannot to do her Janene dance with the walker. Patient was resting in the bed and when was checked on she started moving in the bed saying today I'm surfing making the moves . Had good food intake. Denies Si,[place]

## 2025-04-16 NOTE — PLAN OF CARE
Problem: Alteration in Thoughts and Perception  Goal: Verbalize thoughts and feelings  Description: Interventions:- Promote a nonjudgmental and trusting relationship with the patient through active listening and therapeutic communication- Assess patient's level of functioning, behavior and potential for risk- Engage patient in 1 on 1 interactions- Encourage patient to express fears, feelings, frustrations, and discuss symptoms  - [place] patient to reality, help patient recognize reality-based thinking - Administer medications as ordered and assess for potential side effects- Provide the patient education related to the signs and symptoms of the illness and desired effects of prescribed medications  Outcome: Progressing     Problem: Ineffective Coping  Goal: Participates in unit activities  Description: Interventions:- Provide therapeutic environment - Provide required programming - Redirect inappropriate behaviors   Outcome: Progressing     Problem: Risk for Self Injury/Neglect  Goal: Treatment Goal: Remain safe during length of stay, learn and adopt new coping skills, and be free of self-injurious ideation, impulses and acts at the time of discharge  Outcome: Progressing  Goal: Verbalize thoughts and feelings  Description: Interventions:- Assess and re-assess patient's lethality and potential for self-injury- Engage patient in 1:1 interactions, daily, for a minimum of 15 minutes- Encourage patient to express feelings, fears, frustrations, hopes- Establish rapport/trust with patient   Outcome: Progressing  Goal: Refrain from harming self  Description: Interventions:- Monitor patient closely, per order- Develop a trusting relationship- Supervise medication ingestion, monitor effects and side effects   Outcome: Progressing  Goal: Attend and participate in unit activities, including therapeutic, recreational, and educational groups  Description: Interventions:- Provide therapeutic and educational activities daily,  encourage attendance and participation, and document same in the medical record- Obtain collateral information, encourage visitation and family involvement in care   Outcome: Progressing  Goal: Recognize maladaptive responses and adopt new coping mechanisms  Outcome: Progressing  Goal: Complete daily ADLs, including personal hygiene independently, as able  Description: Interventions:- Observe, teach, and assist patient with ADLS- Monitor and promote a balance of rest/activity, with adequate nutrition and elimination  Outcome: Progressing     Problem: Depression  Goal: Verbalize thoughts and feelings  Description: Interventions:- Assess and re-assess patient's level of risk - Engage patient in 1:1 interactions, daily, for a minimum of 15 minutes - Encourage patient to express feelings, fears, frustrations, hopes   Outcome: Progressing

## 2025-04-16 NOTE — PROGRESS NOTES
Progress Note - Behavioral Health   Name: Lisa ZARATE McGorry 69 y.o. female I MRN: [id number]  Unit/Bed#: [facility] 605-02 I Date of Admission: 4/8/2025   Date of Service: 4/16/2025 I Hospital Day: 8    Assessment & Plan  Bipolar affective disorder, current episode manic (HCC)  As of 04/16/2025 patient is less labile and tangential.  She is denying psych symptoms.  However, patient is reporting chills and does appear more lethargic than days prior.  Klonopin could be attributing to the patient's lethargy.    Continue PM Zyprexa dose 10 mg and AM dose of Zyprexa to 7.5 mg due to current symptoms of carlie (increased on 4/14/25)  Continue Trazodone to 100mg PO QHS for insomnia (started on 4/13/2025)  Decrease Klonopin to 0.25mg PO BID for manic behaviors, can utilize until Zyprexa is at a therapeutic dose (started on 4/13/2025)  Unable to initiate lithium as patient reports worsening kidney function while taking lithium, liver enzymes currently increased so will avoid Depakote at this time  CMP on 04/14 patient LFTs WNL    Current medications:  Current Facility-Administered Medications   Medication Dose Route Frequency Provider Last Rate    acetaminophen  650 mg Oral Q4H PRN STEVE Dawson      acetaminophen  650 mg Oral Q4H PRN STEVE Dawson      acetaminophen  975 mg Oral Q6H PRN STEVE Dawson      aluminum-magnesium hydroxide-simethicone  30 mL Oral Q4H PRN STEVE Dawson      apixaban  10 mg Oral BID STEVE Murcia      [START ON 4/18/2025] apixaban  5 mg Oral BID STEVE Murcia      benztropine  1 mg Intramuscular Q4H PRN Max 6/day STEVE Dawson      benztropine  0.5 mg Oral Q4H PRN Max 6/day STEVE Dawson      bisacodyl  10 mg Rectal Daily PRN STEVE Dawson      cephalexin  500 mg Oral Q6H DANYEL Lawson DO      clonazePAM  0.5 mg Oral BID STEVE Dawson      hydrOXYzine HCL  25 mg Oral Q6H PRN Max 4/day STEVE Dawson      hydrOXYzine HCL  50 mg Oral  Q6H PRN Max 4/day STEVE Dawson      insulin lispro  1-6 Units Subcutaneous TID AC Lance Zayas MD      insulin lispro  1-6 Units Subcutaneous HS Lance Zayas MD      levothyroxine  88 mcg Oral Early Morning Lance Zayas MD      lisinopril  5 mg Oral Daily Lance Zayas MD      LORazepam  1 mg Intramuscular Q6H PRN Max 3/day STEVE Dawson      LORazepam  1 mg Oral Q6H PRN Max 3/day STEVE Dawson      mupirocin   Topical Daily Saad Butts, DPM      nystatin   Topical BID Lance Zayas MD      OLANZapine  5 mg Intramuscular Q3H PRN Max 3/day STEVE Dawson      OLANZapine  10 mg Oral HS Andrae Samuels DO      OLANZapine  2.5 mg Oral Q4H PRN Max 6/day STEVE Dawson      OLANZapine  5 mg Oral Q4H PRN Max 3/day STEVE Dawson      OLANZapine  5 mg Oral Q3H PRN Max 3/day STEVE Dawson      OLANZapine  7.5 mg Oral Daily STEVE Dawson      polyethylene glycol  17 g Oral Daily PRN STEVE Dawson      pravastatin  40 mg Oral Daily Lanec Zayas MD      propranolol  5 mg Oral Q8H PRN STEVE Dawson      senna-docusate sodium  1 tablet Oral Daily PRN STEVE Dawson      traZODone  100 mg Oral HS STEVE Dawson      traZODone  50 mg Oral HS PRN STEVE Dawson          Risks/Benefits of Treatment:     Risks, benefits, and possible side effects of medications explained to patient and patient verbalizes understanding and agreement for treatment.    Progress Toward Goals: improving    Treatment Planning:      - Encourage early mobility and having a structured day  - Provide frequent re-orientation, and cognitive stimulation  - Ensure assistive devices are in proper working order (eye-glasses, hearing aids)  - Encourage adequate hydration, nutrition and monitor bowel movements  - Maintain sleep-wake cycle: Uninterrupted sleep time; low-level lighting at night  - Fall precaution  - f/u SLIM recs  "regarding the medical problems   - Continue medication titration and treatment plan; adjust medication to optimize treatment response and as clinically indicated.   - Observation:Routine  - Legal Status: 201  - VS: as per unit protocol  - Encourage group attendance and milieu therapy  - Dispo: To be determined  - Long Stay Certification : Not Applicable  - Estimated Discharge Day: 5/5/2025     Subjective       Patient was visited on unit for continuing care; chart reviewed and discussed with multidisciplinary treatment team.     Patient continues to be visible in the milieu and interacts with staff and peers. No reports of aggression or self-injurious behavior on unit. No PRN medications used in the past 24 hours.  Nursing noted the patient does have a DVT in her left hand and is on Eliquis.    Patient accepted all offered medications and no adverse effects of medications noted or reported.    Patient did appear more lethargic today.    Patient today on initial approach reported that she has 10 out of 10 chills.  She stated she has had chills since last night.  Aside from that, she reported her mood as \"good.\"  She endorsed adequate sleep and appetite.  She denied medication side effects.  She did denied suicidal and homicidal ideation.  She denied auditory visual hallucinations.    Sleep: normal  Appetite: normal  Medication side effects: No  ROS: review of systems as noted above in HPI/Subjective report, all other systems are negative    Objective :  Temp:  [96.8 °F (36 °C)-97.2 °F (36.2 °C)] 96.8 °F (36 °C)  HR:  [71-87] 74  BP: (114-131)/(58-62) 131/60  Resp:  [16-18] 16  SpO2:  [95 %-98 %] 98 %  O2 Device: None (Room air)    Temp:  [96.8 °F (36 °C)-97.2 °F (36.2 °C)] 96.8 °F (36 °C)  HR:  [71-87] 74  BP: (114-131)/(58-62) 131/60  Resp:  [16-18] 16  SpO2:  [95 %-98 %] 98 %  O2 Device: None (Room air)    Mental Status Evaluation:    Appearance:  Laying in bed with multiple blankets, marginal hygiene, appears " "stated age, dressed in hospital attire   Behavior:  pleasant, cooperative   Speech:  normal rate, normal volume, normal pitch   Mood:  \"Good.\"   Affect:  Less labile   Thought Process:  Less tangential   Thought Content:  no overt delusions   Perceptual Disturbances: no auditory hallucinations, no visual hallucinations, does not appear responding to internal stimuli   Risk Potential: Suicidal Ideation -  None at present  Homicidal Ideation -  None at present  Potential for Aggression - No   Sensorium:  oriented to person, place, and time/date   Memory:  recent and remote memory grossly intact   Consciousness:  alert and awake   Attention/Concentration: attention span and concentration are age appropriate   Insight:  Limited but improving   Judgment: Limited but improving   Gait/Station: normal gait/station, normal balance   Motor Activity: no abnormal movements         Lab Results: I have reviewed the following results:  Results from the past 24 hours:   Recent Results (from the past 24 hours)   Fingerstick Glucose (POCT)    Collection Time: 04/15/25  9:05 PM   Result Value Ref Range    POC Glucose 111 65 - 140 mg/dl   Fingerstick Glucose (POCT)    Collection Time: 04/16/25  7:18 AM   Result Value Ref Range    POC Glucose 104 65 - 140 mg/dl   Fingerstick Glucose (POCT)    Collection Time: 04/16/25 11:21 AM   Result Value Ref Range    POC Glucose 162 (H) 65 - 140 mg/dl   Fingerstick Glucose (POCT)    Collection Time: 04/16/25  4:12 PM   Result Value Ref Range    POC Glucose 152 (H) 65 - 140 mg/dl       Administrative Statements     Counseling / Coordination of Care:   Patient's progress discussed with staff in treatment team meeting.  Medication changes reviewed with staff in treatment team meeting..    Andrae Samuels DO 04/16/25  PGY-II  "

## 2025-04-16 NOTE — PROGRESS NOTES
"   04/16/25 0800   Team Meeting   Meeting Type Daily Rounds   Team Members Present   Team Members Present Physician;Nurse;   Physician Team Member Tiny/Dilma/Ramez   Nursing Team Member aRfaela/Carmen/Linda   Care Management Team Member Stanford   OT Team Member Alix   Patient/Family Present   Patient Present No   Patient's Family Present No     Patient seems to be improving slowly and has been \"slowing down\" and has been less manic. Patient would like her friend Vero to visit. She is compliant with medications. She had a BM on the 15th and a shower on the 14th. She has a left hand DBT and is on Elliquis. Zyprexa was increased to 10 at HS.  She is sleeping well and attending groups. Patient discharge is pending stabilization of mood and medications.   "

## 2025-04-17 LAB
GLUCOSE SERPL-MCNC: 142 MG/DL (ref 65–140)
GLUCOSE SERPL-MCNC: 210 MG/DL (ref 65–140)
GLUCOSE SERPL-MCNC: 255 MG/DL (ref 65–140)
GLUCOSE SERPL-MCNC: 95 MG/DL (ref 65–140)
T4 FREE SERPL-MCNC: 1.08 NG/DL (ref 0.61–1.12)
TSH SERPL DL<=0.05 MIU/L-ACNC: 7.74 UIU/ML (ref 0.45–4.5)

## 2025-04-17 PROCEDURE — 84439 ASSAY OF FREE THYROXINE: CPT | Performed by: NURSE PRACTITIONER

## 2025-04-17 PROCEDURE — 82948 REAGENT STRIP/BLOOD GLUCOSE: CPT

## 2025-04-17 PROCEDURE — 84443 ASSAY THYROID STIM HORMONE: CPT | Performed by: NURSE PRACTITIONER

## 2025-04-17 RX ORDER — OLANZAPINE 10 MG/1
10 TABLET, FILM COATED ORAL 2 TIMES DAILY
Status: DISCONTINUED | OUTPATIENT
Start: 2025-04-17 | End: 2025-05-01

## 2025-04-17 RX ADMIN — NYSTATIN 1 APPLICATION: 100000 POWDER TOPICAL at 08:50

## 2025-04-17 RX ADMIN — CEPHALEXIN 500 MG: 500 CAPSULE ORAL at 05:36

## 2025-04-17 RX ADMIN — APIXABAN 10 MG: 5 TABLET, FILM COATED ORAL at 08:50

## 2025-04-17 RX ADMIN — OLANZAPINE 7.5 MG: 2.5 TABLET, FILM COATED ORAL at 08:48

## 2025-04-17 RX ADMIN — CLONAZEPAM 0.25 MG: 0.5 TABLET ORAL at 08:48

## 2025-04-17 RX ADMIN — CEPHALEXIN 500 MG: 500 CAPSULE ORAL at 21:22

## 2025-04-17 RX ADMIN — LEVOTHYROXINE SODIUM 88 MCG: 88 TABLET ORAL at 05:36

## 2025-04-17 RX ADMIN — PRAVASTATIN SODIUM 40 MG: 40 TABLET ORAL at 08:49

## 2025-04-17 RX ADMIN — TRAZODONE HYDROCHLORIDE 100 MG: 100 TABLET ORAL at 21:22

## 2025-04-17 RX ADMIN — CEPHALEXIN 500 MG: 500 CAPSULE ORAL at 13:49

## 2025-04-17 RX ADMIN — INSULIN LISPRO 2 UNITS: 100 INJECTION, SOLUTION INTRAVENOUS; SUBCUTANEOUS at 12:28

## 2025-04-17 RX ADMIN — INSULIN LISPRO 3 UNITS: 100 INJECTION, SOLUTION INTRAVENOUS; SUBCUTANEOUS at 17:00

## 2025-04-17 RX ADMIN — CLONAZEPAM 0.25 MG: 0.5 TABLET ORAL at 17:00

## 2025-04-17 RX ADMIN — MUPIROCIN 1 APPLICATION: 20 OINTMENT TOPICAL at 08:50

## 2025-04-17 RX ADMIN — LISINOPRIL 5 MG: 5 TABLET ORAL at 08:48

## 2025-04-17 RX ADMIN — OLANZAPINE 10 MG: 10 TABLET, FILM COATED ORAL at 17:00

## 2025-04-17 RX ADMIN — NYSTATIN: 100000 POWDER TOPICAL at 18:00

## 2025-04-17 RX ADMIN — APIXABAN 10 MG: 5 TABLET, FILM COATED ORAL at 17:00

## 2025-04-17 NOTE — TREATMENT TEAM
"Pt attended group and stayed for duration.  Pt hyperverbal, tangential and needs redirection/boundaries at times.  Fixated on wanting tea/caffeine.  Pt also wanting to dance and do \"the tom tom tom\" ans trying to stand and do the movements.  Pt does use her spirituality/jeimy to guide her.  Pt expressed gratitude for the day.      04/17/25 1330   Activity/Group Checklist   Group Self Esteem   Attendance Attended   Attendance Duration (min) 46-60   Interactions Disorganized interaction  (hyperverbal)   Affect/Mood Wide   Goals Achieved Identified feelings;Identified triggers;Identified relapse prevention strategies;Discussed coping strategies;Able to listen to others;Able to engage in interactions       "

## 2025-04-17 NOTE — NURSING NOTE
Patient is pleasant and cooperative , with poor concentration. Her behaviors and attitude is labile, sometimes dancing sometimes walking and being tearful . Patient has bizarre appearance with pleasant affect. Good medication compliance. Impulsive behaviors not  observed. Reported no depression and no Anxiety . Denies Si,HI.

## 2025-04-17 NOTE — NURSING NOTE
"Patient visible in milieu and social with peers. Still displays hyper verbal and tangential speech regarding dancing, taking multiple \"shots,\" and reporting that she could possibly be pregnant. Denies all psychiatric s/s. Compliant with all offered medications. Denies any additional needs at this time.   "

## 2025-04-17 NOTE — ASSESSMENT & PLAN NOTE
As of 04/17/2025 patient is less labile and tangential.  She is denying psych symptoms.  Patient's fatigue and chills are no longer present.  Klonopin could be attributing to the patient's lethargy.  Patient continues to express delusions about her her brother who is her primary support system.  Which complicates discharge disposition.    Increase Zyprexa to 10 mg BID for current symptoms of carlie (increased on 4/14/25)  Continue Trazodone to 100mg PO QHS for insomnia (started on 4/13/2025)  Decrease Klonopin to 0.25mg PO BID for manic behaviors, can utilize until Zyprexa is at a therapeutic dose (started on 4/13/2025)  Unable to initiate lithium as patient reports worsening kidney function while taking lithium, liver enzymes currently increased so will avoid Depakote at this time  CMP on 04/14 patient LFTs WNL

## 2025-04-17 NOTE — PROGRESS NOTES
Progress Note - Behavioral Health   Name: Lisa Bocanegraorry 69 y.o. female I MRN: [id number]  Unit/Bed#: [facility]02 I Date of Admission: 4/8/2025   Date of Service: 4/17/2025 I Hospital Day: 9    Assessment & Plan  Bipolar affective disorder, current episode manic (HCC)  As of 04/17/2025 patient is less labile and tangential.  She is denying psych symptoms.  Patient's fatigue and chills are no longer present.  Klonopin could be attributing to the patient's lethargy.  Patient continues to express delusions about her her brother who is her primary support system.  Which complicates discharge disposition.    Increase Zyprexa to 10 mg BID for current symptoms of carlie (increased on 4/14/25)  Continue Trazodone to 100mg PO QHS for insomnia (started on 4/13/2025)  Decrease Klonopin to 0.25mg PO BID for manic behaviors, can utilize until Zyprexa is at a therapeutic dose (started on 4/13/2025)  Unable to initiate lithium as patient reports worsening kidney function while taking lithium, liver enzymes currently increased so will avoid Depakote at this time  CMP on 04/14 patient LFTs WNL    Current medications:  Current Facility-Administered Medications   Medication Dose Route Frequency Provider Last Rate    acetaminophen  650 mg Oral Q4H PRN STEVE Dawson      acetaminophen  650 mg Oral Q4H PRN STEVE Dawson      acetaminophen  975 mg Oral Q6H PRN STEVE Dawson      aluminum-magnesium hydroxide-simethicone  30 mL Oral Q4H PRN STEVE Dawson      apixaban  10 mg Oral BID STEVE Murcia      [START ON 4/18/2025] apixaban  5 mg Oral BID STEVE Murcia      benztropine  1 mg Intramuscular Q4H PRN Max 6/day STEVE Dawson      benztropine  0.5 mg Oral Q4H PRN Max 6/day STEVE Dawson      bisacodyl  10 mg Rectal Daily PRN STEVE Dawson      cephalexin  500 mg Oral Q8H [facility] STEVE Lopez      clonazePAM  0.25 mg Oral BID Rachel Myrick,       hydrOXYzine HCL  25  mg Oral Q6H PRN Max 4/day STEVE Dawson      hydrOXYzine HCL  50 mg Oral Q6H PRN Max 4/day STEVE Dawson      insulin lispro  1-6 Units Subcutaneous TID AC Lance Zayas MD      insulin lispro  1-6 Units Subcutaneous HS Lance Zayas MD      levothyroxine  88 mcg Oral Early Morning Lance Zayas MD      lisinopril  5 mg Oral Daily Lance Zayas MD      LORazepam  1 mg Intramuscular Q6H PRN Max 3/day STEVE Dawson      LORazepam  1 mg Oral Q6H PRN Max 3/day STEVE Dawson      mupirocin   Topical Daily Saad Butts DPM      nystatin   Topical BID Lance Zayas MD      OLANZapine  5 mg Intramuscular Q3H PRN Max 3/day STEVE Dawson      OLANZapine  10 mg Oral HS Andrae Samuels DO      OLANZapine  2.5 mg Oral Q4H PRN Max 6/day STEVE Dawson      OLANZapine  5 mg Oral Q4H PRN Max 3/day STEVE Dawson      OLANZapine  5 mg Oral Q3H PRN Max 3/day STEVE Dawson      OLANZapine  7.5 mg Oral Daily STEVE Dawson      polyethylene glycol  17 g Oral Daily PRN STEVE Dawson      pravastatin  40 mg Oral Daily Lance Zayas MD      propranolol  5 mg Oral Q8H PRN STEVE Dawson      senna-docusate sodium  1 tablet Oral Daily PRN STEVE Dawson      traZODone  100 mg Oral HS STEVE Dawson      traZODone  50 mg Oral HS PRN STEVE Dawson          Risks/Benefits of Treatment:     Risks, benefits, and possible side effects of medications explained to patient and patient verbalizes understanding and agreement for treatment.    Progress Toward Goals: improving    Treatment Planning:      - Encourage early mobility and having a structured day  - Provide frequent re-orientation, and cognitive stimulation  - Ensure assistive devices are in proper working order (eye-glasses, hearing aids)  - Encourage adequate hydration, nutrition and monitor bowel movements  - Maintain sleep-wake cycle: Uninterrupted sleep  "time; low-level lighting at night  - Fall precaution  - f/u SLIM recs regarding the medical problems   - Continue medication titration and treatment plan; adjust medication to optimize treatment response and as clinically indicated.   - Observation:Routine  - Legal Status: 201  - VS: as per unit protocol  - Encourage group attendance and milieu therapy  - Dispo: To be determined  - Long Stay Certification : Not Applicable  - Estimated Discharge Day: 5/5/2025     Subjective       Patient was visited on unit for continuing care; chart reviewed and discussed with multidisciplinary treatment team.     Patient continues to be visible in the milieu and interacts with staff and peers. No reports of aggression or self-injurious behavior on unit. No PRN medications used in the past 24 hours.    Patient accepted all offered medications and no adverse effects of medications noted or reported.    Patient today was pleasant and cooperative.  She was less fatigued than days prior and was denying chills.  She also was less tangential than days prior.  She described her mood as \"good.\"  She denied medication side effects.  She endorsed adequate sleep and appetite.  She denied suicidal and homicidal ideation.  She denied auditory and visual hallucinations.  She does remain somewhat delusional about her brother believing that it is unsafe to return home with him.    Sleep: normal  Appetite: normal  Medication side effects: No  ROS: review of systems as noted above in HPI/Subjective report, all other systems are negative    Objective :  Temp:  [97 °F (36.1 °C)-97.7 °F (36.5 °C)] 97.7 °F (36.5 °C)  HR:  [77-82] 78  BP: (115-123)/(56-64) 123/60  Resp:  [18] 18  SpO2:  [92 %-99 %] 99 %  O2 Device: None (Room air)    Temp:  [97 °F (36.1 °C)-97.7 °F (36.5 °C)] 97.7 °F (36.5 °C)  HR:  [77-82] 78  BP: (115-123)/(56-64) 123/60  Resp:  [18] 18  SpO2:  [92 %-99 %] 99 %  O2 Device: None (Room air)    Mental Status Evaluation:    Appearance:  age " "appropriate, casually dressed, adequate grooming, looks stated age, overweight   Behavior:  pleasant, cooperative   Speech:  normal rate, normal volume, normal pitch   Mood:  \"Good.\"   Affect:  normal range and intensity   Thought Process:  Mildly tangential   Thought Content:  no overt delusions   Perceptual Disturbances: no auditory hallucinations, no visual hallucinations, does not appear responding to internal stimuli   Risk Potential: Suicidal Ideation -  None at present  Homicidal Ideation -  None at present  Potential for Aggression - Not at present   Sensorium:  oriented to person, place, and time/date   Memory:  recent and remote memory grossly intact   Consciousness:  alert and awake   Attention/Concentration: attention span and concentration are age appropriate   Insight:  improving   Judgment: improving   Gait/Station: normal gait/station, normal balance   Motor Activity: no abnormal movements         Lab Results: I have reviewed the following results:  Results from the past 24 hours:   Recent Results (from the past 24 hours)   Fingerstick Glucose (POCT)    Collection Time: 04/16/25 11:21 AM   Result Value Ref Range    POC Glucose 162 (H) 65 - 140 mg/dl   Fingerstick Glucose (POCT)    Collection Time: 04/16/25  4:12 PM   Result Value Ref Range    POC Glucose 152 (H) 65 - 140 mg/dl   Fingerstick Glucose (POCT)    Collection Time: 04/16/25  9:11 PM   Result Value Ref Range    POC Glucose 174 (H) 65 - 140 mg/dl   TSH, 3rd generation with Free T4 reflex    Collection Time: 04/17/25  5:51 AM   Result Value Ref Range    TSH 3RD GENERATON 7.739 (H) 0.450 - 4.500 uIU/mL   Fingerstick Glucose (POCT)    Collection Time: 04/17/25  7:21 AM   Result Value Ref Range    POC Glucose 95 65 - 140 mg/dl       Administrative Statements     Counseling / Coordination of Care:   Patient's progress discussed with staff in treatment team meeting.  Medication changes reviewed with staff in treatment team meeting..    Andrae " DO Abril 04/17/25  PGY-II

## 2025-04-17 NOTE — PROGRESS NOTES
04/17/25 0800   Team Meeting   Meeting Type Daily Rounds   Team Members Present   Team Members Present Physician;Nurse;   Physician Team Member Tiny/Dilma/Rmaez   Nursing Team Member Rafaela/Carmen/Linda   Care Management Team Member Stanford   OT Team Member Alix   Patient/Family Present   Patient Present No   Patient's Family Present No     Patient is hyper verbal and tangential. She is improving a bit. She still refuses to sign for brother. 15th for her Bm and 16th for her shower. Her klonopin was decreased and she has been more awake. Patient continues to take Keflex. Patient discharge is pending stabilization of mood and medications.

## 2025-04-17 NOTE — PLAN OF CARE
Problem: PAIN - ADULT  Goal: Verbalizes/displays adequate comfort level or baseline comfort level  Description: Interventions:- Encourage patient to monitor pain and request assistance- Assess pain using appropriate pain scale- Administer analgesics based on type and severity of pain and evaluate response- Implement non-pharmacological measures as appropriate and evaluate response- Consider cultural and social influences on pain and pain management- Notify physician/advanced practitioner if interventions unsuccessful or patient reports new pain  Outcome: Progressing     Problem: INFECTION - ADULT  Goal: Absence or prevention of progression during hospitalization  Description: INTERVENTIONS:- Assess and monitor for signs and symptoms of infection- Monitor lab/diagnostic results- Monitor all insertion sites, i.e. indwelling lines, tubes, and drains- Monitor endotracheal if appropriate and nasal secretions for changes in amount and color- [place] appropriate cooling/warming therapies per order- Administer medications as ordered- Instruct and encourage patient and family to use good hand hygiene technique- Identify and instruct in appropriate isolation precautions for identified infection/condition  Outcome: Progressing  Goal: Absence of fever/infection during neutropenic period  Description: INTERVENTIONS:- Monitor WBC  Outcome: Progressing     Problem: SAFETY ADULT  Goal: Patient will remain free of falls  Description: INTERVENTIONS:- Educate patient/family on patient safety including physical limitations  Outcome: Progressing  Goal: Maintain or return to baseline ADL function  Description: INTERVENTIONS:-  Assess patient's ability to carry out ADLs; assess patient's baseline for ADL function and identify physical deficits which impact ability to perform ADLs (bathing, care of mouth/teeth, toileting, grooming, dressing, etc.)- Assess/evaluate cause of self-care deficits - Assess range of motion- Assess patient's  mobility; develop plan if impaired- Assess patient's need for assistive devices and provide as appropriate- Encourage maximum independence but intervene and supervise when necessary- Involve family in performance of ADLs- Assess for home care needs following discharge - Consider OT consult to assist with ADL evaluation and planning for discharge- Provide patient education as appropriate  Outcome: Progressing  Goal: Maintains/Returns to pre admission functional level  Outcome: Progressing     Problem: Risk for Violence/Aggression Toward Others  Goal: Verbalize thoughts and feelings  Description: Interventions:- Assess and re-assess patient's level of risk, every waking shift- Engage patient in 1:1 interactions, daily, for a minimum of 15 minutes - Allow patient to express feelings and frustrations in a safe and non-threatening manner - Establish rapport/trust with patient   Outcome: Progressing  Goal: Refrain from harming others  Outcome: Progressing  Goal: Control angry outbursts  Description: Interventions:- Monitor patient closely, per order- Ensure early verbal de-escalation- Monitor prn medication needs- Set reasonable/therapeutic limits, outline behavioral expectations, and consequences - Provide a non-threatening milieu, utilizing the least restrictive interventions   Outcome: Progressing     Problem: Alteration in Orientation  Goal: Interact with staff daily  Description: Interventions:- Assess and re-assess patient's level of orientation- Engage patient in 1 on 1 interactions, daily, for a minimum of 15 minutes - Establish rapport/trust with patient   Outcome: Progressing  Goal: Express concerns related to confused thinking related to:  Description: Interventions:- Encourage patient to express feelings, fears, frustrations, hopes- Assign consistent caregivers - Logan/re-orient patient as needed- Allow comfort items, as appropriate- Provide visual cues, signs, etc.   Outcome: Progressing  Goal: Allow medical  examinations, as recommended  Description: Interventions:- Provide physical/neurological exams and/or referrals, per provider   Outcome: Progressing

## 2025-04-17 NOTE — CMS CERTIFICATION NOTE
Recertification: Based upon physical, mental and social evaluations, I certify that inpatient psychiatric services continue to be medically necessary for this patient for a duration of 30 midnights for the treatment of  Bipolar affective disorder, current episode manic (HCC) Available alternative community resources still do not meet the patient's mental health care needs. I further attest that an established written individualized plan of care has been updated and is outlined in the patient's medical records.

## 2025-04-17 NOTE — CASE MANAGEMENT
Patient signed releases for Ryanne (Friend) and Gadiel (Aunt). Patient does not want her brother knowing where she is.     CM left VM for Ryanne with call back information.   Ryanne (Friend)  [phone]      Cm reached out to Aunt and the number is not in service.   Gadiel (Aunt)   [phone]

## 2025-04-18 LAB
GLUCOSE SERPL-MCNC: 143 MG/DL (ref 65–140)
GLUCOSE SERPL-MCNC: 150 MG/DL (ref 65–140)
GLUCOSE SERPL-MCNC: 216 MG/DL (ref 65–140)
GLUCOSE SERPL-MCNC: 248 MG/DL (ref 65–140)
GLUCOSE SERPL-MCNC: 99 MG/DL (ref 65–140)

## 2025-04-18 PROCEDURE — 99232 SBSQ HOSP IP/OBS MODERATE 35: CPT | Performed by: PSYCHIATRY & NEUROLOGY

## 2025-04-18 PROCEDURE — 82948 REAGENT STRIP/BLOOD GLUCOSE: CPT

## 2025-04-18 RX ADMIN — OLANZAPINE 10 MG: 10 TABLET, FILM COATED ORAL at 17:02

## 2025-04-18 RX ADMIN — OLANZAPINE 10 MG: 10 TABLET, FILM COATED ORAL at 08:43

## 2025-04-18 RX ADMIN — NYSTATIN: 100000 POWDER TOPICAL at 17:05

## 2025-04-18 RX ADMIN — APIXABAN 5 MG: 5 TABLET, FILM COATED ORAL at 08:43

## 2025-04-18 RX ADMIN — CLONAZEPAM 0.25 MG: 0.5 TABLET ORAL at 17:02

## 2025-04-18 RX ADMIN — CEPHALEXIN 500 MG: 500 CAPSULE ORAL at 15:31

## 2025-04-18 RX ADMIN — NYSTATIN: 100000 POWDER TOPICAL at 08:44

## 2025-04-18 RX ADMIN — CEPHALEXIN 500 MG: 500 CAPSULE ORAL at 21:23

## 2025-04-18 RX ADMIN — PRAVASTATIN SODIUM 40 MG: 40 TABLET ORAL at 08:43

## 2025-04-18 RX ADMIN — INSULIN LISPRO 3 UNITS: 100 INJECTION, SOLUTION INTRAVENOUS; SUBCUTANEOUS at 21:23

## 2025-04-18 RX ADMIN — CLONAZEPAM 0.25 MG: 0.5 TABLET ORAL at 08:43

## 2025-04-18 RX ADMIN — INSULIN LISPRO 2 UNITS: 100 INJECTION, SOLUTION INTRAVENOUS; SUBCUTANEOUS at 17:01

## 2025-04-18 RX ADMIN — MUPIROCIN: 20 OINTMENT TOPICAL at 08:44

## 2025-04-18 RX ADMIN — CEPHALEXIN 500 MG: 500 CAPSULE ORAL at 05:18

## 2025-04-18 RX ADMIN — INSULIN LISPRO 1 UNITS: 100 INJECTION, SOLUTION INTRAVENOUS; SUBCUTANEOUS at 12:29

## 2025-04-18 RX ADMIN — LEVOTHYROXINE SODIUM 88 MCG: 88 TABLET ORAL at 05:18

## 2025-04-18 RX ADMIN — TRAZODONE HYDROCHLORIDE 100 MG: 100 TABLET ORAL at 21:23

## 2025-04-18 RX ADMIN — LISINOPRIL 5 MG: 5 TABLET ORAL at 08:43

## 2025-04-18 RX ADMIN — APIXABAN 5 MG: 5 TABLET, FILM COATED ORAL at 17:02

## 2025-04-18 NOTE — PROGRESS NOTES
Progress Note - Behavioral Health   Name: Lisa Bocanegraorry 69 y.o. female I MRN: [id number]  Unit/Bed#: [facility] 605-02 I Date of Admission: 4/8/2025   Date of Service: 4/18/2025 I Hospital Day: 10    Assessment & Plan  Bipolar affective disorder, current episode manic (HCC)  As of 04/17/2025 patient is less labile and tangential.  She is denying psych symptoms.  Patient's fatigue and chills are no longer present.  Klonopin could be attributing to the patient's lethargy.  Patient continues to express delusions about her her brother who is her primary support system.  Which complicates discharge disposition.    Continue Zyprexa to 10 mg BID for current symptoms of carlie (increased on 4/18/25)  Continue Trazodone to 100mg PO QHS for insomnia (started on 4/13/2025)  Decrease Klonopin to 0.25mg PO BID for manic behaviors, can utilize until Zyprexa is at a therapeutic dose (started on 4/13/2025)  Unable to initiate lithium as patient reports worsening kidney function while taking lithium, liver enzymes currently increased so will avoid Depakote at this time  CMP on 04/14 patient LFTs WNL    Current medications:  Current Facility-Administered Medications   Medication Dose Route Frequency Provider Last Rate    acetaminophen  650 mg Oral Q4H PRN STEVE Dawson      acetaminophen  650 mg Oral Q4H PRN STEVE Dawson      acetaminophen  975 mg Oral Q6H PRN STEVE Dawson      aluminum-magnesium hydroxide-simethicone  30 mL Oral Q4H PRN STEVE Dawson      apixaban  5 mg Oral BID Areli Leblanc, STEVE      benztropine  1 mg Intramuscular Q4H PRN Max 6/day STEVE Dawson      benztropine  0.5 mg Oral Q4H PRN Max 6/day STEVE Dawson      bisacodyl  10 mg Rectal Daily PRN STEVE Dawson      cephalexin  500 mg Oral Q8H [facility] Laverne Wiley, STEVE      clonazePAM  0.25 mg Oral BID Rachel Myrick,       hydrOXYzine HCL  25 mg Oral Q6H PRN Max 4/day STEVE Dawson      hydrOXYzine HCL  50 mg  Oral Q6H PRN Max 4/day STEVE Dawson      insulin lispro  1-6 Units Subcutaneous TID AC Lance Zayas MD      insulin lispro  1-6 Units Subcutaneous HS Lance Zayas MD      levothyroxine  88 mcg Oral Early Morning Lance Zayas MD      lisinopril  5 mg Oral Daily Lance Zayas MD      LORazepam  1 mg Intramuscular Q6H PRN Max 3/day STEVE Dawson      LORazepam  1 mg Oral Q6H PRN Max 3/day Gianna Perera, STEVE      mupirocin   Topical Daily Saad Butts, DPM      nystatin   Topical BID Lance Zayas MD      OLANZapine  5 mg Intramuscular Q3H PRN Max 3/day STEVE Dawson      OLANZapine  10 mg Oral BID Andrae Samuels DO      OLANZapine  2.5 mg Oral Q4H PRN Max 6/day STEVE Dawson      OLANZapine  5 mg Oral Q4H PRN Max 3/day STEVE Dawson      OLANZapine  5 mg Oral Q3H PRN Max 3/day STEVE Dawson      polyethylene glycol  17 g Oral Daily PRN STEVE Dawson      pravastatin  40 mg Oral Daily Lance Zayas MD      propranolol  5 mg Oral Q8H PRN STEVE Dawson      senna-docusate sodium  1 tablet Oral Daily PRN Gianna Perera, STEVE      traZODone  100 mg Oral HS STEVE Dawson      traZODone  50 mg Oral HS PRN STEVE Dawson          Risks/Benefits of Treatment:     Risks, benefits, and possible side effects of medications explained to patient. Patient has limited understanding of risks and benefits of treatment at this time, but agrees to take medications as prescribed.    Progress Toward Goals: progressing gradually    Treatment Planning:      - Encourage early mobility and having a structured day  - Provide frequent re-orientation, and cognitive stimulation  - Ensure assistive devices are in proper working order (eye-glasses, hearing aids)  - Encourage adequate hydration, nutrition and monitor bowel movements  - Maintain sleep-wake cycle: Uninterrupted sleep time; low-level lighting at night  - Fall  "precaution  - f/u SLIM recs regarding the medical problems   - Continue medication titration and treatment plan; adjust medication to optimize treatment response and as clinically indicated.   - Observation:Routine  - Legal Status: 201  - VS: as per unit protocol  - Encourage group attendance and milieu therapy  - Dispo: To be determined  - Long Stay Certification : Not Applicable  - Estimated Discharge Day: 5/5/2025     Subjective     Per staff over the last 24 hours: Patient attending groups and reports being willing to allow treatment team to contact her brother. Patient remains hyperverbal, and tangential.     Patient was visited on unit for continuing care; chart reviewed and discussed with multidisciplinary treatment team.  On approach, the patient was hyperactive, still frequently observed dancing and singing \"tom,tom tom.\" She no longer appears sedated and states she is \"all locomotion\" today. Thought process remains disorganized and tangential but speech is no longer pressured. No problem initiating and maintaining sleep.  Denied A/VH currently.  Denied SI/HI, intent or plan upon direct inquiry at this time.    Patient continues to be visible in the milieu and interacts with staff and peers. No reports of aggression or self-injurious behavior on unit.     Patient accepted all offered medications and no adverse effects of medications noted or reported.    Sleep: normal  Appetite: normal  Medication side effects: No  ROS: review of systems as noted above in HPI/Subjective report, all other systems are negative    Objective :  Temp:  [97 °F (36.1 °C)-98 °F (36.7 °C)] 98 °F (36.7 °C)  HR:  [79-90] 79  BP: (131-145)/(62-70) 144/70  Resp:  [16-18] 16  SpO2:  [93 %-98 %] 93 %  O2 Device: None (Room air)    Temp:  [97 °F (36.1 °C)-98 °F (36.7 °C)] 98 °F (36.7 °C)  HR:  [79-90] 79  BP: (131-145)/(62-70) 144/70  Resp:  [16-18] 16  SpO2:  [93 %-98 %] 93 %  O2 Device: None (Room air)    Mental Status " "Examination:  Appearance:  age appropriate, casually dressed, looks stated age   Behavior:  psychomotor agitation, inappropriate, responds better to redirection   Speech:  increased rate, hypertalkative   Mood:  \"Happy\"   Affect:  increased in intensity, increased in range   Thought Process:  disorganized, tangential   Associations: tangential associations   Thought Content:  grandiose and bizarre delusions, paranoid ideation   Perceptual Disturbances: no auditory hallucinations, no visual hallucinations, does not appear responding to internal stimuli   Risk Potential: Suicidal ideation - None at present  Homicidal ideation - None at present  Potential for aggression - Not at present   Sensorium:  oriented to person, place, and time/date   Memory:  recent and remote memory grossly intact   Consciousness:  alert and awake   Attention/Concentration: attention span and concentration appear shorter than expected for age   Insight:  improving   Judgment: improving   Gait/Station: normal gait/station   Motor Activity: no abnormal movements         Lab Results: I have reviewed the following results:  Results from the past 24 hours:   Recent Results (from the past 24 hours)   Fingerstick Glucose (POCT)    Collection Time: 04/17/25  9:04 PM   Result Value Ref Range    POC Glucose 142 (H) 65 - 140 mg/dl   Fingerstick Glucose (POCT)    Collection Time: 04/18/25  7:13 AM   Result Value Ref Range    POC Glucose 99 65 - 140 mg/dl   Fingerstick Glucose (POCT)    Collection Time: 04/18/25 11:23 AM   Result Value Ref Range    POC Glucose 150 (H) 65 - 140 mg/dl   Fingerstick Glucose (POCT)    Collection Time: 04/18/25 11:58 AM   Result Value Ref Range    POC Glucose 143 (H) 65 - 140 mg/dl   Fingerstick Glucose (POCT)    Collection Time: 04/18/25  4:07 PM   Result Value Ref Range    POC Glucose 216 (H) 65 - 140 mg/dl       Administrative Statements     Counseling / Coordination of Care:   Patient's progress discussed with staff in " treatment team meeting.  Medication changes reviewed with staff in treatment team meeting.  Medications, treatment progress and treatment plan reviewed with patient.  Reassurance and supportive therapy provided.  Encouraged participation in milieu and group therapy on the unit..    Rachel Myrick DO 04/18/25

## 2025-04-18 NOTE — PROGRESS NOTES
"   04/18/25 0800   Team Meeting   Meeting Type Daily Rounds   Team Members Present   Team Members Present Physician;Nurse;   Physician Team Member Tiny/Dilma/Ramez   Nursing Team Member Rafaela/Carmen/Linda   Care Management Team Member Stanford   OT Team Member Alix   Patient/Family Present   Patient Present No   Patient's Family Present No     Patient is receiving wound care on her left toe. She is getting Zyprexa 10mg BID. She denies signs and symptoms. She went to four different groups and is now willing to sign for her brother as long as we dont \"tell him where she is.\" Patient discharge is pending stabilization of mood and medications.   "

## 2025-04-18 NOTE — PLAN OF CARE
Problem: Alteration in Thoughts and Perception  Goal: Verbalize thoughts and feelings  Description: Interventions:- Promote a nonjudgmental and trusting relationship with the patient through active listening and therapeutic communication- Assess patient's level of functioning, behavior and potential for risk- Engage patient in 1 on 1 interactions- Encourage patient to express fears, feelings, frustrations, and discuss symptoms  - [place] patient to reality, help patient recognize reality-based thinking - Administer medications as ordered and assess for potential side effects- Provide the patient education related to the signs and symptoms of the illness and desired effects of prescribed medications  Outcome: Progressing     Problem: Ineffective Coping  Goal: Participates in unit activities  Description: Interventions:- Provide therapeutic environment - Provide required programming - Redirect inappropriate behaviors   Outcome: Progressing     Problem: Risk for Self Injury/Neglect  Goal: Treatment Goal: Remain safe during length of stay, learn and adopt new coping skills, and be free of self-injurious ideation, impulses and acts at the time of discharge  Outcome: Progressing  Goal: Verbalize thoughts and feelings  Description: Interventions:- Assess and re-assess patient's lethality and potential for self-injury- Engage patient in 1:1 interactions, daily, for a minimum of 15 minutes- Encourage patient to express feelings, fears, frustrations, hopes- Establish rapport/trust with patient   Outcome: Progressing  Goal: Refrain from harming self  Description: Interventions:- Monitor patient closely, per order- Develop a trusting relationship- Supervise medication ingestion, monitor effects and side effects   Outcome: Progressing  Goal: Attend and participate in unit activities, including therapeutic, recreational, and educational groups  Description: Interventions:- Provide therapeutic and educational activities daily,  encourage attendance and participation, and document same in the medical record- Obtain collateral information, encourage visitation and family involvement in care   Outcome: Progressing  Goal: Recognize maladaptive responses and adopt new coping mechanisms  Outcome: Progressing  Goal: Complete daily ADLs, including personal hygiene independently, as able  Description: Interventions:- Observe, teach, and assist patient with ADLS- Monitor and promote a balance of rest/activity, with adequate nutrition and elimination  Outcome: Progressing

## 2025-04-18 NOTE — PROGRESS NOTES
Pastoral Care Progress Note          Chaplaincy Interventions Utilized:   Empowerment: Encouraged focus on present, Encouraged self-care, Facilitated group experience, and Normalized experience of patient/family    Exploration: Explored hope, Explored emotional needs & resources, and Explored spiritual needs & resources    Relationship Building: Cultivated a relationship of care and support, Listened empathically, and Hospitality    The pt participated in spirituality group facilitated by the  today. Pt was active in discussion and spoke about her excitement and gail surrounding Holy Week.

## 2025-04-18 NOTE — ASSESSMENT & PLAN NOTE
As of 04/17/2025 patient is less labile and tangential.  She is denying psych symptoms.  Patient's fatigue and chills are no longer present.  Klonopin could be attributing to the patient's lethargy.  Patient continues to express delusions about her her brother who is her primary support system.  Which complicates discharge disposition.    Continue Zyprexa to 10 mg BID for current symptoms of carlie (increased on 4/18/25)  Continue Trazodone to 100mg PO QHS for insomnia (started on 4/13/2025)  Decrease Klonopin to 0.25mg PO BID for manic behaviors, can utilize until Zyprexa is at a therapeutic dose (started on 4/13/2025)  Unable to initiate lithium as patient reports worsening kidney function while taking lithium, liver enzymes currently increased so will avoid Depakote at this time  CMP on 04/14 patient LFTs WNL

## 2025-04-18 NOTE — NURSING NOTE
"Pt pleasant and cooperative w/ care. Visible and social on the unit. [place] bright, hyper-verbal, and tangential at times. Remains behaviorally controlled. No need to utilize PRN medications. + meals and meds. Good appetite. Compliant w/ mouth checks. Preoccupied w/ alcoholic beverages during med passes. Describes scheduled medication as \"[place] slammers and tequila shots\" Redirection provided. On assessment denies all psych S+S's. Denies unmet needs. Q15 safety checks maintained.   "

## 2025-04-19 LAB
GLUCOSE SERPL-MCNC: 107 MG/DL (ref 65–140)
GLUCOSE SERPL-MCNC: 139 MG/DL (ref 65–140)
GLUCOSE SERPL-MCNC: 188 MG/DL (ref 65–140)
GLUCOSE SERPL-MCNC: 219 MG/DL (ref 65–140)

## 2025-04-19 PROCEDURE — 82948 REAGENT STRIP/BLOOD GLUCOSE: CPT

## 2025-04-19 PROCEDURE — 99232 SBSQ HOSP IP/OBS MODERATE 35: CPT | Performed by: PSYCHIATRY & NEUROLOGY

## 2025-04-19 RX ADMIN — CLONAZEPAM 0.25 MG: 0.5 TABLET ORAL at 17:28

## 2025-04-19 RX ADMIN — APIXABAN 5 MG: 5 TABLET, FILM COATED ORAL at 17:26

## 2025-04-19 RX ADMIN — INSULIN LISPRO 2 UNITS: 100 INJECTION, SOLUTION INTRAVENOUS; SUBCUTANEOUS at 16:33

## 2025-04-19 RX ADMIN — APIXABAN 5 MG: 5 TABLET, FILM COATED ORAL at 08:54

## 2025-04-19 RX ADMIN — LEVOTHYROXINE SODIUM 88 MCG: 88 TABLET ORAL at 05:24

## 2025-04-19 RX ADMIN — MUPIROCIN 1 APPLICATION: 20 OINTMENT TOPICAL at 08:53

## 2025-04-19 RX ADMIN — LISINOPRIL 5 MG: 5 TABLET ORAL at 08:33

## 2025-04-19 RX ADMIN — NYSTATIN 1 APPLICATION: 100000 POWDER TOPICAL at 08:54

## 2025-04-19 RX ADMIN — NYSTATIN 1 APPLICATION: 100000 POWDER TOPICAL at 17:26

## 2025-04-19 RX ADMIN — TRAZODONE HYDROCHLORIDE 100 MG: 100 TABLET ORAL at 21:24

## 2025-04-19 RX ADMIN — OLANZAPINE 10 MG: 10 TABLET, FILM COATED ORAL at 08:54

## 2025-04-19 RX ADMIN — PRAVASTATIN SODIUM 40 MG: 40 TABLET ORAL at 08:54

## 2025-04-19 RX ADMIN — CLONAZEPAM 0.25 MG: 0.5 TABLET ORAL at 08:54

## 2025-04-19 RX ADMIN — OLANZAPINE 10 MG: 10 TABLET, FILM COATED ORAL at 17:25

## 2025-04-19 RX ADMIN — INSULIN LISPRO 1 UNITS: 100 INJECTION, SOLUTION INTRAVENOUS; SUBCUTANEOUS at 21:25

## 2025-04-19 NOTE — NURSING NOTE
Patient calm, pleasant and cooperative. Hyper verbal, disorganized and overly bright. No negative behavior noted, No PRN needed. Denies all psych s/s. Medication compliant. Safety precautions maintained.

## 2025-04-19 NOTE — PLAN OF CARE
Problem: PAIN - ADULT  Goal: Verbalizes/displays adequate comfort level or baseline comfort level  Description: Interventions:- Encourage patient to monitor pain and request assistance- Assess pain using appropriate pain scale- Administer analgesics based on type and severity of pain and evaluate response- Implement non-pharmacological measures as appropriate and evaluate response- Consider cultural and social influences on pain and pain management- Notify physician/advanced practitioner if interventions unsuccessful or patient reports new pain  Outcome: Progressing     Problem: INFECTION - ADULT  Goal: Absence or prevention of progression during hospitalization  Description: INTERVENTIONS:- Assess and monitor for signs and symptoms of infection- Monitor lab/diagnostic results- Monitor all insertion sites, i.e. indwelling lines, tubes, and drains- Monitor endotracheal if appropriate and nasal secretions for changes in amount and color- [place] appropriate cooling/warming therapies per order- Administer medications as ordered- Instruct and encourage patient and family to use good hand hygiene technique- Identify and instruct in appropriate isolation precautions for identified infection/condition  Outcome: Progressing  Goal: Absence of fever/infection during neutropenic period  Description: INTERVENTIONS:- Monitor WBC  Outcome: Progressing     Problem: SAFETY ADULT  Goal: Patient will remain free of falls  Description: INTERVENTIONS:- Educate patient/family on patient safety including physical limitations  Outcome: Progressing  Goal: Maintain or return to baseline ADL function  Description: INTERVENTIONS:-  Assess patient's ability to carry out ADLs; assess patient's baseline for ADL function and identify physical deficits which impact ability to perform ADLs (bathing, care of mouth/teeth, toileting, grooming, dressing, etc.)- Assess/evaluate cause of self-care deficits - Assess range of motion- Assess patient's  mobility; develop plan if impaired- Assess patient's need for assistive devices and provide as appropriate- Encourage maximum independence but intervene and supervise when necessary- Involve family in performance of ADLs- Assess for home care needs following discharge - Consider OT consult to assist with ADL evaluation and planning for discharge- Provide patient education as appropriate  Outcome: Progressing  Goal: Maintains/Returns to pre admission functional level  Outcome: Progressing     Problem: DISCHARGE PLANNING  Goal: Discharge to home or other facility with appropriate resources  Description: INTERVENTIONS:- Identify barriers to discharge w/patient and caregiver- Arrange for needed discharge resources and transportation as appropriate- Identify discharge learning needs (meds, wound care, etc.)- Arrange for interpretive services to assist at discharge as needed- Refer to Case Management Department for coordinating discharge planning if the patient needs post-hospital services based on physician/advanced practitioner order or complex needs related to functional status, cognitive ability, or social support system  Outcome: Progressing     Problem: Knowledge Deficit  Goal: Patient/family/caregiver demonstrates understanding of disease process, treatment plan, medications, and discharge instructions  Description: Complete learning assessment and assess knowledge base.Interventions:- Provide teaching at level of understanding- Provide teaching via preferred learning methods  Outcome: Progressing     Problem: Alteration in Thoughts and Perception  Goal: Treatment Goal: Gain control of psychotic behaviors/thinking, reduce/eliminate presenting symptoms and demonstrate improved reality functioning upon discharge  Outcome: Progressing  Goal: Verbalize thoughts and feelings  Description: Interventions:- Promote a nonjudgmental and trusting relationship with the patient through active listening and therapeutic  communication- Assess patient's level of functioning, behavior and potential for risk- Engage patient in 1 on 1 interactions- Encourage patient to express fears, feelings, frustrations, and discuss symptoms  - [place] patient to reality, help patient recognize reality-based thinking - Administer medications as ordered and assess for potential side effects- Provide the patient education related to the signs and symptoms of the illness and desired effects of prescribed medications  Outcome: Progressing  Goal: Refrain from acting on delusional thinking/internal stimuli  Description: Interventions:- Monitor patient closely, per order - Utilize least restrictive measures - Set reasonable limits, give positive feedback for acceptable - Administer medications as ordered and monitor of potential side effects  Outcome: Progressing  Goal: Agree to be compliant with medication regime, as prescribed and report medication side effects  Description: Interventions:- Offer appropriate PRN medication and supervise ingestion; conduct AIMS, as needed   Outcome: Progressing  Goal: Attend and participate in unit activities, including therapeutic, recreational, and educational groups  Description: Interventions:-Encourage Visitation and family involvement in care  Outcome: Progressing  Goal: Recognize dysfunctional thoughts, communicate reality-based thoughts at the time of discharge  Description: Interventions:- Provide medication and psycho-education to assist patient in compliance and developing insight into his/her illness   Outcome: Progressing  Goal: Complete daily ADLs, including personal hygiene independently, as able  Description: Interventions:- Observe, teach, and assist patient with ADLS- Monitor and promote a balance of rest/activity, with adequate nutrition and elimination   Outcome: Progressing     Problem: Ineffective Coping  Goal: Cooperates with admission process  Description: Interventions: - Complete admission  process  Outcome: Progressing  Goal: Identifies ineffective coping skills  Outcome: Progressing  Goal: Identifies healthy coping skills  Outcome: Progressing  Goal: Demonstrates healthy coping skills  Outcome: Progressing  Goal: Participates in unit activities  Description: Interventions:- Provide therapeutic environment - Provide required programming - Redirect inappropriate behaviors   Outcome: Progressing  Goal: Patient/Family participate in treatment and DC plans  Description: Interventions:- Provide therapeutic environment  Outcome: Progressing  Goal: Patient/Family verbalizes awareness of resources  Outcome: Progressing  Goal: Understands least restrictive measures  Description: Interventions:- Utilize least restrictive behavior  Outcome: Progressing  Goal: Free from restraint events  Description: - Utilize least restrictive measures - Provide behavioral interventions - Redirect inappropriate behaviors   Outcome: Progressing     Problem: Risk for Self Injury/Neglect  Goal: Treatment Goal: Remain safe during length of stay, learn and adopt new coping skills, and be free of self-injurious ideation, impulses and acts at the time of discharge  Outcome: Progressing  Goal: Verbalize thoughts and feelings  Description: Interventions:- Assess and re-assess patient's lethality and potential for self-injury- Engage patient in 1:1 interactions, daily, for a minimum of 15 minutes- Encourage patient to express feelings, fears, frustrations, hopes- Establish rapport/trust with patient   Outcome: Progressing  Goal: Refrain from harming self  Description: Interventions:- Monitor patient closely, per order- Develop a trusting relationship- Supervise medication ingestion, monitor effects and side effects   Outcome: Progressing  Goal: Attend and participate in unit activities, including therapeutic, recreational, and educational groups  Description: Interventions:- Provide therapeutic and educational activities daily, encourage  attendance and participation, and document same in the medical record- Obtain collateral information, encourage visitation and family involvement in care   Outcome: Progressing  Goal: Recognize maladaptive responses and adopt new coping mechanisms  Outcome: Progressing  Goal: Complete daily ADLs, including personal hygiene independently, as able  Description: Interventions:- Observe, teach, and assist patient with ADLS- Monitor and promote a balance of rest/activity, with adequate nutrition and elimination  Outcome: Progressing     Problem: Depression  Goal: Treatment Goal: Demonstrate behavioral control of depressive symptoms, verbalize feelings of improved mood/affect, and adopt new coping skills prior to discharge  Outcome: Progressing  Goal: Verbalize thoughts and feelings  Description: Interventions:- Assess and re-assess patient's level of risk - Engage patient in 1:1 interactions, daily, for a minimum of 15 minutes - Encourage patient to express feelings, fears, frustrations, hopes   Outcome: Progressing  Goal: Refrain from harming self  Description: Interventions:- Monitor patient closely, per order - Supervise medication ingestion, monitor effects and side effects   Outcome: Progressing  Goal: Refrain from isolation  Description: Interventions:- Develop a trusting relationship - Encourage socialization   Outcome: Progressing  Goal: Refrain from self-neglect  Outcome: Progressing  Goal: Attend and participate in unit activities, including therapeutic, recreational, and educational groups  Description: Interventions:- Provide therapeutic and educational activities daily, encourage attendance and participation, and document same in the medical record   Outcome: Progressing  Goal: Complete daily ADLs, including personal hygiene independently, as able  Description: Interventions:- Observe, teach, and assist patient with ADLS-  Monitor and promote a balance of rest/activity, with adequate nutrition and  elimination   Outcome: Progressing     Problem: Anxiety  Goal: Anxiety is at manageable level  Description: Interventions:- Assess and monitor patient's anxiety level. - Monitor for signs and symptoms (heart palpitations, chest pain, shortness of breath, headaches, nausea, feeling jumpy, restlessness, irritable, apprehensive). - Collaborate with interdisciplinary team and initiate plan and interventions as ordered.- [place] patient to unit/surroundings- Explain treatment plan- Encourage participation in care- Encourage verbalization of concerns/fears- Identify coping mechanisms- Assist in developing anxiety-reducing skills- Administer/offer alternative therapies- Limit or eliminate stimulants  Outcome: Progressing     Problem: Risk for Violence/Aggression Toward Others  Goal: Treatment Goal: Refrain from acts of violence/aggression during length of stay, and demonstrate improved impulse control at the time of discharge  Outcome: Progressing  Goal: Verbalize thoughts and feelings  Description: Interventions:- Assess and re-assess patient's level of risk, every waking shift- Engage patient in 1:1 interactions, daily, for a minimum of 15 minutes - Allow patient to express feelings and frustrations in a safe and non-threatening manner - Establish rapport/trust with patient   Outcome: Progressing  Goal: Refrain from harming others  Outcome: Progressing  Goal: Refrain from destructive acts on the environment or property  Outcome: Progressing  Goal: Control angry outbursts  Description: Interventions:- Monitor patient closely, per order- Ensure early verbal de-escalation- Monitor prn medication needs- Set reasonable/therapeutic limits, outline behavioral expectations, and consequences - Provide a non-threatening milieu, utilizing the least restrictive interventions   Outcome: Progressing  Goal: Attend and participate in unit activities, including therapeutic, recreational, and educational groups  Description:  Interventions:- Provide therapeutic and educational activities daily, encourage attendance and participation, and document same in the medical record   Outcome: Progressing  Goal: Identify appropriate positive anger management techniques  Description: Interventions:- Offer anger management and coping skills groups - Staff will provide positive feedback for appropriate anger control  Outcome: Progressing     Problem: Alteration in Orientation  Goal: Treatment Goal: Demonstrate a reduction of confusion and improved orientation to person, place, time and/or situation upon discharge, according to optimum baseline/ability  Outcome: Progressing  Goal: Interact with staff daily  Description: Interventions:- Assess and re-assess patient's level of orientation- Engage patient in 1 on 1 interactions, daily, for a minimum of 15 minutes - Establish rapport/trust with patient   Outcome: Progressing  Goal: Express concerns related to confused thinking related to:  Description: Interventions:- Encourage patient to express feelings, fears, frustrations, hopes- Assign consistent caregivers - [place]/re-orient patient as needed- Allow comfort items, as appropriate- Provide visual cues, signs, etc.   Outcome: Progressing  Goal: Allow medical examinations, as recommended  Description: Interventions:- Provide physical/neurological exams and/or referrals, per provider   Outcome: Progressing  Goal: Cooperate with recommended testing/procedures  Description: Interventions:- Determine need for ancillary testing- Observe for mental status changes- Implement falls/precaution protocol   Outcome: Progressing  Goal: Attend and participate in unit activities, including therapeutic, recreational, and educational groups  Description: Interventions:- Provide therapeutic and educational activities daily, encourage attendance and participation, and document same in the medical record - Provide appropriate opportunities for reminiscence - Provide a  consistent daily routine - Encourage family contact/visitation   Outcome: Progressing  Goal: Complete daily ADLs, including personal hygiene independently, as able  Description: Interventions:- Observe, teach, and assist patient with ADLS  Outcome: Progressing     Problem: Individualized Interventions  Goal: Patient will verbalize appropriate use of telephone within 5 days  Description: Interventions:- Treatment team to determine use of supervised phone privileges   Outcome: Progressing  Goal: Patient will verbalize need for hospitalization and will no longer attempt elopement within 5 days  Description: Interventions:- Ongoing education to help patient understand need for hospitalization  Outcome: Progressing  Goal: Patient will recognize inappropriate behaviors and develop alternative behaviors within 5 days  Description: Interventions:- Patient in collaboration with Treatment Team will develop a behavior management plan to help identify effective coping skills to deal with stressors  Outcome: Progressing

## 2025-04-19 NOTE — ASSESSMENT & PLAN NOTE
As of 04/17/2025 patient is less labile and tangential.  She is denying psych symptoms.  Patient's fatigue and chills are no longer present.  Klonopin could be attributing to the patient's lethargy.  Patient continues to express delusions about her brother who is her primary support system.  Which complicates discharge disposition.  4/19- remains tangential/overbright.    Continue Zyprexa to 10 mg BID for current symptoms of carlie (increased on 4/18/25)  Continue Trazodone to 100mg PO QHS for insomnia (started on 4/13/2025)  Decrease Klonopin to 0.25mg PO BID for manic behaviors, can utilize until Zyprexa is at a therapeutic dose (started on 4/13/2025)  Unable to initiate lithium as patient reports worsening kidney function while taking lithium, liver enzymes currently increased so will avoid Depakote at this time  CMP on 04/14 patient LFTs WNL

## 2025-04-19 NOTE — NURSING NOTE
Patient is medication and meal compliant.  No complaints of pain or discomfort. Patient is visible on the unit and social with peers on the unit. Patient has some delusional thoughts regarding her family and her grandchildren which it is unclear what is true or not. Patient denies current SI, AH or VH.  Patient does endorse anxiety and depression. Will continue to monitor patient for changes in mood or behavior.

## 2025-04-19 NOTE — PROGRESS NOTES
Progress Note - Behavioral Health   Name: Lisa ZARATE McGorry 69 y.o. female I MRN: [id number]  Unit/Bed#: [facility] 609-02 I Date of Admission: 4/8/2025   Date of Service: 4/19/2025 I Hospital Day: 11     Assessment & Plan  Bipolar affective disorder, current episode manic (HCC)  As of 04/17/2025 patient is less labile and tangential.  She is denying psych symptoms.  Patient's fatigue and chills are no longer present.  Klonopin could be attributing to the patient's lethargy.  Patient continues to express delusions about her brother who is her primary support system.  Which complicates discharge disposition.  4/19- remains tangential/overbright.    Continue Zyprexa to 10 mg BID for current symptoms of carlie (increased on 4/18/25)  Continue Trazodone to 100mg PO QHS for insomnia (started on 4/13/2025)  Decrease Klonopin to 0.25mg PO BID for manic behaviors, can utilize until Zyprexa is at a therapeutic dose (started on 4/13/2025)  Unable to initiate lithium as patient reports worsening kidney function while taking lithium, liver enzymes currently increased so will avoid Depakote at this time  CMP on 04/14 patient LFTs WNL    Current Medications:    Current Facility-Administered Medications:     apixaban (ELIQUIS) tablet 5 mg, Oral, BID    clonazePAM (KlonoPIN) tablet 0.25 mg, Oral, BID    insulin lispro (HumALOG/ADMELOG) 100 units/mL subcutaneous injection 1-6 Units, Subcutaneous, TID AC **AND** Fingerstick Glucose (POCT), , TID AC    insulin lispro (HumALOG/ADMELOG) 100 units/mL subcutaneous injection 1-6 Units, Subcutaneous, HS    levothyroxine tablet 88 mcg, Oral, Early Morning    lisinopril (ZESTRIL) tablet 5 mg, Oral, Daily    mupirocin (BACTROBAN) 2 % ointment, Topical, Daily    nystatin (MYCOSTATIN) powder, Topical, BID    OLANZapine (ZyPREXA) tablet 10 mg, Oral, BID    pravastatin (PRAVACHOL) tablet 40 mg, Oral, Daily    traZODone (DESYREL) tablet 100 mg, Oral, HS    Risks/Benefits of Treatment:     Risks, benefits,  "and possible side effects of medications explained to patient. Patient has limited understanding of risks and benefits of treatment at this time, but agrees to take medications as prescribed.    Progress Toward Goals:  progressing      Treatment Planning:   - Encourage early mobility and having a structured day  - Provide frequent re-orientation, and cognitive stimulation  - Ensure assistive devices are in proper working order (eye-glasses, hearing aids)  - Encourage adequate hydration, nutrition and monitor bowel movements  - Maintain sleep-wake cycle: Uninterrupted sleep time; low-level lighting at night  - Fall precaution  - f/u SLIM recs regarding the medical problems   - Continue medication titration and treatment plan; adjust medication to optimize treatment response and as clinically indicated.   - Observation:Routine  - Legal Status: 201  - VS: as per unit protocol  - Encourage group attendance and milieu therapy  - Dispo: To be determined  Estimated Discharge Day: 5/5/2025   Long Stay Certification : Not Applicable    Subjective     Behavior over the last 24 hours: unchanged    Per nursing, Lisa pleasant and cooperative, visible and social, over bright hyperverbal, tangential, behaviorally controlled.  Disorganized at times.  Med and meal compliant, denies SI/HI/AVH.    Lisa remains hyperverbal, over bright, can be sexually inappropriate, disorganized and tangential.  She seems perseverative on her jacket that says \"grandma #1\" and then begins talking about how her boyfriend cheated on her and then relating the story to her being a grandmother.  However she starts dancing and states she has no ring on her finger and is excited about being a grandmother.  Discussing how they are going to have a dedication for her in [place] and that she is going to try to get lady dooley, Juno Contreraseen, and journey to play for her.  Denies issue with sleep or appetite.  Denies SI/HI/AVH.  Denies medication side " effects at this time.    Sleep:  Adequate  Appetite: normal  Medication side effects: No  ROS: review of systems as noted above in HPI/Subjective report, all other systems are negative    Objective :  Temp:  [97 °F (36.1 °C)-98.6 °F (37 °C)] 97 °F (36.1 °C)  HR:  [] 112  BP: (114-164)/(56-76) 164/76  Resp:  [17-19] 19  SpO2:  [93 %-97 %] 93 %  O2 Device: None (Room air)    Mental Status Evaluation:    Appearance:  age appropriate, casually dressed, adequate grooming   Behavior:  Energetic, psychomotor agitation, inappropriate, responds to redirection   Speech:  increased rate, hypertalkative   Mood:  euthymic   Affect:  increased in intensity, increased in range, overbright   Thought Process:  disorganized, tangential   Thought Content:  bizarre delusions, grandiose ideas   Perceptual Disturbances: denies auditory hallucinations when asked, denies visual hallucinations when asked   Risk Potential: Suicidal Ideation -  None at present  Homicidal Ideation -  None at present  Potential for Aggression - Not at present   Sensorium:  oriented to person, place, and time/date   Memory:  recent and remote memory grossly intact   Consciousness:  alert and awake   Attention/Concentration: attention span and concentration appear shorter than expected for age   Insight:  Slowly improving   Judgment: Slowly improving   Gait/Station: normal gait/station   Motor Activity: no abnormal movements       Lab Results: I have reviewed the following results:  Results from the past 24 hours:   Recent Results (from the past 24 hours)   Fingerstick Glucose (POCT)    Collection Time: 04/18/25  4:07 PM   Result Value Ref Range    POC Glucose 216 (H) 65 - 140 mg/dl   Fingerstick Glucose (POCT)    Collection Time: 04/18/25  9:20 PM   Result Value Ref Range    POC Glucose 248 (H) 65 - 140 mg/dl   Fingerstick Glucose (POCT)    Collection Time: 04/19/25  7:13 AM   Result Value Ref Range    POC Glucose 107 65 - 140 mg/dl   Fingerstick Glucose  "(POCT)    Collection Time: 04/19/25 11:58 AM   Result Value Ref Range    POC Glucose 139 65 - 140 mg/dl       Administrative Statements     Counseling / Coordination of Care:  Patient's progress discussed with staff in treatment team meeting.  Medication changes reviewed with staff in treatment team meeting.  Medication education provided to patient.  Educated on importance of medication and treatment compliance.    Portions of the record may have been created with voice recognition software. Occasional wrong word or \"sound a like\" substitutions may have occurred due to the inherent limitations of voice recognition software. Read the chart carefully and recognize, using context, where substitutions have occurred.    Rodney Oconnor PA-C 04/19/25  "

## 2025-04-20 LAB
GLUCOSE SERPL-MCNC: 154 MG/DL (ref 65–140)
GLUCOSE SERPL-MCNC: 163 MG/DL (ref 65–140)
GLUCOSE SERPL-MCNC: 221 MG/DL (ref 65–140)
GLUCOSE SERPL-MCNC: 75 MG/DL (ref 65–140)
GLUCOSE SERPL-MCNC: 97 MG/DL (ref 65–140)

## 2025-04-20 PROCEDURE — 82948 REAGENT STRIP/BLOOD GLUCOSE: CPT

## 2025-04-20 PROCEDURE — 99232 SBSQ HOSP IP/OBS MODERATE 35: CPT | Performed by: PSYCHIATRY & NEUROLOGY

## 2025-04-20 RX ADMIN — NYSTATIN 1 APPLICATION: 100000 POWDER TOPICAL at 09:25

## 2025-04-20 RX ADMIN — CLONAZEPAM 0.25 MG: 0.5 TABLET ORAL at 08:03

## 2025-04-20 RX ADMIN — MUPIROCIN 1 APPLICATION: 20 OINTMENT TOPICAL at 09:24

## 2025-04-20 RX ADMIN — LISINOPRIL 5 MG: 5 TABLET ORAL at 08:05

## 2025-04-20 RX ADMIN — INSULIN LISPRO 1 UNITS: 100 INJECTION, SOLUTION INTRAVENOUS; SUBCUTANEOUS at 11:53

## 2025-04-20 RX ADMIN — NYSTATIN 1 APPLICATION: 100000 POWDER TOPICAL at 17:18

## 2025-04-20 RX ADMIN — PRAVASTATIN SODIUM 40 MG: 40 TABLET ORAL at 08:05

## 2025-04-20 RX ADMIN — APIXABAN 5 MG: 5 TABLET, FILM COATED ORAL at 08:05

## 2025-04-20 RX ADMIN — APIXABAN 5 MG: 5 TABLET, FILM COATED ORAL at 17:01

## 2025-04-20 RX ADMIN — LEVOTHYROXINE SODIUM 88 MCG: 88 TABLET ORAL at 05:20

## 2025-04-20 RX ADMIN — OLANZAPINE 10 MG: 10 TABLET, FILM COATED ORAL at 08:05

## 2025-04-20 RX ADMIN — SENNOSIDES AND DOCUSATE SODIUM 1 TABLET: 50; 8.6 TABLET ORAL at 13:29

## 2025-04-20 RX ADMIN — TRAZODONE HYDROCHLORIDE 100 MG: 100 TABLET ORAL at 21:16

## 2025-04-20 RX ADMIN — INSULIN LISPRO 1 UNITS: 100 INJECTION, SOLUTION INTRAVENOUS; SUBCUTANEOUS at 21:15

## 2025-04-20 RX ADMIN — INSULIN LISPRO 2 UNITS: 100 INJECTION, SOLUTION INTRAVENOUS; SUBCUTANEOUS at 16:37

## 2025-04-20 RX ADMIN — OLANZAPINE 10 MG: 10 TABLET, FILM COATED ORAL at 17:01

## 2025-04-20 RX ADMIN — CLONAZEPAM 0.25 MG: 0.5 TABLET ORAL at 17:02

## 2025-04-20 NOTE — NURSING NOTE
Patient calm and cooperative. Visible in the milieu, social with select peers. Remains hyper verbal, overly bright, disorganized at times. Denies all psych s/s. Medication compliant. Denies any pain or discomfort. Safety precautions maintained.

## 2025-04-20 NOTE — ASSESSMENT & PLAN NOTE
As of 04/17/2025 patient is less labile and tangential.  She is denying psych symptoms.  Patient's fatigue and chills are no longer present.  Klonopin could be attributing to the patient's lethargy.  Patient continues to express delusions about her brother who is her primary support system.  Which complicates discharge disposition.  4/20- remains disorganized at times/hyperverbal/overbright.    Continue Zyprexa to 10 mg BID for current symptoms of carlie (increased on 4/18/25)  Continue Trazodone to 100mg PO QHS for insomnia (started on 4/13/2025)  Continue Klonopin to 0.25mg PO BID for manic behaviors, can utilize until Zyprexa is at a therapeutic dose (started on 4/13/2025)  Unable to initiate lithium as patient reports worsening kidney function while taking lithium, liver enzymes currently increased so will avoid Depakote at this time  CMP on 04/14 patient LFTs WNL

## 2025-04-20 NOTE — NURSING NOTE
Patient is medication and meal compliant. No complaints of pain or discomfort. Patient is visible and social with peers on the unit. Patient denies all symptoms of depression or anxiety. Patient does have rapid and repetitive speech and has difficulty sitting still at times. Patient with minimal confusion this shift. Will continue to monitor patient for changes in mood or behavior.
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
## 2025-04-20 NOTE — PLAN OF CARE
Problem: PAIN - ADULT  Goal: Verbalizes/displays adequate comfort level or baseline comfort level  Description: Interventions:- Encourage patient to monitor pain and request assistance- Assess pain using appropriate pain scale- Administer analgesics based on type and severity of pain and evaluate response- Implement non-pharmacological measures as appropriate and evaluate response- Consider cultural and social influences on pain and pain management- Notify physician/advanced practitioner if interventions unsuccessful or patient reports new pain  Outcome: Progressing     Problem: INFECTION - ADULT  Goal: Absence or prevention of progression during hospitalization  Description: INTERVENTIONS:- Assess and monitor for signs and symptoms of infection- Monitor lab/diagnostic results- Monitor all insertion sites, i.e. indwelling lines, tubes, and drains- Monitor endotracheal if appropriate and nasal secretions for changes in amount and color- [place] appropriate cooling/warming therapies per order- Administer medications as ordered- Instruct and encourage patient and family to use good hand hygiene technique- Identify and instruct in appropriate isolation precautions for identified infection/condition  Outcome: Progressing  Goal: Absence of fever/infection during neutropenic period  Description: INTERVENTIONS:- Monitor WBC  Outcome: Progressing     Problem: SAFETY ADULT  Goal: Patient will remain free of falls  Description: INTERVENTIONS:- Educate patient/family on patient safety including physical limitations  Outcome: Progressing  Goal: Maintain or return to baseline ADL function  Description: INTERVENTIONS:-  Assess patient's ability to carry out ADLs; assess patient's baseline for ADL function and identify physical deficits which impact ability to perform ADLs (bathing, care of mouth/teeth, toileting, grooming, dressing, etc.)- Assess/evaluate cause of self-care deficits - Assess range of motion- Assess patient's  mobility; develop plan if impaired- Assess patient's need for assistive devices and provide as appropriate- Encourage maximum independence but intervene and supervise when necessary- Involve family in performance of ADLs- Assess for home care needs following discharge - Consider OT consult to assist with ADL evaluation and planning for discharge- Provide patient education as appropriate  Outcome: Progressing  Goal: Maintains/Returns to pre admission functional level  Outcome: Progressing     Problem: DISCHARGE PLANNING  Goal: Discharge to home or other facility with appropriate resources  Description: INTERVENTIONS:- Identify barriers to discharge w/patient and caregiver- Arrange for needed discharge resources and transportation as appropriate- Identify discharge learning needs (meds, wound care, etc.)- Arrange for interpretive services to assist at discharge as needed- Refer to Case Management Department for coordinating discharge planning if the patient needs post-hospital services based on physician/advanced practitioner order or complex needs related to functional status, cognitive ability, or social support system  Outcome: Progressing     Problem: Knowledge Deficit  Goal: Patient/family/caregiver demonstrates understanding of disease process, treatment plan, medications, and discharge instructions  Description: Complete learning assessment and assess knowledge base.Interventions:- Provide teaching at level of understanding- Provide teaching via preferred learning methods  Outcome: Progressing     Problem: Alteration in Thoughts and Perception  Goal: Treatment Goal: Gain control of psychotic behaviors/thinking, reduce/eliminate presenting symptoms and demonstrate improved reality functioning upon discharge  Outcome: Progressing  Goal: Verbalize thoughts and feelings  Description: Interventions:- Promote a nonjudgmental and trusting relationship with the patient through active listening and therapeutic  communication- Assess patient's level of functioning, behavior and potential for risk- Engage patient in 1 on 1 interactions- Encourage patient to express fears, feelings, frustrations, and discuss symptoms  - [place] patient to reality, help patient recognize reality-based thinking - Administer medications as ordered and assess for potential side effects- Provide the patient education related to the signs and symptoms of the illness and desired effects of prescribed medications  Outcome: Progressing  Goal: Refrain from acting on delusional thinking/internal stimuli  Description: Interventions:- Monitor patient closely, per order - Utilize least restrictive measures - Set reasonable limits, give positive feedback for acceptable - Administer medications as ordered and monitor of potential side effects  Outcome: Progressing  Goal: Agree to be compliant with medication regime, as prescribed and report medication side effects  Description: Interventions:- Offer appropriate PRN medication and supervise ingestion; conduct AIMS, as needed   Outcome: Progressing  Goal: Attend and participate in unit activities, including therapeutic, recreational, and educational groups  Description: Interventions:-Encourage Visitation and family involvement in care  Outcome: Progressing  Goal: Recognize dysfunctional thoughts, communicate reality-based thoughts at the time of discharge  Description: Interventions:- Provide medication and psycho-education to assist patient in compliance and developing insight into his/her illness   Outcome: Progressing  Goal: Complete daily ADLs, including personal hygiene independently, as able  Description: Interventions:- Observe, teach, and assist patient with ADLS- Monitor and promote a balance of rest/activity, with adequate nutrition and elimination   Outcome: Progressing     Problem: Ineffective Coping  Goal: Cooperates with admission process  Description: Interventions: - Complete admission  process  Outcome: Progressing  Goal: Identifies ineffective coping skills  Outcome: Progressing  Goal: Identifies healthy coping skills  Outcome: Progressing  Goal: Demonstrates healthy coping skills  Outcome: Progressing  Goal: Participates in unit activities  Description: Interventions:- Provide therapeutic environment - Provide required programming - Redirect inappropriate behaviors   Outcome: Progressing  Goal: Patient/Family participate in treatment and DC plans  Description: Interventions:- Provide therapeutic environment  Outcome: Progressing  Goal: Patient/Family verbalizes awareness of resources  Outcome: Progressing  Goal: Understands least restrictive measures  Description: Interventions:- Utilize least restrictive behavior  Outcome: Progressing  Goal: Free from restraint events  Description: - Utilize least restrictive measures - Provide behavioral interventions - Redirect inappropriate behaviors   Outcome: Progressing     Problem: Risk for Self Injury/Neglect  Goal: Treatment Goal: Remain safe during length of stay, learn and adopt new coping skills, and be free of self-injurious ideation, impulses and acts at the time of discharge  Outcome: Progressing  Goal: Verbalize thoughts and feelings  Description: Interventions:- Assess and re-assess patient's lethality and potential for self-injury- Engage patient in 1:1 interactions, daily, for a minimum of 15 minutes- Encourage patient to express feelings, fears, frustrations, hopes- Establish rapport/trust with patient   Outcome: Progressing  Goal: Refrain from harming self  Description: Interventions:- Monitor patient closely, per order- Develop a trusting relationship- Supervise medication ingestion, monitor effects and side effects   Outcome: Progressing  Goal: Attend and participate in unit activities, including therapeutic, recreational, and educational groups  Description: Interventions:- Provide therapeutic and educational activities daily, encourage  attendance and participation, and document same in the medical record- Obtain collateral information, encourage visitation and family involvement in care   Outcome: Progressing  Goal: Recognize maladaptive responses and adopt new coping mechanisms  Outcome: Progressing  Goal: Complete daily ADLs, including personal hygiene independently, as able  Description: Interventions:- Observe, teach, and assist patient with ADLS- Monitor and promote a balance of rest/activity, with adequate nutrition and elimination  Outcome: Progressing     Problem: Depression  Goal: Treatment Goal: Demonstrate behavioral control of depressive symptoms, verbalize feelings of improved mood/affect, and adopt new coping skills prior to discharge  Outcome: Progressing  Goal: Verbalize thoughts and feelings  Description: Interventions:- Assess and re-assess patient's level of risk - Engage patient in 1:1 interactions, daily, for a minimum of 15 minutes - Encourage patient to express feelings, fears, frustrations, hopes   Outcome: Progressing  Goal: Refrain from harming self  Description: Interventions:- Monitor patient closely, per order - Supervise medication ingestion, monitor effects and side effects   Outcome: Progressing  Goal: Refrain from isolation  Description: Interventions:- Develop a trusting relationship - Encourage socialization   Outcome: Progressing  Goal: Refrain from self-neglect  Outcome: Progressing  Goal: Attend and participate in unit activities, including therapeutic, recreational, and educational groups  Description: Interventions:- Provide therapeutic and educational activities daily, encourage attendance and participation, and document same in the medical record   Outcome: Progressing  Goal: Complete daily ADLs, including personal hygiene independently, as able  Description: Interventions:- Observe, teach, and assist patient with ADLS-  Monitor and promote a balance of rest/activity, with adequate nutrition and  elimination   Outcome: Progressing     Problem: Anxiety  Goal: Anxiety is at manageable level  Description: Interventions:- Assess and monitor patient's anxiety level. - Monitor for signs and symptoms (heart palpitations, chest pain, shortness of breath, headaches, nausea, feeling jumpy, restlessness, irritable, apprehensive). - Collaborate with interdisciplinary team and initiate plan and interventions as ordered.- [place] patient to unit/surroundings- Explain treatment plan- Encourage participation in care- Encourage verbalization of concerns/fears- Identify coping mechanisms- Assist in developing anxiety-reducing skills- Administer/offer alternative therapies- Limit or eliminate stimulants  Outcome: Progressing     Problem: Risk for Violence/Aggression Toward Others  Goal: Treatment Goal: Refrain from acts of violence/aggression during length of stay, and demonstrate improved impulse control at the time of discharge  Outcome: Progressing  Goal: Verbalize thoughts and feelings  Description: Interventions:- Assess and re-assess patient's level of risk, every waking shift- Engage patient in 1:1 interactions, daily, for a minimum of 15 minutes - Allow patient to express feelings and frustrations in a safe and non-threatening manner - Establish rapport/trust with patient   Outcome: Progressing  Goal: Refrain from harming others  Outcome: Progressing  Goal: Refrain from destructive acts on the environment or property  Outcome: Progressing  Goal: Control angry outbursts  Description: Interventions:- Monitor patient closely, per order- Ensure early verbal de-escalation- Monitor prn medication needs- Set reasonable/therapeutic limits, outline behavioral expectations, and consequences - Provide a non-threatening milieu, utilizing the least restrictive interventions   Outcome: Progressing  Goal: Attend and participate in unit activities, including therapeutic, recreational, and educational groups  Description:  Interventions:- Provide therapeutic and educational activities daily, encourage attendance and participation, and document same in the medical record   Outcome: Progressing  Goal: Identify appropriate positive anger management techniques  Description: Interventions:- Offer anger management and coping skills groups - Staff will provide positive feedback for appropriate anger control  Outcome: Progressing     Problem: Alteration in Orientation  Goal: Treatment Goal: Demonstrate a reduction of confusion and improved orientation to person, place, time and/or situation upon discharge, according to optimum baseline/ability  Outcome: Progressing  Goal: Interact with staff daily  Description: Interventions:- Assess and re-assess patient's level of orientation- Engage patient in 1 on 1 interactions, daily, for a minimum of 15 minutes - Establish rapport/trust with patient   Outcome: Progressing  Goal: Express concerns related to confused thinking related to:  Description: Interventions:- Encourage patient to express feelings, fears, frustrations, hopes- Assign consistent caregivers - [place]/re-orient patient as needed- Allow comfort items, as appropriate- Provide visual cues, signs, etc.   Outcome: Progressing  Goal: Allow medical examinations, as recommended  Description: Interventions:- Provide physical/neurological exams and/or referrals, per provider   Outcome: Progressing  Goal: Cooperate with recommended testing/procedures  Description: Interventions:- Determine need for ancillary testing- Observe for mental status changes- Implement falls/precaution protocol   Outcome: Progressing  Goal: Attend and participate in unit activities, including therapeutic, recreational, and educational groups  Description: Interventions:- Provide therapeutic and educational activities daily, encourage attendance and participation, and document same in the medical record - Provide appropriate opportunities for reminiscence - Provide a  consistent daily routine - Encourage family contact/visitation   Outcome: Progressing  Goal: Complete daily ADLs, including personal hygiene independently, as able  Description: Interventions:- Observe, teach, and assist patient with ADLS  Outcome: Progressing     Problem: Individualized Interventions  Goal: Patient will verbalize appropriate use of telephone within 5 days  Description: Interventions:- Treatment team to determine use of supervised phone privileges   Outcome: Progressing  Goal: Patient will verbalize need for hospitalization and will no longer attempt elopement within 5 days  Description: Interventions:- Ongoing education to help patient understand need for hospitalization  Outcome: Progressing  Goal: Patient will recognize inappropriate behaviors and develop alternative behaviors within 5 days  Description: Interventions:- Patient in collaboration with Treatment Team will develop a behavior management plan to help identify effective coping skills to deal with stressors  Outcome: Progressing

## 2025-04-21 LAB
GLUCOSE SERPL-MCNC: 101 MG/DL (ref 65–140)
GLUCOSE SERPL-MCNC: 165 MG/DL (ref 65–140)
GLUCOSE SERPL-MCNC: 181 MG/DL (ref 65–140)
GLUCOSE SERPL-MCNC: 245 MG/DL (ref 65–140)

## 2025-04-21 PROCEDURE — 82948 REAGENT STRIP/BLOOD GLUCOSE: CPT

## 2025-04-21 RX ORDER — DIVALPROEX SODIUM 500 MG/1
500 TABLET, DELAYED RELEASE ORAL 2 TIMES DAILY
Status: DISCONTINUED | OUTPATIENT
Start: 2025-04-21 | End: 2025-05-05 | Stop reason: HOSPADM

## 2025-04-21 RX ADMIN — INSULIN LISPRO 1 UNITS: 100 INJECTION, SOLUTION INTRAVENOUS; SUBCUTANEOUS at 21:25

## 2025-04-21 RX ADMIN — LISINOPRIL 5 MG: 5 TABLET ORAL at 08:35

## 2025-04-21 RX ADMIN — APIXABAN 5 MG: 5 TABLET, FILM COATED ORAL at 08:54

## 2025-04-21 RX ADMIN — NYSTATIN 1 APPLICATION: 100000 POWDER TOPICAL at 08:56

## 2025-04-21 RX ADMIN — APIXABAN 5 MG: 5 TABLET, FILM COATED ORAL at 17:12

## 2025-04-21 RX ADMIN — TRAZODONE HYDROCHLORIDE 100 MG: 100 TABLET ORAL at 21:24

## 2025-04-21 RX ADMIN — INSULIN LISPRO 3 UNITS: 100 INJECTION, SOLUTION INTRAVENOUS; SUBCUTANEOUS at 16:01

## 2025-04-21 RX ADMIN — PRAVASTATIN SODIUM 40 MG: 40 TABLET ORAL at 08:57

## 2025-04-21 RX ADMIN — OLANZAPINE 10 MG: 10 TABLET, FILM COATED ORAL at 08:56

## 2025-04-21 RX ADMIN — CLONAZEPAM 0.25 MG: 0.5 TABLET ORAL at 17:15

## 2025-04-21 RX ADMIN — LEVOTHYROXINE SODIUM 88 MCG: 88 TABLET ORAL at 05:40

## 2025-04-21 RX ADMIN — BISACODYL 10 MG: 10 SUPPOSITORY RECTAL at 21:57

## 2025-04-21 RX ADMIN — OLANZAPINE 10 MG: 10 TABLET, FILM COATED ORAL at 17:12

## 2025-04-21 RX ADMIN — POLYETHYLENE GLYCOL 3350 17 G: 17 POWDER, FOR SOLUTION ORAL at 11:28

## 2025-04-21 RX ADMIN — NYSTATIN 1 APPLICATION: 100000 POWDER TOPICAL at 17:12

## 2025-04-21 RX ADMIN — CLONAZEPAM 0.25 MG: 0.5 TABLET ORAL at 08:55

## 2025-04-21 RX ADMIN — INSULIN LISPRO 1 UNITS: 100 INJECTION, SOLUTION INTRAVENOUS; SUBCUTANEOUS at 11:46

## 2025-04-21 RX ADMIN — DIVALPROEX SODIUM 500 MG: 500 TABLET, DELAYED RELEASE ORAL at 21:24

## 2025-04-21 NOTE — PROGRESS NOTES
Progress Note - Behavioral Health   Name: Lisa ZARATE McGorry 69 y.o. female I MRN: [id number]  Unit/Bed#: [facility] 609-02 I Date of Admission: 4/8/2025   Date of Service: 4/21/2025 I Hospital Day: 13    Assessment & Plan  Bipolar affective disorder, current episode manic (HCC)  As of 04/17/2025 patient is less labile and tangential.  She is denying psych symptoms.  Patient's fatigue and chills are no longer present.  Klonopin could be attributing to the patient's lethargy.  Patient continues to express delusions about her brother who is her primary support system.  Which complicates discharge disposition.  4/20- remains disorganized at times/hyperverbal/overbright.    Continue Zyprexa to 10 mg BID for current symptoms of carlie (increased on 4/18/25)  Initiate Depakote 500 mg twice daily for mood stabilization  Will obtain VPA level, CMP, CBC on 04/24 at 2000 prior to evening dose  Continue Trazodone to 100mg PO QHS for insomnia (started on 4/13/2025)  Continue Klonopin to 0.25mg PO BID for manic behaviors, can utilize until Zyprexa is at a therapeutic dose (started on 4/13/2025)  Unable to initiate lithium as patient reports worsening kidney function while taking lithium, liver enzymes currently increased so will avoid Depakote at this time  CMP on 04/14 patient LFTs WNL    Current Medications:    Current Facility-Administered Medications:     apixaban (ELIQUIS) tablet 5 mg, Oral, BID    clonazePAM (KlonoPIN) tablet 0.25 mg, Oral, BID    insulin lispro (HumALOG/ADMELOG) 100 units/mL subcutaneous injection 1-6 Units, Subcutaneous, TID AC **AND** Fingerstick Glucose (POCT), , TID AC    insulin lispro (HumALOG/ADMELOG) 100 units/mL subcutaneous injection 1-6 Units, Subcutaneous, HS    levothyroxine tablet 88 mcg, Oral, Early Morning    lisinopril (ZESTRIL) tablet 5 mg, Oral, Daily    mupirocin (BACTROBAN) 2 % ointment, Topical, Daily    nystatin (MYCOSTATIN) powder, Topical, BID    OLANZapine (ZyPREXA) tablet 10 mg, Oral,  BID    pravastatin (PRAVACHOL) tablet 40 mg, Oral, Daily    traZODone (DESYREL) tablet 100 mg, Oral, HS    Current Facility-Administered Medications:     acetaminophen (TYLENOL) tablet 650 mg, Oral, Q4H PRN    acetaminophen (TYLENOL) tablet 650 mg, Oral, Q4H PRN    acetaminophen (TYLENOL) tablet 975 mg, Oral, Q6H PRN    aluminum-magnesium hydroxide-simethicone (MAALOX) oral suspension 30 mL, Oral, Q4H PRN    benztropine (COGENTIN) injection 1 mg, Intramuscular, Q4H PRN Max 6/day    benztropine (COGENTIN) tablet 0.5 mg, Oral, Q4H PRN Max 6/day    bisacodyl (DULCOLAX) rectal suppository 10 mg, Rectal, Daily PRN    hydrOXYzine HCL (ATARAX) tablet 25 mg, Oral, Q6H PRN Max 4/day    hydrOXYzine HCL (ATARAX) tablet 50 mg, Oral, Q6H PRN Max 4/day    LORazepam (ATIVAN) injection 1 mg, Intramuscular, Q6H PRN Max 3/day    LORazepam (ATIVAN) tablet 1 mg, Oral, Q6H PRN Max 3/day    OLANZapine (ZyPREXA) IM injection 5 mg, Intramuscular, Q3H PRN Max 3/day    OLANZapine (ZyPREXA) tablet 2.5 mg, Oral, Q4H PRN Max 6/day    OLANZapine (ZyPREXA) tablet 5 mg, Oral, Q4H PRN Max 3/day    OLANZapine (ZyPREXA) tablet 5 mg, Oral, Q3H PRN Max 3/day    polyethylene glycol (MIRALAX) packet 17 g, Oral, Daily PRN    propranolol (INDERAL) tablet 5 mg, Oral, Q8H PRN    senna-docusate sodium (SENOKOT S) 8.6-50 mg per tablet 1 tablet, Oral, Daily PRN    traZODone (DESYREL) tablet 50 mg, Oral, HS PRN    Risks/Benefits of Treatment:     Risks, benefits, and possible side effects of medications explained to patient and patient verbalizes understanding and agreement for treatment.    Progress Toward Goals: Unchanged    Treatment Planning:      - Encourage early mobility and having a structured day  - Provide frequent re-orientation, and cognitive stimulation  - Ensure assistive devices are in proper working order (eye-glasses, hearing aids)  - Encourage adequate hydration, nutrition and monitor bowel movements  - Maintain sleep-wake cycle: Uninterrupted  "sleep time; low-level lighting at night  - Fall precaution  - Follow up with [facility] regarding the medical problems   - Continue medication titration and treatment plan; adjust medication to optimize treatment response and as clinically indicated.   - Observation: Routine  - VS: as per unit protocol  - Encourage group attendance and milieu therapy  - Dispo: To be determined  - Estimated Discharge Day: 5/5/2025   - Legal Status : Voluntary 201 commitment.  - Long Stay Certification : Not Applicable    Subjective     Patient was visited on unit for continuing care; chart reviewed and discussed with multidisciplinary treatment team.     Patient continues to be visible in the milieu and interacts with staff and peers. No reports of aggression or self-injurious behavior on unit. No PRN medications used in the past 24 hours.  Of note patient has not had a bowel movement since 04/16    Patient accepted all offered medications and no adverse effects of medications noted or reported.    Patient today was more hyperactive and tangential.  Her rate of speech has increased and patient was dancing intermittently throughout interview.  Patient also was much more sexually preoccupied than days prior.  She describes her mood today as \"high high high.\"  Patient was perseverative about a friend that she wants to proposed to.  She also discussed on multiple occasions about having sexual interactions with this person.  She endorsed adequate sleep and appetite.  She denied medication side effects.  She denied suicidal and homicidal ideation.  She denied auditory visual hallucinations.    Sleep: normal  Appetite: normal  Medication side effects: No  ROS: review of systems as noted above in HPI/Subjective report, all other systems are negative    Objective :  Temp:  [97.1 °F (36.2 °C)-97.5 °F (36.4 °C)] 97.4 °F (36.3 °C)  HR:  [] 104  BP: (131-148)/(60-68) 148/68  Resp:  [17] 17  SpO2:  [96 %-99 %] 96 %  O2 Device: None (Room " "air)    Mental Status Evaluation:    Appearance:  casually dressed, marginal hygiene, looks stated age, overweight   Behavior:  pleasant, cooperative   Speech:  normal rate, normal volume, normal pitch   Mood:  \"High high high\"   Affect:  overbright   Thought Process:  tangential, perseverative   Thought Content:  no overt delusions   Perceptual Disturbances: denies auditory or visual hallucinations when asked, but appears preoccupied   Risk Potential: Suicidal Ideation -  None at present  Homicidal Ideation -  None at present  Potential for Aggression - Not at present   Sensorium:  oriented to person, place, and time/date   Memory:  recent and remote memory grossly intact   Consciousness:  alert and awake   Attention/Concentration: attention span and concentration are age appropriate   Insight:  poor   Judgment: poor   Gait/Station: normal gait/station   Motor Activity: no abnormal movements         Lab Results: I have reviewed the following results:  Results from the past 24 hours:   Recent Results (from the past 24 hours)   Fingerstick Glucose (POCT)    Collection Time: 04/20/25  8:56 PM   Result Value Ref Range    POC Glucose 154 (H) 65 - 140 mg/dl   Fingerstick Glucose (POCT)    Collection Time: 04/21/25  7:17 AM   Result Value Ref Range    POC Glucose 101 65 - 140 mg/dl   Fingerstick Glucose (POCT)    Collection Time: 04/21/25 11:24 AM   Result Value Ref Range    POC Glucose 165 (H) 65 - 140 mg/dl   Fingerstick Glucose (POCT)    Collection Time: 04/21/25  3:54 PM   Result Value Ref Range    POC Glucose 245 (H) 65 - 140 mg/dl       Administrative Statements     Counseling / Coordination of Care:   Patient's progress discussed with staff in treatment team meeting.  Medication changes reviewed with staff in treatment team meeting.    Portions of the record may have been created with voice recognition software. Occasional wrong word or \"sound a like\" substitutions may have occurred due to the inherent limitations " of voice recognition software. Read the chart carefully and recognize, using context, where substitutions have occurred.    Andrae Samuels DO 04/21/25  PGY-II

## 2025-04-21 NOTE — PROGRESS NOTES
Pt in / out of group. Pt angry w/ this writer when unable to provide undivided attention. Pt has poor boundaries, difficult to redirect. Sexually p/o.   04/21/25 1330   Activity/Group Checklist   Group Other (Comment)  (Cognitive development game)   Attendance Attended   Attendance Duration (min) 16-30   Interactions Disorganized interaction  (Pt in / out of group. Pt angry w/ this writer when unable to provide undivided attention. Pt has poor boundaries, difficult to redirect. Sexually p/o.)   Affect/Mood Wide   Goals Achieved Able to engage in interactions

## 2025-04-21 NOTE — NURSING NOTE
Patient is medication and meal compliant. No complaints of pain or discomfort. Patient is visible on the unit and social with peers. Patient denies depression or anxiety. Patient also denies SI, AH or VH. Patient continues to be hyperverbal, pacing and dancing on the unit and has difficulty sitting still or sitting quietly. Will continue to monitor patient for changes in mood or behavior.

## 2025-04-21 NOTE — PROGRESS NOTES
"Pt stormed out of group with no explanation. When pt returned, pt tried to hug this writer. When redirected and reminded of personal boundaries, pt stormed out again, stating \"see, this is why, this is the second time she has done this to me.\"   04/21/25 1030   Activity/Group Checklist   Group Personal control   Attendance Attended   Attendance Duration (min) 0-15   Interactions Disorganized interaction  (Pt stormed out of group with no explanation. When pt returned, pt tried to hug this writer. When redirected and reminded of personal boundaries, pt stormed out again, stating \"see, this is why, this is the second time she has done this to me.\")   Affect/Mood Wide   Goals Achieved Able to engage in interactions       "

## 2025-04-21 NOTE — ASSESSMENT & PLAN NOTE
As of 04/17/2025 patient is less labile and tangential.  She is denying psych symptoms.  Patient's fatigue and chills are no longer present.  Klonopin could be attributing to the patient's lethargy.  Patient continues to express delusions about her brother who is her primary support system.  Which complicates discharge disposition.  4/20- remains disorganized at times/hyperverbal/overbright.    Continue Zyprexa to 10 mg BID for current symptoms of carlie (increased on 4/18/25)  Initiate Depakote 500 mg twice daily for mood stabilization  Will obtain VPA level, CMP, CBC on 04/24 at 2000 prior to evening dose  Continue Trazodone to 100mg PO QHS for insomnia (started on 4/13/2025)  Continue Klonopin to 0.25mg PO BID for manic behaviors, can utilize until Zyprexa is at a therapeutic dose (started on 4/13/2025)  Unable to initiate lithium as patient reports worsening kidney function while taking lithium, liver enzymes currently increased so will avoid Depakote at this time  CMP on 04/14 patient LFTs WNL

## 2025-04-21 NOTE — PLAN OF CARE
Problem: PAIN - ADULT  Goal: Verbalizes/displays adequate comfort level or baseline comfort level  Description: Interventions:- Encourage patient to monitor pain and request assistance- Assess pain using appropriate pain scale- Administer analgesics based on type and severity of pain and evaluate response- Implement non-pharmacological measures as appropriate and evaluate response- Consider cultural and social influences on pain and pain management- Notify physician/advanced practitioner if interventions unsuccessful or patient reports new pain  Outcome: Progressing     Problem: INFECTION - ADULT  Goal: Absence or prevention of progression during hospitalization  Description: INTERVENTIONS:- Assess and monitor for signs and symptoms of infection- Monitor lab/diagnostic results- Monitor all insertion sites, i.e. indwelling lines, tubes, and drains- Monitor endotracheal if appropriate and nasal secretions for changes in amount and color- [place] appropriate cooling/warming therapies per order- Administer medications as ordered- Instruct and encourage patient and family to use good hand hygiene technique- Identify and instruct in appropriate isolation precautions for identified infection/condition  Outcome: Progressing  Goal: Absence of fever/infection during neutropenic period  Description: INTERVENTIONS:- Monitor WBC  Outcome: Progressing     Problem: SAFETY ADULT  Goal: Patient will remain free of falls  Description: INTERVENTIONS:- Educate patient/family on patient safety including physical limitations  Outcome: Progressing  Goal: Maintain or return to baseline ADL function  Description: INTERVENTIONS:-  Assess patient's ability to carry out ADLs; assess patient's baseline for ADL function and identify physical deficits which impact ability to perform ADLs (bathing, care of mouth/teeth, toileting, grooming, dressing, etc.)- Assess/evaluate cause of self-care deficits - Assess range of motion- Assess patient's  mobility; develop plan if impaired- Assess patient's need for assistive devices and provide as appropriate- Encourage maximum independence but intervene and supervise when necessary- Involve family in performance of ADLs- Assess for home care needs following discharge - Consider OT consult to assist with ADL evaluation and planning for discharge- Provide patient education as appropriate  Outcome: Progressing  Goal: Maintains/Returns to pre admission functional level  Outcome: Progressing     Problem: DISCHARGE PLANNING  Goal: Discharge to home or other facility with appropriate resources  Description: INTERVENTIONS:- Identify barriers to discharge w/patient and caregiver- Arrange for needed discharge resources and transportation as appropriate- Identify discharge learning needs (meds, wound care, etc.)- Arrange for interpretive services to assist at discharge as needed- Refer to Case Management Department for coordinating discharge planning if the patient needs post-hospital services based on physician/advanced practitioner order or complex needs related to functional status, cognitive ability, or social support system  Outcome: Progressing     Problem: Knowledge Deficit  Goal: Patient/family/caregiver demonstrates understanding of disease process, treatment plan, medications, and discharge instructions  Description: Complete learning assessment and assess knowledge base.Interventions:- Provide teaching at level of understanding- Provide teaching via preferred learning methods  Outcome: Progressing     Problem: Alteration in Thoughts and Perception  Goal: Treatment Goal: Gain control of psychotic behaviors/thinking, reduce/eliminate presenting symptoms and demonstrate improved reality functioning upon discharge  Outcome: Progressing  Goal: Verbalize thoughts and feelings  Description: Interventions:- Promote a nonjudgmental and trusting relationship with the patient through active listening and therapeutic  communication- Assess patient's level of functioning, behavior and potential for risk- Engage patient in 1 on 1 interactions- Encourage patient to express fears, feelings, frustrations, and discuss symptoms  - [place] patient to reality, help patient recognize reality-based thinking - Administer medications as ordered and assess for potential side effects- Provide the patient education related to the signs and symptoms of the illness and desired effects of prescribed medications  Outcome: Progressing  Goal: Refrain from acting on delusional thinking/internal stimuli  Description: Interventions:- Monitor patient closely, per order - Utilize least restrictive measures - Set reasonable limits, give positive feedback for acceptable - Administer medications as ordered and monitor of potential side effects  Outcome: Progressing  Goal: Agree to be compliant with medication regime, as prescribed and report medication side effects  Description: Interventions:- Offer appropriate PRN medication and supervise ingestion; conduct AIMS, as needed   Outcome: Progressing  Goal: Attend and participate in unit activities, including therapeutic, recreational, and educational groups  Description: Interventions:-Encourage Visitation and family involvement in care  Outcome: Progressing  Goal: Recognize dysfunctional thoughts, communicate reality-based thoughts at the time of discharge  Description: Interventions:- Provide medication and psycho-education to assist patient in compliance and developing insight into his/her illness   Outcome: Progressing  Goal: Complete daily ADLs, including personal hygiene independently, as able  Description: Interventions:- Observe, teach, and assist patient with ADLS- Monitor and promote a balance of rest/activity, with adequate nutrition and elimination   Outcome: Progressing     Problem: Ineffective Coping  Goal: Cooperates with admission process  Description: Interventions: - Complete admission  process  Outcome: Progressing  Goal: Identifies ineffective coping skills  Outcome: Progressing  Goal: Identifies healthy coping skills  Outcome: Progressing  Goal: Demonstrates healthy coping skills  Outcome: Progressing  Goal: Participates in unit activities  Description: Interventions:- Provide therapeutic environment - Provide required programming - Redirect inappropriate behaviors   Outcome: Progressing  Goal: Patient/Family participate in treatment and DC plans  Description: Interventions:- Provide therapeutic environment  Outcome: Progressing  Goal: Patient/Family verbalizes awareness of resources  Outcome: Progressing  Goal: Understands least restrictive measures  Description: Interventions:- Utilize least restrictive behavior  Outcome: Progressing  Goal: Free from restraint events  Description: - Utilize least restrictive measures - Provide behavioral interventions - Redirect inappropriate behaviors   Outcome: Progressing     Problem: Risk for Self Injury/Neglect  Goal: Treatment Goal: Remain safe during length of stay, learn and adopt new coping skills, and be free of self-injurious ideation, impulses and acts at the time of discharge  Outcome: Progressing  Goal: Verbalize thoughts and feelings  Description: Interventions:- Assess and re-assess patient's lethality and potential for self-injury- Engage patient in 1:1 interactions, daily, for a minimum of 15 minutes- Encourage patient to express feelings, fears, frustrations, hopes- Establish rapport/trust with patient   Outcome: Progressing  Goal: Refrain from harming self  Description: Interventions:- Monitor patient closely, per order- Develop a trusting relationship- Supervise medication ingestion, monitor effects and side effects   Outcome: Progressing  Goal: Attend and participate in unit activities, including therapeutic, recreational, and educational groups  Description: Interventions:- Provide therapeutic and educational activities daily, encourage  attendance and participation, and document same in the medical record- Obtain collateral information, encourage visitation and family involvement in care   Outcome: Progressing  Goal: Recognize maladaptive responses and adopt new coping mechanisms  Outcome: Progressing  Goal: Complete daily ADLs, including personal hygiene independently, as able  Description: Interventions:- Observe, teach, and assist patient with ADLS- Monitor and promote a balance of rest/activity, with adequate nutrition and elimination  Outcome: Progressing     Problem: Depression  Goal: Treatment Goal: Demonstrate behavioral control of depressive symptoms, verbalize feelings of improved mood/affect, and adopt new coping skills prior to discharge  Outcome: Progressing  Goal: Verbalize thoughts and feelings  Description: Interventions:- Assess and re-assess patient's level of risk - Engage patient in 1:1 interactions, daily, for a minimum of 15 minutes - Encourage patient to express feelings, fears, frustrations, hopes   Outcome: Progressing  Goal: Refrain from harming self  Description: Interventions:- Monitor patient closely, per order - Supervise medication ingestion, monitor effects and side effects   Outcome: Progressing  Goal: Refrain from isolation  Description: Interventions:- Develop a trusting relationship - Encourage socialization   Outcome: Progressing  Goal: Refrain from self-neglect  Outcome: Progressing  Goal: Attend and participate in unit activities, including therapeutic, recreational, and educational groups  Description: Interventions:- Provide therapeutic and educational activities daily, encourage attendance and participation, and document same in the medical record   Outcome: Progressing  Goal: Complete daily ADLs, including personal hygiene independently, as able  Description: Interventions:- Observe, teach, and assist patient with ADLS-  Monitor and promote a balance of rest/activity, with adequate nutrition and  elimination   Outcome: Progressing     Problem: Anxiety  Goal: Anxiety is at manageable level  Description: Interventions:- Assess and monitor patient's anxiety level. - Monitor for signs and symptoms (heart palpitations, chest pain, shortness of breath, headaches, nausea, feeling jumpy, restlessness, irritable, apprehensive). - Collaborate with interdisciplinary team and initiate plan and interventions as ordered.- [place] patient to unit/surroundings- Explain treatment plan- Encourage participation in care- Encourage verbalization of concerns/fears- Identify coping mechanisms- Assist in developing anxiety-reducing skills- Administer/offer alternative therapies- Limit or eliminate stimulants  Outcome: Progressing     Problem: Risk for Violence/Aggression Toward Others  Goal: Treatment Goal: Refrain from acts of violence/aggression during length of stay, and demonstrate improved impulse control at the time of discharge  Outcome: Progressing  Goal: Verbalize thoughts and feelings  Description: Interventions:- Assess and re-assess patient's level of risk, every waking shift- Engage patient in 1:1 interactions, daily, for a minimum of 15 minutes - Allow patient to express feelings and frustrations in a safe and non-threatening manner - Establish rapport/trust with patient   Outcome: Progressing  Goal: Refrain from harming others  Outcome: Progressing  Goal: Refrain from destructive acts on the environment or property  Outcome: Progressing  Goal: Control angry outbursts  Description: Interventions:- Monitor patient closely, per order- Ensure early verbal de-escalation- Monitor prn medication needs- Set reasonable/therapeutic limits, outline behavioral expectations, and consequences - Provide a non-threatening milieu, utilizing the least restrictive interventions   Outcome: Progressing  Goal: Attend and participate in unit activities, including therapeutic, recreational, and educational groups  Description:  Interventions:- Provide therapeutic and educational activities daily, encourage attendance and participation, and document same in the medical record   Outcome: Progressing  Goal: Identify appropriate positive anger management techniques  Description: Interventions:- Offer anger management and coping skills groups - Staff will provide positive feedback for appropriate anger control  Outcome: Progressing     Problem: Alteration in Orientation  Goal: Treatment Goal: Demonstrate a reduction of confusion and improved orientation to person, place, time and/or situation upon discharge, according to optimum baseline/ability  Outcome: Progressing  Goal: Interact with staff daily  Description: Interventions:- Assess and re-assess patient's level of orientation- Engage patient in 1 on 1 interactions, daily, for a minimum of 15 minutes - Establish rapport/trust with patient   Outcome: Progressing  Goal: Express concerns related to confused thinking related to:  Description: Interventions:- Encourage patient to express feelings, fears, frustrations, hopes- Assign consistent caregivers - [place]/re-orient patient as needed- Allow comfort items, as appropriate- Provide visual cues, signs, etc.   Outcome: Progressing  Goal: Allow medical examinations, as recommended  Description: Interventions:- Provide physical/neurological exams and/or referrals, per provider   Outcome: Progressing  Goal: Cooperate with recommended testing/procedures  Description: Interventions:- Determine need for ancillary testing- Observe for mental status changes- Implement falls/precaution protocol   Outcome: Progressing  Goal: Attend and participate in unit activities, including therapeutic, recreational, and educational groups  Description: Interventions:- Provide therapeutic and educational activities daily, encourage attendance and participation, and document same in the medical record - Provide appropriate opportunities for reminiscence - Provide a  consistent daily routine - Encourage family contact/visitation   Outcome: Progressing  Goal: Complete daily ADLs, including personal hygiene independently, as able  Description: Interventions:- Observe, teach, and assist patient with ADLS  Outcome: Progressing     Problem: Individualized Interventions  Goal: Patient will verbalize appropriate use of telephone within 5 days  Description: Interventions:- Treatment team to determine use of supervised phone privileges   Outcome: Progressing  Goal: Patient will verbalize need for hospitalization and will no longer attempt elopement within 5 days  Description: Interventions:- Ongoing education to help patient understand need for hospitalization  Outcome: Progressing  Goal: Patient will recognize inappropriate behaviors and develop alternative behaviors within 5 days  Description: Interventions:- Patient in collaboration with Treatment Team will develop a behavior management plan to help identify effective coping skills to deal with stressors  Outcome: Progressing

## 2025-04-21 NOTE — PROGRESS NOTES
04/21/25 0800   Team Meeting   Meeting Type Daily Rounds   Team Members Present   Team Members Present Physician;Nurse;   Physician Team Member Tiny/Dilma/Ramez   Nursing Team Member Rafaela/Carmen/Linda   Care Management Team Member Stanford   OT Team Member Alix   Patient/Family Present   Patient Present No   Patient's Family Present No     Patient was given senna as she has not had a BM since the 16th. She is overly bright and appears to be somewhat manic. She appears to be more directable and less pressured in her speech. Patient discharge is pending stabilization of mood and medications.

## 2025-04-21 NOTE — CASE MANAGEMENT
"Patient states that he knows patient is in the hospital. He shares that he and their cousin are very fed up with her. He shares that she has been confused and manic for the last several months. He shared that someone took her off of the medication that was working (he cannot remember the name) and she has been \"acting crazy for over a month.\" He states that when she does get like this the focus is usually on him being \"abusive\" and being the head of a \"pornography ring.\" All of which he states is very far from the truth. In fact, Shaw states that his cousin and aunt are working to find him a different place to go because they are worried about his wellbeing at times. He states that she has called the  on him for \"abuse\" and when they show up they wonder why they are called. He states that it is not uncommon for her to walk to a grocery store or a retail store and talk with random people and be there for hours talking to strangers. Cm gave brother contact information but patient is addiment that brother does not know which hospital she is in.     Shaw Rich (Brother)   [phone] (H)   "

## 2025-04-21 NOTE — NURSING NOTE
Patient calm and cooperative. Visible in the milieu, social with select peers. Remains hyper verbal and disorganized at times. Denies all psych s/s. Medication compliant. Denies any pain or discomfort. Safety precautions maintained.

## 2025-04-22 LAB
GLUCOSE SERPL-MCNC: 153 MG/DL (ref 65–140)
GLUCOSE SERPL-MCNC: 187 MG/DL (ref 65–140)
GLUCOSE SERPL-MCNC: 191 MG/DL (ref 65–140)
GLUCOSE SERPL-MCNC: 98 MG/DL (ref 65–140)

## 2025-04-22 PROCEDURE — 82948 REAGENT STRIP/BLOOD GLUCOSE: CPT

## 2025-04-22 RX ADMIN — DIVALPROEX SODIUM 500 MG: 500 TABLET, DELAYED RELEASE ORAL at 21:04

## 2025-04-22 RX ADMIN — APIXABAN 5 MG: 5 TABLET, FILM COATED ORAL at 09:01

## 2025-04-22 RX ADMIN — NYSTATIN 1 APPLICATION: 100000 POWDER TOPICAL at 09:02

## 2025-04-22 RX ADMIN — CLONAZEPAM 0.25 MG: 0.5 TABLET ORAL at 09:00

## 2025-04-22 RX ADMIN — MUPIROCIN 1 APPLICATION: 20 OINTMENT TOPICAL at 09:02

## 2025-04-22 RX ADMIN — INSULIN LISPRO 1 UNITS: 100 INJECTION, SOLUTION INTRAVENOUS; SUBCUTANEOUS at 21:07

## 2025-04-22 RX ADMIN — APIXABAN 5 MG: 5 TABLET, FILM COATED ORAL at 17:10

## 2025-04-22 RX ADMIN — PRAVASTATIN SODIUM 40 MG: 40 TABLET ORAL at 09:01

## 2025-04-22 RX ADMIN — OLANZAPINE 10 MG: 10 TABLET, FILM COATED ORAL at 17:10

## 2025-04-22 RX ADMIN — TRAZODONE HYDROCHLORIDE 100 MG: 100 TABLET ORAL at 21:03

## 2025-04-22 RX ADMIN — OLANZAPINE 10 MG: 10 TABLET, FILM COATED ORAL at 09:01

## 2025-04-22 RX ADMIN — DIVALPROEX SODIUM 500 MG: 500 TABLET, DELAYED RELEASE ORAL at 09:01

## 2025-04-22 RX ADMIN — LISINOPRIL 5 MG: 5 TABLET ORAL at 09:01

## 2025-04-22 RX ADMIN — NYSTATIN 1 APPLICATION: 100000 POWDER TOPICAL at 17:11

## 2025-04-22 RX ADMIN — LEVOTHYROXINE SODIUM 88 MCG: 88 TABLET ORAL at 06:06

## 2025-04-22 RX ADMIN — INSULIN LISPRO 2 UNITS: 100 INJECTION, SOLUTION INTRAVENOUS; SUBCUTANEOUS at 17:12

## 2025-04-22 RX ADMIN — INSULIN LISPRO 1 UNITS: 100 INJECTION, SOLUTION INTRAVENOUS; SUBCUTANEOUS at 12:35

## 2025-04-22 RX ADMIN — CLONAZEPAM 0.25 MG: 0.5 TABLET ORAL at 17:10

## 2025-04-22 NOTE — PROGRESS NOTES
Progress Note - Behavioral Health   Name: Lisa ZARATE McGorry 69 y.o. female I MRN: [id number]  Unit/Bed#: [facility] 609-02 I Date of Admission: 4/8/2025   Date of Service: 4/22/2025 I Hospital Day: 14    Assessment & Plan  Bipolar affective disorder, current episode manic (HCC)  04/21 the patient remains over bright, tangential, disorganized, paranoid, and experiencing increased energy.  Disposition remains complicated due to family factors.    Continue Zyprexa to 10 mg BID for current symptoms of carlie (increased on 4/18/25)  Initiate Depakote 500 mg twice daily for mood stabilization  Will obtain VPA level, CMP, CBC on 04/24 at 2000 prior to evening dose  Continue Trazodone to 100mg PO QHS for insomnia (started on 4/13/2025)  Continue Klonopin to 0.25mg PO BID for manic behaviors, can utilize until Zyprexa is at a therapeutic dose (started on 4/13/2025)  Unable to initiate lithium as patient reports worsening kidney function while taking lithium, liver enzymes currently increased so will avoid Depakote at this time  CMP on 04/14 patient LFTs WNL    Current Medications:    Current Facility-Administered Medications:     apixaban (ELIQUIS) tablet 5 mg, Oral, BID    clonazePAM (KlonoPIN) tablet 0.25 mg, Oral, BID    divalproex sodium (DEPAKOTE) DR tablet 500 mg, Oral, BID    insulin lispro (HumALOG/ADMELOG) 100 units/mL subcutaneous injection 1-6 Units, Subcutaneous, TID AC **AND** Fingerstick Glucose (POCT), , TID AC    insulin lispro (HumALOG/ADMELOG) 100 units/mL subcutaneous injection 1-6 Units, Subcutaneous, HS    levothyroxine tablet 88 mcg, Oral, Early Morning    lisinopril (ZESTRIL) tablet 5 mg, Oral, Daily    mupirocin (BACTROBAN) 2 % ointment, Topical, Daily    nystatin (MYCOSTATIN) powder, Topical, BID    OLANZapine (ZyPREXA) tablet 10 mg, Oral, BID    pravastatin (PRAVACHOL) tablet 40 mg, Oral, Daily    traZODone (DESYREL) tablet 100 mg, Oral, HS    Current Facility-Administered Medications:     acetaminophen  (TYLENOL) tablet 650 mg, Oral, Q4H PRN    acetaminophen (TYLENOL) tablet 650 mg, Oral, Q4H PRN    acetaminophen (TYLENOL) tablet 975 mg, Oral, Q6H PRN    aluminum-magnesium hydroxide-simethicone (MAALOX) oral suspension 30 mL, Oral, Q4H PRN    benztropine (COGENTIN) injection 1 mg, Intramuscular, Q4H PRN Max 6/day    benztropine (COGENTIN) tablet 0.5 mg, Oral, Q4H PRN Max 6/day    bisacodyl (DULCOLAX) rectal suppository 10 mg, Rectal, Daily PRN    hydrOXYzine HCL (ATARAX) tablet 25 mg, Oral, Q6H PRN Max 4/day    hydrOXYzine HCL (ATARAX) tablet 50 mg, Oral, Q6H PRN Max 4/day    LORazepam (ATIVAN) injection 1 mg, Intramuscular, Q6H PRN Max 3/day    LORazepam (ATIVAN) tablet 1 mg, Oral, Q6H PRN Max 3/day    OLANZapine (ZyPREXA) IM injection 5 mg, Intramuscular, Q3H PRN Max 3/day    OLANZapine (ZyPREXA) tablet 2.5 mg, Oral, Q4H PRN Max 6/day    OLANZapine (ZyPREXA) tablet 5 mg, Oral, Q4H PRN Max 3/day    OLANZapine (ZyPREXA) tablet 5 mg, Oral, Q3H PRN Max 3/day    polyethylene glycol (MIRALAX) packet 17 g, Oral, Daily PRN    propranolol (INDERAL) tablet 5 mg, Oral, Q8H PRN    senna-docusate sodium (SENOKOT S) 8.6-50 mg per tablet 1 tablet, Oral, Daily PRN    traZODone (DESYREL) tablet 50 mg, Oral, HS PRN    Risks/Benefits of Treatment:     Risks, benefits, and possible side effects of medications explained to patient and patient verbalizes understanding and agreement for treatment.    Progress Toward Goals: improving    Treatment Planning:      - Encourage early mobility and having a structured day  - Provide frequent re-orientation, and cognitive stimulation  - Ensure assistive devices are in proper working order (eye-glasses, hearing aids)  - Encourage adequate hydration, nutrition and monitor bowel movements  - Maintain sleep-wake cycle: Uninterrupted sleep time; low-level lighting at night  - Fall precaution  - Follow up with KOFFI regarding the medical problems   - Continue medication titration and treatment plan;  "adjust medication to optimize treatment response and as clinically indicated.   - Observation: Routine  - VS: as per unit protocol  - Encourage group attendance and milieu therapy  - Dispo: To be determined  - Estimated Discharge Day: 5/5/2025   - Legal Status : Voluntary 201 commitment.  - Long Stay Certification : Not Applicable    Subjective     Patient was visited on unit for continuing care; chart reviewed and discussed with multidisciplinary treatment team.     Patient continues to be visible in the milieu and interacts with staff and peers. No reports of aggression or self-injurious behavior on unit. No PRN medications used in the past 24 hours.    Patient accepted all offered medications and no adverse effects of medications noted or reported.    Patient today was intermittently agitated with this writer after discussing making contact with her family to assist with discharge.  The patient was paranoid about her cousin being contacted because \"my cousin comes from my brother's side.\"  The patient endorsed her mood is \"real good.\"  She denied medication side effects.  She endorsed adequate sleep and appetite.  She denied suicidal and homicidal ideation.  She denied auditory and visual hallucinations.    Sleep: normal  Appetite: normal  Medication side effects: No  ROS: review of systems as noted above in HPI/Subjective report, all other systems are negative    Objective :  Temp:  [96.9 °F (36.1 °C)-97.8 °F (36.6 °C)] 96.9 °F (36.1 °C)  HR:  [] 104  BP: (108-141)/(61-75) 137/61  Resp:  [18-20] 18  SpO2:  [96 %-99 %] 96 %  O2 Device: None (Room air)    Mental Status Evaluation:    Appearance:  disheveled, marginal hygiene, dressed in hospital attire, looks older than stated age   Behavior:  agitated   Speech:  normal volume, normal pitch, increased rate   Mood:  \"Real good.\"   Affect:  overbright   Thought Process:  disorganized, tangential   Thought Content:  paranoid ideation   Perceptual Disturbances: " "denies auditory or visual hallucinations when asked, but appears responding to internal stimuli   Risk Potential: Suicidal Ideation -  None at present  Homicidal Ideation -  None at present  Potential for Aggression - Yes, due to agitation   Sensorium:  oriented to person, place, and time/date   Memory:  recent and remote memory grossly intact   Consciousness:  alert and awake   Attention/Concentration: attention span and concentration are age appropriate   Insight:  poor   Judgment: poor   Gait/Station: normal gait/station   Motor Activity: no abnormal movements         Lab Results: I have reviewed the following results:  Results from the past 24 hours:   Recent Results (from the past 24 hours)   Fingerstick Glucose (POCT)    Collection Time: 04/21/25 11:24 AM   Result Value Ref Range    POC Glucose 165 (H) 65 - 140 mg/dl   Fingerstick Glucose (POCT)    Collection Time: 04/21/25  3:54 PM   Result Value Ref Range    POC Glucose 245 (H) 65 - 140 mg/dl   Fingerstick Glucose (POCT)    Collection Time: 04/21/25  8:50 PM   Result Value Ref Range    POC Glucose 181 (H) 65 - 140 mg/dl   Fingerstick Glucose (POCT)    Collection Time: 04/22/25  7:20 AM   Result Value Ref Range    POC Glucose 98 65 - 140 mg/dl       Administrative Statements     Counseling / Coordination of Care:   Patient's progress discussed with staff in treatment team meeting.  Medication changes reviewed with staff in treatment team meeting.    Portions of the record may have been created with voice recognition software. Occasional wrong word or \"sound a like\" substitutions may have occurred due to the inherent limitations of voice recognition software. Read the chart carefully and recognize, using context, where substitutions have occurred.    Andrae Samuels DO 04/22/25  PGY-II  "

## 2025-04-22 NOTE — PLAN OF CARE
Problem: Alteration in Thoughts and Perception  Goal: Treatment Goal: Gain control of psychotic behaviors/thinking, reduce/eliminate presenting symptoms and demonstrate improved reality functioning upon discharge  Outcome: Progressing  Goal: Verbalize thoughts and feelings  Description: Interventions:- Promote a nonjudgmental and trusting relationship with the patient through active listening and therapeutic communication- Assess patient's level of functioning, behavior and potential for risk- Engage patient in 1 on 1 interactions- Encourage patient to express fears, feelings, frustrations, and discuss symptoms  - [place] patient to reality, help patient recognize reality-based thinking - Administer medications as ordered and assess for potential side effects- Provide the patient education related to the signs and symptoms of the illness and desired effects of prescribed medications  Outcome: Progressing  Goal: Refrain from acting on delusional thinking/internal stimuli  Description: Interventions:- Monitor patient closely, per order - Utilize least restrictive measures - Set reasonable limits, give positive feedback for acceptable - Administer medications as ordered and monitor of potential side effects  Outcome: Progressing  Goal: Agree to be compliant with medication regime, as prescribed and report medication side effects  Description: Interventions:- Offer appropriate PRN medication and supervise ingestion; conduct AIMS, as needed   Outcome: Progressing  Goal: Recognize dysfunctional thoughts, communicate reality-based thoughts at the time of discharge  Description: Interventions:- Provide medication and psycho-education to assist patient in compliance and developing insight into his/her illness   Outcome: Progressing     Problem: Ineffective Coping  Goal: Demonstrates healthy coping skills  Outcome: Progressing     Problem: Risk for Self Injury/Neglect  Goal: Treatment Goal: Remain safe during length of stay,  learn and adopt new coping skills, and be free of self-injurious ideation, impulses and acts at the time of discharge  Outcome: Progressing  Goal: Verbalize thoughts and feelings  Description: Interventions:- Assess and re-assess patient's lethality and potential for self-injury- Engage patient in 1:1 interactions, daily, for a minimum of 15 minutes- Encourage patient to express feelings, fears, frustrations, hopes- Establish rapport/trust with patient   Outcome: Progressing     Problem: Depression  Goal: Treatment Goal: Demonstrate behavioral control of depressive symptoms, verbalize feelings of improved mood/affect, and adopt new coping skills prior to discharge  Outcome: Progressing  Goal: Refrain from isolation  Description: Interventions:- Develop a trusting relationship - Encourage socialization   Outcome: Progressing     Problem: Anxiety  Goal: Anxiety is at manageable level  Description: Interventions:- Assess and monitor patient's anxiety level. - Monitor for signs and symptoms (heart palpitations, chest pain, shortness of breath, headaches, nausea, feeling jumpy, restlessness, irritable, apprehensive). - Collaborate with interdisciplinary team and initiate plan and interventions as ordered.- [facility] patient to unit/surroundings- Explain treatment plan- Encourage participation in care- Encourage verbalization of concerns/fears- Identify coping mechanisms- Assist in developing anxiety-reducing skills- Administer/offer alternative therapies- Limit or eliminate stimulants  Outcome: Progressing

## 2025-04-22 NOTE — NURSING NOTE
Patient is pleasant and cooperative , with poor concentration. Her behaviors and attitude is warm and friendly. Patient has bizarre appearance with worried affect. Good medication compliance. Impulsive behaviors not  observed.  Denies Si,HI. A,V/H.

## 2025-04-22 NOTE — PROGRESS NOTES
04/22/25 0800   Team Meeting   Meeting Type Daily Rounds   Team Members Present   Team Members Present Physician;Nurse;   Physician Team Member Tiny/Dilma/Ramez   Nursing Team Member Rafaela/Carmen/Linda   Care Management Team Member Stanford   OT Team Member Alix   Patient/Family Present   Patient Present No   Patient's Family Present No     Patient was started on  Depakote and will have labs taken soon. She is manic at times. She is hypersexual and is unhappy with boundaries being set. She is sleeping and eating well. She still believes herself to be pregnant.  Patient discharge is pending stabilization of mood and medications.

## 2025-04-22 NOTE — PROGRESS NOTES
Pt attempted relapse prevention plan with assistance. Pt was tangential, pressured, disorganized, sexually p/o. Multiple attempts were made to complete documentation. Pt unable to stay on task to complete relapse prevention plan. Pt was made aware of resources.   04/22/25 1330   Activity/Group Checklist   Group Admission/Discharge   Attendance Attended   Attendance Duration (min) 16-30   Interactions Disorganized interaction  (PT tangential, pressured, disorganized, sexually p/o. Could not stay on task to complete documentation.)   Affect/Mood Wide   Goals Achieved Identified feelings;Identified triggers;Identified relapse prevention strategies;Identified medication adherence strategies;Discussed safety plan;Discussed coping strategies;Discussed discharge plans;Able to listen to others;Identified resources and support systems;Able to engage in interactions;Able to reflect/comment on own behavior;Able to self-disclose;Able to recieve feedback

## 2025-04-22 NOTE — NURSING NOTE
"Pt visible in day room this evening, pleasant on approach. Currently denies depression and anxiety, denies further s/s or unmet needs. Brightens on approach. Remains hyperverbal and repetitive in speech, frequently speaking about dancing. 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     Pt denies BM as of yet. Prn dulcolax suppository provided at 2157. Tolerated well. Compliant with HS medications.     Will maintain q15min checks.  "

## 2025-04-22 NOTE — ASSESSMENT & PLAN NOTE
04/21 the patient remains over bright, tangential, disorganized, paranoid, and experiencing increased energy.  Disposition remains complicated due to family factors.    Continue Zyprexa to 10 mg BID for current symptoms of carlie (increased on 4/18/25)  Initiate Depakote 500 mg twice daily for mood stabilization  Will obtain VPA level, CMP, CBC on 04/24 at 2000 prior to evening dose  Continue Trazodone to 100mg PO QHS for insomnia (started on 4/13/2025)  Continue Klonopin to 0.25mg PO BID for manic behaviors, can utilize until Zyprexa is at a therapeutic dose (started on 4/13/2025)  Unable to initiate lithium as patient reports worsening kidney function while taking lithium, liver enzymes currently increased so will avoid Depakote at this time  CMP on 04/14 patient LFTs WNL

## 2025-04-22 NOTE — CASE MANAGEMENT
"Cm obtained verbal permission to speak with patient's cousin Russ Mishra while in the hallway, witnessed by RAMEZ Pinon. Patient does not want family to know where she is currently residing, but will allow CM to speak with cousin regarding disposition as he has been calling with \"very important information and background.\" CM returned call for Russ. Russ states that her and her brother have lived together since the mid s since their father . He states that their father shot himself. He states that she and her brother are both \"limited\" but can do their ADLS. The house was left to them and they don't pay any mortgage. He states that they often do not money and don't pay the bills despite having the SSD to due so. He and his wife have been trying to help them with finances and had a meeting with them to try and figure out her finances. She had some kidney problems from the lithium, and he believes that she was taken off due to that, but that she wasn't drinking or eating well. She states that she told him that Jeramie broke her ankle, which Russ said happened when they were 7 and they were playing. He states that there isn't merit to what she says and has been bringing things up from the s as if they happened today. She deactivated both her and his card before entering and they had turned his back on but not his. He stated that her brother is not able to care for her and has his own issues. He has been trying to get her to sell her house but both of their names are on it but she has not been willing to do so. He states that the house is filthy. He has paid their taxes for them and that they were living out of a cooler and a mini fridge. He replaced their recliners and bought a fridge for them. He states that her obsession with her brother being the \"eduarda schwartz [facility]\" goes all the way back to when they were kids and her brother had his father playboy magazines. He states that they were paying for " HD TV and had a small tube tv that doesn't allow for it and they were spending $300 a month. He states that she cannot return there.       Russ Horton (Cousin)  [phone]

## 2025-04-23 LAB
GLUCOSE SERPL-MCNC: 105 MG/DL (ref 65–140)
GLUCOSE SERPL-MCNC: 137 MG/DL (ref 65–140)
GLUCOSE SERPL-MCNC: 138 MG/DL (ref 65–140)
GLUCOSE SERPL-MCNC: 155 MG/DL (ref 65–140)

## 2025-04-23 PROCEDURE — 82948 REAGENT STRIP/BLOOD GLUCOSE: CPT

## 2025-04-23 RX ADMIN — OLANZAPINE 10 MG: 10 TABLET, FILM COATED ORAL at 17:11

## 2025-04-23 RX ADMIN — OLANZAPINE 10 MG: 10 TABLET, FILM COATED ORAL at 08:37

## 2025-04-23 RX ADMIN — CLONAZEPAM 0.25 MG: 0.5 TABLET ORAL at 17:11

## 2025-04-23 RX ADMIN — LEVOTHYROXINE SODIUM 88 MCG: 88 TABLET ORAL at 05:58

## 2025-04-23 RX ADMIN — TRAZODONE HYDROCHLORIDE 100 MG: 100 TABLET ORAL at 21:26

## 2025-04-23 RX ADMIN — LISINOPRIL 5 MG: 5 TABLET ORAL at 08:38

## 2025-04-23 RX ADMIN — PRAVASTATIN SODIUM 40 MG: 40 TABLET ORAL at 08:37

## 2025-04-23 RX ADMIN — APIXABAN 5 MG: 5 TABLET, FILM COATED ORAL at 17:11

## 2025-04-23 RX ADMIN — DIVALPROEX SODIUM 500 MG: 500 TABLET, DELAYED RELEASE ORAL at 21:25

## 2025-04-23 RX ADMIN — DIVALPROEX SODIUM 500 MG: 500 TABLET, DELAYED RELEASE ORAL at 08:37

## 2025-04-23 RX ADMIN — CLONAZEPAM 0.25 MG: 0.5 TABLET ORAL at 08:36

## 2025-04-23 RX ADMIN — MUPIROCIN 1 APPLICATION: 20 OINTMENT TOPICAL at 08:39

## 2025-04-23 RX ADMIN — NYSTATIN 1 APPLICATION: 100000 POWDER TOPICAL at 17:27

## 2025-04-23 RX ADMIN — INSULIN LISPRO 1 UNITS: 100 INJECTION, SOLUTION INTRAVENOUS; SUBCUTANEOUS at 17:13

## 2025-04-23 RX ADMIN — APIXABAN 5 MG: 5 TABLET, FILM COATED ORAL at 08:38

## 2025-04-23 RX ADMIN — NYSTATIN 1 APPLICATION: 100000 POWDER TOPICAL at 08:39

## 2025-04-23 NOTE — NURSING NOTE
Patient was visible and social in the milieu with select peers. Denies all psych s/s. No behaviors noted. No c/o pain. No needs expressed. Had 100% for dinner. Took her medications. Safety checks ongoing.

## 2025-04-23 NOTE — PROGRESS NOTES
04/23/25 0800   Team Meeting   Meeting Type Daily Rounds   Team Members Present   Team Members Present Other (Discipline and Name);Occupational Therapist;;Nurse;Physician   Physician Team Member Ramez/Tiny/Timmy   Nursing Team Member Rafaela/Carmen/Linda   Care Management Team Member Stanford   OT Team Member Alix   Other (Discipline and Name) Kandi - Pharmacy   Patient/Family Present   Patient Present No   Patient's Family Present No     Patient is bizarre and intrusive. She had a BM on the 21st and shower on the 22nd. She is eating, sleeping, and attending groups. Depakote has been added and a level is to be done tomorrow. Patient discharge is pending stabilization of mood and medications.

## 2025-04-23 NOTE — PLAN OF CARE
Problem: PAIN - ADULT  Goal: Verbalizes/displays adequate comfort level or baseline comfort level  Description: Interventions:- Encourage patient to monitor pain and request assistance- Assess pain using appropriate pain scale- Administer analgesics based on type and severity of pain and evaluate response- Implement non-pharmacological measures as appropriate and evaluate response- Consider cultural and social influences on pain and pain management- Notify physician/advanced practitioner if interventions unsuccessful or patient reports new pain  Outcome: Progressing     Problem: SAFETY ADULT  Goal: Patient will remain free of falls  Description: INTERVENTIONS:- Educate patient/family on patient safety including physical limitations  Outcome: Progressing  Goal: Maintain or return to baseline ADL function  Description: INTERVENTIONS:-  Assess patient's ability to carry out ADLs; assess patient's baseline for ADL function and identify physical deficits which impact ability to perform ADLs (bathing, care of mouth/teeth, toileting, grooming, dressing, etc.)- Assess/evaluate cause of self-care deficits - Assess range of motion- Assess patient's mobility; develop plan if impaired- Assess patient's need for assistive devices and provide as appropriate- Encourage maximum independence but intervene and supervise when necessary- Involve family in performance of ADLs- Assess for home care needs following discharge - Consider OT consult to assist with ADL evaluation and planning for discharge- Provide patient education as appropriate  Outcome: Progressing     Problem: Depression  Goal: Treatment Goal: Demonstrate behavioral control of depressive symptoms, verbalize feelings of improved mood/affect, and adopt new coping skills prior to discharge  Outcome: Progressing  Goal: Verbalize thoughts and feelings  Description: Interventions:- Assess and re-assess patient's level of risk - Engage patient in 1:1 interactions, daily, for  a minimum of 15 minutes - Encourage patient to express feelings, fears, frustrations, hopes   Outcome: Progressing  Goal: Refrain from harming self  Description: Interventions:- Monitor patient closely, per order - Supervise medication ingestion, monitor effects and side effects   Outcome: Progressing  Goal: Refrain from isolation  Description: Interventions:- Develop a trusting relationship - Encourage socialization   Outcome: Progressing  Goal: Refrain from self-neglect  Outcome: Progressing     Problem: Alteration in Orientation  Goal: Interact with staff daily  Description: Interventions:- Assess and re-assess patient's level of orientation- Engage patient in 1 on 1 interactions, daily, for a minimum of 15 minutes - Establish rapport/trust with patient   Outcome: Progressing  Goal: Express concerns related to confused thinking related to:  Description: Interventions:- Encourage patient to express feelings, fears, frustrations, hopes- Assign consistent caregivers - [place]/re-orient patient as needed- Allow comfort items, as appropriate- Provide visual cues, signs, etc.   Outcome: Progressing  Goal: Allow medical examinations, as recommended  Description: Interventions:- Provide physical/neurological exams and/or referrals, per provider   Outcome: Progressing     Problem: Risk for Violence/Aggression Toward Others  Goal: Treatment Goal: Refrain from acts of violence/aggression during length of stay, and demonstrate improved impulse control at the time of discharge  Outcome: Progressing  Goal: Verbalize thoughts and feelings  Description: Interventions:- Assess and re-assess patient's level of risk, every waking shift- Engage patient in 1:1 interactions, daily, for a minimum of 15 minutes - Allow patient to express feelings and frustrations in a safe and non-threatening manner - Establish rapport/trust with patient   Outcome: Progressing  Goal: Refrain from harming others  Outcome: Progressing     Problem:  Alteration in Orientation  Goal: Interact with staff daily  Description: Interventions:- Assess and re-assess patient's level of orientation- Engage patient in 1 on 1 interactions, daily, for a minimum of 15 minutes - Establish rapport/trust with patient   Outcome: Progressing  Goal: Express concerns related to confused thinking related to:  Description: Interventions:- Encourage patient to express feelings, fears, frustrations, hopes- Assign consistent caregivers - [place]/re-orient patient as needed- Allow comfort items, as appropriate- Provide visual cues, signs, etc.   Outcome: Progressing  Goal: Allow medical examinations, as recommended  Description: Interventions:- Provide physical/neurological exams and/or referrals, per provider   Outcome: Progressing

## 2025-04-23 NOTE — NURSING NOTE
Patient is pleasant and cooperative with the care and medications. Patient is visible on the unit and brightens on approach. Patient has to be redirected and when she crosses personal boundaries like hugging. Patient respects it. Denies SI,HI,has good appetite.

## 2025-04-23 NOTE — NURSING NOTE
Patient was visible and social with select peers in the milieu. Denies all psych s/s. No behaviors noted. No c/o pain. No needs expressed. Had 100% for dinner. Took her medications. Safety checks ongoing.

## 2025-04-23 NOTE — CASE MANAGEMENT
CM completed and emailed patient Aging Referral to [facility] Department of Aging. Will await response.
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
## 2025-04-23 NOTE — ASSESSMENT & PLAN NOTE
As of 04/23 the patient was more redirectable today but remains hyperactive and hyperverbal.  She is reporting fatigue from her Depakote.    Continue Zyprexa to 10 mg BID for current symptoms of carlie (increased on 4/18/25)  Initiate Depakote 500 mg twice daily for mood stabilization  Will obtain VPA level, CMP, CBC on 04/24 at 2000 prior to evening dose  Continue Trazodone to 100mg PO QHS for insomnia (started on 4/13/2025)  Continue Klonopin to 0.25mg PO BID for manic behaviors, can utilize until Zyprexa is at a therapeutic dose (started on 4/13/2025)  Unable to initiate lithium as patient reports worsening kidney function while taking lithium, liver enzymes currently increased so will avoid Depakote at this time  CMP on 04/14 patient LFTs WNL

## 2025-04-23 NOTE — PROGRESS NOTES
Progress Note - Behavioral Health   Name: Lisa ZARATE McGorry 69 y.o. female I MRN: [id number]  Unit/Bed#: [facility] 609-02 I Date of Admission: 4/8/2025   Date of Service: 4/23/2025 I Hospital Day: 15    Assessment & Plan  Bipolar affective disorder, current episode manic (HCC)  As of 04/23 the patient was more redirectable today but remains hyperactive and hyperverbal.  She is reporting fatigue from her Depakote.    Continue Zyprexa to 10 mg BID for current symptoms of carlie (increased on 4/18/25)  Initiate Depakote 500 mg twice daily for mood stabilization  Will obtain VPA level, CMP, CBC on 04/24 at 2000 prior to evening dose  Continue Trazodone to 100mg PO QHS for insomnia (started on 4/13/2025)  Continue Klonopin to 0.25mg PO BID for manic behaviors, can utilize until Zyprexa is at a therapeutic dose (started on 4/13/2025)  Unable to initiate lithium as patient reports worsening kidney function while taking lithium, liver enzymes currently increased so will avoid Depakote at this time  CMP on 04/14 patient LFTs WNL    Current Medications:    Current Facility-Administered Medications:     apixaban (ELIQUIS) tablet 5 mg, Oral, BID    clonazePAM (KlonoPIN) tablet 0.25 mg, Oral, BID    divalproex sodium (DEPAKOTE) DR tablet 500 mg, Oral, BID    insulin lispro (HumALOG/ADMELOG) 100 units/mL subcutaneous injection 1-6 Units, Subcutaneous, TID AC **AND** Fingerstick Glucose (POCT), , TID AC    insulin lispro (HumALOG/ADMELOG) 100 units/mL subcutaneous injection 1-6 Units, Subcutaneous, HS    levothyroxine tablet 88 mcg, Oral, Early Morning    lisinopril (ZESTRIL) tablet 5 mg, Oral, Daily    mupirocin (BACTROBAN) 2 % ointment, Topical, Daily    nystatin (MYCOSTATIN) powder, Topical, BID    OLANZapine (ZyPREXA) tablet 10 mg, Oral, BID    pravastatin (PRAVACHOL) tablet 40 mg, Oral, Daily    traZODone (DESYREL) tablet 100 mg, Oral, HS    Current Facility-Administered Medications:     acetaminophen (TYLENOL) tablet 650 mg, Oral,  Q4H PRN    acetaminophen (TYLENOL) tablet 650 mg, Oral, Q4H PRN    acetaminophen (TYLENOL) tablet 975 mg, Oral, Q6H PRN    aluminum-magnesium hydroxide-simethicone (MAALOX) oral suspension 30 mL, Oral, Q4H PRN    benztropine (COGENTIN) injection 1 mg, Intramuscular, Q4H PRN Max 6/day    benztropine (COGENTIN) tablet 0.5 mg, Oral, Q4H PRN Max 6/day    bisacodyl (DULCOLAX) rectal suppository 10 mg, Rectal, Daily PRN    hydrOXYzine HCL (ATARAX) tablet 25 mg, Oral, Q6H PRN Max 4/day    hydrOXYzine HCL (ATARAX) tablet 50 mg, Oral, Q6H PRN Max 4/day    LORazepam (ATIVAN) injection 1 mg, Intramuscular, Q6H PRN Max 3/day    LORazepam (ATIVAN) tablet 1 mg, Oral, Q6H PRN Max 3/day    OLANZapine (ZyPREXA) IM injection 5 mg, Intramuscular, Q3H PRN Max 3/day    OLANZapine (ZyPREXA) tablet 2.5 mg, Oral, Q4H PRN Max 6/day    OLANZapine (ZyPREXA) tablet 5 mg, Oral, Q4H PRN Max 3/day    OLANZapine (ZyPREXA) tablet 5 mg, Oral, Q3H PRN Max 3/day    polyethylene glycol (MIRALAX) packet 17 g, Oral, Daily PRN    propranolol (INDERAL) tablet 5 mg, Oral, Q8H PRN    senna-docusate sodium (SENOKOT S) 8.6-50 mg per tablet 1 tablet, Oral, Daily PRN    traZODone (DESYREL) tablet 50 mg, Oral, HS PRN    Risks/Benefits of Treatment:     Risks, benefits, and possible side effects of medications explained to patient and patient verbalizes understanding and agreement for treatment.    Progress Toward Goals: improving    Treatment Planning:      - Encourage early mobility and having a structured day  - Provide frequent re-orientation, and cognitive stimulation  - Ensure assistive devices are in proper working order (eye-glasses, hearing aids)  - Encourage adequate hydration, nutrition and monitor bowel movements  - Maintain sleep-wake cycle: Uninterrupted sleep time; low-level lighting at night  - Fall precaution  - Follow up with [facility] regarding the medical problems   - Continue medication titration and treatment plan; adjust medication to optimize  "treatment response and as clinically indicated.   - Observation: Routine  - VS: as per unit protocol  - Encourage group attendance and milieu therapy  - Dispo: To be determined  - Estimated Discharge Day: 5/5/2025   - Legal Status : Voluntary 201 commitment.  - Long Stay Certification : Not Applicable    Subjective     Patient was visited on unit for continuing care; chart reviewed and discussed with multidisciplinary treatment team.     Patient continues to be visible in the milieu and interacts with staff and peers. No reports of aggression or self-injurious behavior on unit. No PRN medications used in the past 24 hours.    Patient accepted all offered medications and no adverse effects of medications noted or reported.    Patient today is denying symptoms but continues to be hyperverbal and disorganized.  She describes her mood today as \"good.\"  She endorsed adequate appetite and sleep.  Regarding medication side effects the patient stated that she has been feeling fatigue since she started taking Depakote.  Patient continues to request discharge home and feels that she is ready for discharge despite her disorganized behavior.  Patient also appears to have a limited understanding of her inability to return home with her current conflict with her brother who is at this time.  Patient denied suicidal and homicidal ideation.  Patient denied auditory and visual hallucinations.    Sleep: normal  Appetite: normal  Medication side effects: No  ROS: review of systems as noted above in HPI/Subjective report, all other systems are negative    Objective :  Temp:  [97.6 °F (36.4 °C)-98.2 °F (36.8 °C)] 97.7 °F (36.5 °C)  HR:  [87-91] 87  BP: (115-128)/(55-57) 128/57  Resp:  [16-18] 18  SpO2:  [95 %-98 %] 98 %  O2 Device: None (Room air)    Mental Status Evaluation:    Appearance:  disheveled, marginal hygiene, dressed in hospital attire, looks stated age, overweight   Behavior:  pleasant, cooperative   Speech:  Increased " "rate, normal volume   Mood:  \"Good.\"   Affect:  overbright   Thought Process:  disorganized, tangential   Thought Content:  some paranoia   Perceptual Disturbances: denies auditory or visual hallucinations when asked, but appears distracted   Risk Potential: Suicidal Ideation -  None at present  Homicidal Ideation -  None at present  Potential for Aggression - Not at present   Sensorium:  oriented to person, place, and time/date   Memory:  recent and remote memory grossly intact   Consciousness:  alert and awake   Attention/Concentration: attention span and concentration are age appropriate   Insight:  limited   Judgment: limited   Gait/Station: slow gait, imbalance   Motor Activity: no abnormal movements         Lab Results: I have reviewed the following results:  Results from the past 24 hours:   Recent Results (from the past 24 hours)   Fingerstick Glucose (POCT)    Collection Time: 04/22/25 11:23 AM   Result Value Ref Range    POC Glucose 153 (H) 65 - 140 mg/dl   Fingerstick Glucose (POCT)    Collection Time: 04/22/25  4:09 PM   Result Value Ref Range    POC Glucose 191 (H) 65 - 140 mg/dl   Fingerstick Glucose (POCT)    Collection Time: 04/22/25  9:05 PM   Result Value Ref Range    POC Glucose 187 (H) 65 - 140 mg/dl   Fingerstick Glucose (POCT)    Collection Time: 04/23/25  7:29 AM   Result Value Ref Range    POC Glucose 105 65 - 140 mg/dl       Administrative Statements     Counseling / Coordination of Care:   Patient's progress discussed with staff in treatment team meeting.  Medication changes reviewed with staff in treatment team meeting.    Portions of the record may have been created with voice recognition software. Occasional wrong word or \"sound a like\" substitutions may have occurred due to the inherent limitations of voice recognition software. Read the chart carefully and recognize, using context, where substitutions have occurred.    Andrae Samuels, DO 04/23/25  PGY-II  "

## 2025-04-24 LAB
ALBUMIN SERPL BCG-MCNC: 4.4 G/DL (ref 3.5–5)
ALP SERPL-CCNC: 92 U/L (ref 34–104)
ALT SERPL W P-5'-P-CCNC: 16 U/L (ref 7–52)
ANION GAP SERPL CALCULATED.3IONS-SCNC: 10 MMOL/L (ref 4–13)
AST SERPL W P-5'-P-CCNC: 17 U/L (ref 13–39)
BASOPHILS # BLD AUTO: 0.04 THOUSANDS/ÂΜL (ref 0–0.1)
BASOPHILS NFR BLD AUTO: 1 % (ref 0–1)
BILIRUB SERPL-MCNC: 0.55 MG/DL (ref 0.2–1)
BUN SERPL-MCNC: 28 MG/DL (ref 5–25)
CALCIUM SERPL-MCNC: 10.2 MG/DL (ref 8.4–10.2)
CHLORIDE SERPL-SCNC: 101 MMOL/L (ref 96–108)
CO2 SERPL-SCNC: 29 MMOL/L (ref 21–32)
CREAT SERPL-MCNC: 1.17 MG/DL (ref 0.6–1.3)
EOSINOPHIL # BLD AUTO: 0.28 THOUSAND/ÂΜL (ref 0–0.61)
EOSINOPHIL NFR BLD AUTO: 4 % (ref 0–6)
ERYTHROCYTE [DISTWIDTH] IN BLOOD BY AUTOMATED COUNT: 13.5 % (ref 11.6–15.1)
GFR SERPL CREATININE-BSD FRML MDRD: 47 ML/MIN/1.73SQ M
GLUCOSE SERPL-MCNC: 138 MG/DL (ref 65–140)
GLUCOSE SERPL-MCNC: 141 MG/DL (ref 65–140)
GLUCOSE SERPL-MCNC: 151 MG/DL (ref 65–140)
GLUCOSE SERPL-MCNC: 181 MG/DL (ref 65–140)
GLUCOSE SERPL-MCNC: 98 MG/DL (ref 65–140)
HCT VFR BLD AUTO: 41.3 % (ref 34.8–46.1)
HGB BLD-MCNC: 12.4 G/DL (ref 11.5–15.4)
IMM GRANULOCYTES # BLD AUTO: 0.05 THOUSAND/UL (ref 0–0.2)
IMM GRANULOCYTES NFR BLD AUTO: 1 % (ref 0–2)
LYMPHOCYTES # BLD AUTO: 1.99 THOUSANDS/ÂΜL (ref 0.6–4.47)
LYMPHOCYTES NFR BLD AUTO: 25 % (ref 14–44)
MCH RBC QN AUTO: 29.9 PG (ref 26.8–34.3)
MCHC RBC AUTO-ENTMCNC: 30 G/DL (ref 31.4–37.4)
MCV RBC AUTO: 100 FL (ref 82–98)
MONOCYTES # BLD AUTO: 0.61 THOUSAND/ÂΜL (ref 0.17–1.22)
MONOCYTES NFR BLD AUTO: 8 % (ref 4–12)
NEUTROPHILS # BLD AUTO: 4.97 THOUSANDS/ÂΜL (ref 1.85–7.62)
NEUTS SEG NFR BLD AUTO: 61 % (ref 43–75)
NRBC BLD AUTO-RTO: 0 /100 WBCS
PLATELET # BLD AUTO: 276 THOUSANDS/UL (ref 149–390)
PMV BLD AUTO: 9.9 FL (ref 8.9–12.7)
POTASSIUM SERPL-SCNC: 4.4 MMOL/L (ref 3.5–5.3)
PROT SERPL-MCNC: 7.2 G/DL (ref 6.4–8.4)
RBC # BLD AUTO: 4.15 MILLION/UL (ref 3.81–5.12)
SODIUM SERPL-SCNC: 140 MMOL/L (ref 135–147)
VALPROATE SERPL-MCNC: 90 UG/ML (ref 50–125)
WBC # BLD AUTO: 7.94 THOUSAND/UL (ref 4.31–10.16)

## 2025-04-24 PROCEDURE — 80053 COMPREHEN METABOLIC PANEL: CPT

## 2025-04-24 PROCEDURE — 82948 REAGENT STRIP/BLOOD GLUCOSE: CPT

## 2025-04-24 PROCEDURE — 85025 COMPLETE CBC W/AUTO DIFF WBC: CPT

## 2025-04-24 PROCEDURE — 80164 ASSAY DIPROPYLACETIC ACD TOT: CPT

## 2025-04-24 RX ORDER — LISINOPRIL 5 MG/1
2.5 TABLET ORAL DAILY
Status: DISCONTINUED | OUTPATIENT
Start: 2025-04-25 | End: 2025-05-05 | Stop reason: HOSPADM

## 2025-04-24 RX ADMIN — APIXABAN 5 MG: 5 TABLET, FILM COATED ORAL at 17:14

## 2025-04-24 RX ADMIN — TRAZODONE HYDROCHLORIDE 100 MG: 100 TABLET ORAL at 21:48

## 2025-04-24 RX ADMIN — LEVOTHYROXINE SODIUM 88 MCG: 88 TABLET ORAL at 06:14

## 2025-04-24 RX ADMIN — MUPIROCIN: 20 OINTMENT TOPICAL at 09:17

## 2025-04-24 RX ADMIN — PRAVASTATIN SODIUM 40 MG: 40 TABLET ORAL at 09:12

## 2025-04-24 RX ADMIN — CLONAZEPAM 0.25 MG: 0.5 TABLET ORAL at 09:10

## 2025-04-24 RX ADMIN — INSULIN LISPRO 1 UNITS: 100 INJECTION, SOLUTION INTRAVENOUS; SUBCUTANEOUS at 16:14

## 2025-04-24 RX ADMIN — OLANZAPINE 10 MG: 10 TABLET, FILM COATED ORAL at 09:12

## 2025-04-24 RX ADMIN — OLANZAPINE 10 MG: 10 TABLET, FILM COATED ORAL at 17:14

## 2025-04-24 RX ADMIN — INSULIN LISPRO 1 UNITS: 100 INJECTION, SOLUTION INTRAVENOUS; SUBCUTANEOUS at 12:20

## 2025-04-24 RX ADMIN — DIVALPROEX SODIUM 500 MG: 500 TABLET, DELAYED RELEASE ORAL at 21:48

## 2025-04-24 RX ADMIN — DIVALPROEX SODIUM 500 MG: 500 TABLET, DELAYED RELEASE ORAL at 09:11

## 2025-04-24 RX ADMIN — APIXABAN 5 MG: 5 TABLET, FILM COATED ORAL at 09:12

## 2025-04-24 RX ADMIN — NYSTATIN: 100000 POWDER TOPICAL at 09:18

## 2025-04-24 RX ADMIN — NYSTATIN: 100000 POWDER TOPICAL at 17:21

## 2025-04-24 RX ADMIN — CLONAZEPAM 0.25 MG: 0.5 TABLET ORAL at 17:13

## 2025-04-24 NOTE — TREATMENT TEAM
04/24/25 0800   Team Meeting   Meeting Type Daily Rounds   Team Members Present   Team Members Present Physician;Nurse;   Physician Team Member Dr Franks, Dr Myrick, Rodney Oconnor PA-C   Nursing Team Member Rafaela Burroughs   Care Management Team Member Christina DICKENS Team Member Sergo   Patient/Family Present   Patient Present No   Patient's Family Present No     Seen by Aging this AM, requires redirection regarding personal boundaries, eating well, med compliant, attends groups, labs tonight, slept, plan for snf placement. Adjust Depakote pending levels

## 2025-04-24 NOTE — NURSING NOTE
Patient is alert and oriented able to make her needs known. She is calm and pleasant on approach. She is visible in the milieu interacts with staff and peers. She is medication and meal compliant. She denies all psych s/s. She attended groups. No behaviors noted. Will cont to monitor. Safety checks ongoing.

## 2025-04-24 NOTE — PLAN OF CARE
Problem: SAFETY ADULT  Goal: Patient will remain free of falls  Description: INTERVENTIONS:- Educate patient/family on patient safety including physical limitations  Outcome: Progressing     Problem: Depression  Goal: Treatment Goal: Demonstrate behavioral control of depressive symptoms, verbalize feelings of improved mood/affect, and adopt new coping skills prior to discharge  Outcome: Progressing  Goal: Verbalize thoughts and feelings  Description: Interventions:- Assess and re-assess patient's level of risk - Engage patient in 1:1 interactions, daily, for a minimum of 15 minutes - Encourage patient to express feelings, fears, frustrations, hopes   Outcome: Progressing  Goal: Refrain from harming self  Description: Interventions:- Monitor patient closely, per order - Supervise medication ingestion, monitor effects and side effects   Outcome: Progressing  Goal: Refrain from isolation  Description: Interventions:- Develop a trusting relationship - Encourage socialization   Outcome: Progressing  Goal: Refrain from self-neglect  Outcome: Progressing     Problem: Anxiety  Goal: Anxiety is at manageable level  Description: Interventions:- Assess and monitor patient's anxiety level. - Monitor for signs and symptoms (heart palpitations, chest pain, shortness of breath, headaches, nausea, feeling jumpy, restlessness, irritable, apprehensive). - Collaborate with interdisciplinary team and initiate plan and interventions as ordered.- [place] patient to unit/surroundings- Explain treatment plan- Encourage participation in care- Encourage verbalization of concerns/fears- Identify coping mechanisms- Assist in developing anxiety-reducing skills- Administer/offer alternative therapies- Limit or eliminate stimulants  Outcome: Progressing

## 2025-04-24 NOTE — ASSESSMENT & PLAN NOTE
As of 04/24 patient is still hyperactive and at times has a disorganized thought process but is improving.  Her fatigue is also improving.  Patient continues to refuse to return home if her brother is there.    Continue Zyprexa to 10 mg BID for current symptoms of carlie (increased on 4/18/25)  Continue Depakote 500 mg twice daily for mood stabilization  Will obtain VPA level, CMP, CBC on 04/24 at 2000 prior to evening dose  Continue Trazodone to 100mg PO QHS for insomnia (started on 4/13/2025)  Continue Klonopin to 0.25mg PO BID for manic behaviors, can utilize until Zyprexa is at a therapeutic dose (started on 4/13/2025)

## 2025-04-24 NOTE — PROGRESS NOTES
Patient notified. Patient verbalized understanding of information given. Patient reports he had some BBQ ribs over the weekend. Otherwise he denies increased sodium or fluid intake. Verified the patient is taking Lasix 20 mg daily. Denies shortness of breath, swelling or abdominal bloating. Patient missed his weights the last couple days but says he was around 206 lb. Patient notified to continue current medication regimen for now, and to call if symptoms develop or significant weight gain of >3 lb overnight or >5 lb in a week. Reinforced the importance of sodium and fluid restrictions    FW to [facility]   Progress Note - Behavioral Health   Name: Lisa ZARATE McGorry 69 y.o. female I MRN: [id number]  Unit/Bed#: [facility] 609-02 I Date of Admission: 4/8/2025   Date of Service: 4/24/2025 I Hospital Day: 16    Assessment & Plan  Bipolar affective disorder, current episode manic (HCC)  As of 04/24 patient is still hyperactive and at times has a disorganized thought process but is improving.  Her fatigue is also improving.  Patient continues to refuse to return home if her brother is there.    Continue Zyprexa to 10 mg BID for current symptoms of carlie (increased on 4/18/25)  Continue Depakote 500 mg twice daily for mood stabilization  Will obtain VPA level, CMP, CBC on 04/24 at 2000 prior to evening dose  Continue Trazodone to 100mg PO QHS for insomnia (started on 4/13/2025)  Continue Klonopin to 0.25mg PO BID for manic behaviors, can utilize until Zyprexa is at a therapeutic dose (started on 4/13/2025)    Current Medications:    Current Facility-Administered Medications:     apixaban (ELIQUIS) tablet 5 mg, Oral, BID    clonazePAM (KlonoPIN) tablet 0.25 mg, Oral, BID    divalproex sodium (DEPAKOTE) DR tablet 500 mg, Oral, BID    insulin lispro (HumALOG/ADMELOG) 100 units/mL subcutaneous injection 1-6 Units, Subcutaneous, TID AC **AND** Fingerstick Glucose (POCT), , TID AC    insulin lispro (HumALOG/ADMELOG) 100 units/mL subcutaneous injection 1-6 Units, Subcutaneous, HS    levothyroxine tablet 88 mcg, Oral, Early Morning    lisinopril (ZESTRIL) tablet 5 mg, Oral, Daily    mupirocin (BACTROBAN) 2 % ointment, Topical, Daily    nystatin (MYCOSTATIN) powder, Topical, BID    OLANZapine (ZyPREXA) tablet 10 mg, Oral, BID    pravastatin (PRAVACHOL) tablet 40 mg, Oral, Daily    traZODone (DESYREL) tablet 100 mg, Oral, HS    Current Facility-Administered Medications:     acetaminophen (TYLENOL) tablet 650 mg, Oral, Q4H PRN    acetaminophen (TYLENOL) tablet 650 mg, Oral, Q4H PRN    acetaminophen (TYLENOL) tablet 975 mg, Oral, Q6H PRN     aluminum-magnesium hydroxide-simethicone (MAALOX) oral suspension 30 mL, Oral, Q4H PRN    benztropine (COGENTIN) injection 1 mg, Intramuscular, Q4H PRN Max 6/day    benztropine (COGENTIN) tablet 0.5 mg, Oral, Q4H PRN Max 6/day    bisacodyl (DULCOLAX) rectal suppository 10 mg, Rectal, Daily PRN    hydrOXYzine HCL (ATARAX) tablet 25 mg, Oral, Q6H PRN Max 4/day    hydrOXYzine HCL (ATARAX) tablet 50 mg, Oral, Q6H PRN Max 4/day    LORazepam (ATIVAN) injection 1 mg, Intramuscular, Q6H PRN Max 3/day    LORazepam (ATIVAN) tablet 1 mg, Oral, Q6H PRN Max 3/day    OLANZapine (ZyPREXA) IM injection 5 mg, Intramuscular, Q3H PRN Max 3/day    OLANZapine (ZyPREXA) tablet 2.5 mg, Oral, Q4H PRN Max 6/day    OLANZapine (ZyPREXA) tablet 5 mg, Oral, Q4H PRN Max 3/day    OLANZapine (ZyPREXA) tablet 5 mg, Oral, Q3H PRN Max 3/day    polyethylene glycol (MIRALAX) packet 17 g, Oral, Daily PRN    propranolol (INDERAL) tablet 5 mg, Oral, Q8H PRN    senna-docusate sodium (SENOKOT S) 8.6-50 mg per tablet 1 tablet, Oral, Daily PRN    traZODone (DESYREL) tablet 50 mg, Oral, HS PRN    Risks/Benefits of Treatment:     Risks, benefits, and possible side effects of medications explained to patient and patient verbalizes understanding and agreement for treatment.    Progress Toward Goals: improving    Treatment Planning:      - Encourage early mobility and having a structured day  - Provide frequent re-orientation, and cognitive stimulation  - Ensure assistive devices are in proper working order (eye-glasses, hearing aids)  - Encourage adequate hydration, nutrition and monitor bowel movements  - Maintain sleep-wake cycle: Uninterrupted sleep time; low-level lighting at night  - Fall precaution  - Follow up with [facility] regarding the medical problems   - Continue medication titration and treatment plan; adjust medication to optimize treatment response and as clinically indicated.   - Observation: Routine  - VS: as per unit protocol  - Encourage group  "attendance and milieu therapy  - Dispo: To be determined  - Estimated Discharge Day: 5/5/2025   - Legal Status : Voluntary 201 commitment.  - Long Stay Certification : Not Applicable    Subjective     Patient was visited on unit for continuing care; chart reviewed and discussed with multidisciplinary treatment team.     Patient continues to be visible in the milieu and interacts with staff and peers. No reports of aggression or self-injurious behavior on unit. No PRN medications used in the past 24 hours.    Per nursing, continues to be disorganized and hyperactive. Last BM on 04/23. She slept well last night.     Patient accepted all offered medications and no adverse effects of medications noted or reported.    Patient today described her mood as \"good.\"  She endorsed adequate sleep and appetite.  She reported that she still has some fatigue but that has significantly improved compared to yesterday.  She denies any other medication side effects.  She denied suicidal and homicidal ideation.  She denied auditory and visual hallucinations.    Sleep: normal  Appetite: normal  Medication side effects: No  ROS: review of systems as noted above in HPI/Subjective report, all other systems are negative    Objective :  Temp:  [97 °F (36.1 °C)-98 °F (36.7 °C)] 98 °F (36.7 °C)  HR:  [78-90] 78  BP: ()/(58-62) 97/58  Resp:  [16-18] 18  SpO2:  [94 %-99 %] 99 %  O2 Device: None (Room air)    Mental Status Evaluation:    Appearance:  Casually dressed, disheveled, marginal hygiene, looks stated age, overweight   Behavior:  pleasant, cooperative   Speech:  normal rate, normal volume, normal pitch   Mood:  \"Good.\"   Affect:  overbright   Thought Process:  tangential   Thought Content:  no overt delusions   Perceptual Disturbances: denies auditory or visual hallucinations when asked, but appears preoccupied   Risk Potential: Suicidal Ideation -  None at present  Homicidal Ideation -  None at present  Potential for Aggression - " "Not at present   Sensorium:  oriented to person, place, and time/date   Memory:  recent and remote memory grossly intact   Consciousness:  alert and awake   Attention/Concentration: attention span and concentration are age appropriate   Insight:  limited   Judgment: limited   Gait/Station: normal gait/station   Motor Activity: no abnormal movements         Lab Results: I have reviewed the following results:  Results from the past 24 hours:   Recent Results (from the past 24 hours)   Fingerstick Glucose (POCT)    Collection Time: 04/23/25 11:43 AM   Result Value Ref Range    POC Glucose 138 65 - 140 mg/dl   Fingerstick Glucose (POCT)    Collection Time: 04/23/25  4:06 PM   Result Value Ref Range    POC Glucose 155 (H) 65 - 140 mg/dl   Fingerstick Glucose (POCT)    Collection Time: 04/23/25  9:04 PM   Result Value Ref Range    POC Glucose 137 65 - 140 mg/dl   Fingerstick Glucose (POCT)    Collection Time: 04/24/25  7:29 AM   Result Value Ref Range    POC Glucose 98 65 - 140 mg/dl       Administrative Statements     Counseling / Coordination of Care:   Patient's progress discussed with staff in treatment team meeting.  Medication changes reviewed with staff in treatment team meeting.    Portions of the record may have been created with voice recognition software. Occasional wrong word or \"sound a like\" substitutions may have occurred due to the inherent limitations of voice recognition software. Read the chart carefully and recognize, using context, where substitutions have occurred.    Andrae Samuels DO 04/24/25  PGY-II  "

## 2025-04-24 NOTE — TREATMENT TEAM
"Pt attended group and able to self reflect.  Pt expressed being happy about future placements options and noted she was tested (cog eval ?) and \"passed with flying colors\".  Pt does continue to state she \"dances to feel calm\" and does hand movement.  Pt states she will always do that.  Pt needed redirection for boundaries with another patient.  Pt states she helps other pt do things.  Encouraged pt about not her role to take care of patient and personal space during meal times and seek staff.  Pt stated she was making another patient wear a rosary bead. Pt also tearful when pt was d/c today. (Joking that they would fight fires and drink a beer together)       04/24/25 1330   Activity/Group Checklist   Group Other (Comment)  (Mental health recovery: Reflection and progress)   Attendance Attended   Attendance Duration (min) 46-60   Interactions Other (Comment)  (needed redirection boundaries, tearful when peer d/c today)   Affect/Mood Incongruent   Goals Achieved Identified feelings;Identified triggers;Discussed coping strategies;Able to listen to others;Able to engage in interactions           "

## 2025-04-25 LAB
GLUCOSE SERPL-MCNC: 160 MG/DL (ref 65–140)
GLUCOSE SERPL-MCNC: 175 MG/DL (ref 65–140)
GLUCOSE SERPL-MCNC: 79 MG/DL (ref 65–140)
GLUCOSE SERPL-MCNC: 93 MG/DL (ref 65–140)

## 2025-04-25 PROCEDURE — 82948 REAGENT STRIP/BLOOD GLUCOSE: CPT

## 2025-04-25 RX ADMIN — LISINOPRIL 2.5 MG: 5 TABLET ORAL at 09:33

## 2025-04-25 RX ADMIN — DIVALPROEX SODIUM 500 MG: 500 TABLET, DELAYED RELEASE ORAL at 21:25

## 2025-04-25 RX ADMIN — NYSTATIN: 100000 POWDER TOPICAL at 08:48

## 2025-04-25 RX ADMIN — TRAZODONE HYDROCHLORIDE 100 MG: 100 TABLET ORAL at 21:25

## 2025-04-25 RX ADMIN — INSULIN LISPRO 1 UNITS: 100 INJECTION, SOLUTION INTRAVENOUS; SUBCUTANEOUS at 21:31

## 2025-04-25 RX ADMIN — INSULIN LISPRO 1 UNITS: 100 INJECTION, SOLUTION INTRAVENOUS; SUBCUTANEOUS at 12:41

## 2025-04-25 RX ADMIN — OLANZAPINE 10 MG: 10 TABLET, FILM COATED ORAL at 08:46

## 2025-04-25 RX ADMIN — OLANZAPINE 10 MG: 10 TABLET, FILM COATED ORAL at 17:06

## 2025-04-25 RX ADMIN — PRAVASTATIN SODIUM 40 MG: 40 TABLET ORAL at 08:46

## 2025-04-25 RX ADMIN — MUPIROCIN: 20 OINTMENT TOPICAL at 09:33

## 2025-04-25 RX ADMIN — LEVOTHYROXINE SODIUM 88 MCG: 88 TABLET ORAL at 05:14

## 2025-04-25 RX ADMIN — APIXABAN 5 MG: 5 TABLET, FILM COATED ORAL at 08:46

## 2025-04-25 RX ADMIN — NYSTATIN: 100000 POWDER TOPICAL at 17:07

## 2025-04-25 RX ADMIN — CLONAZEPAM 0.25 MG: 0.5 TABLET ORAL at 08:47

## 2025-04-25 RX ADMIN — APIXABAN 5 MG: 5 TABLET, FILM COATED ORAL at 17:06

## 2025-04-25 RX ADMIN — DIVALPROEX SODIUM 500 MG: 500 TABLET, DELAYED RELEASE ORAL at 08:47

## 2025-04-25 RX ADMIN — CLONAZEPAM 0.25 MG: 0.5 TABLET ORAL at 17:06

## 2025-04-25 NOTE — PROGRESS NOTES
04/25/25 0900   Team Meeting   Meeting Type Daily Rounds   Team Members Present   Team Members Present Other (Discipline and Name);Occupational Therapist;;Nurse;Physician   Physician Team Member Ramez/Tiny/Timmy   Nursing Team Member Rafaela/Carmen/Linda   Care Management Team Member Stanford   OT Team Member Alix   Other (Discipline and Name) Kandi - Pharmacy   Patient/Family Present   Patient Present No   Patient's Family Present No     Patient had a BM on the 22nd and shower on the 23rd. She is disorganized and is loud at times. Her Depakote level is at 90. She has been confused and inappropriate at times. Aging to see patient as soon as available. Patient discharge is pending stabilization of mood and medications.

## 2025-04-25 NOTE — PROGRESS NOTES
Pastoral Care Progress Note          Chaplaincy Interventions Utilized:   Empowerment: Encouraged focus on present, Encouraged self-care, Facilitated group experience, and Normalized experience of patient/family    Exploration: Explored hope, Explored emotional needs & resources, and Explored spiritual needs & resources    Relationship Building: Cultivated a relationship of care and support, Listened empathically, and Hospitality    The pt participated in spirituality group facilitated by the  today.

## 2025-04-25 NOTE — TREATMENT TEAM
04/25/25 1100   Activity/Group Checklist   Group Other (Comment)  ([facility] Recovery and reflection)   Attendance Attended  (late)   Attendance Duration (min) 31-45   Interactions Other (Comment)  (sleepy)   Affect/Mood Calm   Goals Achieved Identified feelings;Identified relapse prevention strategies;Discussed coping strategies;Able to listen to others;Able to engage in interactions

## 2025-04-25 NOTE — ASSESSMENT & PLAN NOTE
As of 04/25 patient is less hyperactive than days prior.  She is denying psychiatric symptoms.  She remains focused on discharge.    Continue Zyprexa to 10 mg BID for current symptoms of carlie (increased on 4/18/25)  Continue Depakote 500 mg twice daily for mood stabilization  Will obtain VPA level, CMP, CBC on 04/24 at 2000 prior to evening dose  Continue Trazodone to 100mg PO QHS for insomnia (started on 4/13/2025)  Continue Klonopin to 0.25mg PO BID for manic behaviors, can utilize until Zyprexa is at a therapeutic dose (started on 4/13/2025)

## 2025-04-25 NOTE — PLAN OF CARE
Problem: PAIN - ADULT  Goal: Verbalizes/displays adequate comfort level or baseline comfort level  Description: Interventions:- Encourage patient to monitor pain and request assistance- Assess pain using appropriate pain scale- Administer analgesics based on type and severity of pain and evaluate response- Implement non-pharmacological measures as appropriate and evaluate response- Consider cultural and social influences on pain and pain management- Notify physician/advanced practitioner if interventions unsuccessful or patient reports new pain  Outcome: Progressing     Problem: INFECTION - ADULT  Goal: Absence or prevention of progression during hospitalization  Description: INTERVENTIONS:- Assess and monitor for signs and symptoms of infection- Monitor lab/diagnostic results- Monitor all insertion sites, i.e. indwelling lines, tubes, and drains- Monitor endotracheal if appropriate and nasal secretions for changes in amount and color- [place] appropriate cooling/warming therapies per order- Administer medications as ordered- Instruct and encourage patient and family to use good hand hygiene technique- Identify and instruct in appropriate isolation precautions for identified infection/condition  Outcome: Progressing  Goal: Absence of fever/infection during neutropenic period  Description: INTERVENTIONS:- Monitor WBC  Outcome: Progressing     Problem: SAFETY ADULT  Goal: Patient will remain free of falls  Description: INTERVENTIONS:- Educate patient/family on patient safety including physical limitations  Outcome: Progressing  Goal: Maintain or return to baseline ADL function  Description: INTERVENTIONS:-  Assess patient's ability to carry out ADLs; assess patient's baseline for ADL function and identify physical deficits which impact ability to perform ADLs (bathing, care of mouth/teeth, toileting, grooming, dressing, etc.)- Assess/evaluate cause of self-care deficits - Assess range of motion- Assess patient's  mobility; develop plan if impaired- Assess patient's need for assistive devices and provide as appropriate- Encourage maximum independence but intervene and supervise when necessary- Involve family in performance of ADLs- Assess for home care needs following discharge - Consider OT consult to assist with ADL evaluation and planning for discharge- Provide patient education as appropriate  Outcome: Progressing  Goal: Maintains/Returns to pre admission functional level  Outcome: Progressing     Problem: DISCHARGE PLANNING  Goal: Discharge to home or other facility with appropriate resources  Description: INTERVENTIONS:- Identify barriers to discharge w/patient and caregiver- Arrange for needed discharge resources and transportation as appropriate- Identify discharge learning needs (meds, wound care, etc.)- Arrange for interpretive services to assist at discharge as needed- Refer to Case Management Department for coordinating discharge planning if the patient needs post-hospital services based on physician/advanced practitioner order or complex needs related to functional status, cognitive ability, or social support system  Outcome: Progressing     Problem: Knowledge Deficit  Goal: Patient/family/caregiver demonstrates understanding of disease process, treatment plan, medications, and discharge instructions  Description: Complete learning assessment and assess knowledge base.Interventions:- Provide teaching at level of understanding- Provide teaching via preferred learning methods  Outcome: Progressing     Problem: Alteration in Thoughts and Perception  Goal: Treatment Goal: Gain control of psychotic behaviors/thinking, reduce/eliminate presenting symptoms and demonstrate improved reality functioning upon discharge  Outcome: Progressing  Goal: Verbalize thoughts and feelings  Description: Interventions:- Promote a nonjudgmental and trusting relationship with the patient through active listening and therapeutic  communication- Assess patient's level of functioning, behavior and potential for risk- Engage patient in 1 on 1 interactions- Encourage patient to express fears, feelings, frustrations, and discuss symptoms  - [place] patient to reality, help patient recognize reality-based thinking - Administer medications as ordered and assess for potential side effects- Provide the patient education related to the signs and symptoms of the illness and desired effects of prescribed medications  Outcome: Progressing  Goal: Refrain from acting on delusional thinking/internal stimuli  Description: Interventions:- Monitor patient closely, per order - Utilize least restrictive measures - Set reasonable limits, give positive feedback for acceptable - Administer medications as ordered and monitor of potential side effects  Outcome: Progressing  Goal: Agree to be compliant with medication regime, as prescribed and report medication side effects  Description: Interventions:- Offer appropriate PRN medication and supervise ingestion; conduct AIMS, as needed   Outcome: Progressing  Goal: Attend and participate in unit activities, including therapeutic, recreational, and educational groups  Description: Interventions:-Encourage Visitation and family involvement in care  Outcome: Progressing  Goal: Recognize dysfunctional thoughts, communicate reality-based thoughts at the time of discharge  Description: Interventions:- Provide medication and psycho-education to assist patient in compliance and developing insight into his/her illness   Outcome: Progressing  Goal: Complete daily ADLs, including personal hygiene independently, as able  Description: Interventions:- Observe, teach, and assist patient with ADLS- Monitor and promote a balance of rest/activity, with adequate nutrition and elimination   Outcome: Progressing     Problem: Ineffective Coping  Goal: Cooperates with admission process  Description: Interventions: - Complete admission  process  Outcome: Progressing  Goal: Identifies ineffective coping skills  Outcome: Progressing  Goal: Identifies healthy coping skills  Outcome: Progressing  Goal: Demonstrates healthy coping skills  Outcome: Progressing  Goal: Participates in unit activities  Description: Interventions:- Provide therapeutic environment - Provide required programming - Redirect inappropriate behaviors   Outcome: Progressing  Goal: Patient/Family participate in treatment and DC plans  Description: Interventions:- Provide therapeutic environment  Outcome: Progressing  Goal: Patient/Family verbalizes awareness of resources  Outcome: Progressing  Goal: Understands least restrictive measures  Description: Interventions:- Utilize least restrictive behavior  Outcome: Progressing  Goal: Free from restraint events  Description: - Utilize least restrictive measures - Provide behavioral interventions - Redirect inappropriate behaviors   Outcome: Progressing     Problem: Risk for Self Injury/Neglect  Goal: Treatment Goal: Remain safe during length of stay, learn and adopt new coping skills, and be free of self-injurious ideation, impulses and acts at the time of discharge  Outcome: Progressing  Goal: Verbalize thoughts and feelings  Description: Interventions:- Assess and re-assess patient's lethality and potential for self-injury- Engage patient in 1:1 interactions, daily, for a minimum of 15 minutes- Encourage patient to express feelings, fears, frustrations, hopes- Establish rapport/trust with patient   Outcome: Progressing  Goal: Refrain from harming self  Description: Interventions:- Monitor patient closely, per order- Develop a trusting relationship- Supervise medication ingestion, monitor effects and side effects   Outcome: Progressing  Goal: Attend and participate in unit activities, including therapeutic, recreational, and educational groups  Description: Interventions:- Provide therapeutic and educational activities daily, encourage  attendance and participation, and document same in the medical record- Obtain collateral information, encourage visitation and family involvement in care   Outcome: Progressing  Goal: Recognize maladaptive responses and adopt new coping mechanisms  Outcome: Progressing  Goal: Complete daily ADLs, including personal hygiene independently, as able  Description: Interventions:- Observe, teach, and assist patient with ADLS- Monitor and promote a balance of rest/activity, with adequate nutrition and elimination  Outcome: Progressing     Problem: Depression  Goal: Treatment Goal: Demonstrate behavioral control of depressive symptoms, verbalize feelings of improved mood/affect, and adopt new coping skills prior to discharge  Outcome: Progressing  Goal: Verbalize thoughts and feelings  Description: Interventions:- Assess and re-assess patient's level of risk - Engage patient in 1:1 interactions, daily, for a minimum of 15 minutes - Encourage patient to express feelings, fears, frustrations, hopes   Outcome: Progressing  Goal: Refrain from harming self  Description: Interventions:- Monitor patient closely, per order - Supervise medication ingestion, monitor effects and side effects   Outcome: Progressing  Goal: Refrain from isolation  Description: Interventions:- Develop a trusting relationship - Encourage socialization   Outcome: Progressing  Goal: Refrain from self-neglect  Outcome: Progressing  Goal: Attend and participate in unit activities, including therapeutic, recreational, and educational groups  Description: Interventions:- Provide therapeutic and educational activities daily, encourage attendance and participation, and document same in the medical record   Outcome: Progressing  Goal: Complete daily ADLs, including personal hygiene independently, as able  Description: Interventions:- Observe, teach, and assist patient with ADLS-  Monitor and promote a balance of rest/activity, with adequate nutrition and  elimination   Outcome: Progressing     Problem: Anxiety  Goal: Anxiety is at manageable level  Description: Interventions:- Assess and monitor patient's anxiety level. - Monitor for signs and symptoms (heart palpitations, chest pain, shortness of breath, headaches, nausea, feeling jumpy, restlessness, irritable, apprehensive). - Collaborate with interdisciplinary team and initiate plan and interventions as ordered.- [place] patient to unit/surroundings- Explain treatment plan- Encourage participation in care- Encourage verbalization of concerns/fears- Identify coping mechanisms- Assist in developing anxiety-reducing skills- Administer/offer alternative therapies- Limit or eliminate stimulants  Outcome: Progressing     Problem: Risk for Violence/Aggression Toward Others  Goal: Treatment Goal: Refrain from acts of violence/aggression during length of stay, and demonstrate improved impulse control at the time of discharge  Outcome: Progressing  Goal: Verbalize thoughts and feelings  Description: Interventions:- Assess and re-assess patient's level of risk, every waking shift- Engage patient in 1:1 interactions, daily, for a minimum of 15 minutes - Allow patient to express feelings and frustrations in a safe and non-threatening manner - Establish rapport/trust with patient   Outcome: Progressing  Goal: Refrain from harming others  Outcome: Progressing  Goal: Refrain from destructive acts on the environment or property  Outcome: Progressing  Goal: Control angry outbursts  Description: Interventions:- Monitor patient closely, per order- Ensure early verbal de-escalation- Monitor prn medication needs- Set reasonable/therapeutic limits, outline behavioral expectations, and consequences - Provide a non-threatening milieu, utilizing the least restrictive interventions   Outcome: Progressing  Goal: Attend and participate in unit activities, including therapeutic, recreational, and educational groups  Description:  Interventions:- Provide therapeutic and educational activities daily, encourage attendance and participation, and document same in the medical record   Outcome: Progressing  Goal: Identify appropriate positive anger management techniques  Description: Interventions:- Offer anger management and coping skills groups - Staff will provide positive feedback for appropriate anger control  Outcome: Progressing     Problem: Alteration in Orientation  Goal: Treatment Goal: Demonstrate a reduction of confusion and improved orientation to person, place, time and/or situation upon discharge, according to optimum baseline/ability  Outcome: Progressing  Goal: Interact with staff daily  Description: Interventions:- Assess and re-assess patient's level of orientation- Engage patient in 1 on 1 interactions, daily, for a minimum of 15 minutes - Establish rapport/trust with patient   Outcome: Progressing  Goal: Express concerns related to confused thinking related to:  Description: Interventions:- Encourage patient to express feelings, fears, frustrations, hopes- Assign consistent caregivers - [place]/re-orient patient as needed- Allow comfort items, as appropriate- Provide visual cues, signs, etc.   Outcome: Progressing  Goal: Allow medical examinations, as recommended  Description: Interventions:- Provide physical/neurological exams and/or referrals, per provider   Outcome: Progressing  Goal: Cooperate with recommended testing/procedures  Description: Interventions:- Determine need for ancillary testing- Observe for mental status changes- Implement falls/precaution protocol   Outcome: Progressing  Goal: Attend and participate in unit activities, including therapeutic, recreational, and educational groups  Description: Interventions:- Provide therapeutic and educational activities daily, encourage attendance and participation, and document same in the medical record - Provide appropriate opportunities for reminiscence - Provide a  consistent daily routine - Encourage family contact/visitation   Outcome: Progressing  Goal: Complete daily ADLs, including personal hygiene independently, as able  Description: Interventions:- Observe, teach, and assist patient with ADLS  Outcome: Progressing     Problem: Individualized Interventions  Goal: Patient will verbalize appropriate use of telephone within 5 days  Description: Interventions:- Treatment team to determine use of supervised phone privileges   Outcome: Progressing  Goal: Patient will verbalize need for hospitalization and will no longer attempt elopement within 5 days  Description: Interventions:- Ongoing education to help patient understand need for hospitalization  Outcome: Progressing  Goal: Patient will recognize inappropriate behaviors and develop alternative behaviors within 5 days  Description: Interventions:- Patient in collaboration with Treatment Team will develop a behavior management plan to help identify effective coping skills to deal with stressors  Outcome: Progressing

## 2025-04-25 NOTE — PROGRESS NOTES
Progress Note - Behavioral Health   Name: Lisa ZARATE McGorry 69 y.o. female I MRN: [id number]  Unit/Bed#: [facility] 609-02 I Date of Admission: 4/8/2025   Date of Service: 4/25/2025 I Hospital Day: 17    Assessment & Plan  Bipolar affective disorder, current episode manic (HCC)  As of 04/25 patient is less hyperactive than days prior.  She is denying psychiatric symptoms.  She remains focused on discharge.    Continue Zyprexa to 10 mg BID for current symptoms of carlie (increased on 4/18/25)  Continue Depakote 500 mg twice daily for mood stabilization  Will obtain VPA level, CMP, CBC on 04/24 at 2000 prior to evening dose  Continue Trazodone to 100mg PO QHS for insomnia (started on 4/13/2025)  Continue Klonopin to 0.25mg PO BID for manic behaviors, can utilize until Zyprexa is at a therapeutic dose (started on 4/13/2025)    Current Medications:    Current Facility-Administered Medications:     apixaban (ELIQUIS) tablet 5 mg, Oral, BID    clonazePAM (KlonoPIN) tablet 0.25 mg, Oral, BID    divalproex sodium (DEPAKOTE) DR tablet 500 mg, Oral, BID    insulin lispro (HumALOG/ADMELOG) 100 units/mL subcutaneous injection 1-6 Units, Subcutaneous, TID AC **AND** Fingerstick Glucose (POCT), , TID AC    insulin lispro (HumALOG/ADMELOG) 100 units/mL subcutaneous injection 1-6 Units, Subcutaneous, HS    levothyroxine tablet 88 mcg, Oral, Early Morning    lisinopril (ZESTRIL) tablet 2.5 mg, Oral, Daily    mupirocin (BACTROBAN) 2 % ointment, Topical, Daily    nystatin (MYCOSTATIN) powder, Topical, BID    OLANZapine (ZyPREXA) tablet 10 mg, Oral, BID    pravastatin (PRAVACHOL) tablet 40 mg, Oral, Daily    traZODone (DESYREL) tablet 100 mg, Oral, HS    Current Facility-Administered Medications:     acetaminophen (TYLENOL) tablet 650 mg, Oral, Q4H PRN    acetaminophen (TYLENOL) tablet 650 mg, Oral, Q4H PRN    acetaminophen (TYLENOL) tablet 975 mg, Oral, Q6H PRN    aluminum-magnesium hydroxide-simethicone (MAALOX) oral suspension 30 mL, Oral,  Q4H PRN    benztropine (COGENTIN) injection 1 mg, Intramuscular, Q4H PRN Max 6/day    benztropine (COGENTIN) tablet 0.5 mg, Oral, Q4H PRN Max 6/day    bisacodyl (DULCOLAX) rectal suppository 10 mg, Rectal, Daily PRN    hydrOXYzine HCL (ATARAX) tablet 25 mg, Oral, Q6H PRN Max 4/day    hydrOXYzine HCL (ATARAX) tablet 50 mg, Oral, Q6H PRN Max 4/day    LORazepam (ATIVAN) injection 1 mg, Intramuscular, Q6H PRN Max 3/day    LORazepam (ATIVAN) tablet 1 mg, Oral, Q6H PRN Max 3/day    OLANZapine (ZyPREXA) IM injection 5 mg, Intramuscular, Q3H PRN Max 3/day    OLANZapine (ZyPREXA) tablet 2.5 mg, Oral, Q4H PRN Max 6/day    OLANZapine (ZyPREXA) tablet 5 mg, Oral, Q4H PRN Max 3/day    OLANZapine (ZyPREXA) tablet 5 mg, Oral, Q3H PRN Max 3/day    polyethylene glycol (MIRALAX) packet 17 g, Oral, Daily PRN    propranolol (INDERAL) tablet 5 mg, Oral, Q8H PRN    senna-docusate sodium (SENOKOT S) 8.6-50 mg per tablet 1 tablet, Oral, Daily PRN    traZODone (DESYREL) tablet 50 mg, Oral, HS PRN    Risks/Benefits of Treatment:     Risks, benefits, and possible side effects of medications explained to patient and patient verbalizes understanding and agreement for treatment.    Progress Toward Goals: improving    Treatment Planning:      - Encourage early mobility and having a structured day  - Provide frequent re-orientation, and cognitive stimulation  - Ensure assistive devices are in proper working order (eye-glasses, hearing aids)  - Encourage adequate hydration, nutrition and monitor bowel movements  - Maintain sleep-wake cycle: Uninterrupted sleep time; low-level lighting at night  - Fall precaution  - Follow up with [facility] regarding the medical problems   - Continue medication titration and treatment plan; adjust medication to optimize treatment response and as clinically indicated.   - Observation: Routine  - VS: as per unit protocol  - Encourage group attendance and milieu therapy  - Dispo: To be determined  - Estimated Discharge Day:  "5/5/2025   - Legal Status : Voluntary 201 commitment.  - Long Stay Certification : Not Applicable    Subjective     Patient was visited on unit for continuing care; chart reviewed and discussed with multidisciplinary treatment team.     Patient continues to be visible in the milieu and interacts with staff and peers. No reports of aggression or self-injurious behavior on unit. No PRN medications used in the past 24 hours.    Patient accepted all offered medications and no adverse effects of medications noted or reported.    Patient today stated her mood was \"really good.\"  She slept adequately but reports that she has been feeling fatigued this morning.  However shortly after saying she was fatigued she was talking about how much she was dancing earlier in the morning.  She endorsed an adequate appetite.  She denied suicidal and homicidal ideation.  She denied auditory and visual hallucinations.    Sleep: normal  Appetite: normal  Medication side effects: Yes - fatigue  ROS: review of systems as noted above in HPI/Subjective report, all other systems are negative    Objective :  Temp:  [96.5 °F (35.8 °C)-97.8 °F (36.6 °C)] 97.8 °F (36.6 °C)  HR:  [83-93] 93  BP: ()/(53-73) 128/58  Resp:  [18] 18  SpO2:  [92 %-97 %] 95 %  O2 Device: None (Room air)    Mental Status Evaluation:    Appearance:  Casually dressed, disheveled, marginal hygiene, looks stated age, overweight   Behavior:  pleasant, cooperative   Speech:  normal rate, normal volume, normal pitch   Mood:  \"Real good.\"   Affect:  blunted   Thought Process:  More organized than days prior.  Less tangential.   Thought Content:  no overt delusions   Perceptual Disturbances: no auditory hallucinations, no visual hallucinations   Risk Potential: Suicidal Ideation -  None at present  Homicidal Ideation -  None at present  Potential for Aggression - Not at present   Sensorium:  oriented to person, place, and time/date   Memory:  recent and remote memory grossly " intact   Consciousness:  alert and awake   Attention/Concentration: attention span and concentration are age appropriate   Insight:  limited   Judgment: limited   Gait/Station: normal gait/station   Motor Activity: no abnormal movements         Lab Results: I have reviewed the following results:  Results from the past 24 hours:   Recent Results (from the past 24 hours)   Fingerstick Glucose (POCT)    Collection Time: 04/24/25 11:59 AM   Result Value Ref Range    POC Glucose 181 (H) 65 - 140 mg/dl   Fingerstick Glucose (POCT)    Collection Time: 04/24/25  4:29 PM   Result Value Ref Range    POC Glucose 151 (H) 65 - 140 mg/dl   Fingerstick Glucose (POCT)    Collection Time: 04/24/25  8:57 PM   Result Value Ref Range    POC Glucose 141 (H) 65 - 140 mg/dl   Valproic acid level, total    Collection Time: 04/24/25  9:20 PM   Result Value Ref Range    Valproic Acid, Total 90 50 - 125 ug/mL   Comprehensive metabolic panel    Collection Time: 04/24/25  9:20 PM   Result Value Ref Range    Sodium 140 135 - 147 mmol/L    Potassium 4.4 3.5 - 5.3 mmol/L    Chloride 101 96 - 108 mmol/L    CO2 29 21 - 32 mmol/L    ANION GAP 10 4 - 13 mmol/L    BUN 28 (H) 5 - 25 mg/dL    Creatinine 1.17 0.60 - 1.30 mg/dL    Glucose 138 65 - 140 mg/dL    Calcium 10.2 8.4 - 10.2 mg/dL    AST 17 13 - 39 U/L    ALT 16 7 - 52 U/L    Alkaline Phosphatase 92 34 - 104 U/L    Total Protein 7.2 6.4 - 8.4 g/dL    Albumin 4.4 3.5 - 5.0 g/dL    Total Bilirubin 0.55 0.20 - 1.00 mg/dL    eGFR 47 ml/min/1.73sq m   CBC and differential    Collection Time: 04/24/25  9:20 PM   Result Value Ref Range    WBC 7.94 4.31 - 10.16 Thousand/uL    RBC 4.15 3.81 - 5.12 Million/uL    Hemoglobin 12.4 11.5 - 15.4 g/dL    Hematocrit 41.3 34.8 - 46.1 %     (H) 82 - 98 fL    MCH 29.9 26.8 - 34.3 pg    MCHC 30.0 (L) 31.4 - 37.4 g/dL    RDW 13.5 11.6 - 15.1 %    MPV 9.9 8.9 - 12.7 fL    Platelets 276 149 - 390 Thousands/uL    nRBC 0 /100 WBCs    Segmented % 61 43 - 75 %     "Immature Grans % 1 0 - 2 %    Lymphocytes % 25 14 - 44 %    Monocytes % 8 4 - 12 %    Eosinophils Relative 4 0 - 6 %    Basophils Relative 1 0 - 1 %    Absolute Neutrophils 4.97 1.85 - 7.62 Thousands/µL    Absolute Immature Grans 0.05 0.00 - 0.20 Thousand/uL    Absolute Lymphocytes 1.99 0.60 - 4.47 Thousands/µL    Absolute Monocytes 0.61 0.17 - 1.22 Thousand/µL    Eosinophils Absolute 0.28 0.00 - 0.61 Thousand/µL    Basophils Absolute 0.04 0.00 - 0.10 Thousands/µL   Fingerstick Glucose (POCT)    Collection Time: 04/25/25  7:26 AM   Result Value Ref Range    POC Glucose 93 65 - 140 mg/dl       Administrative Statements     Counseling / Coordination of Care:   Patient's progress discussed with staff in treatment team meeting.  Medication changes reviewed with staff in treatment team meeting.    Portions of the record may have been created with voice recognition software. Occasional wrong word or \"sound a like\" substitutions may have occurred due to the inherent limitations of voice recognition software. Read the chart carefully and recognize, using context, where substitutions have occurred.    Andrae Samuels, DO 04/25/25  PGY-II  "

## 2025-04-25 NOTE — NURSING NOTE
Pt calm and cooperative. Visible and social on the unit. Bright and pleasant on approach. Takes medications as prescribed. Compliant w/ mouth checks. Fair appetite. Attends partial groups. Describes feeling tired today. Spends time napping after lunch. On assessment denies all psych S+S's. Denies pain and unmet needs. Q15 safety checks maintained.

## 2025-04-26 LAB
GLUCOSE SERPL-MCNC: 165 MG/DL (ref 65–140)
GLUCOSE SERPL-MCNC: 196 MG/DL (ref 65–140)
GLUCOSE SERPL-MCNC: 204 MG/DL (ref 65–140)
GLUCOSE SERPL-MCNC: 90 MG/DL (ref 65–140)

## 2025-04-26 PROCEDURE — 82948 REAGENT STRIP/BLOOD GLUCOSE: CPT

## 2025-04-26 PROCEDURE — 99232 SBSQ HOSP IP/OBS MODERATE 35: CPT | Performed by: STUDENT IN AN ORGANIZED HEALTH CARE EDUCATION/TRAINING PROGRAM

## 2025-04-26 RX ADMIN — NYSTATIN 1 APPLICATION: 100000 POWDER TOPICAL at 08:11

## 2025-04-26 RX ADMIN — DIVALPROEX SODIUM 500 MG: 500 TABLET, DELAYED RELEASE ORAL at 21:39

## 2025-04-26 RX ADMIN — CLONAZEPAM 0.25 MG: 0.5 TABLET ORAL at 08:09

## 2025-04-26 RX ADMIN — INSULIN LISPRO 2 UNITS: 100 INJECTION, SOLUTION INTRAVENOUS; SUBCUTANEOUS at 17:21

## 2025-04-26 RX ADMIN — APIXABAN 5 MG: 5 TABLET, FILM COATED ORAL at 08:09

## 2025-04-26 RX ADMIN — DIVALPROEX SODIUM 500 MG: 500 TABLET, DELAYED RELEASE ORAL at 08:08

## 2025-04-26 RX ADMIN — INSULIN LISPRO 1 UNITS: 100 INJECTION, SOLUTION INTRAVENOUS; SUBCUTANEOUS at 12:06

## 2025-04-26 RX ADMIN — OLANZAPINE 10 MG: 10 TABLET, FILM COATED ORAL at 08:09

## 2025-04-26 RX ADMIN — OLANZAPINE 10 MG: 10 TABLET, FILM COATED ORAL at 17:21

## 2025-04-26 RX ADMIN — LEVOTHYROXINE SODIUM 88 MCG: 88 TABLET ORAL at 05:21

## 2025-04-26 RX ADMIN — INSULIN LISPRO 2 UNITS: 100 INJECTION, SOLUTION INTRAVENOUS; SUBCUTANEOUS at 21:40

## 2025-04-26 RX ADMIN — PRAVASTATIN SODIUM 40 MG: 40 TABLET ORAL at 08:08

## 2025-04-26 RX ADMIN — MUPIROCIN 1 APPLICATION: 20 OINTMENT TOPICAL at 09:25

## 2025-04-26 RX ADMIN — TRAZODONE HYDROCHLORIDE 100 MG: 100 TABLET ORAL at 21:39

## 2025-04-26 RX ADMIN — APIXABAN 5 MG: 5 TABLET, FILM COATED ORAL at 17:20

## 2025-04-26 RX ADMIN — NYSTATIN 1 APPLICATION: 100000 POWDER TOPICAL at 17:21

## 2025-04-26 RX ADMIN — CLONAZEPAM 0.25 MG: 0.5 TABLET ORAL at 17:20

## 2025-04-26 NOTE — NURSING NOTE
"Patient calm, pleasant and cooperative. Visible in the milieu, brightens on approach. Denies all psych s/s, reports feeling\" tired\". Denies any pain or discomfort. Medication compliant. Safety precautions maintained.     "

## 2025-04-26 NOTE — PROGRESS NOTES
Progress Note - Behavioral Health   Name: Lisa Bocanegraorry 69 y.o. female I MRN: [id number]   Unit/Bed#: [facility] 609-02 I Date of Admission: 4/8/2025   Date of Service: 4/26/2025 I Hospital Day: 18     Assessment & Plan  Bipolar affective disorder, current episode manic (HCC)  As of 04/25 patient is less hyperactive than days prior.  She is denying psychiatric symptoms.  She remains focused on discharge.    Continue Zyprexa 10 mg BID for current symptoms of carlie (increased on 4/18/25)  Continue Depakote 500 mg twice daily for mood stabilization  VPA level on 4/24 was 90, CMP and CBC is acceptable  Continue Trazodone to 100mg PO QHS for insomnia (started on 4/13/2025)  Continue Klonopin to 0.25mg PO BID for manic behaviors, can utilize until Zyprexa is at a therapeutic dose (started on 4/13/2025)     Risks/Benefits of Treatment:     Risks, benefits, and possible side effects of medications explained to patient. Patient has limited understanding of risks and benefits of treatment at this time, but agrees to take medications as prescribed.    Progress Toward Goals: gradual improvement    Treatment Planning:      - Encourage early mobility and having a structured day  - Provide frequent re-orientation, and cognitive stimulation  - Ensure assistive devices are in proper working order (eye-glasses, hearing aids)  - Encourage adequate hydration, nutrition and monitor bowel movements  - Maintain sleep-wake cycle: Uninterrupted sleep time; low-level lighting at night  - Fall precaution  - Continue medication titration and treatment plan; adjust medication to optimize treatment response and as clinically indicated.   - f/u SLIM recs regarding the medical problems   - Observation:Routine  - Legal Status:  Legal Status: 201  - VS: as per unit protocol  - Encourage group attendance and milieu therapy  - Dispo: To be determined  - Long Stay Certification : Not Applicable  - Estimated Discharge Day: 9 days  "(5/5/2025)    Subjective:    Patient was seen today for continuation of care, records reviewed and patient was discussed with the morning case review team.    Lisa was seen today for psychiatric follow-up.  On assessment today, Lisa was seen while in the hallway.  Superficially pleasant and polite.  Does seem a little bit more calm and less preoccupied today compared to previous interactions.  Can still be bizarre and show elevated mood at times, she stops me later in the hallway and tells me her name is \"Argentina Maldonado Pen\", remains childlike at times.  Lisa reports adequate daytime energy and denies any difficulties with initiating or staying asleep.  Oral appetite and hydration is adequate.   We reviewed once more the specific as-needed medications they can use going forward if they experience any insomnia or destabilization of their mood, they understood and were agreeable. Milieu visibility and group attendance encouraged to promote an active participation in treatment.    Lisa denies acute suicidal/self-harm ideation/intent/plan upon direct inquiry at this time. Lisa is able to contract for safety while on the unit and would feel comfortable seeking staff support should suicidal symptoms or urges appear or worsen. Lisa remains behaviorally appropriate, no agitation or aggression noted on exam or in report. Lisa also denies HI.  Lisa remains adherent to her current psychotropic medication regimen and denies any side effects from medications, as well as none noted on exam.    Patient is not yet ready for discharge.  Patient's condition currently requires active psychopharmacological medication management, interdisciplinary coordination with case management, and the utilization of adjunctive milieu and group therapy to augment psychopharmacological efficacy.  The patients risk of morbidity, and progression or decompensation of psychiatric disease, is higher without this current treatment.  Patient " cannot be safety treated at a lower level of care and requires further psychiatric evaluation, medication treatment, other treatment which can be reasonably be provided only in a psychiatric hospital.  Discharge to an unsupervised setting will represent a significant deterioration in ability to function and present a severe risk to the patients physical and mental health.    Review of Systems:  Behavior over the last 24 hours: Slowly improving  Sleep: sleeping okay throughout the night  Appetite: adequate  Medication side effects: none reported  ROS:no complaints, all other systems are negative    Objective:    Vitals:  Vitals:    04/26/25 0741   BP: 127/61   Pulse: 86   Resp: 17   Temp: 97.6 °F (36.4 °C)   SpO2: 95%     Laboratory Results:  I have personally reviewed all pertinent laboratory/tests results.  Most Recent Labs:   Lab Results   Component Value Date    WBC 7.94 04/24/2025    RBC 4.15 04/24/2025    HGB 12.4 04/24/2025    HCT 41.3 04/24/2025     04/24/2025    RDW 13.5 04/24/2025    NEUTROABS 4.97 04/24/2025    SODIUM 140 04/24/2025    K 4.4 04/24/2025     04/24/2025    CO2 29 04/24/2025    BUN 28 (H) 04/24/2025    CREATININE 1.17 04/24/2025    GLUC 138 04/24/2025    GLUF 113 (H) 04/14/2025    CALCIUM 10.2 04/24/2025    AST 17 04/24/2025    ALT 16 04/24/2025    ALKPHOS 92 04/24/2025    TP 7.2 04/24/2025    ALB 4.4 04/24/2025    TBILI 0.55 04/24/2025    CHOLESTEROL 144 04/09/2025    HDL 64 04/09/2025    TRIG 60 04/09/2025    LDLCALC 68 04/09/2025    NONHDLC 80 04/09/2025    VALPROICTOT 90 04/24/2025    LITHIUM 1.03 01/08/2025    ULB5MRMCRKUX 7.739 (H) 04/17/2025    FREET4 1.08 04/17/2025    T3FREE 3.24 07/11/2023    HGBA1C 6.1 (H) 04/09/2025     04/09/2025     Mental Status Evaluation:  Appearance:  casually dressed   Behavior:  pleasant, more calm, more cooperative   Speech:  normal volume   Mood:  euthymic   Affect:  slightly brighter   Thought Process:  more organized   Associations:  circumstantial associations   Thought Content:  no overt delusions, negative thinking, ruminating thoughts   Perceptual Disturbances: no auditory hallucinations, no visual hallucinations, denies when asked, does not appear responding to internal stimuli   Risk Potential: Suicidal ideation - None at present, able to seek out staff before acting on thoughts of harming self on the unit, would talk to staff if not feeling safe on the unit  Homicidal ideation - None at present, able to seek out staff before acting on thoughts of harming others on the unit  Potential for aggression - Not at present   Sensorium:  oriented to person, place, and time/date   Memory:  recent memory intact   Consciousness:  alert and awake   Attention/Concentration: attention span and concentration appear shorter than expected for age   Insight:  limited   Judgment: limited   Gait/Station: slow gait   Motor Activity: no abnormal movements     Cognitive Assessment : deferred  Pain : deferred  Pain Scale : deferred    Suicide/Homicide Risk Assessment:  Risk of Harm to Self:   Nursing Suicide Risk Assessment Last 24 hours: C-SSRS Risk (Since Last Contact)  Calculated C-SSRS Risk Score (Since Last Contact): No Risk Indicated    Risk of Harm to Others:  Nursing Homicide Risk Assessment: Violence Risk to Others: Denies within past 6 months    Behavioral Health Medications: all current active meds have been reviewed and continue current psychiatric medications.  Current Facility-Administered Medications   Medication Dose Route Frequency Provider Last Rate    acetaminophen  650 mg Oral Q4H PRN STEVE Dawson      acetaminophen  650 mg Oral Q4H PRN STEVE Dawson      acetaminophen  975 mg Oral Q6H PRN STEVE Dawson      aluminum-magnesium hydroxide-simethicone  30 mL Oral Q4H PRN STEVE Dawson      apixaban  5 mg Oral BID STEVE Murcia      benztropine  1 mg Intramuscular Q4H PRN Max 6/day STEVE Dawson       benztropine  0.5 mg Oral Q4H PRN Max 6/day STEVE Dawson      bisacodyl  10 mg Rectal Daily PRN STEVE Dawson      clonazePAM  0.25 mg Oral BID Rachel Myrick, DO      divalproex sodium  500 mg Oral BID Andrae Samuels, DO      hydrOXYzine HCL  25 mg Oral Q6H PRN Max 4/day STEVE Dawson      hydrOXYzine HCL  50 mg Oral Q6H PRN Max 4/day STEVE Dawson      insulin lispro  1-6 Units Subcutaneous TID AC Lance Zayas MD      insulin lispro  1-6 Units Subcutaneous HS Lance Zayas MD      levothyroxine  88 mcg Oral Early Morning Lance Zayas MD      lisinopril  2.5 mg Oral Daily STEVE Lopez      LORazepam  1 mg Intramuscular Q6H PRN Max 3/day STEVE Dawson      LORazepam  1 mg Oral Q6H PRN Max 3/day STEVE Dawson      mupirocin   Topical Daily Saad Butts, DPM      nystatin   Topical BID aLnce Zayas MD      OLANZapine  5 mg Intramuscular Q3H PRN Max 3/day STEVE Dawson      OLANZapine  10 mg Oral BID Andraedebbie Samuels, DO      OLANZapine  2.5 mg Oral Q4H PRN Max 6/day STEVE Dawson      OLANZapine  5 mg Oral Q4H PRN Max 3/day STEVE Dawson      OLANZapine  5 mg Oral Q3H PRN Max 3/day STEVE Dawson      polyethylene glycol  17 g Oral Daily PRN STEVE Dawson      pravastatin  40 mg Oral Daily Lance Zayas MD      propranolol  5 mg Oral Q8H PRN STEVE Dawson      senna-docusate sodium  1 tablet Oral Daily PRN STEVE Dawson      traZODone  100 mg Oral HS STEVE Dawson      traZODone  50 mg Oral HS PRN STEVE Dawson       Administrative Statements:  Patient's progress discussed with staff in treatment team meeting.  Medications, treatment progress and treatment plan reviewed with patient.   Educated on importance of medication and treatment compliance.  Reassurance and supportive therapy provided.   Encouraged participation in milieu and group therapy on the unit.    Gianna  STEVE Perera 04/26/25

## 2025-04-26 NOTE — NURSING NOTE
Pt calm and cooperative, visible on unit. Rambling and tangential speech, pt displays elevated mood. Pt denies all symptoms at current time. Social with peers. Pt attended group activity. Required redirections for standing at nurse desk.

## 2025-04-26 NOTE — ASSESSMENT & PLAN NOTE
As of 04/25 patient is less hyperactive than days prior.  She is denying psychiatric symptoms.  She remains focused on discharge.    Continue Zyprexa 10 mg BID for current symptoms of carlie (increased on 4/18/25)  Continue Depakote 500 mg twice daily for mood stabilization  VPA level on 4/24 was 90, CMP and CBC is acceptable  Continue Trazodone to 100mg PO QHS for insomnia (started on 4/13/2025)  Continue Klonopin to 0.25mg PO BID for manic behaviors, can utilize until Zyprexa is at a therapeutic dose (started on 4/13/2025)

## 2025-04-26 NOTE — PLAN OF CARE
Problem: PAIN - ADULT  Goal: Verbalizes/displays adequate comfort level or baseline comfort level  Description: Interventions:- Encourage patient to monitor pain and request assistance- Assess pain using appropriate pain scale- Administer analgesics based on type and severity of pain and evaluate response- Implement non-pharmacological measures as appropriate and evaluate response- Consider cultural and social influences on pain and pain management- Notify physician/advanced practitioner if interventions unsuccessful or patient reports new pain  Outcome: Progressing     Problem: INFECTION - ADULT  Goal: Absence or prevention of progression during hospitalization  Description: INTERVENTIONS:- Assess and monitor for signs and symptoms of infection- Monitor lab/diagnostic results- Monitor all insertion sites, i.e. indwelling lines, tubes, and drains- Monitor endotracheal if appropriate and nasal secretions for changes in amount and color- [place] appropriate cooling/warming therapies per order- Administer medications as ordered- Instruct and encourage patient and family to use good hand hygiene technique- Identify and instruct in appropriate isolation precautions for identified infection/condition  Outcome: Progressing  Goal: Absence of fever/infection during neutropenic period  Description: INTERVENTIONS:- Monitor WBC  Outcome: Progressing     Problem: SAFETY ADULT  Goal: Patient will remain free of falls  Description: INTERVENTIONS:- Educate patient/family on patient safety including physical limitations  Outcome: Progressing  Goal: Maintain or return to baseline ADL function  Description: INTERVENTIONS:-  Assess patient's ability to carry out ADLs; assess patient's baseline for ADL function and identify physical deficits which impact ability to perform ADLs (bathing, care of mouth/teeth, toileting, grooming, dressing, etc.)- Assess/evaluate cause of self-care deficits - Assess range of motion- Assess patient's  mobility; develop plan if impaired- Assess patient's need for assistive devices and provide as appropriate- Encourage maximum independence but intervene and supervise when necessary- Involve family in performance of ADLs- Assess for home care needs following discharge - Consider OT consult to assist with ADL evaluation and planning for discharge- Provide patient education as appropriate  Outcome: Progressing  Goal: Maintains/Returns to pre admission functional level  Outcome: Progressing     Problem: DISCHARGE PLANNING  Goal: Discharge to home or other facility with appropriate resources  Description: INTERVENTIONS:- Identify barriers to discharge w/patient and caregiver- Arrange for needed discharge resources and transportation as appropriate- Identify discharge learning needs (meds, wound care, etc.)- Arrange for interpretive services to assist at discharge as needed- Refer to Case Management Department for coordinating discharge planning if the patient needs post-hospital services based on physician/advanced practitioner order or complex needs related to functional status, cognitive ability, or social support system  Outcome: Progressing     Problem: Knowledge Deficit  Goal: Patient/family/caregiver demonstrates understanding of disease process, treatment plan, medications, and discharge instructions  Description: Complete learning assessment and assess knowledge base.Interventions:- Provide teaching at level of understanding- Provide teaching via preferred learning methods  Outcome: Progressing     Problem: Alteration in Thoughts and Perception  Goal: Treatment Goal: Gain control of psychotic behaviors/thinking, reduce/eliminate presenting symptoms and demonstrate improved reality functioning upon discharge  Outcome: Progressing  Goal: Verbalize thoughts and feelings  Description: Interventions:- Promote a nonjudgmental and trusting relationship with the patient through active listening and therapeutic  communication- Assess patient's level of functioning, behavior and potential for risk- Engage patient in 1 on 1 interactions- Encourage patient to express fears, feelings, frustrations, and discuss symptoms  - [place] patient to reality, help patient recognize reality-based thinking - Administer medications as ordered and assess for potential side effects- Provide the patient education related to the signs and symptoms of the illness and desired effects of prescribed medications  Outcome: Progressing  Goal: Refrain from acting on delusional thinking/internal stimuli  Description: Interventions:- Monitor patient closely, per order - Utilize least restrictive measures - Set reasonable limits, give positive feedback for acceptable - Administer medications as ordered and monitor of potential side effects  Outcome: Progressing  Goal: Agree to be compliant with medication regime, as prescribed and report medication side effects  Description: Interventions:- Offer appropriate PRN medication and supervise ingestion; conduct AIMS, as needed   Outcome: Progressing  Goal: Attend and participate in unit activities, including therapeutic, recreational, and educational groups  Description: Interventions:-Encourage Visitation and family involvement in care  Outcome: Progressing  Goal: Recognize dysfunctional thoughts, communicate reality-based thoughts at the time of discharge  Description: Interventions:- Provide medication and psycho-education to assist patient in compliance and developing insight into his/her illness   Outcome: Progressing  Goal: Complete daily ADLs, including personal hygiene independently, as able  Description: Interventions:- Observe, teach, and assist patient with ADLS- Monitor and promote a balance of rest/activity, with adequate nutrition and elimination   Outcome: Progressing     Problem: Ineffective Coping  Goal: Cooperates with admission process  Description: Interventions: - Complete admission  process  Outcome: Progressing  Goal: Identifies ineffective coping skills  Outcome: Progressing  Goal: Identifies healthy coping skills  Outcome: Progressing  Goal: Demonstrates healthy coping skills  Outcome: Progressing  Goal: Participates in unit activities  Description: Interventions:- Provide therapeutic environment - Provide required programming - Redirect inappropriate behaviors   Outcome: Progressing  Goal: Patient/Family participate in treatment and DC plans  Description: Interventions:- Provide therapeutic environment  Outcome: Progressing  Goal: Patient/Family verbalizes awareness of resources  Outcome: Progressing  Goal: Understands least restrictive measures  Description: Interventions:- Utilize least restrictive behavior  Outcome: Progressing  Goal: Free from restraint events  Description: - Utilize least restrictive measures - Provide behavioral interventions - Redirect inappropriate behaviors   Outcome: Progressing     Problem: Risk for Self Injury/Neglect  Goal: Treatment Goal: Remain safe during length of stay, learn and adopt new coping skills, and be free of self-injurious ideation, impulses and acts at the time of discharge  Outcome: Progressing  Goal: Verbalize thoughts and feelings  Description: Interventions:- Assess and re-assess patient's lethality and potential for self-injury- Engage patient in 1:1 interactions, daily, for a minimum of 15 minutes- Encourage patient to express feelings, fears, frustrations, hopes- Establish rapport/trust with patient   Outcome: Progressing  Goal: Refrain from harming self  Description: Interventions:- Monitor patient closely, per order- Develop a trusting relationship- Supervise medication ingestion, monitor effects and side effects   Outcome: Progressing  Goal: Attend and participate in unit activities, including therapeutic, recreational, and educational groups  Description: Interventions:- Provide therapeutic and educational activities daily, encourage  attendance and participation, and document same in the medical record- Obtain collateral information, encourage visitation and family involvement in care   Outcome: Progressing  Goal: Recognize maladaptive responses and adopt new coping mechanisms  Outcome: Progressing  Goal: Complete daily ADLs, including personal hygiene independently, as able  Description: Interventions:- Observe, teach, and assist patient with ADLS- Monitor and promote a balance of rest/activity, with adequate nutrition and elimination  Outcome: Progressing     Problem: Depression  Goal: Treatment Goal: Demonstrate behavioral control of depressive symptoms, verbalize feelings of improved mood/affect, and adopt new coping skills prior to discharge  Outcome: Progressing  Goal: Verbalize thoughts and feelings  Description: Interventions:- Assess and re-assess patient's level of risk - Engage patient in 1:1 interactions, daily, for a minimum of 15 minutes - Encourage patient to express feelings, fears, frustrations, hopes   Outcome: Progressing  Goal: Refrain from harming self  Description: Interventions:- Monitor patient closely, per order - Supervise medication ingestion, monitor effects and side effects   Outcome: Progressing  Goal: Refrain from isolation  Description: Interventions:- Develop a trusting relationship - Encourage socialization   Outcome: Progressing  Goal: Refrain from self-neglect  Outcome: Progressing  Goal: Attend and participate in unit activities, including therapeutic, recreational, and educational groups  Description: Interventions:- Provide therapeutic and educational activities daily, encourage attendance and participation, and document same in the medical record   Outcome: Progressing  Goal: Complete daily ADLs, including personal hygiene independently, as able  Description: Interventions:- Observe, teach, and assist patient with ADLS-  Monitor and promote a balance of rest/activity, with adequate nutrition and  elimination   Outcome: Progressing     Problem: Anxiety  Goal: Anxiety is at manageable level  Description: Interventions:- Assess and monitor patient's anxiety level. - Monitor for signs and symptoms (heart palpitations, chest pain, shortness of breath, headaches, nausea, feeling jumpy, restlessness, irritable, apprehensive). - Collaborate with interdisciplinary team and initiate plan and interventions as ordered.- [place] patient to unit/surroundings- Explain treatment plan- Encourage participation in care- Encourage verbalization of concerns/fears- Identify coping mechanisms- Assist in developing anxiety-reducing skills- Administer/offer alternative therapies- Limit or eliminate stimulants  Outcome: Progressing     Problem: Risk for Violence/Aggression Toward Others  Goal: Treatment Goal: Refrain from acts of violence/aggression during length of stay, and demonstrate improved impulse control at the time of discharge  Outcome: Progressing  Goal: Verbalize thoughts and feelings  Description: Interventions:- Assess and re-assess patient's level of risk, every waking shift- Engage patient in 1:1 interactions, daily, for a minimum of 15 minutes - Allow patient to express feelings and frustrations in a safe and non-threatening manner - Establish rapport/trust with patient   Outcome: Progressing  Goal: Refrain from harming others  Outcome: Progressing  Goal: Refrain from destructive acts on the environment or property  Outcome: Progressing  Goal: Control angry outbursts  Description: Interventions:- Monitor patient closely, per order- Ensure early verbal de-escalation- Monitor prn medication needs- Set reasonable/therapeutic limits, outline behavioral expectations, and consequences - Provide a non-threatening milieu, utilizing the least restrictive interventions   Outcome: Progressing  Goal: Attend and participate in unit activities, including therapeutic, recreational, and educational groups  Description:  Interventions:- Provide therapeutic and educational activities daily, encourage attendance and participation, and document same in the medical record   Outcome: Progressing  Goal: Identify appropriate positive anger management techniques  Description: Interventions:- Offer anger management and coping skills groups - Staff will provide positive feedback for appropriate anger control  Outcome: Progressing     Problem: Alteration in Orientation  Goal: Treatment Goal: Demonstrate a reduction of confusion and improved orientation to person, place, time and/or situation upon discharge, according to optimum baseline/ability  Outcome: Progressing  Goal: Interact with staff daily  Description: Interventions:- Assess and re-assess patient's level of orientation- Engage patient in 1 on 1 interactions, daily, for a minimum of 15 minutes - Establish rapport/trust with patient   Outcome: Progressing  Goal: Express concerns related to confused thinking related to:  Description: Interventions:- Encourage patient to express feelings, fears, frustrations, hopes- Assign consistent caregivers - [place]/re-orient patient as needed- Allow comfort items, as appropriate- Provide visual cues, signs, etc.   Outcome: Progressing  Goal: Allow medical examinations, as recommended  Description: Interventions:- Provide physical/neurological exams and/or referrals, per provider   Outcome: Progressing  Goal: Cooperate with recommended testing/procedures  Description: Interventions:- Determine need for ancillary testing- Observe for mental status changes- Implement falls/precaution protocol   Outcome: Progressing  Goal: Attend and participate in unit activities, including therapeutic, recreational, and educational groups  Description: Interventions:- Provide therapeutic and educational activities daily, encourage attendance and participation, and document same in the medical record - Provide appropriate opportunities for reminiscence - Provide a  consistent daily routine - Encourage family contact/visitation   Outcome: Progressing  Goal: Complete daily ADLs, including personal hygiene independently, as able  Description: Interventions:- Observe, teach, and assist patient with ADLS  Outcome: Progressing     Problem: Individualized Interventions  Goal: Patient will verbalize appropriate use of telephone within 5 days  Description: Interventions:- Treatment team to determine use of supervised phone privileges   Outcome: Progressing  Goal: Patient will verbalize need for hospitalization and will no longer attempt elopement within 5 days  Description: Interventions:- Ongoing education to help patient understand need for hospitalization  Outcome: Progressing  Goal: Patient will recognize inappropriate behaviors and develop alternative behaviors within 5 days  Description: Interventions:- Patient in collaboration with Treatment Team will develop a behavior management plan to help identify effective coping skills to deal with stressors  Outcome: Progressing

## 2025-04-27 LAB
GLUCOSE SERPL-MCNC: 137 MG/DL (ref 65–140)
GLUCOSE SERPL-MCNC: 168 MG/DL (ref 65–140)
GLUCOSE SERPL-MCNC: 236 MG/DL (ref 65–140)
GLUCOSE SERPL-MCNC: 84 MG/DL (ref 65–140)

## 2025-04-27 PROCEDURE — 82948 REAGENT STRIP/BLOOD GLUCOSE: CPT

## 2025-04-27 PROCEDURE — 99232 SBSQ HOSP IP/OBS MODERATE 35: CPT | Performed by: STUDENT IN AN ORGANIZED HEALTH CARE EDUCATION/TRAINING PROGRAM

## 2025-04-27 RX ADMIN — LISINOPRIL 2.5 MG: 5 TABLET ORAL at 08:52

## 2025-04-27 RX ADMIN — OLANZAPINE 10 MG: 10 TABLET, FILM COATED ORAL at 17:12

## 2025-04-27 RX ADMIN — LEVOTHYROXINE SODIUM 88 MCG: 88 TABLET ORAL at 05:13

## 2025-04-27 RX ADMIN — PRAVASTATIN SODIUM 40 MG: 40 TABLET ORAL at 08:52

## 2025-04-27 RX ADMIN — DIVALPROEX SODIUM 500 MG: 500 TABLET, DELAYED RELEASE ORAL at 21:20

## 2025-04-27 RX ADMIN — INSULIN LISPRO 3 UNITS: 100 INJECTION, SOLUTION INTRAVENOUS; SUBCUTANEOUS at 21:20

## 2025-04-27 RX ADMIN — MUPIROCIN 1 APPLICATION: 20 OINTMENT TOPICAL at 08:55

## 2025-04-27 RX ADMIN — DIVALPROEX SODIUM 500 MG: 500 TABLET, DELAYED RELEASE ORAL at 08:52

## 2025-04-27 RX ADMIN — CLONAZEPAM 0.25 MG: 0.5 TABLET ORAL at 17:12

## 2025-04-27 RX ADMIN — APIXABAN 5 MG: 5 TABLET, FILM COATED ORAL at 17:12

## 2025-04-27 RX ADMIN — CLONAZEPAM 0.25 MG: 0.5 TABLET ORAL at 08:53

## 2025-04-27 RX ADMIN — INSULIN LISPRO 1 UNITS: 100 INJECTION, SOLUTION INTRAVENOUS; SUBCUTANEOUS at 17:13

## 2025-04-27 RX ADMIN — TRAZODONE HYDROCHLORIDE 100 MG: 100 TABLET ORAL at 21:20

## 2025-04-27 RX ADMIN — APIXABAN 5 MG: 5 TABLET, FILM COATED ORAL at 08:51

## 2025-04-27 RX ADMIN — OLANZAPINE 10 MG: 10 TABLET, FILM COATED ORAL at 08:52

## 2025-04-27 NOTE — NURSING NOTE
Pt appears bright on unit, smiling and social with peers and staff. Visible for meals with good intake. Rambling tangential speech. Pt cooperative with prompts. Denies all symptoms currently.

## 2025-04-27 NOTE — PROGRESS NOTES
Progress Note - Behavioral Health   Name: Lisa Bocanegraorry 69 y.o. female I MRN: [id number]   Unit/Bed#: [facility] 609-02 I Date of Admission: 4/8/2025   Date of Service: 4/27/2025 I Hospital Day: 19     Assessment & Plan  Bipolar affective disorder, current episode manic (HCC)  As of 04/25 patient is less hyperactive than days prior.  She is denying psychiatric symptoms.  She remains focused on discharge.    Continue Zyprexa 10 mg BID for current symptoms of carlie (increased on 4/18/25)  Continue Depakote 500 mg twice daily for mood stabilization  VPA level on 4/24 was 90, CMP and CBC is acceptable  Continue Trazodone to 100mg PO QHS for insomnia (started on 4/13/2025)  Continue Klonopin to 0.25mg PO BID for manic behaviors, can utilize until Zyprexa is at a therapeutic dose (started on 4/13/2025)     Risks/Benefits of Treatment:     Risks, benefits, and possible side effects of medications explained to patient. Patient has limited understanding of risks and benefits of treatment at this time, but agrees to take medications as prescribed.    Progress Toward Goals: gradual improvement    Treatment Planning:      - Encourage early mobility and having a structured day  - Provide frequent re-orientation, and cognitive stimulation  - Ensure assistive devices are in proper working order (eye-glasses, hearing aids)  - Encourage adequate hydration, nutrition and monitor bowel movements  - Maintain sleep-wake cycle: Uninterrupted sleep time; low-level lighting at night  - Fall precaution  - Continue medication titration and treatment plan; adjust medication to optimize treatment response and as clinically indicated.   - f/u SLIM recs regarding the medical problems   - Observation:Routine  - Legal Status:  Legal Status: 201  - VS: as per unit protocol  - Encourage group attendance and milieu therapy  - Dispo: To be determined  - Long Stay Certification : Not Applicable  - Estimated Discharge Day: 9 days  "(5/6/2025)    Subjective:    Patient was seen today for continuation of care, records reviewed and patient was discussed with the morning case review team.    Lisa was seen today for psychiatric follow-up.  Lisa was seen this morning sitting in the dayroom.  States she feels tired but gets increasingly more talkative and alert as the conversations goes on.  Very disorganized in conversation, starts off talking about doing the \"tom tom slide\", then talks about Lady Snider and Juno Logan then goes on to talk about other peers and how well dressed they are today.  Patient wants to writer a letter to Lady Snider and mail it to her, asks this writer to look up Lady Snider's address.  Patient redirected.  She was later seen in the [place] and shows this writer how to do squats, patient redirected.  Lisa reports adequate daytime energy and denies any difficulties with initiating or staying asleep.  Oral appetite and hydration is adequate.  We reviewed once more the specific as-needed medications they can use going forward if they experience any insomnia or destabilization of their mood, they understood and were agreeable.  Milieu visibility and group attendance encouraged to promote an active participation in treatment.    Patient denies acute suicidal/self-harm ideation/intent/plan upon direct inquiry at this time. Patient is able to contract for safety while on the unit and would feel comfortable seeking staff support should suicidal symptoms or urges appear or worsen. Patient remains behaviorally appropriate, no agitation or aggression noted on exam or in report. Patient also denies HI/AH/VH. Patient remains adherent to her current psychotropic medication regimen and denies any side effects from medications, as well as none noted on exam.    Patient is not yet ready for discharge.  Patient's condition currently requires active psychopharmacological medication management, interdisciplinary coordination with case " management, and the utilization of adjunctive milieu and group therapy to augment psychopharmacological efficacy.  The patients risk of morbidity, and progression or decompensation of psychiatric disease, is higher without this current treatment.  Patient cannot be safety treated at a lower level of care and requires further psychiatric evaluation, medication treatment, other treatment which can be reasonably be provided only in a psychiatric hospital.  Discharge to an unsupervised setting will represent a significant deterioration in ability to function and present a severe risk to the patients physical and mental health.    Review of Systems:  Behavior over the last 24 hours: Slowly improving  Sleep: sleeping okay throughout the night  Appetite: adequate  Medication side effects: none reported  ROS:no complaints, all other systems are negative    Objective:    Vitals:  Vitals:    04/27/25 0739   BP: 148/66   Pulse: 83   Resp: 17   Temp: (!) 96.3 °F (35.7 °C)   SpO2: 97%     Laboratory Results:  I have personally reviewed all pertinent laboratory/tests results.  Most Recent Labs:   Lab Results   Component Value Date    WBC 7.94 04/24/2025    RBC 4.15 04/24/2025    HGB 12.4 04/24/2025    HCT 41.3 04/24/2025     04/24/2025    RDW 13.5 04/24/2025    NEUTROABS 4.97 04/24/2025    SODIUM 140 04/24/2025    K 4.4 04/24/2025     04/24/2025    CO2 29 04/24/2025    BUN 28 (H) 04/24/2025    CREATININE 1.17 04/24/2025    GLUC 138 04/24/2025    GLUF 113 (H) 04/14/2025    CALCIUM 10.2 04/24/2025    AST 17 04/24/2025    ALT 16 04/24/2025    ALKPHOS 92 04/24/2025    TP 7.2 04/24/2025    ALB 4.4 04/24/2025    TBILI 0.55 04/24/2025    CHOLESTEROL 144 04/09/2025    HDL 64 04/09/2025    TRIG 60 04/09/2025    LDLCALC 68 04/09/2025    NONHDLC 80 04/09/2025    VALPROICTOT 90 04/24/2025    LITHIUM 1.03 01/08/2025    DBF8YERNFPPP 7.739 (H) 04/17/2025    FREET4 1.08 04/17/2025    T3FREE 3.24 07/11/2023    HGBA1C 6.1 (H)  04/09/2025     04/09/2025     Mental Status Evaluation:  Appearance:  casually dressed   Behavior:  pleasant, more calm, more cooperative   Speech:  Normal volume   Mood:  euthymic   Affect:  Slightly brighter   Thought Process:  More organized   Associations: circumstantial associations   Thought Content:  no overt delusions, negative thinking, ruminating thoughts   Perceptual Disturbances: no auditory hallucinations, no visual hallucinations, denies when asked, does not appear responding to internal stimuli   Risk Potential: Suicidal ideation - None at present, able to seek out staff before acting on thoughts of harming self on the unit, would talk to staff if not feeling safe on the unit  Homicidal ideation - None at present, able to seek out staff before acting on thoughts of harming others on the unit  Potential for aggression - Not at present   Sensorium:  oriented to person, place, and time/date   Memory:  recent memory intact   Consciousness:  alert and awake   Attention/Concentration: attention span and concentration appear shorter than expected for age   Insight:  limited   Judgment: limited   Gait/Station: slow gait   Motor Activity: no abnormal movements     Cognitive Assessment : deferred  Pain : deferred  Pain Scale : deferred    Suicide/Homicide Risk Assessment:  Risk of Harm to Self:   Nursing Suicide Risk Assessment Last 24 hours: C-SSRS Risk (Since Last Contact)  Calculated C-SSRS Risk Score (Since Last Contact): No Risk Indicated    Risk of Harm to Others:  Nursing Homicide Risk Assessment: Violence Risk to Others: Denies within past 6 months    Behavioral Health Medications: all current active meds have been reviewed and continue current psychiatric medications.  Current Facility-Administered Medications   Medication Dose Route Frequency Provider Last Rate    acetaminophen  650 mg Oral Q4H PRN STEVE Dawson      acetaminophen  650 mg Oral Q4H PRN STEVE Dawson      acetaminophen   975 mg Oral Q6H PRN STEVE Dawson      aluminum-magnesium hydroxide-simethicone  30 mL Oral Q4H PRN STEVE Dawson      apixaban  5 mg Oral BID Areli Leblanc, STEVE      benztropine  1 mg Intramuscular Q4H PRN Max 6/day Gianna Perera, STEVE      benztropine  0.5 mg Oral Q4H PRN Max 6/day STEVE Dawson      bisacodyl  10 mg Rectal Daily PRN STEVE Dawson      clonazePAM  0.25 mg Oral BID Rachel Ramez, DO      divalproex sodium  500 mg Oral BID Andrae Samuels, DO      hydrOXYzine HCL  25 mg Oral Q6H PRN Max 4/day Gianna Perera, STEVE      hydrOXYzine HCL  50 mg Oral Q6H PRN Max 4/day STEVE Dawson      insulin lispro  1-6 Units Subcutaneous TID AC Lance Zayas MD      insulin lispro  1-6 Units Subcutaneous HS Lance Zayas MD      levothyroxine  88 mcg Oral Early Morning Lance Zayas MD      lisinopril  2.5 mg Oral Daily Laverne Wiley, STEVE      LORazepam  1 mg Intramuscular Q6H PRN Max 3/day STEVE Dawson      LORazepam  1 mg Oral Q6H PRN Max 3/day STEVE Dawson      mupirocin   Topical Daily Saad Butts, DPM      nystatin   Topical BID Lance Zayas MD      OLANZapine  5 mg Intramuscular Q3H PRN Max 3/day STEVE Dawson      OLANZapine  10 mg Oral BID Andrae Samuels, DO      OLANZapine  2.5 mg Oral Q4H PRN Max 6/day STEVE Dawson      OLANZapine  5 mg Oral Q4H PRN Max 3/day STEVE Dawson      OLANZapine  5 mg Oral Q3H PRN Max 3/day Gianna Perera, STEVE      polyethylene glycol  17 g Oral Daily PRN STEVE Dawson      pravastatin  40 mg Oral Daily Lance Zayas MD      propranolol  5 mg Oral Q8H PRN STEVE Dawson      senna-docusate sodium  1 tablet Oral Daily PRN STEVE Dawson      traZODone  100 mg Oral HS STEVE Dawson      traZODone  50 mg Oral HS PRN STEVE Dawson       Administrative Statements:  Patient's progress discussed with staff in treatment team  meeting.  Medications, treatment progress and treatment plan reviewed with patient.   Educated on importance of medication and treatment compliance.  Reassurance and supportive therapy provided.   Encouraged participation in milieu and group therapy on the unit.    STEVE Dawson 04/27/25

## 2025-04-27 NOTE — PLAN OF CARE
Problem: PAIN - ADULT  Goal: Verbalizes/displays adequate comfort level or baseline comfort level  Description: Interventions:- Encourage patient to monitor pain and request assistance- Assess pain using appropriate pain scale- Administer analgesics based on type and severity of pain and evaluate response- Implement non-pharmacological measures as appropriate and evaluate response- Consider cultural and social influences on pain and pain management- Notify physician/advanced practitioner if interventions unsuccessful or patient reports new pain  Outcome: Progressing     Problem: INFECTION - ADULT  Goal: Absence or prevention of progression during hospitalization  Description: INTERVENTIONS:- Assess and monitor for signs and symptoms of infection- Monitor lab/diagnostic results- Monitor all insertion sites, i.e. indwelling lines, tubes, and drains- Monitor endotracheal if appropriate and nasal secretions for changes in amount and color- [place] appropriate cooling/warming therapies per order- Administer medications as ordered- Instruct and encourage patient and family to use good hand hygiene technique- Identify and instruct in appropriate isolation precautions for identified infection/condition  Outcome: Progressing  Goal: Absence of fever/infection during neutropenic period  Description: INTERVENTIONS:- Monitor WBC  Outcome: Progressing     Problem: SAFETY ADULT  Goal: Patient will remain free of falls  Description: INTERVENTIONS:- Educate patient/family on patient safety including physical limitations  Outcome: Progressing  Goal: Maintain or return to baseline ADL function  Description: INTERVENTIONS:-  Assess patient's ability to carry out ADLs; assess patient's baseline for ADL function and identify physical deficits which impact ability to perform ADLs (bathing, care of mouth/teeth, toileting, grooming, dressing, etc.)- Assess/evaluate cause of self-care deficits - Assess range of motion- Assess patient's  mobility; develop plan if impaired- Assess patient's need for assistive devices and provide as appropriate- Encourage maximum independence but intervene and supervise when necessary- Involve family in performance of ADLs- Assess for home care needs following discharge - Consider OT consult to assist with ADL evaluation and planning for discharge- Provide patient education as appropriate  Outcome: Progressing  Goal: Maintains/Returns to pre admission functional level  Outcome: Progressing     Problem: DISCHARGE PLANNING  Goal: Discharge to home or other facility with appropriate resources  Description: INTERVENTIONS:- Identify barriers to discharge w/patient and caregiver- Arrange for needed discharge resources and transportation as appropriate- Identify discharge learning needs (meds, wound care, etc.)- Arrange for interpretive services to assist at discharge as needed- Refer to Case Management Department for coordinating discharge planning if the patient needs post-hospital services based on physician/advanced practitioner order or complex needs related to functional status, cognitive ability, or social support system  Outcome: Progressing     Problem: Knowledge Deficit  Goal: Patient/family/caregiver demonstrates understanding of disease process, treatment plan, medications, and discharge instructions  Description: Complete learning assessment and assess knowledge base.Interventions:- Provide teaching at level of understanding- Provide teaching via preferred learning methods  Outcome: Progressing     Problem: Alteration in Thoughts and Perception  Goal: Treatment Goal: Gain control of psychotic behaviors/thinking, reduce/eliminate presenting symptoms and demonstrate improved reality functioning upon discharge  Outcome: Progressing  Goal: Verbalize thoughts and feelings  Description: Interventions:- Promote a nonjudgmental and trusting relationship with the patient through active listening and therapeutic  communication- Assess patient's level of functioning, behavior and potential for risk- Engage patient in 1 on 1 interactions- Encourage patient to express fears, feelings, frustrations, and discuss symptoms  - [place] patient to reality, help patient recognize reality-based thinking - Administer medications as ordered and assess for potential side effects- Provide the patient education related to the signs and symptoms of the illness and desired effects of prescribed medications  Outcome: Progressing  Goal: Refrain from acting on delusional thinking/internal stimuli  Description: Interventions:- Monitor patient closely, per order - Utilize least restrictive measures - Set reasonable limits, give positive feedback for acceptable - Administer medications as ordered and monitor of potential side effects  Outcome: Progressing  Goal: Agree to be compliant with medication regime, as prescribed and report medication side effects  Description: Interventions:- Offer appropriate PRN medication and supervise ingestion; conduct AIMS, as needed   Outcome: Progressing  Goal: Attend and participate in unit activities, including therapeutic, recreational, and educational groups  Description: Interventions:-Encourage Visitation and family involvement in care  Outcome: Progressing  Goal: Recognize dysfunctional thoughts, communicate reality-based thoughts at the time of discharge  Description: Interventions:- Provide medication and psycho-education to assist patient in compliance and developing insight into his/her illness   Outcome: Progressing  Goal: Complete daily ADLs, including personal hygiene independently, as able  Description: Interventions:- Observe, teach, and assist patient with ADLS- Monitor and promote a balance of rest/activity, with adequate nutrition and elimination   Outcome: Progressing     Problem: Ineffective Coping  Goal: Cooperates with admission process  Description: Interventions: - Complete admission  process  Outcome: Progressing  Goal: Identifies ineffective coping skills  Outcome: Progressing  Goal: Identifies healthy coping skills  Outcome: Progressing  Goal: Demonstrates healthy coping skills  Outcome: Progressing  Goal: Participates in unit activities  Description: Interventions:- Provide therapeutic environment - Provide required programming - Redirect inappropriate behaviors   Outcome: Progressing  Goal: Patient/Family participate in treatment and DC plans  Description: Interventions:- Provide therapeutic environment  Outcome: Progressing  Goal: Patient/Family verbalizes awareness of resources  Outcome: Progressing  Goal: Understands least restrictive measures  Description: Interventions:- Utilize least restrictive behavior  Outcome: Progressing  Goal: Free from restraint events  Description: - Utilize least restrictive measures - Provide behavioral interventions - Redirect inappropriate behaviors   Outcome: Progressing     Problem: Risk for Self Injury/Neglect  Goal: Treatment Goal: Remain safe during length of stay, learn and adopt new coping skills, and be free of self-injurious ideation, impulses and acts at the time of discharge  Outcome: Progressing  Goal: Verbalize thoughts and feelings  Description: Interventions:- Assess and re-assess patient's lethality and potential for self-injury- Engage patient in 1:1 interactions, daily, for a minimum of 15 minutes- Encourage patient to express feelings, fears, frustrations, hopes- Establish rapport/trust with patient   Outcome: Progressing  Goal: Refrain from harming self  Description: Interventions:- Monitor patient closely, per order- Develop a trusting relationship- Supervise medication ingestion, monitor effects and side effects   Outcome: Progressing  Goal: Attend and participate in unit activities, including therapeutic, recreational, and educational groups  Description: Interventions:- Provide therapeutic and educational activities daily, encourage  attendance and participation, and document same in the medical record- Obtain collateral information, encourage visitation and family involvement in care   Outcome: Progressing  Goal: Recognize maladaptive responses and adopt new coping mechanisms  Outcome: Progressing  Goal: Complete daily ADLs, including personal hygiene independently, as able  Description: Interventions:- Observe, teach, and assist patient with ADLS- Monitor and promote a balance of rest/activity, with adequate nutrition and elimination  Outcome: Progressing     Problem: Depression  Goal: Treatment Goal: Demonstrate behavioral control of depressive symptoms, verbalize feelings of improved mood/affect, and adopt new coping skills prior to discharge  Outcome: Progressing  Goal: Verbalize thoughts and feelings  Description: Interventions:- Assess and re-assess patient's level of risk - Engage patient in 1:1 interactions, daily, for a minimum of 15 minutes - Encourage patient to express feelings, fears, frustrations, hopes   Outcome: Progressing  Goal: Refrain from harming self  Description: Interventions:- Monitor patient closely, per order - Supervise medication ingestion, monitor effects and side effects   Outcome: Progressing  Goal: Refrain from isolation  Description: Interventions:- Develop a trusting relationship - Encourage socialization   Outcome: Progressing  Goal: Refrain from self-neglect  Outcome: Progressing  Goal: Attend and participate in unit activities, including therapeutic, recreational, and educational groups  Description: Interventions:- Provide therapeutic and educational activities daily, encourage attendance and participation, and document same in the medical record   Outcome: Progressing  Goal: Complete daily ADLs, including personal hygiene independently, as able  Description: Interventions:- Observe, teach, and assist patient with ADLS-  Monitor and promote a balance of rest/activity, with adequate nutrition and  elimination   Outcome: Progressing     Problem: Anxiety  Goal: Anxiety is at manageable level  Description: Interventions:- Assess and monitor patient's anxiety level. - Monitor for signs and symptoms (heart palpitations, chest pain, shortness of breath, headaches, nausea, feeling jumpy, restlessness, irritable, apprehensive). - Collaborate with interdisciplinary team and initiate plan and interventions as ordered.- [place] patient to unit/surroundings- Explain treatment plan- Encourage participation in care- Encourage verbalization of concerns/fears- Identify coping mechanisms- Assist in developing anxiety-reducing skills- Administer/offer alternative therapies- Limit or eliminate stimulants  Outcome: Progressing     Problem: Risk for Violence/Aggression Toward Others  Goal: Treatment Goal: Refrain from acts of violence/aggression during length of stay, and demonstrate improved impulse control at the time of discharge  Outcome: Progressing  Goal: Verbalize thoughts and feelings  Description: Interventions:- Assess and re-assess patient's level of risk, every waking shift- Engage patient in 1:1 interactions, daily, for a minimum of 15 minutes - Allow patient to express feelings and frustrations in a safe and non-threatening manner - Establish rapport/trust with patient   Outcome: Progressing  Goal: Refrain from harming others  Outcome: Progressing  Goal: Refrain from destructive acts on the environment or property  Outcome: Progressing  Goal: Control angry outbursts  Description: Interventions:- Monitor patient closely, per order- Ensure early verbal de-escalation- Monitor prn medication needs- Set reasonable/therapeutic limits, outline behavioral expectations, and consequences - Provide a non-threatening milieu, utilizing the least restrictive interventions   Outcome: Progressing  Goal: Attend and participate in unit activities, including therapeutic, recreational, and educational groups  Description:  Interventions:- Provide therapeutic and educational activities daily, encourage attendance and participation, and document same in the medical record   Outcome: Progressing  Goal: Identify appropriate positive anger management techniques  Description: Interventions:- Offer anger management and coping skills groups - Staff will provide positive feedback for appropriate anger control  Outcome: Progressing     Problem: Alteration in Orientation  Goal: Treatment Goal: Demonstrate a reduction of confusion and improved orientation to person, place, time and/or situation upon discharge, according to optimum baseline/ability  Outcome: Progressing  Goal: Interact with staff daily  Description: Interventions:- Assess and re-assess patient's level of orientation- Engage patient in 1 on 1 interactions, daily, for a minimum of 15 minutes - Establish rapport/trust with patient   Outcome: Progressing  Goal: Express concerns related to confused thinking related to:  Description: Interventions:- Encourage patient to express feelings, fears, frustrations, hopes- Assign consistent caregivers - Honor/re-orient patient as needed- Allow comfort items, as appropriate- Provide visual cues, signs, etc.   Outcome: Progressing  Goal: Allow medical examinations, as recommended  Description: Interventions:- Provide physical/neurological exams and/or referrals, per provider   Outcome: Progressing  Goal: Cooperate with recommended testing/procedures  Description: Interventions:- Determine need for ancillary testing- Observe for mental status changes- Implement falls/precaution protocol   Outcome: Progressing  Goal: Attend and participate in unit activities, including therapeutic, recreational, and educational groups  Description: Interventions:- Provide therapeutic and educational activities daily, encourage attendance and participation, and document same in the medical record - Provide appropriate opportunities for reminiscence - Provide a  consistent daily routine - Encourage family contact/visitation   Outcome: Progressing  Goal: Complete daily ADLs, including personal hygiene independently, as able  Description: Interventions:- Observe, teach, and assist patient with ADLS  Outcome: Progressing     Problem: Individualized Interventions  Goal: Patient will verbalize appropriate use of telephone within 5 days  Description: Interventions:- Treatment team to determine use of supervised phone privileges   Outcome: Progressing  Goal: Patient will verbalize need for hospitalization and will no longer attempt elopement within 5 days  Description: Interventions:- Ongoing education to help patient understand need for hospitalization  Outcome: Progressing  Goal: Patient will recognize inappropriate behaviors and develop alternative behaviors within 5 days  Description: Interventions:- Patient in collaboration with Treatment Team will develop a behavior management plan to help identify effective coping skills to deal with stressors  Outcome: Progressing

## 2025-04-27 NOTE — NURSING NOTE
Patient calm and cooperative. Rambling and disorganized speech present. Visible in the milieu, social with select peers. Denies all psych s/s. Medication compliant. Safety precautions maintained.

## 2025-04-28 LAB
GLUCOSE SERPL-MCNC: 113 MG/DL (ref 65–140)
GLUCOSE SERPL-MCNC: 125 MG/DL (ref 65–140)
GLUCOSE SERPL-MCNC: 133 MG/DL (ref 65–140)
GLUCOSE SERPL-MCNC: 198 MG/DL (ref 65–140)
GLUCOSE SERPL-MCNC: 89 MG/DL (ref 65–140)

## 2025-04-28 PROCEDURE — 82948 REAGENT STRIP/BLOOD GLUCOSE: CPT

## 2025-04-28 RX ADMIN — LISINOPRIL 2.5 MG: 5 TABLET ORAL at 08:09

## 2025-04-28 RX ADMIN — NYSTATIN 1 APPLICATION: 100000 POWDER TOPICAL at 17:16

## 2025-04-28 RX ADMIN — LEVOTHYROXINE SODIUM 88 MCG: 88 TABLET ORAL at 05:18

## 2025-04-28 RX ADMIN — APIXABAN 5 MG: 5 TABLET, FILM COATED ORAL at 08:09

## 2025-04-28 RX ADMIN — TRAZODONE HYDROCHLORIDE 100 MG: 100 TABLET ORAL at 21:34

## 2025-04-28 RX ADMIN — CLONAZEPAM 0.25 MG: 0.5 TABLET ORAL at 08:11

## 2025-04-28 RX ADMIN — PRAVASTATIN SODIUM 40 MG: 40 TABLET ORAL at 08:09

## 2025-04-28 RX ADMIN — CLONAZEPAM 0.25 MG: 0.5 TABLET ORAL at 17:15

## 2025-04-28 RX ADMIN — OLANZAPINE 10 MG: 10 TABLET, FILM COATED ORAL at 08:09

## 2025-04-28 RX ADMIN — OLANZAPINE 10 MG: 10 TABLET, FILM COATED ORAL at 17:16

## 2025-04-28 RX ADMIN — MUPIROCIN 1 APPLICATION: 20 OINTMENT TOPICAL at 08:12

## 2025-04-28 RX ADMIN — APIXABAN 5 MG: 5 TABLET, FILM COATED ORAL at 17:16

## 2025-04-28 RX ADMIN — NYSTATIN 1 APPLICATION: 100000 POWDER TOPICAL at 08:12

## 2025-04-28 RX ADMIN — DIVALPROEX SODIUM 500 MG: 500 TABLET, DELAYED RELEASE ORAL at 21:34

## 2025-04-28 RX ADMIN — INSULIN LISPRO 2 UNITS: 100 INJECTION, SOLUTION INTRAVENOUS; SUBCUTANEOUS at 17:17

## 2025-04-28 RX ADMIN — DIVALPROEX SODIUM 500 MG: 500 TABLET, DELAYED RELEASE ORAL at 08:11

## 2025-04-28 NOTE — ASSESSMENT & PLAN NOTE
As of 04/28 the patient remains hyperverbal but is less hyperactive and less tangential.  She is in agreement to discharge to a SNF.    Continue Zyprexa 10 mg BID for current symptoms of carlie (increased on 4/18/25)  Continue Depakote 500 mg twice daily for mood stabilization  VPA level on 4/24 was 90, CMP and CBC is acceptable  Continue Trazodone to 100mg PO QHS for insomnia (started on 4/13/2025)  Continue Klonopin to 0.25mg PO BID for manic behaviors, can utilize until Zyprexa is at a therapeutic dose (started on 4/13/2025)

## 2025-04-28 NOTE — NURSING NOTE
"Pt visible in day room this evening, pleasant on approach. Social with peers. Denies depression and anxiety, denies further s/s or unmet needs. Pt remains hyperverbal, speaking about \"tom-tom-ing\" and dancing. Compliant with HS medications. Will maintain q15min checks.  "

## 2025-04-28 NOTE — PROGRESS NOTES
04/28/25 0900   Team Meeting   Meeting Type Daily Rounds   Team Members Present   Team Members Present Physician;Nurse;   Physician Team Member Tiny/Dilma/Ramez   Nursing Team Member Rafaela/Carmen/Linda   Care Management Team Member Stanford   OT Team Member Alix   Patient/Family Present   Patient Present No   Patient's Family Present No     Patient is eating well and is bright and hyper verbal. She is less disorganized and is more open. She is sleeping well and is improving slowly. Patient was approved for SNF level of care. Patient discharge is pending stabilization of mood and medications.

## 2025-04-28 NOTE — PROGRESS NOTES
Progress Note - Behavioral Health   Name: Lisa Bocanegraorry 69 y.o. female I MRN: [id number]  Unit/Bed#: [facility] 609-02 I Date of Admission: 4/8/2025   Date of Service: 4/28/2025 I Hospital Day: 20    Assessment & Plan  Bipolar affective disorder, current episode manic (HCC)  As of 04/28 the patient remains hyperverbal but is less hyperactive and less tangential.  She is in agreement to discharge to a SNF.    Continue Zyprexa 10 mg BID for current symptoms of carlie (increased on 4/18/25)  Continue Depakote 500 mg twice daily for mood stabilization  VPA level on 4/24 was 90, CMP and CBC is acceptable  Continue Trazodone to 100mg PO QHS for insomnia (started on 4/13/2025)  Continue Klonopin to 0.25mg PO BID for manic behaviors, can utilize until Zyprexa is at a therapeutic dose (started on 4/13/2025)    Current Medications:    Current Facility-Administered Medications:     apixaban (ELIQUIS) tablet 5 mg, Oral, BID    clonazePAM (KlonoPIN) tablet 0.25 mg, Oral, BID    divalproex sodium (DEPAKOTE) DR tablet 500 mg, Oral, BID    insulin lispro (HumALOG/ADMELOG) 100 units/mL subcutaneous injection 1-6 Units, Subcutaneous, TID AC **AND** Fingerstick Glucose (POCT), , TID AC    insulin lispro (HumALOG/ADMELOG) 100 units/mL subcutaneous injection 1-6 Units, Subcutaneous, HS    levothyroxine tablet 88 mcg, Oral, Early Morning    lisinopril (ZESTRIL) tablet 2.5 mg, Oral, Daily    mupirocin (BACTROBAN) 2 % ointment, Topical, Daily    nystatin (MYCOSTATIN) powder, Topical, BID    OLANZapine (ZyPREXA) tablet 10 mg, Oral, BID    pravastatin (PRAVACHOL) tablet 40 mg, Oral, Daily    traZODone (DESYREL) tablet 100 mg, Oral, HS    Current Facility-Administered Medications:     acetaminophen (TYLENOL) tablet 650 mg, Oral, Q4H PRN    acetaminophen (TYLENOL) tablet 650 mg, Oral, Q4H PRN    acetaminophen (TYLENOL) tablet 975 mg, Oral, Q6H PRN    aluminum-magnesium hydroxide-simethicone (MAALOX) oral suspension 30 mL, Oral, Q4H PRN     benztropine (COGENTIN) injection 1 mg, Intramuscular, Q4H PRN Max 6/day    benztropine (COGENTIN) tablet 0.5 mg, Oral, Q4H PRN Max 6/day    bisacodyl (DULCOLAX) rectal suppository 10 mg, Rectal, Daily PRN    hydrOXYzine HCL (ATARAX) tablet 25 mg, Oral, Q6H PRN Max 4/day    hydrOXYzine HCL (ATARAX) tablet 50 mg, Oral, Q6H PRN Max 4/day    LORazepam (ATIVAN) injection 1 mg, Intramuscular, Q6H PRN Max 3/day    LORazepam (ATIVAN) tablet 1 mg, Oral, Q6H PRN Max 3/day    OLANZapine (ZyPREXA) IM injection 5 mg, Intramuscular, Q3H PRN Max 3/day    OLANZapine (ZyPREXA) tablet 2.5 mg, Oral, Q4H PRN Max 6/day    OLANZapine (ZyPREXA) tablet 5 mg, Oral, Q4H PRN Max 3/day    OLANZapine (ZyPREXA) tablet 5 mg, Oral, Q3H PRN Max 3/day    polyethylene glycol (MIRALAX) packet 17 g, Oral, Daily PRN    propranolol (INDERAL) tablet 5 mg, Oral, Q8H PRN    senna-docusate sodium (SENOKOT S) 8.6-50 mg per tablet 1 tablet, Oral, Daily PRN    traZODone (DESYREL) tablet 50 mg, Oral, HS PRN    Risks/Benefits of Treatment:     Risks, benefits, and possible side effects of medications explained to patient and patient verbalizes understanding and agreement for treatment.    Progress Toward Goals: improving    Treatment Planning:      - Encourage early mobility and having a structured day  - Provide frequent re-orientation, and cognitive stimulation  - Ensure assistive devices are in proper working order (eye-glasses, hearing aids)  - Encourage adequate hydration, nutrition and monitor bowel movements  - Maintain sleep-wake cycle: Uninterrupted sleep time; low-level lighting at night  - Fall precaution  - Follow up with [facility] regarding the medical problems   - Continue medication titration and treatment plan; adjust medication to optimize treatment response and as clinically indicated.   - Observation: Routine  - VS: as per unit protocol  - Encourage group attendance and milieu therapy  - Dispo: To be determined  - Estimated Discharge Day: 5/5/2025  "  - Legal Status : Voluntary 201 commitment.  - Long Stay Certification : Not Applicable    Subjective     Patient was visited on unit for continuing care; chart reviewed and discussed with multidisciplinary treatment team.     Patient continues to be visible in the milieu and interacts with staff and peers. No reports of aggression or self-injurious behavior on unit. No PRN medications used in the past 24 hours.    Per nursing, she is bright and hyper-verbal. She is more organized.     Patient accepted all offered medications and no adverse effects of medications noted or reported.    Patient today described her mood is \"really good.\"  She continues to be hyperverbal but less so than days prior.  She is also less tangential.  She described her sleep and appetite as adequate.  She endorsed continued fatigue from her Depakote.  Otherwise is denying side effects.  She denied suicidal and homicidal ideation.  She denied auditory and visual hallucinations.  She is in agreement to be discharged to a SNF and continues to states she does not want to return home while her brother is there.    Sleep: normal  Appetite: normal  Medication side effects: Yes - fatigue  ROS: review of systems as noted above in HPI/Subjective report, all other systems are negative    Objective :  Temp:  [97 °F (36.1 °C)-97.6 °F (36.4 °C)] 97.6 °F (36.4 °C)  HR:  [] 103  BP: (115-137)/(56-64) 137/60  Resp:  [16-18] 18  SpO2:  [93 %-98 %] 95 %  O2 Device: None (Room air)    Mental Status Evaluation:    Appearance:  casually dressed, marginal hygiene, improved grooming, looks stated age   Behavior:  pleasant, cooperative   Speech:  normal rate, normal volume, normal pitch   Mood:  \"Really good.\"   Affect:  Less over bright today and more appropriate.  Range and intensity   Thought Process:  Less tangential   Thought Content:  persecutory delusions   Perceptual Disturbances: no auditory hallucinations, no visual hallucinations, does not appear " "responding to internal stimuli   Risk Potential: Suicidal Ideation -  None at present  Homicidal Ideation -  None at present  Potential for Aggression - Not at present   Sensorium:  oriented to person, place, and time/date   Memory:  recent and remote memory grossly intact   Consciousness:  alert and awake   Attention/Concentration: attention span and concentration are age appropriate   Insight:  limited   Judgment: limited   Gait/Station: normal gait/station   Motor Activity: no abnormal movements         Lab Results: I have reviewed the following results:  Results from the past 24 hours:   Recent Results (from the past 24 hours)   Fingerstick Glucose (POCT)    Collection Time: 04/27/25 11:31 AM   Result Value Ref Range    POC Glucose 137 65 - 140 mg/dl   Fingerstick Glucose (POCT)    Collection Time: 04/27/25  4:00 PM   Result Value Ref Range    POC Glucose 168 (H) 65 - 140 mg/dl   Fingerstick Glucose (POCT)    Collection Time: 04/27/25  8:53 PM   Result Value Ref Range    POC Glucose 236 (H) 65 - 140 mg/dl   Fingerstick Glucose (POCT)    Collection Time: 04/28/25  7:19 AM   Result Value Ref Range    POC Glucose 89 65 - 140 mg/dl   Fingerstick Glucose (POCT)    Collection Time: 04/28/25  7:56 AM   Result Value Ref Range    POC Glucose 113 65 - 140 mg/dl       Administrative Statements     Counseling / Coordination of Care:   Patient's progress discussed with staff in treatment team meeting.  Medication changes reviewed with staff in treatment team meeting.    Portions of the record may have been created with voice recognition software. Occasional wrong word or \"sound a like\" substitutions may have occurred due to the inherent limitations of voice recognition software. Read the chart carefully and recognize, using context, where substitutions have occurred.    Andrae Samuels, DO 04/28/25  PGY-II  "

## 2025-04-28 NOTE — CASE MANAGEMENT
CM received report from department of aging that patient is a fit for SNF level of placement. CM completed [facility] referral. Will await responses.

## 2025-04-28 NOTE — PROGRESS NOTES
"   04/28/25 0900   Activity/Group Checklist   Group Community meeting   Attendance Attended   Attendance Duration (min) 0-15   Interactions Other (Comment)  (Pt states \"I will not attend your classes today because you're not wearing your heels.\")   Affect/Mood Appropriate   Goals Achieved Identified feelings;Able to listen to others;Able to engage in interactions;Able to self-disclose       "

## 2025-04-28 NOTE — NURSING NOTE
Pt calm and cooperative, visible on unit. Talkative with peers and staff. Able to express needs appropriately. Pt showered, some assistance needed in changing clothing. Wound care done on L toe, pt tolerated well. Pt denies all symptoms at current time. Denies unmet needs.

## 2025-04-28 NOTE — NURSING NOTE
"Patient calm, bright and cooperative. Visible in the milieu social with select peers. Rambling and disorganized speech present. Denies all psych s/s stated \" It was a very good day\". Denies any pain or discomfort. Medication compliant. Safety precautions maintained.   "

## 2025-04-28 NOTE — PLAN OF CARE
Problem: PAIN - ADULT  Goal: Verbalizes/displays adequate comfort level or baseline comfort level  Description: Interventions:- Encourage patient to monitor pain and request assistance- Assess pain using appropriate pain scale- Administer analgesics based on type and severity of pain and evaluate response- Implement non-pharmacological measures as appropriate and evaluate response- Consider cultural and social influences on pain and pain management- Notify physician/advanced practitioner if interventions unsuccessful or patient reports new pain  Outcome: Progressing     Problem: INFECTION - ADULT  Goal: Absence or prevention of progression during hospitalization  Description: INTERVENTIONS:- Assess and monitor for signs and symptoms of infection- Monitor lab/diagnostic results- Monitor all insertion sites, i.e. indwelling lines, tubes, and drains- Monitor endotracheal if appropriate and nasal secretions for changes in amount and color- [place] appropriate cooling/warming therapies per order- Administer medications as ordered- Instruct and encourage patient and family to use good hand hygiene technique- Identify and instruct in appropriate isolation precautions for identified infection/condition  Outcome: Progressing  Goal: Absence of fever/infection during neutropenic period  Description: INTERVENTIONS:- Monitor WBC  Outcome: Progressing     Problem: SAFETY ADULT  Goal: Patient will remain free of falls  Description: INTERVENTIONS:- Educate patient/family on patient safety including physical limitations  Outcome: Progressing  Goal: Maintain or return to baseline ADL function  Description: INTERVENTIONS:-  Assess patient's ability to carry out ADLs; assess patient's baseline for ADL function and identify physical deficits which impact ability to perform ADLs (bathing, care of mouth/teeth, toileting, grooming, dressing, etc.)- Assess/evaluate cause of self-care deficits - Assess range of motion- Assess patient's  mobility; develop plan if impaired- Assess patient's need for assistive devices and provide as appropriate- Encourage maximum independence but intervene and supervise when necessary- Involve family in performance of ADLs- Assess for home care needs following discharge - Consider OT consult to assist with ADL evaluation and planning for discharge- Provide patient education as appropriate  Outcome: Progressing  Goal: Maintains/Returns to pre admission functional level  Outcome: Progressing     Problem: DISCHARGE PLANNING  Goal: Discharge to home or other facility with appropriate resources  Description: INTERVENTIONS:- Identify barriers to discharge w/patient and caregiver- Arrange for needed discharge resources and transportation as appropriate- Identify discharge learning needs (meds, wound care, etc.)- Arrange for interpretive services to assist at discharge as needed- Refer to Case Management Department for coordinating discharge planning if the patient needs post-hospital services based on physician/advanced practitioner order or complex needs related to functional status, cognitive ability, or social support system  Outcome: Progressing     Problem: Knowledge Deficit  Goal: Patient/family/caregiver demonstrates understanding of disease process, treatment plan, medications, and discharge instructions  Description: Complete learning assessment and assess knowledge base.Interventions:- Provide teaching at level of understanding- Provide teaching via preferred learning methods  Outcome: Progressing     Problem: Alteration in Thoughts and Perception  Goal: Treatment Goal: Gain control of psychotic behaviors/thinking, reduce/eliminate presenting symptoms and demonstrate improved reality functioning upon discharge  Outcome: Progressing  Goal: Verbalize thoughts and feelings  Description: Interventions:- Promote a nonjudgmental and trusting relationship with the patient through active listening and therapeutic  communication- Assess patient's level of functioning, behavior and potential for risk- Engage patient in 1 on 1 interactions- Encourage patient to express fears, feelings, frustrations, and discuss symptoms  - [place] patient to reality, help patient recognize reality-based thinking - Administer medications as ordered and assess for potential side effects- Provide the patient education related to the signs and symptoms of the illness and desired effects of prescribed medications  Outcome: Progressing  Goal: Refrain from acting on delusional thinking/internal stimuli  Description: Interventions:- Monitor patient closely, per order - Utilize least restrictive measures - Set reasonable limits, give positive feedback for acceptable - Administer medications as ordered and monitor of potential side effects  Outcome: Progressing  Goal: Agree to be compliant with medication regime, as prescribed and report medication side effects  Description: Interventions:- Offer appropriate PRN medication and supervise ingestion; conduct AIMS, as needed   Outcome: Progressing  Goal: Attend and participate in unit activities, including therapeutic, recreational, and educational groups  Description: Interventions:-Encourage Visitation and family involvement in care  Outcome: Progressing  Goal: Recognize dysfunctional thoughts, communicate reality-based thoughts at the time of discharge  Description: Interventions:- Provide medication and psycho-education to assist patient in compliance and developing insight into his/her illness   Outcome: Progressing  Goal: Complete daily ADLs, including personal hygiene independently, as able  Description: Interventions:- Observe, teach, and assist patient with ADLS- Monitor and promote a balance of rest/activity, with adequate nutrition and elimination   Outcome: Progressing     Problem: Ineffective Coping  Goal: Cooperates with admission process  Description: Interventions: - Complete admission  process  Outcome: Progressing  Goal: Identifies ineffective coping skills  Outcome: Progressing  Goal: Identifies healthy coping skills  Outcome: Progressing  Goal: Demonstrates healthy coping skills  Outcome: Progressing  Goal: Participates in unit activities  Description: Interventions:- Provide therapeutic environment - Provide required programming - Redirect inappropriate behaviors   Outcome: Progressing  Goal: Patient/Family participate in treatment and DC plans  Description: Interventions:- Provide therapeutic environment  Outcome: Progressing  Goal: Patient/Family verbalizes awareness of resources  Outcome: Progressing  Goal: Understands least restrictive measures  Description: Interventions:- Utilize least restrictive behavior  Outcome: Progressing  Goal: Free from restraint events  Description: - Utilize least restrictive measures - Provide behavioral interventions - Redirect inappropriate behaviors   Outcome: Progressing     Problem: Risk for Self Injury/Neglect  Goal: Treatment Goal: Remain safe during length of stay, learn and adopt new coping skills, and be free of self-injurious ideation, impulses and acts at the time of discharge  Outcome: Progressing  Goal: Verbalize thoughts and feelings  Description: Interventions:- Assess and re-assess patient's lethality and potential for self-injury- Engage patient in 1:1 interactions, daily, for a minimum of 15 minutes- Encourage patient to express feelings, fears, frustrations, hopes- Establish rapport/trust with patient   Outcome: Progressing  Goal: Refrain from harming self  Description: Interventions:- Monitor patient closely, per order- Develop a trusting relationship- Supervise medication ingestion, monitor effects and side effects   Outcome: Progressing  Goal: Attend and participate in unit activities, including therapeutic, recreational, and educational groups  Description: Interventions:- Provide therapeutic and educational activities daily, encourage  attendance and participation, and document same in the medical record- Obtain collateral information, encourage visitation and family involvement in care   Outcome: Progressing  Goal: Recognize maladaptive responses and adopt new coping mechanisms  Outcome: Progressing  Goal: Complete daily ADLs, including personal hygiene independently, as able  Description: Interventions:- Observe, teach, and assist patient with ADLS- Monitor and promote a balance of rest/activity, with adequate nutrition and elimination  Outcome: Progressing     Problem: Depression  Goal: Treatment Goal: Demonstrate behavioral control of depressive symptoms, verbalize feelings of improved mood/affect, and adopt new coping skills prior to discharge  Outcome: Progressing  Goal: Verbalize thoughts and feelings  Description: Interventions:- Assess and re-assess patient's level of risk - Engage patient in 1:1 interactions, daily, for a minimum of 15 minutes - Encourage patient to express feelings, fears, frustrations, hopes   Outcome: Progressing  Goal: Refrain from harming self  Description: Interventions:- Monitor patient closely, per order - Supervise medication ingestion, monitor effects and side effects   Outcome: Progressing  Goal: Refrain from isolation  Description: Interventions:- Develop a trusting relationship - Encourage socialization   Outcome: Progressing  Goal: Refrain from self-neglect  Outcome: Progressing  Goal: Attend and participate in unit activities, including therapeutic, recreational, and educational groups  Description: Interventions:- Provide therapeutic and educational activities daily, encourage attendance and participation, and document same in the medical record   Outcome: Progressing  Goal: Complete daily ADLs, including personal hygiene independently, as able  Description: Interventions:- Observe, teach, and assist patient with ADLS-  Monitor and promote a balance of rest/activity, with adequate nutrition and  elimination   Outcome: Progressing     Problem: Anxiety  Goal: Anxiety is at manageable level  Description: Interventions:- Assess and monitor patient's anxiety level. - Monitor for signs and symptoms (heart palpitations, chest pain, shortness of breath, headaches, nausea, feeling jumpy, restlessness, irritable, apprehensive). - Collaborate with interdisciplinary team and initiate plan and interventions as ordered.- [place] patient to unit/surroundings- Explain treatment plan- Encourage participation in care- Encourage verbalization of concerns/fears- Identify coping mechanisms- Assist in developing anxiety-reducing skills- Administer/offer alternative therapies- Limit or eliminate stimulants  Outcome: Progressing     Problem: Risk for Violence/Aggression Toward Others  Goal: Treatment Goal: Refrain from acts of violence/aggression during length of stay, and demonstrate improved impulse control at the time of discharge  Outcome: Progressing  Goal: Verbalize thoughts and feelings  Description: Interventions:- Assess and re-assess patient's level of risk, every waking shift- Engage patient in 1:1 interactions, daily, for a minimum of 15 minutes - Allow patient to express feelings and frustrations in a safe and non-threatening manner - Establish rapport/trust with patient   Outcome: Progressing  Goal: Refrain from harming others  Outcome: Progressing  Goal: Refrain from destructive acts on the environment or property  Outcome: Progressing  Goal: Control angry outbursts  Description: Interventions:- Monitor patient closely, per order- Ensure early verbal de-escalation- Monitor prn medication needs- Set reasonable/therapeutic limits, outline behavioral expectations, and consequences - Provide a non-threatening milieu, utilizing the least restrictive interventions   Outcome: Progressing  Goal: Attend and participate in unit activities, including therapeutic, recreational, and educational groups  Description:  Interventions:- Provide therapeutic and educational activities daily, encourage attendance and participation, and document same in the medical record   Outcome: Progressing  Goal: Identify appropriate positive anger management techniques  Description: Interventions:- Offer anger management and coping skills groups - Staff will provide positive feedback for appropriate anger control  Outcome: Progressing     Problem: Alteration in Orientation  Goal: Treatment Goal: Demonstrate a reduction of confusion and improved orientation to person, place, time and/or situation upon discharge, according to optimum baseline/ability  Outcome: Progressing  Goal: Interact with staff daily  Description: Interventions:- Assess and re-assess patient's level of orientation- Engage patient in 1 on 1 interactions, daily, for a minimum of 15 minutes - Establish rapport/trust with patient   Outcome: Progressing  Goal: Express concerns related to confused thinking related to:  Description: Interventions:- Encourage patient to express feelings, fears, frustrations, hopes- Assign consistent caregivers - [place]/re-orient patient as needed- Allow comfort items, as appropriate- Provide visual cues, signs, etc.   Outcome: Progressing  Goal: Allow medical examinations, as recommended  Description: Interventions:- Provide physical/neurological exams and/or referrals, per provider   Outcome: Progressing  Goal: Cooperate with recommended testing/procedures  Description: Interventions:- Determine need for ancillary testing- Observe for mental status changes- Implement falls/precaution protocol   Outcome: Progressing  Goal: Attend and participate in unit activities, including therapeutic, recreational, and educational groups  Description: Interventions:- Provide therapeutic and educational activities daily, encourage attendance and participation, and document same in the medical record - Provide appropriate opportunities for reminiscence - Provide a  consistent daily routine - Encourage family contact/visitation   Outcome: Progressing  Goal: Complete daily ADLs, including personal hygiene independently, as able  Description: Interventions:- Observe, teach, and assist patient with ADLS  Outcome: Progressing     Problem: Individualized Interventions  Goal: Patient will verbalize appropriate use of telephone within 5 days  Description: Interventions:- Treatment team to determine use of supervised phone privileges   Outcome: Progressing  Goal: Patient will verbalize need for hospitalization and will no longer attempt elopement within 5 days  Description: Interventions:- Ongoing education to help patient understand need for hospitalization  Outcome: Progressing  Goal: Patient will recognize inappropriate behaviors and develop alternative behaviors within 5 days  Description: Interventions:- Patient in collaboration with Treatment Team will develop a behavior management plan to help identify effective coping skills to deal with stressors  Outcome: Progressing

## 2025-04-29 LAB — GLUCOSE SERPL-MCNC: 83 MG/DL (ref 65–140)

## 2025-04-29 PROCEDURE — 82948 REAGENT STRIP/BLOOD GLUCOSE: CPT

## 2025-04-29 RX ADMIN — TRAZODONE HYDROCHLORIDE 100 MG: 100 TABLET ORAL at 21:16

## 2025-04-29 RX ADMIN — LISINOPRIL 2.5 MG: 5 TABLET ORAL at 08:23

## 2025-04-29 RX ADMIN — DIVALPROEX SODIUM 500 MG: 500 TABLET, DELAYED RELEASE ORAL at 08:23

## 2025-04-29 RX ADMIN — PRAVASTATIN SODIUM 40 MG: 40 TABLET ORAL at 08:23

## 2025-04-29 RX ADMIN — NYSTATIN: 100000 POWDER TOPICAL at 08:28

## 2025-04-29 RX ADMIN — MUPIROCIN: 20 OINTMENT TOPICAL at 08:29

## 2025-04-29 RX ADMIN — CLONAZEPAM 0.25 MG: 0.5 TABLET ORAL at 17:17

## 2025-04-29 RX ADMIN — OLANZAPINE 10 MG: 10 TABLET, FILM COATED ORAL at 17:17

## 2025-04-29 RX ADMIN — OLANZAPINE 10 MG: 10 TABLET, FILM COATED ORAL at 08:23

## 2025-04-29 RX ADMIN — DIVALPROEX SODIUM 500 MG: 500 TABLET, DELAYED RELEASE ORAL at 21:16

## 2025-04-29 RX ADMIN — APIXABAN 5 MG: 5 TABLET, FILM COATED ORAL at 08:23

## 2025-04-29 RX ADMIN — LEVOTHYROXINE SODIUM 88 MCG: 88 TABLET ORAL at 05:13

## 2025-04-29 RX ADMIN — APIXABAN 5 MG: 5 TABLET, FILM COATED ORAL at 17:17

## 2025-04-29 RX ADMIN — CLONAZEPAM 0.25 MG: 0.5 TABLET ORAL at 08:24

## 2025-04-29 NOTE — ASSESSMENT & PLAN NOTE
As of 04/29 the patient remains hyperverbal but is less hyperactive and less tangential. She continues to report fatigue from Depakote.  Placement to SNF continues to be a work in progress.    Continue Zyprexa 10 mg BID for current symptoms of carlie (increased on 4/18/25)  Continue Depakote 500 mg twice daily for mood stabilization  VPA level on 4/24 was 90, CMP and CBC is acceptable  Continue Trazodone to 100mg PO QHS for insomnia (started on 4/13/2025)  Continue Klonopin to 0.25mg PO BID for manic behaviors, can utilize until Zyprexa is at a therapeutic dose (started on 4/13/2025)

## 2025-04-29 NOTE — PROGRESS NOTES
Progress Note - Behavioral Health   Name: Lisa Bocanegraorry 69 y.o. female I MRN: [id number]  Unit/Bed#: [facility] 609-02 I Date of Admission: 4/8/2025   Date of Service: 4/29/2025 I Hospital Day: 21    Assessment & Plan  Bipolar affective disorder, current episode manic (HCC)  As of 04/29 the patient remains hyperverbal but is less hyperactive and less tangential. She continues to report fatigue from Depakote.  Placement to SNF continues to be a work in progress.    Continue Zyprexa 10 mg BID for current symptoms of carlie (increased on 4/18/25)  Continue Depakote 500 mg twice daily for mood stabilization  VPA level on 4/24 was 90, CMP and CBC is acceptable  Continue Trazodone to 100mg PO QHS for insomnia (started on 4/13/2025)  Continue Klonopin to 0.25mg PO BID for manic behaviors, can utilize until Zyprexa is at a therapeutic dose (started on 4/13/2025)    Current Medications:    Current Facility-Administered Medications:     apixaban (ELIQUIS) tablet 5 mg, Oral, BID    clonazePAM (KlonoPIN) tablet 0.25 mg, Oral, BID    divalproex sodium (DEPAKOTE) DR tablet 500 mg, Oral, BID    levothyroxine tablet 88 mcg, Oral, Early Morning    lisinopril (ZESTRIL) tablet 2.5 mg, Oral, Daily    mupirocin (BACTROBAN) 2 % ointment, Topical, Daily    nystatin (MYCOSTATIN) powder, Topical, BID    OLANZapine (ZyPREXA) tablet 10 mg, Oral, BID    pravastatin (PRAVACHOL) tablet 40 mg, Oral, Daily    traZODone (DESYREL) tablet 100 mg, Oral, HS    Current Facility-Administered Medications:     acetaminophen (TYLENOL) tablet 650 mg, Oral, Q4H PRN    acetaminophen (TYLENOL) tablet 650 mg, Oral, Q4H PRN    acetaminophen (TYLENOL) tablet 975 mg, Oral, Q6H PRN    aluminum-magnesium hydroxide-simethicone (MAALOX) oral suspension 30 mL, Oral, Q4H PRN    benztropine (COGENTIN) injection 1 mg, Intramuscular, Q4H PRN Max 6/day    benztropine (COGENTIN) tablet 0.5 mg, Oral, Q4H PRN Max 6/day    bisacodyl (DULCOLAX) rectal suppository 10 mg, Rectal,  Daily PRN    hydrOXYzine HCL (ATARAX) tablet 25 mg, Oral, Q6H PRN Max 4/day    hydrOXYzine HCL (ATARAX) tablet 50 mg, Oral, Q6H PRN Max 4/day    LORazepam (ATIVAN) injection 1 mg, Intramuscular, Q6H PRN Max 3/day    LORazepam (ATIVAN) tablet 1 mg, Oral, Q6H PRN Max 3/day    OLANZapine (ZyPREXA) IM injection 5 mg, Intramuscular, Q3H PRN Max 3/day    OLANZapine (ZyPREXA) tablet 2.5 mg, Oral, Q4H PRN Max 6/day    OLANZapine (ZyPREXA) tablet 5 mg, Oral, Q4H PRN Max 3/day    OLANZapine (ZyPREXA) tablet 5 mg, Oral, Q3H PRN Max 3/day    polyethylene glycol (MIRALAX) packet 17 g, Oral, Daily PRN    propranolol (INDERAL) tablet 5 mg, Oral, Q8H PRN    senna-docusate sodium (SENOKOT S) 8.6-50 mg per tablet 1 tablet, Oral, Daily PRN    traZODone (DESYREL) tablet 50 mg, Oral, HS PRN    Risks/Benefits of Treatment:     Risks, benefits, and possible side effects of medications explained to patient and patient verbalizes understanding and agreement for treatment.    Progress Toward Goals: improving    Treatment Planning:      - Encourage early mobility and having a structured day  - Provide frequent re-orientation, and cognitive stimulation  - Ensure assistive devices are in proper working order (eye-glasses, hearing aids)  - Encourage adequate hydration, nutrition and monitor bowel movements  - Maintain sleep-wake cycle: Uninterrupted sleep time; low-level lighting at night  - Fall precaution  - Follow up with [facility] regarding the medical problems   - Continue medication titration and treatment plan; adjust medication to optimize treatment response and as clinically indicated.   - Observation: Routine  - VS: as per unit protocol  - Encourage group attendance and milieu therapy  - Dispo: To be determined  - Estimated Discharge Day: 5/5/2025   - Legal Status : Voluntary 201 commitment.  - Long Stay Certification : Not Applicable    Subjective     Patient was visited on unit for continuing care; chart reviewed and discussed with  "multidisciplinary treatment team.     Patient continues to be visible in the milieu and interacts with staff and peers. No reports of aggression or self-injurious behavior on unit. No PRN medications used in the past 24 hours.    Patient accepted all offered medications and no adverse effects of medications noted or reported.    Patient today reported her mood as \"tired.\"  She continues to contribute daytime fatigue to her Depakote.  She denied other medication side effects.  She endorsed adequate sleep and appetite.  She denied suicidal and homicidal ideation.  She denied auditory and visual hallucinations.  The patient at one point in the interview stated that \"I was just told my diabetes is cured.\"  She cannot explain why her diabetes was cured but believes it to be true.    Sleep: normal  Appetite: normal  Medication side effects: Yes - fatigue  ROS: review of systems as noted above in HPI/Subjective report, all other systems are negative    Objective :  Temp:  [96.6 °F (35.9 °C)-98 °F (36.7 °C)] 96.6 °F (35.9 °C)  HR:  [72-97] 97  BP: (137-160)/(60-66) 137/66  Resp:  [16-17] 16  SpO2:  [92 %-97 %] 97 %  O2 Device: None (Room air)    Mental Status Evaluation:    Appearance:  disheveled, marginal hygiene, dressed in hospital attire, looks stated age   Behavior:  pleasant, cooperative   Speech:  normal rate, normal volume, normal pitch   Mood:  \"Tired.\"   Affect:  normal range and intensity   Thought Process:  More organized than days prior   Thought Content:  no overt delusions   Perceptual Disturbances: no auditory hallucinations, no visual hallucinations, does not appear responding to internal stimuli   Risk Potential: Suicidal Ideation -  None at present  Homicidal Ideation -  None at present  Potential for Aggression - Not at present   Sensorium:  oriented to person, place, and time/date   Memory:  recent and remote memory grossly intact   Consciousness:  alert and awake   Attention/Concentration: attention " "span and concentration are age appropriate   Insight:  limited   Judgment: limited   Gait/Station: normal gait/station   Motor Activity: no abnormal movements         Lab Results: I have reviewed the following results:  Results from the past 24 hours:   Recent Results (from the past 24 hours)   Fingerstick Glucose (POCT)    Collection Time: 04/28/25 11:37 AM   Result Value Ref Range    POC Glucose 133 65 - 140 mg/dl   Fingerstick Glucose (POCT)    Collection Time: 04/28/25  3:50 PM   Result Value Ref Range    POC Glucose 198 (H) 65 - 140 mg/dl   Fingerstick Glucose (POCT)    Collection Time: 04/28/25  9:03 PM   Result Value Ref Range    POC Glucose 125 65 - 140 mg/dl   Fingerstick Glucose (POCT)    Collection Time: 04/29/25  7:22 AM   Result Value Ref Range    POC Glucose 83 65 - 140 mg/dl       Administrative Statements     Counseling / Coordination of Care:   Patient's progress discussed with staff in treatment team meeting.  Medication changes reviewed with staff in treatment team meeting.    Portions of the record may have been created with voice recognition software. Occasional wrong word or \"sound a like\" substitutions may have occurred due to the inherent limitations of voice recognition software. Read the chart carefully and recognize, using context, where substitutions have occurred.    Andrae Samuels, DO 04/29/25  PGY-II  "

## 2025-04-29 NOTE — NURSING NOTE
Patient is calm, visible in the milieu, compliant with meds and meals. Patient denies all psych s/s. Safety checks ongoing.

## 2025-04-29 NOTE — PROGRESS NOTES
04/29/25 0700   Team Meeting   Meeting Type Daily Rounds   Team Members Present   Team Members Present Physician;Nurse;   Physician Team Member Tiny/Dilma/Ramez   Nursing Team Member Rafaela/Carmen/Linda   Care Management Team Member Stanford   OT Team Member Alix   Patient/Family Present   Patient Present No   Patient's Family Present No     Patient SNF search began yesterday. She is hyper verbal and loud. She is compliant with her medications. She had a BM and a shower on the 28th. She attended one group yesterday. Patient discharge is pending stabilization of mood and medications.

## 2025-04-29 NOTE — PLAN OF CARE
Problem: Alteration in Thoughts and Perception  Goal: Treatment Goal: Gain control of psychotic behaviors/thinking, reduce/eliminate presenting symptoms and demonstrate improved reality functioning upon discharge  Outcome: Progressing  Goal: Verbalize thoughts and feelings  Description: Interventions:- Promote a nonjudgmental and trusting relationship with the patient through active listening and therapeutic communication- Assess patient's level of functioning, behavior and potential for risk- Engage patient in 1 on 1 interactions- Encourage patient to express fears, feelings, frustrations, and discuss symptoms  - [place] patient to reality, help patient recognize reality-based thinking - Administer medications as ordered and assess for potential side effects- Provide the patient education related to the signs and symptoms of the illness and desired effects of prescribed medications  Outcome: Progressing  Goal: Refrain from acting on delusional thinking/internal stimuli  Description: Interventions:- Monitor patient closely, per order - Utilize least restrictive measures - Set reasonable limits, give positive feedback for acceptable - Administer medications as ordered and monitor of potential side effects  Outcome: Progressing  Goal: Agree to be compliant with medication regime, as prescribed and report medication side effects  Description: Interventions:- Offer appropriate PRN medication and supervise ingestion; conduct AIMS, as needed   Outcome: Progressing     Problem: Ineffective Coping  Goal: Identifies healthy coping skills  Outcome: Progressing     Problem: Risk for Self Injury/Neglect  Goal: Treatment Goal: Remain safe during length of stay, learn and adopt new coping skills, and be free of self-injurious ideation, impulses and acts at the time of discharge  Outcome: Progressing  Goal: Verbalize thoughts and feelings  Description: Interventions:- Assess and re-assess patient's lethality and potential for  self-injury- Engage patient in 1:1 interactions, daily, for a minimum of 15 minutes- Encourage patient to express feelings, fears, frustrations, hopes- Establish rapport/trust with patient   Outcome: Progressing  Goal: Refrain from harming self  Description: Interventions:- Monitor patient closely, per order- Develop a trusting relationship- Supervise medication ingestion, monitor effects and side effects   Outcome: Progressing     Problem: Depression  Goal: Treatment Goal: Demonstrate behavioral control of depressive symptoms, verbalize feelings of improved mood/affect, and adopt new coping skills prior to discharge  Outcome: Progressing  Goal: Refrain from harming self  Description: Interventions:- Monitor patient closely, per order - Supervise medication ingestion, monitor effects and side effects   Outcome: Progressing  Goal: Refrain from isolation  Description: Interventions:- Develop a trusting relationship - Encourage socialization   Outcome: Progressing     Problem: Anxiety  Goal: Anxiety is at manageable level  Description: Interventions:- Assess and monitor patient's anxiety level. - Monitor for signs and symptoms (heart palpitations, chest pain, shortness of breath, headaches, nausea, feeling jumpy, restlessness, irritable, apprehensive). - Collaborate with interdisciplinary team and initiate plan and interventions as ordered.- [place] patient to unit/surroundings- Explain treatment plan- Encourage participation in care- Encourage verbalization of concerns/fears- Identify coping mechanisms- Assist in developing anxiety-reducing skills- Administer/offer alternative therapies- Limit or eliminate stimulants  Outcome: Progressing

## 2025-04-29 NOTE — TREATMENT TEAM
Pt attends groups.  Pt pleasant and cooperative  Pt  displayed positive thinking pattens.   Sleepy at end of group.  Pt was happy about her change in recent diet.     04/29/25 1100   Activity/Group Checklist   Group Other (Comment)  ([facility]estyles [facility])   Attendance Attended   Attendance Duration (min) 46-60   Interactions Other (Comment)  (sleepy at end)   Affect/Mood Calm   Goals Achieved Identified feelings;Identified relapse prevention strategies;Discussed coping strategies;Discussed self-esteem issues;Able to listen to others;Able to engage in interactions

## 2025-04-30 RX ORDER — AMMONIUM LACTATE 12 G/100G
LOTION TOPICAL 2 TIMES DAILY
Status: DISCONTINUED | OUTPATIENT
Start: 2025-04-30 | End: 2025-05-05 | Stop reason: HOSPADM

## 2025-04-30 RX ADMIN — APIXABAN 5 MG: 5 TABLET, FILM COATED ORAL at 17:40

## 2025-04-30 RX ADMIN — MUPIROCIN: 20 OINTMENT TOPICAL at 08:46

## 2025-04-30 RX ADMIN — Medication: at 15:48

## 2025-04-30 RX ADMIN — LEVOTHYROXINE SODIUM 88 MCG: 88 TABLET ORAL at 05:31

## 2025-04-30 RX ADMIN — APIXABAN 5 MG: 5 TABLET, FILM COATED ORAL at 08:45

## 2025-04-30 RX ADMIN — TRAZODONE HYDROCHLORIDE 100 MG: 100 TABLET ORAL at 21:24

## 2025-04-30 RX ADMIN — CLONAZEPAM 0.25 MG: 0.5 TABLET ORAL at 17:39

## 2025-04-30 RX ADMIN — CLONAZEPAM 0.25 MG: 0.5 TABLET ORAL at 08:46

## 2025-04-30 RX ADMIN — OLANZAPINE 10 MG: 10 TABLET, FILM COATED ORAL at 08:45

## 2025-04-30 RX ADMIN — PRAVASTATIN SODIUM 40 MG: 40 TABLET ORAL at 08:45

## 2025-04-30 RX ADMIN — OLANZAPINE 10 MG: 10 TABLET, FILM COATED ORAL at 17:40

## 2025-04-30 RX ADMIN — NYSTATIN: 100000 POWDER TOPICAL at 17:42

## 2025-04-30 RX ADMIN — NYSTATIN: 100000 POWDER TOPICAL at 08:46

## 2025-04-30 RX ADMIN — DIVALPROEX SODIUM 500 MG: 500 TABLET, DELAYED RELEASE ORAL at 21:23

## 2025-04-30 RX ADMIN — LISINOPRIL 2.5 MG: 5 TABLET ORAL at 08:45

## 2025-04-30 RX ADMIN — DIVALPROEX SODIUM 500 MG: 500 TABLET, DELAYED RELEASE ORAL at 08:46

## 2025-04-30 NOTE — NURSING NOTE
Patient is bright and visible this shift. Social with peers and able to make needs known. Denies all psych s/s. Medication compliant and cooperative with care. Safety precautions maintained.

## 2025-04-30 NOTE — ASSESSMENT & PLAN NOTE
As of 04/30 the patient remains hyperverbal but is less hyperactive and less tangential. She continues to report fatigue from Depakote.  Placement to SNF continues to be a work in progress.    Continue Zyprexa 10 mg BID for current symptoms of carlie (increased on 4/18/25)  Continue Depakote 500 mg twice daily for mood stabilization  VPA level on 4/24 was 90, CMP and CBC is acceptable  Continue Trazodone to 100mg PO QHS for insomnia (started on 4/13/2025)  Continue Klonopin to 0.25mg PO BID for manic behaviors, can utilize until Zyprexa is at a therapeutic dose (started on 4/13/2025)

## 2025-04-30 NOTE — TREATMENT TEAM
04/30/25 1100   Activity/Group Checklist   Group Self Esteem   Attendance Attended   Attendance Duration (min) 31-45   Interactions Interacted appropriately   Affect/Mood Appropriate   Goals Achieved Identified feelings;Discussed coping strategies;Identified distorted thoughts/beliefs;Identified resources and support systems;Increased hopefulness;Able to engage in interactions;Able to listen to others

## 2025-04-30 NOTE — NURSING NOTE
Patient denies all psych s/s. Patient is calm, visible in the milieu, calm, social, and pleasant. Patient is medication compliant, remains hyper verbal, and bright. Safety checks ongoing.

## 2025-04-30 NOTE — PLAN OF CARE
Problem: Alteration in Thoughts and Perception  Goal: Treatment Goal: Gain control of psychotic behaviors/thinking, reduce/eliminate presenting symptoms and demonstrate improved reality functioning upon discharge  Outcome: Progressing  Goal: Verbalize thoughts and feelings  Description: Interventions:- Promote a nonjudgmental and trusting relationship with the patient through active listening and therapeutic communication- Assess patient's level of functioning, behavior and potential for risk- Engage patient in 1 on 1 interactions- Encourage patient to express fears, feelings, frustrations, and discuss symptoms  - [place] patient to reality, help patient recognize reality-based thinking - Administer medications as ordered and assess for potential side effects- Provide the patient education related to the signs and symptoms of the illness and desired effects of prescribed medications  Outcome: Progressing  Goal: Refrain from acting on delusional thinking/internal stimuli  Description: Interventions:- Monitor patient closely, per order - Utilize least restrictive measures - Set reasonable limits, give positive feedback for acceptable - Administer medications as ordered and monitor of potential side effects  Outcome: Progressing  Goal: Agree to be compliant with medication regime, as prescribed and report medication side effects  Description: Interventions:- Offer appropriate PRN medication and supervise ingestion; conduct AIMS, as needed   Outcome: Progressing     Problem: Ineffective Coping  Goal: Identifies healthy coping skills  Outcome: Progressing     Problem: Risk for Self Injury/Neglect  Goal: Treatment Goal: Remain safe during length of stay, learn and adopt new coping skills, and be free of self-injurious ideation, impulses and acts at the time of discharge  Outcome: Progressing  Goal: Verbalize thoughts and feelings  Description: Interventions:- Assess and re-assess patient's lethality and potential for  self-injury- Engage patient in 1:1 interactions, daily, for a minimum of 15 minutes- Encourage patient to express feelings, fears, frustrations, hopes- Establish rapport/trust with patient   Outcome: Progressing  Goal: Refrain from harming self  Description: Interventions:- Monitor patient closely, per order- Develop a trusting relationship- Supervise medication ingestion, monitor effects and side effects   Outcome: Progressing     Problem: Depression  Goal: Treatment Goal: Demonstrate behavioral control of depressive symptoms, verbalize feelings of improved mood/affect, and adopt new coping skills prior to discharge  Outcome: Progressing  Goal: Refrain from isolation  Description: Interventions:- Develop a trusting relationship - Encourage socialization   Outcome: Progressing     Problem: Anxiety  Goal: Anxiety is at manageable level  Description: Interventions:- Assess and monitor patient's anxiety level. - Monitor for signs and symptoms (heart palpitations, chest pain, shortness of breath, headaches, nausea, feeling jumpy, restlessness, irritable, apprehensive). - Collaborate with interdisciplinary team and initiate plan and interventions as ordered.- [place] patient to unit/surroundings- Explain treatment plan- Encourage participation in care- Encourage verbalization of concerns/fears- Identify coping mechanisms- Assist in developing anxiety-reducing skills- Administer/offer alternative therapies- Limit or eliminate stimulants  Outcome: Progressing     Problem: Risk for Violence/Aggression Toward Others  Goal: Control angry outbursts  Description: Interventions:- Monitor patient closely, per order- Ensure early verbal de-escalation- Monitor prn medication needs- Set reasonable/therapeutic limits, outline behavioral expectations, and consequences - Provide a non-threatening milieu, utilizing the least restrictive interventions   Outcome: Progressing

## 2025-04-30 NOTE — PROGRESS NOTES
04/30/25 0800   Team Meeting   Meeting Type Daily Rounds   Team Members Present   Team Members Present Other (Discipline and Name);Occupational Therapist;;Nurse;Physician   Physician Team Member Ramez/Tiny/Timmy   Nursing Team Member Rafaela/Carmen/Linda   Care Management Team Member Stanford   OT Team Member Alix   Other (Discipline and Name) [facility] - Pharmacy   Patient/Family Present   Patient Present No   Patient's Family Present No     Patient is compliant with medications but is still hyper-verbal and elated at times. She is attending groups and she is sleeping and eating well. Her accu-checks were discontinued. Patient had BM and shower on the 29th.Patient discharge is pending stabilization of mood and medications.

## 2025-04-30 NOTE — PROGRESS NOTES
Progress Note - Behavioral Health   Name: Lisa Bocanegraorry 69 y.o. female I MRN: [id number]  Unit/Bed#: [facility] 609-02 I Date of Admission: 4/8/2025   Date of Service: 4/30/2025 I Hospital Day: 22    Assessment & Plan  Bipolar affective disorder, current episode manic (HCC)  As of 04/30 the patient remains hyperverbal but is less hyperactive and less tangential. She continues to report fatigue from Depakote.  Placement to SNF continues to be a work in progress.    Continue Zyprexa 10 mg BID for current symptoms of carlie (increased on 4/18/25)  Continue Depakote 500 mg twice daily for mood stabilization  VPA level on 4/24 was 90, CMP and CBC is acceptable  Continue Trazodone to 100mg PO QHS for insomnia (started on 4/13/2025)  Continue Klonopin to 0.25mg PO BID for manic behaviors, can utilize until Zyprexa is at a therapeutic dose (started on 4/13/2025)    Current Medications:    Current Facility-Administered Medications:     apixaban (ELIQUIS) tablet 5 mg, Oral, BID    clonazePAM (KlonoPIN) tablet 0.25 mg, Oral, BID    divalproex sodium (DEPAKOTE) DR tablet 500 mg, Oral, BID    levothyroxine tablet 88 mcg, Oral, Early Morning    lisinopril (ZESTRIL) tablet 2.5 mg, Oral, Daily    mupirocin (BACTROBAN) 2 % ointment, Topical, Daily    nystatin (MYCOSTATIN) powder, Topical, BID    OLANZapine (ZyPREXA) tablet 10 mg, Oral, BID    pravastatin (PRAVACHOL) tablet 40 mg, Oral, Daily    traZODone (DESYREL) tablet 100 mg, Oral, HS    Current Facility-Administered Medications:     acetaminophen (TYLENOL) tablet 650 mg, Oral, Q4H PRN    acetaminophen (TYLENOL) tablet 650 mg, Oral, Q4H PRN    acetaminophen (TYLENOL) tablet 975 mg, Oral, Q6H PRN    aluminum-magnesium hydroxide-simethicone (MAALOX) oral suspension 30 mL, Oral, Q4H PRN    benztropine (COGENTIN) injection 1 mg, Intramuscular, Q4H PRN Max 6/day    benztropine (COGENTIN) tablet 0.5 mg, Oral, Q4H PRN Max 6/day    bisacodyl (DULCOLAX) rectal suppository 10 mg, Rectal,  Daily PRN    hydrOXYzine HCL (ATARAX) tablet 25 mg, Oral, Q6H PRN Max 4/day    hydrOXYzine HCL (ATARAX) tablet 50 mg, Oral, Q6H PRN Max 4/day    LORazepam (ATIVAN) injection 1 mg, Intramuscular, Q6H PRN Max 3/day    LORazepam (ATIVAN) tablet 1 mg, Oral, Q6H PRN Max 3/day    OLANZapine (ZyPREXA) IM injection 5 mg, Intramuscular, Q3H PRN Max 3/day    OLANZapine (ZyPREXA) tablet 2.5 mg, Oral, Q4H PRN Max 6/day    OLANZapine (ZyPREXA) tablet 5 mg, Oral, Q4H PRN Max 3/day    OLANZapine (ZyPREXA) tablet 5 mg, Oral, Q3H PRN Max 3/day    polyethylene glycol (MIRALAX) packet 17 g, Oral, Daily PRN    propranolol (INDERAL) tablet 5 mg, Oral, Q8H PRN    senna-docusate sodium (SENOKOT S) 8.6-50 mg per tablet 1 tablet, Oral, Daily PRN    traZODone (DESYREL) tablet 50 mg, Oral, HS PRN    Risks/Benefits of Treatment:     Risks, benefits, and possible side effects of medications explained to patient and patient verbalizes understanding and agreement for treatment.    Progress Toward Goals: improving    Treatment Planning:      - Encourage early mobility and having a structured day  - Provide frequent re-orientation, and cognitive stimulation  - Ensure assistive devices are in proper working order (eye-glasses, hearing aids)  - Encourage adequate hydration, nutrition and monitor bowel movements  - Maintain sleep-wake cycle: Uninterrupted sleep time; low-level lighting at night  - Fall precaution  - Follow up with [facility] regarding the medical problems   - Continue medication titration and treatment plan; adjust medication to optimize treatment response and as clinically indicated.   - Observation: Routine  - VS: as per unit protocol  - Encourage group attendance and milieu therapy  - Dispo: To be determined  - Estimated Discharge Day: 5/5/2025   - Legal Status : Voluntary 201 commitment.  - Long Stay Certification : Not Applicable    Subjective     Patient was visited on unit for continuing care; chart reviewed and discussed with  "multidisciplinary treatment team.     Patient continues to be visible in the milieu and interacts with staff and peers. No reports of aggression or self-injurious behavior on unit. No PRN medications used in the past 24 hours.    Patient accepted all offered medications and no adverse effects of medications noted or reported.    Patient today reported her mood is \"good.\"  Patient was slightly anxious due to peers yelling at her earlier in the day but was otherwise doing well.  She endorsed adequate sleep and appetite.  She denied auditory and visual hallucinations.  She denied suicidal and homicidal ideation.  She denied medication side effects.  She is looking forward to going to a SNF.    Sleep: normal  Appetite: normal  Medication side effects: No  ROS: review of systems as noted above in HPI/Subjective report, all other systems are negative    Objective :  Temp:  [97.1 °F (36.2 °C)-98 °F (36.7 °C)] 97.1 °F (36.2 °C)  HR:  [72-83] 72  BP: (101-131)/(51-70) 131/70  Resp:  [16-18] 16  SpO2:  [94 %-98 %] 96 %  O2 Device: None (Room air)    Mental Status Evaluation:    Appearance:  casually dressed, adequate grooming, dressed appropriately, improved grooming   Behavior:  pleasant, cooperative   Speech:  normal rate, normal volume, normal pitch   Mood:  \"Good\"   Affect:  normal range and intensity   Thought Process:  organized, goal directed   Thought Content:  no overt delusions   Perceptual Disturbances: no auditory hallucinations, no visual hallucinations, does not appear responding to internal stimuli   Risk Potential: Suicidal Ideation -  None at present  Homicidal Ideation -  None at present  Potential for Aggression - Not at present   Sensorium:  oriented to person, place, and time/date   Memory:  recent and remote memory grossly intact   Consciousness:  alert and awake   Attention/Concentration: attention span and concentration are age appropriate   Insight:  improving   Judgment: improving   Gait/Station: " "normal gait/station   Motor Activity: no abnormal movements         Lab Results: I have reviewed the following results:  Results from the past 24 hours: No results found for this or any previous visit (from the past 24 hours).    Administrative Statements     Counseling / Coordination of Care:   Patient's progress discussed with staff in treatment team meeting.  Medication changes reviewed with staff in treatment team meeting.    Portions of the record may have been created with voice recognition software. Occasional wrong word or \"sound a like\" substitutions may have occurred due to the inherent limitations of voice recognition software. Read the chart carefully and recognize, using context, where substitutions have occurred.    Andrae Samuels DO 04/30/25  PGY-II  "

## 2025-05-01 RX ORDER — CLONAZEPAM 0.5 MG/1
0.25 TABLET ORAL EVERY EVENING
Status: DISCONTINUED | OUTPATIENT
Start: 2025-05-01 | End: 2025-05-05 | Stop reason: HOSPADM

## 2025-05-01 RX ORDER — OLANZAPINE 10 MG/1
20 TABLET, FILM COATED ORAL EVERY EVENING
Status: DISCONTINUED | OUTPATIENT
Start: 2025-05-02 | End: 2025-05-05 | Stop reason: HOSPADM

## 2025-05-01 RX ORDER — OLANZAPINE 10 MG/1
10 TABLET, FILM COATED ORAL 2 TIMES DAILY
Status: COMPLETED | OUTPATIENT
Start: 2025-05-01 | End: 2025-05-01

## 2025-05-01 RX ADMIN — OLANZAPINE 10 MG: 10 TABLET, FILM COATED ORAL at 17:54

## 2025-05-01 RX ADMIN — DIVALPROEX SODIUM 500 MG: 500 TABLET, DELAYED RELEASE ORAL at 21:40

## 2025-05-01 RX ADMIN — MUPIROCIN: 20 OINTMENT TOPICAL at 08:31

## 2025-05-01 RX ADMIN — CLONAZEPAM 0.25 MG: 0.5 TABLET ORAL at 08:30

## 2025-05-01 RX ADMIN — APIXABAN 5 MG: 5 TABLET, FILM COATED ORAL at 08:30

## 2025-05-01 RX ADMIN — PRAVASTATIN SODIUM 40 MG: 40 TABLET ORAL at 08:30

## 2025-05-01 RX ADMIN — CLONAZEPAM 0.25 MG: 0.5 TABLET ORAL at 17:54

## 2025-05-01 RX ADMIN — DIVALPROEX SODIUM 500 MG: 500 TABLET, DELAYED RELEASE ORAL at 08:30

## 2025-05-01 RX ADMIN — Medication: at 08:31

## 2025-05-01 RX ADMIN — TRAZODONE HYDROCHLORIDE 100 MG: 100 TABLET ORAL at 21:40

## 2025-05-01 RX ADMIN — APIXABAN 5 MG: 5 TABLET, FILM COATED ORAL at 17:55

## 2025-05-01 RX ADMIN — NYSTATIN: 100000 POWDER TOPICAL at 08:31

## 2025-05-01 RX ADMIN — LEVOTHYROXINE SODIUM 88 MCG: 88 TABLET ORAL at 05:40

## 2025-05-01 RX ADMIN — OLANZAPINE 10 MG: 10 TABLET, FILM COATED ORAL at 08:30

## 2025-05-01 NOTE — CASE MANAGEMENT
"CM reached out to patient's cousin to speak with him. CM has received several calls from patient's brother and patient has limited CM in the information to be spoken about only to keep the conversation to basics and did not want CM talking to the brother today. She does not want brother to know where she is or where she is going. RAMEZ spoke with cousin about the condition of their home and her status. RAMEZ explained the SNF search and [facility]idin referral. He states that patient has made her brother out to \"be the devil\" but he is not. Her ankle and tongue were from when they were [age] years old. He would like them to sell their home and move to other places and he will help to move [place]. RAMEZ is going to make a report to Aging regarding the state of the home. RAMEZ and Russ came to the agreement with the new information that it would be the best way forward to make sure both patient and brother no longer lived in that home. He states that there are sections of the home that they avoid to \"not fall through the floor\" and that two of the bathrooms do not work and that its filthy. RAMEZ to complete an aging report and give Johns information as contact.     Russ Horton (Cousin)  [phone]     RAMEZ reached out to the  Department of Aging to report living conditions of the home. RAMEZ spoke to Lester and he stated that they wouldn't do much for them and can't do much and can't force them to \"not live like that.\" He suggested CM call [facility] as it would most likely be something they would address.      Dept of Aging   [phone]    Ramez spoke with [facility] and they told CM they would need to speak with the [facility] that they live in to report to the housing office that CM needs to report a home for being uninhabitable.     Department of Health   [phone]    CM called and left a VM for Housing .   [facility]   Housing Office   [phone]     RAMEZ received call back from Rafaela at [facility] " "Office and she stated that the water is currently shut off, so by law, no one should be living there. She asked CM to fill out \"request for action worksheet\" to send her. She asked that CM be as descriptive as possible and once received, she can send someone out to assess the situation. CM completed and emailed form. Cm will await response.     [email or web address] Office   [phone]           "

## 2025-05-01 NOTE — NURSING NOTE
Patient was visible in the milieu sleeping on the couch and stated that she is tired from the medications. Denies all psych s/s. No behaviors noted. No c/o pain. No needs expressed. Took her HS medications. Safety checks ongoing.

## 2025-05-01 NOTE — PROGRESS NOTES
Progress Note - Behavioral Health   Name: Lisa ZARATE McGorry 69 y.o. female I MRN: [id number]  Unit/Bed#: [facility] 609-02 I Date of Admission: 4/8/2025   Date of Service: 5/1/2025 I Hospital Day: 23    Assessment & Plan  Bipolar affective disorder, current episode manic (HCC)  As of 05/01 the patient is denying psychiatric symptoms but is feeling fatigued.    Change times of Zyprexa from 10 mg BID to 20 mg nightly for current symptoms of carlie (starting on 05/02/25)  Continue Depakote 500 mg twice daily for mood stabilization  VPA level on 4/24 was 90, CMP and CBC is acceptable  Continue Trazodone to 100mg PO QHS for insomnia (started on 4/13/2025)  Continue Klonopin to 0.25mg PO BID for manic behaviors, can utilize until Zyprexa is at a therapeutic dose (started on 4/13/2025)    Current Medications:    Current Facility-Administered Medications:     ammonium lactate (LAC-HYDRIN) 12 % lotion, Topical, BID    apixaban (ELIQUIS) tablet 5 mg, Oral, BID    clonazePAM (KlonoPIN) tablet 0.25 mg, Oral, BID    divalproex sodium (DEPAKOTE) DR tablet 500 mg, Oral, BID    levothyroxine tablet 88 mcg, Oral, Early Morning    lisinopril (ZESTRIL) tablet 2.5 mg, Oral, Daily    mupirocin (BACTROBAN) 2 % ointment, Topical, Daily    nystatin (MYCOSTATIN) powder, Topical, BID    OLANZapine (ZyPREXA) tablet 10 mg, Oral, BID    pravastatin (PRAVACHOL) tablet 40 mg, Oral, Daily    traZODone (DESYREL) tablet 100 mg, Oral, HS    Current Facility-Administered Medications:     acetaminophen (TYLENOL) tablet 650 mg, Oral, Q4H PRN    acetaminophen (TYLENOL) tablet 650 mg, Oral, Q4H PRN    acetaminophen (TYLENOL) tablet 975 mg, Oral, Q6H PRN    aluminum-magnesium hydroxide-simethicone (MAALOX) oral suspension 30 mL, Oral, Q4H PRN    benztropine (COGENTIN) injection 1 mg, Intramuscular, Q4H PRN Max 6/day    benztropine (COGENTIN) tablet 0.5 mg, Oral, Q4H PRN Max 6/day    bisacodyl (DULCOLAX) rectal suppository 10 mg, Rectal, Daily PRN    hydrOXYzine  HCL (ATARAX) tablet 25 mg, Oral, Q6H PRN Max 4/day    hydrOXYzine HCL (ATARAX) tablet 50 mg, Oral, Q6H PRN Max 4/day    LORazepam (ATIVAN) injection 1 mg, Intramuscular, Q6H PRN Max 3/day    LORazepam (ATIVAN) tablet 1 mg, Oral, Q6H PRN Max 3/day    OLANZapine (ZyPREXA) IM injection 5 mg, Intramuscular, Q3H PRN Max 3/day    OLANZapine (ZyPREXA) tablet 2.5 mg, Oral, Q4H PRN Max 6/day    OLANZapine (ZyPREXA) tablet 5 mg, Oral, Q4H PRN Max 3/day    OLANZapine (ZyPREXA) tablet 5 mg, Oral, Q3H PRN Max 3/day    polyethylene glycol (MIRALAX) packet 17 g, Oral, Daily PRN    propranolol (INDERAL) tablet 5 mg, Oral, Q8H PRN    senna-docusate sodium (SENOKOT S) 8.6-50 mg per tablet 1 tablet, Oral, Daily PRN    traZODone (DESYREL) tablet 50 mg, Oral, HS PRN    Risks/Benefits of Treatment:     Risks, benefits, and possible side effects of medications explained to patient and patient verbalizes understanding and agreement for treatment.    Progress Toward Goals: improving    Treatment Planning:      - Encourage early mobility and having a structured day  - Provide frequent re-orientation, and cognitive stimulation  - Ensure assistive devices are in proper working order (eye-glasses, hearing aids)  - Encourage adequate hydration, nutrition and monitor bowel movements  - Maintain sleep-wake cycle: Uninterrupted sleep time; low-level lighting at night  - Fall precaution  - Follow up with [facility] regarding the medical problems   - Continue medication titration and treatment plan; adjust medication to optimize treatment response and as clinically indicated.   - Observation: Routine  - VS: as per unit protocol  - Encourage group attendance and milieu therapy  - Dispo: To be determined  - Estimated Discharge Day: 5/5/2025   - Legal Status : Voluntary 201 commitment.  - Long Stay Certification : Not Applicable    Subjective     Patient was visited on unit for continuing care; chart reviewed and discussed with multidisciplinary treatment  "team.     Patient continues to be visible in the milieu and interacts with staff and peers. No reports of aggression or self-injurious behavior on unit. No PRN medications used in the past 24 hours.    Per nursing, patient was more withdrawn to room after a peer yelled at her.     Patient accepted all offered medications and no adverse effects of medications noted or reported.    Patient today endorsed her mood was \"good.\"  However, she did state that she is feeling quite fatigued today.  She endorsed adequate sleep but continues to need daytime naps.  She endorsed adequate appetite.  She denied other medication side effects.  She denied suicidal and homicidal ideation.  She denied auditory and visual hallucinations.    Sleep: normal  Appetite: normal  Medication side effects: Yes - fatigue  ROS: review of systems as noted above in HPI/Subjective report, all other systems are negative    Objective :  Temp:  [97 °F (36.1 °C)-98.9 °F (37.2 °C)] 98.9 °F (37.2 °C)  HR:  [69-87] 82  BP: ()/(54-63) 117/57  Resp:  [17-18] 18  SpO2:  [92 %-96 %] 95 %  O2 Device: None (Room air)    Mental Status Evaluation:    Appearance:  disheveled, marginal hygiene, wearing hospital clothes, looks stated age   Behavior:  pleasant, cooperative, calm   Speech:  normal rate, normal volume, normal pitch   Mood:  \"Good.\"   Affect:  normal range and intensity   Thought Process:  organized, goal directed   Thought Content:  no overt delusions   Perceptual Disturbances: no auditory hallucinations, no visual hallucinations, does not appear responding to internal stimuli   Risk Potential: Suicidal Ideation -  None at present  Homicidal Ideation -  None at present  Potential for Aggression - Not at present   Sensorium:  oriented to person, place, and time/date   Memory:  recent and remote memory grossly intact   Consciousness:  sedated   Attention/Concentration: attention span and concentration are age appropriate   Insight:  improving " "  Judgment: improving   Gait/Station: normal gait/station   Motor Activity: no abnormal movements               Lab Results: I have reviewed the following results:  Results from the past 24 hours: No results found for this or any previous visit (from the past 24 hours).    Administrative Statements     Counseling / Coordination of Care:   Patient's progress discussed with staff in treatment team meeting.  Medication changes reviewed with staff in treatment team meeting.    Portions of the record may have been created with voice recognition software. Occasional wrong word or \"sound a like\" substitutions may have occurred due to the inherent limitations of voice recognition software. Read the chart carefully and recognize, using context, where substitutions have occurred.    Andrae Samuels DO 05/01/25  PGY-II  "

## 2025-05-01 NOTE — NURSING NOTE
Pt condition unchanged since AM assessment. Continues to deny SI/HI/AVH, denies anxiety or depression, support provided.

## 2025-05-01 NOTE — PROGRESS NOTES
05/01/25 0900   Team Meeting   Meeting Type Daily Rounds   Team Members Present   Team Members Present Physician;Nurse;   Physician Team Member Tiny/Dilma/Ramez   Nursing Team Member Rafaela/Carmen/Linda   Care Management Team Member Stanford   OT Team Member Alix   Patient/Family Present   Patient Present No   Patient's Family Present No     Patient is less hyper-verbal. She is attending groups and is taking her medications. She had a BM on the 29th and a shower on the 30th. She is eating and sleeping well. Patient discharge is pending stabilization of mood and medications.

## 2025-05-01 NOTE — NURSING NOTE
Patient pleasant, calm and cooperative, able to make needs known. Pt reports fatigue, informed pt that her meds are changing and she should experience less sleepiness. Is visible on the module, social with peers, attends groups. Denies SI/HI/AVH, anxiety or depression. Medication compliant. Will continue to monitor.

## 2025-05-01 NOTE — PLAN OF CARE
Problem: SAFETY ADULT  Goal: Patient will remain free of falls  Description: INTERVENTIONS:- Educate patient/family on patient safety including physical limitations  Outcome: Progressing     Problem: Knowledge Deficit  Goal: Patient/family/caregiver demonstrates understanding of disease process, treatment plan, medications, and discharge instructions  Description: Complete learning assessment and assess knowledge base.Interventions:- Provide teaching at level of understanding- Provide teaching via preferred learning methods  Outcome: Progressing     Problem: Alteration in Thoughts and Perception  Goal: Treatment Goal: Gain control of psychotic behaviors/thinking, reduce/eliminate presenting symptoms and demonstrate improved reality functioning upon discharge  Outcome: Progressing  Goal: Verbalize thoughts and feelings  Description: Interventions:- Promote a nonjudgmental and trusting relationship with the patient through active listening and therapeutic communication- Assess patient's level of functioning, behavior and potential for risk- Engage patient in 1 on 1 interactions- Encourage patient to express fears, feelings, frustrations, and discuss symptoms  - [place] patient to reality, help patient recognize reality-based thinking - Administer medications as ordered and assess for potential side effects- Provide the patient education related to the signs and symptoms of the illness and desired effects of prescribed medications  Outcome: Progressing  Goal: Refrain from acting on delusional thinking/internal stimuli  Description: Interventions:- Monitor patient closely, per order - Utilize least restrictive measures - Set reasonable limits, give positive feedback for acceptable - Administer medications as ordered and monitor of potential side effects  Outcome: Progressing  Goal: Agree to be compliant with medication regime, as prescribed and report medication side effects  Description: Interventions:- Offer  appropriate PRN medication and supervise ingestion; conduct AIMS, as needed   Outcome: Progressing  Goal: Recognize dysfunctional thoughts, communicate reality-based thoughts at the time of discharge  Description: Interventions:- Provide medication and psycho-education to assist patient in compliance and developing insight into his/her illness   Outcome: Progressing     Problem: Risk for Self Injury/Neglect  Goal: Treatment Goal: Remain safe during length of stay, learn and adopt new coping skills, and be free of self-injurious ideation, impulses and acts at the time of discharge  Outcome: Progressing  Goal: Verbalize thoughts and feelings  Description: Interventions:- Assess and re-assess patient's lethality and potential for self-injury- Engage patient in 1:1 interactions, daily, for a minimum of 15 minutes- Encourage patient to express feelings, fears, frustrations, hopes- Establish rapport/trust with patient   Outcome: Progressing  Goal: Refrain from harming self  Description: Interventions:- Monitor patient closely, per order- Develop a trusting relationship- Supervise medication ingestion, monitor effects and side effects   Outcome: Progressing     Problem: Depression  Goal: Treatment Goal: Demonstrate behavioral control of depressive symptoms, verbalize feelings of improved mood/affect, and adopt new coping skills prior to discharge  Outcome: Progressing  Goal: Refrain from isolation  Description: Interventions:- Develop a trusting relationship - Encourage socialization   Outcome: Progressing  Goal: Refrain from self-neglect  Outcome: Progressing     Problem: Anxiety  Goal: Anxiety is at manageable level  Description: Interventions:- Assess and monitor patient's anxiety level. - Monitor for signs and symptoms (heart palpitations, chest pain, shortness of breath, headaches, nausea, feeling jumpy, restlessness, irritable, apprehensive). - Collaborate with interdisciplinary team and initiate plan and  interventions as ordered.- [place] patient to unit/surroundings- Explain treatment plan- Encourage participation in care- Encourage verbalization of concerns/fears- Identify coping mechanisms- Assist in developing anxiety-reducing skills- Administer/offer alternative therapies- Limit or eliminate stimulants  Outcome: Progressing

## 2025-05-01 NOTE — ASSESSMENT & PLAN NOTE
As of 05/01 the patient is denying psychiatric symptoms but is feeling fatigued.    Change times of Zyprexa from 10 mg BID to 20 mg nightly for current symptoms of carlie (starting on 05/02/25)  Continue Depakote 500 mg twice daily for mood stabilization  VPA level on 4/24 was 90, CMP and CBC is acceptable  Continue Trazodone to 100mg PO QHS for insomnia (started on 4/13/2025)  Continue Klonopin to 0.25mg PO BID for manic behaviors, can utilize until Zyprexa is at a therapeutic dose (started on 4/13/2025)

## 2025-05-02 LAB
ALBUMIN SERPL BCG-MCNC: 3.5 G/DL (ref 3.5–5)
ALP SERPL-CCNC: 76 U/L (ref 34–104)
ALT SERPL W P-5'-P-CCNC: 10 U/L (ref 7–52)
ANION GAP SERPL CALCULATED.3IONS-SCNC: 8 MMOL/L (ref 4–13)
AST SERPL W P-5'-P-CCNC: 13 U/L (ref 13–39)
BILIRUB SERPL-MCNC: 0.39 MG/DL (ref 0.2–1)
BUN SERPL-MCNC: 24 MG/DL (ref 5–25)
CALCIUM SERPL-MCNC: 9.1 MG/DL (ref 8.4–10.2)
CHLORIDE SERPL-SCNC: 104 MMOL/L (ref 96–108)
CO2 SERPL-SCNC: 28 MMOL/L (ref 21–32)
CREAT SERPL-MCNC: 1.08 MG/DL (ref 0.6–1.3)
GFR SERPL CREATININE-BSD FRML MDRD: 52 ML/MIN/1.73SQ M
GLUCOSE SERPL-MCNC: 208 MG/DL (ref 65–140)
POTASSIUM SERPL-SCNC: 4.2 MMOL/L (ref 3.5–5.3)
PROT SERPL-MCNC: 6 G/DL (ref 6.4–8.4)
SODIUM SERPL-SCNC: 140 MMOL/L (ref 135–147)
VALPROATE SERPL-MCNC: 74 UG/ML (ref 50–125)

## 2025-05-02 PROCEDURE — 80053 COMPREHEN METABOLIC PANEL: CPT | Performed by: PSYCHIATRY & NEUROLOGY

## 2025-05-02 PROCEDURE — 97167 OT EVAL HIGH COMPLEX 60 MIN: CPT

## 2025-05-02 PROCEDURE — 97163 PT EVAL HIGH COMPLEX 45 MIN: CPT

## 2025-05-02 PROCEDURE — 80164 ASSAY DIPROPYLACETIC ACD TOT: CPT | Performed by: PSYCHIATRY & NEUROLOGY

## 2025-05-02 RX ADMIN — CLONAZEPAM 0.25 MG: 0.5 TABLET ORAL at 17:54

## 2025-05-02 RX ADMIN — APIXABAN 5 MG: 5 TABLET, FILM COATED ORAL at 17:54

## 2025-05-02 RX ADMIN — TRAZODONE HYDROCHLORIDE 100 MG: 100 TABLET ORAL at 21:52

## 2025-05-02 RX ADMIN — PRAVASTATIN SODIUM 40 MG: 40 TABLET ORAL at 08:29

## 2025-05-02 RX ADMIN — APIXABAN 5 MG: 5 TABLET, FILM COATED ORAL at 08:29

## 2025-05-02 RX ADMIN — Medication 1 APPLICATION: at 18:09

## 2025-05-02 RX ADMIN — OLANZAPINE 20 MG: 10 TABLET, FILM COATED ORAL at 17:54

## 2025-05-02 RX ADMIN — DIVALPROEX SODIUM 500 MG: 500 TABLET, DELAYED RELEASE ORAL at 08:29

## 2025-05-02 RX ADMIN — NYSTATIN 1 APPLICATION: 100000 POWDER TOPICAL at 18:09

## 2025-05-02 RX ADMIN — LEVOTHYROXINE SODIUM 88 MCG: 88 TABLET ORAL at 05:40

## 2025-05-02 RX ADMIN — MUPIROCIN 1 APPLICATION: 20 OINTMENT TOPICAL at 18:11

## 2025-05-02 RX ADMIN — LISINOPRIL 2.5 MG: 5 TABLET ORAL at 08:29

## 2025-05-02 RX ADMIN — DIVALPROEX SODIUM 500 MG: 500 TABLET, DELAYED RELEASE ORAL at 21:52

## 2025-05-02 NOTE — PROGRESS NOTES
Pt attends groups.  Pt pleasant and cooperative  Pt  displayed positive thinking pattens.   Pt stated the medications were making her sleepy.      05/02/25 1100   Activity/Group Checklist   Group Other (Comment)  ( Recovery: 10 Principles of Recovery, [facility])   Attendance Attended   Attendance Duration (min) 31-45   Interactions Interacted appropriately  (sleepy at times)   Affect/Mood Appropriate;Calm   Goals Achieved Identified triggers;Identified feelings;Identified relapse prevention strategies;Discussed coping strategies;Discussed self-esteem issues;Able to listen to others;Able to engage in interactions;Identified distorted thoughts/beliefs;Able to manage/cope with feelings;Verbalized increased hopefulness

## 2025-05-02 NOTE — PROGRESS NOTES
Progress Note - Behavioral Health   Name: Lisa ZARATE McGorry 69 y.o. female I MRN: [id number]  Unit/Bed#: [facility] 609-02 I Date of Admission: 4/8/2025   Date of Service: 5/2/2025 I Hospital Day: 24    Assessment & Plan  Bipolar affective disorder, current episode manic (HCC)  As of 05/02 the patient is denying psychiatric symptoms but is feeling fatigued.    Change times of Zyprexa from 10 mg BID to 20 mg nightly for current symptoms of carlie (starting on 05/02/25)  Continue Depakote 500 mg twice daily for mood stabilization  VPA level on 4/24 was 90, CMP and CBC is acceptable  Continue Trazodone to 100mg PO QHS for insomnia (started on 4/13/2025)  Change Klonopin to 0.25mg PO at night only for manic behaviors, can utilize until Zyprexa is at a therapeutic dose (started on 4/13/2025)    Current Medications:    Current Facility-Administered Medications:     ammonium lactate (LAC-HYDRIN) 12 % lotion, Topical, BID    apixaban (ELIQUIS) tablet 5 mg, Oral, BID    clonazePAM (KlonoPIN) tablet 0.25 mg, Oral, QPM    divalproex sodium (DEPAKOTE) DR tablet 500 mg, Oral, BID    levothyroxine tablet 88 mcg, Oral, Early Morning    lisinopril (ZESTRIL) tablet 2.5 mg, Oral, Daily    mupirocin (BACTROBAN) 2 % ointment, Topical, Daily    nystatin (MYCOSTATIN) powder, Topical, BID    OLANZapine (ZyPREXA) tablet 20 mg, Oral, QPM    pravastatin (PRAVACHOL) tablet 40 mg, Oral, Daily    traZODone (DESYREL) tablet 100 mg, Oral, HS    Current Facility-Administered Medications:     acetaminophen (TYLENOL) tablet 650 mg, Oral, Q4H PRN    acetaminophen (TYLENOL) tablet 650 mg, Oral, Q4H PRN    acetaminophen (TYLENOL) tablet 975 mg, Oral, Q6H PRN    aluminum-magnesium hydroxide-simethicone (MAALOX) oral suspension 30 mL, Oral, Q4H PRN    benztropine (COGENTIN) injection 1 mg, Intramuscular, Q4H PRN Max 6/day    benztropine (COGENTIN) tablet 0.5 mg, Oral, Q4H PRN Max 6/day    bisacodyl (DULCOLAX) rectal suppository 10 mg, Rectal, Daily PRN     hydrOXYzine HCL (ATARAX) tablet 25 mg, Oral, Q6H PRN Max 4/day    hydrOXYzine HCL (ATARAX) tablet 50 mg, Oral, Q6H PRN Max 4/day    LORazepam (ATIVAN) injection 1 mg, Intramuscular, Q6H PRN Max 3/day    LORazepam (ATIVAN) tablet 1 mg, Oral, Q6H PRN Max 3/day    OLANZapine (ZyPREXA) IM injection 5 mg, Intramuscular, Q3H PRN Max 3/day    OLANZapine (ZyPREXA) tablet 2.5 mg, Oral, Q4H PRN Max 6/day    OLANZapine (ZyPREXA) tablet 5 mg, Oral, Q4H PRN Max 3/day    OLANZapine (ZyPREXA) tablet 5 mg, Oral, Q3H PRN Max 3/day    polyethylene glycol (MIRALAX) packet 17 g, Oral, Daily PRN    propranolol (INDERAL) tablet 5 mg, Oral, Q8H PRN    senna-docusate sodium (SENOKOT S) 8.6-50 mg per tablet 1 tablet, Oral, Daily PRN    traZODone (DESYREL) tablet 50 mg, Oral, HS PRN    Risks/Benefits of Treatment:     Risks, benefits, and possible side effects of medications explained to patient and patient verbalizes understanding and agreement for treatment.    Progress Toward Goals: improving    Treatment Planning:      - Encourage early mobility and having a structured day  - Provide frequent re-orientation, and cognitive stimulation  - Ensure assistive devices are in proper working order (eye-glasses, hearing aids)  - Encourage adequate hydration, nutrition and monitor bowel movements  - Maintain sleep-wake cycle: Uninterrupted sleep time; low-level lighting at night  - Fall precaution  - Follow up with [facility] regarding the medical problems   - Continue medication titration and treatment plan; adjust medication to optimize treatment response and as clinically indicated.   - Observation: Routine  - VS: as per unit protocol  - Encourage group attendance and milieu therapy  - Dispo: To be determined  - Estimated Discharge Day: 5/5/2025   - Legal Status : Voluntary 201 commitment.  - Long Stay Certification : Not Applicable    Subjective     Patient was visited on unit for continuing care; chart reviewed and discussed with multidisciplinary  "treatment team.     Patient continues to be visible in the milieu and interacts with staff and peers. No reports of aggression or self-injurious behavior on unit. No PRN medications used in the past 24 hours.    Per nursing, the patient was lethargic yesterday.     Patient accepted all offered medications and no adverse effects of medications noted or reported.    Patient reported her mood is \"good.\"  However, she does continue to report daytime fatigue which she blames on her medications.  She denies other medication side effects.  She endorsed adequate sleep and appetite.  She denied suicidal and homicidal ideation.  She denied auditory and visual hallucinations.    Sleep: normal  Appetite: normal  Medication side effects: Yes - Fatigue  ROS: review of systems as noted above in HPI/Subjective report, all other systems are negative    Objective :  Temp:  [98 °F (36.7 °C)-98.4 °F (36.9 °C)] 98 °F (36.7 °C)  HR:  [88-94] 88  BP: (117-133)/(56-70) 133/70  Resp:  [16-18] 16  SpO2:  [95 %-98 %] 98 %  O2 Device: None (Room air)    Mental Status Evaluation:    Appearance:  disheveled, marginal hygiene, dressed in hospital attire, looks stated age   Behavior:  pleasant, cooperative   Speech:  normal rate, normal volume, normal pitch   Mood:  \"Good\"   Affect:  normal range and intensity   Thought Process:  organized, logical   Thought Content:  no overt delusions   Perceptual Disturbances: no auditory hallucinations, no visual hallucinations, does not appear responding to internal stimuli   Risk Potential: Suicidal Ideation -  None at present  Homicidal Ideation -  None at present  Potential for Aggression - Not at present   Sensorium:  oriented to person, place, and time/date   Memory:  recent and remote memory grossly intact   Consciousness:  alert and awake   Attention/Concentration: attention span and concentration are age appropriate   Insight:  limited   Judgment: limited   Gait/Station: normal gait/station   Motor " "Activity: no abnormal movements         Lab Results: I have reviewed the following results:  Results from the past 24 hours: No results found for this or any previous visit (from the past 24 hours).    Administrative Statements     Counseling / Coordination of Care:   Patient's progress discussed with staff in treatment team meeting.  Medication changes reviewed with staff in treatment team meeting.    Portions of the record may have been created with voice recognition software. Occasional wrong word or \"sound a like\" substitutions may have occurred due to the inherent limitations of voice recognition software. Read the chart carefully and recognize, using context, where substitutions have occurred.    Andrae Samuels DO 05/02/25  PGY-II  "

## 2025-05-02 NOTE — PLAN OF CARE
Problem: PAIN - ADULT  Goal: Verbalizes/displays adequate comfort level or baseline comfort level  Description: Interventions:- Encourage patient to monitor pain and request assistance- Assess pain using appropriate pain scale- Administer analgesics based on type and severity of pain and evaluate response- Implement non-pharmacological measures as appropriate and evaluate response- Consider cultural and social influences on pain and pain management- Notify physician/advanced practitioner if interventions unsuccessful or patient reports new pain  Outcome: Progressing     Problem: INFECTION - ADULT  Goal: Absence or prevention of progression during hospitalization  Description: INTERVENTIONS:- Assess and monitor for signs and symptoms of infection- Monitor lab/diagnostic results- Monitor all insertion sites, i.e. indwelling lines, tubes, and drains- Monitor endotracheal if appropriate and nasal secretions for changes in amount and color- [place] appropriate cooling/warming therapies per order- Administer medications as ordered- Instruct and encourage patient and family to use good hand hygiene technique- Identify and instruct in appropriate isolation precautions for identified infection/condition  Outcome: Progressing  Goal: Absence of fever/infection during neutropenic period  Description: INTERVENTIONS:- Monitor WBC  Outcome: Progressing     Problem: SAFETY ADULT  Goal: Patient will remain free of falls  Description: INTERVENTIONS:- Educate patient/family on patient safety including physical limitations  Outcome: Progressing  Goal: Maintain or return to baseline ADL function  Description: INTERVENTIONS:-  Assess patient's ability to carry out ADLs; assess patient's baseline for ADL function and identify physical deficits which impact ability to perform ADLs (bathing, care of mouth/teeth, toileting, grooming, dressing, etc.)- Assess/evaluate cause of self-care deficits - Assess range of motion- Assess patient's  mobility; develop plan if impaired- Assess patient's need for assistive devices and provide as appropriate- Encourage maximum independence but intervene and supervise when necessary- Involve family in performance of ADLs- Assess for home care needs following discharge - Consider OT consult to assist with ADL evaluation and planning for discharge- Provide patient education as appropriate  Outcome: Progressing  Goal: Maintains/Returns to pre admission functional level  Outcome: Progressing     Problem: DISCHARGE PLANNING  Goal: Discharge to home or other facility with appropriate resources  Description: INTERVENTIONS:- Identify barriers to discharge w/patient and caregiver- Arrange for needed discharge resources and transportation as appropriate- Identify discharge learning needs (meds, wound care, etc.)- Arrange for interpretive services to assist at discharge as needed- Refer to Case Management Department for coordinating discharge planning if the patient needs post-hospital services based on physician/advanced practitioner order or complex needs related to functional status, cognitive ability, or social support system  Outcome: Progressing     Problem: Knowledge Deficit  Goal: Patient/family/caregiver demonstrates understanding of disease process, treatment plan, medications, and discharge instructions  Description: Complete learning assessment and assess knowledge base.Interventions:- Provide teaching at level of understanding- Provide teaching via preferred learning methods  Outcome: Progressing     Problem: Alteration in Thoughts and Perception  Goal: Treatment Goal: Gain control of psychotic behaviors/thinking, reduce/eliminate presenting symptoms and demonstrate improved reality functioning upon discharge  Outcome: Progressing  Goal: Verbalize thoughts and feelings  Description: Interventions:- Promote a nonjudgmental and trusting relationship with the patient through active listening and therapeutic  communication- Assess patient's level of functioning, behavior and potential for risk- Engage patient in 1 on 1 interactions- Encourage patient to express fears, feelings, frustrations, and discuss symptoms  - [place] patient to reality, help patient recognize reality-based thinking - Administer medications as ordered and assess for potential side effects- Provide the patient education related to the signs and symptoms of the illness and desired effects of prescribed medications  Outcome: Progressing  Goal: Refrain from acting on delusional thinking/internal stimuli  Description: Interventions:- Monitor patient closely, per order - Utilize least restrictive measures - Set reasonable limits, give positive feedback for acceptable - Administer medications as ordered and monitor of potential side effects  Outcome: Progressing  Goal: Agree to be compliant with medication regime, as prescribed and report medication side effects  Description: Interventions:- Offer appropriate PRN medication and supervise ingestion; conduct AIMS, as needed   Outcome: Progressing  Goal: Attend and participate in unit activities, including therapeutic, recreational, and educational groups  Description: Interventions:-Encourage Visitation and family involvement in care  Outcome: Progressing  Goal: Recognize dysfunctional thoughts, communicate reality-based thoughts at the time of discharge  Description: Interventions:- Provide medication and psycho-education to assist patient in compliance and developing insight into his/her illness   Outcome: Progressing  Goal: Complete daily ADLs, including personal hygiene independently, as able  Description: Interventions:- Observe, teach, and assist patient with ADLS- Monitor and promote a balance of rest/activity, with adequate nutrition and elimination   Outcome: Progressing     Problem: Ineffective Coping  Goal: Cooperates with admission process  Description: Interventions: - Complete admission  process  Outcome: Progressing  Goal: Identifies ineffective coping skills  Outcome: Progressing  Goal: Identifies healthy coping skills  Outcome: Progressing  Goal: Demonstrates healthy coping skills  Outcome: Progressing  Goal: Participates in unit activities  Description: Interventions:- Provide therapeutic environment - Provide required programming - Redirect inappropriate behaviors   Outcome: Progressing  Goal: Patient/Family participate in treatment and DC plans  Description: Interventions:- Provide therapeutic environment  Outcome: Progressing  Goal: Patient/Family verbalizes awareness of resources  Outcome: Progressing  Goal: Understands least restrictive measures  Description: Interventions:- Utilize least restrictive behavior  Outcome: Progressing  Goal: Free from restraint events  Description: - Utilize least restrictive measures - Provide behavioral interventions - Redirect inappropriate behaviors   Outcome: Progressing     Problem: Risk for Self Injury/Neglect  Goal: Treatment Goal: Remain safe during length of stay, learn and adopt new coping skills, and be free of self-injurious ideation, impulses and acts at the time of discharge  Outcome: Progressing  Goal: Verbalize thoughts and feelings  Description: Interventions:- Assess and re-assess patient's lethality and potential for self-injury- Engage patient in 1:1 interactions, daily, for a minimum of 15 minutes- Encourage patient to express feelings, fears, frustrations, hopes- Establish rapport/trust with patient   Outcome: Progressing  Goal: Refrain from harming self  Description: Interventions:- Monitor patient closely, per order- Develop a trusting relationship- Supervise medication ingestion, monitor effects and side effects   Outcome: Progressing  Goal: Attend and participate in unit activities, including therapeutic, recreational, and educational groups  Description: Interventions:- Provide therapeutic and educational activities daily, encourage  attendance and participation, and document same in the medical record- Obtain collateral information, encourage visitation and family involvement in care   Outcome: Progressing  Goal: Recognize maladaptive responses and adopt new coping mechanisms  Outcome: Progressing  Goal: Complete daily ADLs, including personal hygiene independently, as able  Description: Interventions:- Observe, teach, and assist patient with ADLS- Monitor and promote a balance of rest/activity, with adequate nutrition and elimination  Outcome: Progressing     Problem: Depression  Goal: Treatment Goal: Demonstrate behavioral control of depressive symptoms, verbalize feelings of improved mood/affect, and adopt new coping skills prior to discharge  Outcome: Progressing  Goal: Verbalize thoughts and feelings  Description: Interventions:- Assess and re-assess patient's level of risk - Engage patient in 1:1 interactions, daily, for a minimum of 15 minutes - Encourage patient to express feelings, fears, frustrations, hopes   Outcome: Progressing  Goal: Refrain from harming self  Description: Interventions:- Monitor patient closely, per order - Supervise medication ingestion, monitor effects and side effects   Outcome: Progressing  Goal: Refrain from isolation  Description: Interventions:- Develop a trusting relationship - Encourage socialization   Outcome: Progressing  Goal: Refrain from self-neglect  Outcome: Progressing  Goal: Attend and participate in unit activities, including therapeutic, recreational, and educational groups  Description: Interventions:- Provide therapeutic and educational activities daily, encourage attendance and participation, and document same in the medical record   Outcome: Progressing  Goal: Complete daily ADLs, including personal hygiene independently, as able  Description: Interventions:- Observe, teach, and assist patient with ADLS-  Monitor and promote a balance of rest/activity, with adequate nutrition and  elimination   Outcome: Progressing     Problem: Anxiety  Goal: Anxiety is at manageable level  Description: Interventions:- Assess and monitor patient's anxiety level. - Monitor for signs and symptoms (heart palpitations, chest pain, shortness of breath, headaches, nausea, feeling jumpy, restlessness, irritable, apprehensive). - Collaborate with interdisciplinary team and initiate plan and interventions as ordered.- [place] patient to unit/surroundings- Explain treatment plan- Encourage participation in care- Encourage verbalization of concerns/fears- Identify coping mechanisms- Assist in developing anxiety-reducing skills- Administer/offer alternative therapies- Limit or eliminate stimulants  Outcome: Progressing     Problem: Risk for Violence/Aggression Toward Others  Goal: Treatment Goal: Refrain from acts of violence/aggression during length of stay, and demonstrate improved impulse control at the time of discharge  Outcome: Progressing  Goal: Verbalize thoughts and feelings  Description: Interventions:- Assess and re-assess patient's level of risk, every waking shift- Engage patient in 1:1 interactions, daily, for a minimum of 15 minutes - Allow patient to express feelings and frustrations in a safe and non-threatening manner - Establish rapport/trust with patient   Outcome: Progressing  Goal: Refrain from harming others  Outcome: Progressing  Goal: Refrain from destructive acts on the environment or property  Outcome: Progressing  Goal: Control angry outbursts  Description: Interventions:- Monitor patient closely, per order- Ensure early verbal de-escalation- Monitor prn medication needs- Set reasonable/therapeutic limits, outline behavioral expectations, and consequences - Provide a non-threatening milieu, utilizing the least restrictive interventions   Outcome: Progressing  Goal: Attend and participate in unit activities, including therapeutic, recreational, and educational groups  Description:  Interventions:- Provide therapeutic and educational activities daily, encourage attendance and participation, and document same in the medical record   Outcome: Progressing  Goal: Identify appropriate positive anger management techniques  Description: Interventions:- Offer anger management and coping skills groups - Staff will provide positive feedback for appropriate anger control  Outcome: Progressing     Problem: Alteration in Orientation  Goal: Treatment Goal: Demonstrate a reduction of confusion and improved orientation to person, place, time and/or situation upon discharge, according to optimum baseline/ability  Outcome: Progressing  Goal: Interact with staff daily  Description: Interventions:- Assess and re-assess patient's level of orientation- Engage patient in 1 on 1 interactions, daily, for a minimum of 15 minutes - Establish rapport/trust with patient   Outcome: Progressing  Goal: Express concerns related to confused thinking related to:  Description: Interventions:- Encourage patient to express feelings, fears, frustrations, hopes- Assign consistent caregivers - [place]/re-orient patient as needed- Allow comfort items, as appropriate- Provide visual cues, signs, etc.   Outcome: Progressing  Goal: Allow medical examinations, as recommended  Description: Interventions:- Provide physical/neurological exams and/or referrals, per provider   Outcome: Progressing  Goal: Cooperate with recommended testing/procedures  Description: Interventions:- Determine need for ancillary testing- Observe for mental status changes- Implement falls/precaution protocol   Outcome: Progressing  Goal: Attend and participate in unit activities, including therapeutic, recreational, and educational groups  Description: Interventions:- Provide therapeutic and educational activities daily, encourage attendance and participation, and document same in the medical record - Provide appropriate opportunities for reminiscence - Provide a  consistent daily routine - Encourage family contact/visitation   Outcome: Progressing  Goal: Complete daily ADLs, including personal hygiene independently, as able  Description: Interventions:- Observe, teach, and assist patient with ADLS  Outcome: Progressing     Problem: Individualized Interventions  Goal: Patient will verbalize appropriate use of telephone within 5 days  Description: Interventions:- Treatment team to determine use of supervised phone privileges   Outcome: Progressing  Goal: Patient will verbalize need for hospitalization and will no longer attempt elopement within 5 days  Description: Interventions:- Ongoing education to help patient understand need for hospitalization  Outcome: Progressing  Goal: Patient will recognize inappropriate behaviors and develop alternative behaviors within 5 days  Description: Interventions:- Patient in collaboration with Treatment Team will develop a behavior management plan to help identify effective coping skills to deal with stressors  Outcome: Progressing

## 2025-05-02 NOTE — PROGRESS NOTES
05/02/25 0800   Team Meeting   Meeting Type Daily Rounds   Team Members Present   Team Members Present Other (Discipline and Name);Occupational Therapist;;Nurse;Physician   Physician Team Member Ramez/Tiny/Timmy   Nursing Team Member Rafaela/Carmen/Linda   Care Management Team Member [place] DICKENS Team Member Alix   Other (Discipline and Name) Kandi - Pharmacy   Patient/Family Present   Patient Present No   Patient's Family Present No     Patient was withdrawn. She is calm and cooperative. Continues to await SNF placement. Patient is eating and sleeping well. She is less delusional and much less invasive and intrusive. Patient morning klonopin was discontinued and Depakote level is to be checked. Patient discharge is pending stabilization of mood and medications.

## 2025-05-02 NOTE — CASE MANAGEMENT
Patient accepted at [facility] pending update OT/PT notes. Will notify providers.

## 2025-05-02 NOTE — PROGRESS NOTES
05/02/25 1330   Activity/Group Checklist   Group Music   Attendance Attended   Attendance Duration (min) Greater than 60   Interactions Disorganized interaction  (Pt getting too close to male peer, required redirection)   Affect/Mood Appropriate   Goals Achieved Able to listen to others;Able to engage in interactions

## 2025-05-02 NOTE — NURSING NOTE
Patient is calm and cooperative. Visible during shift, social with selected peers.   Patient is compliant with medications and able to express needs. Denies any psych S/S.   Patient denies any pain. Patient participated in group.

## 2025-05-02 NOTE — OCCUPATIONAL THERAPY NOTE
Occupational Therapy Evaluation     Patient Name: Lisa Rich  Today's Date: 5/2/2025  Problem List  Principal Problem:    Bipolar affective disorder, current episode manic ([facility])  Active Problems:    Hypothyroidism    Type 2 diabetes mellitus with stage 3a chronic kidney disease, without long-term current use of insulin ([facility])    Medical clearance for psychiatric admission    Essential hypertension    Toe ulcer ([facility])    Past Medical History  Past Medical History:   Diagnosis Date    Allergic     Depression     Diabetes mellitus ([facility])     Disease of thyroid gland     GERD (gastroesophageal reflux disease)     Hyperchloremia     Hypertension     Psychiatric disorder      Past Surgical History  Past Surgical History:   Procedure Laterality Date    ANKLE SURGERY      Incision    DILATION AND CURETTAGE OF UTERUS               05/02/25 1431   OT Last Visit   OT Visit Date 05/02/25   Note Type   Note type Evaluation   Pain Assessment   Pain Assessment Tool 0-10   Pain Score No Pain   Restrictions/Precautions   Weight Bearing Precautions Per Order No   Other Precautions Fall Risk   Home Living   Type of Home House   Home Layout Two level;Stairs to enter with rails;Bed/bath upstairs;1/2 bath on main level   Bathroom Shower/Tub Walk-in shower   Bathroom Toilet Standard   Home Equipment Walker;Cane   Prior Function   Level of [place] Independent with ADLs;Independent with functional mobility   Lives With Family   ADL   Grooming Assistance 5  Supervision/Setup   UB Bathing Assistance 5  Supervision/Setup   LB Bathing Assistance 5  Supervision/Setup   UB Dressing Assistance 5  Supervision/Setup   LB Dressing Assistance 5  Supervision/Setup   Toileting Assistance  5  Supervision/Setup   Transfers   Sit to Stand 5  Supervision   Stand to Sit 5  Supervision   Functional Mobility   Functional Mobility 5  Supervision   Balance   Static Sitting Good   Dynamic Sitting Fair +   Static Standing Fair   Dynamic Standing Fair    Ambulatory Fair   Activity Tolerance   Activity Tolerance Patient tolerated treatment well   RUE Assessment   RUE Assessment WFL   LUE Assessment   LUE Assessment WFL   Hand Function   Gross Motor Coordination Functional   Sensation   Light Touch No apparent deficits   Cognition   Arousal/Participation Alert;Cooperative   Attention Attends with cues to redirect   Orientation Level Oriented to person;Oriented to place;Oriented to time   Memory Decreased recall of recent events   Following Commands Follows one step commands without difficulty   Assessment   Limitation Decreased high-level ADLs;Decreased Safe judgement during ADL;Decreased cognition;Decreased ADL status   Prognosis Fair   Assessment   Pt is a 69 y.o. female seen for OT evaluation s/p [facility] [place] [facility] w/ Bipolar affective disorder, current episode manic (HCC).  See medical history above for extensive list of comorbidities affecting pt's functional performance at time of assessment. Personal factors affecting Pt at time of IE include:behavioral pattern and health management . Upon evaluation: Pt requires supervision for functional ambulation including bathroom mobility and negotiation of small spaces, supervision for ADL transfers.  Pt requires supervision for ADLs in standing. Pt's primary barrier(s) at this time: decreased safety, cognition. The following deficits impact occupational performance: weakness, decreased strength, decreased balance, decreased tolerance, impaired memory, impaired sequencing, impaired problem solving, and decreased safety awareness. Pt to benefit from continued skilled OT services while in the hospital to address deficits as defined above and maximize level of functional independence w ADL's and functional mobility. Occupational performance areas to address include: bathing/shower, toilet hygiene, dressing, health maintenance, functional mobility, and clothing management. From OT standpoint, recommendation at time  of d/c would be minimum resource intensity.       Goals   STG Time Frame   (1 week)   Short Term Goals Pt will complete ADL transfers [place]/I.  Pt will complete functional ambulation Barron/I.   Pt will complete IADL [place]/I.    Plan   Treatment Interventions ADL retraining;Functional transfer training;UE strengthening/ROM;Endurance training;Continued evaluation;Energy conservation;Activityengagement   Goal Expiration Date 05/09/25   OT Frequency 1-2x/wk   Discharge Recommendation   Rehab Resource Intensity Level, OT II (Minimum Resource Intensity)

## 2025-05-02 NOTE — ASSESSMENT & PLAN NOTE
As of 05/02 the patient is denying psychiatric symptoms but is feeling fatigued.    Change times of Zyprexa from 10 mg BID to 20 mg nightly for current symptoms of carlie (starting on 05/02/25)  Continue Depakote 500 mg twice daily for mood stabilization  VPA level on 4/24 was 90, CMP and CBC is acceptable  Continue Trazodone to 100mg PO QHS for insomnia (started on 4/13/2025)  Change Klonopin to 0.25mg PO at night only for manic behaviors, can utilize until Zyprexa is at a therapeutic dose (started on 4/13/2025)

## 2025-05-02 NOTE — PLAN OF CARE
Problem: OCCUPATIONAL THERAPY ADULT  Goal: Performs self-care activities at highest level of function for planned discharge setting.  See evaluation for individualized goals.  Description: Treatment Interventions: ADL retraining, Functional transfer training, UE strengthening/ROM, Endurance training, Continued evaluation, Energy conservation, Activityengagement          See flowsheet documentation for full assessment, interventions and recommendations.   Outcome: Progressing  Note: Limitation: Decreased high-level ADLs, Decreased Safe judgement during ADL, Decreased cognition, Decreased ADL status  Prognosis: Fair  Assessment: Pt is a 69 y.o. female seen for OT evaluation s/p admit to [facility] on 4/8/2025 w/ Bipolar affective disorder, current episode manic (HCC).  See medical history above for extensive list of comorbidities affecting pt's functional performance at time of assessment. Personal factors affecting Pt at time of IE include:behavioral pattern and health management . Upon evaluation: Pt requires supervision for functional ambulation including bathroom mobility and negotiation of small spaces, supervision for ADL transfers.  Pt requires supervision for ADLs in standing. Pt's primary barrier(s) at this time: decreased safety, cognition. The following deficits impact occupational performance: weakness, decreased strength, decreased balance, decreased tolerance, impaired memory, impaired sequencing, impaired problem solving, and decreased safety awareness. Pt to benefit from continued skilled OT services while in the hospital to address deficits as defined above and maximize level of functional independence w ADL's and functional mobility. Occupational performance areas to address include: bathing/shower, toilet hygiene, dressing, health maintenance, functional mobility, and clothing management. From OT standpoint, recommendation at time of d/c would be minimum resource intensity.       Rehab Resource  Intensity Level, OT: II (Moderate Resource Intensity)

## 2025-05-02 NOTE — PHYSICAL THERAPY NOTE
Physical Therapy Evaluation    Patient's Name: Lisa A McGorry    Admitting Diagnosis  Major depressive disorder, single episode, unspecified [F32.9]  Manic behavior (HCC) [F30.10]    Problem List  Patient Active Problem List   Diagnosis    Bipolar affective disorder, current episode manic (HCC)    Constipation    Hypercholesterolemia    Hypothyroidism    Type 2 diabetes mellitus with stage 3a chronic kidney disease, without long-term current use of insulin (HCC)    Class 2 obesity due to excess calories with body mass index (BMI) of 35.0 to 35.9 in adult    Postmenopausal    Medical clearance for psychiatric admission    Atypical squamous cells of undetermined significance on cytologic smear of cervix ([facility]-US)    Essential hypertension    Vitamin D deficiency    Vitamin B12 deficiency    Stage 3 chronic kidney disease, unspecified whether stage 3a or 3b CKD (HCC)    Generalized abdominal pain    Primary osteoarthritis of left knee    Toe ulcer (HCC)    Social problem       Past Medical History  Past Medical History:   Diagnosis Date    Allergic     Depression     Diabetes mellitus (HCC)     Disease of thyroid gland     GERD (gastroesophageal reflux disease)     Hyperchloremia     Hypertension     Psychiatric disorder        Past Surgical History  Past Surgical History:   Procedure Laterality Date    ANKLE SURGERY      Incision    DILATION AND CURETTAGE OF UTERUS         Recent Imaging  VAS upper limb venous duplex scan, unilateral/limited   Final Result by Gaetano Silva MD (04/11 1721)          Recent Vital Signs  Vitals:    05/01/25 0751 05/01/25 1544 05/01/25 2023 05/02/25 0744   BP: 117/57 117/56 129/68 133/70   BP Location: Right arm Right arm Left arm Right arm   Pulse: 82 89 94 88   Resp: 18 16 18 16   Temp: 98.9 °F (37.2 °C) 98.4 °F (36.9 °C) 98.1 °F (36.7 °C) 98 °F (36.7 °C)   TempSrc: Temporal Temporal Temporal Temporal   SpO2: 95% 95% 96% 98%   Weight:       Height:            05/02/25 1130    PT Last Visit   PT Visit Date 05/02/25   Note Type   Note type Evaluation   Pain Assessment   Pain Assessment Tool 0-10   Pain Score No Pain   Restrictions/Precautions   Weight Bearing Precautions Per Order No   Other Precautions Fall Risk;Pain;Cognitive   Home Living   Type of Home House   Home Layout Two level;Bed/bath upstairs;Stairs to enter with rails   Home Equipment Walker;Cane   Prior Function   Level of [place] Independent with ADLs;Independent with functional mobility   Lives With Family   Receives Help From Family   IADLs Family/Friend/Other provides transportation   Falls in the last 6 months 1 to 4   General   Family/Caregiver Present No   Cognition   Overall Cognitive Status Impaired   Arousal/Participation Alert   Orientation Level Oriented to person;Oriented to place;Oriented to situation   Following Commands Follows all commands and directions without difficulty   RLE Assessment   RLE Assessment   (4/5)   LLE Assessment   LLE Assessment   (4/5)   Coordination   Movements are Fluid and Coordinated 0   Coordination and Movement Description mild coordination deficit   Sensation WFL   Light Touch   RLE Light Touch Grossly intact   LLE Light Touch Grossly intact   Bed Mobility   Supine to Sit 6  Modified independent   Additional items Increased time required   Sit to Supine 6  Modified independent   Additional items Increased time required   Transfers   Sit to Stand 5  Supervision   Additional items Increased time required   Stand to Sit 5  Supervision   Additional items Increased time required   Ambulation/Elevation   Gait pattern Step through pattern;Decreased toe off;Decreased heel strike;Excessively slow;Short stride;Decreased foot clearance;Improper Weight shift   Gait Assistance 5  Supervision   Assistive Device None   Distance 100ft x3   Balance   Static Sitting Good   Dynamic Sitting Fair +   Static Standing Fair   Dynamic Standing Fair   Ambulatory Fair   Endurance Deficit   Endurance  Deficit Yes   Endurance Deficit Description reduced vs baseline   Activity Tolerance   Activity Tolerance Patient tolerated treatment well   Medical Staff Made Aware spoke to CM   Nurse Made Aware spoke to RN   Assessment   Prognosis Good   Problem List Decreased strength;Decreased endurance;Impaired balance;Decreased mobility;Decreased coordination;Decreased cognition;Impaired judgement;Decreased safety awareness;Obesity   Barriers to Discharge Inaccessible home environment;Decreased caregiver support   Goals   Patient Goals to be able to leave hospital   Discharge Recommendation   Rehab Resource Intensity Level, PT III (Minimum Resource Intensity)  (per treatment team pt will require SNF placement, may benefit from PT treatment at SNF)   AM-PAC Basic Mobility Inpatient   Turning in Flat Bed Without Bedrails 4   Lying on Back to Sitting on Edge of Flat Bed Without Bedrails 4   Moving Bed to Chair 4   Standing Up From Chair Using Arms 4   Walk in Room 3   Climb 3-5 Stairs With Railing 3   Basic Mobility Inpatient Raw Score 22   Basic Mobility Standardized Score 47.4   [facility] Highest Level Of Mobility   -HLM Goal 7: Walk 25 feet or more   -HLM Achieved 8: Walk 250 feet ot more   End of Consult   Patient Position at End of Consult Bedside chair;All needs within reach         ASSESSMENT                                                                                                                     Lisa Bocanegraorredu is a 69 y.o. female admitted to [facility] on 4/8/2025 for Bipolar affective disorder, current episode manic ([facility]). Pt  has a past medical history of Allergic, Depression, Diabetes mellitus (HCC), Disease of thyroid gland, GERD (gastroesophageal reflux disease), Hyperchloremia, Hypertension, and Psychiatric disorder.. PT was consulted and pt was seen on 5/2/2025 for mobility assessment and d/c planning.  Impairments limiting pt at this time include impaired balance, decreased endurance, decreased  coordination, new onset of impairment of functional mobility, decreased ADLS, decreased IADLS, decreased safety awareness, and decreased strength. Pt is currently functioning at a modified independent assistance level for bed mobility, supervision assistance x1 level for transfers, supervision assistance x1 level for ambulation with no assistive device. The patient's AM-PAC Basic Mobility Inpatient Short Form Raw Score is 22. A Raw score of greater than 16 suggests the patient may benefit from discharge to home. Please also refer to the recommendation of the Physical Therapist for safe discharge planning.    Recommendations                                                                                                                DME: None    Discharge Disposition:   Per treatment team pt will require SNF placement, would likely benefit from PT treatment at SNF      Manuel Lu PT, DPT

## 2025-05-03 PROCEDURE — 99232 SBSQ HOSP IP/OBS MODERATE 35: CPT | Performed by: STUDENT IN AN ORGANIZED HEALTH CARE EDUCATION/TRAINING PROGRAM

## 2025-05-03 RX ADMIN — PRAVASTATIN SODIUM 40 MG: 40 TABLET ORAL at 08:26

## 2025-05-03 RX ADMIN — NYSTATIN: 100000 POWDER TOPICAL at 09:29

## 2025-05-03 RX ADMIN — LEVOTHYROXINE SODIUM 88 MCG: 88 TABLET ORAL at 05:29

## 2025-05-03 RX ADMIN — TRAZODONE HYDROCHLORIDE 100 MG: 100 TABLET ORAL at 21:23

## 2025-05-03 RX ADMIN — OLANZAPINE 20 MG: 10 TABLET, FILM COATED ORAL at 17:19

## 2025-05-03 RX ADMIN — Medication 1 APPLICATION: at 09:29

## 2025-05-03 RX ADMIN — DIVALPROEX SODIUM 500 MG: 500 TABLET, DELAYED RELEASE ORAL at 08:27

## 2025-05-03 RX ADMIN — Medication 1 APPLICATION: at 17:47

## 2025-05-03 RX ADMIN — MUPIROCIN 1 APPLICATION: 20 OINTMENT TOPICAL at 09:29

## 2025-05-03 RX ADMIN — CLONAZEPAM 0.25 MG: 0.5 TABLET ORAL at 17:19

## 2025-05-03 RX ADMIN — APIXABAN 5 MG: 5 TABLET, FILM COATED ORAL at 08:26

## 2025-05-03 RX ADMIN — APIXABAN 5 MG: 5 TABLET, FILM COATED ORAL at 17:19

## 2025-05-03 RX ADMIN — NYSTATIN 1 APPLICATION: 100000 POWDER TOPICAL at 17:47

## 2025-05-03 RX ADMIN — DIVALPROEX SODIUM 500 MG: 500 TABLET, DELAYED RELEASE ORAL at 21:23
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
## 2025-05-03 NOTE — PLAN OF CARE
Problem: PAIN - ADULT  Goal: Verbalizes/displays adequate comfort level or baseline comfort level  Description: Interventions:- Encourage patient to monitor pain and request assistance- Assess pain using appropriate pain scale- Administer analgesics based on type and severity of pain and evaluate response- Implement non-pharmacological measures as appropriate and evaluate response- Consider cultural and social influences on pain and pain management- Notify physician/advanced practitioner if interventions unsuccessful or patient reports new pain  Outcome: Progressing     Problem: INFECTION - ADULT  Goal: Absence or prevention of progression during hospitalization  Description: INTERVENTIONS:- Assess and monitor for signs and symptoms of infection- Monitor lab/diagnostic results- Monitor all insertion sites, i.e. indwelling lines, tubes, and drains- Monitor endotracheal if appropriate and nasal secretions for changes in amount and color- [place] appropriate cooling/warming therapies per order- Administer medications as ordered- Instruct and encourage patient and family to use good hand hygiene technique- Identify and instruct in appropriate isolation precautions for identified infection/condition  Outcome: Progressing  Goal: Absence of fever/infection during neutropenic period  Description: INTERVENTIONS:- Monitor WBC  Outcome: Progressing     Problem: SAFETY ADULT  Goal: Patient will remain free of falls  Description: INTERVENTIONS:- Educate patient/family on patient safety including physical limitations  Outcome: Progressing  Goal: Maintain or return to baseline ADL function  Description: INTERVENTIONS:-  Assess patient's ability to carry out ADLs; assess patient's baseline for ADL function and identify physical deficits which impact ability to perform ADLs (bathing, care of mouth/teeth, toileting, grooming, dressing, etc.)- Assess/evaluate cause of self-care deficits - Assess range of motion- Assess patient's  mobility; develop plan if impaired- Assess patient's need for assistive devices and provide as appropriate- Encourage maximum independence but intervene and supervise when necessary- Involve family in performance of ADLs- Assess for home care needs following discharge - Consider OT consult to assist with ADL evaluation and planning for discharge- Provide patient education as appropriate  Outcome: Progressing  Goal: Maintains/Returns to pre admission functional level  Outcome: Progressing     Problem: DISCHARGE PLANNING  Goal: Discharge to home or other facility with appropriate resources  Description: INTERVENTIONS:- Identify barriers to discharge w/patient and caregiver- Arrange for needed discharge resources and transportation as appropriate- Identify discharge learning needs (meds, wound care, etc.)- Arrange for interpretive services to assist at discharge as needed- Refer to Case Management Department for coordinating discharge planning if the patient needs post-hospital services based on physician/advanced practitioner order or complex needs related to functional status, cognitive ability, or social support system  Outcome: Progressing     Problem: Knowledge Deficit  Goal: Patient/family/caregiver demonstrates understanding of disease process, treatment plan, medications, and discharge instructions  Description: Complete learning assessment and assess knowledge base.Interventions:- Provide teaching at level of understanding- Provide teaching via preferred learning methods  Outcome: Progressing     Problem: Alteration in Thoughts and Perception  Goal: Treatment Goal: Gain control of psychotic behaviors/thinking, reduce/eliminate presenting symptoms and demonstrate improved reality functioning upon discharge  Outcome: Progressing  Goal: Verbalize thoughts and feelings  Description: Interventions:- Promote a nonjudgmental and trusting relationship with the patient through active listening and therapeutic  communication- Assess patient's level of functioning, behavior and potential for risk- Engage patient in 1 on 1 interactions- Encourage patient to express fears, feelings, frustrations, and discuss symptoms  - [place] patient to reality, help patient recognize reality-based thinking - Administer medications as ordered and assess for potential side effects- Provide the patient education related to the signs and symptoms of the illness and desired effects of prescribed medications  Outcome: Progressing  Goal: Refrain from acting on delusional thinking/internal stimuli  Description: Interventions:- Monitor patient closely, per order - Utilize least restrictive measures - Set reasonable limits, give positive feedback for acceptable - Administer medications as ordered and monitor of potential side effects  Outcome: Progressing  Goal: Agree to be compliant with medication regime, as prescribed and report medication side effects  Description: Interventions:- Offer appropriate PRN medication and supervise ingestion; conduct AIMS, as needed   Outcome: Progressing  Goal: Attend and participate in unit activities, including therapeutic, recreational, and educational groups  Description: Interventions:-Encourage Visitation and family involvement in care  Outcome: Progressing  Goal: Recognize dysfunctional thoughts, communicate reality-based thoughts at the time of discharge  Description: Interventions:- Provide medication and psycho-education to assist patient in compliance and developing insight into his/her illness   Outcome: Progressing  Goal: Complete daily ADLs, including personal hygiene independently, as able  Description: Interventions:- Observe, teach, and assist patient with ADLS- Monitor and promote a balance of rest/activity, with adequate nutrition and elimination   Outcome: Progressing     Problem: Ineffective Coping  Goal: Cooperates with admission process  Description: Interventions: - Complete admission  process  Outcome: Progressing  Goal: Identifies ineffective coping skills  Outcome: Progressing  Goal: Identifies healthy coping skills  Outcome: Progressing  Goal: Demonstrates healthy coping skills  Outcome: Progressing  Goal: Participates in unit activities  Description: Interventions:- Provide therapeutic environment - Provide required programming - Redirect inappropriate behaviors   Outcome: Progressing  Goal: Patient/Family participate in treatment and DC plans  Description: Interventions:- Provide therapeutic environment  Outcome: Progressing  Goal: Patient/Family verbalizes awareness of resources  Outcome: Progressing  Goal: Understands least restrictive measures  Description: Interventions:- Utilize least restrictive behavior  Outcome: Progressing  Goal: Free from restraint events  Description: - Utilize least restrictive measures - Provide behavioral interventions - Redirect inappropriate behaviors   Outcome: Progressing     Problem: Risk for Self Injury/Neglect  Goal: Treatment Goal: Remain safe during length of stay, learn and adopt new coping skills, and be free of self-injurious ideation, impulses and acts at the time of discharge  Outcome: Progressing  Goal: Verbalize thoughts and feelings  Description: Interventions:- Assess and re-assess patient's lethality and potential for self-injury- Engage patient in 1:1 interactions, daily, for a minimum of 15 minutes- Encourage patient to express feelings, fears, frustrations, hopes- Establish rapport/trust with patient   Outcome: Progressing  Goal: Refrain from harming self  Description: Interventions:- Monitor patient closely, per order- Develop a trusting relationship- Supervise medication ingestion, monitor effects and side effects   Outcome: Progressing  Goal: Attend and participate in unit activities, including therapeutic, recreational, and educational groups  Description: Interventions:- Provide therapeutic and educational activities daily, encourage  attendance and participation, and document same in the medical record- Obtain collateral information, encourage visitation and family involvement in care   Outcome: Progressing  Goal: Recognize maladaptive responses and adopt new coping mechanisms  Outcome: Progressing  Goal: Complete daily ADLs, including personal hygiene independently, as able  Description: Interventions:- Observe, teach, and assist patient with ADLS- Monitor and promote a balance of rest/activity, with adequate nutrition and elimination  Outcome: Progressing     Problem: Depression  Goal: Treatment Goal: Demonstrate behavioral control of depressive symptoms, verbalize feelings of improved mood/affect, and adopt new coping skills prior to discharge  Outcome: Progressing  Goal: Verbalize thoughts and feelings  Description: Interventions:- Assess and re-assess patient's level of risk - Engage patient in 1:1 interactions, daily, for a minimum of 15 minutes - Encourage patient to express feelings, fears, frustrations, hopes   Outcome: Progressing  Goal: Refrain from harming self  Description: Interventions:- Monitor patient closely, per order - Supervise medication ingestion, monitor effects and side effects   Outcome: Progressing  Goal: Refrain from isolation  Description: Interventions:- Develop a trusting relationship - Encourage socialization   Outcome: Progressing  Goal: Refrain from self-neglect  Outcome: Progressing  Goal: Attend and participate in unit activities, including therapeutic, recreational, and educational groups  Description: Interventions:- Provide therapeutic and educational activities daily, encourage attendance and participation, and document same in the medical record   Outcome: Progressing  Goal: Complete daily ADLs, including personal hygiene independently, as able  Description: Interventions:- Observe, teach, and assist patient with ADLS-  Monitor and promote a balance of rest/activity, with adequate nutrition and  elimination   Outcome: Progressing     Problem: Anxiety  Goal: Anxiety is at manageable level  Description: Interventions:- Assess and monitor patient's anxiety level. - Monitor for signs and symptoms (heart palpitations, chest pain, shortness of breath, headaches, nausea, feeling jumpy, restlessness, irritable, apprehensive). - Collaborate with interdisciplinary team and initiate plan and interventions as ordered.- [place] patient to unit/surroundings- Explain treatment plan- Encourage participation in care- Encourage verbalization of concerns/fears- Identify coping mechanisms- Assist in developing anxiety-reducing skills- Administer/offer alternative therapies- Limit or eliminate stimulants  Outcome: Progressing     Problem: Risk for Violence/Aggression Toward Others  Goal: Treatment Goal: Refrain from acts of violence/aggression during length of stay, and demonstrate improved impulse control at the time of discharge  Outcome: Progressing  Goal: Verbalize thoughts and feelings  Description: Interventions:- Assess and re-assess patient's level of risk, every waking shift- Engage patient in 1:1 interactions, daily, for a minimum of 15 minutes - Allow patient to express feelings and frustrations in a safe and non-threatening manner - Establish rapport/trust with patient   Outcome: Progressing  Goal: Refrain from harming others  Outcome: Progressing  Goal: Refrain from destructive acts on the environment or property  Outcome: Progressing  Goal: Control angry outbursts  Description: Interventions:- Monitor patient closely, per order- Ensure early verbal de-escalation- Monitor prn medication needs- Set reasonable/therapeutic limits, outline behavioral expectations, and consequences - Provide a non-threatening milieu, utilizing the least restrictive interventions   Outcome: Progressing  Goal: Attend and participate in unit activities, including therapeutic, recreational, and educational groups  Description:  Interventions:- Provide therapeutic and educational activities daily, encourage attendance and participation, and document same in the medical record   Outcome: Progressing  Goal: Identify appropriate positive anger management techniques  Description: Interventions:- Offer anger management and coping skills groups - Staff will provide positive feedback for appropriate anger control  Outcome: Progressing     Problem: Alteration in Orientation  Goal: Treatment Goal: Demonstrate a reduction of confusion and improved orientation to person, place, time and/or situation upon discharge, according to optimum baseline/ability  Outcome: Progressing  Goal: Interact with staff daily  Description: Interventions:- Assess and re-assess patient's level of orientation- Engage patient in 1 on 1 interactions, daily, for a minimum of 15 minutes - Establish rapport/trust with patient   Outcome: Progressing  Goal: Express concerns related to confused thinking related to:  Description: Interventions:- Encourage patient to express feelings, fears, frustrations, hopes- Assign consistent caregivers - [place]/re-orient patient as needed- Allow comfort items, as appropriate- Provide visual cues, signs, etc.   Outcome: Progressing  Goal: Allow medical examinations, as recommended  Description: Interventions:- Provide physical/neurological exams and/or referrals, per provider   Outcome: Progressing  Goal: Cooperate with recommended testing/procedures  Description: Interventions:- Determine need for ancillary testing- Observe for mental status changes- Implement falls/precaution protocol   Outcome: Progressing  Goal: Attend and participate in unit activities, including therapeutic, recreational, and educational groups  Description: Interventions:- Provide therapeutic and educational activities daily, encourage attendance and participation, and document same in the medical record - Provide appropriate opportunities for reminiscence - Provide a  consistent daily routine - Encourage family contact/visitation   Outcome: Progressing  Goal: Complete daily ADLs, including personal hygiene independently, as able  Description: Interventions:- Observe, teach, and assist patient with ADLS  Outcome: Progressing     Problem: Individualized Interventions  Goal: Patient will verbalize appropriate use of telephone within 5 days  Description: Interventions:- Treatment team to determine use of supervised phone privileges   Outcome: Progressing  Goal: Patient will verbalize need for hospitalization and will no longer attempt elopement within 5 days  Description: Interventions:- Ongoing education to help patient understand need for hospitalization  Outcome: Progressing  Goal: Patient will recognize inappropriate behaviors and develop alternative behaviors within 5 days  Description: Interventions:- Patient in collaboration with Treatment Team will develop a behavior management plan to help identify effective coping skills to deal with stressors  Outcome: Progressing

## 2025-05-03 NOTE — NURSING NOTE
Pt is calm and cooperative, pleasant on approach. She is visible and social with selected peers.   She denies all psych S/S, SI/HI. She is compliant with her medications and has no complains of pain.

## 2025-05-04 PROCEDURE — 99232 SBSQ HOSP IP/OBS MODERATE 35: CPT | Performed by: STUDENT IN AN ORGANIZED HEALTH CARE EDUCATION/TRAINING PROGRAM

## 2025-05-04 RX ADMIN — MUPIROCIN: 20 OINTMENT TOPICAL at 09:35

## 2025-05-04 RX ADMIN — NYSTATIN: 100000 POWDER TOPICAL at 09:35

## 2025-05-04 RX ADMIN — DIVALPROEX SODIUM 500 MG: 500 TABLET, DELAYED RELEASE ORAL at 08:11

## 2025-05-04 RX ADMIN — LEVOTHYROXINE SODIUM 88 MCG: 88 TABLET ORAL at 05:41

## 2025-05-04 RX ADMIN — OLANZAPINE 20 MG: 10 TABLET, FILM COATED ORAL at 18:19

## 2025-05-04 RX ADMIN — APIXABAN 5 MG: 5 TABLET, FILM COATED ORAL at 18:20

## 2025-05-04 RX ADMIN — CLONAZEPAM 0.25 MG: 0.5 TABLET ORAL at 18:20

## 2025-05-04 RX ADMIN — DIVALPROEX SODIUM 500 MG: 500 TABLET, DELAYED RELEASE ORAL at 20:16

## 2025-05-04 RX ADMIN — Medication: at 09:35

## 2025-05-04 RX ADMIN — NYSTATIN 1 APPLICATION: 100000 POWDER TOPICAL at 18:51

## 2025-05-04 RX ADMIN — PRAVASTATIN SODIUM 40 MG: 40 TABLET ORAL at 08:11

## 2025-05-04 RX ADMIN — APIXABAN 5 MG: 5 TABLET, FILM COATED ORAL at 08:11

## 2025-05-04 RX ADMIN — LISINOPRIL 2.5 MG: 5 TABLET ORAL at 08:11

## 2025-05-04 RX ADMIN — Medication 1 APPLICATION: at 18:51

## 2025-05-04 RX ADMIN — TRAZODONE HYDROCHLORIDE 100 MG: 100 TABLET ORAL at 21:18

## 2025-05-04 NOTE — PLAN OF CARE
Problem: PAIN - ADULT  Goal: Verbalizes/displays adequate comfort level or baseline comfort level  Description: Interventions:- Encourage patient to monitor pain and request assistance- Assess pain using appropriate pain scale- Administer analgesics based on type and severity of pain and evaluate response- Implement non-pharmacological measures as appropriate and evaluate response- Consider cultural and social influences on pain and pain management- Notify physician/advanced practitioner if interventions unsuccessful or patient reports new pain  Outcome: Progressing     Problem: INFECTION - ADULT  Goal: Absence or prevention of progression during hospitalization  Description: INTERVENTIONS:- Assess and monitor for signs and symptoms of infection- Monitor lab/diagnostic results- Monitor all insertion sites, i.e. indwelling lines, tubes, and drains- Monitor endotracheal if appropriate and nasal secretions for changes in amount and color- [place] appropriate cooling/warming therapies per order- Administer medications as ordered- Instruct and encourage patient and family to use good hand hygiene technique- Identify and instruct in appropriate isolation precautions for identified infection/condition  Outcome: Progressing  Goal: Absence of fever/infection during neutropenic period  Description: INTERVENTIONS:- Monitor WBC  Outcome: Progressing     Problem: SAFETY ADULT  Goal: Patient will remain free of falls  Description: INTERVENTIONS:- Educate patient/family on patient safety including physical limitations  Outcome: Progressing  Goal: Maintain or return to baseline ADL function  Description: INTERVENTIONS:-  Assess patient's ability to carry out ADLs; assess patient's baseline for ADL function and identify physical deficits which impact ability to perform ADLs (bathing, care of mouth/teeth, toileting, grooming, dressing, etc.)- Assess/evaluate cause of self-care deficits - Assess range of motion- Assess patient's  mobility; develop plan if impaired- Assess patient's need for assistive devices and provide as appropriate- Encourage maximum independence but intervene and supervise when necessary- Involve family in performance of ADLs- Assess for home care needs following discharge - Consider OT consult to assist with ADL evaluation and planning for discharge- Provide patient education as appropriate  Outcome: Progressing  Goal: Maintains/Returns to pre admission functional level  Outcome: Progressing     Problem: DISCHARGE PLANNING  Goal: Discharge to home or other facility with appropriate resources  Description: INTERVENTIONS:- Identify barriers to discharge w/patient and caregiver- Arrange for needed discharge resources and transportation as appropriate- Identify discharge learning needs (meds, wound care, etc.)- Arrange for interpretive services to assist at discharge as needed- Refer to Case Management Department for coordinating discharge planning if the patient needs post-hospital services based on physician/advanced practitioner order or complex needs related to functional status, cognitive ability, or social support system  Outcome: Progressing     Problem: Knowledge Deficit  Goal: Patient/family/caregiver demonstrates understanding of disease process, treatment plan, medications, and discharge instructions  Description: Complete learning assessment and assess knowledge base.Interventions:- Provide teaching at level of understanding- Provide teaching via preferred learning methods  Outcome: Progressing     Problem: Alteration in Thoughts and Perception  Goal: Treatment Goal: Gain control of psychotic behaviors/thinking, reduce/eliminate presenting symptoms and demonstrate improved reality functioning upon discharge  Outcome: Progressing  Goal: Verbalize thoughts and feelings  Description: Interventions:- Promote a nonjudgmental and trusting relationship with the patient through active listening and therapeutic  communication- Assess patient's level of functioning, behavior and potential for risk- Engage patient in 1 on 1 interactions- Encourage patient to express fears, feelings, frustrations, and discuss symptoms  - [place] patient to reality, help patient recognize reality-based thinking - Administer medications as ordered and assess for potential side effects- Provide the patient education related to the signs and symptoms of the illness and desired effects of prescribed medications  Outcome: Progressing  Goal: Refrain from acting on delusional thinking/internal stimuli  Description: Interventions:- Monitor patient closely, per order - Utilize least restrictive measures - Set reasonable limits, give positive feedback for acceptable - Administer medications as ordered and monitor of potential side effects  Outcome: Progressing  Goal: Agree to be compliant with medication regime, as prescribed and report medication side effects  Description: Interventions:- Offer appropriate PRN medication and supervise ingestion; conduct AIMS, as needed   Outcome: Progressing  Goal: Attend and participate in unit activities, including therapeutic, recreational, and educational groups  Description: Interventions:-Encourage Visitation and family involvement in care  Outcome: Progressing  Goal: Recognize dysfunctional thoughts, communicate reality-based thoughts at the time of discharge  Description: Interventions:- Provide medication and psycho-education to assist patient in compliance and developing insight into his/her illness   Outcome: Progressing  Goal: Complete daily ADLs, including personal hygiene independently, as able  Description: Interventions:- Observe, teach, and assist patient with ADLS- Monitor and promote a balance of rest/activity, with adequate nutrition and elimination   Outcome: Progressing     Problem: Ineffective Coping  Goal: Cooperates with admission process  Description: Interventions: - Complete admission  process  Outcome: Progressing  Goal: Identifies ineffective coping skills  Outcome: Progressing  Goal: Identifies healthy coping skills  Outcome: Progressing  Goal: Demonstrates healthy coping skills  Outcome: Progressing  Goal: Participates in unit activities  Description: Interventions:- Provide therapeutic environment - Provide required programming - Redirect inappropriate behaviors   Outcome: Progressing  Goal: Patient/Family participate in treatment and DC plans  Description: Interventions:- Provide therapeutic environment  Outcome: Progressing  Goal: Patient/Family verbalizes awareness of resources  Outcome: Progressing  Goal: Understands least restrictive measures  Description: Interventions:- Utilize least restrictive behavior  Outcome: Progressing  Goal: Free from restraint events  Description: - Utilize least restrictive measures - Provide behavioral interventions - Redirect inappropriate behaviors   Outcome: Progressing     Problem: Risk for Self Injury/Neglect  Goal: Treatment Goal: Remain safe during length of stay, learn and adopt new coping skills, and be free of self-injurious ideation, impulses and acts at the time of discharge  Outcome: Progressing  Goal: Verbalize thoughts and feelings  Description: Interventions:- Assess and re-assess patient's lethality and potential for self-injury- Engage patient in 1:1 interactions, daily, for a minimum of 15 minutes- Encourage patient to express feelings, fears, frustrations, hopes- Establish rapport/trust with patient   Outcome: Progressing  Goal: Refrain from harming self  Description: Interventions:- Monitor patient closely, per order- Develop a trusting relationship- Supervise medication ingestion, monitor effects and side effects   Outcome: Progressing  Goal: Attend and participate in unit activities, including therapeutic, recreational, and educational groups  Description: Interventions:- Provide therapeutic and educational activities daily, encourage  attendance and participation, and document same in the medical record- Obtain collateral information, encourage visitation and family involvement in care   Outcome: Progressing  Goal: Recognize maladaptive responses and adopt new coping mechanisms  Outcome: Progressing  Goal: Complete daily ADLs, including personal hygiene independently, as able  Description: Interventions:- Observe, teach, and assist patient with ADLS- Monitor and promote a balance of rest/activity, with adequate nutrition and elimination  Outcome: Progressing     Problem: Depression  Goal: Treatment Goal: Demonstrate behavioral control of depressive symptoms, verbalize feelings of improved mood/affect, and adopt new coping skills prior to discharge  Outcome: Progressing  Goal: Verbalize thoughts and feelings  Description: Interventions:- Assess and re-assess patient's level of risk - Engage patient in 1:1 interactions, daily, for a minimum of 15 minutes - Encourage patient to express feelings, fears, frustrations, hopes   Outcome: Progressing  Goal: Refrain from harming self  Description: Interventions:- Monitor patient closely, per order - Supervise medication ingestion, monitor effects and side effects   Outcome: Progressing  Goal: Refrain from isolation  Description: Interventions:- Develop a trusting relationship - Encourage socialization   Outcome: Progressing  Goal: Refrain from self-neglect  Outcome: Progressing  Goal: Attend and participate in unit activities, including therapeutic, recreational, and educational groups  Description: Interventions:- Provide therapeutic and educational activities daily, encourage attendance and participation, and document same in the medical record   Outcome: Progressing  Goal: Complete daily ADLs, including personal hygiene independently, as able  Description: Interventions:- Observe, teach, and assist patient with ADLS-  Monitor and promote a balance of rest/activity, with adequate nutrition and  elimination   Outcome: Progressing     Problem: Anxiety  Goal: Anxiety is at manageable level  Description: Interventions:- Assess and monitor patient's anxiety level. - Monitor for signs and symptoms (heart palpitations, chest pain, shortness of breath, headaches, nausea, feeling jumpy, restlessness, irritable, apprehensive). - Collaborate with interdisciplinary team and initiate plan and interventions as ordered.- [place] patient to unit/surroundings- Explain treatment plan- Encourage participation in care- Encourage verbalization of concerns/fears- Identify coping mechanisms- Assist in developing anxiety-reducing skills- Administer/offer alternative therapies- Limit or eliminate stimulants  Outcome: Progressing     Problem: Risk for Violence/Aggression Toward Others  Goal: Treatment Goal: Refrain from acts of violence/aggression during length of stay, and demonstrate improved impulse control at the time of discharge  Outcome: Progressing  Goal: Verbalize thoughts and feelings  Description: Interventions:- Assess and re-assess patient's level of risk, every waking shift- Engage patient in 1:1 interactions, daily, for a minimum of 15 minutes - Allow patient to express feelings and frustrations in a safe and non-threatening manner - Establish rapport/trust with patient   Outcome: Progressing  Goal: Refrain from harming others  Outcome: Progressing  Goal: Refrain from destructive acts on the environment or property  Outcome: Progressing  Goal: Control angry outbursts  Description: Interventions:- Monitor patient closely, per order- Ensure early verbal de-escalation- Monitor prn medication needs- Set reasonable/therapeutic limits, outline behavioral expectations, and consequences - Provide a non-threatening milieu, utilizing the least restrictive interventions   Outcome: Progressing  Goal: Attend and participate in unit activities, including therapeutic, recreational, and educational groups  Description:  Interventions:- Provide therapeutic and educational activities daily, encourage attendance and participation, and document same in the medical record   Outcome: Progressing  Goal: Identify appropriate positive anger management techniques  Description: Interventions:- Offer anger management and coping skills groups - Staff will provide positive feedback for appropriate anger control  Outcome: Progressing     Problem: Alteration in Orientation  Goal: Treatment Goal: Demonstrate a reduction of confusion and improved orientation to person, place, time and/or situation upon discharge, according to optimum baseline/ability  Outcome: Progressing  Goal: Interact with staff daily  Description: Interventions:- Assess and re-assess patient's level of orientation- Engage patient in 1 on 1 interactions, daily, for a minimum of 15 minutes - Establish rapport/trust with patient   Outcome: Progressing  Goal: Express concerns related to confused thinking related to:  Description: Interventions:- Encourage patient to express feelings, fears, frustrations, hopes- Assign consistent caregivers - [place]/re-orient patient as needed- Allow comfort items, as appropriate- Provide visual cues, signs, etc.   Outcome: Progressing  Goal: Allow medical examinations, as recommended  Description: Interventions:- Provide physical/neurological exams and/or referrals, per provider   Outcome: Progressing  Goal: Cooperate with recommended testing/procedures  Description: Interventions:- Determine need for ancillary testing- Observe for mental status changes- Implement falls/precaution protocol   Outcome: Progressing  Goal: Attend and participate in unit activities, including therapeutic, recreational, and educational groups  Description: Interventions:- Provide therapeutic and educational activities daily, encourage attendance and participation, and document same in the medical record - Provide appropriate opportunities for reminiscence - Provide a  consistent daily routine - Encourage family contact/visitation   Outcome: Progressing  Goal: Complete daily ADLs, including personal hygiene independently, as able  Description: Interventions:- Observe, teach, and assist patient with ADLS  Outcome: Progressing     Problem: Individualized Interventions  Goal: Patient will verbalize appropriate use of telephone within 5 days  Description: Interventions:- Treatment team to determine use of supervised phone privileges   Outcome: Progressing  Goal: Patient will verbalize need for hospitalization and will no longer attempt elopement within 5 days  Description: Interventions:- Ongoing education to help patient understand need for hospitalization  Outcome: Progressing  Goal: Patient will recognize inappropriate behaviors and develop alternative behaviors within 5 days  Description: Interventions:- Patient in collaboration with Treatment Team will develop a behavior management plan to help identify effective coping skills to deal with stressors  Outcome: Progressing

## 2025-05-04 NOTE — NURSING NOTE
Pt is calm and cooperative, pleasant on approach. She is compliant with medications, she denies anxiety, depression and SI/HI.   She expresses housing needs, informed patient her  is working on it and she should have an update soon.   Patient has no complaint of pain, I performed wound care to her left third toe, pt tolerated well.     Will continue to monitor and assess throughout shift.

## 2025-05-04 NOTE — NURSING NOTE
"Patient calm and cooperative with irritable edge on this shift. Preoccupied with \"medication\", stated \" I told the doctor, I can't stay up, I am tired, it's the medication\". Patient difficult to redirect. Patient reports looking forward to D/C, stated \" I can't stay here fore ever, I have to move on\". Denies all psych s/s. Medication compliant. Safety precautions maintained.   "

## 2025-05-04 NOTE — PROGRESS NOTES
Progress Note - Behavioral Health   Name: Lisa ZARATE McGorry 69 y.o. female I MRN: [id number]  Unit/Bed#: [facility] 609-02 I Date of Admission: 4/8/2025   Date of Service: 5/4/2025 I Hospital Day: 26    Assessment & Plan  Bipolar affective disorder, current episode manic (HCC)  As of 05/02 the patient is denying psychiatric symptoms but is feeling fatigued.  Change times of Zyprexa from 10 mg BID to 20 mg nightly for current symptoms of carlie (starting on 05/02/25)  Continue Depakote 500 mg twice daily for mood stabilization  VPA level on 4/24 was 90, CMP and CBC is acceptable  Continue Trazodone to 100mg PO QHS for insomnia (started on 4/13/2025)  Change Klonopin to 0.25mg PO at night only for manic behaviors, can utilize until Zyprexa is at a therapeutic dose (started on 4/13/2025)    Current Medications:    Current Facility-Administered Medications:     ammonium lactate (LAC-HYDRIN) 12 % lotion, Topical, BID    apixaban (ELIQUIS) tablet 5 mg, Oral, BID    clonazePAM (KlonoPIN) tablet 0.25 mg, Oral, QPM    divalproex sodium (DEPAKOTE) DR tablet 500 mg, Oral, BID    levothyroxine tablet 88 mcg, Oral, Early Morning    lisinopril (ZESTRIL) tablet 2.5 mg, Oral, Daily    mupirocin (BACTROBAN) 2 % ointment, Topical, Daily    nystatin (MYCOSTATIN) powder, Topical, BID    OLANZapine (ZyPREXA) tablet 20 mg, Oral, QPM    pravastatin (PRAVACHOL) tablet 40 mg, Oral, Daily    traZODone (DESYREL) tablet 100 mg, Oral, HS    Current Facility-Administered Medications:     acetaminophen (TYLENOL) tablet 650 mg, Oral, Q4H PRN    acetaminophen (TYLENOL) tablet 650 mg, Oral, Q4H PRN    acetaminophen (TYLENOL) tablet 975 mg, Oral, Q6H PRN    aluminum-magnesium hydroxide-simethicone (MAALOX) oral suspension 30 mL, Oral, Q4H PRN    benztropine (COGENTIN) injection 1 mg, Intramuscular, Q4H PRN Max 6/day    benztropine (COGENTIN) tablet 0.5 mg, Oral, Q4H PRN Max 6/day    bisacodyl (DULCOLAX) rectal suppository 10 mg, Rectal, Daily PRN     hydrOXYzine HCL (ATARAX) tablet 25 mg, Oral, Q6H PRN Max 4/day    hydrOXYzine HCL (ATARAX) tablet 50 mg, Oral, Q6H PRN Max 4/day    LORazepam (ATIVAN) injection 1 mg, Intramuscular, Q6H PRN Max 3/day    LORazepam (ATIVAN) tablet 1 mg, Oral, Q6H PRN Max 3/day    OLANZapine (ZyPREXA) IM injection 5 mg, Intramuscular, Q3H PRN Max 3/day    OLANZapine (ZyPREXA) tablet 2.5 mg, Oral, Q4H PRN Max 6/day    OLANZapine (ZyPREXA) tablet 5 mg, Oral, Q4H PRN Max 3/day    OLANZapine (ZyPREXA) tablet 5 mg, Oral, Q3H PRN Max 3/day    polyethylene glycol (MIRALAX) packet 17 g, Oral, Daily PRN    propranolol (INDERAL) tablet 5 mg, Oral, Q8H PRN    senna-docusate sodium (SENOKOT S) 8.6-50 mg per tablet 1 tablet, Oral, Daily PRN    traZODone (DESYREL) tablet 50 mg, Oral, HS PRN    Risks/Benefits of Treatment:     Risks, benefits, and possible side effects of medications explained to patient and patient verbalizes understanding and agreement for treatment.    Progress Toward Goals: progressing    Treatment Planning:      - Encourage early mobility and having a structured day  - Provide frequent re-orientation, and cognitive stimulation  - Ensure assistive devices are in proper working order (eye-glasses, hearing aids)  - Encourage adequate hydration, nutrition and monitor bowel movements  - Maintain sleep-wake cycle: Uninterrupted sleep time; low-level lighting at night  - Fall precaution  - Follow up with [facility] regarding the medical problems   - Continue medication titration and treatment plan; adjust medication to optimize treatment response and as clinically indicated.   - Observation: Routine  - VS: as per unit protocol  - Encourage group attendance and milieu therapy  - Dispo: To be determined  - Estimated Discharge Day: 5/5/2025   - Legal Status : Voluntary 201 commitment.  - Long Stay Certification : Not Applicable    Subjective     Lisa is observed this morning walking among the milieu. Appears to be less manic compared to  "admission, with improvements in mood and energy toning down. Has been less overly euphoric and hyperactive. No worsening sexual comments or preoccupation. Thoughts seem to become more connected/organized. Although she still remains talkative, more amendable to redirection. She has been perseverative on discharge, frequently asking when she is leaving. Discussed pending response from [facility]. Does mention some ongoing sedation from medications, however could be subjective adjustment from patients previous elevated state as she approaches baseline. Does not appear to be overtly sedated and has been participating in inpatient programming, and interacting with staff/peers.     Sleep: normal  Appetite: normal  Medication side effects: Yes - reporting sedation  ROS: review of systems as noted above in HPI/Subjective report, all other systems are negative    Objective :  Temp:  [96.8 °F (36 °C)-97.5 °F (36.4 °C)] 97.2 °F (36.2 °C)  HR:  [71-98] 90  BP: (107-144)/(57-76) 136/76  Resp:  [17-18] 17  SpO2:  [95 %-96 %] 95 %  O2 Device: None (Room air)    Mental Status Evaluation:    Appearance:  disheveled, marginal hygiene, dressed in hospital attire, looks stated age   Behavior:  pleasant, cooperative   Speech:  normal rate, normal volume, normal pitch   Mood:  \"good\"   Affect:  Appropriate, bright   Thought Process:  organized, logical, perseverative   Thought Content:  no overt delusions   Perceptual Disturbances: no auditory hallucinations, no visual hallucinations, does not appear responding to internal stimuli   Risk Potential: Suicidal Ideation -  None at present  Homicidal Ideation -  None at present  Potential for Aggression - Not at present   Sensorium:  Alert to person and place   Memory:  recent and remote memory grossly intact   Consciousness:  alert and awake   Attention/Concentration: attention span and concentration are age appropriate   Insight:  limited   Judgment: limited   Gait/Station: normal " "gait/station   Motor Activity: no abnormal movements         Lab Results: I have reviewed the following results:  Results from the past 24 hours:   No results found for this or any previous visit (from the past 24 hours).      Administrative Statements     Counseling / Coordination of Care:   Patient's progress discussed with staff in treatment team meeting.  Medication changes reviewed with staff in treatment team meeting.    Portions of the record may have been created with voice recognition software. Occasional wrong word or \"sound a like\" substitutions may have occurred due to the inherent limitations of voice recognition software. Read the chart carefully and recognize, using context, where substitutions have occurred.    Jojo Valle PA-C 05/04/25    "

## 2025-05-05 VITALS
SYSTOLIC BLOOD PRESSURE: 113 MMHG | TEMPERATURE: 97.9 F | HEIGHT: 60 IN | WEIGHT: 175.6 LBS | RESPIRATION RATE: 16 BRPM | OXYGEN SATURATION: 97 % | BODY MASS INDEX: 34.47 KG/M2 | DIASTOLIC BLOOD PRESSURE: 65 MMHG | HEART RATE: 98 BPM

## 2025-05-05 PROBLEM — Z00.8 MEDICAL CLEARANCE FOR PSYCHIATRIC ADMISSION: Status: RESOLVED | Noted: 2020-01-20 | Resolved: 2025-05-05

## 2025-05-05 PROCEDURE — 99239 HOSP IP/OBS DSCHRG MGMT >30: CPT

## 2025-05-05 RX ORDER — DIVALPROEX SODIUM 500 MG/1
500 TABLET, DELAYED RELEASE ORAL 2 TIMES DAILY
Start: 2025-05-05

## 2025-05-05 RX ORDER — MUPIROCIN 20 MG/G
OINTMENT TOPICAL DAILY
Start: 2025-05-06

## 2025-05-05 RX ORDER — AMMONIUM LACTATE 12 G/100G
LOTION TOPICAL 2 TIMES DAILY
Start: 2025-05-05

## 2025-05-05 RX ORDER — OLANZAPINE 20 MG/1
20 TABLET, FILM COATED ORAL EVERY EVENING
Start: 2025-05-05

## 2025-05-05 RX ORDER — PRAVASTATIN SODIUM 40 MG
40 TABLET ORAL DAILY
Start: 2025-05-05

## 2025-05-05 RX ORDER — LISINOPRIL 2.5 MG/1
2.5 TABLET ORAL DAILY
Start: 2025-05-06

## 2025-05-05 RX ORDER — NYSTATIN 100000 [USP'U]/G
POWDER TOPICAL 2 TIMES DAILY
Start: 2025-05-05

## 2025-05-05 RX ORDER — TRAZODONE HYDROCHLORIDE 100 MG/1
100 TABLET ORAL
Start: 2025-05-05

## 2025-05-05 RX ORDER — LEVOTHYROXINE SODIUM 88 UG/1
88 TABLET ORAL
Start: 2025-05-06

## 2025-05-05 RX ORDER — CLONAZEPAM 0.5 MG/1
0.25 TABLET ORAL EVERY EVENING
Start: 2025-05-05 | End: 2025-05-05

## 2025-05-05 RX ORDER — CLONAZEPAM 0.5 MG/1
0.25 TABLET ORAL EVERY EVENING
Qty: 5 TABLET | Refills: 0 | Status: SHIPPED | OUTPATIENT
Start: 2025-05-05 | End: 2025-05-14 | Stop reason: SDUPTHER

## 2025-05-05 RX ADMIN — OLANZAPINE 20 MG: 10 TABLET, FILM COATED ORAL at 17:18

## 2025-05-05 RX ADMIN — Medication: at 17:22

## 2025-05-05 RX ADMIN — PRAVASTATIN SODIUM 40 MG: 40 TABLET ORAL at 08:24

## 2025-05-05 RX ADMIN — DIVALPROEX SODIUM 500 MG: 500 TABLET, DELAYED RELEASE ORAL at 08:24

## 2025-05-05 RX ADMIN — NYSTATIN: 100000 POWDER TOPICAL at 17:22

## 2025-05-05 RX ADMIN — NYSTATIN: 100000 POWDER TOPICAL at 08:27

## 2025-05-05 RX ADMIN — MUPIROCIN: 20 OINTMENT TOPICAL at 08:26

## 2025-05-05 RX ADMIN — APIXABAN 5 MG: 5 TABLET, FILM COATED ORAL at 17:18

## 2025-05-05 RX ADMIN — LISINOPRIL 2.5 MG: 5 TABLET ORAL at 08:24

## 2025-05-05 RX ADMIN — APIXABAN 5 MG: 5 TABLET, FILM COATED ORAL at 08:24

## 2025-05-05 RX ADMIN — Medication: at 08:26

## 2025-05-05 RX ADMIN — CLONAZEPAM 0.25 MG: 0.5 TABLET ORAL at 17:19

## 2025-05-05 RX ADMIN — LEVOTHYROXINE SODIUM 88 MCG: 88 TABLET ORAL at 05:20

## 2025-05-05 NOTE — DISCHARGE INSTR - APPOINTMENTS
Behavioral Health Nurse Navigator, Laura or Carlita will be calling you after your discharge, on the phone number that you provided.  They will be available as an additional support, if needed.   If you wish to speak with Laura, you may contact her at [phone].

## 2025-05-05 NOTE — NURSING NOTE
Patient reports readiness for discharge. She left via EMT transport on stretcher. Report given to [place] nurse by previous RN. AVS reviewed with patient and facility nurse. All questions answered. Patient left with all belongings brought in with her at time of discharge.

## 2025-05-05 NOTE — PLAN OF CARE
Problem: PAIN - ADULT  Goal: Verbalizes/displays adequate comfort level or baseline comfort level  Description: Interventions:- Encourage patient to monitor pain and request assistance- Assess pain using appropriate pain scale- Administer analgesics based on type and severity of pain and evaluate response- Implement non-pharmacological measures as appropriate and evaluate response- Consider cultural and social influences on pain and pain management- Notify physician/advanced practitioner if interventions unsuccessful or patient reports new pain  Outcome: Progressing     Problem: INFECTION - ADULT  Goal: Absence or prevention of progression during hospitalization  Description: INTERVENTIONS:- Assess and monitor for signs and symptoms of infection- Monitor lab/diagnostic results- Monitor all insertion sites, i.e. indwelling lines, tubes, and drains- Monitor endotracheal if appropriate and nasal secretions for changes in amount and color- [place] appropriate cooling/warming therapies per order- Administer medications as ordered- Instruct and encourage patient and family to use good hand hygiene technique- Identify and instruct in appropriate isolation precautions for identified infection/condition  Outcome: Progressing  Goal: Absence of fever/infection during neutropenic period  Description: INTERVENTIONS:- Monitor WBC  Outcome: Progressing     Problem: SAFETY ADULT  Goal: Patient will remain free of falls  Description: INTERVENTIONS:- Educate patient/family on patient safety including physical limitations  Outcome: Progressing  Goal: Maintain or return to baseline ADL function  Description: INTERVENTIONS:-  Assess patient's ability to carry out ADLs; assess patient's baseline for ADL function and identify physical deficits which impact ability to perform ADLs (bathing, care of mouth/teeth, toileting, grooming, dressing, etc.)- Assess/evaluate cause of self-care deficits - Assess range of motion- Assess patient's  mobility; develop plan if impaired- Assess patient's need for assistive devices and provide as appropriate- Encourage maximum independence but intervene and supervise when necessary- Involve family in performance of ADLs- Assess for home care needs following discharge - Consider OT consult to assist with ADL evaluation and planning for discharge- Provide patient education as appropriate  Outcome: Progressing  Goal: Maintains/Returns to pre admission functional level  Outcome: Progressing     Problem: DISCHARGE PLANNING  Goal: Discharge to home or other facility with appropriate resources  Description: INTERVENTIONS:- Identify barriers to discharge w/patient and caregiver- Arrange for needed discharge resources and transportation as appropriate- Identify discharge learning needs (meds, wound care, etc.)- Arrange for interpretive services to assist at discharge as needed- Refer to Case Management Department for coordinating discharge planning if the patient needs post-hospital services based on physician/advanced practitioner order or complex needs related to functional status, cognitive ability, or social support system  Outcome: Progressing     Problem: Knowledge Deficit  Goal: Patient/family/caregiver demonstrates understanding of disease process, treatment plan, medications, and discharge instructions  Description: Complete learning assessment and assess knowledge base.Interventions:- Provide teaching at level of understanding- Provide teaching via preferred learning methods  Outcome: Progressing     Problem: Alteration in Thoughts and Perception  Goal: Treatment Goal: Gain control of psychotic behaviors/thinking, reduce/eliminate presenting symptoms and demonstrate improved reality functioning upon discharge  Outcome: Progressing  Goal: Verbalize thoughts and feelings  Description: Interventions:- Promote a nonjudgmental and trusting relationship with the patient through active listening and therapeutic  communication- Assess patient's level of functioning, behavior and potential for risk- Engage patient in 1 on 1 interactions- Encourage patient to express fears, feelings, frustrations, and discuss symptoms  - [place] patient to reality, help patient recognize reality-based thinking - Administer medications as ordered and assess for potential side effects- Provide the patient education related to the signs and symptoms of the illness and desired effects of prescribed medications  Outcome: Progressing  Goal: Refrain from acting on delusional thinking/internal stimuli  Description: Interventions:- Monitor patient closely, per order - Utilize least restrictive measures - Set reasonable limits, give positive feedback for acceptable - Administer medications as ordered and monitor of potential side effects  Outcome: Progressing  Goal: Agree to be compliant with medication regime, as prescribed and report medication side effects  Description: Interventions:- Offer appropriate PRN medication and supervise ingestion; conduct AIMS, as needed   Outcome: Progressing  Goal: Attend and participate in unit activities, including therapeutic, recreational, and educational groups  Description: Interventions:-Encourage Visitation and family involvement in care  Outcome: Progressing  Goal: Recognize dysfunctional thoughts, communicate reality-based thoughts at the time of discharge  Description: Interventions:- Provide medication and psycho-education to assist patient in compliance and developing insight into his/her illness   Outcome: Progressing  Goal: Complete daily ADLs, including personal hygiene independently, as able  Description: Interventions:- Observe, teach, and assist patient with ADLS- Monitor and promote a balance of rest/activity, with adequate nutrition and elimination   Outcome: Progressing     Problem: Ineffective Coping  Goal: Cooperates with admission process  Description: Interventions: - Complete admission  process  Outcome: Progressing  Goal: Identifies ineffective coping skills  Outcome: Progressing  Goal: Identifies healthy coping skills  Outcome: Progressing  Goal: Demonstrates healthy coping skills  Outcome: Progressing  Goal: Participates in unit activities  Description: Interventions:- Provide therapeutic environment - Provide required programming - Redirect inappropriate behaviors   Outcome: Progressing  Goal: Patient/Family participate in treatment and DC plans  Description: Interventions:- Provide therapeutic environment  Outcome: Progressing  Goal: Patient/Family verbalizes awareness of resources  Outcome: Progressing  Goal: Understands least restrictive measures  Description: Interventions:- Utilize least restrictive behavior  Outcome: Progressing  Goal: Free from restraint events  Description: - Utilize least restrictive measures - Provide behavioral interventions - Redirect inappropriate behaviors   Outcome: Progressing     Problem: Risk for Self Injury/Neglect  Goal: Treatment Goal: Remain safe during length of stay, learn and adopt new coping skills, and be free of self-injurious ideation, impulses and acts at the time of discharge  Outcome: Progressing  Goal: Verbalize thoughts and feelings  Description: Interventions:- Assess and re-assess patient's lethality and potential for self-injury- Engage patient in 1:1 interactions, daily, for a minimum of 15 minutes- Encourage patient to express feelings, fears, frustrations, hopes- Establish rapport/trust with patient   Outcome: Progressing  Goal: Refrain from harming self  Description: Interventions:- Monitor patient closely, per order- Develop a trusting relationship- Supervise medication ingestion, monitor effects and side effects   Outcome: Progressing  Goal: Attend and participate in unit activities, including therapeutic, recreational, and educational groups  Description: Interventions:- Provide therapeutic and educational activities daily, encourage  attendance and participation, and document same in the medical record- Obtain collateral information, encourage visitation and family involvement in care   Outcome: Progressing  Goal: Recognize maladaptive responses and adopt new coping mechanisms  Outcome: Progressing  Goal: Complete daily ADLs, including personal hygiene independently, as able  Description: Interventions:- Observe, teach, and assist patient with ADLS- Monitor and promote a balance of rest/activity, with adequate nutrition and elimination  Outcome: Progressing     Problem: Depression  Goal: Treatment Goal: Demonstrate behavioral control of depressive symptoms, verbalize feelings of improved mood/affect, and adopt new coping skills prior to discharge  Outcome: Progressing  Goal: Verbalize thoughts and feelings  Description: Interventions:- Assess and re-assess patient's level of risk - Engage patient in 1:1 interactions, daily, for a minimum of 15 minutes - Encourage patient to express feelings, fears, frustrations, hopes   Outcome: Progressing  Goal: Refrain from harming self  Description: Interventions:- Monitor patient closely, per order - Supervise medication ingestion, monitor effects and side effects   Outcome: Progressing  Goal: Refrain from isolation  Description: Interventions:- Develop a trusting relationship - Encourage socialization   Outcome: Progressing  Goal: Refrain from self-neglect  Outcome: Progressing  Goal: Attend and participate in unit activities, including therapeutic, recreational, and educational groups  Description: Interventions:- Provide therapeutic and educational activities daily, encourage attendance and participation, and document same in the medical record   Outcome: Progressing  Goal: Complete daily ADLs, including personal hygiene independently, as able  Description: Interventions:- Observe, teach, and assist patient with ADLS-  Monitor and promote a balance of rest/activity, with adequate nutrition and  elimination   Outcome: Progressing     Problem: Anxiety  Goal: Anxiety is at manageable level  Description: Interventions:- Assess and monitor patient's anxiety level. - Monitor for signs and symptoms (heart palpitations, chest pain, shortness of breath, headaches, nausea, feeling jumpy, restlessness, irritable, apprehensive). - Collaborate with interdisciplinary team and initiate plan and interventions as ordered.- [place] patient to unit/surroundings- Explain treatment plan- Encourage participation in care- Encourage verbalization of concerns/fears- Identify coping mechanisms- Assist in developing anxiety-reducing skills- Administer/offer alternative therapies- Limit or eliminate stimulants  Outcome: Progressing     Problem: Risk for Violence/Aggression Toward Others  Goal: Treatment Goal: Refrain from acts of violence/aggression during length of stay, and demonstrate improved impulse control at the time of discharge  Outcome: Progressing  Goal: Verbalize thoughts and feelings  Description: Interventions:- Assess and re-assess patient's level of risk, every waking shift- Engage patient in 1:1 interactions, daily, for a minimum of 15 minutes - Allow patient to express feelings and frustrations in a safe and non-threatening manner - Establish rapport/trust with patient   Outcome: Progressing  Goal: Refrain from harming others  Outcome: Progressing  Goal: Refrain from destructive acts on the environment or property  Outcome: Progressing  Goal: Control angry outbursts  Description: Interventions:- Monitor patient closely, per order- Ensure early verbal de-escalation- Monitor prn medication needs- Set reasonable/therapeutic limits, outline behavioral expectations, and consequences - Provide a non-threatening milieu, utilizing the least restrictive interventions   Outcome: Progressing  Goal: Attend and participate in unit activities, including therapeutic, recreational, and educational groups  Description:  Interventions:- Provide therapeutic and educational activities daily, encourage attendance and participation, and document same in the medical record   Outcome: Progressing  Goal: Identify appropriate positive anger management techniques  Description: Interventions:- Offer anger management and coping skills groups - Staff will provide positive feedback for appropriate anger control  Outcome: Progressing     Problem: Alteration in Orientation  Goal: Treatment Goal: Demonstrate a reduction of confusion and improved orientation to person, place, time and/or situation upon discharge, according to optimum baseline/ability  Outcome: Progressing  Goal: Interact with staff daily  Description: Interventions:- Assess and re-assess patient's level of orientation- Engage patient in 1 on 1 interactions, daily, for a minimum of 15 minutes - Establish rapport/trust with patient   Outcome: Progressing  Goal: Express concerns related to confused thinking related to:  Description: Interventions:- Encourage patient to express feelings, fears, frustrations, hopes- Assign consistent caregivers - [place]/re-orient patient as needed- Allow comfort items, as appropriate- Provide visual cues, signs, etc.   Outcome: Progressing  Goal: Allow medical examinations, as recommended  Description: Interventions:- Provide physical/neurological exams and/or referrals, per provider   Outcome: Progressing  Goal: Cooperate with recommended testing/procedures  Description: Interventions:- Determine need for ancillary testing- Observe for mental status changes- Implement falls/precaution protocol   Outcome: Progressing  Goal: Attend and participate in unit activities, including therapeutic, recreational, and educational groups  Description: Interventions:- Provide therapeutic and educational activities daily, encourage attendance and participation, and document same in the medical record - Provide appropriate opportunities for reminiscence - Provide a  consistent daily routine - Encourage family contact/visitation   Outcome: Progressing  Goal: Complete daily ADLs, including personal hygiene independently, as able  Description: Interventions:- Observe, teach, and assist patient with ADLS  Outcome: Progressing     Problem: Individualized Interventions  Goal: Patient will verbalize appropriate use of telephone within 5 days  Description: Interventions:- Treatment team to determine use of supervised phone privileges   Outcome: Progressing  Goal: Patient will verbalize need for hospitalization and will no longer attempt elopement within 5 days  Description: Interventions:- Ongoing education to help patient understand need for hospitalization  Outcome: Progressing  Goal: Patient will recognize inappropriate behaviors and develop alternative behaviors within 5 days  Description: Interventions:- Patient in collaboration with Treatment Team will develop a behavior management plan to help identify effective coping skills to deal with stressors  Outcome: Progressing

## 2025-05-05 NOTE — NURSING NOTE
Patient is alert and oriented able to make her needs known.  She is calm and pleasant on approach. She is visible in the milieu interacts with staff and peers. She denies all psych s/s. No behaviors observed this shift. Will cont to monitor. Safety checks ongoing.
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
## 2025-05-05 NOTE — PROGRESS NOTES
05/05/25 0800   Team Meeting   Meeting Type Daily Rounds   Team Members Present   Team Members Present Physician;Nurse;   Physician Team Member Tiny/Dilma/Ramez   Nursing Team Member Rafaela/Carmen/Linda   Care Management Team Member Stanford   OT Team Member Alix   Patient/Family Present   Patient Present No   Patient's Family Present No     Patient was accepted to different facilities but CM is awaiting auth. Patient is calm and cooperative with care. She is attending groups and is eating and sleeping well. Patient discharge is pending stabilization of mood and medications.

## 2025-05-05 NOTE — PLAN OF CARE
Problem: DISCHARGE PLANNING  Goal: Discharge to home or other facility with appropriate resources  Description: INTERVENTIONS:- Identify barriers to discharge w/patient and caregiver- Arrange for needed discharge resources and transportation as appropriate- Identify discharge learning needs (meds, wound care, etc.)- Arrange for interpretive services to assist at discharge as needed- Refer to Case Management Department for coordinating discharge planning if the patient needs post-hospital services based on physician/advanced practitioner order or complex needs related to functional status, cognitive ability, or social support system    Outcome: Adequate for Discharge  Pt to D/C today. Pt denies SI/HI/AVH. Pt oriented x3. Pt to d/c to [facility] and S will  upon discharge. Pt to follow up with nursing home staff. Scripts sent with patient.     Discharge Address: [facility]   Phone: [phone]

## 2025-05-05 NOTE — TREATMENT TEAM
Pt attends groups.  Pt pleasant and cooperative  Pt  able to identify positive self talk.   05/05/25 1100   Activity/Group Checklist   Group Other (Comment)  ([facility]: Awareness of mental health needs and d/c readiness)   Attendance Attended   Attendance Duration (min) 31-45   Interactions Interacted appropriately   Affect/Mood Appropriate;Bright   Goals Achieved Identified feelings;Identified relapse prevention strategies;Discussed self-esteem issues;Able to listen to others;Able to engage in interactions;Able to reflect/comment on own behavior;Able to manage/cope with feelings;Verbalized increased hopefulness;Able to self-disclose

## 2025-05-05 NOTE — CASE MANAGEMENT
Patient accepted at [facility] if she qualifies after Auth. Facility to complete auth. CM faxed additional clinical from the last 48 hours. Will await response.

## 2025-05-05 NOTE — ASSESSMENT & PLAN NOTE
Continue Zyprexa 20 mg QHS for symptoms of psychosis  Continue Depakote 500 mg twice daily for mood stabilization  VPA level on 4/24 was 90, CMP and CBC is acceptable  Continue Trazodone to 100mg PO QHS for insomnia  Change Klonopin to 0.25mg PO at night for nighttime anxiety and insomnia in the setting of manic symptoms

## 2025-05-05 NOTE — BH TRANSITION RECORD
Contact Information: If you have any questions, concerns, pended studies, tests and/or procedures, or emergencies regarding your inpatient behavioral health visit. Please contact [facility] older adult behavioral health unit 6T [phone] and ask to speak to a , nurse or physician. A contact is available 24 hours/ 7 days a week at this number.     Summary of Procedures Performed During your Stay:  Below is a list of major procedures performed during your hospital stay and a summary of results:    VAS upper limb venous duplex scan 4/11/25  Impression  RIGHT UPPER LIMB LIMITED:  Evaluation shows no evidence of thrombus in the internal jugular vein and  subclavian vein. The innominate vein was not visualized.  LEFT UPPER LIMB:  There is evidence of acute occlusive thrombus in a small segment of the  cephalic vein at the distal upper arm.  Doppler evaluation shows a normal response to augmentation maneuvers.      EKG 4/9/2025   Ventricular Rate      BPM 77    Atrial Rate      BPM 77    MS Interval      ms 156    QRSD Interval      ms 84    QT Interval      ms 360    QTC Interval      ms 408    P Axis      degrees 66    QRS Axis      degrees 27    T Wave Axis      degrees 43    Normal sinus rhythm  Cannot rule out Anterior infarct , age undetermined  Abnormal ECG  When compared with ECG of 07-Apr-2025 15:11,  Nonspecific T wave abnormality has replaced inverted T waves in Inferior leads      Pending Studies (From admission, onward)      None          Please follow up on the above pending studies with your PCP and/or referring provider.

## 2025-05-06 ENCOUNTER — TELEPHONE (OUTPATIENT)
Dept: FAMILY MEDICINE CLINIC | Facility: CLINIC | Age: 70
End: 2025-05-06

## 2025-05-06 NOTE — TELEPHONE ENCOUNTER
Called patient due to missing appointment. Brother said that she is in the hospital and has been for about 1 month. I told him to let us know when she is out

## 2025-05-13 ENCOUNTER — OFFICE VISIT (OUTPATIENT)
Dept: FAMILY MEDICINE CLINIC | Facility: HOME HEALTHCARE | Age: 70
End: 2025-05-13
Payer: COMMERCIAL

## 2025-05-13 VITALS
OXYGEN SATURATION: 97 % | DIASTOLIC BLOOD PRESSURE: 72 MMHG | HEART RATE: 93 BPM | RESPIRATION RATE: 20 BRPM | HEIGHT: 60 IN | TEMPERATURE: 98.1 F | BODY MASS INDEX: 35.42 KG/M2 | WEIGHT: 180.4 LBS | SYSTOLIC BLOOD PRESSURE: 120 MMHG

## 2025-05-13 DIAGNOSIS — F31.2 BIPOLAR AFFECTIVE DISORDER, CURRENTLY MANIC, SEVERE, WITH PSYCHOTIC FEATURES (HCC): ICD-10-CM

## 2025-05-13 DIAGNOSIS — Z78.9 TRANSITION OF CARE: Primary | ICD-10-CM

## 2025-05-13 PROCEDURE — 99495 TRANSJ CARE MGMT MOD F2F 14D: CPT | Performed by: PHYSICIAN ASSISTANT

## 2025-05-13 RX ORDER — CLONAZEPAM 0.5 MG/1
0.25 TABLET ORAL EVERY EVENING
Qty: 5 TABLET | Refills: 0 | Status: CANCELLED | OUTPATIENT
Start: 2025-05-13 | End: 2025-05-23

## 2025-05-13 NOTE — PROGRESS NOTES
Transition of Care Visit:  Name: Lisa Rich      : 1955      MRN: [id number]  Encounter Provider: Gadiel Young PA-C  Encounter Date: 2025   Encounter department: [facility]    Assessment & Plan  Transition of care         Bipolar affective disorder, currently manic, severe, with psychotic features (HCC)  Continue Depakote, trazodone, and Zyprexa as prescribed.  Follow up with psychiatry.            History of Present Illness     Transitional Care Management Review:   Lisa Rich is a 69 y.o. female here for TCM follow up.     During the TCM phone call patient stated:  TCM Call (since 2025)     None      TCM Call (since 2025)     Amanda Gonzalez is a 69 year old female with history of HLD, hypothyroidism, type 2 DM, and bipolar disorder, presenting for TCM.    Patient was hospitalized 4/3- at [facility] with disorganized behavior, pressured speech, and symptoms of carlie. She was initially admitted to the hospital on a 302 and was subsequently converted to a 201 voluntary admission.  She was started on Zyprexa 20 mg daily, Depakote 500 mg BID, and trazodone 100 mg daily as well as klonopin 0.25 mg qhs.  She was subsequently discharged to [facility] on  where she stayed until 5/10 when she was discharged home.    Patient is unsure of if/when she has an appointment with her psychiatrist through [facility].  She reports med compliance and is not aware of any refills that she needs currently.  She denies medication side effects.  Denies SI/HI.      Review of Systems   Constitutional:  Positive for fatigue. Negative for chills, diaphoresis and fever.   Respiratory:  Negative for cough, chest tightness, shortness of breath and wheezing.    Cardiovascular:  Negative for chest pain, palpitations and leg swelling.   Gastrointestinal:  Negative for abdominal pain, constipation, diarrhea, nausea and vomiting.    Skin:  Negative for rash and wound.   Neurological:  Negative for dizziness, syncope, weakness, light-headedness and headaches.   Psychiatric/Behavioral:  Negative for dysphoric mood, hallucinations, self-injury and suicidal ideas.      Objective   /72 (BP Location: Left arm, Patient Position: Sitting, Cuff Size: Large)   Pulse 93   Temp 98.1 °F (36.7 °C) (Tympanic)   Resp 20   Ht 5' (1.524 m)   Wt 81.8 kg (180 lb 6.4 oz)   SpO2 97%   BMI 35.23 kg/m²     Physical Exam  Vitals and nursing note reviewed.   Constitutional:       General: She is not in acute distress.     Appearance: Normal appearance. She is obese.      Comments: Ambulating with a cane   HENT:      Head: Normocephalic and atraumatic.   Eyes:      Extraocular Movements: Extraocular movements intact.      Conjunctiva/sclera: Conjunctivae normal.      Pupils: Pupils are equal, round, and reactive to light.   Cardiovascular:      Rate and Rhythm: Normal rate and regular rhythm.      Heart sounds: Normal heart sounds. No murmur heard.  Pulmonary:      Effort: Pulmonary effort is normal. No respiratory distress.      Breath sounds: Normal breath sounds. No wheezing.   Musculoskeletal:      Cervical back: Normal range of motion and neck supple.      Right lower leg: No edema.      Left lower leg: No edema.   Skin:     General: Skin is warm and dry.   Neurological:      General: No focal deficit present.      Mental Status: She is alert and oriented to person, place, and time.      Cranial Nerves: No cranial nerve deficit.      Motor: No weakness.   Psychiatric:         Mood and Affect: Mood normal.         Behavior: Behavior normal.       Medications have been reviewed by provider in current encounter

## 2025-05-14 DIAGNOSIS — F31.2 BIPOLAR AFFECTIVE DISORDER, CURRENTLY MANIC, SEVERE, WITH PSYCHOTIC FEATURES (HCC): ICD-10-CM

## 2025-05-14 RX ORDER — CLONAZEPAM 0.5 MG/1
0.25 TABLET ORAL EVERY EVENING
Qty: 5 TABLET | Refills: 0 | Status: SHIPPED | OUTPATIENT
Start: 2025-05-14

## 2025-06-09 ENCOUNTER — RA CDI HCC (OUTPATIENT)
Dept: OTHER | Facility: HOSPITAL | Age: 70
End: 2025-06-09

## 2025-06-10 NOTE — PROGRESS NOTES
HCC coding opportunities    E66.01, I12.9  GR     Chart Reviewed number of suggestions sent to Provider: 2     Patients Insurance     Medicare Insurance: [employer] Advantage

## 2025-06-12 ENCOUNTER — TELEPHONE (OUTPATIENT)
Dept: FAMILY MEDICINE CLINIC | Facility: CLINIC | Age: 70
End: 2025-06-12

## 2025-06-12 NOTE — TELEPHONE ENCOUNTER
Yes nat, this is Lisa Mcgorry. I have an appointment for June the 16th and I won't be able to make it, but I'd like to res[birth year]dule my birthday to 1955. Phone number [phone]. Thank you Bye.    LVM -told pt that I cancelled her apt for 6-16-25 and to call office back to reschedule apt.

## 2025-06-23 ENCOUNTER — OFFICE VISIT (OUTPATIENT)
Dept: OBGYN CLINIC | Facility: CLINIC | Age: 70
End: 2025-06-23
Payer: COMMERCIAL

## 2025-06-23 VITALS — WEIGHT: 180 LBS | BODY MASS INDEX: 35.34 KG/M2 | HEIGHT: 60 IN

## 2025-06-23 DIAGNOSIS — M17.12 PRIMARY OSTEOARTHRITIS OF LEFT KNEE: Primary | ICD-10-CM

## 2025-06-23 PROCEDURE — 99213 OFFICE O/P EST LOW 20 MIN: CPT | Performed by: ORTHOPAEDIC SURGERY

## 2025-06-23 PROCEDURE — 20610 DRAIN/INJ JOINT/BURSA W/O US: CPT | Performed by: ORTHOPAEDIC SURGERY

## 2025-06-23 RX ORDER — TRIAMCINOLONE ACETONIDE 40 MG/ML
80 INJECTION, SUSPENSION INTRA-ARTICULAR; INTRAMUSCULAR
Status: COMPLETED | OUTPATIENT
Start: 2025-06-23 | End: 2025-06-23

## 2025-06-23 RX ORDER — BUPIVACAINE HYDROCHLORIDE 2.5 MG/ML
4 INJECTION, SOLUTION INFILTRATION; PERINEURAL
Status: COMPLETED | OUTPATIENT
Start: 2025-06-23 | End: 2025-06-23

## 2025-06-23 RX ADMIN — BUPIVACAINE HYDROCHLORIDE 4 ML: 2.5 INJECTION, SOLUTION INFILTRATION; PERINEURAL at 13:00

## 2025-06-23 RX ADMIN — TRIAMCINOLONE ACETONIDE 80 MG: 40 INJECTION, SUSPENSION INTRA-ARTICULAR; INTRAMUSCULAR at 13:00

## 2025-06-23 NOTE — PROGRESS NOTES
Assessment & Plan  Primary osteoarthritis of left knee  The patient's left knee was injected with Kenalog and Marcaine.  She tolerated the injection quite well.  She will follow-up with our office in 3 months.  She is acceptable to this plan.             The patient has degenerative joint disease of her left knee.  Under aseptic technique the left knee was injected with Kenalog and Marcaine.  She tolerated procedure well.  Return back months for strength and motion check    Return in about 3 months (around 9/23/2025).      _____________________________________________________  CHIEF COMPLAINT:  Chief Complaint   Patient presents with    Left Knee - Follow-up         SUBJECTIVE:  Lisa Rich is a 69 y.o. female who presents to our office for a follow-up visit.  The patient has a history of primary osteoarthritis of her left knee.  She would like a corticosteroid injection today, as they provided her with relief of her symptoms in the past.  She denies any numbness or tingling.  She denies any fever or chills.    The following portions of the patient's history were reviewed and updated as appropriate: allergies, current medications, past family history, past medical history, past social history, past surgical history and problem list.    PAST MEDICAL HISTORY:  Past Medical History[1]    PAST SURGICAL HISTORY:  Past Surgical History[2]    FAMILY HISTORY:  Family History[3]    SOCIAL HISTORY:  Social History[4]    MEDICATIONS:  Current Medications[5]    ALLERGIES:  Allergies[6]    ROS:  Review of Systems     Constitutional: Negative for fatigue, fever or loss of appetite.   HENT: Negative.    Respiratory: Negative for shortness of breath, dyspnea.    Cardiovascular: Negative for chest pain/tightness.   Gastrointestinal: Negative for abdominal pain, N/V.   Endocrine: Negative for cold/heat intolerance, unexplained weight loss/gain.   Genitourinary: Negative for flank pain, dysuria, hematuria.   Musculoskeletal:  Positive for arthralgia   Skin: Negative for rash.    Neurological: Negative for numbness or tingling  Psychiatric/Behavioral: Negative for agitation.  _____________________________________________________  PHYSICAL EXAMINATION:    Height 5' (1.524 m), weight 81.6 kg (180 lb).    Constitutional: Oriented to person, place, and time. Appears well-developed and well-nourished. No distress.   HENT:   Head: Normocephalic.   Eyes: Conjunctivae are normal. Right eye exhibits no discharge. Left eye exhibits no discharge. No scleral icterus.   Cardiovascular: Normal rate.    Pulmonary/Chest: Effort normal.   Neurological: Alert and oriented to person, place, and time.   Skin: Skin is warm and dry. No rash noted. Not diaphoretic. No erythema. No pallor.   Psychiatric: Normal mood and affect. Behavior is normal. Judgment and thought content normal.      MUSCULOSKELETAL EXAMINATION:   Physical Exam  Ortho Exam    Left lower extremity is neurovascularly intact  Toes are pink and mobile   Compartments are soft  No warmth, erythema or ecchymosis  ROM of knee is from 5-115 degrees  Negative Lachman, drawer or pivot shift  No medial instability  Medial joint line tenderness, slight lateral joint line tenderness  Patellofemoral crepitation   Objective:  BP Readings from Last 1 Encounters:   05/13/25 120/72      Wt Readings from Last 1 Encounters:   06/23/25 81.6 kg (180 lb)        BMI:   Estimated body mass index is 35.15 kg/m² as calculated from the following:    Height as of this encounter: 5' (1.524 m).    Weight as of this encounter: 81.6 kg (180 lb).      PROCEDURES PERFORMED:  Large joint arthrocentesis: L knee    Performed by: Barrett Wilson DO  Authorized by: Barrett Wilson DO    Universal Protocol:  Risks and benefits: risks, benefits and alternatives were discussed  Consent given by: patient  Patient understanding: patient states understanding of the procedure being performed  Site marked: the operative site was  marked  Patient identity confirmed: verbally with patient  Supporting Documentation  Indications: pain     Is this a Visco injection? NoProcedure Details  Location: knee - L knee  Preparation: Patient was prepped and draped in the usual sterile fashion  Needle size: 22 G  Ultrasound guidance: no  Approach: lateral  Medications administered: 4 mL bupivacaine 0.25 %; 80 mg triamcinolone acetonide 40 mg/mL    Patient tolerance: patient tolerated the procedure well with no immediate complications  Dressing:  Sterile dressing applied            Scribe Attestation      I,:  Arley Reynolds PA-C am acting as a scribe while in the presence of the attending physician.:       I,:  Barrett Wilson DO personally performed the services described in this documentation    as scribed in my presence.:                   [1]   Past Medical History:  Diagnosis Date    Allergic     Depression     Diabetes mellitus (HCC)     Disease of thyroid gland     GERD (gastroesophageal reflux disease)     Hyperchloremia     Hypertension     Psychiatric disorder    [2]   Past Surgical History:  Procedure Laterality Date    ANKLE SURGERY      Incision    DILATION AND CURETTAGE OF UTERUS     [3]   Family History  Problem Relation Name Age of Onset    No Known Problems Family      Diabetes Mother      Heart disease Mother      Stroke Mother      Diabetes Father      Stroke Father      No Known Problems Maternal Grandmother      No Known Problems Maternal Grandfather      No Known Problems Paternal Grandmother      No Known Problems Paternal Grandfather      Diabetes Brother      No Known Problems Paternal Aunt     [4]   Social History  Tobacco Use    Smoking status: Never     Passive exposure: Never    Smokeless tobacco: Never   Vaping Use    Vaping status: Never Used   Substance Use Topics    Alcohol use: Not Currently    Drug use: No   [5]   Current Outpatient Medications:     ammonium lactate (LAC-HYDRIN) 12 % lotion, Apply topically 2  (two) times a day, Disp: , Rfl:     apixaban (Eliquis) 5 mg, Take 1 tablet (5 mg total) by mouth 2 (two) times a day, Disp: , Rfl:     clonazePAM (KlonoPIN) 0.5 mg tablet, Take 0.5 tablets (0.25 mg total) by mouth every evening, Disp: 5 tablet, Rfl: 0    divalproex sodium (DEPAKOTE) 500 mg DR tablet, Take 1 tablet (500 mg total) by mouth 2 (two) times a day, Disp: , Rfl:     levothyroxine 88 mcg tablet, Take 1 tablet (88 mcg total) by mouth daily in the early morning, Disp: , Rfl:     lisinopril (ZESTRIL) 2.5 mg tablet, Take 1 tablet (2.5 mg total) by mouth daily, Disp: , Rfl:     mupirocin (BACTROBAN) 2 % ointment, Apply topically daily, Disp: , Rfl:     nystatin (MYCOSTATIN) powder, Apply topically 2 (two) times a day, Disp: , Rfl:     OLANZapine (ZyPREXA) 20 MG tablet, Take 1 tablet (20 mg total) by mouth every evening, Disp: , Rfl:     pravastatin (PRAVACHOL) 40 mg tablet, Take 1 tablet (40 mg total) by mouth daily, Disp: , Rfl:     traZODone (DESYREL) 100 mg tablet, Take 1 tablet (100 mg total) by mouth daily at bedtime, Disp: , Rfl:   [6] No Known Allergies

## 2025-06-23 NOTE — ASSESSMENT & PLAN NOTE
The patient's left knee was injected with Kenalog and Marcaine.  She tolerated the injection quite well.  She will follow-up with our office in 3 months.  She is acceptable to this plan.

## 2025-07-02 ENCOUNTER — OFFICE VISIT (OUTPATIENT)
Dept: URGENT CARE | Facility: CLINIC | Age: 70
End: 2025-07-02
Payer: COMMERCIAL

## 2025-07-02 VITALS
TEMPERATURE: 97.1 F | RESPIRATION RATE: 20 BRPM | DIASTOLIC BLOOD PRESSURE: 77 MMHG | OXYGEN SATURATION: 96 % | HEART RATE: 83 BPM | SYSTOLIC BLOOD PRESSURE: 122 MMHG

## 2025-07-02 DIAGNOSIS — Z91.148 NON COMPLIANCE W MEDICATION REGIMEN: ICD-10-CM

## 2025-07-02 DIAGNOSIS — Z79.899 MEDICATION THERAPY CHANGED: ICD-10-CM

## 2025-07-02 DIAGNOSIS — N30.00 ACUTE CYSTITIS WITHOUT HEMATURIA: Primary | ICD-10-CM

## 2025-07-02 DIAGNOSIS — R53.1 WEAKNESS: ICD-10-CM

## 2025-07-02 LAB
SL AMB  POCT GLUCOSE, UA: ABNORMAL
SL AMB LEUKOCYTE ESTERASE,UA: ABNORMAL
SL AMB POCT BILIRUBIN,UA: ABNORMAL
SL AMB POCT BLOOD,UA: ABNORMAL
SL AMB POCT CLARITY,UA: ABNORMAL
SL AMB POCT COLOR,UA: YELLOW
SL AMB POCT KETONES,UA: ABNORMAL
SL AMB POCT NITRITE,UA: ABNORMAL
SL AMB POCT PH,UA: 6.5
SL AMB POCT SPECIFIC GRAVITY,UA: 1010
SL AMB POCT URINE PROTEIN: ABNORMAL
SL AMB POCT UROBILINOGEN: 0.2

## 2025-07-02 PROCEDURE — 93005 ELECTROCARDIOGRAM TRACING: CPT | Performed by: NURSE PRACTITIONER

## 2025-07-02 PROCEDURE — 99214 OFFICE O/P EST MOD 30 MIN: CPT | Performed by: NURSE PRACTITIONER

## 2025-07-02 PROCEDURE — 87086 URINE CULTURE/COLONY COUNT: CPT | Performed by: NURSE PRACTITIONER

## 2025-07-02 PROCEDURE — 81002 URINALYSIS NONAUTO W/O SCOPE: CPT | Performed by: NURSE PRACTITIONER

## 2025-07-02 PROCEDURE — S9088 SERVICES PROVIDED IN URGENT: HCPCS | Performed by: NURSE PRACTITIONER

## 2025-07-02 RX ORDER — QUETIAPINE FUMARATE 50 MG/1
50 TABLET, FILM COATED ORAL
Status: ON HOLD | COMMUNITY
Start: 2025-06-16

## 2025-07-02 RX ORDER — CEPHALEXIN 500 MG/1
500 CAPSULE ORAL EVERY 8 HOURS SCHEDULED
Qty: 21 CAPSULE | Refills: 0 | Status: SHIPPED | OUTPATIENT
Start: 2025-07-02 | End: 2025-07-08 | Stop reason: ALTCHOICE

## 2025-07-02 NOTE — PROGRESS NOTES
[facility]  Name: Lisa Rich      : 1955      MRN: [id number]  Encounter Provider: STEVE Nicole  Encounter Date: 2025   Encounter department: [facility]  :  Assessment & Plan  Acute cystitis without hematuria    Orders:    cephalexin (KEFLEX) 500 mg capsule; Take 1 capsule (500 mg total) by mouth every 8 (eight) hours for 7 days    Urine culture; Future    Weakness    Orders:    POCT urine dip    ECG 12 lead    cephalexin (KEFLEX) 500 mg capsule; Take 1 capsule (500 mg total) by mouth every 8 (eight) hours for 7 days    Urine culture; Future    Medication therapy changed    Orders:    POCT urine dip    ECG 12 lead    Non compliance w medication regimen             Patient Instructions  Follow up with PCP in 3-5 days.  Proceed to  ER if symptoms worsen.    If tests are performed, our office will contact you with results only if changes need to made to the care plan discussed with you at the visit. You can review your full results on [facility]'s MyChart.      Your urine shows bacteria.  You have been prescribed an antibiotic. You are to take all medication as prescribed.   You are to avoid alcohol and caffeine - they are bladder irritants.  Drink water.  Take tylenol or motrin for pain or fever.    You are to download [facility] mychart for the results in 3-5 days.   You will be notified if the antibiotic needs changed.  Follow up with your PCP in 2-3 days.   Go to the ED if symptoms worsen.      You have been prescribed cephalexin - take as prescribed  You are to call Dr. Young and inform her that you stopped the Eliquis    If tests have been performed at [facility], our office will contact you with results if changes need to be made to the care plan discussed with you at the visit.  You can review your full results on [facility]'s MyChart.      Chief Complaint:   Chief Complaint   Patient presents with    Weakness - Generalized     History of Present Illness    This is a 69 year old female who appears older than stated age who comes to [facility] with c/o weakness for the last couple of days. She states her doctor stopped her lithium due to renal issues. She is now on zyprexa and Seroquel.  Pt states she stopped her eliquis and depakote - she doesn't know why she stopped it. She thinks she stopped her clonazepam and taking trazodone at hs.   Pt is a poor historian.  Not able to give adequate medication information.   She denies CP, SOB, n/v/d, fevers, chills, dysuria.   She states the wound on her leg is resolved (denies need for it to be assessed).  She states she has a PCP appt next week.  PMH listed and reviewed           Review of Systems   Constitutional: Negative.    HENT: Negative.     Eyes: Negative.    Respiratory: Negative.     Cardiovascular: Negative.    Gastrointestinal: Negative.    Endocrine: Negative.    Genitourinary: Negative.    Musculoskeletal: Negative.    Skin: Negative.    Allergic/Immunologic: Negative.    Neurological:  Positive for weakness.   Hematological: Negative.    Psychiatric/Behavioral: Negative.       Past Medical History   Past Medical History[1]  Past Surgical History[2]  Family History[3]  she reports that she has never smoked. She has never been exposed to tobacco smoke. She has never used smokeless tobacco. She reports that she does not currently use alcohol. She reports that she does not use drugs.  Current Outpatient Medications   Medication Instructions    ammonium lactate (LAC-HYDRIN) 12 % lotion Topical, 2 times daily    apixaban (ELIQUIS) 5 mg, Oral, 2 times daily    cephalexin (KEFLEX) 500 mg, Oral, Every 8 hours scheduled    clonazePAM (KLONOPIN) 0.25 mg, Oral, Every evening    divalproex sodium (DEPAKOTE) 500 mg, Oral, 2 times daily    levothyroxine 88 mcg, Oral, Daily (early morning)    lisinopril (ZESTRIL) 2.5 mg, Oral, Daily    mupirocin (BACTROBAN) 2 % ointment Topical, Daily    nystatin (MYCOSTATIN) powder Topical, 2  times daily    OLANZapine (ZYPREXA) 20 mg, Oral, Every evening    pravastatin (PRAVACHOL) 40 mg, Oral, Daily    QUEtiapine (SEROquel) 50 mg tablet     traZODone (DESYREL) 100 mg, Oral, Daily at bedtime   Allergies[4]     Objective   /77   Pulse 83   Temp (!) 97.1 °F (36.2 °C)   Resp 20   SpO2 96%      Physical Exam  Vitals and nursing note reviewed.   Constitutional:       General: She is not in acute distress.     Appearance: Normal appearance. She is obese. She is not ill-appearing, toxic-appearing or diaphoretic.      Comments: Disheveled; poor hygiene    HENT:      Head: Normocephalic and atraumatic.      Nose: Nose normal.      Mouth/Throat:      Mouth: Mucous membranes are moist.     Eyes:      Extraocular Movements: Extraocular movements intact.       Cardiovascular:      Rate and Rhythm: Normal rate and regular rhythm.      Pulses: Normal pulses.      Heart sounds: Normal heart sounds. No murmur heard.  Pulmonary:      Effort: Pulmonary effort is normal.      Breath sounds: Normal breath sounds.   Abdominal:      Palpations: Abdomen is soft.      Comments: Obese pendulous      Musculoskeletal:         General: Normal range of motion.      Cervical back: Normal range of motion.      Comments: Walks with a cane - steady gait      Skin:     General: Skin is warm and dry.      Capillary Refill: Capillary refill takes less than 2 seconds.     Neurological:      General: No focal deficit present.      Mental Status: She is alert and oriented to person, place, and time.      GCS: GCS eye subscore is 4. GCS verbal subscore is 5. GCS motor subscore is 6.      Comments: Constant lip smacking    Psychiatric:         Mood and Affect: Mood normal.         Behavior: Behavior normal.         Thought Content: Thought content normal.         Judgment: Judgment normal.         EKG no stemi  SR 69   QRS 84          Poct urine mod leuks  trace proteiin  Will send for cx       Portions of the record may  "have been created with voice recognition software.  Occasional wrong word or \"sound a like\" substitutions may have occurred due to the inherent limitations of voice recognition software.  Read the chart carefully and recognize, using context, where substitutions have occurred.       [1]   Past Medical History:  Diagnosis Date    Allergic     Chronic kidney disease     Depression     Diabetes mellitus (HCC)     Disease of thyroid gland     GERD (gastroesophageal reflux disease)     Hyperchloremia     Hypertension     Psychiatric disorder    [2]   Past Surgical History:  Procedure Laterality Date    ANKLE SURGERY      Incision    DILATION AND CURETTAGE OF UTERUS     [3]   Family History  Problem Relation Name Age of Onset    No Known Problems Family      Diabetes Mother      Heart disease Mother      Stroke Mother      Diabetes Father      Stroke Father      No Known Problems Maternal Grandmother      No Known Problems Maternal Grandfather      No Known Problems Paternal Grandmother      No Known Problems Paternal Grandfather      Diabetes Brother      No Known Problems Paternal Aunt     [4] No Known Allergies    "

## 2025-07-02 NOTE — PATIENT INSTRUCTIONS
Your urine shows bacteria.  You have been prescribed an antibiotic. You are to take all medication as prescribed.   You are to avoid alcohol and caffeine - they are bladder irritants.  Drink water.  Take tylenol or motrin for pain or fever.    You are to download [email or web address] for the results in 3-5 days.   You will be notified if the antibiotic needs changed.  Follow up with your PCP in 2-3 days.   Go to the ED if symptoms worsen.      You have been prescribed cephalexin - take as prescribed  You are to call Dr. Young and inform her that you stopped the Eliquis    If tests have been performed at [facility], our office will contact you with results if changes need to be made to the care plan discussed with you at the visit.  You can review your full results on [facility].

## 2025-07-03 LAB
ATRIAL RATE: 69 BPM
BACTERIA UR CULT: NORMAL
P AXIS: 23 DEGREES
PR INTERVAL: 150 MS
QRS AXIS: 16 DEGREES
QRSD INTERVAL: 84 MS
QT INTERVAL: 372 MS
QTC INTERVAL: 399 MS
T WAVE AXIS: 54 DEGREES
VENTRICULAR RATE: 69 BPM

## 2025-07-03 PROCEDURE — 93010 ELECTROCARDIOGRAM REPORT: CPT | Performed by: INTERNAL MEDICINE

## 2025-07-04 ENCOUNTER — HOSPITAL ENCOUNTER (EMERGENCY)
Facility: HOSPITAL | Age: 70
Discharge: HOME | End: 2025-07-04
Attending: STUDENT IN AN ORGANIZED HEALTH CARE EDUCATION/TRAINING PROGRAM
Payer: COMMERCIAL

## 2025-07-04 VITALS
DIASTOLIC BLOOD PRESSURE: 65 MMHG | WEIGHT: 177.25 LBS | SYSTOLIC BLOOD PRESSURE: 137 MMHG | BODY MASS INDEX: 34.8 KG/M2 | HEIGHT: 60 IN | RESPIRATION RATE: 20 BRPM | TEMPERATURE: 97.9 F | HEART RATE: 63 BPM | OXYGEN SATURATION: 95 %

## 2025-07-04 DIAGNOSIS — N39.0 UTI (URINARY TRACT INFECTION): ICD-10-CM

## 2025-07-04 DIAGNOSIS — R42 DIZZINESS: Primary | ICD-10-CM

## 2025-07-04 LAB
ALBUMIN SERPL BCG-MCNC: 4 G/DL (ref 3.5–5)
ALP SERPL-CCNC: 56 U/L (ref 34–104)
ALT SERPL W P-5'-P-CCNC: 8 U/L (ref 7–52)
ANION GAP SERPL CALCULATED.3IONS-SCNC: 8 MMOL/L (ref 4–13)
AST SERPL W P-5'-P-CCNC: 12 U/L (ref 13–39)
BACTERIA UR QL AUTO: ABNORMAL /HPF
BASOPHILS # BLD AUTO: 0.03 THOUSANDS/ÂΜL (ref 0–0.1)
BASOPHILS NFR BLD AUTO: 0 % (ref 0–1)
BILIRUB SERPL-MCNC: 1.64 MG/DL (ref 0.2–1)
BILIRUB UR QL STRIP: NEGATIVE
BUN SERPL-MCNC: 14 MG/DL (ref 5–25)
CALCIUM SERPL-MCNC: 9.8 MG/DL (ref 8.4–10.2)
CHLORIDE SERPL-SCNC: 107 MMOL/L (ref 96–108)
CLARITY UR: CLEAR
CO2 SERPL-SCNC: 25 MMOL/L (ref 21–32)
COLOR UR: YELLOW
CREAT SERPL-MCNC: 0.93 MG/DL (ref 0.6–1.3)
EOSINOPHIL # BLD AUTO: 0.01 THOUSAND/ÂΜL (ref 0–0.61)
EOSINOPHIL NFR BLD AUTO: 0 % (ref 0–6)
ERYTHROCYTE [DISTWIDTH] IN BLOOD BY AUTOMATED COUNT: 13.2 % (ref 11.6–15.1)
GFR SERPL CREATININE-BSD FRML MDRD: 63 ML/MIN/1.73SQ M
GLUCOSE SERPL-MCNC: 135 MG/DL (ref 65–140)
GLUCOSE UR STRIP-MCNC: NEGATIVE MG/DL
HCT VFR BLD AUTO: 39.4 % (ref 34.8–46.1)
HGB BLD-MCNC: 12.8 G/DL (ref 11.5–15.4)
HGB UR QL STRIP.AUTO: NEGATIVE
IMM GRANULOCYTES # BLD AUTO: 0.02 THOUSAND/UL (ref 0–0.2)
IMM GRANULOCYTES NFR BLD AUTO: 0 % (ref 0–2)
KETONES UR STRIP-MCNC: ABNORMAL MG/DL
LEUKOCYTE ESTERASE UR QL STRIP: ABNORMAL
LYMPHOCYTES # BLD AUTO: 1.58 THOUSANDS/ÂΜL (ref 0.6–4.47)
LYMPHOCYTES NFR BLD AUTO: 19 % (ref 14–44)
MCH RBC QN AUTO: 30.5 PG (ref 26.8–34.3)
MCHC RBC AUTO-ENTMCNC: 32.5 G/DL (ref 31.4–37.4)
MCV RBC AUTO: 94 FL (ref 82–98)
MONOCYTES # BLD AUTO: 0.54 THOUSAND/ÂΜL (ref 0.17–1.22)
MONOCYTES NFR BLD AUTO: 7 % (ref 4–12)
MUCOUS THREADS UR QL AUTO: ABNORMAL
NEUTROPHILS # BLD AUTO: 6.14 THOUSANDS/ÂΜL (ref 1.85–7.62)
NEUTS SEG NFR BLD AUTO: 74 % (ref 43–75)
NITRITE UR QL STRIP: NEGATIVE
NON-SQ EPI CELLS URNS QL MICRO: ABNORMAL /HPF
NRBC BLD AUTO-RTO: 0 /100 WBCS
PH UR STRIP.AUTO: 6.5 [PH]
PLATELET # BLD AUTO: 267 THOUSANDS/UL (ref 149–390)
PMV BLD AUTO: 9.6 FL (ref 8.9–12.7)
POTASSIUM SERPL-SCNC: 4.1 MMOL/L (ref 3.5–5.3)
PROT SERPL-MCNC: 6.4 G/DL (ref 6.4–8.4)
PROT UR STRIP-MCNC: NEGATIVE MG/DL
RBC # BLD AUTO: 4.2 MILLION/UL (ref 3.81–5.12)
RBC #/AREA URNS AUTO: ABNORMAL /HPF
SODIUM SERPL-SCNC: 140 MMOL/L (ref 135–147)
SP GR UR STRIP.AUTO: 1.02
UROBILINOGEN UR QL STRIP.AUTO: 0.2 E.U./DL
WBC # BLD AUTO: 8.32 THOUSAND/UL (ref 4.31–10.16)
WBC #/AREA URNS AUTO: ABNORMAL /HPF

## 2025-07-04 PROCEDURE — 81001 URINALYSIS AUTO W/SCOPE: CPT | Performed by: STUDENT IN AN ORGANIZED HEALTH CARE EDUCATION/TRAINING PROGRAM

## 2025-07-04 PROCEDURE — 99285 EMERGENCY DEPT VISIT HI MDM: CPT

## 2025-07-04 PROCEDURE — 99285 EMERGENCY DEPT VISIT HI MDM: CPT | Performed by: STUDENT IN AN ORGANIZED HEALTH CARE EDUCATION/TRAINING PROGRAM

## 2025-07-04 PROCEDURE — 93005 ELECTROCARDIOGRAM TRACING: CPT

## 2025-07-04 PROCEDURE — 85025 COMPLETE CBC W/AUTO DIFF WBC: CPT | Performed by: STUDENT IN AN ORGANIZED HEALTH CARE EDUCATION/TRAINING PROGRAM

## 2025-07-04 PROCEDURE — 80053 COMPREHEN METABOLIC PANEL: CPT | Performed by: STUDENT IN AN ORGANIZED HEALTH CARE EDUCATION/TRAINING PROGRAM

## 2025-07-04 PROCEDURE — 36415 COLL VENOUS BLD VENIPUNCTURE: CPT | Performed by: STUDENT IN AN ORGANIZED HEALTH CARE EDUCATION/TRAINING PROGRAM

## 2025-07-04 RX ADMIN — SODIUM CHLORIDE 1000 ML: 0.9 INJECTION, SOLUTION INTRAVENOUS at 13:30

## 2025-07-04 NOTE — ED PROVIDER NOTES
Time reflects when diagnosis was documented in both MDM as applicable and the Disposition within this note       Time User Action Codes Description Comment    7/4/2025  1:38 PM Aditya Abel Add [R42] Dizziness     7/4/2025  1:38 PM Aditya Abel Add [N39.0] UTI (urinary tract infection)           ED Disposition       ED Disposition   Discharge    Condition   Stable    Date/Time   Fri Jul 4, 2025  1:38 PM    Comment   Lisa A McGorry discharge to home/self care.                   Assessment & Plan       Medical Decision Making  Differential, worsening UTI, bacteremia, ELVI, dehydration, electrolyte abnormality.    Problems Addressed:  Dizziness: acute illness or injury  UTI (urinary tract infection): acute illness or injury    Amount and/or Complexity of Data Reviewed  Independent Historian: EMS  External Data Reviewed: labs, radiology and notes.  Labs: ordered. Decision-making details documented in ED Course.  Radiology: ordered. Decision-making details documented in ED Course.  ECG/medicine tests: ordered and independent interpretation performed. Decision-making details documented in ED Course.        ED Course as of 07/04/25 1338   Fri Jul 04, 2025   1336 Leukocytes, UA(!): Trace  Labs relatively unremarkable with some mild leukocytes in her urine but significantly improved compared to previous.  Patient afebrile hemodynamically stable ambulating at baseline.  There is no acute pathology going on, as such no indication for admission at this time.       Medications   sodium chloride 0.9 % bolus 1,000 mL (1,000 mL Intravenous New Bag 7/4/25 1330)       ED Risk Strat Scores                    (ISAR) Identification of Seniors at Risk  Before the illness or injury that brought you to the Emergency, did you need someone to help you on a regular basis?: 0  In the last 24 hours, have you needed more help than usual?: 0  Have you been hospitalized for one or more nights during the past 6 months?: 0  In general, do you  "see well?: 0  In general, do you have serious problems with your memory?: 0  Do you take more than three different medications every day?: 1  ISAR Score: 1            SBIRT 22yo+      Flowsheet Row Most Recent Value   Initial Alcohol Screen: US AUDIT-C     1. How often do you have a drink containing alcohol? 0 Filed at: 07/04/2025 1228   2. How many drinks containing alcohol do you have on a typical day you are drinking?  0 Filed at: 07/04/2025 1228   3b. FEMALE Any Age, or MALE 65+: How often do you have 4 or more drinks on one occassion? 0 Filed at: 07/04/2025 1228   Audit-C Score 0 Filed at: 07/04/2025 1228   SHARAD: How many times in the past year have you...    Used an illegal drug or used a prescription medication for non-medical reasons? Never Filed at: 07/04/2025 1228                            History of Present Illness       Chief Complaint   Patient presents with    Dizziness     Via EMS/BLS w/complaint of weakness & dizziness x2 days; being treated for UTI x2 days & has been weak & dizzy since; complaint with taking prescribed antibiotic; denies N/V, fever, chills and/or diarrhea; denies recent falls    Weakness - Generalized       Past Medical History[1]   Past Surgical History[2]   Family History[3]   Social History[4]   E-Cigarette/Vaping    E-Cigarette Use Never User       E-Cigarette/Vaping Substances    Nicotine No     THC No     CBD No     Flavoring No     Other No     Unknown No       I have reviewed and agree with the history as documented.     69-year-old female presents to the emergency department with with she describes as 24 hours of \"weakness\".  Patient denies any other symptoms she states she feels weak however is ambulating at baseline.  Patient states she does have a known UTI for which she is receiving treatment and is on day 3 of her antibiotics.  She denies any chest pain nausea vomiting or shortness of breath    Of note patient with complex psychiatric history, based on chart review I " believe she is at her baseline mentation          Review of Systems   Constitutional:  Positive for fatigue. Negative for activity change, appetite change, chills and fever.   HENT:  Negative for congestion, dental problem, drooling, ear discharge, ear pain, facial swelling, postnasal drip, rhinorrhea and sinus pain.    Eyes:  Negative for photophobia, pain, discharge and itching.   Respiratory:  Negative for apnea, cough, chest tightness and shortness of breath.    Cardiovascular:  Negative for chest pain and leg swelling.   Gastrointestinal:  Negative for abdominal distention, abdominal pain, anal bleeding, constipation, diarrhea and nausea.   Endocrine: Negative for cold intolerance, heat intolerance and polydipsia.   Genitourinary:  Negative for difficulty urinating.   Musculoskeletal:  Negative for arthralgias, gait problem, joint swelling and myalgias.   Skin:  Negative for color change and pallor.   Allergic/Immunologic: Negative for immunocompromised state.   Neurological:  Negative for dizziness, seizures, facial asymmetry, weakness, light-headedness, numbness and headaches.   Psychiatric/Behavioral:  Negative for agitation, behavioral problems, confusion, decreased concentration and dysphoric mood.    All other systems reviewed and are negative.          Objective       ED Triage Vitals [07/04/25 1226]   Temperature Pulse Blood Pressure Respirations SpO2 Patient Position - Orthostatic VS   98.1 °F (36.7 °C) 74 119/56 19 93 % Lying      Temp Source Heart Rate Source BP Location FiO2 (%) Pain Score    Temporal Monitor Left arm -- No Pain      Vitals      Date and Time Temp Pulse SpO2 Resp BP Pain Score FACES Pain Rating User   07/04/25 1306 -- -- -- -- -- No Pain -- TAB   07/04/25 1300 -- 64 95 % 18 113/56 -- -- TAB   07/04/25 1226 98.1 °F (36.7 °C) 74 93 % 19 119/56 No Pain -- TB            Physical Exam  Vitals and nursing note reviewed.   Constitutional:       General: She is not in acute distress.      Appearance: She is well-developed. She is obese. She is not ill-appearing or diaphoretic.   HENT:      Head: Normocephalic and atraumatic.     Eyes:      Conjunctiva/sclera: Conjunctivae normal.      Pupils: Pupils are equal, round, and reactive to light.       Cardiovascular:      Rate and Rhythm: Normal rate and regular rhythm.      Heart sounds: Normal heart sounds. No murmur heard.     No friction rub.   Pulmonary:      Effort: Pulmonary effort is normal.      Breath sounds: Normal breath sounds.   Abdominal:      General: Bowel sounds are normal.      Palpations: Abdomen is soft.     Musculoskeletal:         General: Normal range of motion.      Cervical back: Normal range of motion and neck supple.     Skin:     General: Skin is warm.      Capillary Refill: Capillary refill takes less than 2 seconds.     Neurological:      Mental Status: She is alert and oriented to person, place, and time.      Motor: No abnormal muscle tone.      Coordination: Coordination normal.     Psychiatric:         Behavior: Behavior normal.         Thought Content: Thought content normal.         Results Reviewed       Procedure Component Value Units Date/Time    UA w Reflex to Microscopic w Reflex to Culture [[id number]]  (Abnormal) Collected: 07/04/25 1330    Lab Status: Final result Specimen: Urine, Clean Catch Updated: 07/04/25 1336     Color, UA Yellow     Clarity, UA Clear     Specific Gravity, UA 1.020     pH, UA 6.5     Leukocytes, UA Trace     Nitrite, UA Negative     Protein, UA Negative mg/dl      Glucose, UA Negative mg/dl      Ketones, UA 15 (1+) mg/dl      Urobilinogen, UA 0.2 E.U./dl      Bilirubin, UA Negative     Occult Blood, UA Negative    Urine Microscopic [[id number]] Collected: 07/04/25 1330    Lab Status: In process Specimen: Urine, Clean Catch Updated: 07/04/25 1336    Comprehensive metabolic panel [[id number]]  (Abnormal) Collected: 07/04/25 1240    Lab Status: Final result Specimen: Blood from Arm, Right  Updated: 07/04/25 1258     Sodium 140 mmol/L      Potassium 4.1 mmol/L      Chloride 107 mmol/L      CO2 25 mmol/L      ANION GAP 8 mmol/L      BUN 14 mg/dL      Creatinine 0.93 mg/dL      Glucose 135 mg/dL      Calcium 9.8 mg/dL      AST 12 U/L      ALT 8 U/L      Alkaline Phosphatase 56 U/L      Total Protein 6.4 g/dL      Albumin 4.0 g/dL      Total Bilirubin 1.64 mg/dL      eGFR 63 ml/min/1.73sq m     Narrative:      National Kidney Disease Foundation guidelines for Chronic Kidney Disease (CKD):     Stage 1 with normal or high GFR (GFR > 90 mL/min/1.73 square meters)    Stage 2 Mild CKD (GFR = 60-89 mL/min/1.73 square meters)    Stage 3A Moderate CKD (GFR = 45-59 mL/min/1.73 square meters)    Stage 3B Moderate CKD (GFR = 30-44 mL/min/1.73 square meters)    Stage 4 Severe CKD (GFR = 15-29 mL/min/1.73 square meters)    Stage 5 End Stage CKD (GFR <15 mL/min/1.73 square meters)  Note: GFR calculation is accurate only with a steady state creatinine    CBC and differential [[id number]] Collected: 07/04/25 1240    Lab Status: Final result Specimen: Blood from Arm, Right Updated: 07/04/25 1245     WBC 8.32 Thousand/uL      RBC 4.20 Million/uL      Hemoglobin 12.8 g/dL      Hematocrit 39.4 %      MCV 94 fL      MCH 30.5 pg      MCHC 32.5 g/dL      RDW 13.2 %      MPV 9.6 fL      Platelets 267 Thousands/uL      nRBC 0 /100 WBCs      Segmented % 74 %      Immature Grans % 0 %      Lymphocytes % 19 %      Monocytes % 7 %      Eosinophils Relative 0 %      Basophils Relative 0 %      Absolute Neutrophils 6.14 Thousands/µL      Absolute Immature Grans 0.02 Thousand/uL      Absolute Lymphocytes 1.58 Thousands/µL      Absolute Monocytes 0.54 Thousand/µL      Eosinophils Absolute 0.01 Thousand/µL      Basophils Absolute 0.03 Thousands/µL             No orders to display       ECG 12 Lead Documentation Only    Date/Time: 7/4/2025 12:55 PM    Performed by: Aditya Abel MD  Authorized by: Aditya Abel MD    Indications /  Diagnosis:  Presyncopal  Patient location:  ED  Previous ECG:     Previous ECG:  Unavailable    Comparison to cardiac monitor: Yes    Interpretation:     Interpretation: normal    Rate:     ECG rate assessment: normal    Rhythm:     Rhythm: sinus rhythm    Ectopy:     Ectopy: none    QRS:     QRS axis:  Normal  Conduction:     Conduction: normal    ST segments:     ST segments:  Normal  T waves:     T waves: normal        ED Medication and Procedure Management   Prior to Admission Medications   Prescriptions Last Dose Informant Patient Reported? Taking?   OLANZapine (ZyPREXA) 20 MG tablet   No No   Sig: Take 1 tablet (20 mg total) by mouth every evening   QUEtiapine (SEROquel) 50 mg tablet   Yes No   ammonium lactate (LAC-HYDRIN) 12 % lotion   No No   Sig: Apply topically 2 (two) times a day   Patient not taking: Reported on 7/2/2025   apixaban (Eliquis) 5 mg   No No   Sig: Take 1 tablet (5 mg total) by mouth 2 (two) times a day   Patient not taking: Reported on 7/2/2025   cephalexin (KEFLEX) 500 mg capsule 7/3/2025  No Yes   Sig: Take 1 capsule (500 mg total) by mouth every 8 (eight) hours for 7 days   clonazePAM (KlonoPIN) 0.5 mg tablet   No No   Sig: Take 0.5 tablets (0.25 mg total) by mouth every evening   Patient not taking: Reported on 7/2/2025   divalproex sodium (DEPAKOTE) 500 mg DR tablet   No No   Sig: Take 1 tablet (500 mg total) by mouth 2 (two) times a day   Patient not taking: Reported on 7/2/2025   levothyroxine 88 mcg tablet   No No   Sig: Take 1 tablet (88 mcg total) by mouth daily in the early morning   lisinopril (ZESTRIL) 2.5 mg tablet   No No   Sig: Take 1 tablet (2.5 mg total) by mouth daily   mupirocin (BACTROBAN) 2 % ointment   No No   Sig: Apply topically daily   Patient not taking: Reported on 7/2/2025   nystatin (MYCOSTATIN) powder   No No   Sig: Apply topically 2 (two) times a day   Patient not taking: Reported on 7/2/2025   pravastatin (PRAVACHOL) 40 mg tablet   No No   Sig: Take 1  tablet (40 mg total) by mouth daily   traZODone (DESYREL) 100 mg tablet   No No   Sig: Take 1 tablet (100 mg total) by mouth daily at bedtime      Facility-Administered Medications: None     Patient's Medications   Discharge Prescriptions    No medications on file     No discharge procedures on file.  ED SEPSIS DOCUMENTATION   Time reflects when diagnosis was documented in both MDM as applicable and the Disposition within this note       Time User Action Codes Description Comment    7/4/2025  1:38 PM Aditya Abel [R42] Dizziness     7/4/2025  1:38 PM Aditya Abel [N39.0] UTI (urinary tract infection)                      [1]   Past Medical History:  Diagnosis Date    Allergic     Chronic kidney disease     Depression     Diabetes mellitus (HCC)     Disease of thyroid gland     GERD (gastroesophageal reflux disease)     Hyperchloremia     Hypertension     Psychiatric disorder    [2]   Past Surgical History:  Procedure Laterality Date    ANKLE SURGERY      Incision    DILATION AND CURETTAGE OF UTERUS     [3]   Family History  Problem Relation Name Age of Onset    No Known Problems Family      Diabetes Mother      Heart disease Mother      Stroke Mother      Diabetes Father      Stroke Father      No Known Problems Maternal Grandmother      No Known Problems Maternal Grandfather      No Known Problems Paternal Grandmother      No Known Problems Paternal Grandfather      Diabetes Brother      No Known Problems Paternal Aunt     [4]   Social History  Tobacco Use    Smoking status: Never     Passive exposure: Never    Smokeless tobacco: Never   Vaping Use    Vaping status: Never Used   Substance Use Topics    Alcohol use: Not Currently    Drug use: No        Aidtya Abel MD  07/04/25 1616

## 2025-07-04 NOTE — ED TRIAGE NOTES
Via EMS/BLS w/complaint of weakness & dizziness x2 days; being treated for UTI x2 days & has been weak & dizzy since; complaint with taking prescribed antibiotic; denies N/V, fever, chills and/or diarrhea; denies recent falls

## 2025-07-04 NOTE — ED NOTES
Pt made aware of urine sample needed; unable to void at this time     Sharon Maya, RN  07/04/25 9481

## 2025-07-05 LAB
ATRIAL RATE: 67 BPM
P AXIS: 22 DEGREES
PR INTERVAL: 156 MS
QRS AXIS: 30 DEGREES
QRSD INTERVAL: 84 MS
QT INTERVAL: 380 MS
QTC INTERVAL: 401 MS
T WAVE AXIS: 51 DEGREES
VENTRICULAR RATE: 67 BPM

## 2025-07-05 PROCEDURE — 93010 ELECTROCARDIOGRAM REPORT: CPT | Performed by: INTERNAL MEDICINE

## 2025-07-06 ENCOUNTER — APPOINTMENT (EMERGENCY)
Dept: RADIOLOGY | Facility: HOSPITAL | Age: 70
End: 2025-07-06
Payer: COMMERCIAL

## 2025-07-06 ENCOUNTER — HOSPITAL ENCOUNTER (EMERGENCY)
Facility: HOSPITAL | Age: 70
Discharge: HOME | End: 2025-07-06
Attending: EMERGENCY MEDICINE | Admitting: EMERGENCY MEDICINE
Payer: COMMERCIAL

## 2025-07-06 VITALS
TEMPERATURE: 98 F | RESPIRATION RATE: 18 BRPM | OXYGEN SATURATION: 96 % | BODY MASS INDEX: 34.75 KG/M2 | SYSTOLIC BLOOD PRESSURE: 120 MMHG | WEIGHT: 177 LBS | HEIGHT: 60 IN | DIASTOLIC BLOOD PRESSURE: 65 MMHG | HEART RATE: 83 BPM

## 2025-07-06 DIAGNOSIS — S80.02XA CONTUSION OF LEFT KNEE, INITIAL ENCOUNTER: ICD-10-CM
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
## 2025-07-06 DIAGNOSIS — F32.A DEPRESSION: ICD-10-CM

## 2025-07-06 PROCEDURE — 99283 EMERGENCY DEPT VISIT LOW MDM: CPT

## 2025-07-06 PROCEDURE — 99285 EMERGENCY DEPT VISIT HI MDM: CPT | Performed by: EMERGENCY MEDICINE

## 2025-07-06 PROCEDURE — 73564 X-RAY EXAM KNEE 4 OR MORE: CPT

## 2025-07-06 RX ORDER — ACETAMINOPHEN 325 MG/1
650 TABLET ORAL ONCE
Status: COMPLETED | OUTPATIENT
Start: 2025-07-06 | End: 2025-07-06

## 2025-07-06 RX ADMIN — ACETAMINOPHEN 650 MG: 325 TABLET ORAL at 12:21

## 2025-07-06 NOTE — ED NOTES
CW was able to meet with pt. PT stated that she has been having low energy and motivation. PT reports that she has been off both her mental health and medical medications. Cw reviewed which mental health medications and it was just her Depakote that she was off. Pt reports that she has both a therapist and psychiatrist through [place] which she has not contacted to let them know that she is off of it. PT reports that she has not told her Dr. Either. PT stated that she is unsure of why she came off of them. PT lives with her brother that she identified as a support. PT has been IP in the past at [facility] in [facility]. Pt reports her coping as watching Tv, talking with her brother and friends. PT stated that she has no self harm history. PT has no PHP experience. PT reports that she might have went to NP in the past. PT stated that she wants to go IP at this time. CW explained that she doesn't meet criteria for IP but would be appropriate for Np, referral sent to them for a review. PT is agreeable to that. PT denied SI/HI/AH/VH at this time.

## 2025-07-06 NOTE — ED NOTES
Discharge and Safety Plan    This writer discussed the patients current presentation and recommended discharge plan with .  They agree with the patient being discharged at this time with referrals and/or information about OP/ NP      The patient was Instructed to follow up with their NP   The patient was provided with referral information for:   NP     This writer and the patient completed a safety plan.  The patient was provided with a copy of their safety plan with encouragement to utilize the plan following discharge.     In addition, the patient was instructed to call local [facility] crisis, other crisis services, 911 or to go to the nearest ER immediately if their situation changes at any time.         SAFETY PLAN  Warning Signs (thoughts, images, mood, behavior, situations) of a potential crisis:       Coping Skills (what can I do to take my mind off the problem, or to keep myself safe):       Outside Support (who can I reach out to for support and help):         National Suicide Prevention Hotline:  988      [facility] [phone] - [facility] [phone] - LVF Crisis/Mobile Crisis   [phone] - [facility] Crisis   [facility]: [phone]  [facility]: [phone]   [facility] [phone] - Crisis   [facility] [phone] - Crisis     [facility] [phone] - Crisis   [facility] [phone] - Crisis   [phone] - Peer Support Talk Line (1-9pm daily)  [phone] - Teen Support Talk Line (1-9pm daily)  [phone] - [facility]   [facility] [phone]- Crisis    [facility] [phone] - Crisis   [facility] [phone] - Crisis    [facility]) [phone] - [facility] Crisis

## 2025-07-06 NOTE — ED PROVIDER NOTES
Time reflects when diagnosis was documented in both MDM as applicable and the Disposition within this note       Time User Action Codes Description Comment    7/6/2025  1:28 PM Bella Justice Add [W01.0XXA] Fall on same level from tripping     7/6/2025  1:29 PM Bella Justice Add [S80.02XA] Contusion of left knee, initial encounter     7/6/2025  1:29 PM Bella Justice Add [F32.A] Depression           ED Disposition       ED Disposition   Discharge    Condition   Stable    Date/Time   Sun Jul 6, 2025  1:29 PM    Comment   Lisa A McGorry discharge to home/self care.                   Assessment & Plan       Medical Decision Making  69-year-old female presents to the ED status post mechanical trip and fall landing on her bilateral knees with complaint of left knee pain.  Suspect knee contusion but will obtain x-ray to rule out acute fracture.  Patient also requesting mental health evaluation.  She admits she stopped taking her mental health medications a couple of weeks ago and has been more depressed but no suicidal ideation.  Will consult ED crisis worker for evaluation.  Will provide Tylenol and ice for pain control.    Amount and/or Complexity of Data Reviewed  Radiology: ordered and independent interpretation performed. Decision-making details documented in ED Course.    Risk  OTC drugs.        ED Course as of 07/06/25 2220   Sun Jul 06, 2025   1230 Spoke with Shelli Servin, ED crisis worker, who will evaluate patient.    1328 Crisis worker evaluated patient and no indication for inpatient treatment.  She will [facility] but at this time patient stable for discharge.       Medications   acetaminophen (TYLENOL) tablet 650 mg (650 mg Oral Given 7/6/25 1221)       ED Risk Strat Scores                    (ISAR) Identification of Seniors at Risk  Before the illness or injury that brought you to the Emergency, did you need someone to help you on a regular basis?: 0  In the last 24 hours,  have you needed more help than usual?: 1  Have you been hospitalized for one or more nights during the past 6 months?: 0  In general, do you see well?: 0  In general, do you have serious problems with your memory?: 0  Do you take more than three different medications every day?: 1  ISAR Score: 2            SBIRT 20yo+      Flowsheet Row Most Recent Value   Initial Alcohol Screen: US AUDIT-C     1. How often do you have a drink containing alcohol? 0 Filed at: 07/06/2025 1444   2. How many drinks containing alcohol do you have on a typical day you are drinking?  0 Filed at: 07/06/2025 1444   3a. Male UNDER 65: How often do you have five or more drinks on one occasion? 0 Filed at: 07/06/2025 1444   3b. FEMALE Any Age, or MALE 65+: How often do you have 4 or more drinks on one occassion? 0 Filed at: 07/06/2025 1444   Audit-C Score 0 Filed at: 07/06/2025 1444   SHARAD: How many times in the past year have you...    Used an illegal drug or used a prescription medication for non-medical reasons? Never Filed at: 07/06/2025 1444                            History of Present Illness       Chief Complaint   Patient presents with    Fall     PT fell at home, complaining of left knee pain. Denies thinners or LOC       Past Medical History[1]   Past Surgical History[2]   Family History[3]   Social History[4]   E-Cigarette/Vaping    E-Cigarette Use Never User       E-Cigarette/Vaping Substances    Nicotine No     THC No     CBD No     Flavoring No     Other No     Unknown No       I have reviewed and agree with the history as documented.     Patient is a 69-year-old female with past medical history of hypertension, hyperlipidemia, prior DVT on Eliquis, diabetes, GERD, hypothyroidism, chronic kidney disease, depression, presents to the emergency department by ambulance complaining of left knee pain after she had a mechanical trip and fall today.  Patient states that she tripped causing her to fall forward on her left knee.  She  states she did land on both knees but the left knee is what is causing her pain.  She was ambulatory on scene and was able to walk out to the ambulance per EMS and per patient.  She has been icing the left knee and noticed a bruise.  She denies any head strike or loss of consciousness.  Denies any other pain elsewhere related to the fall including neck or back, chest, abdomen.  She denies any leg paresthesia or weakness.  Patient admits she stopped taking her Eliquis about 2 weeks ago on her own.  She also admits she stopped taking her mental health medications and is requesting mental health evaluation.  She reports feeling as though she has lack of energy and desire to do things and feels more depressed but denies any suicidal ideation.  Denies any homicidal ideation or new hallucinations.      History provided by:  Patient, medical records and EMS personnel   used: No    Fall  Associated symptoms: no abdominal pain, no back pain, no chest pain, no headaches, no nausea, no neck pain and no vomiting        Review of Systems   Constitutional:  Negative for chills and fever.   HENT:  Negative for congestion, ear pain, rhinorrhea and sore throat.    Respiratory:  Negative for cough, chest tightness, shortness of breath and wheezing.    Cardiovascular:  Negative for chest pain and palpitations.   Gastrointestinal:  Negative for abdominal pain, constipation, diarrhea, nausea and vomiting.   Genitourinary:  Negative for dysuria, flank pain, frequency and hematuria.   Musculoskeletal:  Negative for back pain and neck pain.   Skin:  Negative for color change, pallor, rash and wound.   Allergic/Immunologic: Negative for immunocompromised state.   Neurological:  Negative for dizziness, syncope, weakness, light-headedness, numbness and headaches.   Hematological:  Negative for adenopathy.   Psychiatric/Behavioral:  Positive for dysphoric mood. Negative for hallucinations and suicidal ideas.    All other  systems reviewed and are negative.          Objective       ED Triage Vitals [07/06/25 1142]   Temperature Pulse Blood Pressure Respirations SpO2 Patient Position - Orthostatic VS   98.6 °F (37 °C) 85 138/65 18 96 % Sitting      Temp Source Heart Rate Source BP Location FiO2 (%) Pain Score    Temporal Monitor Left arm -- 3      Vitals      Date and Time Temp Pulse SpO2 Resp BP Pain Score FACES Pain Rating User   07/06/25 1344 98 °F (36.7 °C) 83 96 % 18 120/65 1 -- DK   07/06/25 1221 -- -- -- -- -- 2 -- OP   07/06/25 1142 98.6 °F (37 °C) 85 96 % 18 138/65 3 -- AMF            Physical Exam  Vitals and nursing note reviewed.   Constitutional:       General: She is not in acute distress.     Appearance: Normal appearance. She is well-developed. She is not ill-appearing, toxic-appearing or diaphoretic.   HENT:      Head: Normocephalic and atraumatic.      Right Ear: External ear normal.      Left Ear: External ear normal.      Mouth/Throat:      Mouth: Mucous membranes are moist.      Pharynx: Oropharynx is clear.     Eyes:      Extraocular Movements: Extraocular movements intact.      Conjunctiva/sclera: Conjunctivae normal.      Pupils: Pupils are equal, round, and reactive to light.     Neck:      Vascular: No JVD.     Cardiovascular:      Rate and Rhythm: Normal rate and regular rhythm.      Pulses: Normal pulses.      Heart sounds: Normal heart sounds. No murmur heard.     No friction rub. No gallop.   Pulmonary:      Effort: Pulmonary effort is normal. No respiratory distress.      Breath sounds: Normal breath sounds. No wheezing, rhonchi or rales.   Chest:      Chest wall: No tenderness.   Abdominal:      General: Bowel sounds are normal. There is no distension.      Palpations: Abdomen is soft.      Tenderness: There is no abdominal tenderness. There is no guarding or rebound.     Musculoskeletal:         General: Tenderness present. No deformity. Normal range of motion.      Cervical back: Normal range of motion  and neck supple. No rigidity or tenderness.      Comments: No midline cervical, thoracic or lumbar spine tenderness.  Pelvis stable.    Superficial erythematous abrasion over anterior right knee without any bony tenderness of the right knee or right lower extremity above or below the knee joint.  Full range of motion of right hip and knee.  There is large area of ecchymosis over the medial and anterior aspect of the left knee with associated tenderness in this region.  Patient has slight decreased range of motion with left knee flexion due to pain.  Full range of motion left hip and ankle joint.  No tenderness above or below the knee joint.  2+ DP and PT pulses bilaterally.     Skin:     General: Skin is warm and dry.      Coloration: Skin is not pale.      Findings: Bruising present. No erythema or rash.     Neurological:      Mental Status: She is alert and oriented to person, place, and time.      Cranial Nerves: No cranial nerve deficit.     Psychiatric:         Behavior: Behavior normal.      Comments: Flat affect.         Results Reviewed       None            XR knee 4+ vw left injury   Final Interpretation by E. Alec Schoenberger, MD (07/06 1238)      No acute osseous abnormality.         Computerized Assisted Algorithm (CAA) may have been used to analyze all applicable images.         Workstation performed: [id number]             Procedures    ED Medication and Procedure Management   Prior to Admission Medications   Prescriptions Last Dose Informant Patient Reported? Taking?   OLANZapine (ZyPREXA) 20 MG tablet   No No   Sig: Take 1 tablet (20 mg total) by mouth every evening   QUEtiapine (SEROquel) 50 mg tablet   Yes No   ammonium lactate (LAC-HYDRIN) 12 % lotion   No No   Sig: Apply topically 2 (two) times a day   Patient not taking: Reported on 7/2/2025   apixaban (Eliquis) 5 mg   No No   Sig: Take 1 tablet (5 mg total) by mouth 2 (two) times a day   Patient not taking: Reported on 7/2/2025   cephalexin  (KEFLEX) 500 mg capsule   No No   Sig: Take 1 capsule (500 mg total) by mouth every 8 (eight) hours for 7 days   clonazePAM (KlonoPIN) 0.5 mg tablet   No No   Sig: Take 0.5 tablets (0.25 mg total) by mouth every evening   Patient not taking: Reported on 7/2/2025   divalproex sodium (DEPAKOTE) 500 mg DR tablet   No No   Sig: Take 1 tablet (500 mg total) by mouth 2 (two) times a day   Patient not taking: Reported on 7/2/2025   levothyroxine 88 mcg tablet   No No   Sig: Take 1 tablet (88 mcg total) by mouth daily in the early morning   lisinopril (ZESTRIL) 2.5 mg tablet   No No   Sig: Take 1 tablet (2.5 mg total) by mouth daily   mupirocin (BACTROBAN) 2 % ointment   No No   Sig: Apply topically daily   Patient not taking: Reported on 7/2/2025   nystatin (MYCOSTATIN) powder   No No   Sig: Apply topically 2 (two) times a day   Patient not taking: Reported on 7/2/2025   pravastatin (PRAVACHOL) 40 mg tablet   No No   Sig: Take 1 tablet (40 mg total) by mouth daily   traZODone (DESYREL) 100 mg tablet   No No   Sig: Take 1 tablet (100 mg total) by mouth daily at bedtime      Facility-Administered Medications: None     Discharge Medication List as of 7/6/2025  1:29 PM        CONTINUE these medications which have NOT CHANGED    Details   ammonium lactate (LAC-HYDRIN) 12 % lotion Apply topically 2 (two) times a day, Starting Mon 5/5/2025, No Print      apixaban (Eliquis) 5 mg Take 1 tablet (5 mg total) by mouth 2 (two) times a day, Starting Mon 5/5/2025, No Print      cephalexin (KEFLEX) 500 mg capsule Take 1 capsule (500 mg total) by mouth every 8 (eight) hours for 7 days, Starting Wed 7/2/2025, Until Wed 7/9/2025, Normal      clonazePAM (KlonoPIN) 0.5 mg tablet Take 0.5 tablets (0.25 mg total) by mouth every evening, Starting Wed 5/14/2025, Normal      divalproex sodium (DEPAKOTE) 500 mg DR tablet Take 1 tablet (500 mg total) by mouth 2 (two) times a day, Starting Mon 5/5/2025, No Print      levothyroxine 88 mcg tablet Take 1  tablet (88 mcg total) by mouth daily in the early morning, Starting Tue 5/6/2025, No Print      lisinopril (ZESTRIL) 2.5 mg tablet Take 1 tablet (2.5 mg total) by mouth daily, Starting Tue 5/6/2025, No Print      mupirocin (BACTROBAN) 2 % ointment Apply topically daily, Starting Tue 5/6/2025, No Print      nystatin (MYCOSTATIN) powder Apply topically 2 (two) times a day, Starting Mon 5/5/2025, No Print      OLANZapine (ZyPREXA) 20 MG tablet Take 1 tablet (20 mg total) by mouth every evening, Starting Mon 5/5/2025, No Print      pravastatin (PRAVACHOL) 40 mg tablet Take 1 tablet (40 mg total) by mouth daily, Starting Mon 5/5/2025, No Print      QUEtiapine (SEROquel) 50 mg tablet Historical Med      traZODone (DESYREL) 100 mg tablet Take 1 tablet (100 mg total) by mouth daily at bedtime, Starting Mon 5/5/2025, No Print           No discharge procedures on file.  ED SEPSIS DOCUMENTATION   Time reflects when diagnosis was documented in both MDM as applicable and the Disposition within this note       Time User Action Codes Description Comment    7/6/2025  1:28 PM Bella Justice [W01.0XXA] Fall on same level from tripping     7/6/2025  1:29 PM Bella Justice [S80.02XA] Contusion of left knee, initial encounter     7/6/2025  1:29 PM Bella Justice [F32.A] Depression                    [1]   Past Medical History:  Diagnosis Date    Allergic     Chronic kidney disease     Depression     Diabetes mellitus (HCC)     Disease of thyroid gland     GERD (gastroesophageal reflux disease)     Hyperchloremia     Hypertension     Psychiatric disorder    [2]   Past Surgical History:  Procedure Laterality Date    ANKLE SURGERY      Incision    DILATION AND CURETTAGE OF UTERUS     [3]   Family History  Problem Relation Name Age of Onset    No Known Problems Family      Diabetes Mother      Heart disease Mother      Stroke Mother      Diabetes Father      Stroke Father      No Known Problems Maternal Grandmother      No  Known Problems Maternal Grandfather      No Known Problems Paternal Grandmother      No Known Problems Paternal Grandfather      Diabetes Brother      No Known Problems Paternal Aunt     [4]   Social History  Tobacco Use    Smoking status: Never     Passive exposure: Never    Smokeless tobacco: Never   Vaping Use    Vaping status: Never Used   Substance Use Topics    Alcohol use: Not Currently    Drug use: No        Bella Justice DO  07/06/25 [id number]

## 2025-07-08 ENCOUNTER — SOCIAL WORK (OUTPATIENT)
Dept: BEHAVIORAL/MENTAL HEALTH CLINIC | Facility: CLINIC | Age: 70
End: 2025-07-08
Payer: COMMERCIAL

## 2025-07-08 ENCOUNTER — OFFICE VISIT (OUTPATIENT)
Dept: FAMILY MEDICINE CLINIC | Facility: HOME HEALTHCARE | Age: 70
End: 2025-07-08
Payer: COMMERCIAL

## 2025-07-08 VITALS
TEMPERATURE: 97.6 F | OXYGEN SATURATION: 97 % | HEIGHT: 60 IN | RESPIRATION RATE: 18 BRPM | HEART RATE: 103 BPM | DIASTOLIC BLOOD PRESSURE: 70 MMHG | WEIGHT: 171.6 LBS | BODY MASS INDEX: 33.69 KG/M2 | SYSTOLIC BLOOD PRESSURE: 118 MMHG

## 2025-07-08 DIAGNOSIS — I10 ESSENTIAL HYPERTENSION: ICD-10-CM

## 2025-07-08 DIAGNOSIS — I82.622 ACUTE DEEP VEIN THROMBOSIS (DVT) OF OTHER VEIN OF LEFT UPPER EXTREMITY (HCC): ICD-10-CM

## 2025-07-08 DIAGNOSIS — E66.09 CLASS 1 OBESITY DUE TO EXCESS CALORIES WITH SERIOUS COMORBIDITY AND BODY MASS INDEX (BMI) OF 33.0 TO 33.9 IN ADULT: ICD-10-CM

## 2025-07-08 DIAGNOSIS — Z00.00 MEDICARE ANNUAL WELLNESS VISIT, SUBSEQUENT: Primary | ICD-10-CM

## 2025-07-08 DIAGNOSIS — E66.811 CLASS 1 OBESITY DUE TO EXCESS CALORIES WITH SERIOUS COMORBIDITY AND BODY MASS INDEX (BMI) OF 33.0 TO 33.9 IN ADULT: ICD-10-CM

## 2025-07-08 DIAGNOSIS — N18.31 TYPE 2 DIABETES MELLITUS WITH STAGE 3A CHRONIC KIDNEY DISEASE, WITHOUT LONG-TERM CURRENT USE OF INSULIN (HCC): ICD-10-CM

## 2025-07-08 DIAGNOSIS — E11.22 TYPE 2 DIABETES MELLITUS WITH STAGE 3A CHRONIC KIDNEY DISEASE, WITHOUT LONG-TERM CURRENT USE OF INSULIN (HCC): ICD-10-CM

## 2025-07-08 DIAGNOSIS — E03.9 ACQUIRED HYPOTHYROIDISM: ICD-10-CM

## 2025-07-08 DIAGNOSIS — F33.0 MAJOR DEPRESSIVE DISORDER, RECURRENT EPISODE, MILD (HCC): Primary | ICD-10-CM

## 2025-07-08 DIAGNOSIS — F31.2 BIPOLAR AFFECTIVE DISORDER, CURRENTLY MANIC, SEVERE, WITH PSYCHOTIC FEATURES (HCC): ICD-10-CM

## 2025-07-08 PROCEDURE — G0439 PPPS, SUBSEQ VISIT: HCPCS | Performed by: PHYSICIAN ASSISTANT

## 2025-07-08 PROCEDURE — H2021 COM WRAP-AROUND SV, 15 MIN: HCPCS | Performed by: COUNSELOR

## 2025-07-08 RX ORDER — LISINOPRIL 5 MG/1
5 TABLET ORAL DAILY
Status: ON HOLD | COMMUNITY
Start: 2025-06-25

## 2025-07-08 NOTE — ASSESSMENT & PLAN NOTE
TSH 4/17/25 elevated at 7.73 with T4 1.08.  Continue levothyroxine 88 mcg daily.  Repeat labs as ordered.  Orders:  •  TSH, 3rd generation with Free T4 reflex; Future

## 2025-07-08 NOTE — ASSESSMENT & PLAN NOTE
BMI Counseling: Body mass index is 33.51 kg/m². The BMI is above normal. Nutrition recommendations include reducing portion sizes, decreasing overall calorie intake, 3-5 servings of fruits/vegetables daily, reducing fast food intake, consuming healthier snacks, decreasing soda and/or juice intake, moderation in carbohydrate intake, increasing intake of lean protein, reducing intake of saturated fat and trans fat, and reducing intake of cholesterol. Exercise recommendations include moderate aerobic physical activity for 150 minutes/week and exercising 3-5 times per week.

## 2025-07-08 NOTE — PROGRESS NOTES
"Assessment      Crisis Intake  Patient Intake  Special Needs: Depression.  Living Arrangement: House (Lives with brother.)  Can patient return home?: Yes  Address to be Discharge to:: See facesheet.  Patient's Telephone Number: See facesheet.  Access to Firearms: No  Patient History  Presenting Problems: Patient entered the [facility] accompanied by her brother. Patient is under treatment at the [facility]. Sees Shelli Segura for medication management and Zachary for treatment plan updates. Patient stated she saw her PCP today who advised her to come to the [facility] for an eval. Reported she stated she's had an increase in depressive symptoms. Patient said, \"I need to go into the program to feel better.\" CIS asked what she meant by that- patient reported she needs to be admitted to help manage her depression and get a medication adjustment. CIS discussed the purpose of a U admission. Patient denied SI/HI/hallucinations/delusions. Patient stated she has little to no energy and no motivation to complete tasks. Patient reported she has been struggling with depressive symptoms throughout her life. Patient reported no hobbies. Lives with her brother and attends appointments. Patient denied trauma. CIS advised patient to call her treatment services tomorrow and let them know her symptoms have increased and she's looking for a medication adjustment at this time. She was also advised to start seeing her therapist on a more frequent basis and not just for treatment plan updates. Patient agreed.  Treatment History: Patient has been admitted into the U several times in the past- doesn't remember the last time she was admitted. Currently has services under [facility].  Currently in Treatment: Yes  Current Psychiatrist/Therapist: Shelli Segura- doctor; Zachary- therapist.  Medical Problems: Stage 3 kidney disease.  Legal Issues: None.  Substance Abuse: No  Mental Status Exam  Orientation Level: Appropriate for age  Affect: Blunted  Speech: " Logical/coherent  Mood: Other (Comment) (Calm.)  Thought Content: Appropriate  Hallucination Type: No problems reported or observed.  Judgement: Fair  Impulse Control: Good  Attention Span: Appropriate for age  Memory: Intact  Appetite: Good  Sleep: Good  Appear/Hygiene: Other (Comment) (Casual.)  ADL Comments: Denied issues.  Ideations  Current Self Harm/Suicidal Ideation: No  Previous Self Harm/Suicidal Ideation: No  Violence Risk to Self: Denies ideation within past 6 months  Current homicidal or violent thoughts toward another: Denies ideations within past 6 months  Previous Plans to Harm Another Person: No  Violence Risk to Others: Denies within past 6 months  Previous History of Violence to Others: No  Provisional Diagnosis  Axis I: F33  Axis II: Deferred  Axis III: Stage 3 kidney disease    C-SSRS  [place] Suicide Severity Rating Scale  C-SSRS Q1. Wish to be Dead: No - In the Past Month  *Risk Level*: Low Risk      Patient was referred by: Self or Family    Visit start and stop times:    07/08/25  Start Time: 1630  Stop Time: 1710  Total Visit Time: 40 minutes      This writer discussed the patients current presentation and recommended discharge plan- advised patient to call [facility] tomorrow to discuss symptoms and request a sooner appointment with practitioners. Advised to go to ED if symptoms worsen.    The patient was Instructed to follow up with their therapist and psychiatrist.   The patient was provided with referral information for:   [facility].     This writer and the patient completed a safety plan.  The patient was provided with a copy of their safety plan with encouragement to utilize the plan following discharge.     In addition, the patient was instructed to call local [facility] crisis, other crisis services, 911 or to go to the nearest ER immediately if their situation changes at any time.     This writer discussed discharge plans with the patient and family who agrees with and  understands the discharge plans.         SAFETY PLAN  Warning Signs (thoughts, images, mood, behavior, situations) of a potential crisis: Poor motivation, low energy.       Coping Skills (what can I do to take my mind off the problem, or to keep myself safe): Talk to therapist, go for a walk, talk to brother.       Outside Support (who can I reach out to for support and help): Brother.         [place] Suicide Prevention Hotline:  988      [facility] [phone] - Crisis   [facility] [phone] - [facility]F Crisis/Mobile Crisis   [phone] - [facility] Crisis   [facility]: [phone]  [facility]: [phone]   [facility] [phone] - Crisis   [facility] [phone] - Crisis     [facility] [phone] - Crisis   [facility] [phone] - Crisis   [phone] - Peer Support Talk Line (1-9pm daily)  [phone] - Teen Support Talk Line (1-9pm daily)  [phone] - [facility] [phone]- Crisis    [facility] [phone] - Crisis   [facility] [phone] - Crisis    [facility]) [phone] - [facility]

## 2025-07-08 NOTE — ASSESSMENT & PLAN NOTE
Reports worsening depression recently.  Self-discontinued depakote.  Currently following with [facility].  Unsure of any upcoming appointments.  Expresses that she would like inpatient treatment.  Was evaluated by Crisis at [facility] 7/6 with no indication for inpatient.  Patient was provided with a new referral to psychiatry as well as information for the [facility].  Advised that if she experiences any SI/HI, she should report to the ED.  Orders:  •  Ambulatory referral to Psych Services; Future

## 2025-07-08 NOTE — ASSESSMENT & PLAN NOTE
Currently well controlled without medication.  Continue to focus on healthy diet.  Lab Results   Component Value Date    HGBA1C 6.1 (H) 04/09/2025

## 2025-07-08 NOTE — PROGRESS NOTES
Name: Lisa Rich      : 1955      MRN: [id number]  Encounter Provider: Gadiel Young PA-C  Encounter Date: 2025   Encounter department: [facility]  :  Assessment & Plan  Medicare annual wellness visit, subsequent         Type 2 diabetes mellitus with stage 3a chronic kidney disease, without long-term current use of insulin (HCC)  Currently well controlled without medication.  Continue to focus on healthy diet.  Lab Results   Component Value Date    HGBA1C 6.1 (H) 2025            Acquired hypothyroidism  TSH 25 elevated at 7.73 with T4 1.08.  Continue levothyroxine 88 mcg daily.  Repeat labs as ordered.  Orders:  •  TSH, 3rd generation with Free T4 reflex; Future    Essential hypertension  /70 today. Continue lisinopril 5 mg daily.        Bipolar affective disorder, currently manic, severe, with psychotic features (HCC)  Reports worsening depression recently.  Self-discontinued depakote.  Currently following with [facility].  Unsure of any upcoming appointments.  Expresses that she would like inpatient treatment.  Was evaluated by Crisis at [facility]  with no indication for inpatient.  Patient was provided with a new referral to psychiatry as well as information for the [facility].  Advised that if she experiences any SI/HI, she should report to the ED.  Orders:  •  Ambulatory referral to Psych Services; Future    Class 1 obesity due to excess calories with serious comorbidity and body mass index (BMI) of 33.0 to 33.9 in adult  BMI Counseling: Body mass index is 33.51 kg/m². The BMI is above normal. Nutrition recommendations include reducing portion sizes, decreasing overall calorie intake, 3-5 servings of fruits/vegetables daily, reducing fast food intake, consuming healthier snacks, decreasing soda and/or juice intake, moderation in carbohydrate intake, increasing intake of lean protein, reducing intake of saturated fat and  trans fat, and reducing intake of cholesterol. Exercise recommendations include moderate aerobic physical activity for 150 minutes/week and exercising 3-5 times per week.        Acute deep vein thrombosis (DVT) of other vein of left upper extremity (HCC)  Acute occlusive thrombus in a small segment of the cephalic vein at the distal upper arm noted 4/11/25.  Completed approx 2 months of eliquis but reports she stopped it as she ran out and did not have refills.  Recommend completing at least 3 months of anticoagulation.  Refill sent.  Orders:  •  apixaban (Eliquis) 5 mg; Take 1 tablet (5 mg total) by mouth 2 (two) times a day    BMI Counseling: Body mass index is 33.51 kg/m². The BMI is above normal. Exercise recommendations include exercising 3-5 times per week.     Falls Plan of Care: balance, strength, and gait training instructions were provided.       Preventive health issues were discussed with patient, and age appropriate screening tests were ordered as noted in patient's After Visit Summary. Personalized health advice and appropriate referrals for health education or preventive services given if needed, as noted in patient's After Visit Summary.    History of Present Illness     Lisa is a 69 year old female with history of HLD, hypothyroidism, type 2 DM, and bipolar disorder, presenting for AWV.       Patient Care Team:  Gadiel Young PA-C as PCP - General (Family Medicine)  Shan Lynn MD as PCP - PCP-[facility] ([facility])  Caroline Mercer MD as PCP - PCP-[facility] ([facility])  Yasmin Nails DO (Nephrology)  Yasmin Nails DO (Nephrology)    Review of Systems   Constitutional:  Positive for fatigue. Negative for chills, diaphoresis and fever.   Respiratory:  Negative for cough, chest tightness, shortness of breath and wheezing.    Cardiovascular:  Negative for chest pain, palpitations and leg swelling.   Gastrointestinal:  Negative for abdominal pain, constipation, diarrhea, nausea and vomiting.    Skin:  Negative for rash and wound.   Neurological:  Negative for dizziness, syncope, weakness, light-headedness and headaches.   Psychiatric/Behavioral:  Positive for decreased concentration, dysphoric mood and sleep disturbance. Negative for hallucinations, self-injury and suicidal ideas.      Medical History Reviewed by provider this encounter:  Tobacco  Allergies  Meds  Problems  Med Hx  Surg Hx  Fam Hx       Annual Wellness Visit Questionnaire   Lisa is here for her Subsequent Wellness visit.     Health Risk Assessment:   Patient rates overall health as good. Patient feels that their physical health rating is same. Patient is satisfied with their life. Eyesight was rated as same. Hearing was rated as same. Patient feels that their emotional and mental health rating is slightly worse. Patients states they are never, rarely angry. Patient states they are always unusually tired/fatigued. Pain experienced in the last 7 days has been none. Patient states that she has experienced weight loss or gain in last 6 months.     Depression Screening:   PHQ-2 Score: 6  PHQ-9 Score: 12      Fall Risk Screening:   In the past year, patient has experienced: history of falling in past year    Number of falls: 2 or more  Injured during fall?: No    Feels unsteady when standing or walking?: No    Worried about falling?: No      Urinary Incontinence Screening:   Patient has not leaked urine accidently in the last six months.     Home Safety:  Patient does not have trouble with stairs inside or outside of their home. Patient has no working smoke alarms and has no working carbon monoxide detector. Home safety hazards include: none.     Nutrition:   Current diet is Regular and Frequent junk food.     Medications:   Patient is not currently taking any over-the-counter supplements. Patient is able to manage medications.     Activities of Daily Living (ADLs)/Instrumental Activities of Daily Living (IADLs):   Walk and transfer  into and out of bed and chair?: Yes  Dress and groom yourself?: Yes    Bathe or shower yourself?: Yes    Feed yourself? Yes  Do your laundry/housekeeping?: Yes  Manage your money, pay your bills and track your expenses?: Yes  Make your own meals?: Yes    Do your own shopping?: Yes    Previous Hospitalizations:   Any hospitalizations or ED visits within the last 12 months?: Yes    How many hospitalizations have you had in the last year?: 1-2    Advance Care Planning:   Living will: No    Durable POA for healthcare: No    Advanced directive: No      Preventive Screenings      Cardiovascular Screening:    General: History Lipid Disorder and Screening Current      Diabetes Screening:     General: Screening Not Indicated and History Diabetes      Colorectal Cancer Screening:     General: Screening Current      Breast Cancer Screening:     General: Screening Current      Cervical Cancer Screening:    General: Screening Not Indicated      Osteoporosis Screening:    General: Screening Current      Abdominal Aortic Aneurysm (AAA) Screening:        General: Screening Not Indicated      Lung Cancer Screening:     General: Screening Not Indicated      Hepatitis C Screening:    General: Screening Current    Screening, Brief Intervention, and Referral to Treatment (SBIRT)     Screening  Typical number of drinks in a day: 0  Typical number of drinks in a week: 0  Interpretation: Low risk drinking behavior.    AUDIT-C Screenin) How often did you have a drink containing alcohol in the past year? monthly or less  2) How many drinks did you have on a typical day when you were drinking in the past year? 1 to 2  3) How often did you have 6 or more drinks on one occasion in the past year? never    AUDIT-C Score: 1  Interpretation: Score 0-2 (female): Negative screen for alcohol misuse    Social Drivers of Health     Financial Resource Strain: Low Risk  (2024)    Received from [facility]    Overall Financial  Resource Strain (CARDIA)    • Difficulty of Paying Living Expenses: Not very hard   Food Insecurity: No Food Insecurity (7/8/2025)    Nursing - Inadequate Food Risk Classification    • Worried About Running Out of Food in the Last Year: Never true    • Ran Out of Food in the Last Year: Never true    • Ran Out of Food in the Last Year: Never true   Transportation Needs: Unmet Transportation Needs (7/8/2025)    PRAPARE - Transportation    • Lack of Transportation (Medical): Yes    • Lack of Transportation (Non-Medical): Yes   Housing Stability: Low Risk  (7/8/2025)    Housing Stability Vital Sign    • Unable to Pay for Housing in the Last Year: No    • Number of Times Moved in the Last Year: 0    • Homeless in the Last Year: No   Utilities: Not At Risk (7/8/2025)    [facility] Utilities    • Threatened with loss of utilities: No     No results found.    Objective   /70 (BP Location: Left arm, Patient Position: Sitting, Cuff Size: Adult)   Pulse 103   Temp 97.6 °F (36.4 °C) (Tympanic)   Resp 18   Ht 5' (1.524 m)   Wt 77.8 kg (171 lb 9.6 oz)   SpO2 97%   BMI 33.51 kg/m²     Physical Exam  Vitals and nursing note reviewed.   Constitutional:       General: She is not in acute distress.     Appearance: Normal appearance. She is obese.      Comments: Ambulating with a cane   HENT:      Head: Normocephalic and atraumatic.     Eyes:      Extraocular Movements: Extraocular movements intact.      Conjunctiva/sclera: Conjunctivae normal.      Pupils: Pupils are equal, round, and reactive to light.       Cardiovascular:      Rate and Rhythm: Normal rate and regular rhythm.      Pulses: no weak pulses.           Dorsalis pedis pulses are 2+ on the right side and 2+ on the left side.        Posterior tibial pulses are 2+ on the right side and 2+ on the left side.      Heart sounds: Normal heart sounds. No murmur heard.  Pulmonary:      Effort: Pulmonary effort is normal. No respiratory distress.      Breath sounds: Normal  breath sounds. No wheezing.     Musculoskeletal:      Cervical back: Normal range of motion and neck supple.      Right lower leg: No edema.      Left lower leg: No edema.   Feet:      Right foot:      Skin integrity: Erythema and dry skin present. No ulcer, warmth or callus.      Toenail Condition: Right toenails are abnormally thick.      Left foot:      Skin integrity: Erythema and dry skin present. No ulcer, warmth or callus.      Toenail Condition: Left toenails are abnormally thick.     Skin:     General: Skin is warm and dry.     Neurological:      General: No focal deficit present.      Mental Status: She is alert and oriented to person, place, and time.      Cranial Nerves: No cranial nerve deficit.      Motor: No weakness.     Psychiatric:         Mood and Affect: Mood normal.         Behavior: Behavior normal.     Patient's shoes and socks removed.    Right Foot/Ankle   Right Foot Inspection  Skin Exam: dry skin and erythema. No warmth, no callus, no pre-ulcer, no ulcer and no callus.     Toe Exam: ROM and strength within normal limits.     Sensory   Monofilament testing: intact    Vascular  Capillary refills: < 3 seconds  The right DP pulse is 2+. The right PT pulse is 2+.     Left Foot/Ankle  Left Foot Inspection  Skin Exam: dry skin and erythema. No warmth, no pre-ulcer, no ulcer and no callus.     Toe Exam: ROM and strength within normal limits.     Sensory   Monofilament testing: intact    Vascular  Capillary refills: < 3 seconds  The left DP pulse is 2+. The left PT pulse is 2+.     Assign Risk Category  No deformity present  No loss of protective sensation  No weak pulses  Risk: 0

## 2025-07-08 NOTE — PATIENT INSTRUCTIONS
Medicare Preventive Visit Patient Instructions  Thank you for completing your Welcome to Medicare Visit or Medicare Annual Wellness Visit today. Your next wellness visit will be due in one year (7/9/2026).  The screening/preventive services that you may require over the next 5-10 years are detailed below. Some tests may not apply to you based off risk factors and/or age. Screening tests ordered at today's visit but not completed yet may show as past due. Also, please note that scanned in results may not display below.  Preventive Screenings:  Service Recommendations Previous Testing/Comments   Colorectal Cancer Screening  * Colonoscopy    * Fecal Occult Blood Test (FOBT)/Fecal Immunochemical Test (FIT)  * Fecal DNA/Cologuard Test  * Flexible Sigmoidoscopy Age: 45-75 years old   Colonoscopy: every 10 years (may be performed more frequently if at higher risk)  OR  FOBT/FIT: every 1 year  OR  Cologuard: every 3 years  OR  Sigmoidoscopy: every 5 years  Screening may be recommended earlier than age 45 if at higher risk for colorectal cancer. Also, an individualized decision between you and your healthcare provider will decide whether screening between the ages of 76-85 would be appropriate. Colonoscopy: Not on file  FOBT/FIT: Not on file  Cologuard: 01/26/2023  Sigmoidoscopy: Not on file          Breast Cancer Screening Age: 40+ years old  Frequency: every 1-2 years  Not required if history of left and right mastectomy Mammogram: 10/24/2024        Cervical Cancer Screening Between the ages of 21-29, pap smear recommended once every 3 years.   Between the ages of 30-65, can perform pap smear with HPV co-testing every 5 years.   Recommendations may differ for women with a history of total hysterectomy, cervical cancer, or abnormal pap smears in past. Pap Smear: 06/17/2024        Hepatitis C Screening Once for adults born between 1945 and 1965  More frequently in patients at high risk for Hepatitis C Hep C Antibody:  10/05/2024        Diabetes Screening 1-2 times per year if you're at risk for diabetes or have pre-diabetes Fasting glucose: 113 mg/dL (4/14/2025)  A1C: 6.1 % (4/9/2025)      Cholesterol Screening Once every 5 years if you don't have a lipid disorder. May order more often based on risk factors. Lipid panel: 04/09/2025          Other Preventive Screenings Covered by Medicare:  Abdominal Aortic Aneurysm (AAA) Screening: covered once if your at risk. You're considered to be at risk if you have a family history of AAA.  Lung Cancer Screening: covers low dose CT scan once per year if you meet all of the following conditions: (1) Age 55-77; (2) No signs or symptoms of lung cancer; (3) Current smoker or have quit smoking within the last 15 years; (4) You have a tobacco smoking history of at least 20 pack years (packs per day multiplied by number of years you smoked); (5) You get a written order from a healthcare provider.  Glaucoma Screening: covered annually if you're considered high risk: (1) You have diabetes OR (2) Family history of glaucoma OR (3)  aged 50 and older OR (4)  American aged 65 and older  Osteoporosis Screening: covered every 2 years if you meet one of the following conditions: (1) You're estrogen deficient and at risk for osteoporosis based off medical history and other findings; (2) Have a vertebral abnormality; (3) On glucocorticoid therapy for more than 3 months; (4) Have primary hyperparathyroidism; (5) On osteoporosis medications and need to assess response to drug therapy.   Last bone density test (DXA Scan): 10/24/2024.  HIV Screening: covered annually if you're between the age of 15-65. Also covered annually if you are younger than 15 and older than 65 with risk factors for HIV infection. For pregnant patients, it is covered up to 3 times per pregnancy.    Immunizations:  Immunization Recommendations   Influenza Vaccine Annual influenza vaccination during flu season is  recommended for all persons aged >= 6 months who do not have contraindications   Pneumococcal Vaccine   * Pneumococcal conjugate vaccine = PCV13 (Prevnar 13), PCV15 (Vaxneuvance), PCV20 (Prevnar 20)  * Pneumococcal polysaccharide vaccine = PPSV23 (Pneumovax) Adults 19-65 yo with certain risk factors or if 65+ yo  If never received any pneumonia vaccine: recommend Prevnar 20 (PCV20)  Give PCV20 if previously received 1 dose of PCV13 or PPSV23   Hepatitis B Vaccine 3 dose series if at intermediate or high risk (ex: diabetes, end stage renal disease, liver disease)   Respiratory syncytial virus (RSV) Vaccine - COVERED BY MEDICARE PART D  * RSVPreF3 (Arexvy) CDC recommends that adults 60 years of age and older may receive a single dose of RSV vaccine using shared clinical decision-making (SCDM)   Tetanus (Td) Vaccine - COST NOT COVERED BY MEDICARE PART B Following completion of primary series, a booster dose should be given every 10 years to maintain immunity against tetanus. Td may also be given as tetanus wound prophylaxis.   Tdap Vaccine - COST NOT COVERED BY MEDICARE PART B Recommended at least once for all adults. For pregnant patients, recommended with each pregnancy.   Shingles Vaccine (Shingrix) - COST NOT COVERED BY MEDICARE PART B  2 shot series recommended in those 19 years and older who have or will have weakened immune systems or those 50 years and older     Health Maintenance Due:      Topic Date Due   • Colorectal Cancer Screening  01/26/2026   • Breast Cancer Screening: Mammogram  10/24/2026   • DXA SCAN  10/24/2026   • Hepatitis C Screening  Completed     Immunizations Due:      Topic Date Due   • COVID-19 Vaccine (7 - 2024-25 season) 12/13/2024   • Influenza Vaccine (1) 09/01/2025     Advance Directives   What are advance directives?  Advance directives are legal documents that state your wishes and plans for medical care. These plans are made ahead of time in case you lose your ability to make  decisions for yourself. Advance directives can apply to any medical decision, such as the treatments you want, and if you want to donate organs.   What are the types of advance directives?  There are many types of advance directives, and each state has rules about how to use them. You may choose a combination of any of the following:  Living will:  This is a written record of the treatment you want. You can also choose which treatments you do not want, which to limit, and which to stop at a certain time. This includes surgery, medicine, IV fluid, and tube feedings.   Durable power of  for healthcare (DPAHC):  This is a written record that states who you want to make healthcare choices for you when you are unable to make them for yourself. This person, called a proxy, is usually a family member or a friend. You may choose more than 1 proxy.  Do not resuscitate (DNR) order:  A DNR order is used in case your heart stops beating or you stop breathing. It is a request not to have certain forms of treatment, such as CPR. A DNR order may be included in other types of advance directives.  Medical directive:  This covers the care that you want if you are in a coma, near death, or unable to make decisions for yourself. You can list the treatments you want for each condition. Treatment may include pain medicine, surgery, blood transfusions, dialysis, IV or tube feedings, and a ventilator (breathing machine).  Values history:  This document has questions about your views, beliefs, and how you feel and think about life. This information can help others choose the care that you would choose.  Why are advance directives important?  An advance directive helps you control your care. Although spoken wishes may be used, it is better to have your wishes written down. Spoken wishes can be misunderstood, or not followed. Treatments may be given even if you do not want them. An advance directive may make it easier for your family  to make difficult choices about your care.   Weight Management   Why it is important to manage your weight:  Being overweight increases your risk of health conditions such as heart disease, high blood pressure, type 2 diabetes, and certain types of cancer. It can also increase your risk for osteoarthritis, sleep apnea, and other respiratory problems. Aim for a slow, steady weight loss. Even a small amount of weight loss can lower your risk of health problems.  How to lose weight safely:  A safe and healthy way to lose weight is to eat fewer calories and get regular exercise. You can lose up about 1 pound a week by decreasing the number of calories you eat by 500 calories each day.   Healthy meal plan for weight management:  A healthy meal plan includes a variety of foods, contains fewer calories, and helps you stay healthy. A healthy meal plan includes the following:  Eat whole-grain foods more often.  A healthy meal plan should contain fiber. Fiber is the part of grains, fruits, and vegetables that is not broken down by your body. Whole-grain foods are healthy and provide extra fiber in your diet. Some examples of whole-grain foods are whole-wheat breads and pastas, oatmeal, brown rice, and bulgur.  Eat a variety of vegetables every day.  Include dark, leafy greens such as spinach, kale, juaquin greens, and mustard greens. Eat yellow and orange vegetables such as carrots, sweet potatoes, and winter squash.   Eat a variety of fruits every day.  Choose fresh or canned fruit (canned in its own juice or light syrup) instead of juice. Fruit juice has very little or no fiber.  Eat low-fat dairy foods.  Drink fat-free (skim) milk or 1% milk. Eat fat-free yogurt and low-fat cottage cheese. Try low-fat cheeses such as mozzarella and other reduced-fat cheeses.  Choose meat and other protein foods that are low in fat.  Choose beans or other legumes such as split peas or lentils. Choose fish, skinless poultry (chicken or  turkey), or lean cuts of red meat (beef or pork). Before you cook meat or poultry, cut off any visible fat.   Use less fat and oil.  Try baking foods instead of frying them. Add less fat, such as margarine, sour cream, regular salad dressing and mayonnaise to foods. Eat fewer high-fat foods. Some examples of high-fat foods include french fries, doughnuts, ice cream, and cakes.  Eat fewer sweets.  Limit foods and drinks that are high in sugar. This includes candy, cookies, regular soda, and sweetened drinks.  Exercise:  Exercise at least 30 minutes per day on most days of the week. Some examples of exercise include walking, biking, dancing, and swimming. You can also fit in more physical activity by taking the stairs instead of the elevator or parking farther away from stores. Ask your healthcare provider about the best exercise plan for you.    © Copyright [employer] 2018 Information is for End User's use only and may not be sold, redistributed or otherwise used for commercial purposes. All illustrations and images included in CareNotes® are the copyrighted property of [employer]., Inc[employer] or [employer]

## 2025-07-09 ENCOUNTER — TELEPHONE (OUTPATIENT)
Dept: BEHAVIORAL/MENTAL HEALTH CLINIC | Facility: CLINIC | Age: 70
End: 2025-07-09

## 2025-07-09 ENCOUNTER — HOSPITAL ENCOUNTER (EMERGENCY)
Facility: HOSPITAL | Age: 70
End: 2025-07-10
Attending: EMERGENCY MEDICINE
Payer: COMMERCIAL

## 2025-07-09 ENCOUNTER — TELEPHONE (OUTPATIENT)
Age: 70
End: 2025-07-09

## 2025-07-09 DIAGNOSIS — E03.9 ACQUIRED HYPOTHYROIDISM: ICD-10-CM

## 2025-07-09 DIAGNOSIS — I10 ESSENTIAL HYPERTENSION: ICD-10-CM

## 2025-07-09 DIAGNOSIS — Z86.39 HISTORY OF HYPOTHYROIDISM: ICD-10-CM

## 2025-07-09 DIAGNOSIS — F32.A DEPRESSION: Primary | ICD-10-CM

## 2025-07-09 DIAGNOSIS — E11.22 TYPE 2 DIABETES MELLITUS WITH STAGE 3A CHRONIC KIDNEY DISEASE, WITHOUT LONG-TERM CURRENT USE OF INSULIN (HCC): ICD-10-CM

## 2025-07-09 DIAGNOSIS — N18.31 TYPE 2 DIABETES MELLITUS WITH STAGE 3A CHRONIC KIDNEY DISEASE, WITHOUT LONG-TERM CURRENT USE OF INSULIN (HCC): ICD-10-CM

## 2025-07-09 LAB
ALBUMIN SERPL BCG-MCNC: 4 G/DL (ref 3.5–5)
ALP SERPL-CCNC: 56 U/L (ref 34–104)
ALT SERPL W P-5'-P-CCNC: 9 U/L (ref 7–52)
AMPHETAMINES SERPL QL SCN: NEGATIVE
ANION GAP SERPL CALCULATED.3IONS-SCNC: 9 MMOL/L (ref 4–13)
AST SERPL W P-5'-P-CCNC: 12 U/L (ref 13–39)
BARBITURATES UR QL: NEGATIVE
BASOPHILS # BLD AUTO: 0.06 THOUSANDS/ÂΜL (ref 0–0.1)
BASOPHILS NFR BLD AUTO: 1 % (ref 0–1)
BENZODIAZ UR QL: NEGATIVE
BILIRUB SERPL-MCNC: 1.15 MG/DL (ref 0.2–1)
BILIRUB UR QL STRIP: NEGATIVE
BUN SERPL-MCNC: 15 MG/DL (ref 5–25)
CALCIUM SERPL-MCNC: 9.4 MG/DL (ref 8.4–10.2)
CHLORIDE SERPL-SCNC: 109 MMOL/L (ref 96–108)
CLARITY UR: CLEAR
CO2 SERPL-SCNC: 23 MMOL/L (ref 21–32)
COCAINE UR QL: NEGATIVE
COLOR UR: ABNORMAL
CREAT SERPL-MCNC: 0.92 MG/DL (ref 0.6–1.3)
EOSINOPHIL # BLD AUTO: 0.03 THOUSAND/ÂΜL (ref 0–0.61)
EOSINOPHIL NFR BLD AUTO: 0 % (ref 0–6)
ERYTHROCYTE [DISTWIDTH] IN BLOOD BY AUTOMATED COUNT: 12.9 % (ref 11.6–15.1)
ETHANOL EXG-MCNC: 0 MG/DL
ETHANOL SERPL-MCNC: <10 MG/DL
FENTANYL UR QL SCN: NEGATIVE
GFR SERPL CREATININE-BSD FRML MDRD: 64 ML/MIN/1.73SQ M
GLUCOSE SERPL-MCNC: 116 MG/DL (ref 65–140)
GLUCOSE UR STRIP-MCNC: NEGATIVE MG/DL
HCT VFR BLD AUTO: 36.3 % (ref 34.8–46.1)
HGB BLD-MCNC: 12 G/DL (ref 11.5–15.4)
HGB UR QL STRIP.AUTO: NEGATIVE
HYDROCODONE UR QL SCN: NEGATIVE
IMM GRANULOCYTES # BLD AUTO: 0.03 THOUSAND/UL (ref 0–0.2)
IMM GRANULOCYTES NFR BLD AUTO: 0 % (ref 0–2)
KETONES UR STRIP-MCNC: NEGATIVE MG/DL
LEUKOCYTE ESTERASE UR QL STRIP: NEGATIVE
LYMPHOCYTES # BLD AUTO: 1.92 THOUSANDS/ÂΜL (ref 0.6–4.47)
LYMPHOCYTES NFR BLD AUTO: 16 % (ref 14–44)
MCH RBC QN AUTO: 30.5 PG (ref 26.8–34.3)
MCHC RBC AUTO-ENTMCNC: 33.1 G/DL (ref 31.4–37.4)
MCV RBC AUTO: 92 FL (ref 82–98)
METHADONE UR QL: NEGATIVE
MONOCYTES # BLD AUTO: 0.85 THOUSAND/ÂΜL (ref 0.17–1.22)
MONOCYTES NFR BLD AUTO: 7 % (ref 4–12)
NEUTROPHILS # BLD AUTO: 8.93 THOUSANDS/ÂΜL (ref 1.85–7.62)
NEUTS SEG NFR BLD AUTO: 76 % (ref 43–75)
NITRITE UR QL STRIP: NEGATIVE
NRBC BLD AUTO-RTO: 0 /100 WBCS
OPIATES UR QL SCN: NEGATIVE
OXYCODONE+OXYMORPHONE UR QL SCN: NEGATIVE
PCP UR QL: NEGATIVE
PH UR STRIP.AUTO: 6 [PH]
PLATELET # BLD AUTO: 265 THOUSANDS/UL (ref 149–390)
PMV BLD AUTO: 9.5 FL (ref 8.9–12.7)
POTASSIUM SERPL-SCNC: 3.5 MMOL/L (ref 3.5–5.3)
PROT SERPL-MCNC: 6.2 G/DL (ref 6.4–8.4)
PROT UR STRIP-MCNC: NEGATIVE MG/DL
RBC # BLD AUTO: 3.94 MILLION/UL (ref 3.81–5.12)
SODIUM SERPL-SCNC: 141 MMOL/L (ref 135–147)
SP GR UR STRIP.AUTO: <=1.005
THC UR QL: NEGATIVE
TSH SERPL DL<=0.05 MIU/L-ACNC: 2.05 UIU/ML (ref 0.45–4.5)
UROBILINOGEN UR QL STRIP.AUTO: 0.2 E.U./DL
WBC # BLD AUTO: 11.82 THOUSAND/UL (ref 4.31–10.16)

## 2025-07-09 PROCEDURE — 82077 ASSAY SPEC XCP UR&BREATH IA: CPT | Performed by: EMERGENCY MEDICINE

## 2025-07-09 PROCEDURE — 99285 EMERGENCY DEPT VISIT HI MDM: CPT | Performed by: EMERGENCY MEDICINE

## 2025-07-09 PROCEDURE — 36415 COLL VENOUS BLD VENIPUNCTURE: CPT | Performed by: EMERGENCY MEDICINE

## 2025-07-09 PROCEDURE — 85025 COMPLETE CBC W/AUTO DIFF WBC: CPT | Performed by: EMERGENCY MEDICINE

## 2025-07-09 PROCEDURE — 99284 EMERGENCY DEPT VISIT MOD MDM: CPT

## 2025-07-09 PROCEDURE — 82075 ASSAY OF BREATH ETHANOL: CPT | Performed by: EMERGENCY MEDICINE

## 2025-07-09 PROCEDURE — 80307 DRUG TEST PRSMV CHEM ANLYZR: CPT | Performed by: EMERGENCY MEDICINE

## 2025-07-09 PROCEDURE — 80053 COMPREHEN METABOLIC PANEL: CPT | Performed by: EMERGENCY MEDICINE

## 2025-07-09 PROCEDURE — 84443 ASSAY THYROID STIM HORMONE: CPT | Performed by: EMERGENCY MEDICINE

## 2025-07-09 PROCEDURE — 81003 URINALYSIS AUTO W/O SCOPE: CPT | Performed by: EMERGENCY MEDICINE

## 2025-07-09 RX ORDER — OLANZAPINE 10 MG/1
20 TABLET, FILM COATED ORAL EVERY EVENING
Status: DISCONTINUED | OUTPATIENT
Start: 2025-07-09 | End: 2025-07-10 | Stop reason: HOSPADM

## 2025-07-09 RX ORDER — QUETIAPINE FUMARATE 25 MG/1
50 TABLET, FILM COATED ORAL
Status: DISCONTINUED | OUTPATIENT
Start: 2025-07-09 | End: 2025-07-10 | Stop reason: HOSPADM

## 2025-07-09 RX ORDER — CLONAZEPAM 0.5 MG/1
0.25 TABLET ORAL EVERY EVENING
Status: DISCONTINUED | OUTPATIENT
Start: 2025-07-09 | End: 2025-07-10 | Stop reason: HOSPADM

## 2025-07-09 RX ORDER — TRAZODONE HYDROCHLORIDE 50 MG/1
100 TABLET ORAL
Status: DISCONTINUED | OUTPATIENT
Start: 2025-07-09 | End: 2025-07-10 | Stop reason: HOSPADM

## 2025-07-09 RX ORDER — LISINOPRIL 5 MG/1
5 TABLET ORAL DAILY
Status: DISCONTINUED | OUTPATIENT
Start: 2025-07-10 | End: 2025-07-10 | Stop reason: HOSPADM

## 2025-07-09 RX ORDER — PRAVASTATIN SODIUM 20 MG
40 TABLET ORAL DAILY
Status: DISCONTINUED | OUTPATIENT
Start: 2025-07-10 | End: 2025-07-10 | Stop reason: HOSPADM

## 2025-07-09 RX ORDER — LEVOTHYROXINE SODIUM 88 UG/1
88 TABLET ORAL
Status: DISCONTINUED | OUTPATIENT
Start: 2025-07-10 | End: 2025-07-10 | Stop reason: HOSPADM

## 2025-07-09 RX ADMIN — QUETIAPINE FUMARATE 50 MG: 50 TABLET ORAL at 21:12

## 2025-07-09 RX ADMIN — OLANZAPINE 20 MG: 10 TABLET, FILM COATED ORAL at 21:13

## 2025-07-09 RX ADMIN — CLONAZEPAM 0.25 MG: 0.5 TABLET ORAL at 21:12

## 2025-07-09 RX ADMIN — TRAZODONE HYDROCHLORIDE 100 MG: 50 TABLET ORAL at 21:13

## 2025-07-09 NOTE — TELEPHONE ENCOUNTER
"Contacted patient in regards to Routine Referral in attempts to verify patient's needs of services and add patient to proper wait list. Unable to lvm as call did not ring or connect and disconnected as \"no answer.\"    1st attempt  "

## 2025-07-09 NOTE — ED PROVIDER NOTES
Time reflects when diagnosis was documented in both MDM as applicable and the Disposition within this note       Time User Action Codes Description Comment    7/9/2025  6:09 PM Shan Mchugh Add [F32.A] Depression     7/9/2025  6:09 PM Shan Mchugh Add [Z86.39] History of hypothyroidism           ED Disposition       ED Disposition   Transfer to Behavioral Health Condition   --    Date/Time   Wed Jul 9, 2025  6:22 PM    Comment   Lisa A McGorry should be transferred out to [facility] and has been medically cleared.               Assessment & Plan       Medical Decision Making  64-year-old female presents with worsening depression, anhedonia.  She does have a history of hypothyroidism is compliant with her Synthroid.  He does have a psychiatric history.  Patient has a history of 3 ER visits over the last week for fairly generalized symptoms and not for psychiatric purposes with multiple ER visits this year however does not present family for psychiatric complaint.  Does have a history of psychiatric illness including bipolar disorder.  She denies SI.  Differential includes hypothyroidism, electrolyte abnormality, alcohol intoxication, drug use.  Prior charts and notes reviewed  Patient is medically cleared for acute inpatient psychiatric admission.  No indication for 302 at this time.    Problems Addressed:  Depression: acute illness or injury  History of hypothyroidism: acute illness or injury    Amount and/or Complexity of Data Reviewed  External Data Reviewed: notes.  Labs: ordered. Decision-making details documented in ED Course.  ECG/medicine tests: ordered and independent interpretation performed. Decision-making details documented in ED Course.    Risk  Prescription drug management.  Decision regarding hospitalization.        ED Course as of 07/09/25 1900   Wed Jul 09, 2025   1809 Iscussed with crisis worker   1859 Labs reassuring       Medications - No data to display    ED Risk Strat Scores                     (ISAR) Identification of Seniors at Risk  Before the illness or injury that brought you to the Emergency, did you need someone to help you on a regular basis?: 0  In the last 24 hours, have you needed more help than usual?: 0  Have you been hospitalized for one or more nights during the past 6 months?: 1  In general, do you see well?: 0  In general, do you have serious problems with your memory?: 0  Do you take more than three different medications every day?: 1  ISAR Score: 2            SBIRT 20yo+      Flowsheet Row Most Recent Value   Initial Alcohol Screen: US AUDIT-C     1. How often do you have a drink containing alcohol? 0 Filed at: 07/09/2025 1713   2. How many drinks containing alcohol do you have on a typical day you are drinking?  0 Filed at: 07/09/2025 1713   3b. FEMALE Any Age, or MALE 65+: How often do you have 4 or more drinks on one occassion? 0 Filed at: 07/09/2025 1713   Audit-C Score 0 Filed at: 07/09/2025 1713   SHARAD: How many times in the past year have you...    Used an illegal drug or used a prescription medication for non-medical reasons? Never Filed at: 07/09/2025 1713                            History of Present Illness       Chief Complaint   Patient presents with    Psychiatric Evaluation     Patient states that she has been feeling depressed lately and decided she needs to sign herself in. Patient denies SI/HI. Patient also expresses feeling increased weakness. Denies any recent illness.        Past Medical History[1]   Past Surgical History[2]   Family History[3]   Social History[4]   E-Cigarette/Vaping    E-Cigarette Use Never User       E-Cigarette/Vaping Substances    Nicotine No     THC No     CBD No     Flavoring No     Other No     Unknown No       I have reviewed and agree with the history as documented.     69F presents for psychiatric evaluation  See MDM          Review of Systems        Objective       ED Triage Vitals [07/09/25 1710]   Temperature Pulse Blood  Pressure Respirations SpO2 Patient Position - Orthostatic VS   97.9 °F (36.6 °C) 71 120/66 18 96 % --      Temp src Heart Rate Source BP Location FiO2 (%) Pain Score    -- -- -- -- No Pain      Vitals      Date and Time Temp Pulse SpO2 Resp BP Pain Score FACES Pain Rating User   07/09/25 1750 -- -- 96 % -- -- -- -- AMF   07/09/25 1710 97.9 °F (36.6 °C) 71 96 % 18 120/66 No Pain -- JS            Physical Exam  Vitals and nursing note reviewed.   Constitutional:       Appearance: Normal appearance. She is well-developed.   HENT:      Head: Normocephalic and atraumatic.     Eyes:      Conjunctiva/sclera: Conjunctivae normal.      Pupils: Pupils are equal, round, and reactive to light.     Neck:      Trachea: No tracheal deviation.     Cardiovascular:      Rate and Rhythm: Normal rate and regular rhythm.      Heart sounds: Normal heart sounds. No murmur heard.  Pulmonary:      Effort: Pulmonary effort is normal. No respiratory distress.      Breath sounds: Normal breath sounds. No wheezing or rales.   Abdominal:      General: Bowel sounds are normal. There is no distension.      Palpations: Abdomen is soft.      Tenderness: There is no abdominal tenderness.     Musculoskeletal:         General: No deformity.      Cervical back: Normal range of motion and neck supple.     Skin:     General: Skin is warm and dry.      Capillary Refill: Capillary refill takes less than 2 seconds.     Neurological:      General: No focal deficit present.      Mental Status: She is alert and oriented to person, place, and time.      Sensory: No sensory deficit.     Psychiatric:         Mood and Affect: Mood is depressed.         Behavior: Behavior is cooperative.         Thought Content: Thought content does not include homicidal or suicidal ideation. Thought content does not include homicidal or suicidal plan.         Judgment: Judgment normal.         Results Reviewed       Procedure Component Value Units Date/Time    Rapid drug screen,  urine [[id number]]  (Normal) Collected: 07/09/25 1808    Lab Status: Final result Specimen: Urine, Clean Catch Updated: 07/09/25 1856     Amph/Meth UR Negative     Barbiturate Ur Negative     Benzodiazepine Urine Negative     Cocaine Urine Negative     Methadone Urine Negative     Opiate Urine Negative     PCP Ur Negative     THC Urine Negative     Oxycodone Urine Negative     Fentanyl Urine Negative     HYDROCODONE URINE Negative    Narrative:      FOR MEDICAL PURPOSES ONLY.   IF CONFIRMATION NEEDED PLEASE CONTACT THE LAB WITHIN 5 DAYS.    Drug Screen Cutoff Levels:  AMPHETAMINE/METHAMPHETAMINES  1000 ng/mL  BARBITURATES     200 ng/mL  BENZODIAZEPINES     200 ng/mL  COCAINE      300 ng/mL  METHADONE      300 ng/mL  OPIATES      300 ng/mL  PHENCYCLIDINE     25 ng/mL  THC       50 ng/mL  OXYCODONE      100 ng/mL  FENTANYL      5 ng/mL  HYDROCODONE     300 ng/mL    UA w Reflex to Microscopic w Reflex to Culture [[id number]]  (Abnormal) Collected: 07/09/25 1808    Lab Status: Final result Specimen: Urine, Clean Catch Updated: 07/09/25 1817     Color, UA Straw     Clarity, UA Clear     Specific Gravity, UA <=1.005     pH, UA 6.0     Leukocytes, UA Negative     Nitrite, UA Negative     Protein, UA Negative mg/dl      Glucose, UA Negative mg/dl      Ketones, UA Negative mg/dl      Urobilinogen, UA 0.2 E.U./dl      Bilirubin, UA Negative     Occult Blood, UA Negative    TSH [[id number]]  (Normal) Collected: 07/09/25 1732    Lab Status: Final result Specimen: Blood from Arm, Left Updated: 07/09/25 1813     TSH 3RD GENERATION 2.049 uIU/mL     Comprehensive metabolic panel [[id number]]  (Abnormal) Collected: 07/09/25 1732    Lab Status: Final result Specimen: Blood from Arm, Left Updated: 07/09/25 1800     Sodium 141 mmol/L      Potassium 3.5 mmol/L      Chloride 109 mmol/L      CO2 23 mmol/L      ANION GAP 9 mmol/L      BUN 15 mg/dL      Creatinine 0.92 mg/dL      Glucose 116 mg/dL      Calcium 9.4 mg/dL      AST 12 U/L      ALT  9 U/L      Alkaline Phosphatase 56 U/L      Total Protein 6.2 g/dL      Albumin 4.0 g/dL      Total Bilirubin 1.15 mg/dL      eGFR 64 ml/min/1.73sq m     Narrative:      National Kidney Disease Foundation guidelines for Chronic Kidney Disease (CKD):     Stage 1 with normal or high GFR (GFR > 90 mL/min/1.73 square meters)    Stage 2 Mild CKD (GFR = 60-89 mL/min/1.73 square meters)    Stage 3A Moderate CKD (GFR = 45-59 mL/min/1.73 square meters)    Stage 3B Moderate CKD (GFR = 30-44 mL/min/1.73 square meters)    Stage 4 Severe CKD (GFR = 15-29 mL/min/1.73 square meters)    Stage 5 End Stage CKD (GFR <15 mL/min/1.73 square meters)  Note: GFR calculation is accurate only with a steady state creatinine    Ethanol [[id number]]  (Normal) Collected: 07/09/25 1732    Lab Status: Final result Specimen: Blood from Arm, Left Updated: 07/09/25 1757     Ethanol Lvl <10 mg/dL     CBC and differential [[id number]]  (Abnormal) Collected: 07/09/25 1732    Lab Status: Final result Specimen: Blood from Arm, Left Updated: 07/09/25 1740     WBC 11.82 Thousand/uL      RBC 3.94 Million/uL      Hemoglobin 12.0 g/dL      Hematocrit 36.3 %      MCV 92 fL      MCH 30.5 pg      MCHC 33.1 g/dL      RDW 12.9 %      MPV 9.5 fL      Platelets 265 Thousands/uL      nRBC 0 /100 WBCs      Segmented % 76 %      Immature Grans % 0 %      Lymphocytes % 16 %      Monocytes % 7 %      Eosinophils Relative 0 %      Basophils Relative 1 %      Absolute Neutrophils 8.93 Thousands/µL      Absolute Immature Grans 0.03 Thousand/uL      Absolute Lymphocytes 1.92 Thousands/µL      Absolute Monocytes 0.85 Thousand/µL      Eosinophils Absolute 0.03 Thousand/µL      Basophils Absolute 0.06 Thousands/µL     POCT alcohol breath test [[id number]]  (Normal) Collected: 07/09/25 1739    Lab Status: Final result Updated: 07/09/25 1739     EXTBreath Alcohol 0            No orders to display       Procedures    ED Medication and Procedure Management   Prior to Admission  Medications   Prescriptions Last Dose Informant Patient Reported? Taking?   OLANZapine (ZyPREXA) 20 MG tablet   No Yes   Sig: Take 1 tablet (20 mg total) by mouth every evening   QUEtiapine (SEROquel) 50 mg tablet   Yes Yes   Sig: Take 50 mg by mouth daily at bedtime   ammonium lactate (LAC-HYDRIN) 12 % lotion   No No   Sig: Apply topically 2 (two) times a day   Patient not taking: Reported on 7/8/2025   apixaban (Eliquis) 5 mg Not Taking  No No   Sig: Take 1 tablet (5 mg total) by mouth 2 (two) times a day   Patient not taking: Reported on 7/9/2025   clonazePAM (KlonoPIN) 0.5 mg tablet   No Yes   Sig: Take 0.5 tablets (0.25 mg total) by mouth every evening   divalproex sodium (DEPAKOTE) 500 mg DR tablet Not Taking  No No   Sig: Take 1 tablet (500 mg total) by mouth 2 (two) times a day   Patient not taking: Reported on 7/9/2025   levothyroxine 88 mcg tablet   No Yes   Sig: Take 1 tablet (88 mcg total) by mouth daily in the early morning   lisinopril (ZESTRIL) 5 mg tablet   Yes Yes   Sig: Take 5 mg by mouth daily   pravastatin (PRAVACHOL) 40 mg tablet   No Yes   Sig: Take 1 tablet (40 mg total) by mouth daily   traZODone (DESYREL) 100 mg tablet   No Yes   Sig: Take 1 tablet (100 mg total) by mouth daily at bedtime      Facility-Administered Medications: None     Patient's Medications   Discharge Prescriptions    No medications on file     No discharge procedures on file.  ED SEPSIS DOCUMENTATION   Time reflects when diagnosis was documented in both MDM as applicable and the Disposition within this note       Time User Action Codes Description Comment    7/9/2025  6:09 PM Shan Mchugh [F32.A] Depression     7/9/2025  6:09 PM Shan Mchugh [Z86.39] History of hypothyroidism                      [1]   Past Medical History:  Diagnosis Date    Allergic     Chronic kidney disease     Depression     Diabetes mellitus (HCC)     Disease of thyroid gland     GERD (gastroesophageal reflux disease)      Hyperchloremia     Hypertension     Psychiatric disorder    [2]   Past Surgical History:  Procedure Laterality Date    ANKLE SURGERY      Incision    DILATION AND CURETTAGE OF UTERUS     [3]   Family History  Problem Relation Name Age of Onset    No Known Problems Family      Diabetes Mother      Heart disease Mother      Stroke Mother      Diabetes Father      Stroke Father      No Known Problems Maternal Grandmother      No Known Problems Maternal Grandfather      No Known Problems Paternal Grandmother      No Known Problems Paternal Grandfather      Diabetes Brother      No Known Problems Paternal Aunt     [4]   Social History  Tobacco Use    Smoking status: Never     Passive exposure: Never    Smokeless tobacco: Never   Vaping Use    Vaping status: Never Used   Substance Use Topics    Alcohol use: Not Currently    Drug use: No        Shan Mchugh,   07/09/25 5513

## 2025-07-09 NOTE — TELEPHONE ENCOUNTER
Successful follow up with the pt. Pt stated that she is feeling a little better since being seen at the walk in center. Pt stated that she has her virtual therapy appointment with [facility] scheduled for this afternoon. Pt is also scheduled for her follow up medication management in September through [facility]. Pt stated that she would reach back out to the walk in center if any further assistance is needed.

## 2025-07-10 ENCOUNTER — HOSPITAL ENCOUNTER (INPATIENT)
Facility: HOSPITAL | Age: 70
LOS: 15 days | Discharge: HOME | DRG: 885 | End: 2025-07-25
Attending: PSYCHIATRY & NEUROLOGY | Admitting: PSYCHIATRY & NEUROLOGY
Payer: COMMERCIAL

## 2025-07-10 VITALS
DIASTOLIC BLOOD PRESSURE: 59 MMHG | WEIGHT: 177 LBS | HEART RATE: 92 BPM | RESPIRATION RATE: 18 BRPM | TEMPERATURE: 97.8 F | SYSTOLIC BLOOD PRESSURE: 125 MMHG | OXYGEN SATURATION: 98 % | BODY MASS INDEX: 34.57 KG/M2

## 2025-07-10 DIAGNOSIS — I10 ESSENTIAL HYPERTENSION: ICD-10-CM

## 2025-07-10 DIAGNOSIS — G47.00 INSOMNIA: ICD-10-CM

## 2025-07-10 DIAGNOSIS — F31.32 BIPOLAR AFFECTIVE DISORDER, CURRENTLY DEPRESSED, MODERATE (HCC): Primary | ICD-10-CM

## 2025-07-10 DIAGNOSIS — L30.4 INTERTRIGO: ICD-10-CM

## 2025-07-10 DIAGNOSIS — E55.9 VITAMIN D DEFICIENCY: ICD-10-CM

## 2025-07-10 DIAGNOSIS — I82.622 ACUTE DEEP VEIN THROMBOSIS (DVT) OF OTHER VEIN OF LEFT UPPER EXTREMITY (HCC): ICD-10-CM

## 2025-07-10 DIAGNOSIS — N18.31 TYPE 2 DIABETES MELLITUS WITH STAGE 3A CHRONIC KIDNEY DISEASE, WITHOUT LONG-TERM CURRENT USE OF INSULIN (HCC): ICD-10-CM

## 2025-07-10 DIAGNOSIS — E11.22 TYPE 2 DIABETES MELLITUS WITH STAGE 3A CHRONIC KIDNEY DISEASE, WITHOUT LONG-TERM CURRENT USE OF INSULIN (HCC): ICD-10-CM

## 2025-07-10 DIAGNOSIS — L30.9 DERMATITIS: ICD-10-CM

## 2025-07-10 DIAGNOSIS — E78.00 HYPERCHOLESTEROLEMIA: ICD-10-CM

## 2025-07-10 DIAGNOSIS — E03.9 ACQUIRED HYPOTHYROIDISM: ICD-10-CM

## 2025-07-10 DIAGNOSIS — E53.8 VITAMIN B12 DEFICIENCY: ICD-10-CM

## 2025-07-10 RX ORDER — HYDROXYZINE HYDROCHLORIDE 25 MG/1
25 TABLET, FILM COATED ORAL
Status: CANCELLED | OUTPATIENT
Start: 2025-07-10

## 2025-07-10 RX ORDER — TRAZODONE HYDROCHLORIDE 50 MG/1
100 TABLET ORAL
Status: CANCELLED | OUTPATIENT
Start: 2025-07-10

## 2025-07-10 RX ORDER — OLANZAPINE 2.5 MG/1
2.5 TABLET, FILM COATED ORAL
Status: DISCONTINUED | OUTPATIENT
Start: 2025-07-10 | End: 2025-07-25 | Stop reason: HOSPADM

## 2025-07-10 RX ORDER — PRAVASTATIN SODIUM 20 MG
40 TABLET ORAL DAILY
Status: CANCELLED | OUTPATIENT
Start: 2025-07-11

## 2025-07-10 RX ORDER — OLANZAPINE 5 MG/1
5 TABLET, FILM COATED ORAL
Status: DISCONTINUED | OUTPATIENT
Start: 2025-07-10 | End: 2025-07-25 | Stop reason: HOSPADM

## 2025-07-10 RX ORDER — BENZTROPINE MESYLATE 0.5 MG/1
0.5 TABLET ORAL
Status: DISCONTINUED | OUTPATIENT
Start: 2025-07-10 | End: 2025-07-25 | Stop reason: HOSPADM

## 2025-07-10 RX ORDER — LORAZEPAM 0.5 MG/1
0.5 TABLET ORAL
Status: CANCELLED | OUTPATIENT
Start: 2025-07-10

## 2025-07-10 RX ORDER — QUETIAPINE FUMARATE 50 MG/1
50 TABLET, FILM COATED ORAL
Status: DISCONTINUED | OUTPATIENT
Start: 2025-07-10 | End: 2025-07-11

## 2025-07-10 RX ORDER — BENZTROPINE MESYLATE 1 MG/ML
1 INJECTION, SOLUTION INTRAMUSCULAR; INTRAVENOUS
Status: DISCONTINUED | OUTPATIENT
Start: 2025-07-10 | End: 2025-07-25 | Stop reason: HOSPADM

## 2025-07-10 RX ORDER — OLANZAPINE 10 MG/2ML
5 INJECTION, POWDER, FOR SOLUTION INTRAMUSCULAR
Status: DISCONTINUED | OUTPATIENT
Start: 2025-07-10 | End: 2025-07-25 | Stop reason: HOSPADM

## 2025-07-10 RX ORDER — CLONAZEPAM 0.5 MG/1
0.25 TABLET ORAL EVERY EVENING
Status: DISCONTINUED | OUTPATIENT
Start: 2025-07-10 | End: 2025-07-15

## 2025-07-10 RX ORDER — LORAZEPAM 1 MG/1
1 TABLET ORAL
Status: DISCONTINUED | OUTPATIENT
Start: 2025-07-10 | End: 2025-07-25 | Stop reason: HOSPADM

## 2025-07-10 RX ORDER — LORAZEPAM 1 MG/1
1 TABLET ORAL
Status: CANCELLED | OUTPATIENT
Start: 2025-07-10

## 2025-07-10 RX ORDER — ACETAMINOPHEN 325 MG/1
650 TABLET ORAL EVERY 4 HOURS PRN
Status: CANCELLED | OUTPATIENT
Start: 2025-07-10

## 2025-07-10 RX ORDER — CLONAZEPAM 0.5 MG/1
0.25 TABLET ORAL EVERY EVENING
Status: CANCELLED | OUTPATIENT
Start: 2025-07-10

## 2025-07-10 RX ORDER — LORAZEPAM 0.5 MG/1
0.5 TABLET ORAL
Status: DISCONTINUED | OUTPATIENT
Start: 2025-07-10 | End: 2025-07-25 | Stop reason: HOSPADM

## 2025-07-10 RX ORDER — PRAVASTATIN SODIUM 40 MG
40 TABLET ORAL DAILY
Status: DISCONTINUED | OUTPATIENT
Start: 2025-07-11 | End: 2025-07-25 | Stop reason: HOSPADM

## 2025-07-10 RX ORDER — OLANZAPINE 10 MG/2ML
5 INJECTION, POWDER, FOR SOLUTION INTRAMUSCULAR
Status: CANCELLED | OUTPATIENT
Start: 2025-07-10

## 2025-07-10 RX ORDER — ACETAMINOPHEN 325 MG/1
650 TABLET ORAL EVERY 4 HOURS PRN
Status: DISCONTINUED | OUTPATIENT
Start: 2025-07-10 | End: 2025-07-25 | Stop reason: HOSPADM

## 2025-07-10 RX ORDER — ACETAMINOPHEN 325 MG/1
975 TABLET ORAL EVERY 6 HOURS PRN
Status: DISCONTINUED | OUTPATIENT
Start: 2025-07-10 | End: 2025-07-25 | Stop reason: HOSPADM

## 2025-07-10 RX ORDER — LISINOPRIL 5 MG/1
5 TABLET ORAL DAILY
Status: CANCELLED | OUTPATIENT
Start: 2025-07-11

## 2025-07-10 RX ORDER — LORAZEPAM 2 MG/ML
1 INJECTION INTRAMUSCULAR
Status: DISCONTINUED | OUTPATIENT
Start: 2025-07-10 | End: 2025-07-25 | Stop reason: HOSPADM

## 2025-07-10 RX ORDER — LISINOPRIL 5 MG/1
5 TABLET ORAL DAILY
Status: DISCONTINUED | OUTPATIENT
Start: 2025-07-11 | End: 2025-07-25 | Stop reason: HOSPADM

## 2025-07-10 RX ORDER — OLANZAPINE 10 MG/1
20 TABLET, FILM COATED ORAL EVERY EVENING
Status: CANCELLED | OUTPATIENT
Start: 2025-07-10

## 2025-07-10 RX ORDER — LORAZEPAM 2 MG/ML
1 INJECTION INTRAMUSCULAR
Status: CANCELLED | OUTPATIENT
Start: 2025-07-10

## 2025-07-10 RX ORDER — OLANZAPINE 5 MG/1
5 TABLET, FILM COATED ORAL
Status: CANCELLED | OUTPATIENT
Start: 2025-07-10

## 2025-07-10 RX ORDER — BENZTROPINE MESYLATE 1 MG/ML
1 INJECTION, SOLUTION INTRAMUSCULAR; INTRAVENOUS
Status: CANCELLED | OUTPATIENT
Start: 2025-07-10

## 2025-07-10 RX ORDER — LEVOTHYROXINE SODIUM 88 UG/1
88 TABLET ORAL
Status: CANCELLED | OUTPATIENT
Start: 2025-07-11

## 2025-07-10 RX ORDER — HYDROXYZINE HYDROCHLORIDE 25 MG/1
25 TABLET, FILM COATED ORAL
Status: DISCONTINUED | OUTPATIENT
Start: 2025-07-10 | End: 2025-07-25 | Stop reason: HOSPADM

## 2025-07-10 RX ORDER — TRAZODONE HYDROCHLORIDE 100 MG/1
100 TABLET ORAL
Status: DISCONTINUED | OUTPATIENT
Start: 2025-07-10 | End: 2025-07-11

## 2025-07-10 RX ORDER — LEVOTHYROXINE SODIUM 88 UG/1
88 TABLET ORAL
Status: DISCONTINUED | OUTPATIENT
Start: 2025-07-11 | End: 2025-07-25 | Stop reason: HOSPADM

## 2025-07-10 RX ORDER — BENZTROPINE MESYLATE 0.5 MG/1
0.5 TABLET ORAL
Status: CANCELLED | OUTPATIENT
Start: 2025-07-10

## 2025-07-10 RX ORDER — OLANZAPINE 10 MG/1
20 TABLET, FILM COATED ORAL EVERY EVENING
Status: DISCONTINUED | OUTPATIENT
Start: 2025-07-10 | End: 2025-07-15

## 2025-07-10 RX ORDER — ACETAMINOPHEN 325 MG/1
975 TABLET ORAL EVERY 6 HOURS PRN
Status: CANCELLED | OUTPATIENT
Start: 2025-07-10

## 2025-07-10 RX ORDER — QUETIAPINE FUMARATE 50 MG/1
50 TABLET, FILM COATED ORAL
Status: CANCELLED | OUTPATIENT
Start: 2025-07-10

## 2025-07-10 RX ORDER — OLANZAPINE 2.5 MG/1
2.5 TABLET, FILM COATED ORAL
Status: CANCELLED | OUTPATIENT
Start: 2025-07-10

## 2025-07-10 RX ADMIN — PRAVASTATIN SODIUM 40 MG: 20 TABLET ORAL at 08:31

## 2025-07-10 RX ADMIN — Medication 3 MG: at 21:17

## 2025-07-10 RX ADMIN — QUETIAPINE FUMARATE 50 MG: 50 TABLET ORAL at 21:17

## 2025-07-10 RX ADMIN — LEVOTHYROXINE SODIUM 88 MCG: 88 TABLET ORAL at 07:42

## 2025-07-10 RX ADMIN — CLONAZEPAM 0.25 MG: 0.5 TABLET ORAL at 18:13

## 2025-07-10 RX ADMIN — OLANZAPINE 20 MG: 10 TABLET, FILM COATED ORAL at 18:13

## 2025-07-10 RX ADMIN — TRAZODONE HYDROCHLORIDE 100 MG: 100 TABLET ORAL at 21:17

## 2025-07-10 RX ADMIN — LISINOPRIL 5 MG: 5 TABLET ORAL at 08:31

## 2025-07-10 NOTE — ED NOTES
Patient screened upon arrival using [place] Suicide Risk Assessment with result of low   Re-screening not required unless change in behavior or suicidal ideation.  Patient's environment appears to be free of unnecessary equipment/cords, and other objects commonly identified for self harm or harm to others.  Behavioral Health Assessment deferred as patient is sleeping and would benefit from additional rest.  Vital signs deferred until patient awake, no signs or symptoms of respiratory distress at this time.    Once patient is awake and able to participate, will complete assessments.  Will continue to monitor patient until Crisis and the Care team can make appropriate disposition and/or transfer/admission accommodations.      Romelia Duncan, CECILIA  07/09/25 4840

## 2025-07-10 NOTE — ED NOTES
Patient is accepted at [facility]  Patient is accepted by Dr. Beatriz Daily    Transportation is arranged with [employer].         Nurse report is to be called to [phone] prior to patient transfer.

## 2025-07-10 NOTE — ED NOTES
Insurance Authorization for admission:     The auth request form and clinical were faxed to [facility] [phone].    COB was completed with Danny at [facility].

## 2025-07-10 NOTE — ED NOTES
1645  via [facility].    Abimbola at intake aware.    Patient aware of acceptance and  time.

## 2025-07-10 NOTE — ED NOTES
Patient screened upon arrival using [place] Suicide Risk Assessment with result of low   Re-screening not required unless change in behavior or suicidal ideation.  Patient's environment appears to be free of unnecessary equipment/cords, and other objects commonly identified for self harm or harm to others.  Behavioral Health Assessment deferred as patient is sleeping and would benefit from additional rest.  Vital signs deferred until patient awake, no signs or symptoms of respiratory distress at this time.    Once patient is awake and able to participate, will complete assessments.  Will continue to monitor patient until Crisis and the Care team can make appropriate disposition and/or transfer/admission accommodations.      Romelia Duncan, CECILIA  07/10/25 5622

## 2025-07-10 NOTE — ED CARE HANDOFF
Emergency Department Sign Out Note        Sign out and transfer of care from Dr. Mchugh. See Separate Emergency Department note.     The patient, Lisa Bocanegraorry, was evaluated by the previous provider for depression and loneliness with interference in ADLs as a consequence.    Workup Completed:  Patient had emergency department workup prior to my evaluation which included CBC CMP, ethanol, TSH, urinalysis, rapid drug screen, and breath alcohol.    ED Course / Workup Pending (followup):  Patient was medically cleared prior to my arrival and is currently pending bed placement.  Patient has signed a 201.        (ISAR) Identification of Seniors at Risk  Before the illness or injury that brought you to the Emergency, did you need someone to help you on a regular basis?: 0  In the last 24 hours, have you needed more help than usual?: 0  Have you been hospitalized for one or more nights during the past 6 months?: 1  In general, do you see well?: 0  In general, do you have serious problems with your memory?: 0  Do you take more than three different medications every day?: 1  ISAR Score: 2                              ED Course as of 07/10/25 0024   Wed Jul 09, 2025 2134 Received in signout and notes that she is lonely and depressed.  Is interfering with ADLs.  The patient denies suicidal ideation.  201 is signed.  Patient likely not a candidate to be committed.  Patient has been medically cleared and bed search is pending.     Procedures  Medical Decision Making  Amount and/or Complexity of Data Reviewed  Labs: ordered.    Risk  Prescription drug management.  Decision regarding hospitalization.            Disposition  Final diagnoses:   Depression   History of hypothyroidism     Time reflects when diagnosis was documented in both MDM as applicable and the Disposition within this note       Time User Action Codes Description Comment    7/9/2025  6:09 PM Shan Mchugh Add [F32.A] Depression     7/9/2025  6:09 PM  Shan Mchugh Add [Z86.39] History of hypothyroidism           ED Disposition       ED Disposition   Transfer to Behavioral Health Condition   --    Date/Time   Wed Jul 9, 2025  6:22 PM    Comment   Lisa A McGorry should be transferred out to [facility] and has been medically cleared.               Follow-up Information    None       Patient's Medications   Discharge Prescriptions    No medications on file     No discharge procedures on file.       ED Provider  Electronically Signed by     Arthur Coleman Jr.,   07/10/25 0637

## 2025-07-10 NOTE — ED NOTES
Patient screened upon arrival using [place] Suicide Risk Assessment with result of LOW  Re-screening not required unless change in behavior or suicidal ideation.  Patient's environment appears to be free of unnecessary equipment/cords, and other objects commonly identified for self harm or harm to others.  Behavioral Health Assessment deferred as patient is sleeping and would benefit from additional rest.  Vital signs deferred until patient awake, no signs or symptoms of respiratory distress at this time.    Once patient is awake and able to participate, will complete assessments.  Will continue to monitor patient until Crisis and the Care team can make appropriate disposition and/or transfer/admission accommodations.       Tova Bourgeois RN  07/10/25 0700

## 2025-07-10 NOTE — ED NOTES
[facility] met with pt to complete crisis intake and safety risk assessment. Introduce self, role, and evaluation process.  The patient is a 69 year old female who presented to the ED via private vehicle, reporting significant depression over the past several days.  She stated she resided with her brother and has not been sleeping well.  Pt has a history of Bipolar Disorder, with multiple prior psychiatric admissions, the most recent being in April 2025.  The patient is a poor historian and is unable to recall the name of her current psychiatrist.  She denies current use of psychiatric medications and is not currently engaged in outpatient psychiatric care.   Patient is oriented to time, place,a dn person.  She denies SI/HI,AVH.  She also denies use of drugs or alcohol.  Pt was educate on treatment options and agreed to voluntary inpatient admission under Section 201 after a review of her rights and the 72 hour notice.  The ED Provider signed the 201, and patient was referred to the in-network Intake for a psychiatric med search.

## 2025-07-11 PROBLEM — F31.32 BIPOLAR AFFECTIVE DISORDER, CURRENTLY DEPRESSED, MODERATE (HCC): Status: ACTIVE | Noted: 2025-07-11

## 2025-07-11 LAB
25(OH)D3 SERPL-MCNC: 30.4 NG/ML (ref 30–100)
CHOLEST SERPL-MCNC: 132 MG/DL (ref ?–200)
EST. AVERAGE GLUCOSE BLD GHB EST-MCNC: 131 MG/DL
FOLATE SERPL-MCNC: 7.3 NG/ML
HBA1C MFR BLD: 6.2 %
HDLC SERPL-MCNC: 59 MG/DL
LDLC SERPL CALC-MCNC: 61 MG/DL (ref 0–100)
NONHDLC SERPL-MCNC: 73 MG/DL
TRIGL SERPL-MCNC: 58 MG/DL (ref ?–150)
VIT B12 SERPL-MCNC: 301 PG/ML (ref 180–914)

## 2025-07-11 PROCEDURE — 82746 ASSAY OF FOLIC ACID SERUM: CPT

## 2025-07-11 PROCEDURE — 99222 1ST HOSP IP/OBS MODERATE 55: CPT | Performed by: PSYCHIATRY & NEUROLOGY

## 2025-07-11 PROCEDURE — 82306 VITAMIN D 25 HYDROXY: CPT

## 2025-07-11 PROCEDURE — 82607 VITAMIN B-12: CPT

## 2025-07-11 PROCEDURE — 80061 LIPID PANEL: CPT

## 2025-07-11 PROCEDURE — 83036 HEMOGLOBIN GLYCOSYLATED A1C: CPT

## 2025-07-11 RX ORDER — NYSTATIN 100000 [USP'U]/G
POWDER TOPICAL 2 TIMES DAILY
Status: DISCONTINUED | OUTPATIENT
Start: 2025-07-11 | End: 2025-07-25 | Stop reason: HOSPADM

## 2025-07-11 RX ORDER — DIVALPROEX SODIUM 500 MG/1
500 TABLET, FILM COATED, EXTENDED RELEASE ORAL
Status: DISCONTINUED | OUTPATIENT
Start: 2025-07-11 | End: 2025-07-15

## 2025-07-11 RX ORDER — TRAZODONE HYDROCHLORIDE 50 MG/1
50 TABLET ORAL
Status: DISCONTINUED | OUTPATIENT
Start: 2025-07-11 | End: 2025-07-25 | Stop reason: HOSPADM

## 2025-07-11 RX ADMIN — APIXABAN 5 MG: 5 TABLET, FILM COATED ORAL at 11:27

## 2025-07-11 RX ADMIN — NYSTATIN 1 APPLICATION: 100000 POWDER TOPICAL at 11:27

## 2025-07-11 RX ADMIN — APIXABAN 5 MG: 5 TABLET, FILM COATED ORAL at 17:03

## 2025-07-11 RX ADMIN — CLONAZEPAM 0.25 MG: 0.5 TABLET ORAL at 17:03

## 2025-07-11 RX ADMIN — LISINOPRIL 5 MG: 5 TABLET ORAL at 08:31

## 2025-07-11 RX ADMIN — PRAVASTATIN SODIUM 40 MG: 40 TABLET ORAL at 08:32

## 2025-07-11 RX ADMIN — LEVOTHYROXINE SODIUM 88 MCG: 88 TABLET ORAL at 08:31

## 2025-07-11 RX ADMIN — TRAZODONE HYDROCHLORIDE 50 MG: 50 TABLET ORAL at 21:00

## 2025-07-11 RX ADMIN — Medication 3 MG: at 21:00

## 2025-07-11 RX ADMIN — OLANZAPINE 20 MG: 10 TABLET, FILM COATED ORAL at 17:03

## 2025-07-11 RX ADMIN — DIVALPROEX SODIUM 500 MG: 500 TABLET, FILM COATED, EXTENDED RELEASE ORAL at 21:00

## 2025-07-11 RX ADMIN — NYSTATIN 1 APPLICATION: 100000 POWDER TOPICAL at 17:04

## 2025-07-12 PROCEDURE — 99232 SBSQ HOSP IP/OBS MODERATE 35: CPT | Performed by: PSYCHIATRY & NEUROLOGY

## 2025-07-12 RX ADMIN — CLONAZEPAM 0.25 MG: 0.5 TABLET ORAL at 17:09

## 2025-07-12 RX ADMIN — APIXABAN 5 MG: 5 TABLET, FILM COATED ORAL at 17:10

## 2025-07-12 RX ADMIN — OLANZAPINE 20 MG: 10 TABLET, FILM COATED ORAL at 17:10

## 2025-07-12 RX ADMIN — LISINOPRIL 5 MG: 5 TABLET ORAL at 09:01

## 2025-07-12 RX ADMIN — PRAVASTATIN SODIUM 40 MG: 40 TABLET ORAL at 09:01

## 2025-07-12 RX ADMIN — APIXABAN 5 MG: 5 TABLET, FILM COATED ORAL at 09:01

## 2025-07-12 RX ADMIN — LEVOTHYROXINE SODIUM 88 MCG: 88 TABLET ORAL at 07:40

## 2025-07-12 RX ADMIN — NYSTATIN 1 APPLICATION: 100000 POWDER TOPICAL at 17:10

## 2025-07-12 RX ADMIN — NYSTATIN 1 APPLICATION: 100000 POWDER TOPICAL at 09:01

## 2025-07-12 RX ADMIN — DIVALPROEX SODIUM 500 MG: 500 TABLET, FILM COATED, EXTENDED RELEASE ORAL at 21:19

## 2025-07-12 RX ADMIN — Medication 3 MG: at 21:19

## 2025-07-12 RX ADMIN — TRAZODONE HYDROCHLORIDE 50 MG: 50 TABLET ORAL at 21:24

## 2025-07-13 PROCEDURE — 99232 SBSQ HOSP IP/OBS MODERATE 35: CPT | Performed by: PSYCHIATRY & NEUROLOGY

## 2025-07-13 RX ORDER — POLYETHYLENE GLYCOL 3350 17 G/17G
17 POWDER, FOR SOLUTION ORAL DAILY PRN
Status: DISCONTINUED | OUTPATIENT
Start: 2025-07-13 | End: 2025-07-25 | Stop reason: HOSPADM

## 2025-07-13 RX ORDER — AMOXICILLIN 250 MG
1 CAPSULE ORAL 2 TIMES DAILY PRN
Status: DISCONTINUED | OUTPATIENT
Start: 2025-07-13 | End: 2025-07-25 | Stop reason: HOSPADM

## 2025-07-13 RX ADMIN — NYSTATIN 1 APPLICATION: 100000 POWDER TOPICAL at 17:13

## 2025-07-13 RX ADMIN — CLONAZEPAM 0.25 MG: 0.5 TABLET ORAL at 17:12

## 2025-07-13 RX ADMIN — NYSTATIN 1 APPLICATION: 100000 POWDER TOPICAL at 08:16

## 2025-07-13 RX ADMIN — APIXABAN 5 MG: 5 TABLET, FILM COATED ORAL at 08:17

## 2025-07-13 RX ADMIN — PRAVASTATIN SODIUM 40 MG: 40 TABLET ORAL at 08:17

## 2025-07-13 RX ADMIN — OLANZAPINE 20 MG: 10 TABLET, FILM COATED ORAL at 17:12

## 2025-07-13 RX ADMIN — LEVOTHYROXINE SODIUM 88 MCG: 88 TABLET ORAL at 06:12

## 2025-07-13 RX ADMIN — SENNOSIDES AND DOCUSATE SODIUM 1 TABLET: 8.6; 5 TABLET ORAL at 13:14

## 2025-07-13 RX ADMIN — APIXABAN 5 MG: 5 TABLET, FILM COATED ORAL at 17:12

## 2025-07-13 RX ADMIN — Medication 3 MG: at 21:30

## 2025-07-13 RX ADMIN — DIVALPROEX SODIUM 500 MG: 500 TABLET, FILM COATED, EXTENDED RELEASE ORAL at 21:30

## 2025-07-13 RX ADMIN — LISINOPRIL 5 MG: 5 TABLET ORAL at 08:17

## 2025-07-13 RX ADMIN — TRAZODONE HYDROCHLORIDE 50 MG: 50 TABLET ORAL at 21:41

## 2025-07-14 ENCOUNTER — PATIENT OUTREACH (OUTPATIENT)
Dept: CASE MANAGEMENT | Facility: OTHER | Age: 70
End: 2025-07-14

## 2025-07-14 DIAGNOSIS — Z13.9 ENCOUNTER FOR SCREENING INVOLVING SOCIAL DETERMINANTS OF HEALTH (SDOH): Primary | ICD-10-CM

## 2025-07-14 PROCEDURE — 97161 PT EVAL LOW COMPLEX 20 MIN: CPT

## 2025-07-14 PROCEDURE — 97165 OT EVAL LOW COMPLEX 30 MIN: CPT

## 2025-07-14 PROCEDURE — 99232 SBSQ HOSP IP/OBS MODERATE 35: CPT | Performed by: PSYCHIATRY & NEUROLOGY

## 2025-07-14 RX ADMIN — CYANOCOBALAMIN TAB 500 MCG 500 MCG: 500 TAB at 17:41

## 2025-07-14 RX ADMIN — LEVOTHYROXINE SODIUM 88 MCG: 88 TABLET ORAL at 06:09

## 2025-07-14 RX ADMIN — Medication 3 MG: at 21:46

## 2025-07-14 RX ADMIN — TRAZODONE HYDROCHLORIDE 50 MG: 50 TABLET ORAL at 21:46

## 2025-07-14 RX ADMIN — APIXABAN 5 MG: 5 TABLET, FILM COATED ORAL at 17:37

## 2025-07-14 RX ADMIN — DIVALPROEX SODIUM 500 MG: 500 TABLET, FILM COATED, EXTENDED RELEASE ORAL at 21:46

## 2025-07-14 RX ADMIN — APIXABAN 5 MG: 5 TABLET, FILM COATED ORAL at 08:13

## 2025-07-14 RX ADMIN — OLANZAPINE 20 MG: 10 TABLET, FILM COATED ORAL at 17:38

## 2025-07-14 RX ADMIN — CLONAZEPAM 0.25 MG: 0.5 TABLET ORAL at 17:37

## 2025-07-14 RX ADMIN — PRAVASTATIN SODIUM 40 MG: 40 TABLET ORAL at 08:13

## 2025-07-14 RX ADMIN — LISINOPRIL 5 MG: 5 TABLET ORAL at 08:13

## 2025-07-14 RX ADMIN — NYSTATIN 1 APPLICATION: 100000 POWDER TOPICAL at 17:43

## 2025-07-14 RX ADMIN — HYDROXYZINE HYDROCHLORIDE 25 MG: 25 TABLET, FILM COATED ORAL at 03:12

## 2025-07-14 RX ADMIN — CHOLECALCIFEROL TAB 25 MCG (1000 UNIT) 1000 UNITS: 25 TAB at 17:41

## 2025-07-15 PROCEDURE — 99232 SBSQ HOSP IP/OBS MODERATE 35: CPT | Performed by: PSYCHIATRY & NEUROLOGY

## 2025-07-15 RX ORDER — OLANZAPINE 10 MG/1
20 TABLET, FILM COATED ORAL
Status: DISCONTINUED | OUTPATIENT
Start: 2025-07-15 | End: 2025-07-15

## 2025-07-15 RX ORDER — OLANZAPINE 15 MG/1
15 TABLET, FILM COATED ORAL
Status: DISCONTINUED | OUTPATIENT
Start: 2025-07-15 | End: 2025-07-25 | Stop reason: HOSPADM

## 2025-07-15 RX ADMIN — APIXABAN 5 MG: 5 TABLET, FILM COATED ORAL at 17:09

## 2025-07-15 RX ADMIN — LEVOTHYROXINE SODIUM 88 MCG: 88 TABLET ORAL at 06:11

## 2025-07-15 RX ADMIN — LORAZEPAM 0.5 MG: 0.5 TABLET ORAL at 02:20

## 2025-07-15 RX ADMIN — CYANOCOBALAMIN TAB 500 MCG 500 MCG: 500 TAB at 08:39

## 2025-07-15 RX ADMIN — OLANZAPINE 15 MG: 15 TABLET, FILM COATED ORAL at 21:15

## 2025-07-15 RX ADMIN — APIXABAN 5 MG: 5 TABLET, FILM COATED ORAL at 08:39

## 2025-07-15 RX ADMIN — Medication 3 MG: at 21:15

## 2025-07-15 RX ADMIN — TRAZODONE HYDROCHLORIDE 50 MG: 50 TABLET ORAL at 21:15

## 2025-07-15 RX ADMIN — DIVALPROEX SODIUM 750 MG: 250 TABLET, FILM COATED, EXTENDED RELEASE ORAL at 21:15

## 2025-07-15 RX ADMIN — CHOLECALCIFEROL TAB 25 MCG (1000 UNIT) 1000 UNITS: 25 TAB at 08:39

## 2025-07-15 RX ADMIN — LISINOPRIL 5 MG: 5 TABLET ORAL at 08:39

## 2025-07-15 RX ADMIN — PRAVASTATIN SODIUM 40 MG: 40 TABLET ORAL at 08:39

## 2025-07-16 ENCOUNTER — PATIENT OUTREACH (OUTPATIENT)
Dept: CASE MANAGEMENT | Facility: OTHER | Age: 70
End: 2025-07-16

## 2025-07-16 LAB
BASOPHILS # BLD AUTO: 0.04 THOUSANDS/ÂΜL (ref 0–0.1)
BASOPHILS NFR BLD AUTO: 0 % (ref 0–1)
EOSINOPHIL # BLD AUTO: 0.08 THOUSAND/ÂΜL (ref 0–0.61)
EOSINOPHIL NFR BLD AUTO: 1 % (ref 0–6)
ERYTHROCYTE [DISTWIDTH] IN BLOOD BY AUTOMATED COUNT: 13.1 % (ref 11.6–15.1)
HCT VFR BLD AUTO: 37.2 % (ref 34.8–46.1)
HGB BLD-MCNC: 12 G/DL (ref 11.5–15.4)
IMM GRANULOCYTES # BLD AUTO: 0.06 THOUSAND/UL (ref 0–0.2)
IMM GRANULOCYTES NFR BLD AUTO: 1 % (ref 0–2)
LYMPHOCYTES # BLD AUTO: 1.84 THOUSANDS/ÂΜL (ref 0.6–4.47)
LYMPHOCYTES NFR BLD AUTO: 18 % (ref 14–44)
MCH RBC QN AUTO: 30.8 PG (ref 26.8–34.3)
MCHC RBC AUTO-ENTMCNC: 32.3 G/DL (ref 31.4–37.4)
MCV RBC AUTO: 95 FL (ref 82–98)
MONOCYTES # BLD AUTO: 0.78 THOUSAND/ÂΜL (ref 0.17–1.22)
MONOCYTES NFR BLD AUTO: 8 % (ref 4–12)
NEUTROPHILS # BLD AUTO: 7.59 THOUSANDS/ÂΜL (ref 1.85–7.62)
NEUTS SEG NFR BLD AUTO: 72 % (ref 43–75)
NRBC BLD AUTO-RTO: 0 /100 WBCS
PLATELET # BLD AUTO: 250 THOUSANDS/UL (ref 149–390)
PMV BLD AUTO: 10 FL (ref 8.9–12.7)
RBC # BLD AUTO: 3.9 MILLION/UL (ref 3.81–5.12)
VALPROATE SERPL-MCNC: 60 ÂΜG/ML (ref 50–125)
WBC # BLD AUTO: 10.39 THOUSAND/UL (ref 4.31–10.16)

## 2025-07-16 PROCEDURE — 85025 COMPLETE CBC W/AUTO DIFF WBC: CPT

## 2025-07-16 PROCEDURE — 99232 SBSQ HOSP IP/OBS MODERATE 35: CPT | Performed by: PSYCHIATRY & NEUROLOGY

## 2025-07-16 PROCEDURE — 80164 ASSAY DIPROPYLACETIC ACD TOT: CPT | Performed by: PSYCHIATRY & NEUROLOGY

## 2025-07-16 RX ORDER — TRAZODONE HYDROCHLORIDE 50 MG/1
50 TABLET ORAL
Status: DISCONTINUED | OUTPATIENT
Start: 2025-07-16 | End: 2025-07-17

## 2025-07-16 RX ADMIN — OLANZAPINE 15 MG: 15 TABLET, FILM COATED ORAL at 21:27

## 2025-07-16 RX ADMIN — DIVALPROEX SODIUM 750 MG: 250 TABLET, FILM COATED, EXTENDED RELEASE ORAL at 21:27

## 2025-07-16 RX ADMIN — LEVOTHYROXINE SODIUM 88 MCG: 88 TABLET ORAL at 06:56

## 2025-07-16 RX ADMIN — CHOLECALCIFEROL TAB 25 MCG (1000 UNIT) 1000 UNITS: 25 TAB at 08:52

## 2025-07-16 RX ADMIN — CYANOCOBALAMIN TAB 500 MCG 500 MCG: 500 TAB at 08:52

## 2025-07-16 RX ADMIN — NYSTATIN: 100000 POWDER TOPICAL at 08:55

## 2025-07-16 RX ADMIN — TRAZODONE HYDROCHLORIDE 50 MG: 50 TABLET ORAL at 21:27

## 2025-07-16 RX ADMIN — APIXABAN 5 MG: 5 TABLET, FILM COATED ORAL at 17:05

## 2025-07-16 RX ADMIN — ACETAMINOPHEN 975 MG: 325 TABLET ORAL at 12:04

## 2025-07-16 RX ADMIN — Medication 3 MG: at 21:27

## 2025-07-16 RX ADMIN — LISINOPRIL 5 MG: 5 TABLET ORAL at 08:52

## 2025-07-16 RX ADMIN — PRAVASTATIN SODIUM 40 MG: 40 TABLET ORAL at 08:52

## 2025-07-16 RX ADMIN — APIXABAN 5 MG: 5 TABLET, FILM COATED ORAL at 08:52

## 2025-07-16 NOTE — PROGRESS NOTES
Received referral from [facility] Devi Jiménez requesting that [facility] outreach patient and assess for needs. Per chart review, patient presented to [facility] ED on 7/9 for psychiatric eval due to worsening depression, anhedonia. Patient has a history of Bipolar Disorder, with multiple prior psychiatric admissions, most recent being in April 2025. Patient signed 201 and was accepted at [facility] as of 7/10. [facility] will close referral at this time and available if needed in the future via new order.

## 2025-07-17 PROCEDURE — 99232 SBSQ HOSP IP/OBS MODERATE 35: CPT | Performed by: PSYCHIATRY & NEUROLOGY

## 2025-07-17 RX ORDER — CLOTRIMAZOLE 1 %
CREAM (GRAM) TOPICAL 2 TIMES DAILY
Status: DISCONTINUED | OUTPATIENT
Start: 2025-07-17 | End: 2025-07-25 | Stop reason: HOSPADM

## 2025-07-17 RX ORDER — TRAZODONE HYDROCHLORIDE 150 MG/1
75 TABLET ORAL
Status: DISCONTINUED | OUTPATIENT
Start: 2025-07-17 | End: 2025-07-18

## 2025-07-17 RX ADMIN — NYSTATIN 1 APPLICATION: 100000 POWDER TOPICAL at 08:21

## 2025-07-17 RX ADMIN — LEVOTHYROXINE SODIUM 88 MCG: 88 TABLET ORAL at 05:39

## 2025-07-17 RX ADMIN — TRAZODONE HYDROCHLORIDE 75 MG: 150 TABLET ORAL at 22:03

## 2025-07-17 RX ADMIN — APIXABAN 5 MG: 5 TABLET, FILM COATED ORAL at 17:11

## 2025-07-17 RX ADMIN — CLOTRIMAZOLE: 10 CREAM TOPICAL at 22:03

## 2025-07-17 RX ADMIN — NYSTATIN: 100000 POWDER TOPICAL at 22:03

## 2025-07-17 RX ADMIN — LISINOPRIL 5 MG: 5 TABLET ORAL at 08:19

## 2025-07-17 RX ADMIN — Medication 3 MG: at 22:03

## 2025-07-17 RX ADMIN — DIVALPROEX SODIUM 750 MG: 250 TABLET, FILM COATED, EXTENDED RELEASE ORAL at 22:02

## 2025-07-17 RX ADMIN — CYANOCOBALAMIN TAB 500 MCG 500 MCG: 500 TAB at 08:19

## 2025-07-17 RX ADMIN — PRAVASTATIN SODIUM 40 MG: 40 TABLET ORAL at 08:19

## 2025-07-17 RX ADMIN — OLANZAPINE 15 MG: 15 TABLET, FILM COATED ORAL at 22:03

## 2025-07-17 RX ADMIN — TRAZODONE HYDROCHLORIDE 50 MG: 50 TABLET ORAL at 02:24

## 2025-07-17 RX ADMIN — APIXABAN 5 MG: 5 TABLET, FILM COATED ORAL at 08:19

## 2025-07-17 RX ADMIN — CHOLECALCIFEROL TAB 25 MCG (1000 UNIT) 1000 UNITS: 25 TAB at 08:19

## 2025-07-18 PROCEDURE — 99232 SBSQ HOSP IP/OBS MODERATE 35: CPT | Performed by: PSYCHIATRY & NEUROLOGY

## 2025-07-18 RX ORDER — TRAZODONE HYDROCHLORIDE 100 MG/1
100 TABLET ORAL
Status: DISCONTINUED | OUTPATIENT
Start: 2025-07-18 | End: 2025-07-25 | Stop reason: HOSPADM

## 2025-07-18 RX ADMIN — LISINOPRIL 5 MG: 5 TABLET ORAL at 08:07

## 2025-07-18 RX ADMIN — LEVOTHYROXINE SODIUM 88 MCG: 88 TABLET ORAL at 06:01

## 2025-07-18 RX ADMIN — CLOTRIMAZOLE 1 APPLICATION: 10 CREAM TOPICAL at 21:49

## 2025-07-18 RX ADMIN — NYSTATIN 1 APPLICATION: 100000 POWDER TOPICAL at 17:07

## 2025-07-18 RX ADMIN — CLOTRIMAZOLE 1 APPLICATION: 10 CREAM TOPICAL at 08:08

## 2025-07-18 RX ADMIN — CHOLECALCIFEROL TAB 25 MCG (1000 UNIT) 1000 UNITS: 25 TAB at 08:07

## 2025-07-18 RX ADMIN — CYANOCOBALAMIN TAB 500 MCG 500 MCG: 500 TAB at 08:07

## 2025-07-18 RX ADMIN — NYSTATIN: 100000 POWDER TOPICAL at 08:07

## 2025-07-18 RX ADMIN — APIXABAN 5 MG: 5 TABLET, FILM COATED ORAL at 17:07

## 2025-07-18 RX ADMIN — PRAVASTATIN SODIUM 40 MG: 40 TABLET ORAL at 08:07

## 2025-07-18 RX ADMIN — DIVALPROEX SODIUM 750 MG: 250 TABLET, FILM COATED, EXTENDED RELEASE ORAL at 21:47

## 2025-07-18 RX ADMIN — Medication 3 MG: at 21:47

## 2025-07-18 RX ADMIN — APIXABAN 5 MG: 5 TABLET, FILM COATED ORAL at 08:07

## 2025-07-18 RX ADMIN — TRAZODONE HYDROCHLORIDE 100 MG: 100 TABLET ORAL at 21:47

## 2025-07-18 RX ADMIN — OLANZAPINE 15 MG: 15 TABLET, FILM COATED ORAL at 21:47

## 2025-07-19 PROCEDURE — 99232 SBSQ HOSP IP/OBS MODERATE 35: CPT | Performed by: PHYSICIAN ASSISTANT

## 2025-07-19 RX ADMIN — TRAZODONE HYDROCHLORIDE 50 MG: 50 TABLET ORAL at 00:48

## 2025-07-19 RX ADMIN — TRAZODONE HYDROCHLORIDE 100 MG: 100 TABLET ORAL at 21:38

## 2025-07-19 RX ADMIN — OLANZAPINE 15 MG: 15 TABLET, FILM COATED ORAL at 21:38

## 2025-07-19 RX ADMIN — NYSTATIN 1 APPLICATION: 100000 POWDER TOPICAL at 17:29

## 2025-07-19 RX ADMIN — NYSTATIN 1 APPLICATION: 100000 POWDER TOPICAL at 08:20

## 2025-07-19 RX ADMIN — DIVALPROEX SODIUM 750 MG: 250 TABLET, FILM COATED, EXTENDED RELEASE ORAL at 21:38

## 2025-07-19 RX ADMIN — APIXABAN 5 MG: 5 TABLET, FILM COATED ORAL at 17:29

## 2025-07-19 RX ADMIN — CLOTRIMAZOLE 1 APPLICATION: 10 CREAM TOPICAL at 08:19

## 2025-07-19 RX ADMIN — CHOLECALCIFEROL TAB 25 MCG (1000 UNIT) 1000 UNITS: 25 TAB at 08:18

## 2025-07-19 RX ADMIN — CLOTRIMAZOLE: 10 CREAM TOPICAL at 21:39

## 2025-07-19 RX ADMIN — PRAVASTATIN SODIUM 40 MG: 40 TABLET ORAL at 08:18

## 2025-07-19 RX ADMIN — Medication 3 MG: at 21:38

## 2025-07-19 RX ADMIN — APIXABAN 5 MG: 5 TABLET, FILM COATED ORAL at 08:18

## 2025-07-19 RX ADMIN — LISINOPRIL 5 MG: 5 TABLET ORAL at 08:18

## 2025-07-19 RX ADMIN — CYANOCOBALAMIN TAB 500 MCG 500 MCG: 500 TAB at 08:18

## 2025-07-19 RX ADMIN — LEVOTHYROXINE SODIUM 88 MCG: 88 TABLET ORAL at 06:02

## 2025-07-20 PROCEDURE — 99232 SBSQ HOSP IP/OBS MODERATE 35: CPT | Performed by: NURSE PRACTITIONER

## 2025-07-20 RX ADMIN — LEVOTHYROXINE SODIUM 88 MCG: 88 TABLET ORAL at 06:21

## 2025-07-20 RX ADMIN — CYANOCOBALAMIN TAB 500 MCG 500 MCG: 500 TAB at 08:07

## 2025-07-20 RX ADMIN — APIXABAN 5 MG: 5 TABLET, FILM COATED ORAL at 08:07

## 2025-07-20 RX ADMIN — CHOLECALCIFEROL TAB 25 MCG (1000 UNIT) 1000 UNITS: 25 TAB at 08:07

## 2025-07-20 RX ADMIN — Medication 3 MG: at 21:24

## 2025-07-20 RX ADMIN — OLANZAPINE 15 MG: 15 TABLET, FILM COATED ORAL at 21:24

## 2025-07-20 RX ADMIN — PRAVASTATIN SODIUM 40 MG: 40 TABLET ORAL at 08:08

## 2025-07-20 RX ADMIN — TRAZODONE HYDROCHLORIDE 50 MG: 50 TABLET ORAL at 00:56

## 2025-07-20 RX ADMIN — TRAZODONE HYDROCHLORIDE 100 MG: 100 TABLET ORAL at 21:24

## 2025-07-20 RX ADMIN — CLOTRIMAZOLE: 10 CREAM TOPICAL at 21:25

## 2025-07-20 RX ADMIN — DIVALPROEX SODIUM 750 MG: 250 TABLET, FILM COATED, EXTENDED RELEASE ORAL at 21:24

## 2025-07-20 RX ADMIN — LISINOPRIL 5 MG: 5 TABLET ORAL at 08:07

## 2025-07-20 RX ADMIN — NYSTATIN 1 APPLICATION: 100000 POWDER TOPICAL at 08:08

## 2025-07-20 RX ADMIN — CLOTRIMAZOLE 1 APPLICATION: 10 CREAM TOPICAL at 08:08

## 2025-07-20 RX ADMIN — APIXABAN 5 MG: 5 TABLET, FILM COATED ORAL at 17:07

## 2025-07-20 RX ADMIN — NYSTATIN 1 APPLICATION: 100000 POWDER TOPICAL at 17:07

## 2025-07-21 PROCEDURE — 99232 SBSQ HOSP IP/OBS MODERATE 35: CPT | Performed by: PSYCHIATRY & NEUROLOGY

## 2025-07-21 RX ADMIN — NYSTATIN: 100000 POWDER TOPICAL at 08:42

## 2025-07-21 RX ADMIN — NYSTATIN: 100000 POWDER TOPICAL at 17:05

## 2025-07-21 RX ADMIN — CLOTRIMAZOLE: 10 CREAM TOPICAL at 21:37

## 2025-07-21 RX ADMIN — TRAZODONE HYDROCHLORIDE 100 MG: 100 TABLET ORAL at 21:38

## 2025-07-21 RX ADMIN — Medication 3 MG: at 21:37

## 2025-07-21 RX ADMIN — PRAVASTATIN SODIUM 40 MG: 40 TABLET ORAL at 08:41

## 2025-07-21 RX ADMIN — APIXABAN 5 MG: 5 TABLET, FILM COATED ORAL at 08:41

## 2025-07-21 RX ADMIN — CHOLECALCIFEROL TAB 25 MCG (1000 UNIT) 1000 UNITS: 25 TAB at 08:41

## 2025-07-21 RX ADMIN — APIXABAN 5 MG: 5 TABLET, FILM COATED ORAL at 17:04

## 2025-07-21 RX ADMIN — OLANZAPINE 15 MG: 15 TABLET, FILM COATED ORAL at 21:37

## 2025-07-21 RX ADMIN — DIVALPROEX SODIUM 750 MG: 250 TABLET, FILM COATED, EXTENDED RELEASE ORAL at 21:37

## 2025-07-21 RX ADMIN — LISINOPRIL 5 MG: 5 TABLET ORAL at 08:41

## 2025-07-21 RX ADMIN — CYANOCOBALAMIN TAB 500 MCG 500 MCG: 500 TAB at 08:41

## 2025-07-21 RX ADMIN — CLOTRIMAZOLE: 10 CREAM TOPICAL at 08:41

## 2025-07-22 PROCEDURE — 99232 SBSQ HOSP IP/OBS MODERATE 35: CPT | Performed by: PSYCHIATRY & NEUROLOGY

## 2025-07-22 RX ORDER — LEVOTHYROXINE SODIUM 88 UG/1
88 TABLET ORAL
Qty: 30 TABLET | Refills: 0 | Status: SHIPPED | OUTPATIENT
Start: 2025-07-22

## 2025-07-22 RX ORDER — PRAVASTATIN SODIUM 40 MG
40 TABLET ORAL DAILY
Qty: 30 TABLET | Refills: 0 | Status: SHIPPED | OUTPATIENT
Start: 2025-07-22

## 2025-07-22 RX ORDER — NYSTATIN 100000 [USP'U]/G
POWDER TOPICAL 2 TIMES DAILY
Qty: 60 G | Refills: 0 | Status: SHIPPED | OUTPATIENT
Start: 2025-07-22

## 2025-07-22 RX ORDER — LISINOPRIL 5 MG/1
5 TABLET ORAL DAILY
Qty: 30 TABLET | Refills: 0 | Status: SHIPPED | OUTPATIENT
Start: 2025-07-22

## 2025-07-22 RX ORDER — CLOTRIMAZOLE 1 %
CREAM (GRAM) TOPICAL 2 TIMES DAILY
Qty: 85 G | Refills: 0 | Status: SHIPPED | OUTPATIENT
Start: 2025-07-22

## 2025-07-22 RX ORDER — DIVALPROEX SODIUM 500 MG/1
500 TABLET, FILM COATED, EXTENDED RELEASE ORAL
Status: DISCONTINUED | OUTPATIENT
Start: 2025-07-22 | End: 2025-07-25 | Stop reason: HOSPADM

## 2025-07-22 RX ADMIN — PRAVASTATIN SODIUM 40 MG: 40 TABLET ORAL at 08:30

## 2025-07-22 RX ADMIN — CHOLECALCIFEROL TAB 25 MCG (1000 UNIT) 1000 UNITS: 25 TAB at 08:30

## 2025-07-22 RX ADMIN — LISINOPRIL 5 MG: 5 TABLET ORAL at 08:30

## 2025-07-22 RX ADMIN — NYSTATIN: 100000 POWDER TOPICAL at 17:46

## 2025-07-22 RX ADMIN — CLOTRIMAZOLE 1 APPLICATION: 10 CREAM TOPICAL at 21:34

## 2025-07-22 RX ADMIN — OLANZAPINE 15 MG: 15 TABLET, FILM COATED ORAL at 21:28

## 2025-07-22 RX ADMIN — LEVOTHYROXINE SODIUM 88 MCG: 88 TABLET ORAL at 05:52

## 2025-07-22 RX ADMIN — Medication 3 MG: at 21:28

## 2025-07-22 RX ADMIN — CYANOCOBALAMIN TAB 500 MCG 500 MCG: 500 TAB at 08:30

## 2025-07-22 RX ADMIN — DIVALPROEX SODIUM 500 MG: 500 TABLET, FILM COATED, EXTENDED RELEASE ORAL at 21:28

## 2025-07-22 RX ADMIN — APIXABAN 5 MG: 5 TABLET, FILM COATED ORAL at 17:46

## 2025-07-22 RX ADMIN — APIXABAN 5 MG: 5 TABLET, FILM COATED ORAL at 08:30

## 2025-07-22 RX ADMIN — TRAZODONE HYDROCHLORIDE 100 MG: 100 TABLET ORAL at 21:28

## 2025-07-23 PROCEDURE — 99232 SBSQ HOSP IP/OBS MODERATE 35: CPT | Performed by: PSYCHIATRY & NEUROLOGY

## 2025-07-23 RX ADMIN — LISINOPRIL 5 MG: 5 TABLET ORAL at 08:50

## 2025-07-23 RX ADMIN — CYANOCOBALAMIN TAB 500 MCG 500 MCG: 500 TAB at 08:50

## 2025-07-23 RX ADMIN — TRAZODONE HYDROCHLORIDE 100 MG: 100 TABLET ORAL at 21:20

## 2025-07-23 RX ADMIN — LEVOTHYROXINE SODIUM 88 MCG: 88 TABLET ORAL at 06:13

## 2025-07-23 RX ADMIN — CLOTRIMAZOLE 1 APPLICATION: 10 CREAM TOPICAL at 21:21

## 2025-07-23 RX ADMIN — APIXABAN 5 MG: 5 TABLET, FILM COATED ORAL at 17:34

## 2025-07-23 RX ADMIN — OLANZAPINE 15 MG: 15 TABLET, FILM COATED ORAL at 21:20

## 2025-07-23 RX ADMIN — CHOLECALCIFEROL TAB 25 MCG (1000 UNIT) 1000 UNITS: 25 TAB at 08:50

## 2025-07-23 RX ADMIN — APIXABAN 5 MG: 5 TABLET, FILM COATED ORAL at 08:50

## 2025-07-23 RX ADMIN — Medication 3 MG: at 21:20

## 2025-07-23 RX ADMIN — PRAVASTATIN SODIUM 40 MG: 40 TABLET ORAL at 08:49

## 2025-07-23 RX ADMIN — DIVALPROEX SODIUM 500 MG: 500 TABLET, FILM COATED, EXTENDED RELEASE ORAL at 21:20

## 2025-07-24 PROCEDURE — 99232 SBSQ HOSP IP/OBS MODERATE 35: CPT | Performed by: PSYCHIATRY & NEUROLOGY

## 2025-07-24 RX ORDER — DIVALPROEX SODIUM 500 MG/1
500 TABLET, FILM COATED, EXTENDED RELEASE ORAL
Qty: 30 TABLET | Refills: 0 | Status: SHIPPED | OUTPATIENT
Start: 2025-07-24

## 2025-07-24 RX ORDER — TRAZODONE HYDROCHLORIDE 100 MG/1
100 TABLET ORAL
Qty: 30 TABLET | Refills: 0 | Status: SHIPPED | OUTPATIENT
Start: 2025-07-24

## 2025-07-24 RX ORDER — OLANZAPINE 15 MG/1
15 TABLET, FILM COATED ORAL
Qty: 30 TABLET | Refills: 0 | Status: SHIPPED | OUTPATIENT
Start: 2025-07-24

## 2025-07-24 RX ADMIN — PRAVASTATIN SODIUM 40 MG: 40 TABLET ORAL at 08:35

## 2025-07-24 RX ADMIN — LEVOTHYROXINE SODIUM 88 MCG: 88 TABLET ORAL at 05:52

## 2025-07-24 RX ADMIN — CLOTRIMAZOLE: 10 CREAM TOPICAL at 21:38

## 2025-07-24 RX ADMIN — OLANZAPINE 15 MG: 15 TABLET, FILM COATED ORAL at 21:28

## 2025-07-24 RX ADMIN — DIVALPROEX SODIUM 500 MG: 500 TABLET, FILM COATED, EXTENDED RELEASE ORAL at 21:28

## 2025-07-24 RX ADMIN — LISINOPRIL 5 MG: 5 TABLET ORAL at 08:35

## 2025-07-24 RX ADMIN — CLOTRIMAZOLE 1 APPLICATION: 10 CREAM TOPICAL at 09:09

## 2025-07-24 RX ADMIN — APIXABAN 5 MG: 5 TABLET, FILM COATED ORAL at 08:35

## 2025-07-24 RX ADMIN — CYANOCOBALAMIN TAB 500 MCG 500 MCG: 500 TAB at 08:35

## 2025-07-24 RX ADMIN — CHOLECALCIFEROL TAB 25 MCG (1000 UNIT) 1000 UNITS: 25 TAB at 08:35

## 2025-07-24 RX ADMIN — Medication 3 MG: at 21:28

## 2025-07-24 RX ADMIN — NYSTATIN 1 APPLICATION: 100000 POWDER TOPICAL at 09:09

## 2025-07-24 RX ADMIN — NYSTATIN 1 APPLICATION: 100000 POWDER TOPICAL at 17:14

## 2025-07-24 RX ADMIN — TRAZODONE HYDROCHLORIDE 100 MG: 100 TABLET ORAL at 21:28

## 2025-07-24 RX ADMIN — APIXABAN 5 MG: 5 TABLET, FILM COATED ORAL at 17:14

## 2025-07-25 VITALS
SYSTOLIC BLOOD PRESSURE: 130 MMHG | RESPIRATION RATE: 16 BRPM | HEIGHT: 60 IN | WEIGHT: 172.2 LBS | DIASTOLIC BLOOD PRESSURE: 60 MMHG | HEART RATE: 78 BPM | TEMPERATURE: 97.7 F | BODY MASS INDEX: 33.81 KG/M2 | OXYGEN SATURATION: 95 %

## 2025-07-25 PROBLEM — F31.32 BIPOLAR AFFECTIVE DISORDER, CURRENTLY DEPRESSED, MODERATE (HCC): Status: RESOLVED | Noted: 2025-07-11 | Resolved: 2025-07-25

## 2025-07-25 PROBLEM — G31.84 MCI (MILD COGNITIVE IMPAIRMENT): Status: ACTIVE | Noted: 2025-07-25

## 2025-07-25 PROCEDURE — 99238 HOSP IP/OBS DSCHRG MGMT 30/<: CPT | Performed by: PSYCHIATRY & NEUROLOGY

## 2025-07-25 RX ADMIN — LEVOTHYROXINE SODIUM 88 MCG: 88 TABLET ORAL at 05:54

## 2025-07-25 RX ADMIN — LISINOPRIL 5 MG: 5 TABLET ORAL at 08:23

## 2025-07-25 RX ADMIN — NYSTATIN: 100000 POWDER TOPICAL at 08:24

## 2025-07-25 RX ADMIN — CYANOCOBALAMIN TAB 500 MCG 500 MCG: 500 TAB at 08:23

## 2025-07-25 RX ADMIN — APIXABAN 5 MG: 5 TABLET, FILM COATED ORAL at 08:23

## 2025-07-25 RX ADMIN — PRAVASTATIN SODIUM 40 MG: 40 TABLET ORAL at 08:23

## 2025-07-25 RX ADMIN — CHOLECALCIFEROL TAB 25 MCG (1000 UNIT) 1000 UNITS: 25 TAB at 08:23

## 2025-07-29 ENCOUNTER — HOSPITAL ENCOUNTER (EMERGENCY)
Facility: HOSPITAL | Age: 70
Discharge: HOME | End: 2025-07-29
Attending: EMERGENCY MEDICINE | Admitting: EMERGENCY MEDICINE
Payer: COMMERCIAL

## 2025-07-29 VITALS
SYSTOLIC BLOOD PRESSURE: 163 MMHG | RESPIRATION RATE: 16 BRPM | DIASTOLIC BLOOD PRESSURE: 72 MMHG | OXYGEN SATURATION: 96 % | TEMPERATURE: 98.6 F | HEART RATE: 99 BPM

## 2025-07-29 DIAGNOSIS — F32.A DEPRESSION: Primary | ICD-10-CM

## 2025-07-29 LAB
ALBUMIN SERPL BCG-MCNC: 3.8 G/DL (ref 3.5–5)
ALP SERPL-CCNC: 70 U/L (ref 34–104)
ALT SERPL W P-5'-P-CCNC: 13 U/L (ref 7–52)
AMPHETAMINES SERPL QL SCN: NEGATIVE
ANION GAP SERPL CALCULATED.3IONS-SCNC: 12 MMOL/L (ref 4–13)
AST SERPL W P-5'-P-CCNC: 15 U/L (ref 13–39)
BACTERIA UR QL AUTO: ABNORMAL /HPF
BARBITURATES UR QL: NEGATIVE
BASOPHILS # BLD AUTO: 0.05 THOUSANDS/ÂΜL (ref 0–0.1)
BASOPHILS NFR BLD AUTO: 1 % (ref 0–1)
BENZODIAZ UR QL: NEGATIVE
BILIRUB SERPL-MCNC: 1.02 MG/DL (ref 0.2–1)
BILIRUB UR QL STRIP: ABNORMAL
BUN SERPL-MCNC: 13 MG/DL (ref 5–25)
CALCIUM SERPL-MCNC: 9.5 MG/DL (ref 8.4–10.2)
CHLORIDE SERPL-SCNC: 107 MMOL/L (ref 96–108)
CLARITY UR: CLEAR
CO2 SERPL-SCNC: 24 MMOL/L (ref 21–32)
COCAINE UR QL: NEGATIVE
COLOR UR: YELLOW
CREAT SERPL-MCNC: 0.87 MG/DL (ref 0.6–1.3)
EOSINOPHIL # BLD AUTO: 0.04 THOUSAND/ÂΜL (ref 0–0.61)
EOSINOPHIL NFR BLD AUTO: 1 % (ref 0–6)
ERYTHROCYTE [DISTWIDTH] IN BLOOD BY AUTOMATED COUNT: 12.3 % (ref 11.6–15.1)
ETHANOL EXG-MCNC: 0 MG/DL
FENTANYL UR QL SCN: NEGATIVE
GFR SERPL CREATININE-BSD FRML MDRD: 68 ML/MIN/1.73SQ M
GLUCOSE SERPL-MCNC: 94 MG/DL (ref 65–140)
GLUCOSE UR STRIP-MCNC: NEGATIVE MG/DL
HCT VFR BLD AUTO: 36.5 % (ref 34.8–46.1)
HGB BLD-MCNC: 12.3 G/DL (ref 11.5–15.4)
HGB UR QL STRIP.AUTO: NEGATIVE
HYDROCODONE UR QL SCN: NEGATIVE
IMM GRANULOCYTES # BLD AUTO: 0.11 THOUSAND/UL (ref 0–0.2)
IMM GRANULOCYTES NFR BLD AUTO: 1 % (ref 0–2)
KETONES UR STRIP-MCNC: ABNORMAL MG/DL
LEUKOCYTE ESTERASE UR QL STRIP: ABNORMAL
LYMPHOCYTES # BLD AUTO: 1.44 THOUSANDS/ÂΜL (ref 0.6–4.47)
LYMPHOCYTES NFR BLD AUTO: 16 % (ref 14–44)
MCH RBC QN AUTO: 30.6 PG (ref 26.8–34.3)
MCHC RBC AUTO-ENTMCNC: 33.7 G/DL (ref 31.4–37.4)
MCV RBC AUTO: 91 FL (ref 82–98)
METHADONE UR QL: NEGATIVE
MONOCYTES # BLD AUTO: 0.78 THOUSAND/ÂΜL (ref 0.17–1.22)
MONOCYTES NFR BLD AUTO: 9 % (ref 4–12)
MUCOUS THREADS UR QL AUTO: ABNORMAL
NEUTROPHILS # BLD AUTO: 6.37 THOUSANDS/ÂΜL (ref 1.85–7.62)
NEUTS SEG NFR BLD AUTO: 72 % (ref 43–75)
NITRITE UR QL STRIP: NEGATIVE
NON-SQ EPI CELLS URNS QL MICRO: ABNORMAL /HPF
NRBC BLD AUTO-RTO: 0 /100 WBCS
OPIATES UR QL SCN: NEGATIVE
OXYCODONE+OXYMORPHONE UR QL SCN: NEGATIVE
PCP UR QL: NEGATIVE
PH UR STRIP.AUTO: 6 [PH]
PLATELET # BLD AUTO: 284 THOUSANDS/UL (ref 149–390)
PMV BLD AUTO: 9.4 FL (ref 8.9–12.7)
POTASSIUM SERPL-SCNC: 3.7 MMOL/L (ref 3.5–5.3)
PROT SERPL-MCNC: 5.9 G/DL (ref 6.4–8.4)
PROT UR STRIP-MCNC: NEGATIVE MG/DL
RBC # BLD AUTO: 4.02 MILLION/UL (ref 3.81–5.12)
RBC #/AREA URNS AUTO: ABNORMAL /HPF
SODIUM SERPL-SCNC: 143 MMOL/L (ref 135–147)
SP GR UR STRIP.AUTO: 1.02
THC UR QL: NEGATIVE
TSH SERPL DL<=0.05 MIU/L-ACNC: 13.59 UIU/ML (ref 0.45–4.5)
UROBILINOGEN UR QL STRIP.AUTO: 1 E.U./DL
VALPROATE SERPL-MCNC: <10 ÂΜG/ML (ref 50–125)
WBC # BLD AUTO: 8.79 THOUSAND/UL (ref 4.31–10.16)
WBC #/AREA URNS AUTO: ABNORMAL /HPF

## 2025-07-29 PROCEDURE — 80164 ASSAY DIPROPYLACETIC ACD TOT: CPT | Performed by: EMERGENCY MEDICINE

## 2025-07-29 PROCEDURE — 85025 COMPLETE CBC W/AUTO DIFF WBC: CPT | Performed by: EMERGENCY MEDICINE

## 2025-07-29 PROCEDURE — 80053 COMPREHEN METABOLIC PANEL: CPT | Performed by: EMERGENCY MEDICINE

## 2025-07-29 PROCEDURE — 99284 EMERGENCY DEPT VISIT MOD MDM: CPT | Performed by: EMERGENCY MEDICINE

## 2025-07-29 PROCEDURE — 93005 ELECTROCARDIOGRAM TRACING: CPT

## 2025-07-29 PROCEDURE — 99284 EMERGENCY DEPT VISIT MOD MDM: CPT

## 2025-07-29 PROCEDURE — 82075 ASSAY OF BREATH ETHANOL: CPT | Performed by: EMERGENCY MEDICINE

## 2025-07-29 PROCEDURE — 36415 COLL VENOUS BLD VENIPUNCTURE: CPT | Performed by: EMERGENCY MEDICINE

## 2025-07-29 PROCEDURE — 80307 DRUG TEST PRSMV CHEM ANLYZR: CPT | Performed by: EMERGENCY MEDICINE

## 2025-07-29 PROCEDURE — 81001 URINALYSIS AUTO W/SCOPE: CPT | Performed by: EMERGENCY MEDICINE

## 2025-07-29 PROCEDURE — 81003 URINALYSIS AUTO W/O SCOPE: CPT | Performed by: EMERGENCY MEDICINE

## 2025-07-29 PROCEDURE — 84443 ASSAY THYROID STIM HORMONE: CPT | Performed by: EMERGENCY MEDICINE

## 2025-07-30 LAB
ATRIAL RATE: 80 BPM
P AXIS: 76 DEGREES
PR INTERVAL: 148 MS
QRS AXIS: 34 DEGREES
QRSD INTERVAL: 80 MS
QT INTERVAL: 360 MS
QTC INTERVAL: 415 MS
T WAVE AXIS: 57 DEGREES
VENTRICULAR RATE: 80 BPM

## 2025-07-30 PROCEDURE — 93010 ELECTROCARDIOGRAM REPORT: CPT | Performed by: INTERNAL MEDICINE

## 2025-07-31 ENCOUNTER — TELEPHONE (OUTPATIENT)
Age: 70
End: 2025-07-31

## 2025-08-08 ENCOUNTER — HOSPITAL ENCOUNTER (EMERGENCY)
Facility: HOSPITAL | Age: 70
End: 2025-08-11
Attending: EMERGENCY MEDICINE | Admitting: EMERGENCY MEDICINE
Payer: COMMERCIAL

## 2025-08-12 PROBLEM — G47.00 INSOMNIA: Status: ACTIVE | Noted: 2025-08-12
